# Patient Record
Sex: FEMALE | Race: WHITE | Employment: OTHER | URBAN - NONMETROPOLITAN AREA
[De-identification: names, ages, dates, MRNs, and addresses within clinical notes are randomized per-mention and may not be internally consistent; named-entity substitution may affect disease eponyms.]

---

## 2017-01-20 ENCOUNTER — TELEPHONE (OUTPATIENT)
Dept: CARDIOLOGY | Age: 77
End: 2017-01-20

## 2017-01-20 DIAGNOSIS — I73.9 PAD (PERIPHERAL ARTERY DISEASE) (HCC): Primary | ICD-10-CM

## 2017-02-15 ENCOUNTER — OFFICE VISIT (OUTPATIENT)
Dept: CARDIOLOGY | Age: 77
End: 2017-02-15

## 2017-02-15 VITALS
DIASTOLIC BLOOD PRESSURE: 68 MMHG | SYSTOLIC BLOOD PRESSURE: 138 MMHG | BODY MASS INDEX: 28.88 KG/M2 | HEIGHT: 67 IN | HEART RATE: 80 BPM | WEIGHT: 184 LBS

## 2017-02-15 DIAGNOSIS — E78.5 DYSLIPIDEMIA: ICD-10-CM

## 2017-02-15 DIAGNOSIS — I73.9 PAD (PERIPHERAL ARTERY DISEASE) (HCC): ICD-10-CM

## 2017-02-15 DIAGNOSIS — I25.10 CORONARY ARTERY DISEASE INVOLVING NATIVE CORONARY ARTERY OF NATIVE HEART WITHOUT ANGINA PECTORIS: ICD-10-CM

## 2017-02-15 DIAGNOSIS — Z95.1 S/P CABG (CORONARY ARTERY BYPASS GRAFT): ICD-10-CM

## 2017-02-15 DIAGNOSIS — I10 ESSENTIAL HYPERTENSION: ICD-10-CM

## 2017-02-15 DIAGNOSIS — R60.0 PEDAL EDEMA: Primary | ICD-10-CM

## 2017-02-15 PROCEDURE — 99213 OFFICE O/P EST LOW 20 MIN: CPT | Performed by: PHYSICIAN ASSISTANT

## 2017-02-15 RX ORDER — HYDROCHLOROTHIAZIDE 12.5 MG/1
12.5 CAPSULE, GELATIN COATED ORAL DAILY
Qty: 30 CAPSULE | Refills: 3 | Status: SHIPPED | OUTPATIENT
Start: 2017-02-15 | End: 2017-04-04 | Stop reason: SDUPTHER

## 2017-04-04 RX ORDER — HYDROCHLOROTHIAZIDE 12.5 MG/1
12.5 CAPSULE, GELATIN COATED ORAL DAILY
Qty: 90 CAPSULE | Refills: 0 | Status: SHIPPED | OUTPATIENT
Start: 2017-04-04 | End: 2017-05-18 | Stop reason: SDUPTHER

## 2017-04-06 RX ORDER — HYDROCHLOROTHIAZIDE 12.5 MG/1
12.5 CAPSULE, GELATIN COATED ORAL DAILY
Qty: 90 CAPSULE | Refills: 0 | OUTPATIENT
Start: 2017-04-06

## 2017-05-09 ENCOUNTER — OFFICE VISIT (OUTPATIENT)
Dept: FAMILY MEDICINE CLINIC | Age: 77
End: 2017-05-09

## 2017-05-09 VITALS
DIASTOLIC BLOOD PRESSURE: 64 MMHG | BODY MASS INDEX: 29.73 KG/M2 | WEIGHT: 185 LBS | TEMPERATURE: 97.8 F | HEART RATE: 67 BPM | HEIGHT: 66 IN | SYSTOLIC BLOOD PRESSURE: 110 MMHG | RESPIRATION RATE: 14 BRPM

## 2017-05-09 DIAGNOSIS — E78.00 PURE HYPERCHOLESTEROLEMIA: ICD-10-CM

## 2017-05-09 DIAGNOSIS — E11.9 TYPE 2 DIABETES MELLITUS WITHOUT COMPLICATION, WITHOUT LONG-TERM CURRENT USE OF INSULIN (HCC): Primary | ICD-10-CM

## 2017-05-09 DIAGNOSIS — Z95.1 S/P CABG X 3: ICD-10-CM

## 2017-05-09 DIAGNOSIS — M54.32 LEFT SIDED SCIATICA: ICD-10-CM

## 2017-05-09 DIAGNOSIS — Z91.81 AT HIGH RISK FOR FALLS: ICD-10-CM

## 2017-05-09 DIAGNOSIS — M25.551 HIP PAIN, RIGHT: ICD-10-CM

## 2017-05-09 LAB — HBA1C MFR BLD: 6.4 %

## 2017-05-09 PROCEDURE — 83036 HEMOGLOBIN GLYCOSYLATED A1C: CPT | Performed by: FAMILY MEDICINE

## 2017-05-09 PROCEDURE — 99214 OFFICE O/P EST MOD 30 MIN: CPT | Performed by: FAMILY MEDICINE

## 2017-05-09 RX ORDER — SIMVASTATIN 40 MG
TABLET ORAL
Qty: 90 TABLET | Refills: 3 | Status: SHIPPED | OUTPATIENT
Start: 2017-05-09 | End: 2018-06-04 | Stop reason: SDUPTHER

## 2017-05-09 RX ORDER — TRAMADOL HYDROCHLORIDE 50 MG/1
50 TABLET ORAL EVERY 8 HOURS PRN
Qty: 90 TABLET | Refills: 1 | Status: SHIPPED | OUTPATIENT
Start: 2017-05-09 | End: 2017-06-08

## 2017-05-18 ENCOUNTER — OFFICE VISIT (OUTPATIENT)
Dept: CARDIOLOGY | Age: 77
End: 2017-05-18

## 2017-05-18 VITALS
BODY MASS INDEX: 29.63 KG/M2 | HEART RATE: 80 BPM | WEIGHT: 184.4 LBS | DIASTOLIC BLOOD PRESSURE: 60 MMHG | SYSTOLIC BLOOD PRESSURE: 110 MMHG | HEIGHT: 66 IN

## 2017-05-18 DIAGNOSIS — I73.9 PAD (PERIPHERAL ARTERY DISEASE) (HCC): ICD-10-CM

## 2017-05-18 DIAGNOSIS — Z95.1 S/P CABG (CORONARY ARTERY BYPASS GRAFT): ICD-10-CM

## 2017-05-18 DIAGNOSIS — E11.59 TYPE 2 DIABETES MELLITUS WITH OTHER CIRCULATORY COMPLICATION: ICD-10-CM

## 2017-05-18 DIAGNOSIS — I25.10 CORONARY ARTERY DISEASE INVOLVING NATIVE CORONARY ARTERY OF NATIVE HEART WITHOUT ANGINA PECTORIS: Primary | ICD-10-CM

## 2017-05-18 DIAGNOSIS — E78.5 DYSLIPIDEMIA: ICD-10-CM

## 2017-05-18 DIAGNOSIS — I10 ESSENTIAL HYPERTENSION: ICD-10-CM

## 2017-05-18 PROCEDURE — 99213 OFFICE O/P EST LOW 20 MIN: CPT | Performed by: INTERNAL MEDICINE

## 2017-05-18 RX ORDER — HYDROCHLOROTHIAZIDE 12.5 MG/1
12.5 CAPSULE, GELATIN COATED ORAL DAILY
Qty: 90 CAPSULE | Refills: 3 | Status: ON HOLD | OUTPATIENT
Start: 2017-05-18 | End: 2018-03-03 | Stop reason: HOSPADM

## 2017-06-15 ENCOUNTER — TELEPHONE (OUTPATIENT)
Dept: FAMILY MEDICINE CLINIC | Age: 77
End: 2017-06-15

## 2017-07-07 ENCOUNTER — TELEPHONE (OUTPATIENT)
Dept: CARDIOLOGY | Age: 77
End: 2017-07-07

## 2017-07-20 ENCOUNTER — OFFICE VISIT (OUTPATIENT)
Dept: FAMILY MEDICINE CLINIC | Age: 77
End: 2017-07-20
Payer: MEDICARE

## 2017-07-20 VITALS
DIASTOLIC BLOOD PRESSURE: 68 MMHG | RESPIRATION RATE: 12 BRPM | BODY MASS INDEX: 28.73 KG/M2 | HEART RATE: 80 BPM | SYSTOLIC BLOOD PRESSURE: 132 MMHG | WEIGHT: 178.8 LBS | HEIGHT: 66 IN | TEMPERATURE: 99 F

## 2017-07-20 DIAGNOSIS — H57.89 EYE IRRITATION: Primary | ICD-10-CM

## 2017-07-20 DIAGNOSIS — Z85.828 HISTORY OF BASAL CELL CARCINOMA OF SKIN: ICD-10-CM

## 2017-07-20 PROCEDURE — 99213 OFFICE O/P EST LOW 20 MIN: CPT | Performed by: FAMILY MEDICINE

## 2017-07-20 RX ORDER — DIPHENHYDRAMINE HCL 25 MG
2 CAPSULE ORAL 3 TIMES DAILY PRN
Qty: 1 BOTTLE | Refills: 1 | Status: ON HOLD | OUTPATIENT
Start: 2017-07-20 | End: 2018-03-02

## 2017-07-20 RX ORDER — FLUTICASONE PROPIONATE 50 MCG
1 SPRAY, SUSPENSION (ML) NASAL DAILY
Qty: 1 BOTTLE | Refills: 3 | Status: SHIPPED | OUTPATIENT
Start: 2017-07-20 | End: 2018-02-07 | Stop reason: ALTCHOICE

## 2017-07-21 ENCOUNTER — TELEPHONE (OUTPATIENT)
Dept: FAMILY MEDICINE CLINIC | Age: 77
End: 2017-07-21

## 2017-08-01 ENCOUNTER — TELEPHONE (OUTPATIENT)
Dept: FAMILY MEDICINE CLINIC | Age: 77
End: 2017-08-01

## 2017-08-01 DIAGNOSIS — Z85.828 HISTORY OF BASAL CELL CANCER: Primary | ICD-10-CM

## 2017-09-19 DIAGNOSIS — R07.89 CHEST WALL PAIN: ICD-10-CM

## 2017-09-19 RX ORDER — AMITRIPTYLINE HYDROCHLORIDE 25 MG/1
25 TABLET, FILM COATED ORAL NIGHTLY
Qty: 90 TABLET | Refills: 3 | Status: SHIPPED | OUTPATIENT
Start: 2017-09-19 | End: 2019-01-14 | Stop reason: SDUPTHER

## 2017-12-14 ENCOUNTER — OFFICE VISIT (OUTPATIENT)
Dept: FAMILY MEDICINE CLINIC | Age: 77
End: 2017-12-14
Payer: MEDICARE

## 2017-12-14 VITALS
HEART RATE: 77 BPM | HEIGHT: 66 IN | WEIGHT: 182 LBS | RESPIRATION RATE: 14 BRPM | DIASTOLIC BLOOD PRESSURE: 77 MMHG | SYSTOLIC BLOOD PRESSURE: 138 MMHG | TEMPERATURE: 97.8 F | BODY MASS INDEX: 29.25 KG/M2

## 2017-12-14 DIAGNOSIS — E11.9 TYPE 2 DIABETES MELLITUS WITHOUT COMPLICATION, WITHOUT LONG-TERM CURRENT USE OF INSULIN (HCC): Primary | ICD-10-CM

## 2017-12-14 DIAGNOSIS — J30.89 CHRONIC NONSEASONAL ALLERGIC RHINITIS DUE TO OTHER ALLERGEN: ICD-10-CM

## 2017-12-14 DIAGNOSIS — I25.119 CORONARY ARTERY DISEASE INVOLVING NATIVE CORONARY ARTERY OF NATIVE HEART WITH ANGINA PECTORIS (HCC): ICD-10-CM

## 2017-12-14 LAB — HBA1C MFR BLD: 6.2 %

## 2017-12-14 PROCEDURE — 99214 OFFICE O/P EST MOD 30 MIN: CPT | Performed by: FAMILY MEDICINE

## 2017-12-14 PROCEDURE — 83036 HEMOGLOBIN GLYCOSYLATED A1C: CPT | Performed by: FAMILY MEDICINE

## 2017-12-14 RX ORDER — CLOPIDOGREL BISULFATE 75 MG/1
75 TABLET ORAL DAILY
Qty: 90 TABLET | Refills: 3 | Status: SHIPPED | OUTPATIENT
Start: 2017-12-14 | End: 2019-01-15 | Stop reason: SDUPTHER

## 2017-12-14 ASSESSMENT — PATIENT HEALTH QUESTIONNAIRE - PHQ9
2. FEELING DOWN, DEPRESSED OR HOPELESS: 1
1. LITTLE INTEREST OR PLEASURE IN DOING THINGS: 0
SUM OF ALL RESPONSES TO PHQ9 QUESTIONS 1 & 2: 1
SUM OF ALL RESPONSES TO PHQ QUESTIONS 1-9: 1

## 2017-12-14 NOTE — PROGRESS NOTES
Rafita Mccullough is a 68 y.o. female that presents for 6 Month Follow-Up; Fatigue (fatigue for past 3 months, states blood pressure was low at Dr Saorj Wisdom office last visit 12/12/17); Other (due for pneumonia injection ); and Other (parking placard)        HPI:    HTN    Does patient check BP regularly at home? - No  Current Medication regimen - Metoprolol, hctz  Tolerating medications well? - yes    Shortness of breath or chest pain? No  Headache or visual complaints? No  Neurologic changes like confusion? No  Extremity edema? No    BP Readings from Last 3 Encounters:   12/14/17 138/77   07/20/17 132/68   05/18/17 110/60     Diabetes Type 2    Glucose control:   Does patient check blood glucoses at home? Yes - last FBG was 105  Report of hypoglycemia: no  Lab Results   Component Value Date    LABA1C 6.2 12/14/2017     No results found for: EAG    Symptoms  Polyuria, Polydipsia or Polyphagia? No  Chest Pain, SOB, or Palpitations? -  No  New Vision complaints? No  Paresthesias of the extremities? No    Medications  Current medication were reviewed. Compliant with medications? yes  Medication side effects? No  On ACE-I or ARB? No  On antiplatelet therapy? Yes  On Statin? Yes      Exercise  Exercise? No  Wt Readings from Last 3 Encounters:   12/14/17 182 lb (82.6 kg)   07/20/17 178 lb 12.8 oz (81.1 kg)   05/18/17 184 lb 6.4 oz (83.6 kg)       Diet discipline?:  Low salt, fat, sugar diet?  no    Blood pressure control:  BP Readings from Last 3 Encounters:   12/14/17 138/77   07/20/17 132/68   05/18/17 110/60       No results found for: LABMICR, [id number]    Lab Results   Component Value Date    LDLCALC 83 02/03/2016       Nasal congestion well controlled. Has not had to use flonase recently.     Health Maintenance   Topic Date Due    DEXA (modify frequency per FRAX score)  07/25/2005    Pneumococcal low/med risk (2 of 2 - PCV13) 11/01/2016    Lipid screen  10/25/2021    DTaP/Tdap/Td vaccine (2 - Td) and family history and I have made updates where appropriate. ROS        PHYSICAL EXAM:  /77   Pulse 77   Temp 97.8 °F (36.6 °C) (Oral)   Resp 14   Ht 5' 6\" (1.676 m)   Wt 182 lb (82.6 kg)   LMP  (LMP Unknown)   BMI 29.38 kg/m²     General Appearance: well developed and well- nourished, in no acute distress  Head: normocephalic and atraumatic  ENT: external ear and ear canal normal bilaterally, nose without deformity, nasal mucosa and turbinates normal without polyps, oropharynx normal, dentition is normal for age, no lip or gum lesions noted  Neck: supple and non-tender without mass, no thyromegaly or thyroid nodules, no cervical lymphadenopathy  Pulmonary/Chest: clear to auscultation bilaterally- no wheezes, rales or rhonchi, normal air movement, no respiratory distress or retractions  Cardiovascular: normal rate, regular rhythm, normal S1 and S2, no murmurs, rubs, clicks, or gallops, distal pulses intact  Extremities: no cyanosis, clubbing or edema of the lower extremities  Psych:  Normal affect without evidence of depression or anxiety, insight and judgement are appropriate, memory appears intact  Skin: warm and dry, no rash or erythema      ASSESSMENT & PLAN  Marilyn Newman was seen today for 6 month follow-up, fatigue, other and other. Diagnoses and all orders for this visit:    Type 2 diabetes mellitus without complication, without long-term current use of insulin (HCC)  -     POCT glycosylated hemoglobin (Hb A1C)  -     Comprehensive Metabolic Panel; Future  -     Lipid Panel; Future    Coronary artery disease involving native coronary artery of native heart with angina pectoris (HCC)  -     metoprolol tartrate (LOPRESSOR) 25 MG tablet; Take 0.5 tablets by mouth 2 times daily  -     clopidogrel (PLAVIX) 75 MG tablet; Take 1 tablet by mouth daily  -     Handicap Placard MISC; by Does not apply route Expires 12/14/2022  -     Comprehensive Metabolic Panel;  Future  -     Lipid Panel; Future    Chronic nonseasonal allergic rhinitis due to other allergen    -Chronic issues well controlled, continue current medications  -Advised to call if any issues      Return in about 6 months (around 6/14/2018), or if symptoms worsen or fail to improve. Arnaud Beckett received counseling on the following healthy behaviors: medication adherence  Reviewed prior labs and health maintenance. Continue current medications, diet and exercise. Discussed use, benefit, and side effects of prescribed medications. Barriers to medication compliance addressed. Patient given educational materials - see patient instructions. All patient questions answered. Patient voiced understanding.

## 2018-02-06 ENCOUNTER — HOSPITAL ENCOUNTER (OUTPATIENT)
Dept: INTERVENTIONAL RADIOLOGY/VASCULAR | Age: 78
Discharge: HOME | End: 2018-02-06
Payer: MEDICARE

## 2018-02-06 ENCOUNTER — TELEPHONE (OUTPATIENT)
Dept: CARDIOLOGY CLINIC | Age: 78
End: 2018-02-06

## 2018-02-06 DIAGNOSIS — M79.661 PAIN AND SWELLING OF RIGHT LOWER LEG: ICD-10-CM

## 2018-02-06 DIAGNOSIS — M79.89 PAIN AND SWELLING OF RIGHT LOWER LEG: ICD-10-CM

## 2018-02-06 PROCEDURE — 93971 EXTREMITY STUDY: CPT

## 2018-02-06 NOTE — TELEPHONE ENCOUNTER
Attempted to call patient.  is full and was unable to leave a message.  Patient needs to be seen before Dr. Eva Enamordao will clear her for her surgery ( Sacroiliac Radio Frequency Ablation ) appointment made for March 27, 2018 @ 10:15AM

## 2018-02-07 ENCOUNTER — OFFICE VISIT (OUTPATIENT)
Dept: CARDIOLOGY CLINIC | Age: 78
End: 2018-02-07
Payer: MEDICARE

## 2018-02-07 VITALS
HEIGHT: 67 IN | SYSTOLIC BLOOD PRESSURE: 98 MMHG | BODY MASS INDEX: 29.35 KG/M2 | HEART RATE: 87 BPM | WEIGHT: 187 LBS | DIASTOLIC BLOOD PRESSURE: 58 MMHG

## 2018-02-07 DIAGNOSIS — I10 ESSENTIAL HYPERTENSION: ICD-10-CM

## 2018-02-07 DIAGNOSIS — I77.1 SUBCLAVIAN ARTERIAL STENOSIS (HCC): ICD-10-CM

## 2018-02-07 DIAGNOSIS — Z01.810 PREOP CARDIOVASCULAR EXAM: Primary | ICD-10-CM

## 2018-02-07 DIAGNOSIS — I73.9 PAD (PERIPHERAL ARTERY DISEASE) (HCC): ICD-10-CM

## 2018-02-07 DIAGNOSIS — Z95.1 S/P CABG (CORONARY ARTERY BYPASS GRAFT): ICD-10-CM

## 2018-02-07 DIAGNOSIS — E78.5 DYSLIPIDEMIA: ICD-10-CM

## 2018-02-07 DIAGNOSIS — M54.5 CHRONIC LOW BACK PAIN, UNSPECIFIED BACK PAIN LATERALITY, WITH SCIATICA PRESENCE UNSPECIFIED: ICD-10-CM

## 2018-02-07 DIAGNOSIS — I25.10 CORONARY ARTERY DISEASE INVOLVING NATIVE CORONARY ARTERY OF NATIVE HEART WITHOUT ANGINA PECTORIS: ICD-10-CM

## 2018-02-07 DIAGNOSIS — M79.89 LEG SWELLING: ICD-10-CM

## 2018-02-07 DIAGNOSIS — Z87.891 EX-SMOKER: ICD-10-CM

## 2018-02-07 DIAGNOSIS — G89.29 CHRONIC LOW BACK PAIN, UNSPECIFIED BACK PAIN LATERALITY, WITH SCIATICA PRESENCE UNSPECIFIED: ICD-10-CM

## 2018-02-07 PROCEDURE — 99214 OFFICE O/P EST MOD 30 MIN: CPT | Performed by: INTERNAL MEDICINE

## 2018-02-07 PROCEDURE — 93000 ELECTROCARDIOGRAM COMPLETE: CPT | Performed by: INTERNAL MEDICINE

## 2018-02-07 RX ORDER — TRAMADOL HYDROCHLORIDE 50 MG/1
50 TABLET ORAL EVERY 8 HOURS PRN
COMMUNITY
End: 2018-05-17 | Stop reason: ALTCHOICE

## 2018-02-07 NOTE — PROGRESS NOTES
This patient is followed regularly by Dr. aMximo Dawson DO. Chief Complaint   Patient presents with    Cardiac Clearance     sacroiliac radio frequency ablation    Coronary Artery Disease       Patient with history of high blood pressure, hyperlipidemia, 90-95 percent stenosis of the distal left main and mild to moderate calcification of the coronary arteries, S/P Coronary artery bypass grafting x3 with reverse aortocoronary saphenous vein grafts to the LAD, the first diagonal branch, and the obtuse marginal branch of the circumflex is here for the follow up. Needs cardiac clearance for sacroiliac radiofrequency ablation. Current Outpatient Prescriptions   Medication Sig Dispense Refill    traMADol (ULTRAM) 50 MG tablet Take 50 mg by mouth every 8 hours as needed for Pain.       metoprolol tartrate (LOPRESSOR) 25 MG tablet Take 0.5 tablets by mouth 2 times daily 180 tablet 3    clopidogrel (PLAVIX) 75 MG tablet Take 1 tablet by mouth daily 90 tablet 3    Handicap Placard MISC by Does not apply route Expires 12/14/2022 1 each 0    amitriptyline (ELAVIL) 25 MG tablet Take 1 tablet by mouth nightly 90 tablet 3    hydrochlorothiazide (MICROZIDE) 12.5 MG capsule Take 1 capsule by mouth daily 90 capsule 3    simvastatin (ZOCOR) 40 MG tablet TAKE 1 TABLET NIGHTLY 90 tablet 3    lactulose (CHRONULAC) 10 GM/15ML solution Take 20 g by mouth daily      Cyanocobalamin (B-12) 2500 MCG TABS Take by mouth daily       nitroGLYCERIN (NITROSTAT) 0.4 MG SL tablet Place 1 tablet under the tongue every 5 minutes as needed for Chest pain 25 tablet 3    aspirin 81 MG tablet Take 81 mg by mouth daily      Multiple Vitamins-Minerals (THERAPEUTIC MULTIVITAMIN-MINERALS) tablet Take 1 tablet by mouth daily (with breakfast) 30 tablet 0    artificial tears 0.1-0.3 % SOLN ophthalmic solution Apply 2 drops to eye 3 times daily as needed (dry eye) 1 Bottle 1     No current facility-administered medications for this visit. Review of Systems - General ROS: negative  Psychological ROS: negative  Hematological and Lymphatic ROS: No history of blood clots or bleeding disorder. Respiratory ROS: no cough, shortness of breath, or wheezing  Cardiovascular ROS: No chest pain  Gastrointestinal ROS: negative  Genito-Urinary ROS: no dysuria, trouble voiding, or hematuria  Musculoskeletal ROS: c/o back pain  Neurological ROS: no TIA or stroke symptoms  Dermatological ROS: negative  Complaints of leg swelling      BP (!) 98/58 (Site: Left Arm, Position: Sitting, Cuff Size: Medium Adult)   Pulse 87   Ht 5' 6.5\" (1.689 m)   Wt 187 lb (84.8 kg)   LMP  (LMP Unknown)   BMI 29.73 kg/m²   BP on the right arm - 120/60 mmHg    Physical Examination: General appearance - alert, well appearing  Mental status - alert, oriented to person, place, and time  Neck - supple, no significant adenopathy, no JVD  Chest - clear to auscultation, no wheezes, rales or rhonchi, symmetric air entry  Heart - normal rate, regular rhythm, normal S1, S2, no murmurs  Abdomen - soft, nontender, nondistended, no masses   Neurological - alert, oriented, normal speech, no focal findings   Musculoskeletal - no joint tenderness, deformity or swelling  Extremities - peripheral pulses normal, trace pedal edema  Skin - normal coloration and turgor, no rashes      EKG  NSR, AW STT changes    Echo  Normal EF, Grade 1 diastolic dysfunction, LAE    Cath   LM - 60% ds    Abn TEOFILO    Lower ext angiogram  Left lower extremity has a mild diffuse disease. Right anterior tibial artery is totally occluded.          Lab Results   Component Value Date    WBC 7.6 01/31/2017    RBC 4.15 01/31/2017    RBC 4.10 07/11/2011    HGB 11.4 01/31/2017    HCT 34.9 01/31/2017    MCV 84.1 01/31/2017    MCH 27.5 01/31/2017    MCHC 32.8 01/31/2017    RDW 16.0 01/31/2017     01/31/2017    MPV 9.1 01/31/2017       Lab Results   Component Value Date     02/21/2017    K 4.4 02/21/2017  02/21/2017    CO2 27 02/21/2017    BUN 10 02/21/2017    LABALBU 3.8 02/25/2016    LABALBU 4.6 07/11/2011    CREATININE 0.8 02/21/2017    CALCIUM 10.0 02/21/2017    LABGLOM 70 02/21/2017    GLUCOSE 125 02/21/2017       Lab Results   Component Value Date    ALKPHOS 115 02/25/2016    ALT 24 02/25/2016    AST 42 02/25/2016    PROT 7.1 02/25/2016    BILITOT 2.3 02/25/2016    BILIDIR 0.5 02/25/2016    LABALBU 3.8 02/25/2016    LABALBU 4.6 07/11/2011       Lab Results   Component Value Date    MG 2.5 02/19/2016         Lab Results   Component Value Date    LABA1C 6.2 12/14/2017       Lab Results   Component Value Date    TRIG 104 02/03/2016    HDL 49 02/03/2016    LDLCALC 83 02/03/2016           Assessment/Plan:      Pt is scheduled for sacroiliac radiofrequency ablation and needs pre-op cardiac clearance. Patient has significant cardiac conditions. Coronary artery bypass graft surgery  Patient's risk of perioperative cardiac events are always high whenever he is off antiplatelets agents. If it is must to stop antiplatelets agents prior to the surgery or procedure then I would recommend to stop Plavix 7 days before the procedure and aspirin 3 days before the procedure . And I recommend to resume them as soon as possible after the procedure. Continue perioperative B-Blocker to reduce further cardiovascular events. Leg swelling could be due to venous insufficiency - Get venous mapping with reflux time to assess the severity. If it is severe, endovascular laser ablation can be considered. Patient had abnormal carotid Doppler in 2015 showing retrograde flow in the left vertebral artery. It could be due to left subclavian stenosis. Blood pressure difference in both arms is more than 20 mmHg. I region suggestive of left subclavian artery stenosis. Get CTA of the chest for better assessment. Stable Peripheral vascular disease  Right anterior tibial artery is totally occluded.   I would intervene on right anterior tibial artery only if patient has known healing ulcer or resting pain. Continue aspirin and Plavix. Coronary artery disease   S/P Coronary artery bypass grafting x3 with reverse aortocoronary saphenous vein grafts to the LAD, the first diagonal branch, and the obtuse marginal branch of the circumflex. Seems to be stable. Denies angina or change in breathing pattern. Continue ASA/plavix/BB/Statin. Hypertension, on medical treatment. Seems to be under good control. Patient is compliant with medical treatment. Diabetes mellitus: Followed by family physician. Patient needs very tight control to minimize cardiac risk. Dyslipidemia: On statin, followed periodically. Patient need periodic lipid and liver profile. Patient is a advised to have their lipids and liver panel checked through family doctor. The therapeutic values that need to be met are also presented to the patient and need to achieve them is emphasized and patient agrees and accepts. Patient is educated about symptoms of congestive heart failure and these include weighing one self and if gains 3 pounds to take additional water pill, fluid restrict to 2 liters a day, 2 gram sodium intake and if increased thirst to seek medical attention. Will schedule follow up appointment in 3 months.

## 2018-02-07 NOTE — PROGRESS NOTES
Patient here for pre-op clearance for sacroiliac radio frequency ablation with Dr. Latasha Ram. Patient denies having any chest pain, SOB or palpitations. Patient has intermittent dizziness and RHODA. EKG completed in office.

## 2018-02-14 ENCOUNTER — HOSPITAL ENCOUNTER (OUTPATIENT)
Dept: INTERVENTIONAL RADIOLOGY/VASCULAR | Age: 78
Discharge: HOME | End: 2018-02-14
Payer: MEDICARE

## 2018-02-14 ENCOUNTER — HOSPITAL ENCOUNTER (OUTPATIENT)
Dept: CT IMAGING | Age: 78
Discharge: HOME | End: 2018-02-14
Payer: MEDICARE

## 2018-02-14 DIAGNOSIS — I77.1 SUBCLAVIAN ARTERIAL STENOSIS (HCC): ICD-10-CM

## 2018-02-14 DIAGNOSIS — M79.89 LEG SWELLING: ICD-10-CM

## 2018-02-14 LAB — POC CREATININE WHOLE BLOOD: 0.8 MG/DL (ref 0.5–1.2)

## 2018-02-14 PROCEDURE — 93970 EXTREMITY STUDY: CPT

## 2018-02-14 PROCEDURE — 82565 ASSAY OF CREATININE: CPT

## 2018-02-14 PROCEDURE — 6360000004 HC RX CONTRAST MEDICATION: Performed by: INTERNAL MEDICINE

## 2018-02-14 PROCEDURE — 71275 CT ANGIOGRAPHY CHEST: CPT

## 2018-02-14 RX ADMIN — IOPAMIDOL 90 ML: 755 INJECTION, SOLUTION INTRAVENOUS at 08:28

## 2018-02-19 ENCOUNTER — TELEPHONE (OUTPATIENT)
Dept: CARDIOLOGY CLINIC | Age: 78
End: 2018-02-19

## 2018-02-21 ENCOUNTER — HOSPITAL ENCOUNTER (EMERGENCY)
Age: 78
Discharge: HOME | End: 2018-02-21
Attending: EMERGENCY MEDICINE
Payer: MEDICARE

## 2018-02-21 VITALS
SYSTOLIC BLOOD PRESSURE: 166 MMHG | WEIGHT: 177 LBS | DIASTOLIC BLOOD PRESSURE: 81 MMHG | BODY MASS INDEX: 28.45 KG/M2 | HEART RATE: 80 BPM | OXYGEN SATURATION: 98 % | TEMPERATURE: 98.2 F | HEIGHT: 66 IN | RESPIRATION RATE: 20 BRPM

## 2018-02-21 DIAGNOSIS — S46.912A MUSCLE STRAIN OF LEFT UPPER ARM, INITIAL ENCOUNTER: Primary | ICD-10-CM

## 2018-02-21 DIAGNOSIS — M79.602 LEFT ARM PAIN: ICD-10-CM

## 2018-02-21 LAB
ANION GAP SERPL CALCULATED.3IONS-SCNC: 11 MEQ/L (ref 8–16)
ANISOCYTOSIS: ABNORMAL
BASOPHILS # BLD: 0.4 %
BASOPHILS ABSOLUTE: 0 THOU/MM3 (ref 0–0.1)
BUN BLDV-MCNC: 9 MG/DL (ref 7–22)
CALCIUM SERPL-MCNC: 10 MG/DL (ref 8.5–10.5)
CHLORIDE BLD-SCNC: 103 MEQ/L (ref 98–111)
CO2: 28 MEQ/L (ref 23–33)
CREAT SERPL-MCNC: 0.8 MG/DL (ref 0.4–1.2)
EKG ATRIAL RATE: 79 BPM
EKG P AXIS: 78 DEGREES
EKG P-R INTERVAL: 182 MS
EKG Q-T INTERVAL: 396 MS
EKG QRS DURATION: 92 MS
EKG QTC CALCULATION (BAZETT): 454 MS
EKG R AXIS: 56 DEGREES
EKG T AXIS: 75 DEGREES
EKG VENTRICULAR RATE: 79 BPM
EOSINOPHIL # BLD: 0.9 %
EOSINOPHILS ABSOLUTE: 0.1 THOU/MM3 (ref 0–0.4)
GFR SERPL CREATININE-BSD FRML MDRD: 69 ML/MIN/1.73M2
GLUCOSE BLD-MCNC: 230 MG/DL (ref 70–108)
HCT VFR BLD CALC: 31.5 % (ref 37–47)
HEMOGLOBIN: 9.8 GM/DL (ref 12–16)
HYPOCHROMIA: ABNORMAL
LYMPHOCYTES # BLD: 20.6 %
LYMPHOCYTES ABSOLUTE: 1.5 THOU/MM3 (ref 1–4.8)
MCH RBC QN AUTO: 25 PG (ref 27–31)
MCHC RBC AUTO-ENTMCNC: 31 GM/DL (ref 33–37)
MCV RBC AUTO: 80.4 FL (ref 81–99)
MICROCYTES: ABNORMAL
MONOCYTES # BLD: 5.1 %
MONOCYTES ABSOLUTE: 0.4 THOU/MM3 (ref 0.4–1.3)
NUCLEATED RED BLOOD CELLS: 0 /100 WBC
OSMOLALITY CALCULATION: 289.1 MOSMOL/KG (ref 275–300)
PDW BLD-RTO: 17.6 % (ref 11.5–14.5)
PLATELET # BLD: 264 THOU/MM3 (ref 130–400)
PMV BLD AUTO: 9.7 FL (ref 7.4–10.4)
POTASSIUM SERPL-SCNC: 4.7 MEQ/L (ref 3.5–5.2)
RBC # BLD: 3.92 MILL/MM3 (ref 4.2–5.4)
SEG NEUTROPHILS: 73 %
SEGMENTED NEUTROPHILS ABSOLUTE COUNT: 5.3 THOU/MM3 (ref 1.8–7.7)
SODIUM BLD-SCNC: 142 MEQ/L (ref 135–145)
TROPONIN T: < 0.01 NG/ML
WBC # BLD: 7.3 THOU/MM3 (ref 4.8–10.8)

## 2018-02-21 PROCEDURE — 99283 EMERGENCY DEPT VISIT LOW MDM: CPT

## 2018-02-21 PROCEDURE — 85025 COMPLETE CBC W/AUTO DIFF WBC: CPT

## 2018-02-21 PROCEDURE — 36415 COLL VENOUS BLD VENIPUNCTURE: CPT

## 2018-02-21 PROCEDURE — 80048 BASIC METABOLIC PNL TOTAL CA: CPT

## 2018-02-21 PROCEDURE — 93005 ELECTROCARDIOGRAM TRACING: CPT | Performed by: EMERGENCY MEDICINE

## 2018-02-21 PROCEDURE — 84484 ASSAY OF TROPONIN QUANT: CPT

## 2018-02-21 ASSESSMENT — PAIN DESCRIPTION - PAIN TYPE: TYPE: ACUTE PAIN

## 2018-02-21 ASSESSMENT — PAIN SCALES - GENERAL: PAINLEVEL_OUTOF10: 6

## 2018-02-21 ASSESSMENT — ENCOUNTER SYMPTOMS
WHEEZING: 0
DIARRHEA: 0
EYE DISCHARGE: 0
EYE PAIN: 0
VOMITING: 0
ABDOMINAL PAIN: 0
COUGH: 0
RHINORRHEA: 0
BACK PAIN: 0
NAUSEA: 0
SHORTNESS OF BREATH: 0
SORE THROAT: 0

## 2018-02-21 ASSESSMENT — PAIN DESCRIPTION - ORIENTATION: ORIENTATION: LEFT

## 2018-02-21 ASSESSMENT — PAIN DESCRIPTION - FREQUENCY: FREQUENCY: INTERMITTENT

## 2018-02-21 ASSESSMENT — PAIN DESCRIPTION - LOCATION: LOCATION: ARM

## 2018-02-21 ASSESSMENT — PAIN DESCRIPTION - DESCRIPTORS: DESCRIPTORS: CRAMPING;DISCOMFORT;PRESSURE

## 2018-02-21 NOTE — ED PROVIDER NOTES
feeling fine and with negative bloodwork and a normal EKG, will discharge home    CRITICAL CARE:   None     CONSULTS:  None    PROCEDURES:  None     FINAL IMPRESSION      1. Muscle strain of left upper arm, initial encounter    2. Left arm pain          DISPOSITION/PLAN   Discharge Home    PATIENT REFERRED TO:  Saurabh Paz [address]  [phone]    Schedule an appointment as soon as possible for a visit         DISCHARGE MEDICATIONS:  New Prescriptions    No medications on file       (Please note that portions of this note were completed with a voice recognition program.  Efforts were made to edit the dictations but occasionally words are mis-transcribed.)    Scribe:  Carter Pal 2/21/18 4:45 PM Scribing for and in the presence of Chantal Bahena DO. Signed by: Joshua Pearson, 02/21/18 5:47 PM    Provider:  I personally performed the services described in the documentation, reviewed and edited the documentation which was dictated to the scribe in my presence, and it accurately records my words and actions.     Chantal Bahena DO 2/21/18 5:47 PM                          Chantal Bahena DO  02/21/18 1747

## 2018-02-22 ENCOUNTER — TELEPHONE (OUTPATIENT)
Dept: CARDIOLOGY CLINIC | Age: 78
End: 2018-02-22

## 2018-02-22 DIAGNOSIS — I77.1 SUBCLAVIAN ARTERIAL STENOSIS (HCC): Primary | ICD-10-CM

## 2018-02-22 PROCEDURE — 93010 ELECTROCARDIOGRAM REPORT: CPT | Performed by: INTERNAL MEDICINE

## 2018-03-02 ENCOUNTER — HOSPITAL ENCOUNTER (OUTPATIENT)
Dept: INTERVENTIONAL RADIOLOGY/VASCULAR | Age: 78
Discharge: HOME | End: 2018-03-02
Payer: MEDICARE

## 2018-03-02 ENCOUNTER — HOSPITAL ENCOUNTER (OUTPATIENT)
Dept: INPATIENT UNIT | Age: 78
Discharge: HOME | End: 2018-03-03
Attending: RADIOLOGY | Admitting: INTERNAL MEDICINE
Payer: MEDICARE

## 2018-03-02 DIAGNOSIS — I77.1 SUBCLAVIAN ARTERIAL STENOSIS (HCC): ICD-10-CM

## 2018-03-02 LAB
ABO: NORMAL
ANION GAP SERPL CALCULATED.3IONS-SCNC: 12 MEQ/L (ref 8–16)
ANTIBODY SCREEN: NORMAL
APTT: 28.4 SECONDS (ref 22–38)
BUN BLDV-MCNC: 13 MG/DL (ref 7–22)
CALCIUM SERPL-MCNC: 10 MG/DL (ref 8.5–10.5)
CHLORIDE BLD-SCNC: 103 MEQ/L (ref 98–111)
CO2: 25 MEQ/L (ref 23–33)
CREAT SERPL-MCNC: 0.7 MG/DL (ref 0.4–1.2)
EKG ATRIAL RATE: 69 BPM
EKG P AXIS: 84 DEGREES
EKG P-R INTERVAL: 190 MS
EKG Q-T INTERVAL: 420 MS
EKG QRS DURATION: 86 MS
EKG QTC CALCULATION (BAZETT): 450 MS
EKG R AXIS: 31 DEGREES
EKG T AXIS: 80 DEGREES
EKG VENTRICULAR RATE: 69 BPM
GFR SERPL CREATININE-BSD FRML MDRD: 81 ML/MIN/1.73M2
GLUCOSE BLD-MCNC: 105 MG/DL (ref 70–108)
HCT VFR BLD CALC: 32.9 % (ref 37–47)
HEMOGLOBIN: 10.3 GM/DL (ref 12–16)
INR BLD: 1.08 (ref 0.85–1.13)
MCH RBC QN AUTO: 24.7 PG (ref 27–31)
MCHC RBC AUTO-ENTMCNC: 31.3 GM/DL (ref 33–37)
MCV RBC AUTO: 78.9 FL (ref 81–99)
PDW BLD-RTO: 19.1 % (ref 11.5–14.5)
PLATELET # BLD: 282 THOU/MM3 (ref 130–400)
PMV BLD AUTO: 9.9 FL (ref 7.4–10.4)
POTASSIUM REFLEX MAGNESIUM: 4.3 MEQ/L (ref 3.5–5.2)
RBC # BLD: 4.17 MILL/MM3 (ref 4.2–5.4)
RH FACTOR: NORMAL
SODIUM BLD-SCNC: 140 MEQ/L (ref 135–145)
WBC # BLD: 8.9 THOU/MM3 (ref 4.8–10.8)

## 2018-03-02 PROCEDURE — 2500000003 HC RX 250 WO HCPCS

## 2018-03-02 PROCEDURE — 85610 PROTHROMBIN TIME: CPT

## 2018-03-02 PROCEDURE — 2580000003 HC RX 258: Performed by: INTERNAL MEDICINE

## 2018-03-02 PROCEDURE — 36221 PLACE CATH THORACIC AORTA: CPT | Performed by: INTERNAL MEDICINE

## 2018-03-02 PROCEDURE — 6360000002 HC RX W HCPCS

## 2018-03-02 PROCEDURE — 6370000000 HC RX 637 (ALT 250 FOR IP): Performed by: INTERNAL MEDICINE

## 2018-03-02 PROCEDURE — 6360000002 HC RX W HCPCS: Performed by: INTERNAL MEDICINE

## 2018-03-02 PROCEDURE — 96361 HYDRATE IV INFUSION ADD-ON: CPT

## 2018-03-02 PROCEDURE — 80048 BASIC METABOLIC PNL TOTAL CA: CPT

## 2018-03-02 PROCEDURE — 86900 BLOOD TYPING SEROLOGIC ABO: CPT

## 2018-03-02 PROCEDURE — 2500000003 HC RX 250 WO HCPCS: Performed by: INTERNAL MEDICINE

## 2018-03-02 PROCEDURE — C1887 CATHETER, GUIDING: HCPCS

## 2018-03-02 PROCEDURE — 2780000010 HC IMPLANT OTHER

## 2018-03-02 PROCEDURE — 86850 RBC ANTIBODY SCREEN: CPT

## 2018-03-02 PROCEDURE — 75710 ARTERY X-RAYS ARM/LEG: CPT | Performed by: INTERNAL MEDICINE

## 2018-03-02 PROCEDURE — 93005 ELECTROCARDIOGRAM TRACING: CPT | Performed by: INTERNAL MEDICINE

## 2018-03-02 PROCEDURE — 6360000004 HC RX CONTRAST MEDICATION: Performed by: INTERNAL MEDICINE

## 2018-03-02 PROCEDURE — 85730 THROMBOPLASTIN TIME PARTIAL: CPT

## 2018-03-02 PROCEDURE — 36415 COLL VENOUS BLD VENIPUNCTURE: CPT

## 2018-03-02 PROCEDURE — 36140 INTRO NDL ICATH UPR/LXTR ART: CPT | Performed by: INTERNAL MEDICINE

## 2018-03-02 PROCEDURE — 36215 PLACE CATHETER IN ARTERY: CPT | Performed by: INTERNAL MEDICINE

## 2018-03-02 PROCEDURE — C1894 INTRO/SHEATH, NON-LASER: HCPCS

## 2018-03-02 PROCEDURE — C1769 GUIDE WIRE: HCPCS

## 2018-03-02 PROCEDURE — 86901 BLOOD TYPING SEROLOGIC RH(D): CPT

## 2018-03-02 PROCEDURE — 85027 COMPLETE CBC AUTOMATED: CPT

## 2018-03-02 PROCEDURE — 37246 TRLUML BALO ANGIOP 1ST ART: CPT | Performed by: INTERNAL MEDICINE

## 2018-03-02 PROCEDURE — 96360 HYDRATION IV INFUSION INIT: CPT

## 2018-03-02 PROCEDURE — 6370000000 HC RX 637 (ALT 250 FOR IP)

## 2018-03-02 PROCEDURE — 93010 ELECTROCARDIOGRAM REPORT: CPT | Performed by: NUCLEAR MEDICINE

## 2018-03-02 RX ORDER — ASPIRIN 325 MG
325 TABLET ORAL ONCE
Status: DISCONTINUED | OUTPATIENT
Start: 2018-03-02 | End: 2018-03-02

## 2018-03-02 RX ORDER — SODIUM CHLORIDE 9 MG/ML
INJECTION, SOLUTION INTRAVENOUS CONTINUOUS
Status: DISCONTINUED | OUTPATIENT
Start: 2018-03-02 | End: 2018-03-03

## 2018-03-02 RX ORDER — OXYCODONE HYDROCHLORIDE AND ACETAMINOPHEN 5; 325 MG/1; MG/1
2 TABLET ORAL EVERY 4 HOURS PRN
Status: DISCONTINUED | OUTPATIENT
Start: 2018-03-02 | End: 2018-03-03 | Stop reason: HOSPADM

## 2018-03-02 RX ORDER — FENTANYL CITRATE 50 UG/ML
50 INJECTION, SOLUTION INTRAMUSCULAR; INTRAVENOUS ONCE
Status: COMPLETED | OUTPATIENT
Start: 2018-03-02 | End: 2018-03-02

## 2018-03-02 RX ORDER — MIDAZOLAM HYDROCHLORIDE 1 MG/ML
1 INJECTION INTRAMUSCULAR; INTRAVENOUS ONCE
Status: COMPLETED | OUTPATIENT
Start: 2018-03-02 | End: 2018-03-02

## 2018-03-02 RX ORDER — OXYCODONE HYDROCHLORIDE AND ACETAMINOPHEN 5; 325 MG/1; MG/1
2 TABLET ORAL EVERY 4 HOURS PRN
Status: DISCONTINUED | OUTPATIENT
Start: 2018-03-02 | End: 2018-03-02

## 2018-03-02 RX ORDER — SODIUM CHLORIDE 0.9 % (FLUSH) 0.9 %
10 SYRINGE (ML) INJECTION EVERY 12 HOURS SCHEDULED
Status: DISCONTINUED | OUTPATIENT
Start: 2018-03-02 | End: 2018-03-03 | Stop reason: HOSPADM

## 2018-03-02 RX ORDER — DIAPER,BRIEF,INFANT-TODD,DISP
EACH MISCELLANEOUS ONCE
Status: COMPLETED | OUTPATIENT
Start: 2018-03-02 | End: 2018-03-02

## 2018-03-02 RX ORDER — SODIUM CHLORIDE 0.9 % (FLUSH) 0.9 %
10 SYRINGE (ML) INJECTION PRN
Status: DISCONTINUED | OUTPATIENT
Start: 2018-03-02 | End: 2018-03-03 | Stop reason: HOSPADM

## 2018-03-02 RX ORDER — HEPARIN SODIUM 1000 [USP'U]/ML
1900 INJECTION, SOLUTION INTRAVENOUS; SUBCUTANEOUS ONCE
Status: COMPLETED | OUTPATIENT
Start: 2018-03-02 | End: 2018-03-02

## 2018-03-02 RX ORDER — AMITRIPTYLINE HYDROCHLORIDE 25 MG/1
25 TABLET, FILM COATED ORAL NIGHTLY
Status: DISCONTINUED | OUTPATIENT
Start: 2018-03-02 | End: 2018-03-03 | Stop reason: HOSPADM

## 2018-03-02 RX ORDER — SIMVASTATIN 40 MG
40 TABLET ORAL NIGHTLY
Status: DISCONTINUED | OUTPATIENT
Start: 2018-03-02 | End: 2018-03-03 | Stop reason: HOSPADM

## 2018-03-02 RX ORDER — HYDROCHLOROTHIAZIDE 12.5 MG/1
12.5 CAPSULE, GELATIN COATED ORAL DAILY
Status: DISCONTINUED | OUTPATIENT
Start: 2018-03-03 | End: 2018-03-03 | Stop reason: HOSPADM

## 2018-03-02 RX ORDER — LACTULOSE 10 G/15ML
20 SOLUTION ORAL DAILY
Status: DISCONTINUED | OUTPATIENT
Start: 2018-03-02 | End: 2018-03-03 | Stop reason: HOSPADM

## 2018-03-02 RX ORDER — NITROGLYCERIN 20 MG/100ML
5 INJECTION INTRAVENOUS CONTINUOUS
Status: DISCONTINUED | OUTPATIENT
Start: 2018-03-02 | End: 2018-03-03 | Stop reason: HOSPADM

## 2018-03-02 RX ORDER — ACETAMINOPHEN 325 MG/1
650 TABLET ORAL EVERY 4 HOURS PRN
Status: DISCONTINUED | OUTPATIENT
Start: 2018-03-02 | End: 2018-03-03 | Stop reason: HOSPADM

## 2018-03-02 RX ORDER — TRAMADOL HYDROCHLORIDE 50 MG/1
50 TABLET ORAL EVERY 8 HOURS PRN
Status: DISCONTINUED | OUTPATIENT
Start: 2018-03-02 | End: 2018-03-03 | Stop reason: HOSPADM

## 2018-03-02 RX ORDER — ONDANSETRON 2 MG/ML
4 INJECTION INTRAMUSCULAR; INTRAVENOUS EVERY 6 HOURS PRN
Status: DISCONTINUED | OUTPATIENT
Start: 2018-03-02 | End: 2018-03-03 | Stop reason: HOSPADM

## 2018-03-02 RX ORDER — CLOPIDOGREL BISULFATE 75 MG/1
75 TABLET ORAL DAILY
Status: DISCONTINUED | OUTPATIENT
Start: 2018-03-03 | End: 2018-03-03 | Stop reason: HOSPADM

## 2018-03-02 RX ORDER — ACETAMINOPHEN 325 MG/1
650 TABLET ORAL EVERY 4 HOURS PRN
Status: DISCONTINUED | OUTPATIENT
Start: 2018-03-02 | End: 2018-03-02

## 2018-03-02 RX ORDER — SODIUM CHLORIDE 0.9 % (FLUSH) 0.9 %
10 SYRINGE (ML) INJECTION PRN
Status: DISCONTINUED | OUTPATIENT
Start: 2018-03-02 | End: 2018-03-02

## 2018-03-02 RX ORDER — HEPARIN SODIUM 1000 [USP'U]/ML
40 INJECTION, SOLUTION INTRAVENOUS; SUBCUTANEOUS ONCE
Status: COMPLETED | OUTPATIENT
Start: 2018-03-02 | End: 2018-03-02

## 2018-03-02 RX ORDER — FENTANYL CITRATE 50 UG/ML
25 INJECTION, SOLUTION INTRAMUSCULAR; INTRAVENOUS ONCE
Status: COMPLETED | OUTPATIENT
Start: 2018-03-02 | End: 2018-03-02

## 2018-03-02 RX ORDER — ASPIRIN 81 MG/1
81 TABLET, CHEWABLE ORAL DAILY
Status: DISCONTINUED | OUTPATIENT
Start: 2018-03-03 | End: 2018-03-03 | Stop reason: HOSPADM

## 2018-03-02 RX ORDER — VERAPAMIL HYDROCHLORIDE 2.5 MG/ML
2.5 INJECTION, SOLUTION INTRAVENOUS ONCE
Status: COMPLETED | OUTPATIENT
Start: 2018-03-02 | End: 2018-03-02

## 2018-03-02 RX ORDER — SODIUM CHLORIDE 9 MG/ML
INJECTION, SOLUTION INTRAVENOUS CONTINUOUS
Status: DISCONTINUED | OUTPATIENT
Start: 2018-03-02 | End: 2018-03-02

## 2018-03-02 RX ADMIN — MIDAZOLAM 1 MG: 1 INJECTION INTRAMUSCULAR; INTRAVENOUS at 10:23

## 2018-03-02 RX ADMIN — Medication 200 MCG: at 10:45

## 2018-03-02 RX ADMIN — BACITRACIN ZINC 1 G: 500 OINTMENT TOPICAL at 12:07

## 2018-03-02 RX ADMIN — VERAPAMIL HYDROCHLORIDE 2.5 MG: 2.5 INJECTION INTRAVENOUS at 10:45

## 2018-03-02 RX ADMIN — HEPARIN SODIUM 1900 UNITS: 1000 INJECTION, SOLUTION INTRAVENOUS; SUBCUTANEOUS at 10:45

## 2018-03-02 RX ADMIN — ONDANSETRON 4 MG: 2 INJECTION INTRAMUSCULAR; INTRAVENOUS at 15:20

## 2018-03-02 RX ADMIN — MIDAZOLAM 1 MG: 1 INJECTION INTRAMUSCULAR; INTRAVENOUS at 10:46

## 2018-03-02 RX ADMIN — AMITRIPTYLINE HYDROCHLORIDE 25 MG: 25 TABLET, FILM COATED ORAL at 20:17

## 2018-03-02 RX ADMIN — HEPARIN SODIUM 3100 UNITS: 1000 INJECTION, SOLUTION INTRAVENOUS; SUBCUTANEOUS at 10:45

## 2018-03-02 RX ADMIN — METOPROLOL TARTRATE 12.5 MG: 25 TABLET ORAL at 20:17

## 2018-03-02 RX ADMIN — FENTANYL CITRATE 25 MCG: 50 INJECTION INTRAMUSCULAR; INTRAVENOUS at 11:13

## 2018-03-02 RX ADMIN — FENTANYL CITRATE 25 MCG: 50 INJECTION INTRAMUSCULAR; INTRAVENOUS at 10:23

## 2018-03-02 RX ADMIN — SODIUM CHLORIDE: 9 INJECTION, SOLUTION INTRAVENOUS at 09:09

## 2018-03-02 RX ADMIN — Medication 10 ML: at 20:18

## 2018-03-02 RX ADMIN — SIMVASTATIN 40 MG: 40 TABLET, FILM COATED ORAL at 20:17

## 2018-03-02 RX ADMIN — IOVERSOL 75 ML: 678 INJECTION INTRA-ARTERIAL; INTRAVENOUS at 11:30

## 2018-03-02 RX ADMIN — FENTANYL CITRATE 25 MCG: 50 INJECTION INTRAMUSCULAR; INTRAVENOUS at 10:46

## 2018-03-02 RX ADMIN — NITROGLYCERIN 10 MCG/MIN: 20 INJECTION INTRAVENOUS at 11:35

## 2018-03-02 RX ADMIN — OXYCODONE HYDROCHLORIDE AND ACETAMINOPHEN 2 TABLET: 5; 325 TABLET ORAL at 14:07

## 2018-03-02 RX ADMIN — MIDAZOLAM 1 MG: 1 INJECTION INTRAMUSCULAR; INTRAVENOUS at 10:18

## 2018-03-02 RX ADMIN — MIDAZOLAM 1 MG: 1 INJECTION INTRAMUSCULAR; INTRAVENOUS at 11:13

## 2018-03-02 RX ADMIN — FENTANYL CITRATE 25 MCG: 50 INJECTION INTRAMUSCULAR; INTRAVENOUS at 10:18

## 2018-03-02 ASSESSMENT — PAIN SCALES - GENERAL
PAINLEVEL_OUTOF10: 0
PAINLEVEL_OUTOF10: 0
PAINLEVEL_OUTOF10: 2
PAINLEVEL_OUTOF10: 0

## 2018-03-02 NOTE — PROGRESS NOTES
Dr Ursula Diana stopped by to check on patient. Called for Rn. Pt sitting in bed holding trash can heaving. Pt had just finished her lunch tray. Had not previously c/o nausea. Pt Cristina ordered Nitro drip be turned off. O2 applied at 2L per nasal cannula. Zofran given-per MAR. No emesis. Will continue to monitor.

## 2018-03-03 ENCOUNTER — TELEPHONE (OUTPATIENT)
Dept: CARDIOLOGY CLINIC | Age: 78
End: 2018-03-03

## 2018-03-03 ENCOUNTER — TELEPHONE (OUTPATIENT)
Dept: FAMILY MEDICINE CLINIC | Age: 78
End: 2018-03-03

## 2018-03-03 ENCOUNTER — APPOINTMENT (OUTPATIENT)
Dept: CT IMAGING | Age: 78
End: 2018-03-03
Attending: RADIOLOGY
Payer: MEDICARE

## 2018-03-03 VITALS
SYSTOLIC BLOOD PRESSURE: 140 MMHG | OXYGEN SATURATION: 95 % | DIASTOLIC BLOOD PRESSURE: 70 MMHG | RESPIRATION RATE: 16 BRPM | WEIGHT: 185.8 LBS | HEART RATE: 71 BPM | BODY MASS INDEX: 29.16 KG/M2 | HEIGHT: 67 IN | TEMPERATURE: 98 F

## 2018-03-03 LAB — HEMOCCULT STL QL: NEGATIVE

## 2018-03-03 PROCEDURE — 70498 CT ANGIOGRAPHY NECK: CPT

## 2018-03-03 PROCEDURE — 96360 HYDRATION IV INFUSION INIT: CPT

## 2018-03-03 PROCEDURE — 2580000003 HC RX 258: Performed by: THORACIC SURGERY (CARDIOTHORACIC VASCULAR SURGERY)

## 2018-03-03 PROCEDURE — 6360000004 HC RX CONTRAST MEDICATION: Performed by: THORACIC SURGERY (CARDIOTHORACIC VASCULAR SURGERY)

## 2018-03-03 PROCEDURE — 6370000000 HC RX 637 (ALT 250 FOR IP): Performed by: INTERNAL MEDICINE

## 2018-03-03 PROCEDURE — 99222 1ST HOSP IP/OBS MODERATE 55: CPT | Performed by: THORACIC SURGERY (CARDIOTHORACIC VASCULAR SURGERY)

## 2018-03-03 PROCEDURE — 96361 HYDRATE IV INFUSION ADD-ON: CPT

## 2018-03-03 PROCEDURE — 6370000000 HC RX 637 (ALT 250 FOR IP): Performed by: THORACIC SURGERY (CARDIOTHORACIC VASCULAR SURGERY)

## 2018-03-03 PROCEDURE — 74176 CT ABD & PELVIS W/O CONTRAST: CPT

## 2018-03-03 PROCEDURE — 82272 OCCULT BLD FECES 1-3 TESTS: CPT

## 2018-03-03 RX ORDER — SODIUM CHLORIDE 9 MG/ML
INJECTION, SOLUTION INTRAVENOUS CONTINUOUS
Status: DISCONTINUED | OUTPATIENT
Start: 2018-03-03 | End: 2018-03-03 | Stop reason: HOSPADM

## 2018-03-03 RX ADMIN — IOPAMIDOL 80 ML: 755 INJECTION, SOLUTION INTRAVENOUS at 11:17

## 2018-03-03 RX ADMIN — SODIUM CHLORIDE: 9 INJECTION, SOLUTION INTRAVENOUS at 06:22

## 2018-03-03 RX ADMIN — CLOPIDOGREL 75 MG: 75 TABLET, FILM COATED ORAL at 08:39

## 2018-03-03 RX ADMIN — ASPIRIN 81 MG: 81 TABLET, CHEWABLE ORAL at 08:39

## 2018-03-03 RX ADMIN — METOPROLOL TARTRATE 12.5 MG: 25 TABLET ORAL at 08:39

## 2018-03-03 RX ADMIN — ACETYLCYSTEINE 1000 MG: 500 TABLET, EFFERVESCENT ORAL at 06:21

## 2018-03-03 RX ADMIN — ACETAMINOPHEN 650 MG: 325 TABLET ORAL at 11:35

## 2018-03-03 RX ADMIN — LACTULOSE 20 G: 20 SOLUTION ORAL at 14:19

## 2018-03-03 ASSESSMENT — PAIN DESCRIPTION - PAIN TYPE: TYPE: ACUTE PAIN

## 2018-03-03 ASSESSMENT — PAIN DESCRIPTION - LOCATION: LOCATION: WRIST

## 2018-03-03 ASSESSMENT — PAIN SCALES - GENERAL
PAINLEVEL_OUTOF10: 7
PAINLEVEL_OUTOF10: 0
PAINLEVEL_OUTOF10: 7

## 2018-03-03 ASSESSMENT — PAIN DESCRIPTION - ORIENTATION: ORIENTATION: LEFT

## 2018-03-03 NOTE — PROGRESS NOTES
All air out of vasc band. Vasc band removed at 1530, bandaid applied, site stable. Patient instructed not to bend, twist, lift, push or pull with right wrist, voices understanding.

## 2018-03-03 NOTE — PLAN OF CARE
Problem: Anxiety:  Goal: Level of anxiety will decrease  Level of anxiety will decrease   Outcome: Ongoing  Patient denies any anxiety during this shift. Will continue to monitor. Problem: Discharge Planning:  Goal: Discharged to appropriate level of care  Discharged to appropriate level of care   Outcome: Ongoing  Patient plans to be discharged home with . Appropriate for her level of care. Problem: Tissue Perfusion - Cardiopulmonary, Altered:  Goal: Hemodynamic stability will improve  Hemodynamic stability will improve   Outcome: Ongoing  Patient's vitals stable. Patient remains in normal sinus. Patient will have CT of abdomen and CTA of neck during the day. Will continue to monitor. Problem: Tissue Perfusion - Peripheral, Altered:  Goal: Absence of hematoma at arterial access site  Absence of hematoma at arterial access site   Outcome: Ongoing  Patient had peripheral angiogram 3/2. Left wrist developed hematoma while on 2E. Site now is soft but very bruised and swollen. Groin also developed hematoma but now is soft and has some bruising. Will continue to monitor. Goal: Circulatory function of lower extremities is within specified parameters  Circulatory function of lower extremities is within specified parameters   Outcome: Ongoing  All pulses palpable. Patient's lower extremity pulses 1+. Warm to touch. No numbness or tingling in extremities. Will continue to monitor. Problem: Falls - Risk of  Goal: Absence of falls  Outcome: Ongoing  Patient free of falls. Call light within reach. Bed in lowest position. Nonskid socks on. Bed alarm on. Patient able to ambulate with a standby. Hourly rounding continues. Problem: Pain:  Goal: Pain level will decrease  Pain level will decrease   Outcome: Ongoing  Patient denies any pain during this shift. Will continue to monitor. Comments: Care plan reviewed with patient.   Patient verbalized understanding of the plan of care and contribute to goal

## 2018-03-03 NOTE — CONSULTS
Facility-Administered Medications: 0.9 % sodium chloride infusion, , Intravenous, Continuous  nitroGLYCERIN 50 mg in dextrose 5% 250 mL infusion, 5 mcg/min, Intravenous, Continuous  oxyCODONE-acetaminophen (PERCOCET) 5-325 MG per tablet 2 tablet, 2 tablet, Oral, Q4H PRN  sodium chloride flush 0.9 % injection 10 mL, 10 mL, Intravenous, 2 times per day  sodium chloride flush 0.9 % injection 10 mL, 10 mL, Intravenous, PRN  acetaminophen (TYLENOL) tablet 650 mg, 650 mg, Oral, Q4H PRN  magnesium hydroxide (MILK OF MAGNESIA) 400 MG/5ML suspension 30 mL, 30 mL, Oral, Daily PRN  ondansetron (ZOFRAN) injection 4 mg, 4 mg, Intravenous, Q6H PRN  aspirin chewable tablet 81 mg, 81 mg, Oral, Daily  lactulose (CHRONULAC) 10 GM/15ML solution 20 g, 20 g, Oral, Daily  simvastatin (ZOCOR) tablet 40 mg, 40 mg, Oral, Nightly  hydrochlorothiazide (MICROZIDE) capsule 12.5 mg, 12.5 mg, Oral, Daily  amitriptyline (ELAVIL) tablet 25 mg, 25 mg, Oral, Nightly  metoprolol tartrate (LOPRESSOR) tablet 12.5 mg, 12.5 mg, Oral, BID  clopidogrel (PLAVIX) tablet 75 mg, 75 mg, Oral, Daily  traMADol (ULTRAM) tablet 50 mg, 50 mg, Oral, Q8H PRN  Allergies:  Ciprofloxacin and Darvon [propoxyphene hcl]    Social History:  reports that she quit smoking about 17 years ago. Her smoking use included Cigarettes. She has a 12.50 pack-year smoking history. She has never used smokeless tobacco. She reports that she does not drink alcohol or use drugs.     Family History:        Problem Relation Age of Onset    Early Death Mother      not sure of cause    Heart Disease Father 52     MI    Cancer Brother      pancreatic    Cancer Son      larynx    Diabetes Neg Hx     High Blood Pressure Neg Hx     Stroke Neg Hx      REVIEW OF SYSTEMS:  CONSTITUTIONAL:  negative  EYES:  negative  HEENT:  negative  RESPIRATORY:  negative  CARDIOVASCULAR:  negative  GASTROINTESTINAL:  negative  GENITOURINARY:  negative  INTEGUMENT/BREAST:  negative  HEMATOLOGIC/LYMPHATIC: negative  ALLERGIC/IMMUNOLOGIC:  negative  ENDOCRINE:  negative  MUSCULOSKELETAL:  negative  NEUROLOGICAL:  negative  BEHAVIOR/PSYCH:  negative  Left upper extremity claudication  PHYSICAL EXAM:  VITALS:  BP (!) 140/70   Pulse 71   Temp 98 °F (36.7 °C) (Oral)   Resp 16   Ht 5' 6.5\" (1.689 m)   Wt 185 lb 12.8 oz (84.3 kg)   LMP  (LMP Unknown)   SpO2 95%   BMI 29.54 kg/m²   CONSTITUTIONAL:  awake, alert, cooperative, no apparent distress, and appears stated age  EYES:  Lids and lashes normal, pupils equal, round and reactive to light, extra ocular muscles intact, sclera clear, conjunctiva normal  ENT:  Normocephalic, without obvious abnormality, atraumatic, sinuses nontender on palpation, external ears without lesions, oral pharynx with moist mucus membranes, tonsils without erythema or exudates, gums normal and good dentition. NECK:  supple, symmetrical, trachea midline, skin normal and carotid bruit heard on left  HEMATOLOGIC/LYMPHATICS:  no cervical lymphadenopathy and no supraclavicular lymphadenopathy  LUNGS:  No increased work of breathing, good air exchange, clear to auscultation bilaterally, no crackles or wheezing  CARDIOVASCULAR:  normal apical pulses, regular rate and rhythm, normal S1 and S2, no S3, no S4, no murmur noted, no edema, Absent left radial brachial artery pulses bounding right brachial and radial artery pulses. 4/4 bilateral DP pulses absent PT pulses bilaterally and carotid with bruit on left  ABDOMEN:  normal bowel sounds, soft, non-distended, non-tender, voluntary guarding present, and pulsatile mass midline aorta AAA. MUSCULOSKELETAL:  There is no redness, warmth, or swelling of the joints. Full range of motion noted. Motor strength is 5 out of 5 all extremities bilaterally. Tone is normal.  NEUROLOGIC:  Awake, alert, oriented to name, place and time. Cranial nerves II-XII are grossly intact. Motor is 5 out of 5 bilaterally. Cerebellar finger to nose, heel to shin intact.

## 2018-03-03 NOTE — PROGRESS NOTES
Transport arrived to take pt to 3b. Once in the wheelchair pt started to heave again. Vomited small amount of emesis. RN cancelled transportation. RN notified Dr Mark Sandoval. EKG ordered and completed. Will monitor patient.

## 2018-03-05 ENCOUNTER — HOSPITAL ENCOUNTER (OUTPATIENT)
Dept: INTERVENTIONAL RADIOLOGY/VASCULAR | Age: 78
Discharge: HOME | End: 2018-03-05
Payer: MEDICARE

## 2018-03-05 DIAGNOSIS — I77.1 SUBCLAVIAN ARTERIAL STENOSIS (HCC): ICD-10-CM

## 2018-03-05 PROCEDURE — 93880 EXTRACRANIAL BILAT STUDY: CPT

## 2018-03-05 NOTE — PROGRESS NOTES
03/05/2018 @ 3267: Follow up phone call made post procedure and patient stated doing well with no pain or problems with site.

## 2018-03-06 NOTE — PROGRESS NOTES
daily      nitroGLYCERIN (NITROSTAT) 0.4 MG SL tablet Place 1 tablet under the tongue every 5 minutes as needed for Chest pain 25 tablet 3       No current facility-administered medications for this encounter. Current Outpatient Prescriptions   Medication Sig Dispense Refill    metoprolol tartrate (LOPRESSOR) 25 MG tablet Take 0.5 tablets by mouth 2 times daily 180 tablet 3    clopidogrel (PLAVIX) 75 MG tablet Take 1 tablet by mouth daily 90 tablet 3    Handicap Placard MISC by Does not apply route Expires 12/14/2022 1 each 0    amitriptyline (ELAVIL) 25 MG tablet Take 1 tablet by mouth nightly 90 tablet 3    simvastatin (ZOCOR) 40 MG tablet TAKE 1 TABLET NIGHTLY 90 tablet 3    Cyanocobalamin (B-12) 2500 MCG TABS Take by mouth daily       aspirin 81 MG tablet Take 81 mg by mouth daily      traMADol (ULTRAM) 50 MG tablet Take 50 mg by mouth every 8 hours as needed for Pain.  lactulose (CHRONULAC) 10 GM/15ML solution Take 20 g by mouth daily      nitroGLYCERIN (NITROSTAT) 0.4 MG SL tablet Place 1 tablet under the tongue every 5 minutes as needed for Chest pain 25 tablet 3             PHYSICAL:     BP (!) 140/70   Pulse 71   Temp 98 °F (36.7 °C) (Oral)   Resp 16   Ht 5' 6.5\" (1.689 m)   Wt 185 lb 12.8 oz (84.3 kg)   LMP  (LMP Unknown)   SpO2 95%   BMI 29.54 kg/m²     Heart:  [x]Regular rate and rhythm  []Other:    Lungs:  [x]Clear    []Other:    Abdomen: [x]Soft    []Other:    Mental Status: [x]Alert & Oriented  []Other:   Ext:                [x]No edema       []Other:         No results for input(s): CKTOTAL, CKMB, CKMBINDEX, TROPONINI in the last 72 hours.     Lab Results   Component Value Date    WBC 8.9 03/02/2018    RBC 4.17 03/02/2018    RBC 4.10 07/11/2011    HGB 10.3 03/02/2018    HCT 32.9 03/02/2018    MCV 78.9 03/02/2018    MCH 24.7 03/02/2018    MCHC 31.3 03/02/2018    RDW 19.1 03/02/2018     03/02/2018    MPV 9.9 03/02/2018       Lab Results   Component Value Date     03/02/2018    K 4.3 03/02/2018     03/02/2018    CO2 25 03/02/2018    BUN 13 03/02/2018    LABALBU 3.8 02/25/2016    LABALBU 4.6 07/11/2011    CREATININE 0.7 03/02/2018    CALCIUM 10.0 03/02/2018    LABGLOM 81 03/02/2018    GLUCOSE 105 03/02/2018       Lab Results   Component Value Date    ALKPHOS 115 02/25/2016    ALT 24 02/25/2016    AST 42 02/25/2016    PROT 7.1 02/25/2016    BILITOT 2.3 02/25/2016    BILIDIR 0.5 02/25/2016    LABALBU 3.8 02/25/2016    LABALBU 4.6 07/11/2011       Lab Results   Component Value Date    MG 2.5 02/19/2016       No components found for: Candance Sahil    Lab Results   Component Value Date    LABA1C 6.2 12/14/2017       Lab Results   Component Value Date    TRIG 104 02/03/2016    HDL 49 02/03/2016    LDLCALC 83 02/03/2016       No results found for: TSH         SEDATION  Planned agent:[x]Midazolam []Meperidine [x]Sublimaze []Morphine []Diazepam  []Other:         ASA Classification:  []1 [x]2 []3 []4 []5  Class 1: A normal healthy patient  Class 2: Pt with mild to moderate systemic disease  Class 3: Severe systemic disease or disturbance  Class 4: Severe systemic disorders that are already life threatening. Class 5: Moribund pt with little chances of survival, for more than 24 hours. Mallampati I Airway Classification:   []1 [x]2 []3 []4      [x]Pre-procedure diagnostic studies complete and results available. Comment:    [x]Previous sedation/anesthesia experiences assessed. Comment:    [x]The patient is an appropriate candidate to undergo the planned procedure sedation and anesthesia. (Refer to nursing sedation/analgesia documentation record)  [x]Formulation and discussion of sedation/procedure plan, risks, and expectations with patient and/or responsible adult completed. [x]Patient examined immediately prior to the procedure.  (Refer to nursing sedation/analgesia documentation record)        Marshall Chacko MD, [facility]  Electronically signed 3/6/2018 at

## 2018-03-09 ENCOUNTER — OFFICE VISIT (OUTPATIENT)
Dept: FAMILY MEDICINE CLINIC | Age: 78
End: 2018-03-09
Payer: MEDICARE

## 2018-03-09 VITALS
TEMPERATURE: 98.9 F | WEIGHT: 187 LBS | BODY MASS INDEX: 31.16 KG/M2 | HEART RATE: 92 BPM | HEIGHT: 65 IN | DIASTOLIC BLOOD PRESSURE: 68 MMHG | RESPIRATION RATE: 12 BRPM | SYSTOLIC BLOOD PRESSURE: 136 MMHG

## 2018-03-09 DIAGNOSIS — I25.10 CORONARY ARTERY DISEASE INVOLVING NATIVE CORONARY ARTERY OF NATIVE HEART WITHOUT ANGINA PECTORIS: ICD-10-CM

## 2018-03-09 DIAGNOSIS — I73.9 PAD (PERIPHERAL ARTERY DISEASE) (HCC): ICD-10-CM

## 2018-03-09 DIAGNOSIS — I70.8 LEFT SUBCLAVIAN ARTERY OCCLUSION: Primary | ICD-10-CM

## 2018-03-09 PROCEDURE — 99214 OFFICE O/P EST MOD 30 MIN: CPT | Performed by: FAMILY MEDICINE

## 2018-03-09 PROCEDURE — 1111F DSCHRG MED/CURRENT MED MERGE: CPT | Performed by: FAMILY MEDICINE

## 2018-03-09 NOTE — PROGRESS NOTES
Transition of Care Visit/Hospital Follow Up:      Ibrahima Delgadillo is a 68 y.o. female that presents for Follow-Up from Hospital (feels about the same  attempted stint )        Date of Discharge:   3/3/18  Was patient contacted within 2 business days of discharge (see chart for documentation):  yes - 3/5/18      Patient presents for hospital follow up. Patient recently hospitalized at [facility] for treatment of Subclavian Artery stenosis. Symptoms prior to admission:  angina     Hospital Course per DC Summary:    No D/C Summary available at the time of the visit. It appears that she was initially admitted for cardiac cath due to angina and was noted to have an occluded left subclavian artery. She was seen by Dr Jennifer Alva in consult. Per his note:    HISTORY OF PRESENT ILLNESS:               The patient is a 68 y.o. female who presents with CAD s/p CABG for distal left main 75% stenosis CAD at present angina free. Was found to have a totally occluded left subclavian artery takeoff occlusion because of her left arm exertional claudication symptoms and depressed BP on the left arm. She came to [facility] ER on 2/21/2018 because of acute onset of left arm pain 6/10 in severity cramping in character. Stated her pain got worse with movement and relieved at rest suggestive of left arm incapacitating claudication. Angiogram done revealing a 2 cm long takeoff occlusion ot the left subclavian artery and a bovine arch. Yesterday Dr. Abbi Garduno attempted angiogram directed PCI of the left subclavian artery occlusion and plaque too solid to allow safe attempt at stenting so this procedure was stopped. Mrs. Wilson was examined and asked about her left arm symptoms. She states that her arm and forearm get very achy and heavy and weak when she tries to do home shores. States sometimes has gotten very weak with some bluish discoloration on her fingers that recover within a few minutes.  Denies left arm excersise induced dizziness but has not exerted left arm severe enough for this as it aches early. Advised her that when she felt that her left arm symptoms are so incapacitating to her that she would like to pursue a left carotid-subclavian bypass procedure for relief of her symptoms this is certainly an option and would be able to perform  for her whenever she decides to pursue it. She states that  she would like to pursue a left carotid subclavian bypass because she feels her symptoms are incapacitating enough but at the present time her and her  are dealing with family issues as their son recently passed and she would like to get this family situation under control before she would consider undergoing left carotid-subclavian artery bypass. She said she will call my office. I informed her that we are here to help her in any way we can. IMPRESSION/RECOMMENDATIONS:     Symptomatic left subclavian artery takeoff occlusion  3 cm infrarenal AAA plus aortic ectasia, calcified walls  73% LICA stenosis asymptomatic  45% [place] stenosis asymptomatic  CAD distal Left main s/p CABG, angina free at present  Anemia 10 gm will get stools for occult blood  PVD with absent palpable PT pulses bilaterally, bounding PT pulses bilaterally. Left carotid bruit        Plan: Elective left Carotid-Subclavian bypass   Cardiac clearance    Medication changes at discharge:  Medication list updated today    Clinical course since discharge:  States that she is feeling about the same since discharge. Denies CP. She has SOB but this is near her baseline. Notes that she does get pain in her left arm with exertion. Will get white discoloration of the left fingers when in the cold. Sx have not worsened since discharge. Eating well. No constipation. Does have some bruising of the left forearm. Current Outpatient Prescriptions   Medication Sig Dispense Refill    traMADol (ULTRAM) 50 MG tablet Take 50 mg by mouth every 8 hours as needed for Pain.      

## 2018-03-22 ENCOUNTER — TELEPHONE (OUTPATIENT)
Dept: CARDIOTHORACIC SURGERY | Age: 78
End: 2018-03-22

## 2018-03-22 NOTE — TELEPHONE ENCOUNTER
Rm Velasquez called in and stated she was seen by Dr. Sixto Coley as a consult and he wanted to do surgery after her cardiac clearance. She states they could not do the cath because she had too much blockage. She would like to know the next steps as she is in a lot of pain and would just rather go ahead with the surgery as planned. Please advise.

## 2018-03-28 ENCOUNTER — APPOINTMENT (OUTPATIENT)
Dept: CARDIAC CATH/INVASIVE PROCEDURES | Age: 78
End: 2018-03-28
Attending: INTERNAL MEDICINE
Payer: MEDICARE

## 2018-03-28 ENCOUNTER — PREP FOR PROCEDURE (OUTPATIENT)
Dept: CARDIOLOGY | Age: 78
End: 2018-03-28

## 2018-03-29 ENCOUNTER — TELEPHONE (OUTPATIENT)
Dept: FAMILY MEDICINE CLINIC | Age: 78
End: 2018-03-29

## 2018-04-04 RX ORDER — HYDROCHLOROTHIAZIDE 12.5 MG/1
12.5 TABLET ORAL DAILY
COMMUNITY
End: 2018-04-23

## 2018-04-10 ENCOUNTER — PREP FOR PROCEDURE (OUTPATIENT)
Dept: CARDIOTHORACIC SURGERY | Age: 78
End: 2018-04-10

## 2018-04-10 RX ORDER — SODIUM CHLORIDE 0.9 % (FLUSH) 0.9 %
10 SYRINGE (ML) INJECTION EVERY 12 HOURS SCHEDULED
Status: CANCELLED | OUTPATIENT
Start: 2018-04-10

## 2018-04-10 RX ORDER — SODIUM CHLORIDE 0.9 % (FLUSH) 0.9 %
10 SYRINGE (ML) INJECTION PRN
Status: CANCELLED | OUTPATIENT
Start: 2018-04-10

## 2018-04-11 ENCOUNTER — HOSPITAL ENCOUNTER (INPATIENT)
Age: 78
LOS: 1 days | Discharge: HOME | DRG: 038 | End: 2018-04-12
Attending: THORACIC SURGERY (CARDIOTHORACIC VASCULAR SURGERY) | Admitting: THORACIC SURGERY (CARDIOTHORACIC VASCULAR SURGERY)
Payer: MEDICARE

## 2018-04-11 ENCOUNTER — ANESTHESIA EVENT (OUTPATIENT)
Dept: OPERATING ROOM | Age: 78
DRG: 038 | End: 2018-04-11
Payer: MEDICARE

## 2018-04-11 ENCOUNTER — ANESTHESIA (OUTPATIENT)
Dept: OPERATING ROOM | Age: 78
DRG: 038 | End: 2018-04-11
Payer: MEDICARE

## 2018-04-11 VITALS
DIASTOLIC BLOOD PRESSURE: 74 MMHG | RESPIRATION RATE: 2 BRPM | SYSTOLIC BLOOD PRESSURE: 156 MMHG | TEMPERATURE: 95.2 F | OXYGEN SATURATION: 100 %

## 2018-04-11 DIAGNOSIS — G89.18 POST-OP PAIN: ICD-10-CM

## 2018-04-11 DIAGNOSIS — D64.9 LOW HEMOGLOBIN: Primary | ICD-10-CM

## 2018-04-11 PROBLEM — I73.9 PVD (PERIPHERAL VASCULAR DISEASE) (HCC): Status: ACTIVE | Noted: 2018-04-11

## 2018-04-11 LAB
ABO: NORMAL
ANION GAP SERPL CALCULATED.3IONS-SCNC: 19 MEQ/L (ref 8–16)
ANTIBODY SCREEN: NORMAL
BASE EXCESS (CALCULATED): -1.5 MMOL/L (ref -2.5–2.5)
BUN BLDV-MCNC: 7 MG/DL (ref 7–22)
CALCIUM IONIZED SERUM: 1.15 MMOL/L (ref 1.12–1.32)
CALCIUM SERPL-MCNC: 9.9 MG/DL (ref 8.5–10.5)
CHLORIDE BLD-SCNC: 102 MEQ/L (ref 98–111)
CO2: 21 MEQ/L (ref 23–33)
COLLECTED BY:: ABNORMAL
CREAT SERPL-MCNC: 0.7 MG/DL (ref 0.4–1.2)
DEVICE: ABNORMAL
GFR SERPL CREATININE-BSD FRML MDRD: 81 ML/MIN/1.73M2
GLUCOSE BLD-MCNC: 121 MG/DL (ref 70–108)
GLUCOSE BLD-MCNC: 124 MG/DL (ref 70–108)
GLUCOSE, WHOLE BLOOD: 147 MG/DL (ref 70–108)
HCO3: 24 MMOL/L (ref 23–28)
HCT VFR BLD CALC: 30.7 % (ref 37–47)
HEMOGLOBIN: 9.4 GM/DL (ref 12–16)
MCH RBC QN AUTO: 23.2 PG (ref 27–31)
MCHC RBC AUTO-ENTMCNC: 30.7 GM/DL (ref 33–37)
MCV RBC AUTO: 75.6 FL (ref 81–99)
MRSA SCREEN RT-PCR: NEGATIVE
O2 SATURATION: 100 %
PCO2: 43 MMHG (ref 35–45)
PDW BLD-RTO: 20 % (ref 11.5–14.5)
PH BLOOD GAS: 7.36 (ref 7.35–7.45)
PLATELET # BLD: 312 THOU/MM3 (ref 130–400)
PMV BLD AUTO: 9.6 FL (ref 7.4–10.4)
PO2: 187 MMHG (ref 71–104)
POC ACTIVATED CLOTTING TIME KAOLIN: 202 SECONDS (ref 1–150)
POTASSIUM SERPL-SCNC: 3.5 MEQ/L (ref 3.5–5.2)
POTASSIUM, WHOLE BLOOD: 3.5 MEQ/L (ref 3.5–4.9)
RBC # BLD: 4.07 MILL/MM3 (ref 4.2–5.4)
RH FACTOR: NORMAL
SODIUM BLD-SCNC: 142 MEQ/L (ref 135–145)
SODIUM, WHOLE BLOOD: 149 MEQ/L (ref 138–146)
SOURCE, BLOOD GAS: ABNORMAL
WBC # BLD: 8.6 THOU/MM3 (ref 4.8–10.8)

## 2018-04-11 PROCEDURE — 37799 UNLISTED PX VASCULAR SURGERY: CPT

## 2018-04-11 PROCEDURE — 2500000003 HC RX 250 WO HCPCS: Performed by: ANESTHESIOLOGY

## 2018-04-11 PROCEDURE — 87081 CULTURE SCREEN ONLY: CPT

## 2018-04-11 PROCEDURE — 3600000013 HC SURGERY LEVEL 3 ADDTL 15MIN: Performed by: THORACIC SURGERY (CARDIOTHORACIC VASCULAR SURGERY)

## 2018-04-11 PROCEDURE — 6360000002 HC RX W HCPCS: Performed by: THORACIC SURGERY (CARDIOTHORACIC VASCULAR SURGERY)

## 2018-04-11 PROCEDURE — 6360000002 HC RX W HCPCS: Performed by: NURSE PRACTITIONER

## 2018-04-11 PROCEDURE — 82803 BLOOD GASES ANY COMBINATION: CPT

## 2018-04-11 PROCEDURE — 82947 ASSAY GLUCOSE BLOOD QUANT: CPT

## 2018-04-11 PROCEDURE — 36415 COLL VENOUS BLD VENIPUNCTURE: CPT

## 2018-04-11 PROCEDURE — 7100000000 HC PACU RECOVERY - FIRST 15 MIN: Performed by: THORACIC SURGERY (CARDIOTHORACIC VASCULAR SURGERY)

## 2018-04-11 PROCEDURE — 86850 RBC ANTIBODY SCREEN: CPT

## 2018-04-11 PROCEDURE — C9248 INJ, CLEVIDIPINE BUTYRATE: HCPCS | Performed by: THORACIC SURGERY (CARDIOTHORACIC VASCULAR SURGERY)

## 2018-04-11 PROCEDURE — 6360000002 HC RX W HCPCS: Performed by: NURSE ANESTHETIST, CERTIFIED REGISTERED

## 2018-04-11 PROCEDURE — 87641 MR-STAPH DNA AMP PROBE: CPT

## 2018-04-11 PROCEDURE — 85027 COMPLETE CBC AUTOMATED: CPT

## 2018-04-11 PROCEDURE — 85347 COAGULATION TIME ACTIVATED: CPT

## 2018-04-11 PROCEDURE — 80048 BASIC METABOLIC PNL TOTAL CA: CPT

## 2018-04-11 PROCEDURE — 2500000003 HC RX 250 WO HCPCS: Performed by: NURSE ANESTHETIST, CERTIFIED REGISTERED

## 2018-04-11 PROCEDURE — 84295 ASSAY OF SERUM SODIUM: CPT

## 2018-04-11 PROCEDURE — 3700000001 HC ADD 15 MINUTES (ANESTHESIA): Performed by: THORACIC SURGERY (CARDIOTHORACIC VASCULAR SURGERY)

## 2018-04-11 PROCEDURE — 2000000000 HC ICU R&B

## 2018-04-11 PROCEDURE — 2500000003 HC RX 250 WO HCPCS: Performed by: THORACIC SURGERY (CARDIOTHORACIC VASCULAR SURGERY)

## 2018-04-11 PROCEDURE — 35606 BPG CAROTID-SUBCLAVIAN: CPT | Performed by: THORACIC SURGERY (CARDIOTHORACIC VASCULAR SURGERY)

## 2018-04-11 PROCEDURE — 2580000003 HC RX 258: Performed by: NURSE ANESTHETIST, CERTIFIED REGISTERED

## 2018-04-11 PROCEDURE — 2580000003 HC RX 258: Performed by: THORACIC SURGERY (CARDIOTHORACIC VASCULAR SURGERY)

## 2018-04-11 PROCEDURE — 82330 ASSAY OF CALCIUM: CPT

## 2018-04-11 PROCEDURE — 87086 URINE CULTURE/COLONY COUNT: CPT

## 2018-04-11 PROCEDURE — 3600000003 HC SURGERY LEVEL 3 BASE: Performed by: THORACIC SURGERY (CARDIOTHORACIC VASCULAR SURGERY)

## 2018-04-11 PROCEDURE — C1725 CATH, TRANSLUMIN NON-LASER: HCPCS | Performed by: THORACIC SURGERY (CARDIOTHORACIC VASCULAR SURGERY)

## 2018-04-11 PROCEDURE — 86901 BLOOD TYPING SEROLOGIC RH(D): CPT

## 2018-04-11 PROCEDURE — 86900 BLOOD TYPING SEROLOGIC ABO: CPT

## 2018-04-11 PROCEDURE — 84132 ASSAY OF SERUM POTASSIUM: CPT

## 2018-04-11 PROCEDURE — P9045 ALBUMIN (HUMAN), 5%, 250 ML: HCPCS

## 2018-04-11 PROCEDURE — 2780000010 HC IMPLANT OTHER: Performed by: THORACIC SURGERY (CARDIOTHORACIC VASCULAR SURGERY)

## 2018-04-11 PROCEDURE — 99024 POSTOP FOLLOW-UP VISIT: CPT | Performed by: THORACIC SURGERY (CARDIOTHORACIC VASCULAR SURGERY)

## 2018-04-11 PROCEDURE — 6360000002 HC RX W HCPCS

## 2018-04-11 PROCEDURE — 6370000000 HC RX 637 (ALT 250 FOR IP): Performed by: THORACIC SURGERY (CARDIOTHORACIC VASCULAR SURGERY)

## 2018-04-11 PROCEDURE — 031L0JK BYPASS LEFT INTERNAL CAROTID ARTERY TO LEFT EXTRACRANIAL ARTERY WITH SYNTHETIC SUBSTITUTE, OPEN APPROACH: ICD-10-PCS | Performed by: THORACIC SURGERY (CARDIOTHORACIC VASCULAR SURGERY)

## 2018-04-11 PROCEDURE — P9045 ALBUMIN (HUMAN), 5%, 250 ML: HCPCS | Performed by: NURSE ANESTHETIST, CERTIFIED REGISTERED

## 2018-04-11 PROCEDURE — 7100000001 HC PACU RECOVERY - ADDTL 15 MIN: Performed by: THORACIC SURGERY (CARDIOTHORACIC VASCULAR SURGERY)

## 2018-04-11 PROCEDURE — 82948 REAGENT STRIP/BLOOD GLUCOSE: CPT

## 2018-04-11 PROCEDURE — C1769 GUIDE WIRE: HCPCS

## 2018-04-11 PROCEDURE — 3700000000 HC ANESTHESIA ATTENDED CARE: Performed by: THORACIC SURGERY (CARDIOTHORACIC VASCULAR SURGERY)

## 2018-04-11 DEVICE — IMPLANTABLE DEVICE: Type: IMPLANTABLE DEVICE | Site: CAROTID | Status: FUNCTIONAL

## 2018-04-11 DEVICE — SYSTEM TISS GLUE 5ML CONTAIN SYR PLUNG STD SYR TIP 12MM 5PKS/EA: Type: IMPLANTABLE DEVICE | Site: CAROTID | Status: FUNCTIONAL

## 2018-04-11 RX ORDER — SUCCINYLCHOLINE CHLORIDE 20 MG/ML
INJECTION INTRAMUSCULAR; INTRAVENOUS PRN
Status: DISCONTINUED | OUTPATIENT
Start: 2018-04-11 | End: 2018-04-11 | Stop reason: SDUPTHER

## 2018-04-11 RX ORDER — PROPOFOL 10 MG/ML
INJECTION, EMULSION INTRAVENOUS PRN
Status: DISCONTINUED | OUTPATIENT
Start: 2018-04-11 | End: 2018-04-11 | Stop reason: SDUPTHER

## 2018-04-11 RX ORDER — LIDOCAINE HYDROCHLORIDE 20 MG/ML
INJECTION, SOLUTION INFILTRATION; PERINEURAL PRN
Status: DISCONTINUED | OUTPATIENT
Start: 2018-04-11 | End: 2018-04-11 | Stop reason: SDUPTHER

## 2018-04-11 RX ORDER — LABETALOL HYDROCHLORIDE 5 MG/ML
INJECTION, SOLUTION INTRAVENOUS PRN
Status: DISCONTINUED | OUTPATIENT
Start: 2018-04-11 | End: 2018-04-11

## 2018-04-11 RX ORDER — OXYCODONE HYDROCHLORIDE 5 MG/1
5 TABLET ORAL EVERY 4 HOURS PRN
Status: DISCONTINUED | OUTPATIENT
Start: 2018-04-11 | End: 2018-04-12 | Stop reason: HOSPADM

## 2018-04-11 RX ORDER — ACETAMINOPHEN 325 MG/1
650 TABLET ORAL EVERY 4 HOURS PRN
Status: DISCONTINUED | OUTPATIENT
Start: 2018-04-11 | End: 2018-04-12 | Stop reason: HOSPADM

## 2018-04-11 RX ORDER — ONDANSETRON 2 MG/ML
4 INJECTION INTRAMUSCULAR; INTRAVENOUS EVERY 6 HOURS PRN
Status: DISCONTINUED | OUTPATIENT
Start: 2018-04-11 | End: 2018-04-12 | Stop reason: HOSPADM

## 2018-04-11 RX ORDER — FENTANYL CITRATE 50 UG/ML
INJECTION, SOLUTION INTRAMUSCULAR; INTRAVENOUS PRN
Status: DISCONTINUED | OUTPATIENT
Start: 2018-04-11 | End: 2018-04-11 | Stop reason: SDUPTHER

## 2018-04-11 RX ORDER — SODIUM CHLORIDE 0.9 % (FLUSH) 0.9 %
10 SYRINGE (ML) INJECTION EVERY 12 HOURS SCHEDULED
Status: DISCONTINUED | OUTPATIENT
Start: 2018-04-11 | End: 2018-04-11 | Stop reason: HOSPADM

## 2018-04-11 RX ORDER — EPHEDRINE SULFATE 50 MG/ML
INJECTION INTRAVENOUS PRN
Status: DISCONTINUED | OUTPATIENT
Start: 2018-04-11 | End: 2018-04-11 | Stop reason: SDUPTHER

## 2018-04-11 RX ORDER — OXYCODONE HYDROCHLORIDE 5 MG/1
10 TABLET ORAL EVERY 4 HOURS PRN
Status: DISCONTINUED | OUTPATIENT
Start: 2018-04-11 | End: 2018-04-12 | Stop reason: HOSPADM

## 2018-04-11 RX ORDER — SODIUM CHLORIDE 0.9 % (FLUSH) 0.9 %
10 SYRINGE (ML) INJECTION PRN
Status: DISCONTINUED | OUTPATIENT
Start: 2018-04-11 | End: 2018-04-11 | Stop reason: HOSPADM

## 2018-04-11 RX ORDER — LABETALOL HYDROCHLORIDE 5 MG/ML
10 INJECTION, SOLUTION INTRAVENOUS EVERY 10 MIN PRN
Status: DISCONTINUED | OUTPATIENT
Start: 2018-04-11 | End: 2018-04-11 | Stop reason: HOSPADM

## 2018-04-11 RX ORDER — SODIUM CHLORIDE 9 MG/ML
INJECTION, SOLUTION INTRAVENOUS CONTINUOUS
Status: DISCONTINUED | OUTPATIENT
Start: 2018-04-11 | End: 2018-04-11

## 2018-04-11 RX ORDER — DEXAMETHASONE SODIUM PHOSPHATE 4 MG/ML
INJECTION, SOLUTION INTRA-ARTICULAR; INTRALESIONAL; INTRAMUSCULAR; INTRAVENOUS; SOFT TISSUE PRN
Status: DISCONTINUED | OUTPATIENT
Start: 2018-04-11 | End: 2018-04-11 | Stop reason: SDUPTHER

## 2018-04-11 RX ORDER — LABETALOL HYDROCHLORIDE 5 MG/ML
10 INJECTION, SOLUTION INTRAVENOUS
Status: DISCONTINUED | OUTPATIENT
Start: 2018-04-11 | End: 2018-04-12 | Stop reason: HOSPADM

## 2018-04-11 RX ORDER — LABETALOL HYDROCHLORIDE 5 MG/ML
INJECTION, SOLUTION INTRAVENOUS
Status: DISPENSED
Start: 2018-04-11 | End: 2018-04-12

## 2018-04-11 RX ORDER — SODIUM CHLORIDE 0.9 % (FLUSH) 0.9 %
10 SYRINGE (ML) INJECTION EVERY 12 HOURS SCHEDULED
Status: DISCONTINUED | OUTPATIENT
Start: 2018-04-11 | End: 2018-04-12 | Stop reason: HOSPADM

## 2018-04-11 RX ORDER — ENALAPRILAT 2.5 MG/2ML
1.25 INJECTION INTRAVENOUS EVERY 6 HOURS PRN
Status: DISCONTINUED | OUTPATIENT
Start: 2018-04-11 | End: 2018-04-12 | Stop reason: HOSPADM

## 2018-04-11 RX ORDER — ROCURONIUM BROMIDE 10 MG/ML
INJECTION, SOLUTION INTRAVENOUS PRN
Status: DISCONTINUED | OUTPATIENT
Start: 2018-04-11 | End: 2018-04-11 | Stop reason: SDUPTHER

## 2018-04-11 RX ORDER — GLYCOPYRROLATE 1 MG/5 ML
SYRINGE (ML) INTRAVENOUS PRN
Status: DISCONTINUED | OUTPATIENT
Start: 2018-04-11 | End: 2018-04-11 | Stop reason: SDUPTHER

## 2018-04-11 RX ORDER — MORPHINE SULFATE 2 MG/ML
2 INJECTION, SOLUTION INTRAMUSCULAR; INTRAVENOUS EVERY 5 MIN PRN
Status: DISCONTINUED | OUTPATIENT
Start: 2018-04-11 | End: 2018-04-11 | Stop reason: HOSPADM

## 2018-04-11 RX ORDER — SODIUM CHLORIDE, SODIUM LACTATE, POTASSIUM CHLORIDE, CALCIUM CHLORIDE 600; 310; 30; 20 MG/100ML; MG/100ML; MG/100ML; MG/100ML
INJECTION, SOLUTION INTRAVENOUS CONTINUOUS PRN
Status: DISCONTINUED | OUTPATIENT
Start: 2018-04-11 | End: 2018-04-11 | Stop reason: SDUPTHER

## 2018-04-11 RX ORDER — HEPARIN SODIUM 1000 [USP'U]/ML
INJECTION, SOLUTION INTRAVENOUS; SUBCUTANEOUS PRN
Status: DISCONTINUED | OUTPATIENT
Start: 2018-04-11 | End: 2018-04-11 | Stop reason: SDUPTHER

## 2018-04-11 RX ORDER — SODIUM CHLORIDE 450 MG/100ML
INJECTION, SOLUTION INTRAVENOUS CONTINUOUS
Status: DISCONTINUED | OUTPATIENT
Start: 2018-04-11 | End: 2018-04-12 | Stop reason: HOSPADM

## 2018-04-11 RX ORDER — SODIUM CHLORIDE 0.9 % (FLUSH) 0.9 %
10 SYRINGE (ML) INJECTION PRN
Status: DISCONTINUED | OUTPATIENT
Start: 2018-04-11 | End: 2018-04-12 | Stop reason: HOSPADM

## 2018-04-11 RX ORDER — CLONIDINE HYDROCHLORIDE 0.1 MG/1
0.1 TABLET ORAL EVERY 30 MIN PRN
Status: DISCONTINUED | OUTPATIENT
Start: 2018-04-11 | End: 2018-04-12 | Stop reason: HOSPADM

## 2018-04-11 RX ORDER — DOCUSATE SODIUM 100 MG/1
100 CAPSULE, LIQUID FILLED ORAL 2 TIMES DAILY
Status: DISCONTINUED | OUTPATIENT
Start: 2018-04-11 | End: 2018-04-12 | Stop reason: HOSPADM

## 2018-04-11 RX ORDER — FENTANYL CITRATE 50 UG/ML
50 INJECTION, SOLUTION INTRAMUSCULAR; INTRAVENOUS EVERY 5 MIN PRN
Status: DISCONTINUED | OUTPATIENT
Start: 2018-04-11 | End: 2018-04-11 | Stop reason: HOSPADM

## 2018-04-11 RX ORDER — ONDANSETRON 2 MG/ML
INJECTION INTRAMUSCULAR; INTRAVENOUS PRN
Status: DISCONTINUED | OUTPATIENT
Start: 2018-04-11 | End: 2018-04-11 | Stop reason: SDUPTHER

## 2018-04-11 RX ORDER — NEOSTIGMINE METHYLSULFATE 1 MG/ML
INJECTION, SOLUTION INTRAVENOUS PRN
Status: DISCONTINUED | OUTPATIENT
Start: 2018-04-11 | End: 2018-04-11 | Stop reason: SDUPTHER

## 2018-04-11 RX ORDER — ALBUMIN, HUMAN INJ 5% 5 %
SOLUTION INTRAVENOUS PRN
Status: DISCONTINUED | OUTPATIENT
Start: 2018-04-11 | End: 2018-04-11 | Stop reason: SDUPTHER

## 2018-04-11 RX ORDER — SODIUM CHLORIDE 9 MG/ML
INJECTION, SOLUTION INTRAVENOUS CONTINUOUS PRN
Status: DISCONTINUED | OUTPATIENT
Start: 2018-04-11 | End: 2018-04-11 | Stop reason: SDUPTHER

## 2018-04-11 RX ORDER — FAMOTIDINE 20 MG/1
20 TABLET, FILM COATED ORAL 2 TIMES DAILY
Status: DISCONTINUED | OUTPATIENT
Start: 2018-04-11 | End: 2018-04-12 | Stop reason: HOSPADM

## 2018-04-11 RX ADMIN — LABETALOL HYDROCHLORIDE 10 MG: 5 INJECTION INTRAVENOUS at 11:25

## 2018-04-11 RX ADMIN — LABETALOL HYDROCHLORIDE 5 MG: 5 INJECTION, SOLUTION INTRAVENOUS at 08:54

## 2018-04-11 RX ADMIN — NEOSTIGMINE METHYLSULFATE 1 MG: 1 INJECTION, SOLUTION INTRAVENOUS at 10:59

## 2018-04-11 RX ADMIN — PHENYLEPHRINE HYDROCHLORIDE 100 MCG: 10 INJECTION INTRAMUSCULAR; INTRAVENOUS; SUBCUTANEOUS at 09:05

## 2018-04-11 RX ADMIN — Medication 0.2 MG: at 09:00

## 2018-04-11 RX ADMIN — PHENYLEPHRINE HYDROCHLORIDE 100 MCG: 10 INJECTION INTRAMUSCULAR; INTRAVENOUS; SUBCUTANEOUS at 08:14

## 2018-04-11 RX ADMIN — SODIUM CHLORIDE, POTASSIUM CHLORIDE, SODIUM LACTATE AND CALCIUM CHLORIDE: 600; 310; 30; 20 INJECTION, SOLUTION INTRAVENOUS at 10:20

## 2018-04-11 RX ADMIN — Medication 40 MG: at 09:08

## 2018-04-11 RX ADMIN — LIDOCAINE HYDROCHLORIDE 60 MG: 20 INJECTION, SOLUTION INFILTRATION; PERINEURAL at 07:45

## 2018-04-11 RX ADMIN — PHENYLEPHRINE HYDROCHLORIDE 100 MCG: 10 INJECTION INTRAMUSCULAR; INTRAVENOUS; SUBCUTANEOUS at 07:47

## 2018-04-11 RX ADMIN — CLEVIPIDINE 3 MG/HR: 0.5 EMULSION INTRAVENOUS at 14:22

## 2018-04-11 RX ADMIN — LABETALOL HYDROCHLORIDE 10 MG: 5 INJECTION INTRAVENOUS at 12:05

## 2018-04-11 RX ADMIN — PHENYLEPHRINE HYDROCHLORIDE 200 MCG: 10 INJECTION INTRAMUSCULAR; INTRAVENOUS; SUBCUTANEOUS at 08:03

## 2018-04-11 RX ADMIN — PHENYLEPHRINE HYDROCHLORIDE 100 MCG: 10 INJECTION INTRAMUSCULAR; INTRAVENOUS; SUBCUTANEOUS at 08:01

## 2018-04-11 RX ADMIN — PROPOFOL 150 MG: 10 INJECTION, EMULSION INTRAVENOUS at 07:45

## 2018-04-11 RX ADMIN — PHENYLEPHRINE HYDROCHLORIDE 50 MCG: 10 INJECTION INTRAMUSCULAR; INTRAVENOUS; SUBCUTANEOUS at 09:16

## 2018-04-11 RX ADMIN — SODIUM CHLORIDE: 9 INJECTION, SOLUTION INTRAVENOUS at 06:52

## 2018-04-11 RX ADMIN — DEXAMETHASONE SODIUM PHOSPHATE 8 MG: 4 INJECTION, SOLUTION INTRAMUSCULAR; INTRAVENOUS at 08:25

## 2018-04-11 RX ADMIN — SODIUM CHLORIDE: 9 INJECTION, SOLUTION INTRAVENOUS at 07:58

## 2018-04-11 RX ADMIN — SODIUM CHLORIDE: 4.5 INJECTION, SOLUTION INTRAVENOUS at 13:45

## 2018-04-11 RX ADMIN — FAMOTIDINE 20 MG: 20 TABLET, FILM COATED ORAL at 21:25

## 2018-04-11 RX ADMIN — PHENYLEPHRINE HYDROCHLORIDE 100 MCG: 10 INJECTION INTRAMUSCULAR; INTRAVENOUS; SUBCUTANEOUS at 09:59

## 2018-04-11 RX ADMIN — PHENYLEPHRINE HYDROCHLORIDE 100 MCG: 10 INJECTION INTRAMUSCULAR; INTRAVENOUS; SUBCUTANEOUS at 09:40

## 2018-04-11 RX ADMIN — LABETALOL HYDROCHLORIDE 10 MG: 5 INJECTION INTRAVENOUS at 13:15

## 2018-04-11 RX ADMIN — Medication 10 ML: at 21:25

## 2018-04-11 RX ADMIN — DOCUSATE SODIUM 100 MG: 100 CAPSULE, LIQUID FILLED ORAL at 22:29

## 2018-04-11 RX ADMIN — PHENYLEPHRINE HYDROCHLORIDE 50 MCG: 10 INJECTION INTRAMUSCULAR; INTRAVENOUS; SUBCUTANEOUS at 10:13

## 2018-04-11 RX ADMIN — PHENYLEPHRINE HYDROCHLORIDE 200 MCG: 10 INJECTION INTRAMUSCULAR; INTRAVENOUS; SUBCUTANEOUS at 08:23

## 2018-04-11 RX ADMIN — EPHEDRINE SULFATE 10 MG: 50 INJECTION, SOLUTION INTRAVENOUS at 09:06

## 2018-04-11 RX ADMIN — OXYCODONE HYDROCHLORIDE 10 MG: 5 TABLET ORAL at 13:38

## 2018-04-11 RX ADMIN — PHENYLEPHRINE HYDROCHLORIDE 100 MCG: 10 INJECTION INTRAMUSCULAR; INTRAVENOUS; SUBCUTANEOUS at 08:20

## 2018-04-11 RX ADMIN — FENTANYL CITRATE 50 MCG: 50 INJECTION INTRAMUSCULAR; INTRAVENOUS at 09:29

## 2018-04-11 RX ADMIN — HEPARIN SODIUM 8000 UNITS: 1000 INJECTION, SOLUTION INTRAVENOUS; SUBCUTANEOUS at 09:35

## 2018-04-11 RX ADMIN — CEFAZOLIN SODIUM 2 G: 2 SOLUTION INTRAVENOUS at 08:03

## 2018-04-11 RX ADMIN — PHENYLEPHRINE HYDROCHLORIDE 50 MCG: 10 INJECTION INTRAMUSCULAR; INTRAVENOUS; SUBCUTANEOUS at 10:23

## 2018-04-11 RX ADMIN — PHENYLEPHRINE HYDROCHLORIDE 50 MCG: 10 INJECTION INTRAMUSCULAR; INTRAVENOUS; SUBCUTANEOUS at 10:10

## 2018-04-11 RX ADMIN — NEOSTIGMINE METHYLSULFATE 4 MG: 1 INJECTION, SOLUTION INTRAVENOUS at 10:47

## 2018-04-11 RX ADMIN — Medication 0.6 MG: at 10:47

## 2018-04-11 RX ADMIN — PHENYLEPHRINE HYDROCHLORIDE 50 MCG: 10 INJECTION INTRAMUSCULAR; INTRAVENOUS; SUBCUTANEOUS at 10:27

## 2018-04-11 RX ADMIN — PHENYLEPHRINE HYDROCHLORIDE 100 MCG: 10 INJECTION INTRAMUSCULAR; INTRAVENOUS; SUBCUTANEOUS at 07:59

## 2018-04-11 RX ADMIN — PHENYLEPHRINE HYDROCHLORIDE 100 MCG: 10 INJECTION INTRAMUSCULAR; INTRAVENOUS; SUBCUTANEOUS at 08:35

## 2018-04-11 RX ADMIN — PHENYLEPHRINE HYDROCHLORIDE 100 MCG: 10 INJECTION INTRAMUSCULAR; INTRAVENOUS; SUBCUTANEOUS at 08:11

## 2018-04-11 RX ADMIN — OXYCODONE HYDROCHLORIDE 5 MG: 5 TABLET ORAL at 21:25

## 2018-04-11 RX ADMIN — PROTAMINE SULFATE 50 MG: 10 INJECTION, SOLUTION INTRAVENOUS at 12:25

## 2018-04-11 RX ADMIN — FAMOTIDINE 20 MG: 20 TABLET, FILM COATED ORAL at 15:51

## 2018-04-11 RX ADMIN — FENTANYL CITRATE 100 MCG: 50 INJECTION INTRAMUSCULAR; INTRAVENOUS at 07:45

## 2018-04-11 RX ADMIN — LABETALOL HYDROCHLORIDE 10 MG: 5 INJECTION INTRAVENOUS at 11:35

## 2018-04-11 RX ADMIN — Medication 0.2 MG: at 09:05

## 2018-04-11 RX ADMIN — FENTANYL CITRATE 50 MCG: 50 INJECTION INTRAMUSCULAR; INTRAVENOUS at 09:13

## 2018-04-11 RX ADMIN — PHENYLEPHRINE HYDROCHLORIDE 100 MCG: 10 INJECTION INTRAMUSCULAR; INTRAVENOUS; SUBCUTANEOUS at 09:45

## 2018-04-11 RX ADMIN — Medication 50 MG: at 08:04

## 2018-04-11 RX ADMIN — CEFAZOLIN SODIUM 2 G: 2 SOLUTION INTRAVENOUS at 15:47

## 2018-04-11 RX ADMIN — LABETALOL HYDROCHLORIDE 10 MG: 5 INJECTION INTRAVENOUS at 11:45

## 2018-04-11 RX ADMIN — LABETALOL HYDROCHLORIDE 5 MG: 5 INJECTION, SOLUTION INTRAVENOUS at 11:02

## 2018-04-11 RX ADMIN — ONDANSETRON 4 MG: 2 INJECTION INTRAMUSCULAR; INTRAVENOUS at 10:44

## 2018-04-11 RX ADMIN — LABETALOL HYDROCHLORIDE 5 MG: 5 INJECTION, SOLUTION INTRAVENOUS at 08:50

## 2018-04-11 RX ADMIN — PHENYLEPHRINE HYDROCHLORIDE 100 MCG: 10 INJECTION INTRAMUSCULAR; INTRAVENOUS; SUBCUTANEOUS at 09:37

## 2018-04-11 RX ADMIN — LABETALOL HYDROCHLORIDE 5 MG: 5 INJECTION, SOLUTION INTRAVENOUS at 10:41

## 2018-04-11 RX ADMIN — SUCCINYLCHOLINE CHLORIDE 120 MG: 20 INJECTION, SOLUTION INTRAMUSCULAR; INTRAVENOUS at 07:45

## 2018-04-11 RX ADMIN — CEFAZOLIN SODIUM 2 G: 2 SOLUTION INTRAVENOUS at 23:44

## 2018-04-11 RX ADMIN — ALBUMIN (HUMAN) 250 ML: 12.5 INJECTION, SOLUTION INTRAVENOUS at 09:51

## 2018-04-11 RX ADMIN — FENTANYL CITRATE 50 MCG: 50 INJECTION INTRAMUSCULAR; INTRAVENOUS at 08:49

## 2018-04-11 ASSESSMENT — PULMONARY FUNCTION TESTS
PIF_VALUE: 15
PIF_VALUE: 14
PIF_VALUE: 15
PIF_VALUE: 6
PIF_VALUE: 15
PIF_VALUE: 14
PIF_VALUE: 4
PIF_VALUE: 16
PIF_VALUE: 27
PIF_VALUE: 14
PIF_VALUE: 15
PIF_VALUE: 14
PIF_VALUE: 15
PIF_VALUE: 14
PIF_VALUE: 16
PIF_VALUE: 16
PIF_VALUE: 15
PIF_VALUE: 16
PIF_VALUE: 16
PIF_VALUE: 15
PIF_VALUE: 13
PIF_VALUE: 14
PIF_VALUE: 15
PIF_VALUE: 16
PIF_VALUE: 15
PIF_VALUE: 14
PIF_VALUE: 14
PIF_VALUE: 15
PIF_VALUE: 16
PIF_VALUE: 19
PIF_VALUE: 14
PIF_VALUE: 16
PIF_VALUE: 14
PIF_VALUE: 15
PIF_VALUE: 15
PIF_VALUE: 14
PIF_VALUE: 15
PIF_VALUE: 15
PIF_VALUE: 16
PIF_VALUE: 16
PIF_VALUE: 14
PIF_VALUE: 15
PIF_VALUE: 15
PIF_VALUE: 16
PIF_VALUE: 14
PIF_VALUE: 15
PIF_VALUE: 14
PIF_VALUE: 14
PIF_VALUE: 16
PIF_VALUE: 14
PIF_VALUE: 15
PIF_VALUE: 15
PIF_VALUE: 16
PIF_VALUE: 15
PIF_VALUE: 16
PIF_VALUE: 0
PIF_VALUE: 15
PIF_VALUE: 16
PIF_VALUE: 16
PIF_VALUE: 15
PIF_VALUE: 1
PIF_VALUE: 15
PIF_VALUE: 14
PIF_VALUE: 15
PIF_VALUE: 14
PIF_VALUE: 15
PIF_VALUE: 14
PIF_VALUE: 11
PIF_VALUE: 16
PIF_VALUE: 14
PIF_VALUE: 17
PIF_VALUE: 0
PIF_VALUE: 16
PIF_VALUE: 15
PIF_VALUE: 16
PIF_VALUE: 14
PIF_VALUE: 0
PIF_VALUE: 15
PIF_VALUE: 16
PIF_VALUE: 4
PIF_VALUE: 4
PIF_VALUE: 3
PIF_VALUE: 15
PIF_VALUE: 18
PIF_VALUE: 15
PIF_VALUE: 11
PIF_VALUE: 14
PIF_VALUE: 13
PIF_VALUE: 15
PIF_VALUE: 3
PIF_VALUE: 15
PIF_VALUE: 14
PIF_VALUE: 3
PIF_VALUE: 15
PIF_VALUE: 11
PIF_VALUE: 14
PIF_VALUE: 15
PIF_VALUE: 16
PIF_VALUE: 14
PIF_VALUE: 14
PIF_VALUE: 2
PIF_VALUE: 15
PIF_VALUE: 14
PIF_VALUE: 2
PIF_VALUE: 14
PIF_VALUE: 14
PIF_VALUE: 16
PIF_VALUE: 14
PIF_VALUE: 15
PIF_VALUE: 16
PIF_VALUE: 3
PIF_VALUE: 14
PIF_VALUE: 14
PIF_VALUE: 16
PIF_VALUE: 16
PIF_VALUE: 15
PIF_VALUE: 16
PIF_VALUE: 14
PIF_VALUE: 15
PIF_VALUE: 16
PIF_VALUE: 18
PIF_VALUE: 5
PIF_VALUE: 14
PIF_VALUE: 16
PIF_VALUE: 12
PIF_VALUE: 16
PIF_VALUE: 0
PIF_VALUE: 15
PIF_VALUE: 16
PIF_VALUE: 1
PIF_VALUE: 14
PIF_VALUE: 15
PIF_VALUE: 12
PIF_VALUE: 15
PIF_VALUE: 4
PIF_VALUE: 14
PIF_VALUE: 2
PIF_VALUE: 15
PIF_VALUE: 14
PIF_VALUE: 15
PIF_VALUE: 12
PIF_VALUE: 16
PIF_VALUE: 16
PIF_VALUE: 25
PIF_VALUE: 14
PIF_VALUE: 14
PIF_VALUE: 15
PIF_VALUE: 15
PIF_VALUE: 2
PIF_VALUE: 14
PIF_VALUE: 14
PIF_VALUE: 12
PIF_VALUE: 12
PIF_VALUE: 15
PIF_VALUE: 0
PIF_VALUE: 15
PIF_VALUE: 16
PIF_VALUE: 15
PIF_VALUE: 17
PIF_VALUE: 15
PIF_VALUE: 14
PIF_VALUE: 17
PIF_VALUE: 16
PIF_VALUE: 14
PIF_VALUE: 15
PIF_VALUE: 14
PIF_VALUE: 15
PIF_VALUE: 12
PIF_VALUE: 14
PIF_VALUE: 15
PIF_VALUE: 14
PIF_VALUE: 16
PIF_VALUE: 14
PIF_VALUE: 15
PIF_VALUE: 14
PIF_VALUE: 15
PIF_VALUE: 16
PIF_VALUE: 1
PIF_VALUE: 16
PIF_VALUE: 16
PIF_VALUE: 14
PIF_VALUE: 15
PIF_VALUE: 17
PIF_VALUE: 16
PIF_VALUE: 16
PIF_VALUE: 2
PIF_VALUE: 16
PIF_VALUE: 15
PIF_VALUE: 15
PIF_VALUE: 11

## 2018-04-11 ASSESSMENT — PAIN DESCRIPTION - LOCATION
LOCATION: NECK
LOCATION: NECK

## 2018-04-11 ASSESSMENT — PAIN - FUNCTIONAL ASSESSMENT: PAIN_FUNCTIONAL_ASSESSMENT: 0-10

## 2018-04-11 ASSESSMENT — PAIN SCALES - GENERAL
PAINLEVEL_OUTOF10: 8
PAINLEVEL_OUTOF10: 7
PAINLEVEL_OUTOF10: 6
PAINLEVEL_OUTOF10: 4
PAINLEVEL_OUTOF10: 10

## 2018-04-11 ASSESSMENT — PAIN DESCRIPTION - DESCRIPTORS: DESCRIPTORS: ACHING;CONSTANT

## 2018-04-11 ASSESSMENT — PAIN DESCRIPTION - ORIENTATION
ORIENTATION: LEFT
ORIENTATION: LEFT

## 2018-04-11 ASSESSMENT — PAIN DESCRIPTION - PAIN TYPE
TYPE: SURGICAL PAIN
TYPE: ACUTE PAIN

## 2018-04-12 ENCOUNTER — TELEPHONE (OUTPATIENT)
Dept: FAMILY MEDICINE CLINIC | Age: 78
End: 2018-04-12

## 2018-04-12 VITALS
DIASTOLIC BLOOD PRESSURE: 65 MMHG | WEIGHT: 181.4 LBS | BODY MASS INDEX: 29.15 KG/M2 | TEMPERATURE: 98.6 F | HEART RATE: 75 BPM | HEIGHT: 66 IN | OXYGEN SATURATION: 93 % | SYSTOLIC BLOOD PRESSURE: 126 MMHG | RESPIRATION RATE: 20 BRPM

## 2018-04-12 PROBLEM — D64.9 LOW HEMOGLOBIN: Status: ACTIVE | Noted: 2018-04-12

## 2018-04-12 LAB
HCT VFR BLD CALC: 24.2 % (ref 37–47)
HCT VFR BLD CALC: 25.6 % (ref 37–47)
HEMOGLOBIN: 7.4 GM/DL (ref 12–16)
HEMOGLOBIN: 7.9 GM/DL (ref 12–16)
MCH RBC QN AUTO: 22.8 PG (ref 27–31)
MCH RBC QN AUTO: 23.1 PG (ref 27–31)
MCHC RBC AUTO-ENTMCNC: 30.5 GM/DL (ref 33–37)
MCHC RBC AUTO-ENTMCNC: 30.7 GM/DL (ref 33–37)
MCV RBC AUTO: 75 FL (ref 81–99)
MCV RBC AUTO: 75.1 FL (ref 81–99)
PDW BLD-RTO: 18.8 % (ref 11.5–14.5)
PDW BLD-RTO: 19.1 % (ref 11.5–14.5)
PLATELET # BLD: 245 THOU/MM3 (ref 130–400)
PLATELET # BLD: 273 THOU/MM3 (ref 130–400)
PMV BLD AUTO: 9.3 FL (ref 7.4–10.4)
PMV BLD AUTO: 9.4 FL (ref 7.4–10.4)
RBC # BLD: 3.22 MILL/MM3 (ref 4.2–5.4)
RBC # BLD: 3.41 MILL/MM3 (ref 4.2–5.4)
WBC # BLD: 11.2 THOU/MM3 (ref 4.8–10.8)
WBC # BLD: 11.7 THOU/MM3 (ref 4.8–10.8)

## 2018-04-12 PROCEDURE — 36415 COLL VENOUS BLD VENIPUNCTURE: CPT

## 2018-04-12 PROCEDURE — 85027 COMPLETE CBC AUTOMATED: CPT

## 2018-04-12 PROCEDURE — 6360000002 HC RX W HCPCS: Performed by: THORACIC SURGERY (CARDIOTHORACIC VASCULAR SURGERY)

## 2018-04-12 PROCEDURE — C9248 INJ, CLEVIDIPINE BUTYRATE: HCPCS | Performed by: THORACIC SURGERY (CARDIOTHORACIC VASCULAR SURGERY)

## 2018-04-12 PROCEDURE — 6370000000 HC RX 637 (ALT 250 FOR IP): Performed by: THORACIC SURGERY (CARDIOTHORACIC VASCULAR SURGERY)

## 2018-04-12 PROCEDURE — 2580000003 HC RX 258: Performed by: THORACIC SURGERY (CARDIOTHORACIC VASCULAR SURGERY)

## 2018-04-12 RX ORDER — OXYCODONE HYDROCHLORIDE 5 MG/1
5 TABLET ORAL EVERY 8 HOURS PRN
Qty: 21 TABLET | Refills: 0 | Status: SHIPPED | OUTPATIENT
Start: 2018-04-12 | End: 2018-04-19

## 2018-04-12 RX ORDER — LANOLIN ALCOHOL/MO/W.PET/CERES
325 CREAM (GRAM) TOPICAL 2 TIMES DAILY
Qty: 60 TABLET | Refills: 0 | Status: SHIPPED | OUTPATIENT
Start: 2018-04-12 | End: 2018-05-07 | Stop reason: SDUPTHER

## 2018-04-12 RX ORDER — HYDROCHLOROTHIAZIDE 25 MG/1
12.5 TABLET ORAL DAILY
Status: DISCONTINUED | OUTPATIENT
Start: 2018-04-12 | End: 2018-04-12 | Stop reason: HOSPADM

## 2018-04-12 RX ORDER — AMITRIPTYLINE HYDROCHLORIDE 25 MG/1
25 TABLET, FILM COATED ORAL NIGHTLY
Status: DISCONTINUED | OUTPATIENT
Start: 2018-04-12 | End: 2018-04-12 | Stop reason: HOSPADM

## 2018-04-12 RX ORDER — CLOPIDOGREL BISULFATE 75 MG/1
75 TABLET ORAL DAILY
Status: DISCONTINUED | OUTPATIENT
Start: 2018-04-12 | End: 2018-04-12 | Stop reason: HOSPADM

## 2018-04-12 RX ADMIN — CLEVIPIDINE 2.5 MG/HR: 0.5 EMULSION INTRAVENOUS at 04:19

## 2018-04-12 RX ADMIN — HYDROCHLOROTHIAZIDE 12.5 MG: 25 TABLET ORAL at 11:31

## 2018-04-12 RX ADMIN — SODIUM CHLORIDE: 4.5 INJECTION, SOLUTION INTRAVENOUS at 04:18

## 2018-04-12 RX ADMIN — ENOXAPARIN SODIUM 40 MG: 40 INJECTION SUBCUTANEOUS at 09:18

## 2018-04-12 RX ADMIN — ACETAMINOPHEN 650 MG: 325 TABLET ORAL at 08:07

## 2018-04-12 RX ADMIN — Medication 10 ML: at 09:19

## 2018-04-12 RX ADMIN — DOCUSATE SODIUM 100 MG: 100 CAPSULE, LIQUID FILLED ORAL at 11:32

## 2018-04-12 RX ADMIN — ASPIRIN 325 MG: 325 TABLET, DELAYED RELEASE ORAL at 09:17

## 2018-04-12 RX ADMIN — FAMOTIDINE 20 MG: 20 TABLET, FILM COATED ORAL at 09:17

## 2018-04-12 RX ADMIN — METOPROLOL TARTRATE 12.5 MG: 25 TABLET ORAL at 11:31

## 2018-04-12 RX ADMIN — Medication 10 ML: at 11:32

## 2018-04-12 RX ADMIN — CLOPIDOGREL 75 MG: 75 TABLET, FILM COATED ORAL at 11:31

## 2018-04-12 ASSESSMENT — PAIN DESCRIPTION - PAIN TYPE: TYPE: CHRONIC PAIN

## 2018-04-12 ASSESSMENT — PAIN DESCRIPTION - LOCATION: LOCATION: HEAD

## 2018-04-12 ASSESSMENT — PAIN SCALES - GENERAL
PAINLEVEL_OUTOF10: 2
PAINLEVEL_OUTOF10: 5

## 2018-04-12 ASSESSMENT — PAIN DESCRIPTION - DESCRIPTORS: DESCRIPTORS: ACHING

## 2018-04-13 ENCOUNTER — TELEPHONE (OUTPATIENT)
Dept: FAMILY MEDICINE CLINIC | Age: 78
End: 2018-04-13

## 2018-04-13 LAB
MRSA SCREEN: NORMAL
URINE CULTURE, ROUTINE: NORMAL
VRE CULTURE: NORMAL

## 2018-04-20 ENCOUNTER — OFFICE VISIT (OUTPATIENT)
Dept: CARDIOTHORACIC SURGERY | Age: 78
End: 2018-04-20

## 2018-04-20 VITALS — DIASTOLIC BLOOD PRESSURE: 66 MMHG | SYSTOLIC BLOOD PRESSURE: 116 MMHG | HEART RATE: 88 BPM

## 2018-04-20 DIAGNOSIS — R06.02 SOB (SHORTNESS OF BREATH): Primary | ICD-10-CM

## 2018-04-20 LAB
CHOLESTEROL, TOTAL: 125 MG/DL
CHOLESTEROL/HDL RATIO: NORMAL
HDLC SERPL-MCNC: 40 MG/DL (ref 35–70)
LDL CHOLESTEROL CALCULATED: 65 MG/DL (ref 0–160)
TRIGL SERPL-MCNC: 101 MG/DL
VLDLC SERPL CALC-MCNC: 20 MG/DL

## 2018-04-20 PROCEDURE — 99024 POSTOP FOLLOW-UP VISIT: CPT | Performed by: NURSE PRACTITIONER

## 2018-04-20 ASSESSMENT — ENCOUNTER SYMPTOMS
COLOR CHANGE: 0
BLOOD IN STOOL: 0
SHORTNESS OF BREATH: 0
STRIDOR: 0
ABDOMINAL DISTENTION: 0
SINUS PRESSURE: 0
VOMITING: 0
FACIAL SWELLING: 0
SORE THROAT: 0
ABDOMINAL PAIN: 0
CONSTIPATION: 0
VOICE CHANGE: 0
DIARRHEA: 0
BACK PAIN: 0
CHOKING: 0
SINUS PAIN: 0
WHEEZING: 0
RECTAL PAIN: 0
EYE DISCHARGE: 0
APNEA: 0
NAUSEA: 0
EYE ITCHING: 0
CHEST TIGHTNESS: 0
TROUBLE SWALLOWING: 0
EYE REDNESS: 0
ANAL BLEEDING: 0
EYE PAIN: 0
COUGH: 0
PHOTOPHOBIA: 0
RHINORRHEA: 0

## 2018-04-23 ENCOUNTER — OFFICE VISIT (OUTPATIENT)
Dept: FAMILY MEDICINE CLINIC | Age: 78
End: 2018-04-23
Payer: MEDICARE

## 2018-04-23 VITALS
RESPIRATION RATE: 12 BRPM | HEIGHT: 66 IN | HEART RATE: 68 BPM | DIASTOLIC BLOOD PRESSURE: 60 MMHG | BODY MASS INDEX: 28.57 KG/M2 | SYSTOLIC BLOOD PRESSURE: 106 MMHG | TEMPERATURE: 98.1 F | WEIGHT: 177.8 LBS

## 2018-04-23 DIAGNOSIS — G43.709 CHRONIC MIGRAINE WITHOUT AURA WITHOUT STATUS MIGRAINOSUS, NOT INTRACTABLE: ICD-10-CM

## 2018-04-23 DIAGNOSIS — I73.9 PERIPHERAL VASCULAR DISEASE (HCC): ICD-10-CM

## 2018-04-23 DIAGNOSIS — D64.9 LOW HEMOGLOBIN: ICD-10-CM

## 2018-04-23 DIAGNOSIS — G45.8 SUBCLAVIAN STEAL SYNDROME: Primary | ICD-10-CM

## 2018-04-23 PROCEDURE — 99214 OFFICE O/P EST MOD 30 MIN: CPT | Performed by: FAMILY MEDICINE

## 2018-04-23 PROCEDURE — 1111F DSCHRG MED/CURRENT MED MERGE: CPT | Performed by: FAMILY MEDICINE

## 2018-04-24 ENCOUNTER — TELEPHONE (OUTPATIENT)
Dept: FAMILY MEDICINE CLINIC | Age: 78
End: 2018-04-24

## 2018-04-30 ENCOUNTER — TELEPHONE (OUTPATIENT)
Dept: FAMILY MEDICINE CLINIC | Age: 78
End: 2018-04-30

## 2018-05-03 ENCOUNTER — TELEPHONE (OUTPATIENT)
Dept: FAMILY MEDICINE CLINIC | Age: 78
End: 2018-05-03

## 2018-05-03 ENCOUNTER — HOSPITAL ENCOUNTER (OUTPATIENT)
Age: 78
Discharge: HOME | End: 2018-05-03
Payer: MEDICARE

## 2018-05-03 ENCOUNTER — NURSE ONLY (OUTPATIENT)
Dept: FAMILY MEDICINE CLINIC | Age: 78
End: 2018-05-03

## 2018-05-03 ENCOUNTER — OFFICE VISIT (OUTPATIENT)
Dept: FAMILY MEDICINE CLINIC | Age: 78
End: 2018-05-03
Payer: MEDICARE

## 2018-05-03 VITALS
SYSTOLIC BLOOD PRESSURE: 112 MMHG | RESPIRATION RATE: 20 BRPM | HEART RATE: 75 BPM | WEIGHT: 175 LBS | TEMPERATURE: 98.4 F | HEIGHT: 66 IN | OXYGEN SATURATION: 97 % | BODY MASS INDEX: 28.12 KG/M2 | DIASTOLIC BLOOD PRESSURE: 68 MMHG

## 2018-05-03 VITALS — SYSTOLIC BLOOD PRESSURE: 110 MMHG | DIASTOLIC BLOOD PRESSURE: 76 MMHG

## 2018-05-03 DIAGNOSIS — R06.02 SHORTNESS OF BREATH ON EXERTION: ICD-10-CM

## 2018-05-03 DIAGNOSIS — I10 HYPERTENSION, UNSPECIFIED TYPE: Primary | ICD-10-CM

## 2018-05-03 DIAGNOSIS — R53.83 OTHER FATIGUE: ICD-10-CM

## 2018-05-03 DIAGNOSIS — R06.02 SHORTNESS OF BREATH ON EXERTION: Primary | ICD-10-CM

## 2018-05-03 LAB
ANION GAP SERPL CALCULATED.3IONS-SCNC: 17 MEQ/L (ref 8–16)
ANISOCYTOSIS: ABNORMAL
BASOPHILIA: SLIGHT
BASOPHILS # BLD: 0.3 %
BASOPHILS ABSOLUTE: 0 THOU/MM3 (ref 0–0.1)
BUN BLDV-MCNC: 8 MG/DL (ref 7–22)
CALCIUM SERPL-MCNC: 9.9 MG/DL (ref 8.5–10.5)
CHLORIDE BLD-SCNC: 105 MEQ/L (ref 98–111)
CO2: 24 MEQ/L (ref 23–33)
CREAT SERPL-MCNC: 0.9 MG/DL (ref 0.4–1.2)
EOSINOPHIL # BLD: 4.9 %
EOSINOPHILS ABSOLUTE: 0.5 THOU/MM3 (ref 0–0.4)
GFR SERPL CREATININE-BSD FRML MDRD: 61 ML/MIN/1.73M2
GLUCOSE BLD-MCNC: 94 MG/DL (ref 70–108)
HCT VFR BLD CALC: 30.4 % (ref 37–47)
HEMOGLOBIN: 9.5 GM/DL (ref 12–16)
HYPOCHROMIA: ABNORMAL
LYMPHOCYTES # BLD: 33.5 %
LYMPHOCYTES ABSOLUTE: 3.1 THOU/MM3 (ref 1–4.8)
MCH RBC QN AUTO: 25.1 PG (ref 27–31)
MCHC RBC AUTO-ENTMCNC: 31.3 GM/DL (ref 33–37)
MCV RBC AUTO: 80.4 FL (ref 81–99)
MICROCYTES: ABNORMAL
MONOCYTES # BLD: 11.1 %
MONOCYTES ABSOLUTE: 1 THOU/MM3 (ref 0.4–1.3)
NUCLEATED RED BLOOD CELLS: 0 /100 WBC
PDW BLD-RTO: 26.7 % (ref 11.5–14.5)
PLATELET # BLD: 378 THOU/MM3 (ref 130–400)
PLATELET ESTIMATE: ADEQUATE
PMV BLD AUTO: 9.4 FL (ref 7.4–10.4)
POTASSIUM SERPL-SCNC: 4.8 MEQ/L (ref 3.5–5.2)
RBC # BLD: 3.78 MILL/MM3 (ref 4.2–5.4)
SEG NEUTROPHILS: 50.2 %
SEGMENTED NEUTROPHILS ABSOLUTE COUNT: 4.7 THOU/MM3 (ref 1.8–7.7)
SODIUM BLD-SCNC: 146 MEQ/L (ref 135–145)
WBC # BLD: 9.4 THOU/MM3 (ref 4.8–10.8)

## 2018-05-03 PROCEDURE — 93000 ELECTROCARDIOGRAM COMPLETE: CPT | Performed by: FAMILY MEDICINE

## 2018-05-03 PROCEDURE — 85025 COMPLETE CBC W/AUTO DIFF WBC: CPT

## 2018-05-03 PROCEDURE — 80048 BASIC METABOLIC PNL TOTAL CA: CPT

## 2018-05-03 PROCEDURE — 36415 COLL VENOUS BLD VENIPUNCTURE: CPT

## 2018-05-03 PROCEDURE — 99214 OFFICE O/P EST MOD 30 MIN: CPT | Performed by: FAMILY MEDICINE

## 2018-05-04 ENCOUNTER — HOSPITAL ENCOUNTER (OUTPATIENT)
Dept: GENERAL RADIOLOGY | Age: 78
Discharge: HOME | End: 2018-05-04
Payer: MEDICARE

## 2018-05-04 ENCOUNTER — HOSPITAL ENCOUNTER (OUTPATIENT)
Age: 78
Discharge: HOME | End: 2018-05-04
Payer: MEDICARE

## 2018-05-04 ENCOUNTER — TELEPHONE (OUTPATIENT)
Dept: FAMILY MEDICINE CLINIC | Age: 78
End: 2018-05-04

## 2018-05-04 DIAGNOSIS — R06.02 SHORTNESS OF BREATH ON EXERTION: ICD-10-CM

## 2018-05-04 DIAGNOSIS — R06.02 SHORTNESS OF BREATH: Primary | ICD-10-CM

## 2018-05-04 PROCEDURE — 71046 X-RAY EXAM CHEST 2 VIEWS: CPT

## 2018-05-07 ENCOUNTER — TELEPHONE (OUTPATIENT)
Dept: FAMILY MEDICINE CLINIC | Age: 78
End: 2018-05-07

## 2018-05-07 RX ORDER — LANOLIN ALCOHOL/MO/W.PET/CERES
325 CREAM (GRAM) TOPICAL 2 TIMES DAILY
Qty: 60 TABLET | Refills: 2 | Status: SHIPPED | OUTPATIENT
Start: 2018-05-07 | End: 2019-05-29 | Stop reason: ALTCHOICE

## 2018-05-08 ENCOUNTER — OFFICE VISIT (OUTPATIENT)
Dept: CARDIOTHORACIC SURGERY | Age: 78
End: 2018-05-08

## 2018-05-08 VITALS
WEIGHT: 176.6 LBS | HEART RATE: 66 BPM | HEIGHT: 66 IN | SYSTOLIC BLOOD PRESSURE: 98 MMHG | BODY MASS INDEX: 28.38 KG/M2 | DIASTOLIC BLOOD PRESSURE: 65 MMHG

## 2018-05-08 DIAGNOSIS — I34.0 MILD MITRAL REGURGITATION: Primary | ICD-10-CM

## 2018-05-08 PROCEDURE — 99024 POSTOP FOLLOW-UP VISIT: CPT | Performed by: THORACIC SURGERY (CARDIOTHORACIC VASCULAR SURGERY)

## 2018-05-14 ENCOUNTER — TELEPHONE (OUTPATIENT)
Dept: FAMILY MEDICINE CLINIC | Age: 78
End: 2018-05-14

## 2018-05-14 ENCOUNTER — HOSPITAL ENCOUNTER (OUTPATIENT)
Dept: PULMONOLOGY | Age: 78
Discharge: HOME | End: 2018-05-14
Payer: MEDICARE

## 2018-05-14 ENCOUNTER — HOSPITAL ENCOUNTER (OUTPATIENT)
Age: 78
Discharge: HOME | End: 2018-05-14
Payer: MEDICARE

## 2018-05-14 DIAGNOSIS — R06.02 SHORTNESS OF BREATH: ICD-10-CM

## 2018-05-14 DIAGNOSIS — D64.9 LOW HEMOGLOBIN: ICD-10-CM

## 2018-05-14 LAB
HCT VFR BLD CALC: 27.6 % (ref 37–47)
HEMOGLOBIN: 8.8 GM/DL (ref 12–16)
MCH RBC QN AUTO: 25.8 PG (ref 27–31)
MCHC RBC AUTO-ENTMCNC: 31.9 GM/DL (ref 33–37)
MCV RBC AUTO: 80.9 FL (ref 81–99)
PDW BLD-RTO: 26.5 % (ref 11.5–14.5)
PLATELET # BLD: 281 THOU/MM3 (ref 130–400)
PMV BLD AUTO: 9.7 FL (ref 7.4–10.4)
RBC # BLD: 3.42 MILL/MM3 (ref 4.2–5.4)
WBC # BLD: 9.5 THOU/MM3 (ref 4.8–10.8)

## 2018-05-14 PROCEDURE — 94726 PLETHYSMOGRAPHY LUNG VOLUMES: CPT

## 2018-05-14 PROCEDURE — 85027 COMPLETE CBC AUTOMATED: CPT

## 2018-05-14 PROCEDURE — 94060 EVALUATION OF WHEEZING: CPT

## 2018-05-14 PROCEDURE — 36415 COLL VENOUS BLD VENIPUNCTURE: CPT

## 2018-05-14 PROCEDURE — 94729 DIFFUSING CAPACITY: CPT

## 2018-05-16 DIAGNOSIS — R06.02 SHORTNESS OF BREATH: ICD-10-CM

## 2018-05-16 DIAGNOSIS — R94.2 DECREASED DIFFUSION CAPACITY: Primary | ICD-10-CM

## 2018-05-17 ENCOUNTER — OFFICE VISIT (OUTPATIENT)
Dept: CARDIOLOGY CLINIC | Age: 78
End: 2018-05-17
Payer: MEDICARE

## 2018-05-17 VITALS
WEIGHT: 172.8 LBS | DIASTOLIC BLOOD PRESSURE: 70 MMHG | SYSTOLIC BLOOD PRESSURE: 132 MMHG | HEIGHT: 66 IN | BODY MASS INDEX: 27.77 KG/M2 | HEART RATE: 80 BPM

## 2018-05-17 DIAGNOSIS — E78.5 DYSLIPIDEMIA: ICD-10-CM

## 2018-05-17 DIAGNOSIS — Z95.1 S/P CABG (CORONARY ARTERY BYPASS GRAFT): ICD-10-CM

## 2018-05-17 DIAGNOSIS — I10 ESSENTIAL HYPERTENSION: ICD-10-CM

## 2018-05-17 DIAGNOSIS — I25.10 CORONARY ARTERY DISEASE INVOLVING NATIVE CORONARY ARTERY OF NATIVE HEART WITHOUT ANGINA PECTORIS: Primary | ICD-10-CM

## 2018-05-17 DIAGNOSIS — Z87.891 EX-SMOKER: ICD-10-CM

## 2018-05-17 DIAGNOSIS — I77.1 SUBCLAVIAN ARTERIAL STENOSIS (HCC): ICD-10-CM

## 2018-05-17 PROCEDURE — 99213 OFFICE O/P EST LOW 20 MIN: CPT | Performed by: INTERNAL MEDICINE

## 2018-05-17 RX ORDER — HYDROCHLOROTHIAZIDE 12.5 MG/1
12.5 CAPSULE, GELATIN COATED ORAL DAILY
COMMUNITY
End: 2018-06-14

## 2018-05-29 ENCOUNTER — HOSPITAL ENCOUNTER (OUTPATIENT)
Dept: NON INVASIVE DIAGNOSTICS | Age: 78
Discharge: HOME | End: 2018-05-29
Payer: MEDICARE

## 2018-05-29 DIAGNOSIS — I34.0 MILD MITRAL REGURGITATION: ICD-10-CM

## 2018-05-29 LAB
LV EF: 55 %
LVEF MODALITY: NORMAL

## 2018-05-29 PROCEDURE — 93306 TTE W/DOPPLER COMPLETE: CPT

## 2018-06-04 DIAGNOSIS — E78.00 PURE HYPERCHOLESTEROLEMIA: ICD-10-CM

## 2018-06-04 DIAGNOSIS — Z95.1 S/P CABG X 3: ICD-10-CM

## 2018-06-04 RX ORDER — SIMVASTATIN 40 MG
TABLET ORAL
Qty: 90 TABLET | Refills: 3 | Status: SHIPPED | OUTPATIENT
Start: 2018-06-04 | End: 2019-05-26 | Stop reason: SDUPTHER

## 2018-06-05 ENCOUNTER — OFFICE VISIT (OUTPATIENT)
Dept: CARDIOTHORACIC SURGERY | Age: 78
End: 2018-06-05

## 2018-06-05 VITALS
DIASTOLIC BLOOD PRESSURE: 63 MMHG | HEART RATE: 73 BPM | BODY MASS INDEX: 27.74 KG/M2 | SYSTOLIC BLOOD PRESSURE: 102 MMHG | WEIGHT: 172.6 LBS | HEIGHT: 66 IN

## 2018-06-05 DIAGNOSIS — I71.40 ABDOMINAL AORTIC ANEURYSM (AAA) 3.0 CM TO 5.0 CM IN DIAMETER IN FEMALE: Primary | ICD-10-CM

## 2018-06-05 DIAGNOSIS — I34.0 NON-RHEUMATIC MITRAL REGURGITATION: ICD-10-CM

## 2018-06-05 PROCEDURE — 99024 POSTOP FOLLOW-UP VISIT: CPT | Performed by: THORACIC SURGERY (CARDIOTHORACIC VASCULAR SURGERY)

## 2018-06-14 ENCOUNTER — OFFICE VISIT (OUTPATIENT)
Dept: FAMILY MEDICINE CLINIC | Age: 78
End: 2018-06-14
Payer: MEDICARE

## 2018-06-14 VITALS
TEMPERATURE: 98.2 F | SYSTOLIC BLOOD PRESSURE: 106 MMHG | BODY MASS INDEX: 27.8 KG/M2 | OXYGEN SATURATION: 97 % | HEIGHT: 66 IN | HEART RATE: 74 BPM | DIASTOLIC BLOOD PRESSURE: 52 MMHG | WEIGHT: 173 LBS | RESPIRATION RATE: 14 BRPM

## 2018-06-14 DIAGNOSIS — I10 ESSENTIAL HYPERTENSION: ICD-10-CM

## 2018-06-14 DIAGNOSIS — R94.2 DIFFUSION CAPACITY OF LUNG (DL), DECREASED: ICD-10-CM

## 2018-06-14 DIAGNOSIS — Z91.81 AT HIGH RISK FOR FALLS: ICD-10-CM

## 2018-06-14 DIAGNOSIS — R06.02 SHORTNESS OF BREATH: Primary | ICD-10-CM

## 2018-06-14 DIAGNOSIS — I25.10 CORONARY ARTERY DISEASE INVOLVING NATIVE CORONARY ARTERY OF NATIVE HEART WITHOUT ANGINA PECTORIS: ICD-10-CM

## 2018-06-14 PROCEDURE — 99214 OFFICE O/P EST MOD 30 MIN: CPT | Performed by: FAMILY MEDICINE

## 2018-06-29 RX ORDER — HYDROCHLOROTHIAZIDE 12.5 MG/1
12.5 CAPSULE, GELATIN COATED ORAL DAILY
Qty: 90 CAPSULE | Refills: 1 | Status: SHIPPED | OUTPATIENT
Start: 2018-06-29 | End: 2018-08-27 | Stop reason: ALTCHOICE

## 2018-07-19 ENCOUNTER — HOSPITAL ENCOUNTER (OUTPATIENT)
Age: 78
Discharge: HOME | End: 2018-07-19
Payer: MEDICARE

## 2018-07-19 LAB
ALBUMIN SERPL-MCNC: 3.9 G/DL (ref 3.5–5.1)
ALP BLD-CCNC: 113 U/L (ref 38–126)
ALT SERPL-CCNC: 12 U/L (ref 11–66)
ANION GAP SERPL CALCULATED.3IONS-SCNC: 10 MEQ/L (ref 8–16)
AST SERPL-CCNC: 36 U/L (ref 5–40)
BILIRUB SERPL-MCNC: 0.4 MG/DL (ref 0.3–1.2)
BUN BLDV-MCNC: 7 MG/DL (ref 7–22)
CALCIUM SERPL-MCNC: 9.7 MG/DL (ref 8.5–10.5)
CHLORIDE BLD-SCNC: 106 MEQ/L (ref 98–111)
CO2: 26 MEQ/L (ref 23–33)
CREAT SERPL-MCNC: 0.8 MG/DL (ref 0.4–1.2)
ERYTHROCYTE [DISTWIDTH] IN BLOOD BY AUTOMATED COUNT: 17.1 % (ref 11.5–14.5)
ERYTHROCYTE [DISTWIDTH] IN BLOOD BY AUTOMATED COUNT: 50.8 FL (ref 35–45)
GFR SERPL CREATININE-BSD FRML MDRD: 69 ML/MIN/1.73M2
GLUCOSE BLD-MCNC: 176 MG/DL (ref 70–108)
HCT VFR BLD CALC: 29.2 % (ref 37–47)
HEMOGLOBIN: 8.3 GM/DL (ref 12–16)
INR BLD: 1.16 (ref 0.85–1.13)
MCH RBC QN AUTO: 23.6 PG (ref 26–33)
MCHC RBC AUTO-ENTMCNC: 28.4 GM/DL (ref 32.2–35.5)
MCV RBC AUTO: 83.2 FL (ref 81–99)
PLATELET # BLD: 252 THOU/MM3 (ref 130–400)
PMV BLD AUTO: 10.9 FL (ref 9.4–12.4)
POTASSIUM SERPL-SCNC: 4.5 MEQ/L (ref 3.5–5.2)
RBC # BLD: 3.51 MILL/MM3 (ref 4.2–5.4)
SODIUM BLD-SCNC: 142 MEQ/L (ref 135–145)
TOTAL PROTEIN: 6.8 G/DL (ref 6.1–8)
WBC # BLD: 5.6 THOU/MM3 (ref 4.8–10.8)

## 2018-07-19 PROCEDURE — 36415 COLL VENOUS BLD VENIPUNCTURE: CPT

## 2018-07-19 PROCEDURE — 85027 COMPLETE CBC AUTOMATED: CPT

## 2018-07-19 PROCEDURE — 85610 PROTHROMBIN TIME: CPT

## 2018-07-19 PROCEDURE — 80053 COMPREHEN METABOLIC PANEL: CPT

## 2018-07-23 ENCOUNTER — TELEPHONE (OUTPATIENT)
Dept: CARDIOLOGY CLINIC | Age: 78
End: 2018-07-23

## 2018-07-23 NOTE — TELEPHONE ENCOUNTER
Patient stated that for about the last 3 weeks her right leg has been swelling. It goes down at night but as the day goes on it swells again. She is requesting an appointment. She stated she had open heart surgery and a bypass in the past. She is going out of town now and is planning to return late Wednesday. Please advise on an appointment.

## 2018-07-30 ENCOUNTER — TELEPHONE (OUTPATIENT)
Dept: CARDIOLOGY CLINIC | Age: 78
End: 2018-07-30

## 2018-07-30 ENCOUNTER — OFFICE VISIT (OUTPATIENT)
Dept: CARDIOLOGY CLINIC | Age: 78
End: 2018-07-30
Payer: MEDICARE

## 2018-07-30 ENCOUNTER — HOSPITAL ENCOUNTER (OUTPATIENT)
Dept: INTERVENTIONAL RADIOLOGY/VASCULAR | Age: 78
Discharge: HOME | End: 2018-07-30
Payer: MEDICARE

## 2018-07-30 VITALS
DIASTOLIC BLOOD PRESSURE: 62 MMHG | BODY MASS INDEX: 27.68 KG/M2 | HEART RATE: 72 BPM | WEIGHT: 172.2 LBS | HEIGHT: 66 IN | SYSTOLIC BLOOD PRESSURE: 116 MMHG

## 2018-07-30 DIAGNOSIS — I73.9 PVD (PERIPHERAL VASCULAR DISEASE) (HCC): ICD-10-CM

## 2018-07-30 DIAGNOSIS — I25.10 CORONARY ARTERY DISEASE INVOLVING NATIVE CORONARY ARTERY OF NATIVE HEART WITHOUT ANGINA PECTORIS: ICD-10-CM

## 2018-07-30 DIAGNOSIS — E78.5 DYSLIPIDEMIA: ICD-10-CM

## 2018-07-30 DIAGNOSIS — M79.89 LEG SWELLING: ICD-10-CM

## 2018-07-30 DIAGNOSIS — I25.10 CORONARY ARTERY DISEASE INVOLVING NATIVE CORONARY ARTERY OF NATIVE HEART WITHOUT ANGINA PECTORIS: Primary | ICD-10-CM

## 2018-07-30 DIAGNOSIS — Z95.1 S/P CABG (CORONARY ARTERY BYPASS GRAFT): ICD-10-CM

## 2018-07-30 DIAGNOSIS — I10 ESSENTIAL HYPERTENSION: ICD-10-CM

## 2018-07-30 PROCEDURE — 93971 EXTREMITY STUDY: CPT

## 2018-07-30 PROCEDURE — 99213 OFFICE O/P EST LOW 20 MIN: CPT | Performed by: PHYSICIAN ASSISTANT

## 2018-07-30 NOTE — PROGRESS NOTES
currently breastfeeding. General:   Well developed, well nourished  Lungs:   Clear to auscultation  Heart:    Normal S1 S2, No murmur, rubs, or gallops  Abdomen:   Soft, non tender, no organomegalies, positive bowel sounds  Extremities:   No edema, no cyanosis, good peripheral pulses  Neurological:   Awake, alert, oriented. No obvious focal deficits  Musculoskeletal:  No obvious deformities    EKG:     Echo 5/29/2018  Conclusions      Summary   Normal left ventricle size and systolic function. Ejection fraction was   estimated at 55 %. There were no regional left ventricular wall motion   abnormalities and wall thickness was within normal limits.   Doppler parameters were consistent with abnormal left ventricular   relaxation (grade 1 diastolic dysfunction).   Mildly dilated left atrium.   There was mild mitral regurgitation.   Mitral annular calcification is present.  Ursula Kinnier was mild tricuspid regurgitation.   Mild pulmonic regurgitation visualized. Diagnosis Orders   1. Coronary artery disease involving native coronary artery of native heart without angina pectoris  VL DUP LOWER EXTREMITY VENOUS RIGHT   2. PVD (peripheral vascular disease) ([facility])  VL DUP LOWER EXTREMITY VENOUS RIGHT   3. Leg swelling  VL DUP LOWER EXTREMITY VENOUS RIGHT   4. S/P CABG (coronary artery bypass graft)     5. Essential hypertension     6. Dyslipidemia         Orders Placed This Encounter   Procedures    VL DUP LOWER EXTREMITY VENOUS RIGHT     Standing Status:   Future     Number of Occurrences:   1     Standing Expiration Date:   7/30/2019     Scheduling Instructions:      STAT hold and call     Order Specific Question:   Reason for exam:     Answer:   R/O DVT     Continue Dr Anita Phillips current treatment plan    I will order a right lower extremity ultrasound to rule out DVT. Will also do office ABIs. Continue same current medications and with constant vigilance to changes in symptoms and also any potential side effects. Return for care if become worse or seek medical attention immediately. The patient is educated on symptoms of heart disease that include chest pain and passing out, dizziness, etc and to report them if there is any change or go to emergency room.        Follow up with myself in 4-6 weeks or sooner if needed  (Please note that portions of this note were completed with a voice recognition program.  Efforts were made to edit the dictation but occasionally words are mis-transcribed.)      Almaz Ovalles PA-C

## 2018-07-30 NOTE — PROGRESS NOTES
Patient here for peripheral edema    Pt complains of:chest pain,SOB,peripheral edema    Pt denies:palpitations,dizziness    EKG done on 5-3-2018

## 2018-07-30 NOTE — TELEPHONE ENCOUNTER
Lew notified patient negative for DVT. Patient cleared to go home. No further orders received. Please agree Zonia Ramp.

## 2018-08-15 ENCOUNTER — TELEPHONE (OUTPATIENT)
Dept: CARDIOLOGY CLINIC | Age: 78
End: 2018-08-15

## 2018-08-27 ENCOUNTER — OFFICE VISIT (OUTPATIENT)
Dept: CARDIOLOGY CLINIC | Age: 78
End: 2018-08-27
Payer: MEDICARE

## 2018-08-27 VITALS
BODY MASS INDEX: 27 KG/M2 | HEIGHT: 66 IN | SYSTOLIC BLOOD PRESSURE: 112 MMHG | WEIGHT: 168 LBS | DIASTOLIC BLOOD PRESSURE: 64 MMHG

## 2018-08-27 DIAGNOSIS — Z95.1 S/P CABG (CORONARY ARTERY BYPASS GRAFT): ICD-10-CM

## 2018-08-27 DIAGNOSIS — E78.5 DYSLIPIDEMIA: ICD-10-CM

## 2018-08-27 DIAGNOSIS — I10 ESSENTIAL HYPERTENSION: ICD-10-CM

## 2018-08-27 DIAGNOSIS — I25.10 CORONARY ARTERY DISEASE INVOLVING NATIVE CORONARY ARTERY OF NATIVE HEART WITHOUT ANGINA PECTORIS: Primary | ICD-10-CM

## 2018-08-27 DIAGNOSIS — I73.9 PAD (PERIPHERAL ARTERY DISEASE) (HCC): ICD-10-CM

## 2018-08-27 PROCEDURE — 99213 OFFICE O/P EST LOW 20 MIN: CPT | Performed by: PHYSICIAN ASSISTANT

## 2018-08-27 NOTE — PROGRESS NOTES
Current Outpatient Prescriptions   Medication Sig Dispense Refill    simvastatin (ZOCOR) 40 MG tablet TAKE 1 TABLET NIGHTLY 90 tablet 3    ferrous sulfate (FE TABS) 325 (65 Fe) MG EC tablet Take 1 tablet by mouth 2 times daily 60 tablet 2    metoprolol tartrate (LOPRESSOR) 25 MG tablet Take 0.5 tablets by mouth 2 times daily 180 tablet 3    clopidogrel (PLAVIX) 75 MG tablet Take 1 tablet by mouth daily 90 tablet 3    Handicap Placard MISC by Does not apply route Expires 12/14/2022 1 each 0    amitriptyline (ELAVIL) 25 MG tablet Take 1 tablet by mouth nightly 90 tablet 3    Cyanocobalamin (B-12) 2500 MCG TABS Take by mouth daily       aspirin 81 MG tablet Take 81 mg by mouth daily       No current facility-administered medications for this visit. Social History     Social History    Marital status:      Spouse name: Shann Goldmann Number of children: 3    Years of education: N/A     Occupational History    retired      Social History Main Topics    Smoking status: Former Smoker     Packs/day: 0.50     Years: 25.00     Types: Cigarettes     Quit date: 11/3/2000    Smokeless tobacco: Never Used      Comment: quit in 2000    Alcohol use No    Drug use: No    Sexual activity: Yes     Partners: Male     Other Topics Concern    None     Social History Narrative    None       Family History   Problem Relation Age of Onset    Early Death Mother         not sure of cause    Heart Disease Father 52        MI    Cancer Brother         pancreatic    Cancer Son         larynx    Diabetes Neg Hx     High Blood Pressure Neg Hx     Stroke Neg Hx        Blood pressure 112/64, height 5' 6\" (1.676 m), weight 168 lb (76.2 kg), not currently breastfeeding.     General:   Well developed, well nourished  Lungs:   Clear to auscultation  Heart:    Normal S1 S2, No murmur, rubs, or gallops  Abdomen:   Soft, non tender, no organomegalies, positive bowel sounds  Extremities:   No edema, no cyanosis, good

## 2018-08-31 ENCOUNTER — PROCEDURE VISIT (OUTPATIENT)
Dept: CARDIOLOGY CLINIC | Age: 78
End: 2018-08-31

## 2018-08-31 DIAGNOSIS — I73.9 CLAUDICATION (HCC): Primary | ICD-10-CM

## 2018-09-12 ENCOUNTER — APPOINTMENT (OUTPATIENT)
Dept: GENERAL RADIOLOGY | Age: 78
End: 2018-09-12
Payer: MEDICARE

## 2018-09-12 ENCOUNTER — HOSPITAL ENCOUNTER (EMERGENCY)
Age: 78
Discharge: HOME | End: 2018-09-12
Attending: INTERNAL MEDICINE
Payer: MEDICARE

## 2018-09-12 ENCOUNTER — APPOINTMENT (OUTPATIENT)
Dept: CT IMAGING | Age: 78
End: 2018-09-12
Payer: MEDICARE

## 2018-09-12 VITALS
HEIGHT: 66 IN | SYSTOLIC BLOOD PRESSURE: 140 MMHG | OXYGEN SATURATION: 95 % | HEART RATE: 65 BPM | WEIGHT: 168 LBS | TEMPERATURE: 98.4 F | BODY MASS INDEX: 27 KG/M2 | RESPIRATION RATE: 14 BRPM | DIASTOLIC BLOOD PRESSURE: 77 MMHG

## 2018-09-12 DIAGNOSIS — G44.89 OTHER HEADACHE SYNDROME: Primary | ICD-10-CM

## 2018-09-12 LAB
ALBUMIN SERPL-MCNC: 4.4 G/DL (ref 3.5–5.1)
ALP BLD-CCNC: 129 U/L (ref 38–126)
ALT SERPL-CCNC: 19 U/L (ref 11–66)
ANION GAP SERPL CALCULATED.3IONS-SCNC: 11 MEQ/L (ref 8–16)
ANISOCYTOSIS: PRESENT
AST SERPL-CCNC: 48 U/L (ref 5–40)
BASOPHILS # BLD: 0.9 %
BASOPHILS ABSOLUTE: 0.1 THOU/MM3 (ref 0–0.1)
BILIRUB SERPL-MCNC: 0.4 MG/DL (ref 0.3–1.2)
BUN BLDV-MCNC: 6 MG/DL (ref 7–22)
CALCIUM SERPL-MCNC: 9.3 MG/DL (ref 8.5–10.5)
CHLORIDE BLD-SCNC: 107 MEQ/L (ref 98–111)
CO2: 24 MEQ/L (ref 23–33)
CREAT SERPL-MCNC: 0.7 MG/DL (ref 0.4–1.2)
ELLIPTOCYTES: ABNORMAL
EOSINOPHIL # BLD: 3.9 %
EOSINOPHILS ABSOLUTE: 0.3 THOU/MM3 (ref 0–0.4)
ERYTHROCYTE [DISTWIDTH] IN BLOOD BY AUTOMATED COUNT: 22.5 % (ref 11.5–14.5)
ERYTHROCYTE [DISTWIDTH] IN BLOOD BY AUTOMATED COUNT: 69.4 FL (ref 35–45)
GFR SERPL CREATININE-BSD FRML MDRD: 81 ML/MIN/1.73M2
GLUCOSE BLD-MCNC: 93 MG/DL (ref 70–108)
HCT VFR BLD CALC: 35.5 % (ref 37–47)
HEMOGLOBIN: 10.6 GM/DL (ref 12–16)
IMMATURE GRANS (ABS): 0.02 THOU/MM3 (ref 0–0.07)
IMMATURE GRANULOCYTES: 0.3 %
LYMPHOCYTES # BLD: 32.9 %
LYMPHOCYTES ABSOLUTE: 2.5 THOU/MM3 (ref 1–4.8)
MCH RBC QN AUTO: 25.5 PG (ref 26–33)
MCHC RBC AUTO-ENTMCNC: 29.9 GM/DL (ref 32.2–35.5)
MCV RBC AUTO: 85.5 FL (ref 81–99)
MONOCYTES # BLD: 11.9 %
MONOCYTES ABSOLUTE: 0.9 THOU/MM3 (ref 0.4–1.3)
NUCLEATED RED BLOOD CELLS: 0 /100 WBC
OSMOLALITY CALCULATION: 280.4 MOSMOL/KG (ref 275–300)
PLATELET # BLD: 264 THOU/MM3 (ref 130–400)
PLATELET ESTIMATE: ADEQUATE
PMV BLD AUTO: 12.2 FL (ref 9.4–12.4)
POIKILOCYTES: ABNORMAL
POTASSIUM SERPL-SCNC: 4.2 MEQ/L (ref 3.5–5.2)
RBC # BLD: 4.15 MILL/MM3 (ref 4.2–5.4)
SCAN OF BLOOD SMEAR: NORMAL
SCHISTOCYTES: ABNORMAL
SEDIMENTATION RATE, ERYTHROCYTE: 14 MM/HR (ref 0–20)
SEG NEUTROPHILS: 50.1 %
SEGMENTED NEUTROPHILS ABSOLUTE COUNT: 3.8 THOU/MM3 (ref 1.8–7.7)
SODIUM BLD-SCNC: 142 MEQ/L (ref 135–145)
TOTAL PROTEIN: 7.3 G/DL (ref 6.1–8)
TROPONIN T: < 0.01 NG/ML
WBC # BLD: 7.5 THOU/MM3 (ref 4.8–10.8)

## 2018-09-12 PROCEDURE — 85651 RBC SED RATE NONAUTOMATED: CPT

## 2018-09-12 PROCEDURE — 85025 COMPLETE CBC W/AUTO DIFF WBC: CPT

## 2018-09-12 PROCEDURE — 6370000000 HC RX 637 (ALT 250 FOR IP): Performed by: INTERNAL MEDICINE

## 2018-09-12 PROCEDURE — 84484 ASSAY OF TROPONIN QUANT: CPT

## 2018-09-12 PROCEDURE — 70360 X-RAY EXAM OF NECK: CPT

## 2018-09-12 PROCEDURE — 71045 X-RAY EXAM CHEST 1 VIEW: CPT

## 2018-09-12 PROCEDURE — 80053 COMPREHEN METABOLIC PANEL: CPT

## 2018-09-12 PROCEDURE — 99284 EMERGENCY DEPT VISIT MOD MDM: CPT

## 2018-09-12 PROCEDURE — 36415 COLL VENOUS BLD VENIPUNCTURE: CPT

## 2018-09-12 PROCEDURE — 70450 CT HEAD/BRAIN W/O DYE: CPT

## 2018-09-12 RX ORDER — BUTALBITAL, ACETAMINOPHEN AND CAFFEINE 50; 325; 40 MG/1; MG/1; MG/1
1 TABLET ORAL EVERY 4 HOURS PRN
Status: DISCONTINUED | OUTPATIENT
Start: 2018-09-12 | End: 2018-09-12 | Stop reason: HOSPADM

## 2018-09-12 RX ADMIN — BUTALBITAL, ACETAMINOPHEN, AND CAFFEINE 1 TABLET: 50; 325; 40 TABLET ORAL at 19:40

## 2018-09-12 ASSESSMENT — PAIN DESCRIPTION - PAIN TYPE: TYPE: ACUTE PAIN

## 2018-09-12 ASSESSMENT — ENCOUNTER SYMPTOMS
SHORTNESS OF BREATH: 0
DIARRHEA: 0
BACK PAIN: 0
VOMITING: 0
NAUSEA: 0
EYE DISCHARGE: 0
COUGH: 0
SORE THROAT: 0
RHINORRHEA: 0
WHEEZING: 0
ABDOMINAL PAIN: 0
EYE PAIN: 0

## 2018-09-12 ASSESSMENT — PAIN SCALES - GENERAL
PAINLEVEL_OUTOF10: 9
PAINLEVEL_OUTOF10: 9

## 2018-09-12 ASSESSMENT — PAIN DESCRIPTION - LOCATION: LOCATION: THROAT;HEAD;EAR

## 2018-09-12 NOTE — ED PROVIDER NOTES
[facility]  eMERGENCY dEPARTMENT eNCOUnter          CHIEF COMPLAINT       Chief Complaint   Patient presents with    Headache    Neck Pain       Nurses Notes reviewed and I agree except as noted in the HPI. HISTORY OF PRESENT ILLNESS    Skinny Estevez is a 66 y.o. female who presents to the Emergency Department with a medical history of CAD, DM, CABG, and PVD for the evaluation of left sided neck pain. The patient reports to have had a left carotid artery stent placed by Dr. Tracy Valentin (cardiothoracic surgery) on April 11, 2018. She states that her neck pain presented this morning and rates her current neck pain as a 9/10 in severity and sharp in character. In addition to her neck pain, the patient reports to have started to experience a headache, left ear fullness, paresthesias of the left land, and lower extremity swelling secondary to presentation of her neck pain. She rates her current headache as a 8/10 in severity. The patient reports no radiating pain. She reports no relieving or modifying factors. She denies any confusion, weakness, numbness, or tingling. She denies nausea, vomiting, diarrhea, or abdominal pain. She denies any visual disturbances, blurred vision, or doubled vision. She denies any indigestion. The patient reports a past social history of smoking but states to have quit in 2000 after smoking for 43 years. No additional symptoms or complaints at this time. The HPI was provided by the patient. REVIEW OF SYSTEMS     Review of Systems   Constitutional: Negative for appetite change, chills, fatigue and fever. HENT: Negative for congestion, ear pain, rhinorrhea and sore throat. Left ear fullness   Eyes: Negative for pain, discharge and visual disturbance. Respiratory: Negative for cough, shortness of breath and wheezing. Cardiovascular: Positive for leg swelling. Negative for chest pain and palpitations.    Gastrointestinal: Negative for abdominal pain, diarrhea, nausea and vomiting. Genitourinary: Negative for difficulty urinating, dysuria, hematuria and vaginal discharge. Musculoskeletal: Positive for neck pain (left). Negative for arthralgias, back pain and joint swelling. Skin: Negative for pallor and rash. Neurological: Positive for headaches. Negative for dizziness, syncope, weakness, light-headedness and numbness. Left hand paresethesias   Hematological: Negative for adenopathy. Psychiatric/Behavioral: Negative for confusion and suicidal ideas. The patient is not nervous/anxious. PAST MEDICAL HISTORY    has a past medical history of Aneurysm of abdominal aorta ([address] Arthritis; Blood circulation, collateral; Bursitis, trochanteric; CAD (coronary artery disease); Cerebral artery occlusion with cerebral infarction ([address] Chronic back pain; Depression; Diabetes mellitus ([address] GERD (gastroesophageal reflux disease); Headache(784.0); History of basal cell cancer; Hyperlipidemia; Hypertension; Irritable bowel; Movement disorder; PVD (peripheral vascular disease) ([facility] S/P CABG (coronary artery bypass graft); and Sciatica. SURGICAL HISTORY      has a past surgical history that includes Colonoscopy; Hysterectomy; Upper gastrointestinal endoscopy; Cholecystectomy (yrs ago); Tonsillectomy; laryngoscopy (10/29/2012 Dr Chris Egan); Appendectomy; Endoscopy, colon, diagnostic; skin biopsy; hiatal hernia repair; Cardiac catheterization (2/3/2016); Abdomen surgery; eye surgery; hernia repair; Coronary artery bypass graft (2-18-16); and pr vein bypass graft,carot-subcl/ subcl-carotd (Left, 4/11/2018).     CURRENT MEDICATIONS       Previous Medications    AMITRIPTYLINE (ELAVIL) 25 MG TABLET    Take 1 tablet by mouth nightly    ASPIRIN 81 MG TABLET    Take 81 mg by mouth daily    CLOPIDOGREL (PLAVIX) 75 MG TABLET    Take 1 tablet by mouth daily    CYANOCOBALAMIN (B-12) 2500 MCG TABS    Take by mouth daily     FERROUS SULFATE (FE TABS) 325 (65 FE) MG EC TABLET    Take 1 tablet by mouth 2 times daily    HANDICAP PLACARD MISC    by Does not apply route Expires 2022    METOPROLOL TARTRATE (LOPRESSOR) 25 MG TABLET    Take 0.5 tablets by mouth 2 times daily    SIMVASTATIN (ZOCOR) 40 MG TABLET    TAKE 1 TABLET NIGHTLY       ALLERGIES     is allergic to ciprofloxacin and darvon [propoxyphene hcl]. FAMILY HISTORY     indicated that her mother is . She indicated that her father is . She indicated that her sister is alive. She indicated that her brother is . She indicated that her son is alive. She indicated that the status of her neg hx is unknown.    family history includes Cancer in her brother and son; Early Death in her mother; Heart Disease (age of onset: 52) in her father. SOCIAL HISTORY      reports that she quit smoking about 17 years ago. Her smoking use included Cigarettes. She has a 12.50 pack-year smoking history. She has never used smokeless tobacco. She reports that she does not drink alcohol or use drugs. PHYSICAL EXAM     INITIAL VITALS:  height is 5' 6\" (1.676 m) and weight is 168 lb (76.2 kg). Her oral temperature is 98.4 °F (36.9 °C). Her blood pressure is 140/77 (abnormal) and her pulse is 65. Her respiration is 14 and oxygen saturation is 95%. Physical Exam   Constitutional: She is oriented to person, place, and time. She appears well-developed and well-nourished. Non-toxic appearance. HENT:   Head: Normocephalic and atraumatic. Right Ear: External ear normal. Tympanic membrane is retracted. Left Ear: External ear normal. Tympanic membrane is retracted. Nose: Nose normal.   Mouth/Throat: Mucous membranes are normal. Posterior oropharyngeal erythema present. No oropharyngeal exudate or posterior oropharyngeal edema. Eyes: Conjunctivae and EOM are normal.   Neck: Normal range of motion. Neck supple. No JVD present.    Cardiovascular: Normal rate, regular rhythm, normal heart sounds, intact distal

## 2018-09-12 NOTE — ED TRIAGE NOTES
Pt to ED with c/o headache and right sided neck pain. Pt had stent placed through right carotid in April.   Pt also states her right leg is swelling and her left ear feels plugged

## 2018-10-23 ENCOUNTER — TELEPHONE (OUTPATIENT)
Dept: CARDIOLOGY CLINIC | Age: 78
End: 2018-10-23

## 2018-10-23 DIAGNOSIS — R68.89 ABNORMAL ANKLE BRACHIAL INDEX (ABI): Primary | ICD-10-CM

## 2018-11-19 ENCOUNTER — HOSPITAL ENCOUNTER (OUTPATIENT)
Dept: INTERVENTIONAL RADIOLOGY/VASCULAR | Age: 78
Discharge: HOME | End: 2018-11-19
Payer: MEDICARE

## 2018-11-19 DIAGNOSIS — R68.89 ABNORMAL ANKLE BRACHIAL INDEX (ABI): ICD-10-CM

## 2018-11-19 PROCEDURE — 93924 LWR XTR VASC STDY BILAT: CPT

## 2018-11-28 ENCOUNTER — OFFICE VISIT (OUTPATIENT)
Dept: CARDIOLOGY CLINIC | Age: 78
End: 2018-11-28
Payer: MEDICARE

## 2018-11-28 VITALS
HEIGHT: 66 IN | WEIGHT: 172.6 LBS | BODY MASS INDEX: 27.74 KG/M2 | SYSTOLIC BLOOD PRESSURE: 128 MMHG | DIASTOLIC BLOOD PRESSURE: 66 MMHG | OXYGEN SATURATION: 99 % | HEART RATE: 68 BPM

## 2018-11-28 DIAGNOSIS — R22.1 NODULE OF NECK: ICD-10-CM

## 2018-11-28 DIAGNOSIS — G45.8 SUBCLAVIAN STEAL SYNDROME: Primary | ICD-10-CM

## 2018-11-28 DIAGNOSIS — Z95.1 S/P CABG (CORONARY ARTERY BYPASS GRAFT): ICD-10-CM

## 2018-11-28 DIAGNOSIS — I10 ESSENTIAL HYPERTENSION: ICD-10-CM

## 2018-11-28 DIAGNOSIS — I25.10 CORONARY ARTERY DISEASE INVOLVING NATIVE CORONARY ARTERY OF NATIVE HEART WITHOUT ANGINA PECTORIS: Primary | ICD-10-CM

## 2018-11-28 DIAGNOSIS — E78.5 DYSLIPIDEMIA: ICD-10-CM

## 2018-11-28 PROCEDURE — 99213 OFFICE O/P EST LOW 20 MIN: CPT | Performed by: PHYSICIAN ASSISTANT

## 2018-11-28 NOTE — PROGRESS NOTES
Pt C/O Cp but pt will belch and it goes away, right foot was swelled but is better now,   Pt has a lump on the left side of neck. Pt denies , SOB, Headache, dizziness, heart palpitations, fatigue               [facility]  [facility]   [address] [address]   Dept: [phone]   Dept Fax: [phone]   Loc: [phone]      Chief Complaint   Patient presents with    3 Month Follow-Up     ofe      Patient presents for follow-up after recent ABIs. Patient states she has not had any more issues with her lower extremities. She denies any chest pain or palpitations. She has concerned about a lump that's superior to her left subclavian bypass incision. She states is been there for a couple of months. It's nontender. She's not had any recent upper respiratory infection.           General:   No fever, no chills, No fatigue or weight loss  Pulmonary:    No dyspnea, no wheezing  Cardiac:    Denies recent chest pain   GI:     No nausea or vomiting, no abdominal pain  Neuro:    No dizziness or light headedness  Musculoskeletal:  No recent active issues  Extremities:   No edema, good peripheral pulses      Past Medical History:   Diagnosis Date    Aneurysm of abdominal aorta (HCC)     Arthritis     Blood circulation, collateral     Bursitis, trochanteric     CAD (coronary artery disease) 2/2015    Cerebral artery occlusion with cerebral infarction (HCC)     Chronic back pain     Depression     Diabetes mellitus (HCC)     diet controlled    GERD (gastroesophageal reflux disease)     Headache(784.0)     History of basal cell cancer     Nose    Hyperlipidemia     Hypertension     Irritable bowel     Movement disorder     [facility]     S/P CABG (coronary artery bypass graft)     Sciatica        Allergies   Allergen Reactions    Ciprofloxacin      Had chest cold and just got worse and worse on the cipro   

## 2018-12-17 ENCOUNTER — OFFICE VISIT (OUTPATIENT)
Dept: FAMILY MEDICINE CLINIC | Age: 78
End: 2018-12-17
Payer: MEDICARE

## 2018-12-17 VITALS
BODY MASS INDEX: 27.94 KG/M2 | HEIGHT: 67 IN | DIASTOLIC BLOOD PRESSURE: 78 MMHG | WEIGHT: 178 LBS | RESPIRATION RATE: 14 BRPM | HEART RATE: 80 BPM | SYSTOLIC BLOOD PRESSURE: 132 MMHG | TEMPERATURE: 98.1 F

## 2018-12-17 DIAGNOSIS — I10 ESSENTIAL HYPERTENSION: Primary | ICD-10-CM

## 2018-12-17 DIAGNOSIS — I25.10 CORONARY ARTERY DISEASE INVOLVING NATIVE CORONARY ARTERY OF NATIVE HEART WITHOUT ANGINA PECTORIS: ICD-10-CM

## 2018-12-17 PROCEDURE — 99213 OFFICE O/P EST LOW 20 MIN: CPT | Performed by: FAMILY MEDICINE

## 2018-12-17 ASSESSMENT — PATIENT HEALTH QUESTIONNAIRE - PHQ9
SUM OF ALL RESPONSES TO PHQ9 QUESTIONS 1 & 2: 0
2. FEELING DOWN, DEPRESSED OR HOPELESS: 0
SUM OF ALL RESPONSES TO PHQ QUESTIONS 1-9: 0
1. LITTLE INTEREST OR PLEASURE IN DOING THINGS: 0
SUM OF ALL RESPONSES TO PHQ QUESTIONS 1-9: 0

## 2018-12-17 NOTE — PROGRESS NOTES
Harvinder Lo is a 66 y.o. female that presents for Follow-up (6 month f/u, discuss cardiac testing )        HPI:        HTN    Does patient check BP regularly at home? - No  Current Medication regimen - Metoprolol  Tolerating medications well? - yes    Shortness of breath or chest pain? Yes - SOB  Headache or visual complaints? No  Neurologic changes like confusion? No  Extremity edema? No    BP Readings from Last 3 Encounters:   12/17/18 132/78   11/28/18 128/66   11/28/18 128/66         CAD    History of myocardial infarction? No    History of heart failure? No.    Current symptoms of angina? No   Patient compliant with therapy? Yes  Tobacco use? No      Antiplatelet therapy? Yes    Beta-blocker therapy?   Yes   ACE/ARB therapy - No      Health Maintenance   Topic Date Due    DEXA (modify frequency per FRAX score)  07/25/2005    Shingles Vaccine (1 of 2 - 2 Dose Series) 11/20/2016    DTaP/Tdap/Td vaccine (2 - Td) 10/15/2022    Flu vaccine  Completed    Pneumococcal low/med risk  Completed       Past Medical History:   Diagnosis Date    Aneurysm of abdominal aorta (HCC)     Arthritis     Blood circulation, collateral     Bursitis, trochanteric     CAD (coronary artery disease) 2/2015    Cerebral artery occlusion with cerebral infarction (HCC)     Chronic back pain     Depression     Diabetes mellitus (HCC)     diet controlled    GERD (gastroesophageal reflux disease)     Headache(784.0)     History of basal cell cancer     Nose    Hyperlipidemia     Hypertension     Irritable bowel     Movement disorder     [facility]) [facility]     S/P CABG (coronary artery bypass graft)     Sciatica        Past Surgical History:   Procedure Laterality Date    ABDOMEN SURGERY      complete hysterectomy, gallbladder    APPENDECTOMY      CARDIAC [facility]  [facility]2016    [facility]    CHOLECYSTECTOMY  yrs ago    COLONOSCOPY      CORONARY ARTERY BYPASS GRAFT  2-18-16    x3

## 2018-12-24 DIAGNOSIS — I25.119 CORONARY ARTERY DISEASE INVOLVING NATIVE CORONARY ARTERY OF NATIVE HEART WITH ANGINA PECTORIS (HCC): ICD-10-CM

## 2018-12-27 ENCOUNTER — TELEPHONE (OUTPATIENT)
Dept: CARDIOLOGY CLINIC | Age: 78
End: 2018-12-27

## 2019-01-14 DIAGNOSIS — R07.89 CHEST WALL PAIN: ICD-10-CM

## 2019-01-14 RX ORDER — AMITRIPTYLINE HYDROCHLORIDE 25 MG/1
25 TABLET, FILM COATED ORAL NIGHTLY
Qty: 90 TABLET | Refills: 3 | Status: SHIPPED | OUTPATIENT
Start: 2019-01-14 | End: 2019-08-28

## 2019-01-15 DIAGNOSIS — I25.119 CORONARY ARTERY DISEASE INVOLVING NATIVE CORONARY ARTERY OF NATIVE HEART WITH ANGINA PECTORIS (HCC): ICD-10-CM

## 2019-01-15 RX ORDER — CLOPIDOGREL BISULFATE 75 MG/1
75 TABLET ORAL DAILY
Qty: 90 TABLET | Refills: 3 | Status: SHIPPED | OUTPATIENT
Start: 2019-01-15 | End: 2020-01-03

## 2019-01-29 ENCOUNTER — TELEPHONE (OUTPATIENT)
Dept: CARDIOLOGY CLINIC | Age: 79
End: 2019-01-29

## 2019-04-08 ENCOUNTER — HOSPITAL ENCOUNTER (OUTPATIENT)
Dept: MRI IMAGING | Age: 79
End: 2019-04-08
Payer: MEDICARE

## 2019-04-08 ENCOUNTER — HOSPITAL ENCOUNTER (OUTPATIENT)
Dept: MRI IMAGING | Age: 79
Discharge: HOME | End: 2019-04-08
Payer: MEDICARE

## 2019-04-08 DIAGNOSIS — M47.812 CERVICAL SPONDYLOSIS WITHOUT MYELOPATHY: ICD-10-CM

## 2019-04-08 PROCEDURE — 72141 MRI NECK SPINE W/O DYE: CPT

## 2019-04-08 PROCEDURE — 72148 MRI LUMBAR SPINE W/O DYE: CPT

## 2019-04-15 ENCOUNTER — TELEPHONE (OUTPATIENT)
Dept: CARDIOLOGY CLINIC | Age: 79
End: 2019-04-15

## 2019-04-15 NOTE — TELEPHONE ENCOUNTER
Pre op Risk Assessment    Procedure Caudal Epidural   Physician Dr. Kenny Bhatt  Date of surgery/procedure TBD    Last OV 11-28-18  Last Stress None in chart  Last Echo 05-29-18  Last Cath 03-28-19  Last Stent none in chart  Is patient on blood thinners Plavix, asa  Hold Meds/how many days 7 days

## 2019-05-26 DIAGNOSIS — E78.00 PURE HYPERCHOLESTEROLEMIA: ICD-10-CM

## 2019-05-26 DIAGNOSIS — Z95.1 S/P CABG X 3: ICD-10-CM

## 2019-05-28 RX ORDER — SIMVASTATIN 40 MG
TABLET ORAL
Qty: 90 TABLET | Refills: 3 | Status: SHIPPED | OUTPATIENT
Start: 2019-05-28 | End: 2020-05-11

## 2019-05-29 ENCOUNTER — OFFICE VISIT (OUTPATIENT)
Dept: CARDIOLOGY CLINIC | Age: 79
End: 2019-05-29
Payer: MEDICARE

## 2019-05-29 VITALS
HEIGHT: 66 IN | WEIGHT: 187.4 LBS | BODY MASS INDEX: 30.12 KG/M2 | DIASTOLIC BLOOD PRESSURE: 61 MMHG | HEART RATE: 82 BPM | SYSTOLIC BLOOD PRESSURE: 134 MMHG

## 2019-05-29 DIAGNOSIS — I25.10 CORONARY ARTERY DISEASE INVOLVING NATIVE CORONARY ARTERY OF NATIVE HEART WITHOUT ANGINA PECTORIS: Primary | ICD-10-CM

## 2019-05-29 DIAGNOSIS — I73.9 PAD (PERIPHERAL ARTERY DISEASE) (HCC): ICD-10-CM

## 2019-05-29 DIAGNOSIS — Z95.1 S/P CABG (CORONARY ARTERY BYPASS GRAFT): ICD-10-CM

## 2019-05-29 PROCEDURE — 99213 OFFICE O/P EST LOW 20 MIN: CPT | Performed by: PHYSICIAN ASSISTANT

## 2019-05-29 PROCEDURE — 93000 ELECTROCARDIOGRAM COMPLETE: CPT | Performed by: PHYSICIAN ASSISTANT

## 2019-05-29 RX ORDER — FUROSEMIDE 20 MG/1
20 TABLET ORAL DAILY
Qty: 30 TABLET | Refills: 3 | Status: SHIPPED | OUTPATIENT
Start: 2019-05-29 | End: 2019-07-20 | Stop reason: SDUPTHER

## 2019-05-29 NOTE — PROGRESS NOTES
6 month follow up. EKG done today. C/o cp on the left side of the chest does not radiate after \"burping\" the pain goes away, sob with activity and long walks, dizziness, lightheaded and RHODA worse on the right, headaches and pain on right side of the neck. Denies palpitations. [facility] PROFESSIONAL SERVICES  [facility] [address] [address]   Dept: [phone]   Dept Fax: [phone]   Loc: [phone]      Chief Complaint   Patient presents with    6 Month Follow-Up    Coronary Artery Disease     Patient with a history of a right subclavian bypass and CABG presents for follow-up. She states she has intermittent substernal discomfort that resolves with burping. She has intermittent shortness of breath. She denies any palpitations.         General:   No fever, no chills, No fatigue or weight loss  Pulmonary:    No dyspnea, no wheezing  Cardiac:    Denies recent chest pain   GI:     No nausea or vomiting, no abdominal pain  Neuro:    No dizziness or light headedness  Musculoskeletal:  No recent active issues  Extremities:   No edema, good peripheral pulses      Past Medical History:   Diagnosis Date    Aneurysm of abdominal aorta (HCC)     Arthritis     Blood circulation, collateral     Bursitis, trochanteric     CAD (coronary artery disease) 2/2015    Cerebral artery occlusion with cerebral infarction (HCC)     Chronic back pain     Depression     Diabetes mellitus (HCC)     diet controlled    GERD (gastroesophageal reflux disease)     Headache(784.0)     History of basal cell cancer     Nose    Hyperlipidemia     Hypertension     Irritable bowel     Movement disorder     PVD [facility]peripheral vascular disease) ([facility] [address]     S/P CABG (coronary artery bypass graft)     Sciatica        Allergies   Allergen Reactions    Ciprofloxacin      Had chest cold and just got worse and worse on the cipro    Darvon [Propoxyphene Hcl] Itching Current Outpatient Medications   Medication Sig Dispense Refill    furosemide (LASIX) 20 MG tablet Take 1 tablet by mouth daily 30 tablet 3    simvastatin (ZOCOR) 40 MG tablet TAKE 1 TABLET NIGHTLY 90 tablet 3    clopidogrel (PLAVIX) 75 MG tablet Take 1 tablet by mouth daily 90 tablet 3    amitriptyline (ELAVIL) 25 MG tablet Take 1 tablet by mouth nightly 90 tablet 3    metoprolol tartrate (LOPRESSOR) 25 MG tablet Take 0.5 tablets by mouth 2 times daily 180 tablet 3    Handicap Placard MISC by Does not apply route Expires 2022 1 each 0    Cyanocobalamin (B-12) 2500 MCG TABS Take by mouth daily       aspirin 81 MG tablet Take 81 mg by mouth daily       No current facility-administered medications for this visit.         Social History     Socioeconomic History    Marital status:      Spouse name: Dann Lucero Number of children: 3    Years of education: None    Highest education level: None   Occupational History    Occupation: retired   Social Needs    Financial resource strain: None    Food insecurity:     Worry: None     Inability: None    Transportation needs:     Medical: None     Non-medical: None   Tobacco Use    Smoking status: Former Smoker     Packs/day: 0.50     Years: 25.00     Pack years: 12.50     Types: Cigarettes     Last attempt to quit: 11/3/2000     Years since quittin.6    Smokeless tobacco: Never Used    Tobacco comment: quit in    Substance and Sexual Activity    Alcohol use: No    Drug use: No    Sexual activity: Yes     Partners: Male   Lifestyle    Physical activity:     Days per week: None     Minutes per session: None    Stress: None   Relationships    Social connections:     Talks on phone: None     Gets together: None     Attends Spiritism service: None     Active member of club or organization: None     Attends meetings of clubs or organizations: None     Relationship status: None    Intimate partner violence:     Fear of current or ex partner: None     Emotionally abused: None     Physically abused: None     Forced sexual activity: None   Other Topics Concern    None   Social History Narrative    None       Family History   Problem Relation Age of Onset    Early Death Mother         not sure of cause    Heart Disease Father 52        MI    Cancer Brother         pancreatic    Cancer Son         larynx    Diabetes Neg Hx     High Blood Pressure Neg Hx     Stroke Neg Hx        Blood pressure 134/61, pulse 82, height 5' 6\" (1.676 m), weight 187 lb 6.4 oz (85 kg), not currently breastfeeding. General:   Well developed, well nourished  Lungs:   Clear to auscultation  Heart:    Normal S1 S2, No murmur, rubs, or gallops  Abdomen:   Soft, non tender, no organomegalies, positive bowel sounds  Extremities:   No edema, no cyanosis, good peripheral pulses  Neurological:   Awake, alert, oriented. No obvious focal deficits  Musculoskeletal:  No obvious deformities    EKG: NSR    ABIs 11/19/2018  FINDINGS[de-identified]        1. Normal resting right TEOFILO 1.05, which does not change significantly following exercise   2. Normal resting left TEOFILO 1.07, which is not changed appreciably following exercise.               Impression   Normal study. Echo 5/29/2018  Conclusions      Summary   Normal left ventricle size and systolic function. Ejection fraction was   estimated at 55 %. There were no regional left ventricular wall motion   abnormalities and wall thickness was within normal limits.   Doppler parameters were consistent with abnormal left ventricular   relaxation (grade 1 diastolic dysfunction).   Mildly dilated left atrium.   There was mild mitral regurgitation.   Mitral annular calcification is present.  Wayna Perry was mild tricuspid regurgitation.   Mild pulmonic regurgitation visualized. Diagnosis Orders   1. Coronary artery disease involving native coronary artery of native heart without angina pectoris  EKG 12 Lead   2.  S/P CABG (coronary artery bypass graft)     3. PAD (peripheral artery disease) ([address]         Orders Placed This Encounter   Procedures    EKG 12 Lead     Order Specific Question:   Reason for Exam?     Answer: Other     An upcoming ultrasound of the right subclavian bypass. Continue same current medications and with constant vigilance to changes in symptoms and also any potential side effects. Return for care if become worse or seek medical attention immediately. The patient is educated on symptoms of heart disease that include chest pain and passing out, dizziness, etc and to report them if there is any change or go to emergency room.        Follow up with myself as scheduled or sooner if needed  (Please note that portions of this note were completed with a voice recognition program.  Efforts were made to edit the dictation but occasionally words are mis-transcribed.)      Sun Marquez PA-C

## 2019-06-03 ENCOUNTER — HOSPITAL ENCOUNTER (OUTPATIENT)
Dept: NON INVASIVE DIAGNOSTICS | Age: 79
Discharge: HOME | End: 2019-06-03
Payer: MEDICARE

## 2019-06-03 ENCOUNTER — HOSPITAL ENCOUNTER (OUTPATIENT)
Dept: INTERVENTIONAL RADIOLOGY/VASCULAR | Age: 79
Discharge: HOME | End: 2019-06-03
Payer: MEDICARE

## 2019-06-03 ENCOUNTER — HOSPITAL ENCOUNTER (OUTPATIENT)
Dept: CT IMAGING | Age: 79
Discharge: HOME | End: 2019-06-03
Payer: MEDICARE

## 2019-06-03 DIAGNOSIS — I71.40 ABDOMINAL AORTIC ANEURYSM (AAA) 3.0 CM TO 5.0 CM IN DIAMETER IN FEMALE: ICD-10-CM

## 2019-06-03 DIAGNOSIS — I34.0 NON-RHEUMATIC MITRAL REGURGITATION: ICD-10-CM

## 2019-06-03 DIAGNOSIS — G45.8 SUBCLAVIAN STEAL SYNDROME: ICD-10-CM

## 2019-06-03 LAB
LV EF: 55 %
LVEF MODALITY: NORMAL
POC CREATININE WHOLE BLOOD: 0.7 MG/DL (ref 0.5–1.2)

## 2019-06-03 PROCEDURE — 93931 UPPER EXTREMITY STUDY: CPT

## 2019-06-03 PROCEDURE — 75635 CT ANGIO ABDOMINAL ARTERIES: CPT

## 2019-06-03 PROCEDURE — 82565 ASSAY OF CREATININE: CPT

## 2019-06-03 PROCEDURE — 93306 TTE W/DOPPLER COMPLETE: CPT

## 2019-06-03 PROCEDURE — 6360000004 HC RX CONTRAST MEDICATION: Performed by: THORACIC SURGERY (CARDIOTHORACIC VASCULAR SURGERY)

## 2019-06-03 RX ADMIN — IOPAMIDOL 100 ML: 755 INJECTION, SOLUTION INTRAVENOUS at 09:43

## 2019-06-12 ENCOUNTER — OFFICE VISIT (OUTPATIENT)
Dept: CARDIOTHORACIC SURGERY | Age: 79
End: 2019-06-12
Payer: MEDICARE

## 2019-06-12 VITALS
BODY MASS INDEX: 30.05 KG/M2 | HEIGHT: 66 IN | HEART RATE: 76 BPM | WEIGHT: 187 LBS | DIASTOLIC BLOOD PRESSURE: 72 MMHG | SYSTOLIC BLOOD PRESSURE: 138 MMHG

## 2019-06-12 DIAGNOSIS — I71.40 ABDOMINAL AORTIC ANEURYSM (AAA) WITHOUT RUPTURE: Primary | ICD-10-CM

## 2019-06-12 DIAGNOSIS — G45.8 SUBCLAVIAN STEAL SYNDROME: ICD-10-CM

## 2019-06-12 PROCEDURE — 99213 OFFICE O/P EST LOW 20 MIN: CPT | Performed by: PHYSICIAN ASSISTANT

## 2019-06-12 NOTE — PROGRESS NOTES
normal S1/S2 without murmurs, rubs or gallops. Abdomen: Soft, non-tender, non-distended with normal bowel sounds. Ext: No clubbing, cyanosis or edema bilaterally. Full range of motion without deformity. Skin: Skin color, texture, turgor normal.  No rashes or lesions. Neurologic:  Neurovascularly intact without any focal sensory/motor deficits. Psychiatric:  Alert and oriented, thought content appropriate, normal insight. Labs:    CBC:  Lab Results   Component Value Date    WBC 7.5 09/12/2018    HGB 10.6 09/12/2018    HCT 35.5 09/12/2018    MCV 85.5 09/12/2018     09/12/2018    INR 1.16 07/19/2018     BMP:   Lab Results   Component Value Date     09/12/2018    K 4.2 09/12/2018    K 3.7 03/28/2018     09/12/2018    CO2 24 09/12/2018    BUN 6 09/12/2018    CREATININE 0.7 09/12/2018    MG 2.5 02/19/2016       Imaging  VAS upper extremity  FINDINGS:        DIST CCA--------> 72 PSV cm/sec   VERT ------------>-24 PSV cm/sec   PROX ANAST ---> 72 PSV cm/sec   PROX GRAFT --> 253 PSV cm/sec   MID GRAFT -----> 184 PSV cm/sec   DIST GRAFT ----> 110 PSV cm/sec   DIST ANAST ----> 161 PSV cm/sec   PROX SUBCL-----> 62 PSV cm/sec   MID SUBCL-------> 80 PSV cm/sec   DIST SUBCL------> 75 PSV cm/sec   AXILLARY A-------> 62 PSV cm/sec   BRACHIAL A------> 62 PSV cm/sec   Radial A ---------->17 PSV cm/sec   Ulnar A ----------->53 PSV cm/sec       VELOCITY MEASUREMENTS:   Irregular plaque is seen throughout the subclavian bypass graft. There are are markedly elevated velocities in the proximal portion of the graft presumably due to greater than 50% stenosis.       Reversal of flow is noted in the left vertebral artery.       The remaining velocities appear to be within normal limits. CTA:  FINDINGS:        Lung bases: Fibroemphysematous lungs. No acute findings.       Liver/gallbladder: Hepatic steatosis. Mildly nodular contour of the liver margins. Cannot exclude cirrhosis.  The gallbladder is not seen. Presumably there is been a prior cholecystectomy. There is moderate dilatation of the common duct diffusely, as    expected post cholecystectomy.       Pancreas, spleen and adrenal glands: Unremarkable       Kidneys: Mildly atrophic/hypoplastic appearing left kidney. Kidneys otherwise unremarkable. No hydronephrosis. No calculi.       Bowel: No abnormally dilated bowel loops are seen. Sigmoid diverticulosis. No evidence for diverticulitis. Findings of constipation. No free air. No free fluid. No retroperitoneal adenopathy.       Bones : Metallic sternotomy sutures. Moderate degenerative vertebral body spondylosis. Severe degenerative facet arthropathy lower lumbar spine. Degenerative disc disease L5-S1 level.       Pelvis: There is moderate thickening of the bladder wall anteriorly. Cannot exclude cystitis. No pelvic mass lesion or fluid collection. No pelvic adenopathy.        Lower extremities: Vascular clips in the right calf and left thigh, suggesting prior vein harvesting. The soft tissues and muscular structures of both lower extremities are otherwise unremarkable.       Vascular:        AORTA: Moderately tortuous abdominal aorta which is diffusely ectatic and demonstrates a 3.1 cm fusiform aneurysm distally, not appreciably changed from prior study. No dissection is seen. There is moderate calcification throughout the abdominal aorta. Moderate calcific plaque is seen at the origin of the celiac artery with suggestion of high-grade stenosis of about 70% at the origin of this vessel. Is also moderate plaque at the origin of the SMA with suggestion of mild stenosis of about 50%. There is    also suggestion of moderate stenosis at the origin of the right renal artery and moderate stenosis origin and proximal segment left renal artery.  The RANDA is patent but relatively small in caliber.       PELVIC VESSELS: Both common, internal, and external iliac arteries are widely patent.       LEFT LEG: The common femoral artery and profunda femoris artery are widely patent. . The SFA and popliteal artery are widely patent.  There is good three-vessel runoff in the left calf.       RIGHT LEG: The common femoral artery and profunda femoris artery are widely patent. The SFA and popliteal artery are widely patent. The trifurcation vessels are widely patent. Assessment:  Patient Active Problem List   Diagnosis    Hyperlipidemia    Chronic low back pain    Medication monitoring encounter    Postmenopausal HRT (hormone replacement therapy)    GERD (gastroesophageal reflux disease)    Postnasal discharge with cough    Esophageal stricture    History of smoking    Nasal mucositis (ulcerative)    Nasal septal deviation    Globus pharyngeus    Lingual tonsillitis    Dysphagia    Neck mass    Current non-smoker but past smoking history unknown    Candida, oral    Xerostomia    Lingual tonsil hypertrophy    Stone of salivary duct    BCC (basal cell carcinoma), face, bridge of nose.  Hip pain, right    Trochanteric bursitis of right hip    CAD (coronary artery disease)    Unstable angina (HCC)    Angina, class II (HCC)    Cerebral artery occlusion with cerebral infarction ([address]    PVD (peripheral vascular disease) (HCC)    Subclavian steal syndrome    Low hemoglobin       Plan 6/12/19:  1) Continue current medical therapy. 2) Follow up in 1 year for continued monitoring of AAA  3) Follow up in one year with arterial duplex to continue to follow carotid to subclavian bypass   Thank you for allowing us to be involved in the patient's care.     Electronically by Alan Alvarado PA-C  on 6/12/2019 at 12:17 PM

## 2019-06-16 ENCOUNTER — HOSPITAL ENCOUNTER (EMERGENCY)
Age: 79
Discharge: HOME | End: 2019-06-16
Attending: EMERGENCY MEDICINE
Payer: MEDICARE

## 2019-06-16 VITALS
SYSTOLIC BLOOD PRESSURE: 130 MMHG | TEMPERATURE: 98 F | RESPIRATION RATE: 16 BRPM | HEART RATE: 69 BPM | BODY MASS INDEX: 31.34 KG/M2 | OXYGEN SATURATION: 98 % | DIASTOLIC BLOOD PRESSURE: 69 MMHG | HEIGHT: 66 IN | WEIGHT: 195 LBS

## 2019-06-16 DIAGNOSIS — R42 VERTIGO: Primary | ICD-10-CM

## 2019-06-16 LAB
EKG ATRIAL RATE: 71 BPM
EKG P AXIS: 78 DEGREES
EKG P-R INTERVAL: 182 MS
EKG Q-T INTERVAL: 420 MS
EKG QRS DURATION: 88 MS
EKG QTC CALCULATION (BAZETT): 456 MS
EKG R AXIS: 15 DEGREES
EKG T AXIS: 74 DEGREES
EKG VENTRICULAR RATE: 71 BPM

## 2019-06-16 PROCEDURE — 93010 ELECTROCARDIOGRAM REPORT: CPT | Performed by: NUCLEAR MEDICINE

## 2019-06-16 PROCEDURE — 6370000000 HC RX 637 (ALT 250 FOR IP): Performed by: EMERGENCY MEDICINE

## 2019-06-16 PROCEDURE — 99284 EMERGENCY DEPT VISIT MOD MDM: CPT

## 2019-06-16 PROCEDURE — 93005 ELECTROCARDIOGRAM TRACING: CPT | Performed by: INTERNAL MEDICINE

## 2019-06-16 RX ORDER — MECLIZINE HYDROCHLORIDE CHEWABLE TABLETS 25 MG/1
25 TABLET, CHEWABLE ORAL ONCE
Status: COMPLETED | OUTPATIENT
Start: 2019-06-16 | End: 2019-06-16

## 2019-06-16 RX ORDER — ONDANSETRON 4 MG/1
4 TABLET, FILM COATED ORAL 3 TIMES DAILY PRN
Qty: 10 TABLET | Refills: 0 | Status: SHIPPED | OUTPATIENT
Start: 2019-06-16 | End: 2019-06-19

## 2019-06-16 RX ORDER — MECLIZINE HYDROCHLORIDE 25 MG/1
25 TABLET ORAL 3 TIMES DAILY PRN
Qty: 10 TABLET | Refills: 0 | Status: SHIPPED | OUTPATIENT
Start: 2019-06-16 | End: 2019-08-28

## 2019-06-16 RX ORDER — ONDANSETRON 4 MG/1
4 TABLET, ORALLY DISINTEGRATING ORAL ONCE
Status: COMPLETED | OUTPATIENT
Start: 2019-06-16 | End: 2019-06-16

## 2019-06-16 RX ADMIN — ONDANSETRON 4 MG: 4 TABLET, ORALLY DISINTEGRATING ORAL at 16:07

## 2019-06-16 RX ADMIN — MECLIZINE HCL 25 MG: 25 TABLET, CHEWABLE ORAL at 16:45

## 2019-06-16 RX ADMIN — MECLIZINE HCL 25 MG: 25 TABLET, CHEWABLE ORAL at 16:07

## 2019-06-16 ASSESSMENT — ENCOUNTER SYMPTOMS
CONSTIPATION: 0
CHEST TIGHTNESS: 0
DIARRHEA: 0
BACK PAIN: 0
COUGH: 0
SINUS PAIN: 0
ABDOMINAL PAIN: 0
RECTAL PAIN: 0
EYE PAIN: 0
SHORTNESS OF BREATH: 0
BLOOD IN STOOL: 0
NAUSEA: 1
SINUS PRESSURE: 0

## 2019-06-16 NOTE — ED TRIAGE NOTES
Pt to er. Pt states at [employer] this morning she began to get dizzy. States worse with movement and when she bends over. Pt states head hurts when she bends down also. Denies any CP or SOB. States symptoms started around 1000 this morning.

## 2019-06-16 NOTE — ED NOTES
pt medicated per orders- restful on cart w/  at bedside. Warm blanket provided.  Will monitor     Magnolia Ramirez RN  06/16/19 [id number]

## 2019-06-16 NOTE — ED PROVIDER NOTES
Chief Complaint   Patient presents with    Dizziness     History obtained from the patient. Tiago Chin is a 66 y.o. female, c/o the patient reports that her dizziness began this morning when she woke up. She states if she moves her head, the room begins to spin. She also complains of nausea and dry heaving. She has a history of vertigo but states that this feels worse than normal. Patient denies any further complaints at initial encounter. Review of Systems   Constitutional: Negative for chills, fever and unexpected weight change. HENT: Negative for congestion, ear pain, nosebleeds, sinus pressure and sinus pain. Eyes: Negative for pain. Respiratory: Negative for cough, chest tightness and shortness of breath. Cardiovascular: Negative for chest pain. Gastrointestinal: Positive for nausea. Negative for abdominal pain, blood in stool, constipation, diarrhea and rectal pain. Dry heave   Endocrine: Negative for polydipsia and polyuria. Genitourinary: Negative for dysuria and flank pain. Musculoskeletal: Negative for arthralgias, back pain, myalgias and neck pain. Skin: Negative for rash. Neurological: Positive for dizziness. Negative for tremors, seizures, weakness and headaches. All other systems reviewed and are negative.         Past Medical History:   Diagnosis Date    [facility]     Arthritis     Blood circulation, collateral     Bursitis, trochanteric     CAD (coronary artery disease) 2/2015    Cerebral artery occlusion with cerebral infarction ([facility])     Chronic back pain     Depression     Diabetes mellitus ([facility])     diet controlled    GERD (gastroesophageal reflux disease)     Headache(784.0)     History of basal cell cancer     Nose    Hyperlipidemia     Hypertension     Irritable bowel     Movement disorder     PVD (peripheral vascular disease) ([facility])     S/P CABG (coronary artery bypass graft)     Sciatica          Past Surgical History:   Procedure Laterality Date    ABDOMEN SURGERY      complete hysterectomy, gallbladder    APPENDECTOMY      C[facility]    CHOLECYSTECTOMY  yrs ago    COLONOSCOPY      CORONARY ARTERY BYPASS GRAFT  2-18-16    x3     ENDOSCOPY, COLON, DIAGNOSTIC      EYE SURGERY      cataracts, glaucoma    HERNIA REPAIR      Hiatal Hernia    HIATAL HERNIA REPAIR      HYSTERECTOMY      LARYNGOSCOPY  10/29/2012 Dr Klaudia Roberts with Bx, Lingual Tonsillectomy, Hypopharyngoscopy    NE VEIN BYPASS GRAFT,CAROT-SUBCL/ SUBCL-CAROTD Left 4/11/2018    LEFT CAROTID SUBCLAVIAN BYPASS performed by Darren Wolfe MD at [address] [place] [address]      as a teen    UPPER GASTROINTESTINAL ENDOSCOPY           No current facility-administered medications for this encounter.      Current Outpatient Medications:     meclizine (ANTIVERT) 25 MG tablet, Take 1 tablet by mouth 3 times daily as needed for Dizziness, Disp: 10 tablet, Rfl: 0    ondansetron (ZOFRAN) 4 MG tablet, Take 1 tablet by mouth 3 times daily as needed for Nausea or Vomiting, Disp: 10 tablet, Rfl: 0    furosemide (LASIX) 20 MG tablet, Take 1 tablet by mouth daily, Disp: 30 tablet, Rfl: 3    simvastatin (ZOCOR) 40 MG tablet, TAKE 1 TABLET NIGHTLY, Disp: 90 tablet, Rfl: 3    clopidogrel (PLAVIX) 75 MG tablet, Take 1 tablet by mouth daily, Disp: 90 tablet, Rfl: 3    amitriptyline (ELAVIL) 25 MG tablet, Take 1 tablet by mouth nightly, Disp: 90 tablet, Rfl: 3    metoprolol tartrate (LOPRESSOR) 25 MG tablet, Take 0.5 tablets by mouth 2 times daily, Disp: 180 tablet, Rfl: 3    Handicap Placard MISC, by Does not apply route Expires 12/14/2022, Disp: 1 each, Rfl: 0    Cyanocobalamin (B-12) 2500 MCG TABS, Take by mouth daily , Disp: , Rfl:     aspirin 81 MG tablet, Take 81 mg by mouth daily, Disp: , Rfl:     Social History     Social History Narrative    Not on file       Social She exhibits no edema. Neurological: She is alert and oriented to person, place, and time. No cranial nerve deficit. No neurological or focal deficits. The patient has 5/5 strength throughout the bilateral upper and lower extremities. Equal sensation is appreciated throughout the bilateral upper and lower extremities. Cranial nerves II - XII are intact. No pronator drift is noted. Skin: Skin is warm and dry. Psychiatric: She has a normal mood and affect. Her behavior is normal.   Nursing note and vitals reviewed. MDM  Initial Assessment: vertigo. Plan: medication, observation. Labs Reviewed - No data to display      Medications   ondansetron (ZOFRAN-ODT) disintegrating tablet 4 mg (4 mg Oral Given 6/16/19 1607)   meclizine (ANTIVERT) chewable tablet 25 mg (25 mg Oral Given 6/16/19 1607)   meclizine (ANTIVERT) chewable tablet 25 mg (25 mg Oral Given 6/16/19 1645)         No orders to display         Final diagnoses:   Vertigo         ED Course as of Jun 16 1647   Sun Jun 16, 2019   1643 Patient feeling better, patient is greatly improved but minimal dizziness still persist.We will give an additional dose of meclizine for possible discharge. Patient has an appointment coming up with her PCP in 2 days and will be able to follow-up then. Will discharge. [DT]      ED Course User Index  [DT] Delvin Thakur MD         New Prescriptions    MECLIZINE (ANTIVERT) 25 MG TABLET    Take 1 tablet by mouth 3 times daily as needed for Dizziness    ONDANSETRON (ZOFRAN) 4 MG TABLET    Take 1 tablet by mouth 3 times daily as needed for Nausea or Vomiting       Scribe:  Rubi Ceballos 6/16/19 3:43 PM Scribing for and in the presence of Jaden Lo.  Fina Love MD.    Signed by: Joshua Wilson, 06/16/19 4:47 PM    Provider:  I personally performed the services described in the documentation, reviewed and edited the documentation which was dictated to the scribe in my presence, and it accurately records my words and actions. Patsy Cranker Hermann Mention, MD 06/16/19 4:47 PM        Dieter Carolina MD  06/16/19 1743

## 2019-06-18 ENCOUNTER — OFFICE VISIT (OUTPATIENT)
Dept: FAMILY MEDICINE CLINIC | Age: 79
End: 2019-06-18
Payer: MEDICARE

## 2019-06-18 VITALS
TEMPERATURE: 98.6 F | RESPIRATION RATE: 12 BRPM | WEIGHT: 187.2 LBS | HEART RATE: 78 BPM | SYSTOLIC BLOOD PRESSURE: 102 MMHG | HEIGHT: 66 IN | DIASTOLIC BLOOD PRESSURE: 68 MMHG | BODY MASS INDEX: 30.08 KG/M2

## 2019-06-18 DIAGNOSIS — G45.8 SUBCLAVIAN STEAL SYNDROME: Primary | ICD-10-CM

## 2019-06-18 DIAGNOSIS — R73.9 HYPERGLYCEMIA: ICD-10-CM

## 2019-06-18 DIAGNOSIS — I25.119 CORONARY ARTERY DISEASE INVOLVING NATIVE CORONARY ARTERY OF NATIVE HEART WITH ANGINA PECTORIS (HCC): ICD-10-CM

## 2019-06-18 DIAGNOSIS — I10 ESSENTIAL HYPERTENSION: ICD-10-CM

## 2019-06-18 PROCEDURE — 99214 OFFICE O/P EST MOD 30 MIN: CPT | Performed by: FAMILY MEDICINE

## 2019-06-18 ASSESSMENT — PATIENT HEALTH QUESTIONNAIRE - PHQ9
SUM OF ALL RESPONSES TO PHQ QUESTIONS 1-9: 0
SUM OF ALL RESPONSES TO PHQ QUESTIONS 1-9: 0
SUM OF ALL RESPONSES TO PHQ9 QUESTIONS 1 & 2: 0
1. LITTLE INTEREST OR PLEASURE IN DOING THINGS: 0
2. FEELING DOWN, DEPRESSED OR HOPELESS: 0

## 2019-06-18 NOTE — PROGRESS NOTES
Hypertension     Irritable bowel     Movement disorder     [facility]VD [facility]peripheral vascular disease) ([facility])     S/P CABG (coronary artery bypass graft)     Sciatica        Past Surgical History:   Procedure Laterality Date    ABDOMEN SURGERY      complete hysterectomy, gallbladder    APPENDECTOMY      CARDI[address]HETER[address]ATION  2/3/2016    [address]    CHOLECYSTECTOMY  yrs ago    COLONOSCOPY      CORONARY ARTERY BYPASS GRAFT  2-18-16    x3     ENDOSCOPY, COLON, DIAGNOSTIC      EYE SURGERY      cataracts, glaucoma    HERNIA REPAIR      Hiatal Hernia    HIATAL HERNIA REPAIR      HYSTERECTOMY      LARYNGOSCOPY  10/29/2012 Dr Moni Hope with Bx, Lingual Tonsillectomy, Hypopharyngoscopy    [place] VEIN BYPASS GRAFT,CAROT-SUBCL/ SUBCL-CAROTD Left 2018    LEFT CAROTID SUBCLAVIAN BYPASS performed by Gary Xiao MD at [address] [address]      as a teen    UPPER GASTROINTESTINAL ENDOSCOPY         Social History     Tobacco Use    Smoking status: Former Smoker     Packs/day: 0.50     Years: 25.00     Pack years: 12.50     Types: Cigarettes     Last attempt to quit: 11/3/2000     Years since quittin.6    Smokeless tobacco: Never Used    Tobacco comment: quit in    Substance Use Topics    Alcohol use: No    Drug use: No       Family History   Problem Relation Age of Onset    Early Death Mother         not sure of cause    Heart Disease Father 52        MI    Cancer Brother         pancreatic    Cancer Son         larynx    Diabetes Neg Hx     High Blood Pressure Neg Hx     Stroke Neg Hx          I have reviewed the patient's past medical history, past surgical history, allergies, medications, social and family history and I have made updates where appropriate.     ROS        PHYSICAL EXAM:  /68   Pulse 78   Temp 98.6 °F (37 °C)   Resp 12   Ht 5' 6\" (1.676 m)   Wt 187 lb 3.2 oz (84.9 kg)   LMP  (LMP Unknown)   BMI 30.21 kg/m²     General Appearance: well developed and well- nourished, in no acute distress  Head: normocephalic and atraumatic  ENT: external ear and ear canal normal bilaterally, nose without deformity, nasal mucosa and turbinates normal without polyps, oropharynx normal, dentition is normal for age, no lip or gum lesions noted  Neck: supple and non-tender without mass, no thyromegaly or thyroid nodules, no cervical lymphadenopathy  Pulmonary/Chest: clear to auscultation bilaterally- no wheezes, rales or rhonchi, normal air movement, no respiratory distress or retractions  Cardiovascular: normal rate, regular rhythm, normal S1 and S2, no murmurs, rubs, clicks, or gallops, distal pulses intact  Extremities: no cyanosis, clubbing or edema of the lower extremities  Psych:  Normal affect without evidence of depression or anxiety, insight and judgement are appropriate, memory appears intact  Skin: warm and dry, no rash or erythema      ASSESSMENT & PLAN  Marleni Barriga was seen today for follow-up. Diagnoses and all orders for this visit:    Subclavian steal syndrome  -     External Referral To Vascular Surgery  -     Comprehensive Metabolic Panel; Future  -     Lipid Panel; Future    Coronary artery disease involving native coronary artery of native heart with angina pectoris ([facility] [address]  -     Comprehensive Metabolic Panel; Future  -     Lipid Panel; Future    Hyperglycemia  -     Hemoglobin A1C; Future    Essential hypertension         -Referred to Dr Lv Magallon for 2nd opinion on her sx  -Other chronic issues stable, continue current medications  -Advised to call if any issues      Return in about 6 months (around 12/18/2019), or if symptoms worsen or fail to improve. Marleni Barriga received counseling on the following healthy behaviors: medication adherence  Reviewed prior labs and health maintenance. Continue current medications, diet and exercise. Discussed use, benefit, and side effects of prescribed medications.  Barriers to medication compliance addressed. Patient given educational materials - see patient instructions. All patient questions answered. Patient voiced understanding.

## 2019-06-18 NOTE — PROGRESS NOTES
Visit Information    Have you changed or started any medications since your last visit including any over-the-counter medicines, vitamins, or herbal medicines? no   Are you having any side effects from any of your medications? -  no  Have you stopped taking any of your medications? Is so, why? -  no    Have you seen any other physician or provider since your last visit? Yes - Records Obtained  Have you had any other diagnostic tests since your last visit? Yes - Records Obtained  Have you been seen in the emergency room and/or had an admission to a hospital since we last saw you? Yes - Records Obtained  Have you had your routine dental cleaning in the past 6 months? no    Have you activated your [employer] account? If not, what are your barriers?  Yes     Patient Care Team:  Kira Frost DO as PCP - General  Kira Frost DO as PCP - [facility]  Susie Rivas MD as Consulting Physician [facility])    Medical History Review  Past Medical, Family, and Social History reviewed and does contribute to the patient presenting condition    Health Maintenance   Topic Date Due    DEXA (modify frequency per FRAX score)  07/25/2005    Shingles Vaccine (2 of 3) 11/15/2016    Potassium monitoring  09/12/2019    Creatinine monitoring  09/12/2019    DTaP/Tdap/Td vaccine (2 - Td) 10/15/2022    Flu vaccine  Completed    Pneumococcal 65+ years Vaccine  Completed

## 2019-06-19 ENCOUNTER — NURSE ONLY (OUTPATIENT)
Dept: LAB | Age: 79
End: 2019-06-19

## 2019-06-19 ENCOUNTER — TELEPHONE (OUTPATIENT)
Dept: FAMILY MEDICINE CLINIC | Age: 79
End: 2019-06-19

## 2019-06-19 DIAGNOSIS — I25.119 CORONARY ARTERY DISEASE INVOLVING NATIVE CORONARY ARTERY OF NATIVE HEART WITH ANGINA PECTORIS (HCC): ICD-10-CM

## 2019-06-19 DIAGNOSIS — G45.8 SUBCLAVIAN STEAL SYNDROME: ICD-10-CM

## 2019-06-19 DIAGNOSIS — R73.9 HYPERGLYCEMIA: ICD-10-CM

## 2019-06-19 LAB
ALBUMIN SERPL-MCNC: 4.1 G/DL (ref 3.5–5.1)
ALP BLD-CCNC: 114 U/L (ref 38–126)
ALT SERPL-CCNC: 17 U/L (ref 11–66)
ANION GAP SERPL CALCULATED.3IONS-SCNC: 9 MEQ/L (ref 8–16)
AST SERPL-CCNC: 53 U/L (ref 5–40)
AVERAGE GLUCOSE: 141 MG/DL (ref 70–126)
BILIRUB SERPL-MCNC: 0.7 MG/DL (ref 0.3–1.2)
BUN BLDV-MCNC: 7 MG/DL (ref 7–22)
CALCIUM SERPL-MCNC: 9.6 MG/DL (ref 8.5–10.5)
CHLORIDE BLD-SCNC: 104 MEQ/L (ref 98–111)
CHOLESTEROL, TOTAL: 118 MG/DL (ref 100–199)
CO2: 29 MEQ/L (ref 23–33)
CREAT SERPL-MCNC: 0.8 MG/DL (ref 0.4–1.2)
GFR SERPL CREATININE-BSD FRML MDRD: 69 ML/MIN/1.73M2
GLUCOSE BLD-MCNC: 124 MG/DL (ref 70–108)
HBA1C MFR BLD: 6.7 % (ref 4.4–6.4)
HDLC SERPL-MCNC: 50 MG/DL
LDL CHOLESTEROL CALCULATED: 54 MG/DL
POTASSIUM SERPL-SCNC: 4.7 MEQ/L (ref 3.5–5.2)
SODIUM BLD-SCNC: 142 MEQ/L (ref 135–145)
TOTAL PROTEIN: 7.1 G/DL (ref 6.1–8)
TRIGL SERPL-MCNC: 68 MG/DL (ref 0–199)

## 2019-07-25 RX ORDER — FUROSEMIDE 20 MG/1
TABLET ORAL
Qty: 90 TABLET | Refills: 1 | Status: SHIPPED | OUTPATIENT
Start: 2019-07-25 | End: 2019-08-28

## 2019-07-26 ENCOUNTER — TELEPHONE (OUTPATIENT)
Dept: CARDIOLOGY CLINIC | Age: 79
End: 2019-07-26

## 2019-08-05 ENCOUNTER — HOSPITAL ENCOUNTER (EMERGENCY)
Age: 79
Discharge: HOME | End: 2019-08-05
Payer: MEDICARE

## 2019-08-05 VITALS
HEIGHT: 66 IN | TEMPERATURE: 97.8 F | SYSTOLIC BLOOD PRESSURE: 147 MMHG | OXYGEN SATURATION: 98 % | HEART RATE: 77 BPM | DIASTOLIC BLOOD PRESSURE: 66 MMHG | BODY MASS INDEX: 30.05 KG/M2 | WEIGHT: 187 LBS | RESPIRATION RATE: 16 BRPM

## 2019-08-05 DIAGNOSIS — L25.5 CONTACT DERMATITIS DUE TO PLANT: Primary | ICD-10-CM

## 2019-08-05 PROCEDURE — 99213 OFFICE O/P EST LOW 20 MIN: CPT | Performed by: NURSE PRACTITIONER

## 2019-08-05 PROCEDURE — 99212 OFFICE O/P EST SF 10 MIN: CPT

## 2019-08-05 RX ORDER — BETAMETHASONE DIPROPIONATE 0.05 %
OINTMENT (GRAM) TOPICAL
Qty: 1 TUBE | Refills: 0 | Status: SHIPPED | OUTPATIENT
Start: 2019-08-05 | End: 2019-08-28

## 2019-08-05 ASSESSMENT — ENCOUNTER SYMPTOMS: SORE THROAT: 0

## 2019-08-05 NOTE — ED PROVIDER NOTES
MEDICATIONS       Discharge Medication List as of 8/5/2019  4:34 PM      CONTINUE these medications which have NOT CHANGED    Details   furosemide (LASIX) 20 MG tablet TAKE 1 TABLET BY MOUTH EVERY DAY, Disp-90 tablet, R-1Normal      simvastatin (ZOCOR) 40 MG tablet TAKE 1 TABLET NIGHTLY, Disp-90 tablet, R-3Normal      clopidogrel (PLAVIX) 75 MG tablet Take 1 tablet by mouth daily, Disp-90 tablet, R-3Normal      amitriptyline (ELAVIL) 25 MG tablet Take 1 tablet by mouth nightly, Disp-90 tablet, R-3Normal      metoprolol tartrate (LOPRESSOR) 25 MG tablet Take 0.5 tablets by mouth 2 times daily, Disp-180 tablet, R-3Normal      Cyanocobalamin (B-12) 2500 MCG TABS Take by mouth daily       aspirin 81 MG tablet Take 81 mg by mouth daily      meclizine (ANTIVERT) 25 MG tablet Take 1 tablet by mouth 3 times daily as needed for Dizziness, Disp-10 tablet, R-0Print      Handicap Placard [facility] Starting Thu 12/14/2017, Disp-1 each, R-0, PrintExpires 12/14/2022             ALLERGIES     Patient is is allergic to ciprofloxacin and darvon [propoxyphene hcl]. FAMILY HISTORY     Patient'sfamily history includes Cancer in her brother and son; Early Death in her mother; Heart Disease (age of onset: 52) in her father. SOCIAL HISTORY     Patient  reports that she quit smoking about 18 years ago. Her smoking use included cigarettes. She has a 12.50 pack-year smoking history. She has never used smokeless tobacco. She reports that she does not drink alcohol or use drugs. PHYSICAL EXAM     ED TRIAGE VITALS  BP: (!) 147/66, Temp: 97.8 °F (36.6 °C), Pulse: 77, Resp: 16, SpO2: 98 %  Physical Exam   Constitutional: She is oriented to person, place, and time. She appears well-developed and well-nourished. She is cooperative. No distress. HENT:   Head: Normocephalic and atraumatic. Eyes: Conjunctivae are normal. Right conjunctiva is not injected. Left conjunctiva is not injected. Pupils are equal.   Neck: Normal range of motion. Cardiovascular: Normal rate. Pulmonary/Chest: Effort normal.   Neurological: She is alert and oriented to person, place, and time. Skin: Skin is warm and dry. Rash (on bilateral arms, linear) noted. Rash is vesicular. There is erythema. Psychiatric: She has a normal mood and affect. Her speech is normal.   Nursing note and vitals reviewed. DIAGNOSTIC RESULTS   Labs: No results found for this visit on 08/05/19. IMAGING:  No orders to display     URGENT CARE COURSE:     Vitals:    08/05/19 1614   BP: (!) 147/66   Pulse: 77   Resp: 16   Temp: 97.8 °F (36.6 °C)   TempSrc: Temporal   SpO2: 98%   Weight: 187 lb (84.8 kg)   Height: 5' 6\" (1.676 m)       Medications - No data to display  PROCEDURES:  None  FINALIMPRESSION      1. Contact dermatitis due to plant        DISPOSITION/PLAN   DISPOSITION    Discharge   Physical exam is consistent with a contact dermatitis due to plant. Topical medication as directed. Patient states that she is scheduled for a cardiac procedure Wednesday. Patient has been advised to call that office and make them aware of current diagnosis as this may effect procedure schedule. Physical assessment findings, diagnostic testing(s) if applicable, and vital signs reviewed with patient/patient representative. Questions answered. If applicable, patient/patient representative will be contacted upon receipt of final culture and sensitivity or other testing results when available. Any additions or changes to medications or changes the plan of care will be made at that time. Medications as directed, including OTC medications for supportive care. Education provided on medications. Differential diagnosis(s) discussed with patient/patient representative. Home care/self care instructions reviewed with patient/patient representative. Patient is to follow-up with family care provider in 2-3 days if no improvement.   Patient is to go to the emergency department if symptoms Complex Repair And Epidermal Autograft Text: The defect edges were debeveled with a #15 scalpel blade.  The primary defect was closed partially with a complex linear closure.  Given the location of the defect, shape of the defect and the proximity to free margins an epidermal autograft was deemed most appropriate to repair the remaining defect.  The graft was trimmed to fit the size of the remaining defect.  The graft was then placed in the primary defect, oriented appropriately, and sutured into place.

## 2019-08-08 ENCOUNTER — HOSPITAL ENCOUNTER (EMERGENCY)
Age: 79
Discharge: HOME | End: 2019-08-08
Payer: MEDICARE

## 2019-08-08 VITALS
DIASTOLIC BLOOD PRESSURE: 70 MMHG | BODY MASS INDEX: 30.05 KG/M2 | SYSTOLIC BLOOD PRESSURE: 113 MMHG | HEART RATE: 84 BPM | WEIGHT: 187 LBS | HEIGHT: 66 IN | OXYGEN SATURATION: 98 % | RESPIRATION RATE: 16 BRPM | TEMPERATURE: 98.3 F

## 2019-08-08 DIAGNOSIS — L29.9 PRURITIC DISORDER: ICD-10-CM

## 2019-08-08 DIAGNOSIS — L23.7 ALLERGIC CONTACT DERMATITIS DUE TO PLANTS, EXCEPT FOOD: Primary | ICD-10-CM

## 2019-08-08 PROCEDURE — 99282 EMERGENCY DEPT VISIT SF MDM: CPT

## 2019-08-08 RX ORDER — PREDNISONE 20 MG/1
20 TABLET ORAL DAILY
Qty: 5 TABLET | Refills: 0 | Status: SHIPPED | OUTPATIENT
Start: 2019-08-08 | End: 2019-08-13

## 2019-08-08 ASSESSMENT — ENCOUNTER SYMPTOMS
EYE PAIN: 0
VOMITING: 0
ABDOMINAL PAIN: 0
RHINORRHEA: 0
NAUSEA: 0
DIARRHEA: 0
BACK PAIN: 0
COUGH: 0
SORE THROAT: 0
EYE DISCHARGE: 0
SHORTNESS OF BREATH: 0
WHEEZING: 0

## 2019-08-08 ASSESSMENT — PAIN DESCRIPTION - PAIN TYPE: TYPE: ACUTE PAIN

## 2019-08-08 ASSESSMENT — PAIN DESCRIPTION - DESCRIPTORS: DESCRIPTORS: CONSTANT;ITCHING

## 2019-08-08 ASSESSMENT — PAIN DESCRIPTION - ONSET: ONSET: ON-GOING

## 2019-08-08 ASSESSMENT — PAIN DESCRIPTION - PROGRESSION: CLINICAL_PROGRESSION: GRADUALLY WORSENING

## 2019-08-08 ASSESSMENT — PAIN SCALES - GENERAL: PAINLEVEL_OUTOF10: 10

## 2019-08-08 ASSESSMENT — PAIN DESCRIPTION - LOCATION: LOCATION: ARM

## 2019-08-08 NOTE — ED PROVIDER NOTES
Negative for adenopathy. Psychiatric/Behavioral: Negative for confusion and suicidal ideas. The patient is not nervous/anxious. PAST MEDICAL HISTORY    has a past medical history of Aneurysm of abdominal aorta (HCC), Arthritis, Blood circulation, collateral, Bursitis, trochanteric, CAD (coronary artery disease), Cerebral artery occlusion with cerebral infarction ([address] Chronic back pain, Depression, Diabetes mellitus ([address] GERD (gastroesophageal reflux disease), Headache(784.0), History of basal cell cancer, Hyperlipidemia, Hypertension, Irritable bowel, Movement disorder, PVD (peripheral vascular disease) ([address] S/P CABG (coronary artery bypass graft), and Sciatica. SURGICAL HISTORY      has a past surgical history that includes Colonoscopy; Hysterectomy; Upper gastrointestinal endoscopy; Cholecystectomy (yrs ago); Tonsillectomy; laryngoscopy (10/29/2012 Dr Khushbu Arguelles); Appendectomy; Endoscopy, colon, diagnostic; skin biopsy; hiatal hernia repair; Cardiac catheterization (2/3/2016); Abdomen surgery; eye surgery; hernia repair; Coronary artery bypass graft (2-18-16); and pr vein bypass graft,carot-subcl/ subcl-carotd (Left, 4/11/2018). CURRENT MEDICATIONS       Discharge Medication List as of 8/8/2019 11:19 AM      CONTINUE these medications which have NOT CHANGED    Details   betamethasone dipropionate (DIPROLENE) 0.05 % ointment Apply topically 2 times daily. , Disp-1 Tube, R-0, Normal      furosemide (LASIX) 20 MG tablet TAKE 1 TABLET BY MOUTH EVERY DAY, Disp-90 tablet, R-1Normal      meclizine (ANTIVERT) 25 MG tablet Take 1 tablet by mouth 3 times daily as needed for Dizziness, Disp-10 tablet, R-0Print      simvastatin (ZOCOR) 40 MG tablet TAKE 1 TABLET NIGHTLY, Disp-90 tablet, R-3Normal      clopidogrel (PLAVIX) 75 MG tablet Take 1 tablet by mouth daily, Disp-90 tablet, R-3Normal      amitriptyline (ELAVIL) 25 MG tablet Take 1 tablet by mouth nightly, Disp-90 tablet, R-3Normal      metoprolol tartrate (LOPRESSOR) 25 MG tablet Take 0.5 tablets by mouth 2 times daily, Disp-180 tablet, R-3Normal      Handicap Placard [facility] Starting Thu 2017, Disp-1 each, R-0, PrintExpires 2022      Cyanocobalamin (B-12) 2500 MCG TABS Take by mouth daily       aspirin 81 MG tablet Take 81 mg by mouth daily             ALLERGIES     is allergic to ciprofloxacin and darvon [propoxyphene hcl]. FAMILY HISTORY     She indicated that her mother is . She indicated that her father is . She indicated that her sister is alive. She indicated that her brother is . She indicated that her son is alive. She indicated that the status of her neg hx is unknown.   family history includes Cancer in her brother and son; Early Death in her mother; Heart Disease (age of onset: 52) in her father. SOCIAL HISTORY      reports that she quit smoking about 18 years ago. Her smoking use included cigarettes. She has a 12.50 pack-year smoking history. She has never used smokeless tobacco. She reports that she does not drink alcohol or use drugs. PHYSICAL EXAM     INITIAL VITALS:  height is 5' 6\" (1.676 m) and weight is 187 lb (84.8 kg). Her oral temperature is 98.3 °F (36.8 °C). Her blood pressure is 113/70 and her pulse is 84. Her respiration is 16 and oxygen saturation is 98%. Physical Exam   Constitutional: She is oriented to person, place, and time. She appears well-developed and well-nourished. HENT:   Head: Normocephalic and atraumatic. Right Ear: External ear normal.   Left Ear: External ear normal.   Eyes: Conjunctivae are normal. Right eye exhibits no discharge. Left eye exhibits no discharge. No scleral icterus. Neck: Normal range of motion. Neck supple. No JVD present. Pulmonary/Chest: Effort normal. No stridor. No respiratory distress. Abdominal: Soft. She exhibits no distension. Musculoskeletal: Normal range of motion. She exhibits no edema.    Neurological: She is alert and oriented to person, place, and time. She exhibits normal muscle tone. GCS eye subscore is 4. GCS verbal subscore is 5. GCS motor subscore is 6. Skin: Skin is warm and dry. Rash noted. Rash is papular. She is not diaphoretic. No erythema. Papular rash mostly over antecubital and forearms spreading into bilateral upper arms    Psychiatric: She has a normal mood and affect. Her behavior is normal.   Nursing note and vitals reviewed. DIFFERENTIAL DIAGNOSIS:   Including but not limited to contact dermatitis, shingles, eczema, psoriasis    DIAGNOSTIC RESULTS     EKG: All EKG's are interpreted by the Emergency Department Physician who either signs or Co-signs this chart in the absence of a cardiologist.    None     RADIOLOGY: non-plainfilm images(s) such as CT, Ultrasound and MRI are read by the radiologist.    No orders to display       LABS:     Labs Reviewed - No data to display    EMERGENCY DEPARTMENT COURSE:   Vitals:    Vitals:    08/08/19 1033   BP: 113/70   Pulse: 84   Resp: 16   Temp: 98.3 °F (36.8 °C)   TempSrc: Oral   SpO2: 98%   Weight: 187 lb (84.8 kg)   Height: 5' 6\" (1.676 m)       10:56 AM: The patient was seen and evaluated. MDM:  The patient presents to the ED with complaints of rash. The patient was not in any acute distress. The patient's physical exam showed a papular rash mostly over antecubital and forearms spreading into bilateral upper arms. Patient's status remained stable during the duration of their stay. I explained my proposed course of treatment with the patient, and they were amenable to my decision. The patient will be discharged home with prednisone, and will need to follow-up with her PCP. She was instructed to use OTC benadryl for itching, and OTC calamine lotion for the rash. The patient will need to come back to the ER if their symptoms worsen, or become more severe in nature. CRITICAL CARE:   None      CONSULTS:  None     PROCEDURES:  None     FINAL IMPRESSION      1.  Allergic

## 2019-08-22 ENCOUNTER — NURSE ONLY (OUTPATIENT)
Dept: LAB | Age: 79
End: 2019-08-22

## 2019-08-22 ENCOUNTER — OFFICE VISIT (OUTPATIENT)
Dept: FAMILY MEDICINE CLINIC | Age: 79
End: 2019-08-22
Payer: MEDICARE

## 2019-08-22 VITALS
HEIGHT: 65 IN | HEART RATE: 80 BPM | DIASTOLIC BLOOD PRESSURE: 70 MMHG | WEIGHT: 183.2 LBS | TEMPERATURE: 97.2 F | SYSTOLIC BLOOD PRESSURE: 144 MMHG | BODY MASS INDEX: 30.52 KG/M2 | RESPIRATION RATE: 14 BRPM

## 2019-08-22 DIAGNOSIS — D50.9 MICROCYTIC ANEMIA: ICD-10-CM

## 2019-08-22 DIAGNOSIS — D50.9 MICROCYTIC ANEMIA: Primary | ICD-10-CM

## 2019-08-22 LAB
ABSOLUTE RETIC #: 52 THOU/MM3 (ref 20–115)
BACTERIA: ABNORMAL
BILIRUBIN URINE: ABNORMAL
BLOOD, URINE: NEGATIVE
CASTS: ABNORMAL /LPF
CASTS: ABNORMAL /LPF
CHARACTER, URINE: CLEAR
COLOR: YELLOW
CRYSTALS: ABNORMAL
EPITHELIAL CELLS, UA: ABNORMAL /HPF
FERRITIN: 15 NG/ML (ref 10–291)
GLUCOSE, URINE: NEGATIVE MG/DL
ICTOTEST: NEGATIVE
IMMATURE RETIC FRACT: 17.8 % (ref 3–15.9)
IRON: 23 UG/DL (ref 50–170)
KETONES, URINE: NEGATIVE
LEUKOCYTE ESTERASE, URINE: NEGATIVE
MISCELLANEOUS LAB TEST RESULT: ABNORMAL
NITRITE, URINE: NEGATIVE
PH UA: 7.5 (ref 5–9)
PROTEIN UA: NEGATIVE MG/DL
RBC URINE: ABNORMAL /HPF
RENAL EPITHELIAL, UA: ABNORMAL
RETIC HEMOGLOBIN: 19.1 PG (ref 28.2–35.7)
RETICULOCYTE ABSOLUTE COUNT: 1.5 % (ref 0.5–2)
SPECIFIC GRAVITY UA: 1.01 (ref 1–1.03)
TOTAL IRON BINDING CAPACITY: 465 UG/DL (ref 171–450)
UROBILINOGEN, URINE: 4 EU/DL (ref 0–1)
WBC UA: ABNORMAL /HPF
YEAST: ABNORMAL

## 2019-08-22 PROCEDURE — 99213 OFFICE O/P EST LOW 20 MIN: CPT | Performed by: FAMILY MEDICINE

## 2019-08-22 RX ORDER — LANOLIN ALCOHOL/MO/W.PET/CERES
325 CREAM (GRAM) TOPICAL 2 TIMES DAILY
Qty: 60 TABLET | Refills: 5 | Status: SHIPPED | OUTPATIENT
Start: 2019-08-22 | End: 2019-12-30

## 2019-08-22 NOTE — PROGRESS NOTES
Deana Mcgowan is a 78 y.o. female that presents for     Chief Complaint   Patient presents with   Matthieu Maria Dolores 55 8/2/19       BP (!) 144/70   Pulse 80   Temp 97.2 °F (36.2 °C) (Oral)   Resp 14   Ht 5' 5\" (1.651 m)   Wt 183 lb 3.2 oz (83.1 kg)   LMP  (LMP Unknown)   BMI 30.49 kg/m²       HPI:    Had recent labs with Dr Renetta Gale that displayed HGb of 7.7 with MCV of 68. Patient is noting some exertional fatigue with mild exertion. Notes that her appetite is decreased overall. Denies abdominal pain/back pain, does get some nausea. Recent CTA of the abdomen was stable. No constipation, blood in stool, melena. Does note that her urine is 'turning orange'. No pain with urination. No hemoptysis.          Health Maintenance   Topic Date Due    Annual Wellness Visit (AWV)  07/25/2003    DEXA (modify frequency per FRAX score)  07/25/2005    Shingles Vaccine (2 of 3) 11/15/2016    Flu vaccine (1) 09/01/2019    Potassium monitoring  06/19/2020    Creatinine monitoring  08/02/2020    DTaP/Tdap/Td vaccine (2 - Td) 10/15/2022    Pneumococcal 65+ years Vaccine  Completed       Past Medical History:   Diagnosis Date    Aneurysm of abdominal aorta (HCC)     Arthritis     Blood circulation, collateral     Bursitis, trochanteric     CAD (coronary artery disease) 2/2015    Cerebral artery occlusion with cerebral infarction (HCC)     Chronic back pain     Depression     Diabetes mellitus (HCC)     diet controlled    GERD (gastroesophageal reflux disease)     Headache(784.0)     History of basal cell cancer     Nose    Hyperlipidemia     Hypertension     Irritable bowel     Movement disorder     PVD (peripheral vascular disease) (HCC)     S/P CABG (coronary artery bypass graft)     Sciatica        Past Surgical History:   Procedure Laterality Date    ABDOMEN SURGERY      complete hysterectomy, gallbladder    APPENDECTOMY      CARDIAC [facility]TER[facility]  [facility]/3/2016    [facility]    CHOLECYSTECTOMY without mass, no thyromegaly or thyroid nodules, no cervical lymphadenopathy  Pulmonary/Chest: clear to auscultation bilaterally- no wheezes, rales or rhonchi, normal air movement, no respiratory distress or retractions  Cardiovascular: normal rate, regular rhythm, normal S1 and S2, no murmurs, rubs, clicks, or gallops, distal pulses intact  Abdomen: soft, non-tender, non-distended, no rebound or guarding, no masses or hernias noted, no hepatospleenomegaly  Extremities: no cyanosis, clubbing or edema of the lower extremities  Psych:  Normal affect without evidence of depression oranxiety, insight and judgement are appropriate, memory appears intact  Skin: warm and dry, no rash or erythema      ASSESSMENT & PLAN  Mira Ogden was seen today for discuss labs. Diagnoses and all orders for this visit:    Microcytic anemia  -     Ferritin; Future  -     Iron; Future  -     Reticulocytes; Future  -     Iron Binding Capacity; Future  -     ferrous sulfate 325 (65 Fe) MG EC tablet; Take 1 tablet by mouth 2 times daily  -     [facility]FL([facility]) - Mik Martinez MD, Gastroenterology, [facility]  -     Urinalysis with Microscopic; Future    -Need to evaluate for source of likely bleeding  -Obtain iron studies today  -Refer to GI for likely endoscopy  -Obtain UA to eval for hematuria  -Further work up pending results of testing. Return if symptoms worsen or fail to improve. Controlled Substance Monitoring:    Acute and Chronic Pain Monitoring:   RX Monitoring 5/9/2017   Attestation The Prescription Monitoring Report for this patient was reviewed today. Periodic Controlled Substance Monitoring No signs of potential drug abuse or diversion identified. Mira Ogden received counseling on the following healthy behaviors: medication adherence  Reviewed prior labs and health maintenance. Continue current medications, diet and exercise. Discussed use, benefit, and side effects of prescribed medications.  Barriers to medication compliance addressed. Patient given educational materials - see patient instructions. All patient questions answered. Patient voiced understanding.

## 2019-08-29 ENCOUNTER — TELEPHONE (OUTPATIENT)
Dept: CARDIOLOGY CLINIC | Age: 79
End: 2019-08-29

## 2019-08-29 NOTE — TELEPHONE ENCOUNTER
Pre op Risk Assessment    Procedure EGD/Colonoscopy  Physician Dr. Matt Mota  Date of surgery/procedure 09-12-19    Last OV 05-29-19  Last Stress 01-25-16  Last Echo 06-03-19  Last Cath 03-8-18  Last Stent 02-03-16  Is patient on blood thinners plavix and aspirin 81  Hold Meds/how many days 5 days    Fax to : [phone]

## 2019-09-23 ENCOUNTER — HOSPITAL ENCOUNTER (OUTPATIENT)
Age: 79
Discharge: HOME | End: 2019-09-23
Payer: MEDICARE

## 2019-09-23 DIAGNOSIS — D50.9 IRON DEFICIENCY ANEMIA, UNSPECIFIED IRON DEFICIENCY ANEMIA TYPE: ICD-10-CM

## 2019-09-23 LAB
ANISOCYTOSIS: PRESENT
BASOPHILIA: ABNORMAL
BASOPHILS # BLD: 1.1 %
BASOPHILS ABSOLUTE: 0.1 THOU/MM3 (ref 0–0.1)
EOSINOPHIL # BLD: 4.7 %
EOSINOPHILS ABSOLUTE: 0.3 THOU/MM3 (ref 0–0.4)
ERYTHROCYTE [DISTWIDTH] IN BLOOD BY AUTOMATED COUNT: 29.9 % (ref 11.5–14.5)
ERYTHROCYTE [DISTWIDTH] IN BLOOD BY AUTOMATED COUNT: 84.8 FL (ref 35–45)
FERRITIN: 25 NG/ML (ref 10–291)
FOLATE: > 20 NG/ML (ref 4.8–24.2)
HCT VFR BLD CALC: 33.1 % (ref 37–47)
HEMOGLOBIN: 9.3 GM/DL (ref 12–16)
HYPOCHROMIA: PRESENT
IMMATURE GRANS (ABS): 0.03 THOU/MM3 (ref 0–0.07)
IMMATURE GRANULOCYTES: 0 %
IRON SATURATION: 21 % (ref 20–50)
IRON: 91 UG/DL (ref 50–170)
LYMPHOCYTES # BLD: 32.6 %
LYMPHOCYTES ABSOLUTE: 2.4 THOU/MM3 (ref 1–4.8)
MCH RBC QN AUTO: 23 PG (ref 26–33)
MCHC RBC AUTO-ENTMCNC: 28.1 GM/DL (ref 32.2–35.5)
MCV RBC AUTO: 81.9 FL (ref 81–99)
MONOCYTES # BLD: 11.8 %
MONOCYTES ABSOLUTE: 0.9 THOU/MM3 (ref 0.4–1.3)
NUCLEATED RED BLOOD CELLS: 0 /100 WBC
PLATELET # BLD: 242 THOU/MM3 (ref 130–400)
PLATELET ESTIMATE: ADEQUATE
PMV BLD AUTO: 10.5 FL (ref 9.4–12.4)
POIKILOCYTES: ABNORMAL
RBC # BLD: 4.04 MILL/MM3 (ref 4.2–5.4)
SCAN OF BLOOD SMEAR: NORMAL
SEG NEUTROPHILS: 49.4 %
SEGMENTED NEUTROPHILS ABSOLUTE COUNT: 3.6 THOU/MM3 (ref 1.8–7.7)
TARGET CELLS: ABNORMAL
TOTAL IRON BINDING CAPACITY: 434 UG/DL (ref 171–450)
VITAMIN B-12: > 2000 PG/ML (ref 211–911)
WBC # BLD: 7.3 THOU/MM3 (ref 4.8–10.8)

## 2019-09-23 PROCEDURE — 83550 IRON BINDING TEST: CPT

## 2019-09-23 PROCEDURE — 82746 ASSAY OF FOLIC ACID SERUM: CPT

## 2019-09-23 PROCEDURE — 82607 VITAMIN B-12: CPT

## 2019-09-23 PROCEDURE — 82272 OCCULT BLD FECES 1-3 TESTS: CPT

## 2019-09-23 PROCEDURE — 36415 COLL VENOUS BLD VENIPUNCTURE: CPT

## 2019-09-23 PROCEDURE — 85025 COMPLETE CBC W/AUTO DIFF WBC: CPT

## 2019-09-23 PROCEDURE — 82728 ASSAY OF FERRITIN: CPT

## 2019-09-23 PROCEDURE — 83540 ASSAY OF IRON: CPT

## 2019-09-24 ENCOUNTER — HOSPITAL ENCOUNTER (OUTPATIENT)
Age: 79
Setting detail: SPECIMEN
Discharge: HOME | End: 2019-09-24
Payer: MEDICARE

## 2019-09-24 DIAGNOSIS — D50.9 IRON DEFICIENCY ANEMIA, UNSPECIFIED IRON DEFICIENCY ANEMIA TYPE: ICD-10-CM

## 2019-09-24 LAB — HEMOCCULT STL QL: POSITIVE

## 2019-09-29 ENCOUNTER — HOSPITAL ENCOUNTER (EMERGENCY)
Age: 79
Discharge: HOME | End: 2019-09-29
Payer: MEDICARE

## 2019-09-29 VITALS
WEIGHT: 179 LBS | RESPIRATION RATE: 16 BRPM | DIASTOLIC BLOOD PRESSURE: 72 MMHG | TEMPERATURE: 97.8 F | HEIGHT: 66 IN | SYSTOLIC BLOOD PRESSURE: 145 MMHG | HEART RATE: 79 BPM | OXYGEN SATURATION: 97 % | BODY MASS INDEX: 28.77 KG/M2

## 2019-09-29 DIAGNOSIS — N92.0 SPOTTING: ICD-10-CM

## 2019-09-29 DIAGNOSIS — N93.9 VAGINAL BLEEDING: Primary | ICD-10-CM

## 2019-09-29 LAB
ALBUMIN SERPL-MCNC: 3.9 G/DL (ref 3.5–5.1)
ALP BLD-CCNC: 109 U/L (ref 38–126)
ALT SERPL-CCNC: 18 U/L (ref 11–66)
ANION GAP SERPL CALCULATED.3IONS-SCNC: 14 MEQ/L (ref 8–16)
ANISOCYTOSIS: PRESENT
APTT: 33.6 SECONDS (ref 22–38)
AST SERPL-CCNC: 57 U/L (ref 5–40)
BASOPHILS # BLD: 0.7 %
BASOPHILS ABSOLUTE: 0.1 THOU/MM3 (ref 0–0.1)
BILIRUB SERPL-MCNC: 0.5 MG/DL (ref 0.3–1.2)
BILIRUBIN DIRECT: < 0.2 MG/DL (ref 0–0.3)
BILIRUBIN URINE: NEGATIVE
BLOOD, URINE: NEGATIVE
BUN BLDV-MCNC: 6 MG/DL (ref 7–22)
CALCIUM SERPL-MCNC: 9.7 MG/DL (ref 8.5–10.5)
CHARACTER, URINE: CLEAR
CHLORIDE BLD-SCNC: 104 MEQ/L (ref 98–111)
CO2: 23 MEQ/L (ref 23–33)
COLOR: YELLOW
CREAT SERPL-MCNC: 0.8 MG/DL (ref 0.4–1.2)
EOSINOPHIL # BLD: 4 %
EOSINOPHILS ABSOLUTE: 0.3 THOU/MM3 (ref 0–0.4)
ERYTHROCYTE [DISTWIDTH] IN BLOOD BY AUTOMATED COUNT: 30.8 % (ref 11.5–14.5)
ERYTHROCYTE [DISTWIDTH] IN BLOOD BY AUTOMATED COUNT: 91.9 FL (ref 35–45)
GFR SERPL CREATININE-BSD FRML MDRD: 69 ML/MIN/1.73M2
GLUCOSE BLD-MCNC: 209 MG/DL (ref 70–108)
GLUCOSE URINE: NEGATIVE MG/DL
HCT VFR BLD CALC: 31.1 % (ref 37–47)
HEMOGLOBIN: 9 GM/DL (ref 12–16)
HYPOCHROMIA: PRESENT
IMMATURE GRANS (ABS): 0.01 THOU/MM3 (ref 0–0.07)
IMMATURE GRANULOCYTES: 0.1 %
KETONES, URINE: NEGATIVE
LEUKOCYTE ESTERASE, URINE: NEGATIVE
LYMPHOCYTES # BLD: 30.5 %
LYMPHOCYTES ABSOLUTE: 2.2 THOU/MM3 (ref 1–4.8)
MCH RBC QN AUTO: 24.1 PG (ref 26–33)
MCHC RBC AUTO-ENTMCNC: 28.9 GM/DL (ref 32.2–35.5)
MCV RBC AUTO: 83.4 FL (ref 81–99)
MONOCYTES # BLD: 12.5 %
MONOCYTES ABSOLUTE: 0.9 THOU/MM3 (ref 0.4–1.3)
NITRITE, URINE: NEGATIVE
NUCLEATED RED BLOOD CELLS: 0 /100 WBC
OSMOLALITY CALCULATION: 285 MOSMOL/KG (ref 275–300)
PH UA: 6.5 (ref 5–9)
PLATELET # BLD: 222 THOU/MM3 (ref 130–400)
PMV BLD AUTO: 10.2 FL (ref 9.4–12.4)
POIKILOCYTES: ABNORMAL
POTASSIUM SERPL-SCNC: 4 MEQ/L (ref 3.5–5.2)
PROTEIN UA: NEGATIVE
RBC # BLD: 3.73 MILL/MM3 (ref 4.2–5.4)
SCAN OF BLOOD SMEAR: NORMAL
SEG NEUTROPHILS: 52.2 %
SEGMENTED NEUTROPHILS ABSOLUTE COUNT: 3.8 THOU/MM3 (ref 1.8–7.7)
SODIUM BLD-SCNC: 141 MEQ/L (ref 135–145)
SPECIFIC GRAVITY, URINE: 1.01 (ref 1–1.03)
TARGET CELLS: ABNORMAL
TOTAL PROTEIN: 6.9 G/DL (ref 6.1–8)
UROBILINOGEN, URINE: 2 EU/DL (ref 0–1)
WBC # BLD: 7.3 THOU/MM3 (ref 4.8–10.8)

## 2019-09-29 PROCEDURE — 36415 COLL VENOUS BLD VENIPUNCTURE: CPT

## 2019-09-29 PROCEDURE — 80053 COMPREHEN METABOLIC PANEL: CPT

## 2019-09-29 PROCEDURE — 82248 BILIRUBIN DIRECT: CPT

## 2019-09-29 PROCEDURE — 2709999900 HC NON-CHARGEABLE SUPPLY

## 2019-09-29 PROCEDURE — 81003 URINALYSIS AUTO W/O SCOPE: CPT

## 2019-09-29 PROCEDURE — 85730 THROMBOPLASTIN TIME PARTIAL: CPT

## 2019-09-29 PROCEDURE — 99284 EMERGENCY DEPT VISIT MOD MDM: CPT

## 2019-09-29 PROCEDURE — 85025 COMPLETE CBC W/AUTO DIFF WBC: CPT

## 2019-09-29 ASSESSMENT — ENCOUNTER SYMPTOMS
VOMITING: 0
RHINORRHEA: 0
COUGH: 0
EYE DISCHARGE: 0
WHEEZING: 0
ABDOMINAL PAIN: 1
BACK PAIN: 0
DIARRHEA: 0
NAUSEA: 0
EYE PAIN: 0
SORE THROAT: 0
SHORTNESS OF BREATH: 0

## 2019-09-29 ASSESSMENT — PAIN SCALES - GENERAL: PAINLEVEL_OUTOF10: 5

## 2019-10-09 ENCOUNTER — OFFICE VISIT (OUTPATIENT)
Dept: FAMILY MEDICINE CLINIC | Age: 79
End: 2019-10-09
Payer: MEDICARE

## 2019-10-09 ENCOUNTER — NURSE ONLY (OUTPATIENT)
Dept: LAB | Age: 79
End: 2019-10-09

## 2019-10-09 ENCOUNTER — TELEPHONE (OUTPATIENT)
Dept: FAMILY MEDICINE CLINIC | Age: 79
End: 2019-10-09

## 2019-10-09 VITALS
DIASTOLIC BLOOD PRESSURE: 51 MMHG | TEMPERATURE: 98.4 F | HEART RATE: 74 BPM | WEIGHT: 181 LBS | HEIGHT: 66 IN | RESPIRATION RATE: 14 BRPM | BODY MASS INDEX: 29.09 KG/M2 | SYSTOLIC BLOOD PRESSURE: 110 MMHG

## 2019-10-09 DIAGNOSIS — Z01.818 PREOP EXAMINATION: Primary | ICD-10-CM

## 2019-10-09 DIAGNOSIS — G45.8 SUBCLAVIAN STEAL SYNDROME: ICD-10-CM

## 2019-10-09 DIAGNOSIS — K13.6 IRRITATION OF ORAL CAVITY: ICD-10-CM

## 2019-10-09 DIAGNOSIS — D50.9 MICROCYTIC ANEMIA: ICD-10-CM

## 2019-10-09 LAB
ANION GAP SERPL CALCULATED.3IONS-SCNC: 13 MEQ/L (ref 8–16)
ANISOCYTOSIS: PRESENT
BASOPHILS # BLD: 0.9 %
BASOPHILS ABSOLUTE: 0.1 THOU/MM3 (ref 0–0.1)
BUN BLDV-MCNC: 7 MG/DL (ref 7–22)
CALCIUM SERPL-MCNC: 10.3 MG/DL (ref 8.5–10.5)
CHLORIDE BLD-SCNC: 103 MEQ/L (ref 98–111)
CO2: 25 MEQ/L (ref 23–33)
CREAT SERPL-MCNC: 0.7 MG/DL (ref 0.4–1.2)
ELLIPTOCYTES: ABNORMAL
EOSINOPHIL # BLD: 4.8 %
EOSINOPHILS ABSOLUTE: 0.4 THOU/MM3 (ref 0–0.4)
ERYTHROCYTE [DISTWIDTH] IN BLOOD BY AUTOMATED COUNT: 31.7 % (ref 11.5–14.5)
ERYTHROCYTE [DISTWIDTH] IN BLOOD BY AUTOMATED COUNT: 96.4 FL (ref 35–45)
GFR SERPL CREATININE-BSD FRML MDRD: 81 ML/MIN/1.73M2
GLUCOSE BLD-MCNC: 156 MG/DL (ref 70–108)
HCT VFR BLD CALC: 33 % (ref 37–47)
HEMOGLOBIN: 9.8 GM/DL (ref 12–16)
IMMATURE GRANS (ABS): 0.04 THOU/MM3 (ref 0–0.07)
IMMATURE GRANULOCYTES: 0.5 %
LYMPHOCYTES # BLD: 30.5 %
LYMPHOCYTES ABSOLUTE: 2.3 THOU/MM3 (ref 1–4.8)
MCH RBC QN AUTO: 25.6 PG (ref 26–33)
MCHC RBC AUTO-ENTMCNC: 29.7 GM/DL (ref 32.2–35.5)
MCV RBC AUTO: 86.2 FL (ref 81–99)
MONOCYTES # BLD: 17.8 %
MONOCYTES ABSOLUTE: 1.4 THOU/MM3 (ref 0.4–1.3)
NUCLEATED RED BLOOD CELLS: 0 /100 WBC
PLATELET # BLD: 209 THOU/MM3 (ref 130–400)
PLATELET ESTIMATE: ADEQUATE
PMV BLD AUTO: 11.4 FL (ref 9.4–12.4)
POIKILOCYTES: ABNORMAL
POTASSIUM SERPL-SCNC: 4.4 MEQ/L (ref 3.5–5.2)
RBC # BLD: 3.83 MILL/MM3 (ref 4.2–5.4)
SCAN OF BLOOD SMEAR: NORMAL
SEG NEUTROPHILS: 45.5 %
SEGMENTED NEUTROPHILS ABSOLUTE COUNT: 3.5 THOU/MM3 (ref 1.8–7.7)
SODIUM BLD-SCNC: 141 MEQ/L (ref 135–145)
WBC # BLD: 7.7 THOU/MM3 (ref 4.8–10.8)

## 2019-10-09 PROCEDURE — 93000 ELECTROCARDIOGRAM COMPLETE: CPT | Performed by: FAMILY MEDICINE

## 2019-10-09 PROCEDURE — 99214 OFFICE O/P EST MOD 30 MIN: CPT | Performed by: FAMILY MEDICINE

## 2019-10-09 RX ORDER — LIDOCAINE HYDROCHLORIDE 20 MG/ML
15 SOLUTION OROPHARYNGEAL
Qty: 200 ML | Refills: 0 | Status: SHIPPED | OUTPATIENT
Start: 2019-10-09 | End: 2020-06-18 | Stop reason: ALTCHOICE

## 2019-10-10 ENCOUNTER — TELEPHONE (OUTPATIENT)
Dept: FAMILY MEDICINE CLINIC | Age: 79
End: 2019-10-10

## 2019-10-18 ENCOUNTER — HOSPITAL ENCOUNTER (OUTPATIENT)
Dept: GENERAL RADIOLOGY | Age: 79
Discharge: HOME | End: 2019-10-18
Payer: MEDICARE

## 2019-10-18 DIAGNOSIS — D50.0 IRON DEFICIENCY ANEMIA DUE TO CHRONIC BLOOD LOSS: ICD-10-CM

## 2019-10-18 DIAGNOSIS — Z98.890 S/P COLONOSCOPY: ICD-10-CM

## 2019-10-18 DIAGNOSIS — Z98.890 S/P ENDOSCOPY: ICD-10-CM

## 2019-10-18 DIAGNOSIS — R19.5 POSITIVE FECAL OCCULT BLOOD TEST: ICD-10-CM

## 2019-10-18 PROCEDURE — 2500000003 HC RX 250 WO HCPCS: Performed by: NURSE PRACTITIONER

## 2019-10-18 PROCEDURE — 74250 X-RAY XM SM INT 1CNTRST STD: CPT

## 2019-10-18 RX ADMIN — BARIUM SULFATE 600 ML: 240 SUSPENSION ORAL at 08:51

## 2019-12-02 ENCOUNTER — OFFICE VISIT (OUTPATIENT)
Dept: CARDIOLOGY CLINIC | Age: 79
End: 2019-12-02
Payer: MEDICARE

## 2019-12-02 VITALS
WEIGHT: 185 LBS | BODY MASS INDEX: 29.73 KG/M2 | HEIGHT: 66 IN | DIASTOLIC BLOOD PRESSURE: 69 MMHG | SYSTOLIC BLOOD PRESSURE: 117 MMHG | HEART RATE: 81 BPM

## 2019-12-02 DIAGNOSIS — I25.10 CORONARY ARTERY DISEASE INVOLVING NATIVE CORONARY ARTERY OF NATIVE HEART WITHOUT ANGINA PECTORIS: Primary | ICD-10-CM

## 2019-12-02 DIAGNOSIS — Z95.1 STATUS POST AORTO-CORONARY ARTERY BYPASS GRAFT: ICD-10-CM

## 2019-12-02 DIAGNOSIS — I73.9 PAD (PERIPHERAL ARTERY DISEASE) (HCC): ICD-10-CM

## 2019-12-02 PROCEDURE — 99213 OFFICE O/P EST LOW 20 MIN: CPT | Performed by: PHYSICIAN ASSISTANT

## 2019-12-02 RX ORDER — HYDROCHLOROTHIAZIDE 25 MG/1
25 TABLET ORAL DAILY
Qty: 30 TABLET | Refills: 3 | Status: SHIPPED | OUTPATIENT
Start: 2019-12-02 | End: 2019-12-18 | Stop reason: SDUPTHER

## 2019-12-08 DIAGNOSIS — I25.119 CORONARY ARTERY DISEASE INVOLVING NATIVE CORONARY ARTERY OF NATIVE HEART WITH ANGINA PECTORIS (HCC): ICD-10-CM

## 2019-12-18 ENCOUNTER — OFFICE VISIT (OUTPATIENT)
Dept: FAMILY MEDICINE CLINIC | Age: 79
End: 2019-12-18
Payer: MEDICARE

## 2019-12-18 VITALS
TEMPERATURE: 97.6 F | BODY MASS INDEX: 28.93 KG/M2 | SYSTOLIC BLOOD PRESSURE: 110 MMHG | WEIGHT: 180 LBS | DIASTOLIC BLOOD PRESSURE: 76 MMHG | HEIGHT: 66 IN | RESPIRATION RATE: 14 BRPM | HEART RATE: 75 BPM

## 2019-12-18 DIAGNOSIS — R07.89 CHEST WALL PAIN: ICD-10-CM

## 2019-12-18 DIAGNOSIS — I10 BENIGN ESSENTIAL HTN: Chronic | ICD-10-CM

## 2019-12-18 DIAGNOSIS — R73.03 PREDIABETES: ICD-10-CM

## 2019-12-18 DIAGNOSIS — Z91.81 AT HIGH RISK FOR FALLS: ICD-10-CM

## 2019-12-18 DIAGNOSIS — I25.10 CORONARY ARTERY DISEASE INVOLVING NATIVE CORONARY ARTERY OF NATIVE HEART WITHOUT ANGINA PECTORIS: Primary | ICD-10-CM

## 2019-12-18 PROCEDURE — 99214 OFFICE O/P EST MOD 30 MIN: CPT | Performed by: FAMILY MEDICINE

## 2019-12-18 RX ORDER — GLUCOSAMINE HCL/CHONDROITIN SU 500-400 MG
CAPSULE ORAL
Qty: 100 STRIP | Refills: 0 | Status: SHIPPED | OUTPATIENT
Start: 2019-12-18

## 2019-12-18 RX ORDER — AMITRIPTYLINE HYDROCHLORIDE 25 MG/1
50 TABLET, FILM COATED ORAL NIGHTLY
Qty: 90 TABLET | Refills: 3
Start: 2019-12-18 | End: 2019-12-30 | Stop reason: SDUPTHER

## 2019-12-18 RX ORDER — HYDROCHLOROTHIAZIDE 25 MG/1
25 TABLET ORAL DAILY
Qty: 90 TABLET | Refills: 3 | Status: SHIPPED | OUTPATIENT
Start: 2019-12-18 | End: 2020-12-21 | Stop reason: SDUPTHER

## 2019-12-29 DIAGNOSIS — D50.9 MICROCYTIC ANEMIA: ICD-10-CM

## 2019-12-30 DIAGNOSIS — R07.89 CHEST WALL PAIN: ICD-10-CM

## 2019-12-30 RX ORDER — AMITRIPTYLINE HYDROCHLORIDE 25 MG/1
50 TABLET, FILM COATED ORAL NIGHTLY
Qty: 90 TABLET | Refills: 3 | Status: SHIPPED | OUTPATIENT
Start: 2019-12-30 | End: 2019-12-31 | Stop reason: SDUPTHER

## 2019-12-30 RX ORDER — LANOLIN ALCOHOL/MO/W.PET/CERES
CREAM (GRAM) TOPICAL
Qty: 180 TABLET | Refills: 1 | Status: SHIPPED | OUTPATIENT
Start: 2019-12-30 | End: 2020-01-02

## 2019-12-31 DIAGNOSIS — R07.89 CHEST WALL PAIN: ICD-10-CM

## 2019-12-31 RX ORDER — AMITRIPTYLINE HYDROCHLORIDE 50 MG/1
50 TABLET, FILM COATED ORAL NIGHTLY
Qty: 90 TABLET | Refills: 3 | Status: SHIPPED | OUTPATIENT
Start: 2019-12-31 | End: 2020-08-10

## 2020-01-02 ENCOUNTER — TELEPHONE (OUTPATIENT)
Dept: FAMILY MEDICINE CLINIC | Age: 80
End: 2020-01-02

## 2020-01-02 RX ORDER — FERROUS SULFATE 325(65) MG
325 TABLET ORAL 2 TIMES DAILY
Qty: 180 TABLET | Refills: 1 | Status: SHIPPED | OUTPATIENT
Start: 2020-01-02 | End: 2020-06-22

## 2020-01-02 NOTE — TELEPHONE ENCOUNTER
ALTERNATIVE MEDICATION ferrous sulfate 325 (65 Fe) MG EC tablet  [facility] requesting the a new Rx for regula tabs be sent , that the EC/DR tabs are way more expensive .      Med pended for review

## 2020-01-03 RX ORDER — CLOPIDOGREL BISULFATE 75 MG/1
TABLET ORAL
Qty: 90 TABLET | Refills: 3 | Status: SHIPPED | OUTPATIENT
Start: 2020-01-03 | End: 2020-12-30

## 2020-02-25 ENCOUNTER — APPOINTMENT (OUTPATIENT)
Dept: CT IMAGING | Age: 80
End: 2020-02-25
Payer: MEDICARE

## 2020-02-25 ENCOUNTER — APPOINTMENT (OUTPATIENT)
Dept: GENERAL RADIOLOGY | Age: 80
End: 2020-02-25
Payer: MEDICARE

## 2020-02-25 ENCOUNTER — HOSPITAL ENCOUNTER (EMERGENCY)
Age: 80
Discharge: HOME | End: 2020-02-25
Attending: EMERGENCY MEDICINE
Payer: MEDICARE

## 2020-02-25 VITALS
TEMPERATURE: 98 F | SYSTOLIC BLOOD PRESSURE: 144 MMHG | DIASTOLIC BLOOD PRESSURE: 60 MMHG | OXYGEN SATURATION: 97 % | WEIGHT: 180 LBS | HEART RATE: 69 BPM | HEIGHT: 66 IN | RESPIRATION RATE: 16 BRPM | BODY MASS INDEX: 28.93 KG/M2

## 2020-02-25 LAB
ANION GAP SERPL CALCULATED.3IONS-SCNC: 13 MEQ/L (ref 8–16)
ANISOCYTOSIS: PRESENT
BASOPHILS # BLD: 1.4 %
BASOPHILS ABSOLUTE: 0.1 THOU/MM3 (ref 0–0.1)
BUN BLDV-MCNC: 18 MG/DL (ref 7–22)
CALCIUM SERPL-MCNC: 9.8 MG/DL (ref 8.5–10.5)
CHLORIDE BLD-SCNC: 104 MEQ/L (ref 98–111)
CO2: 26 MEQ/L (ref 23–33)
CREAT SERPL-MCNC: 0.9 MG/DL (ref 0.4–1.2)
EKG ATRIAL RATE: 78 BPM
EKG P AXIS: 73 DEGREES
EKG P-R INTERVAL: 174 MS
EKG Q-T INTERVAL: 412 MS
EKG QRS DURATION: 90 MS
EKG QTC CALCULATION (BAZETT): 469 MS
EKG R AXIS: 30 DEGREES
EKG T AXIS: 83 DEGREES
EKG VENTRICULAR RATE: 78 BPM
EOSINOPHIL # BLD: 6.6 %
EOSINOPHILS ABSOLUTE: 0.4 THOU/MM3 (ref 0–0.4)
ERYTHROCYTE [DISTWIDTH] IN BLOOD BY AUTOMATED COUNT: 25.1 % (ref 11.5–14.5)
ERYTHROCYTE [DISTWIDTH] IN BLOOD BY AUTOMATED COUNT: 89 FL (ref 35–45)
GFR SERPL CREATININE-BSD FRML MDRD: 60 ML/MIN/1.73M2
GLUCOSE BLD-MCNC: 136 MG/DL (ref 70–108)
HCT VFR BLD CALC: 29 % (ref 37–47)
HEMOGLOBIN: 8.5 GM/DL (ref 12–16)
IMMATURE GRANS (ABS): 0.02 THOU/MM3 (ref 0–0.07)
IMMATURE GRANULOCYTES: 0.3 %
LYMPHOCYTES # BLD: 26.6 %
LYMPHOCYTES ABSOLUTE: 1.6 THOU/MM3 (ref 1–4.8)
MAGNESIUM: 1.9 MG/DL (ref 1.6–2.4)
MCH RBC QN AUTO: 28.2 PG (ref 26–33)
MCHC RBC AUTO-ENTMCNC: 29.3 GM/DL (ref 32.2–35.5)
MCV RBC AUTO: 96.3 FL (ref 81–99)
MONOCYTES # BLD: 11.9 %
MONOCYTES ABSOLUTE: 0.7 THOU/MM3 (ref 0.4–1.3)
NUCLEATED RED BLOOD CELLS: 1 /100 WBC
OSMOLALITY CALCULATION: 289 MOSMOL/KG (ref 275–300)
PLATELET # BLD: 222 THOU/MM3 (ref 130–400)
PMV BLD AUTO: 10.4 FL (ref 9.4–12.4)
POTASSIUM REFLEX MAGNESIUM: 3.4 MEQ/L (ref 3.5–5.2)
PRO-BNP: 123.9 PG/ML (ref 0–1800)
RBC # BLD: 3.01 MILL/MM3 (ref 4.2–5.4)
SCAN OF BLOOD SMEAR: NORMAL
SEG NEUTROPHILS: 53.2 %
SEGMENTED NEUTROPHILS ABSOLUTE COUNT: 3.3 THOU/MM3 (ref 1.8–7.7)
SODIUM BLD-SCNC: 143 MEQ/L (ref 135–145)
TROPONIN T: < 0.01 NG/ML
WBC # BLD: 6.2 THOU/MM3 (ref 4.8–10.8)

## 2020-02-25 PROCEDURE — 83735 ASSAY OF MAGNESIUM: CPT

## 2020-02-25 PROCEDURE — 93005 ELECTROCARDIOGRAM TRACING: CPT | Performed by: EMERGENCY MEDICINE

## 2020-02-25 PROCEDURE — 74176 CT ABD & PELVIS W/O CONTRAST: CPT

## 2020-02-25 PROCEDURE — 83880 ASSAY OF NATRIURETIC PEPTIDE: CPT

## 2020-02-25 PROCEDURE — 71101 X-RAY EXAM UNILAT RIBS/CHEST: CPT

## 2020-02-25 PROCEDURE — 99283 EMERGENCY DEPT VISIT LOW MDM: CPT

## 2020-02-25 PROCEDURE — 6370000000 HC RX 637 (ALT 250 FOR IP): Performed by: EMERGENCY MEDICINE

## 2020-02-25 PROCEDURE — 80048 BASIC METABOLIC PNL TOTAL CA: CPT

## 2020-02-25 PROCEDURE — 93010 ELECTROCARDIOGRAM REPORT: CPT | Performed by: INTERNAL MEDICINE

## 2020-02-25 PROCEDURE — 36415 COLL VENOUS BLD VENIPUNCTURE: CPT

## 2020-02-25 PROCEDURE — 84484 ASSAY OF TROPONIN QUANT: CPT

## 2020-02-25 PROCEDURE — 85025 COMPLETE CBC W/AUTO DIFF WBC: CPT

## 2020-02-25 RX ORDER — HYDROCODONE BITARTRATE AND ACETAMINOPHEN 5; 325 MG/1; MG/1
1 TABLET ORAL ONCE
Status: COMPLETED | OUTPATIENT
Start: 2020-02-25 | End: 2020-02-25

## 2020-02-25 RX ORDER — OMEPRAZOLE 20 MG/1
40 CAPSULE, DELAYED RELEASE ORAL DAILY
Qty: 30 CAPSULE | Refills: 0 | Status: SHIPPED | OUTPATIENT
Start: 2020-02-25 | End: 2020-06-18

## 2020-02-25 RX ORDER — LIDOCAINE 4 G/G
1 PATCH TOPICAL ONCE
Status: DISCONTINUED | OUTPATIENT
Start: 2020-02-25 | End: 2020-02-25 | Stop reason: HOSPADM

## 2020-02-25 RX ORDER — TRAMADOL HYDROCHLORIDE 50 MG/1
50 TABLET ORAL 2 TIMES DAILY PRN
Qty: 10 TABLET | Refills: 0 | Status: SHIPPED | OUTPATIENT
Start: 2020-02-25 | End: 2020-03-17 | Stop reason: SDUPTHER

## 2020-02-25 RX ADMIN — HYDROCODONE BITARTRATE AND ACETAMINOPHEN 1 TABLET: 5; 325 TABLET ORAL at 11:04

## 2020-02-25 ASSESSMENT — PAIN DESCRIPTION - LOCATION: LOCATION: RIB CAGE

## 2020-02-25 ASSESSMENT — ENCOUNTER SYMPTOMS
ABDOMINAL PAIN: 1
COUGH: 0
CHEST TIGHTNESS: 0
BACK PAIN: 0
SHORTNESS OF BREATH: 0
WHEEZING: 0
TROUBLE SWALLOWING: 0
DIARRHEA: 0
BLOOD IN STOOL: 0
VOICE CHANGE: 0
VOMITING: 0
RHINORRHEA: 0
NAUSEA: 0

## 2020-02-25 ASSESSMENT — PAIN DESCRIPTION - DESCRIPTORS: DESCRIPTORS: SHARP

## 2020-02-25 ASSESSMENT — PAIN DESCRIPTION - PAIN TYPE: TYPE: ACUTE PAIN

## 2020-02-25 ASSESSMENT — PAIN SCALES - GENERAL
PAINLEVEL_OUTOF10: 8
PAINLEVEL_OUTOF10: 8

## 2020-02-25 NOTE — ED NOTES
Pt to xray at this time.  States that she will call out when she is back for her Jessica Lozoya RN  02/25/20 1048

## 2020-02-25 NOTE — ED NOTES
Dr. Jose Manuel Perez at bedside. Pt updated on POC. Call light in reach. Will monitor.       José Kirkland RN  02/25/20 8553

## 2020-02-25 NOTE — ED PROVIDER NOTES
[facility] [address] COMPLAINT       Chief Complaint   Patient presents with    Rib Pain     right       Nurses Notes reviewed and I agree except as noted inthe HPI. HISTORY OF PRESENT ILLNESS    Jassi To is a 78 y.o. female who presents to the Emergency Department for the evaluation of right rib/abdominal pain with onset of 3 days ago without injury. The patient presents here from home with  at bedside. The patient has a history of CAD, CABG x 3 in 2016, HTN, HLD, DM, GERD, AAA, and CVA. The patient has been taking Tylenol for pain control. She denies chest pain, SOB, cough, or wheezing. She does not have a history of shingles and denies rash. She denies nausea, vomiting, or diarrhea. She denies changes with urination. She denies back or flank pain. Patient describes this pain as sharp. She rates her current pain at a 8/10 in severity which increases upon palpation or with movement. Patient does not appear to be in any distress at this time. There are no other complaints, symptoms, or concerns. The HPI was provided by the patient. REVIEW OF SYSTEMS     Review of Systems   Constitutional: Negative for chills, diaphoresis and fever. HENT: Negative for congestion, ear pain, rhinorrhea, trouble swallowing and voice change. Eyes: Negative for visual disturbance. Respiratory: Negative for cough, chest tightness, shortness of breath and wheezing. Cardiovascular: Negative for chest pain, palpitations and leg swelling. Gastrointestinal: Positive for abdominal pain. Negative for blood in stool, diarrhea, nausea and vomiting. Genitourinary: Negative for dysuria, frequency and hematuria. Musculoskeletal: Positive for arthralgias. Negative for back pain and neck pain. Skin: Negative for rash and wound. Neurological: Negative for speech difficulty, weakness, numbness and headaches.    Psychiatric/Behavioral: Negative for confusion. PAST MEDICAL HISTORY    has a past medical history of Anemia, Aneurysm of abdominal aorta (HCC), Arthritis, Blood circulation, collateral, Bursitis, trochanteric, CAD (coronary artery disease), Cerebral artery occlusion with cerebral infarction ([address] Chronic back pain, Depression, Diabetes mellitus ([address] GERD (gastroesophageal reflux disease), Headache(784.0), History of basal cell cancer, Hyperlipidemia, Hypertension, Irritable bowel, Movement disorder, PVD (peripheral vascular disease) [address] [address] [facility] S/P CABG (coronary artery bypass graft), and Sciatica. SURGICAL HISTORY      has a past surgical history that includes Colonoscopy; Hysterectomy; Upper gastrointestinal endoscopy; Cholecystectomy (yrs ago); Tonsillectomy; laryngoscopy (10/29/2012 Dr eLvi Chung); Appendectomy; Endoscopy, colon, diagnostic; skin biopsy; hiatal hernia repair; Cardiac catheterization (2/3/2016); Abdomen surgery; eye surgery; hernia repair; Coronary artery bypass graft (2-18-16); and pr vein bypass graft,carot-subcl/ subcl-carotd (Left, 4/11/2018).     CURRENT MEDICATIONS       Previous Medications    AMITRIPTYLINE (ELAVIL) 50 MG TABLET    Take 1 tablet by mouth nightly    ASPIRIN 81 MG TABLET    Take 81 mg by mouth daily    BLOOD GLUCOSE MONITOR STRIPS    Check blood sugar q daily, Dx E11.9    CLOPIDOGREL (PLAVIX) 75 MG TABLET    TAKE 1 TABLET BY MOUTH EVERY DAY    CYANOCOBALAMIN (B-12) 2500 MCG TABS    Take by mouth daily     FERROUS SULFATE 325 (65 FE) MG TABLET    Take 1 tablet by mouth 2 times daily    HANDICAP PLACARD MISC    by Does not apply route Expires 12/14/2022    HYDROCHLOROTHIAZIDE (HYDRODIURIL) 25 MG TABLET    Take 1 tablet by mouth daily    LIDOCAINE VISCOUS HCL (XYLOCAINE) 2 % SOLN SOLUTION    Take 15 mLs by mouth every 3 hours as needed for Irritation    METOPROLOL TARTRATE (LOPRESSOR) 25 MG TABLET    TAKE 1/2 TABLET TWICE A DAY    SIMVASTATIN (ZOCOR) 40 MG TABLET    TAKE 1 TABLET NIGHTLY            is other components within normal limits   BASIC METABOLIC PANEL W/ REFLEX TO MG FOR LOW K - Abnormal; Notable for the following components:    Potassium reflex Magnesium 3.4 (*)     Glucose 136 (*)     All other components within normal limits   GLOMERULAR FILTRATION RATE, ESTIMATED - Abnormal; Notable for the following components:    Est, Glom Filt Rate 60 (*)     All other components within normal limits   TROPONIN   BRAIN NATRIURETIC PEPTIDE   ANION GAP   MAGNESIUM   OSMOLALITY   SCAN OF BLOOD SMEAR       EMERGENCY DEPARTMENT COURSE:   Vitals:    Vitals:    02/25/20 0959 02/25/20 1042 02/25/20 1143   BP: 138/65 129/61 (!) 144/60   Pulse: 80 74 69   Resp: 20 16 16   Temp: 98 °F (36.7 °C)     TempSrc: Oral     SpO2: 98% 95% 97%   Weight: 180 lb (81.6 kg)     Height: 5' 6\" (1.676 m)       1035 Labs ordered. CT abdomen pelvis and XR right ribs/chest ordered. Norco given    1223 Lidocaine patch given    Presenting with complaint of right lower rib cage pain, no rash noted. She denies having had any trauma to her chest wall, said that she woke up with the pain about 3 days ago, thinks that she slept on her 's elbow throughout the night. Patient has pain with palpation of the site, truncal rotation and deep breathing. Low suspicion for PE. Patient had some discomfort of the abdomen, involving the right upper quadrant, CT of the abdomen nonacute, she status post cholecystectomy, of which she was not sure at the time of initial questioning. She had some nonspecific stranding along the celiac axis. She has infrarenal aneurysm measuring 3 cm. She has an appointment with cardiothoracic surgery. Discussion with the neurologist to consult concerning the stranding, I thinks that this could be due to common bile duct dilation otherwise no specific etiology could be gleaned from the study. Patient's pain better with Norco, discharged home with lidocaine patch.     CRITICAL CARE:   None      CONSULTS:  None

## 2020-03-04 ENCOUNTER — HOSPITAL ENCOUNTER (OUTPATIENT)
Dept: GENERAL RADIOLOGY | Age: 80
Discharge: HOME | End: 2020-03-04
Payer: MEDICARE

## 2020-03-04 ENCOUNTER — HOSPITAL ENCOUNTER (OUTPATIENT)
Age: 80
Discharge: HOME | End: 2020-03-04
Payer: MEDICARE

## 2020-03-04 ENCOUNTER — OFFICE VISIT (OUTPATIENT)
Dept: FAMILY MEDICINE CLINIC | Age: 80
End: 2020-03-04
Payer: MEDICARE

## 2020-03-04 ENCOUNTER — NURSE ONLY (OUTPATIENT)
Dept: LAB | Age: 80
End: 2020-03-04

## 2020-03-04 VITALS
BODY MASS INDEX: 29.41 KG/M2 | TEMPERATURE: 98.1 F | DIASTOLIC BLOOD PRESSURE: 72 MMHG | RESPIRATION RATE: 12 BRPM | WEIGHT: 183 LBS | HEIGHT: 66 IN | HEART RATE: 83 BPM | SYSTOLIC BLOOD PRESSURE: 138 MMHG

## 2020-03-04 LAB
ALBUMIN SERPL-MCNC: 4 G/DL (ref 3.5–5.1)
ALP BLD-CCNC: 98 U/L (ref 38–126)
ALT SERPL-CCNC: 15 U/L (ref 11–66)
ANION GAP SERPL CALCULATED.3IONS-SCNC: 15 MEQ/L (ref 8–16)
AST SERPL-CCNC: 47 U/L (ref 5–40)
BILIRUB SERPL-MCNC: 0.6 MG/DL (ref 0.3–1.2)
BUN BLDV-MCNC: 12 MG/DL (ref 7–22)
CALCIUM SERPL-MCNC: 9.8 MG/DL (ref 8.5–10.5)
CHLORIDE BLD-SCNC: 98 MEQ/L (ref 98–111)
CO2: 25 MEQ/L (ref 23–33)
CREAT SERPL-MCNC: 0.7 MG/DL (ref 0.4–1.2)
GFR SERPL CREATININE-BSD FRML MDRD: 81 ML/MIN/1.73M2
GLUCOSE BLD-MCNC: 148 MG/DL (ref 70–108)
POTASSIUM SERPL-SCNC: 3.3 MEQ/L (ref 3.5–5.2)
SCAN OF BLOOD SMEAR: NORMAL
SODIUM BLD-SCNC: 138 MEQ/L (ref 135–145)
TOTAL PROTEIN: 7.4 G/DL (ref 6.1–8)
TSH SERPL DL<=0.05 MIU/L-ACNC: 2.78 UIU/ML (ref 0.4–4.2)

## 2020-03-04 PROCEDURE — 73070 X-RAY EXAM OF ELBOW: CPT

## 2020-03-04 PROCEDURE — 99214 OFFICE O/P EST MOD 30 MIN: CPT | Performed by: FAMILY MEDICINE

## 2020-03-04 RX ORDER — FAMOTIDINE 40 MG/1
TABLET, FILM COATED ORAL
Qty: 90 TABLET | Refills: 1 | Status: SHIPPED | OUTPATIENT
Start: 2020-03-04 | End: 2020-08-31

## 2020-03-04 RX ORDER — METRONIDAZOLE 500 MG/1
500 TABLET ORAL 2 TIMES DAILY
Qty: 14 TABLET | Refills: 0 | Status: SHIPPED | OUTPATIENT
Start: 2020-03-04 | End: 2020-03-11

## 2020-03-04 RX ORDER — FLUCONAZOLE 150 MG/1
150 TABLET ORAL ONCE
Qty: 1 TABLET | Refills: 0 | Status: SHIPPED | OUTPATIENT
Start: 2020-03-04 | End: 2020-03-04

## 2020-03-04 ASSESSMENT — PATIENT HEALTH QUESTIONNAIRE - PHQ9
2. FEELING DOWN, DEPRESSED OR HOPELESS: 0
SUM OF ALL RESPONSES TO PHQ QUESTIONS 1-9: 0
SUM OF ALL RESPONSES TO PHQ QUESTIONS 1-9: 0
SUM OF ALL RESPONSES TO PHQ9 QUESTIONS 1 & 2: 0
1. LITTLE INTEREST OR PLEASURE IN DOING THINGS: 0

## 2020-03-04 NOTE — PROGRESS NOTES
Resp 12   Ht 5' 5.5\" (1.664 m)   Wt 183 lb (83 kg)   LMP  (LMP Unknown)   BMI 29.99 kg/m²   General appearance: alert, well appearing, and in no distress. CVS exam: normal rate, regular rhythm, normal S1, S2, no murmurs, rubs, clicks or gallops. Chest: clear to auscultation, no wheezes, rales or rhonchi, symmetric air entry. Abdominal exam: soft, nontender, nondistended, no masses or organomegaly.  Female Exam:  Thick, whitish dc noted on the vulva and vagina  Psych:  Affect appropriate. Thought process is normal without evidence of depression or psychosis. Good insight and appropriae interaction. Cognition and memory appear to be impaired. She had difficulty answering HPI questions and frequently changes the subject. Physical Exam  Musculoskeletal:      Left elbow: She exhibits normal range of motion, no swelling, no effusion and no deformity. ASSESSMENT & PLAN  Winifred Giron was seen today for vaginal discharge and discuss medications. Diagnoses and all orders for this visit:    Left elbow pain  -     XR ELBOW LEFT (2 VIEWS); Future    Gastroesophageal reflux disease without esophagitis  -     famotidine (PEPCID) 40 MG tablet; TAKE 1 TABLET BY MOUTH EVERY DAY AT NIGHT    Chronic superficial gastritis without bleeding  -     famotidine (PEPCID) 40 MG tablet; TAKE 1 TABLET BY MOUTH EVERY DAY AT NIGHT    Vaginal discharge  -     metroNIDAZOLE (FLAGYL) 500 MG tablet; Take 1 tablet by mouth 2 times daily for 7 days  -     fluconazole (DIFLUCAN) 150 MG tablet; Take 1 tablet by mouth once for 1 dose    Impaired memory  -     TSH with Reflex; Future  -     CBC Auto Differential; Future  -     Comprehensive Metabolic Panel; Future  -     Vitamin B12; Future  -     MRI BRAIN WO CONTRAST; Future    Cognitive impairment  -     TSH with Reflex; Future  -     CBC Auto Differential; Future  -     Comprehensive Metabolic Panel;  Future  -     Vitamin B12; Future  -     MRI BRAIN WO CONTRAST;

## 2020-03-05 ENCOUNTER — TELEPHONE (OUTPATIENT)
Dept: FAMILY MEDICINE CLINIC | Age: 80
End: 2020-03-05

## 2020-03-05 LAB
ANISOCYTOSIS: PRESENT
BASOPHILIA: ABNORMAL
BASOPHILS # BLD: 1.2 %
BASOPHILS ABSOLUTE: 0.1 THOU/MM3 (ref 0–0.1)
ELLIPTOCYTES: ABNORMAL
EOSINOPHIL # BLD: 5.9 %
EOSINOPHILS ABSOLUTE: 0.5 THOU/MM3 (ref 0–0.4)
ERYTHROCYTE [DISTWIDTH] IN BLOOD BY AUTOMATED COUNT: 24.7 % (ref 11.5–14.5)
ERYTHROCYTE [DISTWIDTH] IN BLOOD BY AUTOMATED COUNT: 87.1 FL (ref 35–45)
HCT VFR BLD CALC: 29.4 % (ref 37–47)
HEMOGLOBIN: 8.6 GM/DL (ref 12–16)
IMMATURE GRANS (ABS): 0.03 THOU/MM3 (ref 0–0.07)
IMMATURE GRANULOCYTES: 0.4 %
LYMPHOCYTES # BLD: 24.1 %
LYMPHOCYTES ABSOLUTE: 2 THOU/MM3 (ref 1–4.8)
MCH RBC QN AUTO: 28.5 PG (ref 26–33)
MCHC RBC AUTO-ENTMCNC: 29.3 GM/DL (ref 32.2–35.5)
MCV RBC AUTO: 97.4 FL (ref 81–99)
MONOCYTES # BLD: 14.6 %
MONOCYTES ABSOLUTE: 1.2 THOU/MM3 (ref 0.4–1.3)
NUCLEATED RED BLOOD CELLS: 0 /100 WBC
PATHOLOGIST REVIEW: ABNORMAL
PLATELET # BLD: 265 THOU/MM3 (ref 130–400)
PLATELET ESTIMATE: ADEQUATE
PMV BLD AUTO: 10.8 FL (ref 9.4–12.4)
POIKILOCYTES: ABNORMAL
RBC # BLD: 3.02 MILL/MM3 (ref 4.2–5.4)
SCHISTOCYTES: ABNORMAL
SEG NEUTROPHILS: 53.8 %
SEGMENTED NEUTROPHILS ABSOLUTE COUNT: 4.4 THOU/MM3 (ref 1.8–7.7)
VITAMIN B-12: > 2000 PG/ML (ref 211–911)
WBC # BLD: 8.1 THOU/MM3 (ref 4.8–10.8)

## 2020-03-05 RX ORDER — POTASSIUM CHLORIDE 20 MEQ/1
20 TABLET, EXTENDED RELEASE ORAL DAILY
Qty: 90 TABLET | Refills: 1 | Status: SHIPPED | OUTPATIENT
Start: 2020-03-05 | End: 2020-08-10

## 2020-03-05 NOTE — TELEPHONE ENCOUNTER
----- Message from Gopal Reza DO sent at 3/5/2020  8:32 AM EST -----  Labs look ok other than her potassium is a little low. I would like to start her on a potassium supplement and will send this to the pharmacy. We will call when we have the results of the MRI.     The XRay of her elbow was also normal.

## 2020-03-07 LAB
AEROBIC CULTURE: NORMAL
GRAM STAIN RESULT: NORMAL

## 2020-03-10 ENCOUNTER — APPOINTMENT (OUTPATIENT)
Dept: GENERAL RADIOLOGY | Age: 80
End: 2020-03-10
Payer: MEDICARE

## 2020-03-10 ENCOUNTER — HOSPITAL ENCOUNTER (EMERGENCY)
Age: 80
Discharge: HOME | End: 2020-03-10
Payer: MEDICARE

## 2020-03-10 ENCOUNTER — APPOINTMENT (OUTPATIENT)
Dept: CT IMAGING | Age: 80
End: 2020-03-10
Payer: MEDICARE

## 2020-03-10 VITALS
WEIGHT: 180 LBS | SYSTOLIC BLOOD PRESSURE: 159 MMHG | BODY MASS INDEX: 28.93 KG/M2 | DIASTOLIC BLOOD PRESSURE: 88 MMHG | HEART RATE: 73 BPM | OXYGEN SATURATION: 95 % | TEMPERATURE: 97.9 F | RESPIRATION RATE: 20 BRPM | HEIGHT: 66 IN

## 2020-03-10 LAB
ANION GAP SERPL CALCULATED.3IONS-SCNC: 14 MEQ/L (ref 8–16)
ANISOCYTOSIS: PRESENT
BASOPHILIA: ABNORMAL
BASOPHILS # BLD: 0.6 %
BASOPHILS ABSOLUTE: 0.1 THOU/MM3 (ref 0–0.1)
BUN BLDV-MCNC: 10 MG/DL (ref 7–22)
CALCIUM SERPL-MCNC: 9.7 MG/DL (ref 8.5–10.5)
CHLORIDE BLD-SCNC: 102 MEQ/L (ref 98–111)
CO2: 24 MEQ/L (ref 23–33)
CREAT SERPL-MCNC: 0.9 MG/DL (ref 0.4–1.2)
EKG ATRIAL RATE: 72 BPM
EKG P AXIS: 90 DEGREES
EKG P-R INTERVAL: 184 MS
EKG Q-T INTERVAL: 418 MS
EKG QRS DURATION: 96 MS
EKG QTC CALCULATION (BAZETT): 457 MS
EKG R AXIS: 22 DEGREES
EKG T AXIS: 68 DEGREES
EKG VENTRICULAR RATE: 72 BPM
ELLIPTOCYTES: ABNORMAL
EOSINOPHIL # BLD: 2.2 %
EOSINOPHILS ABSOLUTE: 0.3 THOU/MM3 (ref 0–0.4)
ERYTHROCYTE [DISTWIDTH] IN BLOOD BY AUTOMATED COUNT: 24.5 % (ref 11.5–14.5)
ERYTHROCYTE [DISTWIDTH] IN BLOOD BY AUTOMATED COUNT: 86.6 FL (ref 35–45)
GFR SERPL CREATININE-BSD FRML MDRD: 60 ML/MIN/1.73M2
GLUCOSE BLD-MCNC: 192 MG/DL (ref 70–108)
HCT VFR BLD CALC: 29 % (ref 37–47)
HEMOGLOBIN: 8.5 GM/DL (ref 12–16)
IMMATURE GRANS (ABS): 0.16 THOU/MM3 (ref 0–0.07)
IMMATURE GRANULOCYTES: 1.2 %
LYMPHOCYTES # BLD: 7 %
LYMPHOCYTES ABSOLUTE: 0.9 THOU/MM3 (ref 1–4.8)
MCH RBC QN AUTO: 28.4 PG (ref 26–33)
MCHC RBC AUTO-ENTMCNC: 29.3 GM/DL (ref 32.2–35.5)
MCV RBC AUTO: 97 FL (ref 81–99)
MONOCYTES # BLD: 7.7 %
MONOCYTES ABSOLUTE: 1 THOU/MM3 (ref 0.4–1.3)
NUCLEATED RED BLOOD CELLS: 0 /100 WBC
OSMOLALITY CALCULATION: 283.6 MOSMOL/KG (ref 275–300)
PLATELET # BLD: 290 THOU/MM3 (ref 130–400)
PLATELET ESTIMATE: ADEQUATE
PMV BLD AUTO: 10.5 FL (ref 9.4–12.4)
POIKILOCYTES: ABNORMAL
POTASSIUM SERPL-SCNC: 3.4 MEQ/L (ref 3.5–5.2)
RBC # BLD: 2.99 MILL/MM3 (ref 4.2–5.4)
REASON FOR REJECTION: NORMAL
REJECTED TEST: NORMAL
SCAN OF BLOOD SMEAR: NORMAL
SCHISTOCYTES: ABNORMAL
SEG NEUTROPHILS: 81.3 %
SEGMENTED NEUTROPHILS ABSOLUTE COUNT: 10.7 THOU/MM3 (ref 1.8–7.7)
SODIUM BLD-SCNC: 140 MEQ/L (ref 135–145)
TARGET CELLS: ABNORMAL
TROPONIN T: < 0.01 NG/ML
WBC # BLD: 13.1 THOU/MM3 (ref 4.8–10.8)

## 2020-03-10 PROCEDURE — 80048 BASIC METABOLIC PNL TOTAL CA: CPT

## 2020-03-10 PROCEDURE — 72170 X-RAY EXAM OF PELVIS: CPT

## 2020-03-10 PROCEDURE — 71045 X-RAY EXAM CHEST 1 VIEW: CPT

## 2020-03-10 PROCEDURE — 93010 ELECTROCARDIOGRAM REPORT: CPT | Performed by: NUCLEAR MEDICINE

## 2020-03-10 PROCEDURE — 73090 X-RAY EXAM OF FOREARM: CPT

## 2020-03-10 PROCEDURE — 70450 CT HEAD/BRAIN W/O DYE: CPT

## 2020-03-10 PROCEDURE — 36415 COLL VENOUS BLD VENIPUNCTURE: CPT

## 2020-03-10 PROCEDURE — 73130 X-RAY EXAM OF HAND: CPT

## 2020-03-10 PROCEDURE — 99283 EMERGENCY DEPT VISIT LOW MDM: CPT

## 2020-03-10 PROCEDURE — 93005 ELECTROCARDIOGRAM TRACING: CPT | Performed by: NURSE PRACTITIONER

## 2020-03-10 PROCEDURE — 85025 COMPLETE CBC W/AUTO DIFF WBC: CPT

## 2020-03-10 PROCEDURE — 72100 X-RAY EXAM L-S SPINE 2/3 VWS: CPT

## 2020-03-10 PROCEDURE — 99284 EMERGENCY DEPT VISIT MOD MDM: CPT

## 2020-03-10 PROCEDURE — 84484 ASSAY OF TROPONIN QUANT: CPT

## 2020-03-10 PROCEDURE — 72072 X-RAY EXAM THORAC SPINE 3VWS: CPT

## 2020-03-10 PROCEDURE — 72125 CT NECK SPINE W/O DYE: CPT

## 2020-03-10 ASSESSMENT — PAIN DESCRIPTION - PAIN TYPE: TYPE: ACUTE PAIN

## 2020-03-10 ASSESSMENT — PAIN DESCRIPTION - DESCRIPTORS: DESCRIPTORS: ACHING

## 2020-03-10 ASSESSMENT — PAIN DESCRIPTION - FREQUENCY: FREQUENCY: CONTINUOUS

## 2020-03-10 ASSESSMENT — PAIN SCALES - GENERAL: PAINLEVEL_OUTOF10: 9

## 2020-03-10 ASSESSMENT — PAIN DESCRIPTION - LOCATION: LOCATION: BACK

## 2020-03-10 NOTE — ED NOTES
Bed: 004A  Expected date: 3/10/20  Expected time:   Means of arrival: [facility]  Comments:      Jose Luke, BRIGETTE  03/10/20 4328

## 2020-03-10 NOTE — ED NOTES
Patient to via EMS for fall. Patient reports she lost her balance while going to the  [address] [place] [address]. Patient states she fell backwards hitting her head and back on the shower. Patient denies loss of consciousness. Patient states she is on plavix.  Patient has hematoma to right wrist. Patient c/o neck and back pain      Taryn Gilbert, RN  03/10/20 0235

## 2020-03-13 ENCOUNTER — HOSPITAL ENCOUNTER (OUTPATIENT)
Dept: MRI IMAGING | Age: 80
Discharge: HOME | End: 2020-03-13
Payer: MEDICARE

## 2020-03-13 PROCEDURE — 70551 MRI BRAIN STEM W/O DYE: CPT

## 2020-03-15 ASSESSMENT — ENCOUNTER SYMPTOMS
RHINORRHEA: 0
ABDOMINAL PAIN: 0
CHEST TIGHTNESS: 0
COUGH: 0
BACK PAIN: 0

## 2020-03-15 NOTE — ED PROVIDER NOTES
Normal range of motion. Muscular tenderness (c6-c7) present. Trachea: No tracheal deviation. Cardiovascular:      Rate and Rhythm: Normal rate and regular rhythm. Pulses: Normal pulses. Heart sounds: Normal heart sounds. No gallop. Comments: Normal capillary refill  Pulmonary:      Effort: Pulmonary effort is normal. No respiratory distress. Breath sounds: Normal breath sounds. No stridor. Abdominal:      General: Bowel sounds are normal.      Palpations: Abdomen is soft. Musculoskeletal: Normal range of motion. General: Swelling (right wrist/forearm--obvious hematoma) and tenderness present. No deformity. Skin:     General: Skin is warm and dry. Coloration: Skin is not pale. Findings: No erythema or rash. Neurological:      Mental Status: She is alert and oriented to person, place, and time. Cranial Nerves: No cranial nerve deficit. Psychiatric:         Behavior: Behavior normal.           DIFFERENTIAL DIAGNOSIS:   Including but not limited to sprain, strain, contusion, hematoma, fracture, ICH    DIAGNOSTIC RESULTS     EKG: AllEKG's are interpreted by the Emergency Department Physician who either signs or Co-signs this chart in the absence of a cardiologist.  none    RADIOLOGY: non-plain film images(s) such as CT, Ultrasound and MRI are read by the radiologist.  Plain radiographic images are visualized and preliminarily interpreted by the emergencyphysician unless otherwise stated below. XR CHEST PORTABLE   Final Result   . 1. Minimal left infrahilar atelectasis. 2. Stable postsurgical changes. **This report has been created using voice recognition software. It may contain minor errors which are inherent in voice recognition technology. **      Final report electronically signed by Dr. Pavel James on 3/10/2020 3:27 AM      CT HEAD WO CONTRAST   Final Result   1.. Encephalomalacia of the right cerebellum redemonstrated correlated with previous ischemic changes. 2. No acute pathology present. **This report has been created using voice recognition software. It may contain minor errors which are inherent in voice recognition technology. **      Final report electronically signed by Dr. Corrine Zimmer on 3/10/2020 3:03 AM      CT CERVICAL SPINE WO CONTRAST   Final Result   1.. Nondisplaced fracture of the right C7 superior articulating facet and lateral lamina. 2. Mild anterior C7 vertebral height loss the chronicity of which is indeterminate. 3. Degenerative changes of the cervical spine described above. **This report has been created using voice recognition software. It may contain minor errors which are inherent in voice recognition technology. **      Final report electronically signed by Dr. Corrine Zimmer on 3/10/2020 3:17 AM      XR PELVIS (1-2 VIEWS)   Final Result       1. Negative exam for acute appearing pelvic fracture. 2. Degenerative disc space changes at L5-S1. **This report has been created using voice recognition software. It may contain minor errors which are inherent in voice recognition technology. **      Final report electronically signed by Dr. Corrine Zimmer on 3/10/2020 3:28 AM      XR LUMBAR SPINE (2-3 VIEWS)   Final Result   1.. Degenerative changes of the lumbar spine without acute appearing fracture. 2. Atherosclerotic vascular disease of the abdominal aorta. 3. Degenerative changes of the sacroiliac joint spaces. **This report has been created using voice recognition software. It may contain minor errors which are inherent in voice recognition technology. **      Final report electronically signed by Dr. Corrine Zimmer on 3/10/2020 3:20 AM      XR THORACIC SPINE (3 VIEWS)   Final Result   1. Osteopenic and degenerative changes of the skeleton. **This report has been created using voice recognition software.  It may contain minor errors which are inherent in voice recognition lb (81.6 kg)    Height: 5' 6\" (1.676 m)          Pertinent Labs & Imaging studies reviewed. (See chart for details)    ED Course as of Mar 15 0037   Tue Mar 10, 2020   0340 D/W Dr. Andres Bassett, Trauma consult. Defer to spine. If they want admitted, will admit. D/W RODOLFO Cheung for [facility]. She will review images and call back. [KJ]   [address] Schaumann, PA reviewed imaging. OK to dc to follow up with Dr. Bhanu Love. UPdated family. They are agreeable. She has OIO appt today at [facility]      ED Course User Index  [KJ] Sivakumar Luciano, APRN - CNP         Controlled Substances Monitoring:     RX Monitoring 5/9/2017   Attestation The Prescription Monitoring Report for this patient was reviewed today. Periodic Controlled Substance Monitoring No signs of potential drug abuse or diversion identified. The patient was seen and evaluated in the ER for a fall. No trauma alert was called because there was no evidence of TBI/Intracranial injury. Work up was completed. She has a large hematoma on the right forearm/wrist.  She also has a C7 fracture. This is a stable fracture. I d/w Dr. Josse REYNOLDS. She can follow up in the office. She has an appointment there in about 6 hours already for a shoulder injury. Her wrist is wrapped and placed in an immobilizer. She is encouraged to keep that OIO appointment for further evaluation. Strict return precautions and follow up instructions were discussed with the patient with which the patient agrees     Physical assessment findings, diagnostic testing(s) if applicable, and vital signs reviewed with patient/patient representative. Questions answered. Medications asdirected, including OTC medications for supportive care. Education provided on medications. Differential diagnosis(s) discussed with patient/patient representative. Home care/self care instructions reviewed withpatient/patient representative.   Patient is to follow-up

## 2020-03-16 ENCOUNTER — TELEPHONE (OUTPATIENT)
Dept: FAMILY MEDICINE CLINIC | Age: 80
End: 2020-03-16

## 2020-03-16 NOTE — TELEPHONE ENCOUNTER
----- Message from Jim Cook DO sent at 3/14/2020  7:50 AM EDT -----  The MRI did not display any new findings in the brain. I will discuss further with them at her upcoming visit.

## 2020-03-17 ENCOUNTER — TELEPHONE (OUTPATIENT)
Dept: FAMILY MEDICINE CLINIC | Age: 80
End: 2020-03-17

## 2020-03-17 RX ORDER — TRAMADOL HYDROCHLORIDE 50 MG/1
50 TABLET ORAL EVERY 6 HOURS PRN
Qty: 28 TABLET | Refills: 0 | Status: SHIPPED | OUTPATIENT
Start: 2020-03-17 | End: 2020-03-24

## 2020-03-17 NOTE — TELEPHONE ENCOUNTER
Pt states she will reschedule 3 weeks out but she really wants to discuss a few things with dr Adonica Lesches about her fall on 3/1/9/20 and how she cannot sit down without having a lot of pain in her tailbone. Pt has been rescheduled but would like dr Adonica Lesches to call her and discuss these issues with her.         Future Appointments   Date Time Provider Ashley Dorsey   4/9/2020  1:45 PM [facility]   5/25/2020  9:00 AM STR CT IMAGING RM1  [facility]   5/25/2020 10:00 AM STR VASCULAR LAB ROOM 2 [facility] V[facility]   6/1/2020 10:30 AM Kasia Short PA-C [facility]   6/8/2020 10:15 AM Vilma Mary PA-C [facility]   6/18/2020 10:00 AM Kulwinder Tian DO [address] [place] [address]

## 2020-04-02 ENCOUNTER — TELEPHONE (OUTPATIENT)
Dept: FAMILY MEDICINE CLINIC | Age: 80
End: 2020-04-02

## 2020-05-11 RX ORDER — SIMVASTATIN 40 MG
TABLET ORAL
Qty: 90 TABLET | Refills: 3 | Status: SHIPPED | OUTPATIENT
Start: 2020-05-11 | End: 2020-07-06 | Stop reason: SDUPTHER

## 2020-05-28 ENCOUNTER — HOSPITAL ENCOUNTER (OUTPATIENT)
Dept: CT IMAGING | Age: 80
Discharge: HOME | End: 2020-05-28
Payer: MEDICARE

## 2020-05-28 ENCOUNTER — HOSPITAL ENCOUNTER (OUTPATIENT)
Dept: INTERVENTIONAL RADIOLOGY/VASCULAR | Age: 80
Discharge: HOME | End: 2020-05-28
Payer: MEDICARE

## 2020-05-28 LAB — POC CREATININE WHOLE BLOOD: 0.9 MG/DL (ref 0.5–1.2)

## 2020-05-28 PROCEDURE — 75635 CT ANGIO ABDOMINAL ARTERIES: CPT

## 2020-05-28 PROCEDURE — 93931 UPPER EXTREMITY STUDY: CPT

## 2020-05-28 PROCEDURE — 82565 ASSAY OF CREATININE: CPT

## 2020-05-28 PROCEDURE — 6360000004 HC RX CONTRAST MEDICATION: Performed by: PHYSICIAN ASSISTANT

## 2020-05-28 RX ADMIN — IOPAMIDOL 100 ML: 755 INJECTION, SOLUTION INTRAVENOUS at 09:13

## 2020-06-01 ENCOUNTER — OFFICE VISIT (OUTPATIENT)
Dept: CARDIOTHORACIC SURGERY | Age: 80
End: 2020-06-01
Payer: MEDICARE

## 2020-06-01 VITALS
TEMPERATURE: 98.2 F | HEIGHT: 66 IN | HEART RATE: 76 BPM | WEIGHT: 180.3 LBS | DIASTOLIC BLOOD PRESSURE: 71 MMHG | BODY MASS INDEX: 28.98 KG/M2 | SYSTOLIC BLOOD PRESSURE: 113 MMHG

## 2020-06-01 PROCEDURE — 99213 OFFICE O/P EST LOW 20 MIN: CPT | Performed by: PHYSICIAN ASSISTANT

## 2020-06-10 ENCOUNTER — HOSPITAL ENCOUNTER (OUTPATIENT)
Dept: CT IMAGING | Age: 80
Discharge: HOME | End: 2020-06-10
Payer: MEDICARE

## 2020-06-10 ENCOUNTER — HOSPITAL ENCOUNTER (OUTPATIENT)
Age: 80
Discharge: HOME | End: 2020-06-10
Payer: MEDICARE

## 2020-06-10 PROCEDURE — 70498 CT ANGIOGRAPHY NECK: CPT

## 2020-06-10 PROCEDURE — 6360000004 HC RX CONTRAST MEDICATION: Performed by: PHYSICIAN ASSISTANT

## 2020-06-10 RX ADMIN — IOPAMIDOL 85 ML: 755 INJECTION, SOLUTION INTRAVENOUS at 09:19

## 2020-06-18 ENCOUNTER — OFFICE VISIT (OUTPATIENT)
Dept: FAMILY MEDICINE CLINIC | Age: 80
End: 2020-06-18
Payer: MEDICARE

## 2020-06-18 VITALS
BODY MASS INDEX: 31.24 KG/M2 | SYSTOLIC BLOOD PRESSURE: 122 MMHG | HEART RATE: 75 BPM | HEIGHT: 64 IN | RESPIRATION RATE: 16 BRPM | DIASTOLIC BLOOD PRESSURE: 60 MMHG | WEIGHT: 183 LBS | TEMPERATURE: 98.5 F | OXYGEN SATURATION: 98 %

## 2020-06-18 PROCEDURE — 99214 OFFICE O/P EST MOD 30 MIN: CPT | Performed by: FAMILY MEDICINE

## 2020-06-18 RX ORDER — DONEPEZIL HYDROCHLORIDE 10 MG/1
10 TABLET, FILM COATED ORAL NIGHTLY
Qty: 30 TABLET | Refills: 5 | Status: SHIPPED | OUTPATIENT
Start: 2020-06-18 | End: 2020-10-12

## 2020-06-18 NOTE — PROGRESS NOTES
Hib vaccine  Aged Out    Meningococcal (ACWY) vaccine  Aged Out       Past Medical History:   Diagnosis Date    Anemia     Aneurysm of abdominal aorta (HCC)     Arthritis     Blood circulation, collateral     Bursitis, trochanteric     CAD (coronary artery disease) 2015    Cerebral artery occlusion with cerebral infarction (HCC)     Chronic back pain     Depression     Diabetes mellitus (HCC)     diet controlled    GERD (gastroesophageal reflux disease)     Headache(784.0)     History of basal cell cancer     Nose    Hyperlipidemia     Hypertension     Irritable bowel     Movement disorder     PVD (peripheral vascular disease) ([address]     S/P CABG (coronary artery bypass graft)     Sciatica        Past Surgical History:   Procedure Laterality Date    ABDOMEN SURGERY      complete hysterectomy, gallbladder    APPENDECTOMY      CARD[facility]AC [facility]    CHOLECYSTECTOMY  yrs ago    COLONOSCOPY      CORONARY ARTERY BYPASS GRAFT  2-18-16    x3     ENDOSCOPY, COLON, DIAGNOSTIC      EYE SURGERY      cataracts, glaucoma    HERNIA REPAIR      Hiatal Hernia    HIATAL HERNIA REPAIR      HYSTERECTOMY      LARYNGOSCOPY  10/29/2012 Dr Kayode Atkins with Bx, Lingual Tonsillectomy, Hypopharyngoscopy    MA VEIN BYPASS GRAFT,CAROT-SUBCL/ SUBCL-CAROTD Left 2018    LEFT CAROTID SUBCLAVIAN BYPASS performed by Debra Warren MD at [address] [place] [address]      as a teen    UPPER GASTROINTESTINAL ENDOSCOPY         Social History     Tobacco Use    Smoking status: Former Smoker     Packs/day: 0.50     Years: 25.00     Pack years: 12.50     Types: Cigarettes     Last attempt to quit: 11/3/2000     Years since quittin.6    Smokeless tobacco: Never Used    Tobacco comment: quit in    Substance Use Topics    Alcohol use: No    Drug use: No       Family History   Problem Relation Age of Onset    Early Death Mother         not nightly    Pure hypercholesterolemia    Gastroesophageal reflux disease without esophagitis    Benign essential HTN    -Her history and recent testing indicate that she likely has Alzheimer's Dementia. I had an extensive discussion today with patient regarding diagnosis, tx and prognosis. She would like to try aricept for treatment. Will see back in 1 month.  -Other chronic issues are stable, continue current medications  -Advised to call if any issues      Return in about 4 weeks (around 7/16/2020). Controlled Substance Monitoring:    Acute and Chronic Pain Monitoring:   RX Monitoring 5/9/2017   Attestation The Prescription Monitoring Report for this patient was reviewed today. Periodic Controlled Substance Monitoring No signs of potential drug abuse or diversion identified. Giovanni Lu received counseling on the following healthy behaviors: medication adherence  Reviewed prior labs and health maintenance. Continue current medications, diet and exercise. Discussed use, benefit, and side effects of prescribed medications. Barriers to medication compliance addressed. Patient given educational materials - see patient instructions. All patient questions answered. Patient voiced understanding.

## 2020-06-22 ENCOUNTER — TELEPHONE (OUTPATIENT)
Dept: CARDIOTHORACIC SURGERY | Age: 80
End: 2020-06-22

## 2020-06-22 RX ORDER — FERROUS SULFATE 325(65) MG
TABLET ORAL
Qty: 180 TABLET | Refills: 1 | Status: SHIPPED | OUTPATIENT
Start: 2020-06-22 | End: 2020-08-10 | Stop reason: SDUPTHER

## 2020-06-23 ENCOUNTER — TELEPHONE (OUTPATIENT)
Dept: CARDIOTHORACIC SURGERY | Age: 80
End: 2020-06-23

## 2020-06-23 ENCOUNTER — OFFICE VISIT (OUTPATIENT)
Dept: CARDIOLOGY CLINIC | Age: 80
End: 2020-06-23
Payer: MEDICARE

## 2020-06-23 VITALS
DIASTOLIC BLOOD PRESSURE: 58 MMHG | HEIGHT: 64 IN | BODY MASS INDEX: 30.94 KG/M2 | SYSTOLIC BLOOD PRESSURE: 132 MMHG | WEIGHT: 181.25 LBS | HEART RATE: 64 BPM

## 2020-06-23 PROCEDURE — 99213 OFFICE O/P EST LOW 20 MIN: CPT | Performed by: NURSE PRACTITIONER

## 2020-06-23 NOTE — TELEPHONE ENCOUNTER
Contacted pt about CTA neck results. Case was discussed with Dr. Estela Celaya and Dr. Catrachita Goel. Dr. Catrachita Goel recommended referral to [facility] in [place] for possible stenting of carotid to subclavian bypass.

## 2020-06-23 NOTE — PROGRESS NOTES
[facility] PROFESSIONAL SERVICES  [facility]   [address]   [address] [address]   Dept: [phone]   Dept Fax: [phone]   Loc: [phone]      Chief Complaint   Patient presents with   Dano Michaels    Coronary Artery Disease     6 month f/u visit in 77 y/o female with history of subclavian steal syndrome and AAA s/p carotid subclavian bypass 4/11/18, CAD with CABG 2016, HTN, HLP, DM type 2, PVD, GERD. Currently having more LUE symptoms such as pain and fatigue as prior to carotid subclavian bypass. Had recent CTA of neck with restenosis and CV surgery following and referred to [facility] for further evaluation/management. No recent sob, chest pain, palpitations, dizziness, lightheadedness, or syncope.    Cardiologist: Followed with [facility] in past      General:   No fever, no chills, + intermittent fatigue or weight loss  Pulmonary:    No dyspnea, no wheezing  Cardiac:    Denies recent chest pain   GI:     No nausea or vomiting, no abdominal pain  Neuro:    No dizziness or light headedness  Musculoskeletal:  No recent active issues  Extremities:   No edema, good peripheral pulses, LUE pain       Past Medical History:   Diagnosis Date    Anemia     Aneurysm of abdominal aorta (HCC)     Arthritis     Blood circulation, collateral     Bursitis, trochanteric     CAD (coronary artery disease) 2/2015    Cerebral artery occlusion with cerebral infarction ([facility]     Chronic back pain     Depression     Diabetes mellitus (HCC)     diet controlled    GERD (gastroesophageal reflux disease)     Headache(784.0)     History of basal cell cancer     Nose    Hyperlipidemia     Hypertension     Irritable bowel     Movement disorder     PVD (peripheral vascular disease) ([facility]     S/P CABG (coronary artery bypass graft)     Sciatica        Allergies   Allergen Reactions    Ciprofloxacin      Had chest cold and just got worse and worse on the cipro    Darvon Male   Lifestyle    Physical activity     Days per week: None     Minutes per session: None    Stress: None   Relationships    Social connections     Talks on phone: None     Gets together: None     Attends Jew service: None     Active member of club or organization: None     Attends meetings of clubs or organizations: None     Relationship status: None    Intimate partner violence     Fear of current or ex partner: None     Emotionally abused: None     Physically abused: None     Forced sexual activity: None   Other Topics Concern    None   Social History Narrative    None       Family History   Problem Relation Age of Onset    Early Death Mother         not sure of cause    Heart Disease Father 52        MI    Cancer Brother         pancreatic    Cancer Son         larynx    Diabetes Neg Hx     High Blood Pressure Neg Hx     Stroke Neg Hx     Colon Cancer Neg Hx     Colon Polyps Neg Hx        Blood pressure (!) 132/58, pulse 64, height 5' 4\" (1.626 m), weight 181 lb 4 oz (82.2 kg), not currently breastfeeding. General:   Well developed, well nourished  Lungs:   Clear to auscultation  Heart:    Normal S1 S2, No murmur, rubs, or gallops  Abdomen:   Soft, non tender, no organomegalies, positive bowel sounds  Extremities:   No edema, no cyanosis, good peripheral pulses  Neurological:   Awake, alert, oriented. No obvious focal deficits  Musculoskeletal:  No obvious deformities    EKG:     Echo: 6/3/19  Summary   Ejection fraction is visually estimated at 55%. Overall left ventricular function is normal.   Mildly dilated left atrium. Signature      ----------------------------------------------------------------   Electronically signed by Kevin Culp MD   [facility]: 3/28/18  Procedure Summary      Distal left main is 60% stenosed. SVG to LAD is patent. SVG to OM1 is patent. SVG to diagonal 1 is patent. RCA is mild diffuse disease.       Patient has a long 3-3.5 cm infrarenal

## 2020-06-23 NOTE — TELEPHONE ENCOUNTER
Faxed referral for Dr. Aaron Nuñez to [facility] at [phone], per [facility] request.

## 2020-06-25 NOTE — TELEPHONE ENCOUNTER
Called [facility] to check the status of the referral. I spoke to Juan who states that Dr. Rita Phillips next date of availability is not until August. So it is currently pending review by Dr. Jennifer Rodriguez and his nurse to possibly get the patient in sooner.

## 2020-07-06 ENCOUNTER — HOSPITAL ENCOUNTER (EMERGENCY)
Age: 80
Discharge: HOME | End: 2020-07-06
Attending: EMERGENCY MEDICINE
Payer: MEDICARE

## 2020-07-06 ENCOUNTER — APPOINTMENT (OUTPATIENT)
Dept: CT IMAGING | Age: 80
End: 2020-07-06
Payer: MEDICARE

## 2020-07-06 VITALS
WEIGHT: 181 LBS | TEMPERATURE: 98.2 F | OXYGEN SATURATION: 98 % | DIASTOLIC BLOOD PRESSURE: 76 MMHG | SYSTOLIC BLOOD PRESSURE: 131 MMHG | BODY MASS INDEX: 31.07 KG/M2 | HEART RATE: 79 BPM | RESPIRATION RATE: 15 BRPM

## 2020-07-06 LAB
ALBUMIN SERPL-MCNC: 4.1 G/DL (ref 3.5–5.1)
ALP BLD-CCNC: 97 U/L (ref 38–126)
ALT SERPL-CCNC: 23 U/L (ref 11–66)
ANION GAP SERPL CALCULATED.3IONS-SCNC: 12 MEQ/L (ref 8–16)
ANISOCYTOSIS: PRESENT
AST SERPL-CCNC: 49 U/L (ref 5–40)
BASOPHILS # BLD: 0.9 %
BASOPHILS ABSOLUTE: 0.1 THOU/MM3 (ref 0–0.1)
BILIRUB SERPL-MCNC: 1 MG/DL (ref 0.3–1.2)
BILIRUBIN DIRECT: 0.4 MG/DL (ref 0–0.3)
BUN BLDV-MCNC: 13 MG/DL (ref 7–22)
CALCIUM SERPL-MCNC: 9.8 MG/DL (ref 8.5–10.5)
CHLORIDE BLD-SCNC: 101 MEQ/L (ref 98–111)
CO2: 27 MEQ/L (ref 23–33)
CREAT SERPL-MCNC: 0.7 MG/DL (ref 0.4–1.2)
EKG ATRIAL RATE: 69 BPM
EKG P AXIS: 89 DEGREES
EKG P-R INTERVAL: 190 MS
EKG Q-T INTERVAL: 424 MS
EKG QRS DURATION: 98 MS
EKG QTC CALCULATION (BAZETT): 454 MS
EKG R AXIS: 31 DEGREES
EKG T AXIS: 78 DEGREES
EKG VENTRICULAR RATE: 69 BPM
EOSINOPHIL # BLD: 0.9 %
EOSINOPHILS ABSOLUTE: 0.1 THOU/MM3 (ref 0–0.4)
ERYTHROCYTE [DISTWIDTH] IN BLOOD BY AUTOMATED COUNT: 21.2 % (ref 11.5–14.5)
ERYTHROCYTE [DISTWIDTH] IN BLOOD BY AUTOMATED COUNT: 82 FL (ref 35–45)
GFR SERPL CREATININE-BSD FRML MDRD: 81 ML/MIN/1.73M2
GLUCOSE BLD-MCNC: 175 MG/DL (ref 70–108)
HCT VFR BLD CALC: 32.1 % (ref 37–47)
HEMOGLOBIN: 10.2 GM/DL (ref 12–16)
IMMATURE GRANS (ABS): 0.06 THOU/MM3 (ref 0–0.07)
IMMATURE GRANULOCYTES: 0.8 %
LIPASE: 114.8 U/L (ref 5.6–51.3)
LYMPHOCYTES # BLD: 17.7 %
LYMPHOCYTES ABSOLUTE: 1.4 THOU/MM3 (ref 1–4.8)
MCH RBC QN AUTO: 33 PG (ref 26–33)
MCHC RBC AUTO-ENTMCNC: 31.8 GM/DL (ref 32.2–35.5)
MCV RBC AUTO: 103.9 FL (ref 81–99)
MONOCYTES # BLD: 8.7 %
MONOCYTES ABSOLUTE: 0.7 THOU/MM3 (ref 0.4–1.3)
NUCLEATED RED BLOOD CELLS: 1 /100 WBC
OSMOLALITY CALCULATION: 283.8 MOSMOL/KG (ref 275–300)
PLATELET # BLD: 228 THOU/MM3 (ref 130–400)
PMV BLD AUTO: 11.1 FL (ref 9.4–12.4)
POTASSIUM SERPL-SCNC: 4 MEQ/L (ref 3.5–5.2)
RBC # BLD: 3.09 MILL/MM3 (ref 4.2–5.4)
SEG NEUTROPHILS: 71 %
SEGMENTED NEUTROPHILS ABSOLUTE COUNT: 5.5 THOU/MM3 (ref 1.8–7.7)
SODIUM BLD-SCNC: 140 MEQ/L (ref 135–145)
TOTAL CK: 45 U/L (ref 30–135)
TOTAL PROTEIN: 7.2 G/DL (ref 6.1–8)
TROPONIN T: < 0.01 NG/ML
WBC # BLD: 7.8 THOU/MM3 (ref 4.8–10.8)

## 2020-07-06 PROCEDURE — 36415 COLL VENOUS BLD VENIPUNCTURE: CPT

## 2020-07-06 PROCEDURE — 83690 ASSAY OF LIPASE: CPT

## 2020-07-06 PROCEDURE — 85025 COMPLETE CBC W/AUTO DIFF WBC: CPT

## 2020-07-06 PROCEDURE — 2580000003 HC RX 258: Performed by: EMERGENCY MEDICINE

## 2020-07-06 PROCEDURE — 6360000004 HC RX CONTRAST MEDICATION: Performed by: EMERGENCY MEDICINE

## 2020-07-06 PROCEDURE — 82550 ASSAY OF CK (CPK): CPT

## 2020-07-06 PROCEDURE — 99283 EMERGENCY DEPT VISIT LOW MDM: CPT

## 2020-07-06 PROCEDURE — 93005 ELECTROCARDIOGRAM TRACING: CPT | Performed by: EMERGENCY MEDICINE

## 2020-07-06 PROCEDURE — 80053 COMPREHEN METABOLIC PANEL: CPT

## 2020-07-06 PROCEDURE — 84484 ASSAY OF TROPONIN QUANT: CPT

## 2020-07-06 PROCEDURE — 82248 BILIRUBIN DIRECT: CPT

## 2020-07-06 PROCEDURE — 74177 CT ABD & PELVIS W/CONTRAST: CPT

## 2020-07-06 RX ORDER — ATORVASTATIN CALCIUM 20 MG/1
20 TABLET, FILM COATED ORAL DAILY
COMMUNITY
End: 2020-07-06 | Stop reason: ALTCHOICE

## 2020-07-06 RX ORDER — SIMVASTATIN 40 MG
TABLET ORAL
Qty: 30 TABLET | Refills: 0 | Status: SHIPPED | OUTPATIENT
Start: 2020-07-06 | End: 2021-05-03

## 2020-07-06 RX ORDER — ONDANSETRON 4 MG/1
4 TABLET, ORALLY DISINTEGRATING ORAL EVERY 8 HOURS PRN
Qty: 10 TABLET | Refills: 0 | Status: SHIPPED | OUTPATIENT
Start: 2020-07-06 | End: 2020-07-10

## 2020-07-06 RX ORDER — SODIUM CHLORIDE 9 MG/ML
1000 INJECTION, SOLUTION INTRAVENOUS CONTINUOUS
Status: DISCONTINUED | OUTPATIENT
Start: 2020-07-06 | End: 2020-07-06 | Stop reason: HOSPADM

## 2020-07-06 RX ORDER — FERROUS SULFATE 325(65) MG
TABLET ORAL
Qty: 180 TABLET | Refills: 1 | OUTPATIENT
Start: 2020-07-06

## 2020-07-06 RX ORDER — OMEPRAZOLE 20 MG/1
40 CAPSULE, DELAYED RELEASE ORAL DAILY
Qty: 60 CAPSULE | Refills: 0 | Status: SHIPPED | OUTPATIENT
Start: 2020-07-06 | End: 2020-08-10 | Stop reason: SDUPTHER

## 2020-07-06 RX ADMIN — IOPAMIDOL 80 ML: 755 INJECTION, SOLUTION INTRAVENOUS at 15:13

## 2020-07-06 RX ADMIN — SODIUM CHLORIDE 1000 ML: 9 INJECTION, SOLUTION INTRAVENOUS at 12:45

## 2020-07-06 ASSESSMENT — ENCOUNTER SYMPTOMS
EYE REDNESS: 0
ABDOMINAL DISTENTION: 1
VOMITING: 0
SORE THROAT: 0
DIARRHEA: 1
SINUS PRESSURE: 0
CONSTIPATION: 0
RESPIRATORY NEGATIVE: 1
RHINORRHEA: 0
DIARRHEA: 0
BLOOD IN STOOL: 0
NAUSEA: 1
BACK PAIN: 0
SHORTNESS OF BREATH: 0
ABDOMINAL PAIN: 0
WHEEZING: 0
CHEST TIGHTNESS: 0
NAUSEA: 0
ABDOMINAL PAIN: 1

## 2020-07-06 NOTE — ED NOTES
Cholecystectomy (yrs ago); Tonsillectomy; laryngoscopy (10/29/2012 Dr Mervat Sánchez); Appendectomy; Endoscopy, colon, diagnostic; skin biopsy; hiatal hernia repair; Cardiac catheterization (2/3/2016); Abdomen surgery; eye surgery; hernia repair; Coronary artery bypass graft (16); and pr vein bypass graft,carot-subcl/ subcl-carotd (Left, 2018). CURRENT MEDICATIONS    Previous Medications    AMITRIPTYLINE (ELAVIL) 50 MG TABLET    Take 1 tablet by mouth nightly    ASPIRIN 81 MG TABLET    Take 81 mg by mouth daily    ATORVASTATIN (LIPITOR) 20 MG TABLET    Take 20 mg by mouth daily    BLOOD GLUCOSE MONITOR STRIPS    Check blood sugar q daily, Dx E11.9    CLOPIDOGREL (PLAVIX) 75 MG TABLET    TAKE 1 TABLET BY MOUTH EVERY DAY    DONEPEZIL (ARICEPT) 10 MG TABLET    Take 1 tablet by mouth nightly    FAMOTIDINE (PEPCID) 40 MG TABLET    TAKE 1 TABLET BY MOUTH EVERY DAY AT NIGHT    FERROUS SULFATE (IRON 325) 325 (65 FE) MG TABLET    TAKE 1 TABLET BY MOUTH TWICE A DAY    HANDICAP PLACARD MISC    by Does not apply route Expires 2022    HYDROCHLOROTHIAZIDE (HYDRODIURIL) 25 MG TABLET    Take 1 tablet by mouth daily    METOPROLOL TARTRATE (LOPRESSOR) 25 MG TABLET    TAKE 1/2 TABLET TWICE A DAY    POTASSIUM CHLORIDE (KLOR-CON M) 20 MEQ EXTENDED RELEASE TABLET    Take 1 tablet by mouth daily    SIMVASTATIN (ZOCOR) 40 MG TABLET    TAKE 1 TABLET NIGHTLY     ALLERGIES    is allergic to ciprofloxacin and darvon [propoxyphene hcl]. FAMILY HISTORY    She indicated that her mother is . She indicated that her father is . She indicated that her sister is alive. She indicated that her brother is . She indicated that her son is alive. She indicated that the status of her neg hx is unknown.   family history includes Cancer in her brother and son; Early Death in her mother; Heart Disease (age of onset: 52) in her father. SOCIAL HISTORY     reports that she quit smoking about 19 years ago.  Her smoking use included cigarettes. She has a 12.50 pack-year smoking history. She has never used smokeless tobacco. She reports that she does not drink alcohol or use drugs. PHYSICAL EXAM      INITIAL VITALS: /76   Pulse 88   Temp 98.2 °F (36.8 °C) (Oral)   Resp 16   Wt 181 lb (82.1 kg)   LMP  (LMP Unknown)   SpO2 98%   BMI 31.07 kg/m² Estimated body mass index is 31.07 kg/m² as calculated from the following:    Height as of 6/23/20: 5' 4\" (1.626 m). Weight as of this encounter: 181 lb (82.1 kg). Physical Exam  Constitutional:       General: She is not in acute distress. Appearance: Normal appearance. Eyes:      Extraocular Movements: Extraocular movements intact. Cardiovascular:      Rate and Rhythm: Normal rate and regular rhythm. Pulses: Normal pulses. Heart sounds: Normal heart sounds. Pulmonary:      Effort: Pulmonary effort is normal. No respiratory distress. Breath sounds: Normal breath sounds. Abdominal:      General: Bowel sounds are normal.      Palpations: Abdomen is soft. Tenderness: There is no abdominal tenderness. There is no guarding or rebound. Musculoskeletal: Normal range of motion. Skin:     General: Skin is warm and dry. Neurological:      General: No focal deficit present. Mental Status: She is alert. MEDICAL DECISION MAKING    DIFFERENTIAL DIAGNOSIS:  Adverse medication reaction       DIAGNOSTIC RESULTS    RADIOLOGY:  I have reviewed radiologic plain film image(s).   The plain films will be read or overread by the radiologist.All other non-plain film images(s) such as CT, Ultrasound and MRI have been read by the radiologist.  No orders to display     LABS:   Labs Reviewed   CBC WITH AUTO DIFFERENTIAL - Abnormal; Notable for the following components:       Result Value    RBC 3.09 (*)     Hemoglobin 10.2 (*)     Hematocrit 32.1 (*)     .9 (*)     MCHC 31.8 (*)     RDW-CV 21.2 (*)     RDW-SD 82.0 (*)     All other components within normal limits   BASIC METABOLIC PANEL - Abnormal; Notable for the following components:    Glucose 175 (*)     All other components within normal limits   HEPATIC FUNCTION PANEL - Abnormal; Notable for the following components:    Bilirubin, Direct 0.4 (*)     AST 49 (*)     All other components within normal limits   LIPASE - Abnormal; Notable for the following components:    Lipase 114.8 (*)     All other components within normal limits   GLOMERULAR FILTRATION RATE, ESTIMATED - Abnormal; Notable for the following components:    Est, Glom Filt Rate 81 (*)     All other components within normal limits   TROPONIN   ANION GAP   OSMOLALITY   URINE RT REFLEX TO CULTURE     All other unresulted laboratory test above are normal:    Vitals:    Vitals:    07/06/20 1207   BP: 132/76   Pulse: 88   Resp: 16   Temp: 98.2 °F (36.8 °C)   TempSrc: Oral   SpO2: 98%   Weight: 181 lb (82.1 kg)       EMERGENCY DEPARTMENT COURSE:    Medications   0.9 % sodium chloride infusion (has no administration in time range)     CRITICAL CARE:   None. CONSULTS:  None    PROCEDURES:  None. FINAL IMPRESSION     No diagnosis found. DISPOSITION/PLAN  PATIENT REFERRED TO:  No follow-up provider specified. DISCHARGE MEDICATIONS:  New Prescriptions    No medications on file      07/06/20 12:51 PM     **This is a Medical/ PA/ APRN Student Note and is charted for educational purposes. The non-physician staff attested note is not to be used for billing purposes or to guide patient care. Please see the physician modifications/ attestation for treatment plan/suggestions. This note has been reviewed and feedback has been provided to the student.  *

## 2020-07-06 NOTE — ED PROVIDER NOTES
[facility]  EMERGENCY DEPARTMENT     Pt Name: Priscila Teran  MRN: [id number]  [place] 1940  Date of evaluation: 7/6/2020  Provider: Zach Tse DO,     [address] [place] [address]       Chief Complaint   Patient presents with    Nausea       HISTORY OF PRESENT ILLNESS    Priscila Teran is a 78 y.o. female who presents to the emergency department from home for evaluation of nausea and epigastric discomfort. States over the last 2 nights after being switched from zocor to liptor. She states she will wake up in the middle of the night with epigastric discomfort. Nausea and dry heaves. She states it felt better with ginger ale. Worse with food. Pain and discomfort subsided on went to the ED. she denies chest pain or shortness of breath. Denies fever or chills. Denies lower extremity swelling. Denies dark stools or blood in her stool. Triage notes and Nursing notes were reviewed by myself. Any discrepancies are addressed above.     PAST MEDICAL HISTORY     Past Medical History:   Diagnosis Date    Anemia     Aneurysm of abdominal aorta (HCC)     Arthritis     Blood circulation, collateral     Bursitis, trochanteric     CAD (coronary artery disease) 2/2015    Cerebral artery occlusion with cerebral infarction (HCC)     Chronic back pain     Depression     Diabetes mellitus (HCC)     diet controlled    GERD (gastroesophageal reflux disease)     Headache(784.0)     History of basal cell cancer     Nose    Hyperlipidemia     Hypertension     Irritable bowel     Movement disorder     PVD [facility]peripheral vascular disease) ([facility] [address]     S/P CABG (coronary artery bypass graft)     Sciatica        SURGICAL HISTORY       Past Surgical History:   Procedure Laterality Date    ABDOMEN SURGERY      complete hysterectomy, gallbladder    APPENDECTOMY      CARDIAC [facility]  [facility]/2016    [facility]    CHOLECYSTECTOMY  yrs ago    COLONOSCOPY      CORONARY ARTERY BYPASS GRAFT  2-18-16    x3     ENDOSCOPY, COLON, DIAGNOSTIC      EYE SURGERY      cataracts, glaucoma    HERNIA REPAIR      Hiatal Hernia    HIATAL HERNIA REPAIR      HYSTERECTOMY      LARYNGOSCOPY  10/29/2012 Dr Rosas Liner with Bx, Lingual Tonsillectomy, Hypopharyngoscopy    [place] VEIN BYPASS GRAFT,CAROT-SUBCL/ SUBCL-CAROTD Left 4/11/2018    LEFT CAROTID SUBCLAVIAN BYPASS performed by Iwona Echols MD at [id number] [place] [address]      as a teen    UPPER GASTROINTESTINAL ENDOSCOPY         CURRENT MEDICATIONS       Previous Medications    AMITRIPTYLINE (ELAVIL) 50 MG TABLET    Take 1 tablet by mouth nightly    ASPIRIN 81 MG TABLET    Take 81 mg by mouth daily    ATORVASTATIN (LIPITOR) 20 MG TABLET    Take 20 mg by mouth daily    BLOOD GLUCOSE MONITOR STRIPS    Check blood sugar q daily, Dx E11.9    CLOPIDOGREL (PLAVIX) 75 MG TABLET    TAKE 1 TABLET BY MOUTH EVERY DAY    DONEPEZIL (ARICEPT) 10 MG TABLET    Take 1 tablet by mouth nightly    FAMOTIDINE (PEPCID) 40 MG TABLET    TAKE 1 TABLET BY MOUTH EVERY DAY AT NIGHT    FERROUS SULFATE (IRON 325) 325 (65 FE) MG TABLET    TAKE 1 TABLET BY MOUTH TWICE A DAY    HANDICAP PLACARD MISC    by Does not apply route Expires 12/14/2022    HYDROCHLOROTHIAZIDE (HYDRODIURIL) 25 MG TABLET    Take 1 tablet by mouth daily    METOPROLOL TARTRATE (LOPRESSOR) 25 MG TABLET    TAKE 1/2 TABLET TWICE A DAY    POTASSIUM CHLORIDE (KLOR-CON M) 20 MEQ EXTENDED RELEASE TABLET    Take 1 tablet by mouth daily    SIMVASTATIN (ZOCOR) 40 MG TABLET    TAKE 1 TABLET NIGHTLY       ALLERGIES     Ciprofloxacin and Darvon [propoxyphene hcl]    FAMILY HISTORY       Family History   Problem Relation Age of Onset    Early Death Mother         not sure of cause    Heart Disease Father 52        MI    Cancer Brother         pancreatic    Cancer Son         larynx    Diabetes Neg Hx     High Blood Pressure Neg Hx     Stroke Neg Hx     Colon Cancer Neg Hx     Colon Polyps Neg Hx SOCIAL HISTORY       Social History     Socioeconomic History    Marital status:      Spouse name: Tana Yañez Number of children: 3    Years of education: None    Highest education level: None   Occupational History    Occupation: retired   Social Needs    Financial resource strain: None    Food insecurity     Worry: None     Inability: None    Transportation needs     Medical: None     Non-medical: None   Tobacco Use    Smoking status: Former Smoker     Packs/day: 0.50     Years: 25.00     Pack years: 12.50     Types: Cigarettes     Last attempt to quit: 11/3/2000     Years since quittin.6    Smokeless tobacco: Never Used    Tobacco comment: quit in    Substance and Sexual Activity    Alcohol use: No    Drug use: No    Sexual activity: Yes     Partners: Male   Lifestyle    Physical activity     Days per week: None     Minutes per session: None    Stress: None   Relationships    Social connections     Talks on phone: None     Gets together: None     Attends Rastafari service: None     Active member of club or organization: None     Attends meetings of clubs or organizations: None     Relationship status: None    Intimate partner violence     Fear of current or ex partner: None     Emotionally abused: None     Physically abused: None     Forced sexual activity: None   Other Topics Concern    None   Social History Narrative    None       REVIEW OF SYSTEMS     Review of Systems   Constitutional: Negative for activity change, chills and fever. HENT: Negative for congestion, rhinorrhea, sinus pressure and sore throat. Eyes: Negative for redness and visual disturbance. Respiratory: Negative for chest tightness, shortness of breath and wheezing. Cardiovascular: Negative for chest pain, palpitations and leg swelling. Gastrointestinal: Positive for abdominal distention, abdominal pain and diarrhea. Negative for blood in stool, constipation, nausea and vomiting. Coloration: Skin is not jaundiced. Findings: No rash. Neurological:      Mental Status: She is alert and oriented to person, place, and time. She is not disoriented. GCS: GCS eye subscore is 4. GCS verbal subscore is 5. GCS motor subscore is 6. Cranial Nerves: No cranial nerve deficit. Sensory: No sensory deficit. Motor: No abnormal muscle tone or seizure activity. Coordination: Coordination normal.         DIAGNOSTIC RESULTS     EKG:(none if blank)  All EKG's are interpreted by [facility] Phys[facility] who either signs or Co-signs this chart in the absence of a cardiologist.    Sinus rhythm chronic inferior changes. No acute ST-T wave changes. No STEMI. Similar to old EKG. RADIOLOGY: (none if blank)   Interpretation per the Radiologistbelow, if available at the time of this note:    CT ABDOMEN PELVIS W IV CONTRAST Additional Contrast? None   Final Result      1. Cirrhotic, nodular hepatic contour correlation with LFTs. Heterogeneous attenuation of the liver. Nonemergent abdominal MRI can be performed as clinically warranted. 2. There is minimal possible stranding near the pancreatic tail which is nonspecific. Correlation with pancreatic serology is advised. 3. Common bile duct is dilated which is likely postsurgical. Correlation with serology is advised. **This report has been created using voice recognition software. It may contain minor errors which are inherent in voice recognition technology. **      Final report electronically signed by Dr. Adore Swanson on 7/6/2020 3:32 PM          LABS:  Labs Reviewed   CBC WITH AUTO DIFFERENTIAL - Abnormal; Notable for the following components:       Result Value    RBC 3.09 (*)     Hemoglobin 10.2 (*)     Hematocrit 32.1 (*)     .9 (*)     MCHC 31.8 (*)     RDW-CV 21.2 (*)     RDW-SD 82.0 (*)     All other components within normal limits   BASIC METABOLIC PANEL - Abnormal; Notable for the following components:    Glucose 175 (*)     All other components within normal limits   HEPATIC FUNCTION PANEL - Abnormal; Notable for the following components:    Bilirubin, Direct 0.4 (*)     AST 49 (*)     All other components within normal limits   LIPASE - Abnormal; Notable for the following components:    Lipase 114.8 (*)     All other components within normal limits   GLOMERULAR FILTRATION RATE, ESTIMATED - Abnormal; Notable for the following components:    Est, Glom Filt Rate 81 (*)     All other components within normal limits   TROPONIN   ANION GAP   OSMOLALITY   URINE RT REFLEX TO CULTURE   CK       All other labs were within normal range or not returned as of this dictation. Please note, any cultures that may have been sent were not resulted at the time of this patient visit. EMERGENCY DEPARTMENT COURSE andMedical Decision Making:     MDM/   The patient presents with abdominal discomfort and nausea. Her pain and symptoms have resolved since she arrived to the ED. symptoms developed after being placed on Lipitor for atherosclerosis . This was a change from Zocor. There is a small percentage of people that can develop pancreatitis from this. The patient is feeling better with a benign repeat examination. There is no evidence of an acute abdomen at this time. Based on history, physical exam, risk factors, and tests,  my suspicion for bowel obstruction, severe pancreatitis, abscess, perforated viscous, diverticulitis, cholecystis, appendicitis is very low and I feel the patient can be managed as an outpatient with follow up. I also see no evidence of aortic dissection, AAA, or Acute Coronary Syndrome. Patient has a mild increase and lipase and questionable mild stranding of the pancreatic tail. Her pain is controlled she is tolerating p.o.. She recommended clear liquid diet and advance as tolerated. Admission is not warranted at this time. I did discuss this with the patient.   She was agreeable to outpatient management. Instructions have been given for the patient to return to the ED for worsening of the pain, high fevers, intractable vomiting, or bleeding. Strict follow up and return precautions have been discussed. She is discontinuing her Lipitor and resuming Zocor and she is letting her prescribing physician know of the change. Strict returnprecautions and follow up instructions were discussed with the patient with which the patient agrees    ED Medications administered this visit:    Medications   0.9 % sodium chloride infusion (has no administration in time range)         Procedures: (None if blank)       CLINICAL       1. Nausea    2. Other acute pancreatitis, unspecified complication status    3. Pure hypercholesterolemia    4.  S/P CABG x 3          DISPOSITION/PLAN   DISPOSITION    Home, GI and PCP follow-up    PATIENT REFERRED TO:  Bart Vasquez DO  [address] [place] [address] Dr Marielos Steward  [phone]    Schedule an appointment as soon as possible for a visit in 2 days      Len Goodson MD  [address] [place] [address]. [address]  [phone]    Call in 1 day  To schedule an appointment in the next 4 days      DISCHARGE MEDICATIONS:  Discharge Medication List as of 7/6/2020  4:25 PM      START taking these medications    Details   omeprazole (PRILOSEC) 20 MG delayed release capsule Take 2 capsules by mouth Daily, Disp-60 capsule, R-0Print      ondansetron (ZOFRAN-ODT) 4 MG disintegrating tablet Place 1 tablet under the tongue every 8 hours as needed for Nausea or Vomiting May Sub regular tablet (non-ODT) if insurance does not cover ODT., Disp-10 tablet, R-0Print                    (Please note that portions of this note were completed with a voice recognition program.  Efforts were made to edit the dictations but occasionallywords are mis-transcribed.)      Royce Diamond DO, (electronically signed)  Attending Physician, Emergency Department       Royce Diamond DO  07/06/20 2010

## 2020-07-06 NOTE — ED NOTES
Presents with complaints of nausea for the past couple of days. States that she was placed on a new statin on Friday and then started to get sick on Saturday. States that she has been been having nausea and dry heaves.       Tomer Gao RN  07/06/20 1427

## 2020-07-07 ENCOUNTER — CARE COORDINATION (OUTPATIENT)
Dept: CARE COORDINATION | Age: 80
End: 2020-07-07

## 2020-07-07 NOTE — CARE COORDINATION
Attempted to reach patient for ED follow up in regards to COVID-19 education/ monitoring. Patient was unavailable at the time of my call, and a generic voicemail message was left asking patient to return my call at [phone].

## 2020-07-08 NOTE — CARE COORDINATION
Patient contacted regarding recent visit for viral symptoms. This Emily Ella contacted the patient by telephone to perform post discharge call. Verified name and  with patient as identifiers. Provided introduction to self, and reason for call due to viral symptoms of infection and/or exposure to COVID-19. Patient presented to emergency department/flu clinic with complaints of viral symptoms/exposure to COVID. Patient reports symptoms are improving. Due to no new or worsening symptoms the RN CTN/ACM was not notified for escalation. Discussed exposure protocols and quarantine with CDC Guidelines What To Do If You Are Sick    Patient was given an opportunity for questions and concerns. Stay home except to get medical care    Separate yourself from other people and animals in your home    Call ahead before visiting your doctor    Wear a facemask    Cover your coughs and sneezes    Clean your hands often    Avoid sharing personal household items    Clean all high-touch surfaces everyday    Monitor your symptoms  Seek prompt medical attention if your illness is worsening (e.g., difficulty breathing). Before seeking care, call your healthcare provider and tell them that you have, or are being evaluated for, COVID-19. Put on a facemask before you enter the facility. These steps will help the healthcare provider's office to keep other people in the office or waiting room from getting infected or exposed. Ask your healthcare provider to call the local or [facility] health department. Persons who are placed under If you have a medical emergency and need to call 911, notify the dispatch personnel that you have, or are being evaluated for COVID-19. If possible, put on a facemask before emergency medical services arrive. The patient agrees to contact the [facility] exposure line [phone], local health department [place] Department of Health: ([phone]) and PCP office for questions related to their healthcare. Author provided contact information for future reference. Patient/family/caregiver given information for [employer] and agrees to enroll no    I spoke with the patient re: ed visit. Patient was seen and treated for nausea. Patient states she is feeling better. Admits to minimal nausea. Patient is currently at appointment and requested to end call. Discussed COVID-19 precautions. Will f/u with the patient in two weeks.

## 2020-07-09 ENCOUNTER — OFFICE VISIT (OUTPATIENT)
Dept: FAMILY MEDICINE CLINIC | Age: 80
End: 2020-07-09
Payer: MEDICARE

## 2020-07-09 VITALS
BODY MASS INDEX: 29.16 KG/M2 | RESPIRATION RATE: 10 BRPM | HEIGHT: 65 IN | WEIGHT: 175 LBS | TEMPERATURE: 98.8 F | SYSTOLIC BLOOD PRESSURE: 136 MMHG | HEART RATE: 68 BPM | OXYGEN SATURATION: 99 % | DIASTOLIC BLOOD PRESSURE: 64 MMHG

## 2020-07-09 PROCEDURE — 99213 OFFICE O/P EST LOW 20 MIN: CPT | Performed by: FAMILY MEDICINE

## 2020-07-09 NOTE — PROGRESS NOTES
Rosalina Wang is a 78 y.o. female that presents for     Chief Complaint   Patient presents with    Pre-op Exam     Courtenay next wed .  Other     was  nauseatesd  D/T  medication ( atrovastin )        /64   Pulse 68   Temp 98.8 °F (37.1 °C) (Oral)   Resp 10   Ht 5' 5\" (1.651 m)   Wt 175 lb (79.4 kg)   LMP  (LMP Unknown)   SpO2 99%   BMI 29.12 kg/m²       HPI:    Has been seen by Dr Mayuri Marroquin since last visit. Scheduled for a left subclavian angiogram next week. 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 Has a history of headaches, left arm pain and visual changes related to this. Patient and  are present today to get a clearer plan on upcoming testing. They were sent an event monitor through the mail this week and are not sure where this is from or why it was ordered. Denies palpitations. She did have recent syncopal episode. I reviewed the notes with them from their recent cardiology visit. This had been ordered by Dr Dorothy Molina office. She will have the angiogram next with with possible intervention at that time. I will follow up with them following completion of the testing.     Health Maintenance   Topic Date Due    DEXA (modify frequency per FRAX score)  07/25/1995    Shingles Vaccine (2 of 3) 11/15/2016    Annual Wellness Visit (AWV)  06/18/2019    Lipid screen  06/19/2020    Flu vaccine (1) 09/01/2020    Potassium monitoring  07/06/2021    Creatinine monitoring  07/06/2021    DTaP/Tdap/Td vaccine (2 - Td) 10/15/2022    Pneumococcal 65+ years Vaccine  Completed    Hepatitis A vaccine  Aged Out    Hepatitis B vaccine  Aged Out    Hib vaccine  Aged Out    Meningococcal (ACWY) vaccine  Aged Out       Past Medical History:   Diagnosis Date    Anemia     Aneurysm of abdominal aorta (HCC)     Arthritis     Blood circulation, collateral     Bursitis, trochanteric     CAD (coronary artery disease) 2/2015    Cerebral artery occlusion with [facility] infarction ([facility] [address]     Chronic back pain     Depression     Diabetes mellitus ([facility] [address]     diet controlled    GERD (gastroesophageal reflux disease)     Headache(784.0)     History of basal cell cancer     Nose    Hyperlipidemia     Hypertension     Irritable bowel     Movement disorder     PVD (peripheral vascular disease) ([facility] [address]     S/P CABG (coronary artery bypass graft)     Sciatica        Past Surgical History:   Procedure Laterality Date    ABDOMEN SURGERY      complete hysterectomy, gallbladder    APPENDECTOMY      C[address]HETERIZATION  2/3/2016    [address]    CHOLECYSTECTOMY  yrs ago    COLONOSCOPY      CORONARY ARTERY BYPASS GRAFT  2-18-16    x3     ENDOSCOPY, COLON, DIAGNOSTIC      EYE SURGERY      cataracts, glaucoma    HERNIA REPAIR      Hiatal Hernia    HIATAL HERNIA REPAIR      HYSTERECTOMY      LARYNGOSCOPY  10/29/2012 Dr Kavitha Sam with Bx, Lingual Tonsillectomy, Hypopharyngoscopy    [place] VEIN BYPASS GRAFT,CAROT-SUBCL/ SUBCL-CAROTD Left 2018    LEFT CAROTID SUBCLAVIAN BYPASS performed by Stevenson Retana MD at [address] [place] [address]      as a teen    UPPER GASTROINTESTINAL ENDOSCOPY         Social History     Tobacco Use    Smoking status: Former Smoker     Packs/day: 0.50     Years: 25.00     Pack years: 12.50     Types: Cigarettes     Last attempt to quit: 11/3/2000     Years since quittin.6    Smokeless tobacco: Never Used    Tobacco comment: quit in    Substance Use Topics    Alcohol use: No    Drug use: No       Family History   Problem Relation Age of Onset    Early Death Mother         not sure of cause    Heart Disease Father 52        MI    Cancer Brother         pancreatic    Cancer Son         larynx    Diabetes Neg Hx     High Blood Pressure Neg Hx     Stroke Neg Hx     Colon Cancer Neg Hx     Colon Polyps Neg Hx          I have reviewed the patient's past medical history, past surgical history, allergies, medications, social and family history and I have made updates where appropriate. Review of Systems        PHYSICAL EXAM:  /64   Pulse 68   Temp 98.8 °F (37.1 °C) (Oral)   Resp 10   Ht 5' 5\" (1.651 m)   Wt 175 lb (79.4 kg)   LMP  (LMP Unknown)   SpO2 99% Comment: R/A  BMI 29.12 kg/m²     General Appearance: well developed and well- nourished, in no acute distress  Head: normocephalic and atraumatic  ENT: external ear and ear canal normal bilaterally, nose without deformity, nasal mucosa and turbinates normal without polyps, oropharynx normal, dentition is normal for age, no lipor gum lesions noted  Neck: supple and non-tender without mass, no thyromegaly or thyroid nodules, no cervical lymphadenopathy  Pulmonary/Chest: clear to auscultation bilaterally- no wheezes, rales or rhonchi, normal air movement, no respiratory distress or retractions  Cardiovascular: normal rate, regular rhythm, normal S1 and S2, no murmurs, rubs, clicks, or gallops, distal pulses intact  Extremities: no cyanosis, clubbing or edema of the lower extremities  Psych:  Normal affect without evidence of depression oranxiety, insight and judgement are impaired, memory appears impaired  Skin: warm and dry, no rash or erythema      ASSESSMENT & PLAN  [place] Nurse was seen today for pre-op exam and other. Diagnoses and all orders for this visit:    Subclavian steal syndrome    Syncope, unspecified syncope type    -Plan as above  20 minutes were spent face to face in this encounter and >50% of that time was spent counseling the patient on their medical diagnosis. Return if symptoms worsen or fail to improve. Controlled Substance Monitoring:    Acute and Chronic Pain Monitoring:   RX Monitoring 5/9/2017   Attestation The Prescription Monitoring Report for this patient was reviewed today. Periodic Controlled Substance Monitoring No signs of potential drug abuse or diversion identified. [facility] received counseling on the following healthy behaviors: medication adherence  Reviewed prior labs and health maintenance. Continue current medications, diet and exercise. Discussed use, benefit, and side effects of prescribed medications. Barriers to medication compliance addressed. Patient given educational materials - see patient instructions. All patient questions answered. Patient voiced understanding.

## 2020-07-13 ENCOUNTER — HOSPITAL ENCOUNTER (OUTPATIENT)
Age: 80
Discharge: HOME | End: 2020-07-13
Payer: MEDICARE

## 2020-07-13 LAB
PERFORMING LAB: NORMAL
REPORT: NORMAL
SARS-COV-2: NOT DETECTED

## 2020-07-13 PROCEDURE — U0002 COVID-19 LAB TEST NON-CDC: HCPCS

## 2020-07-20 ENCOUNTER — CARE COORDINATION (OUTPATIENT)
Dept: CARE COORDINATION | Age: 80
End: 2020-07-20

## 2020-07-20 NOTE — CARE COORDINATION
Patient contacted regarding COVID-19 risk and screening. This author contacted the patient by telephone to perform follow-up call. Verified name and  with patient as identifiers. Symptoms reviewed with patient. Patient reports symptoms are improving. Due to no new or worsening symptoms the RN CTN/ACM was not notified for escalation. This author reviewed discharge instructions, medical action plan and red flags such as increased shortness of breath, increasing fever, worsening cough or chest pain with patient who verbalized understanding. Discussed exposure protocols and quarantine with CDC Guidelines What To Do If You Are Sick    Patient who was given an opportunity for questions and concerns. The patient agrees to contact the [facility] exposure line [phone], [facility] department [place] [facility]: ([phone])[facility] PCP office for questions related to their healthcare. Author provided contact information for future reference. Spoke with pt on phone. Pt statees she has been nauseated more days than not. Encouraged pt to contact PCP and pt voiced understanding. Pt was reminded of covid 19 precautions. Will resolve at this time.

## 2020-07-28 ENCOUNTER — NURSE ONLY (OUTPATIENT)
Dept: LAB | Age: 80
End: 2020-07-28

## 2020-07-28 LAB
AMYLASE: 89 U/L (ref 20–104)
ERYTHROCYTE [DISTWIDTH] IN BLOOD BY AUTOMATED COUNT: 20.4 % (ref 11.5–14.5)
ERYTHROCYTE [DISTWIDTH] IN BLOOD BY AUTOMATED COUNT: 75.1 FL (ref 35–45)
FERRITIN: 32 NG/ML (ref 10–291)
GAMMA GLUTAMYL TRANSFERASE: 65 U/L (ref 8–69)
HBV SURFACE AB TITR SER: NEGATIVE {TITER}
HCT VFR BLD CALC: 29.5 % (ref 37–47)
HEMOGLOBIN: 8.6 GM/DL (ref 12–16)
HEPATITIS B SURFACE ANTIGEN: NEGATIVE
HEPATITIS C ANTIBODY: NEGATIVE
INR BLD: 1.23 (ref 0.85–1.13)
IRON SATURATION: 7 % (ref 20–50)
IRON: 28 UG/DL (ref 50–170)
LIPASE: 41.8 U/L (ref 5.6–51.3)
MCH RBC QN AUTO: 29.1 PG (ref 26–33)
MCHC RBC AUTO-ENTMCNC: 29.2 GM/DL (ref 32.2–35.5)
MCV RBC AUTO: 99.7 FL (ref 81–99)
PLATELET # BLD: 247 THOU/MM3 (ref 130–400)
PMV BLD AUTO: 10.8 FL (ref 9.4–12.4)
RBC # BLD: 2.96 MILL/MM3 (ref 4.2–5.4)
REASON FOR REJECTION: NORMAL
REJECTED TEST: NORMAL
TOTAL IRON BINDING CAPACITY: 401 UG/DL (ref 171–450)
WBC # BLD: 5.6 THOU/MM3 (ref 4.8–10.8)

## 2020-07-29 LAB
AFP-TUMOR MARKER: 4.1 UG/L
AMMONIA: 49 UMOL/L (ref 11–60)

## 2020-07-30 ENCOUNTER — TELEPHONE (OUTPATIENT)
Dept: FAMILY MEDICINE CLINIC | Age: 80
End: 2020-07-30

## 2020-07-30 ENCOUNTER — HOSPITAL ENCOUNTER (OUTPATIENT)
Dept: ULTRASOUND IMAGING | Age: 80
Discharge: HOME | End: 2020-07-30
Payer: MEDICARE

## 2020-07-30 PROCEDURE — 76705 ECHO EXAM OF ABDOMEN: CPT

## 2020-07-30 RX ORDER — POLYETHYLENE GLYCOL 3350 17 G/17G
17 POWDER, FOR SOLUTION ORAL DAILY
Qty: 510 G | Refills: 5 | Status: SHIPPED | OUTPATIENT
Start: 2020-07-30 | End: 2020-08-29

## 2020-07-30 NOTE — TELEPHONE ENCOUNTER
DEBBIE. Melani Polo Maria L Lisa Pt came in today stating they got blood done today but was not put on ice and needed it redrawn and was not happy about this    Had scan done today and \"they hurt her real bad right side\" looking at her liver. She requested copy sent to office because you were not going to get a copy and that upset her. States the main reason they came by the office today. ....... Melani Lisa Pt here today very upset and states she needs something for her bowels and she is dizzy because of this being backed up. Pt would like something to help with the bowels on a daily basis and feels this would also help with her dizziness because after she went all day yesterday her dizziness was better.      Future Appointments   Date Time Provider Ashley Dorsey   8/4/2020  9:45 AM DO KAE Cox [facility] [place]   8/5/2020  9:30 AM ANDREZ Tanner - CNP [facility]   8/10/2020  1:30 PM DO KAE Cox PCT [facility] [place]   10/21/2020  1:15 PM Jordan Jacob MD [address] [place] [facility] [place]   6/1/2021 10:30 AM STR ULTRASOUND RM 2 STRZ US STR Radiolog   6/1/2021 11:00 AM STR VASCULAR LAB ROOM 2 STRZ VAS LAB STR Radiolog   6/7/2021 10:30 AM Wliliam Funes PA-C SRPX CT/CV P - [place]

## 2020-07-31 LAB
ALPHA-1 ANTITRYPSIN: 141 MG/DL (ref 90–200)
F-ACTIN AB IGG: 6 UNITS (ref 0–19)
HAV AB SERPL IA-ACNC: POSITIVE
MITOCHONDRIAL ANTIBODY: 3 UNITS (ref 0–20)

## 2020-08-01 LAB — HAV IGM SER IA-ACNC: NEGATIVE

## 2020-08-10 ENCOUNTER — OFFICE VISIT (OUTPATIENT)
Dept: FAMILY MEDICINE CLINIC | Age: 80
End: 2020-08-10
Payer: MEDICARE

## 2020-08-10 VITALS
BODY MASS INDEX: 28.18 KG/M2 | RESPIRATION RATE: 16 BRPM | TEMPERATURE: 97.9 F | HEART RATE: 64 BPM | SYSTOLIC BLOOD PRESSURE: 136 MMHG | OXYGEN SATURATION: 97 % | WEIGHT: 174.6 LBS | DIASTOLIC BLOOD PRESSURE: 60 MMHG

## 2020-08-10 PROCEDURE — 99214 OFFICE O/P EST MOD 30 MIN: CPT | Performed by: FAMILY MEDICINE

## 2020-08-10 RX ORDER — FERROUS SULFATE 325(65) MG
TABLET ORAL
Qty: 180 TABLET | Refills: 3 | Status: SHIPPED | OUTPATIENT
Start: 2020-08-10 | End: 2020-10-22

## 2020-08-10 RX ORDER — OMEPRAZOLE 40 MG/1
40 CAPSULE, DELAYED RELEASE ORAL DAILY
Qty: 90 CAPSULE | Refills: 3 | Status: SHIPPED | OUTPATIENT
Start: 2020-08-10 | End: 2020-12-21

## 2020-08-10 NOTE — PROGRESS NOTES
Rosalina Wang is a [de-identified] y.o. female that presents for     Chief Complaint   Patient presents with    Pre-op Exam     discuss medication        /60   Pulse 64   Temp 97.9 °F (36.6 °C)   Resp 16   Wt 174 lb 9.6 oz (79.2 kg)   LMP  (LMP Unknown)   SpO2 97%   BMI 28.18 kg/m²       HPI:    Dementia    HPI:  I started patient on Aricept on 6/18/20 with new dx of dementia. History is obtained from patient and . New concerns - no new concerns. She does note that she still feeling fatigued through the day. Appetite is about the same. Miralax is helping with the constipation. Since last visit, memory has been about the same. Current Meds:  aricept  In terms of ADLs, she needs Independent  In terms of IADLs she needs Moderate Assist  Driving - none   Continence - normal.    The patient is left alone rarely. Ambulation:  normal.       Has not been taking potassium, her K levels have been normal.  She is not on a loop diuretic, is on HCTZ. HTN    Does patient check BP regularly at home? - No  Current Medication regimen - HCTZ, metoprolol  Tolerating medications well? - yes    Shortness of breath or chest pain? No  Headache or visual complaints? No  Neurologic changes like confusion? No  Extremity edema?  No    BP Readings from Last 3 Encounters:   08/10/20 136/60   08/05/20 118/72   07/09/20 136/64         Health Maintenance   Topic Date Due    Hepatitis A vaccine (1 of 2 - Risk 2-dose series) 07/25/1941    Hepatitis B vaccine (1 of 3 - Risk 3-dose series) 07/25/1959    DEXA (modify frequency per FRAX score)  07/25/1995    Shingles Vaccine (2 of 3) 11/15/2016    Annual Wellness Visit (AWV)  06/18/2019    Lipid screen  06/19/2020    Flu vaccine (1) 09/01/2020    Potassium monitoring  07/06/2021    Creatinine monitoring  07/06/2021    DTaP/Tdap/Td vaccine (2 - Td) 10/15/2022    Pneumococcal 65+ years Vaccine  Completed    Hib vaccine  Aged Out    Meningococcal (ACWY) vaccine Aged Out       Past Medical History:   Diagnosis Date    Anemia     Aneurysm of abdominal aorta (HCC)     Arthritis     Blood circulation, collateral     Bursitis, trochanteric     CAD (coronary artery disease) 2015    Cerebral artery occlusion with cerebral infarction (HCC)     Chronic back pain     Depression     Diabetes mellitus (HCC)     diet controlled    GERD (gastroesophageal reflux disease)     Headache(784.0)     History of basal cell cancer     Nose    Hyperlipidemia     Hypertension     Irritable bowel     Movement disorder     PVD (peripheral vascular disease) ([address]     S/P CABG (coronary artery bypass graft)     Sciatica        Past Surgical History:   Procedure Laterality Date    ABDOMEN SURGERY      complete hysterectomy, gallbladder    APPENDECTOMY      CARD[facility]    CHOLECYSTECTOMY  yrs ago    COLONOSCOPY      CORONARY ARTERY BYPASS GRAFT  2-18-16    x3     ENDOSCOPY, COLON, DIAGNOSTIC      EYE SURGERY      cataracts, glaucoma    HERNIA REPAIR      Hiatal Hernia    HIATAL HERNIA REPAIR      HYSTERECTOMY      LARYNGOSCOPY  10/29/2012 Dr Kavitha Sam with Bx, Lingual Tonsillectomy, Hypopharyngoscopy    [place] VEIN BYPASS GRAFT,CAROT-SUBCL/ SUBCL-CAROTD Left 2018    LEFT CAROTID SUBCLAVIAN BYPASS performed by Stevenson Retana MD at [address] [place] [address]      as a teen    UPPER GASTROINTESTINAL ENDOSCOPY         Social History     Tobacco Use    Smoking status: Former Smoker     Packs/day: 0.50     Years: 25.00     Pack years: 12.50     Types: Cigarettes     Last attempt to quit: 11/3/2000     Years since quittin.7    Smokeless tobacco: Never Used    Tobacco comment: quit in    Substance Use Topics    Alcohol use: No    Drug use: No       Family History   Problem Relation Age of Onset    Early Death Mother         not sure of cause    Heart Disease Father 52        MI   Lani Booker

## 2020-08-14 ENCOUNTER — HOSPITAL ENCOUNTER (OUTPATIENT)
Dept: GENERAL RADIOLOGY | Age: 80
Discharge: HOME | End: 2020-08-14
Payer: MEDICARE

## 2020-08-14 PROCEDURE — 74250 X-RAY XM SM INT 1CNTRST STD: CPT

## 2020-08-14 PROCEDURE — 2500000003 HC RX 250 WO HCPCS: Performed by: NURSE PRACTITIONER

## 2020-08-14 RX ADMIN — BARIUM SULFATE 600 ML: 240 SUSPENSION ORAL at 10:16

## 2020-08-18 ENCOUNTER — TELEPHONE (OUTPATIENT)
Dept: FAMILY MEDICINE CLINIC | Age: 80
End: 2020-08-18

## 2020-08-18 NOTE — TELEPHONE ENCOUNTER
Jackalyn Cooks from [place] is requesting progress notes from the patient's recent visit with Dr. Prasad Odom on 08/10/2020.     Fax to [phone]  Attn: Jackalyn Cooks

## 2020-08-26 ENCOUNTER — NURSE ONLY (OUTPATIENT)
Dept: LAB | Age: 80
End: 2020-08-26

## 2020-08-26 ENCOUNTER — HOSPITAL ENCOUNTER (OUTPATIENT)
Dept: GENERAL RADIOLOGY | Age: 80
Discharge: HOME | End: 2020-08-26
Payer: MEDICARE

## 2020-08-26 ENCOUNTER — OFFICE VISIT (OUTPATIENT)
Dept: FAMILY MEDICINE CLINIC | Age: 80
End: 2020-08-26
Payer: MEDICARE

## 2020-08-26 ENCOUNTER — HOSPITAL ENCOUNTER (OUTPATIENT)
Age: 80
Discharge: HOME | End: 2020-08-26
Payer: MEDICARE

## 2020-08-26 ENCOUNTER — TELEPHONE (OUTPATIENT)
Dept: FAMILY MEDICINE CLINIC | Age: 80
End: 2020-08-26

## 2020-08-26 VITALS
OXYGEN SATURATION: 98 % | SYSTOLIC BLOOD PRESSURE: 106 MMHG | DIASTOLIC BLOOD PRESSURE: 74 MMHG | HEIGHT: 66 IN | TEMPERATURE: 98.7 F | BODY MASS INDEX: 26.78 KG/M2 | HEART RATE: 83 BPM | RESPIRATION RATE: 12 BRPM | WEIGHT: 166.6 LBS

## 2020-08-26 LAB
ALBUMIN SERPL-MCNC: 3.9 G/DL (ref 3.5–5.1)
ALP BLD-CCNC: 87 U/L (ref 38–126)
ALT SERPL-CCNC: 15 U/L (ref 11–66)
AMMONIA: 25 UMOL/L (ref 11–60)
ANION GAP SERPL CALCULATED.3IONS-SCNC: 11 MEQ/L (ref 8–16)
AST SERPL-CCNC: 39 U/L (ref 5–40)
BILIRUB SERPL-MCNC: 0.8 MG/DL (ref 0.3–1.2)
BUN BLDV-MCNC: 18 MG/DL (ref 7–22)
CALCIUM SERPL-MCNC: 10.3 MG/DL (ref 8.5–10.5)
CHLORIDE BLD-SCNC: 105 MEQ/L (ref 98–111)
CO2: 27 MEQ/L (ref 23–33)
CREAT SERPL-MCNC: 0.9 MG/DL (ref 0.4–1.2)
ERYTHROCYTE [DISTWIDTH] IN BLOOD BY AUTOMATED COUNT: 21 % (ref 11.5–14.5)
ERYTHROCYTE [DISTWIDTH] IN BLOOD BY AUTOMATED COUNT: 68.1 FL (ref 35–45)
FERRITIN: 18 NG/ML (ref 10–291)
FIBROMETER PLATELET COUNT: 344
GAMMA GLUTAMYL TRANSFERASE: 79 U/L (ref 8–69)
GFR SERPL CREATININE-BSD FRML MDRD: 60 ML/MIN/1.73M2
GLUCOSE BLD-MCNC: 140 MG/DL (ref 70–108)
HCT VFR BLD CALC: 27.9 % (ref 37–47)
HEMOGLOBIN: 8.3 GM/DL (ref 12–16)
INR BLD: 1.2 (ref 0.85–1.13)
IRON SATURATION: 5 % (ref 20–50)
IRON: 25 UG/DL (ref 50–170)
MCH RBC QN AUTO: 26.8 PG (ref 26–33)
MCHC RBC AUTO-ENTMCNC: 29.7 GM/DL (ref 32.2–35.5)
MCV RBC AUTO: 90 FL (ref 81–99)
PLATELET # BLD: 344 THOU/MM3 (ref 130–400)
PMV BLD AUTO: 10.8 FL (ref 9.4–12.4)
POTASSIUM SERPL-SCNC: 4.3 MEQ/L (ref 3.5–5.2)
RBC # BLD: 3.1 MILL/MM3 (ref 4.2–5.4)
SCAN OF BLOOD SMEAR: NORMAL
SODIUM BLD-SCNC: 143 MEQ/L (ref 135–145)
TOTAL IRON BINDING CAPACITY: 477 UG/DL (ref 171–450)
TOTAL PROTEIN: 6.9 G/DL (ref 6.1–8)
WBC # BLD: 7.2 THOU/MM3 (ref 4.8–10.8)

## 2020-08-26 PROCEDURE — 74018 RADEX ABDOMEN 1 VIEW: CPT

## 2020-08-26 PROCEDURE — 99213 OFFICE O/P EST LOW 20 MIN: CPT | Performed by: NURSE PRACTITIONER

## 2020-08-26 RX ORDER — ONDANSETRON 4 MG/1
4 TABLET, ORALLY DISINTEGRATING ORAL EVERY 8 HOURS PRN
Qty: 20 TABLET | Refills: 0 | Status: SHIPPED | OUTPATIENT
Start: 2020-08-26 | End: 2021-03-22

## 2020-08-26 RX ORDER — PROMETHAZINE HYDROCHLORIDE 25 MG/1
25 TABLET ORAL 4 TIMES DAILY PRN
Qty: 20 TABLET | Refills: 0 | Status: SHIPPED | OUTPATIENT
Start: 2020-08-26 | End: 2020-09-02

## 2020-08-26 NOTE — TELEPHONE ENCOUNTER
I will send in some Phenergan  She can stop the Zofran   If no improvement, they should let GI know  Her KUB revealed large amount of stool throughout the colon consistent with constipation  I want her to take another dose of Miralax this afternoon  Try to push fluids- water, Gatorde, Propel  Please let me know if she doesn't begin improving.   Thanks  [facility]

## 2020-08-26 NOTE — TELEPHONE ENCOUNTER
Pt's , Chelsey Molina, on HIPAA, called stating the zofran is not working for [employer] nausea. Pt is still \"gagging\" and coughing up phlegm. Please advise.

## 2020-08-26 NOTE — PROGRESS NOTES
[facility]  [address] [facility]  Dept: [phone]  Loc: [phone]  Visit Date: 8/26/2020      HPI:   Ce Jj is a [de-identified] y.o. female thatpresents for Nausea (Pt presents c/o nausea since 8/14 after she drank contrast for a CT. She is sometimes nauseous all day other days its on and off. She denies vomiting. )    HPI:    Gastroenteritis     HPI:      Symptoms have been present for since 8/14/20 when she had to drink contrast for a CT. Nausea? Yes  Vomiting? Yes - dry heaving, bringing up small amt of phlegm  Diarrhea? No  Abdominal Pain? Yes - generalized abdominal discomfort and bloating  Fever? No  Blood or Mucus in Stools? No  Currently able to tolerate fluids? yes: in very small amounts, not eating or drinking much  Recent camping or drinking from wells? No  Recent ingestion of seafood or undercooked meat? No  She comes in with her  today  History obtained from pt, , and medical records. I have reviewed the patient's past medical history, past surgical history, allergies,medications, social and family history and I have made updates where appropriate.     Past Medical History:   Diagnosis Date    Anemia     Aneurysm of abdominal aorta (HCC)     Arthritis     Blood circulation, collateral     Bursitis, trochanteric     CAD (coronary artery disease) 2/2015    [facility] ([facility])     Chronic back pain     Depression     Diabetes mellitus (HCC)     diet controlled    GERD (gastroesophageal reflux disease)     Headache(784.0)     History of basal cell cancer     Nose    Hyperlipidemia     Hypertension     Irritable bowel     Movement disorder     PVD [facility]peripheral vascular disease) ([facility]     S/P CABG (coronary artery bypass graft)     Sciatica        Past Surgical History:   Procedure Laterality Date    ABDOMEN SURGERY      complete hysterectomy, gallbladder    Dizziness, Headaches        PHYSICAL EXAM:  /74   Pulse 83   Temp 98.7 °F (37.1 °C)   Resp 12   Ht 5' 6\" (1.676 m)   Wt 166 lb 9.6 oz (75.6 kg)   LMP  (LMP Unknown)   SpO2 98%   BMI 26.89 kg/m²     General Appearance: well developed and well- nourished, in no acute distress  Head: normocephalic and atraumatic  Eyes: pupils equal, round, and reactive to light,  conjunctivae and eye lids without erythema  ENT: external ear normal bilaterally, nose without deformity, no lip or gum lesions noted  Neck: supple and non-tender without mass  Pulmonary/Chest: clear to auscultation bilaterally- no wheezes, rales or rhonchi, normal air movement, no respiratory distress orretractions  Cardiovascular: normal rate, regular rhythm, normal S1 and S2, no murmurs, rubs, clicks, or gallops,distal pulses intact  Abdomen: soft, non-tender, slightly distended, normal bowel sounds,  no rebound or guarding, no masses or hernias noted, no hepatospleenomegaly  Extremities: no cyanosis, clubbing or edema of the lower extremities  Musculoskeletal: No joint swelling or gross deformity   Neuro:  Alert,   normal speech, no focal findings or movement disorder noted, gait wnl  Psych:  Normal affect without evidence of depression or anxiety, insight and judgement are appropriate, memoryappears intact  Skin: warm and dry, no rash or erythema  Lymph:  No cervical, auricular or supraclavicular lymph nodes palpated    ASSESSMENT & PLAN  Clement Cifuentes was seen today for nausea. Diagnoses and all orders for this visit:    Nausea  -     ondansetron (ZOFRAN ODT) 4 MG disintegrating tablet; Take 1 tablet by mouth every 8 hours as needed for Nausea or Vomiting  -     XR ABDOMEN (KUB) (SINGLE AP VIEW); Future    Generalized abdominal pain  -     ondansetron (ZOFRAN ODT) 4 MG disintegrating tablet; Take 1 tablet by mouth every 8 hours as needed for Nausea or Vomiting  -     XR ABDOMEN (KUB) (SINGLE AP VIEW);  Future    Abdominal bloating  - ondansetron (ZOFRAN ODT) 4 MG disintegrating tablet; Take 1 tablet by mouth every 8 hours as needed for Nausea or Vomiting  -     XR ABDOMEN (KUB) (SINGLE AP VIEW); Future    Will call results of KUB today  Eat bland diet  Push clear liquids    No follow-ups on file. Naomi Ruiz received counseling on the following healthy behaviors: nutrition and medication adherence  Reviewedprior labs and health maintenance. Continue current medications, diet and exercise. Discussed use, benefit, and side effects of prescribed medications. Barriers to medication compliance addressed. Patient given educationalmaterials - see patient instructions. All patient questions answered. Patient voiced understanding.      Electronically signed by ANDREZ Gonzalez on 8/26/20 at 8:10 AM EDT

## 2020-08-31 RX ORDER — FAMOTIDINE 40 MG/1
TABLET, FILM COATED ORAL
Qty: 90 TABLET | Refills: 3 | Status: SHIPPED | OUTPATIENT
Start: 2020-08-31 | End: 2020-12-21 | Stop reason: SDUPTHER

## 2020-09-02 PROBLEM — D50.0 IRON DEFICIENCY ANEMIA DUE TO CHRONIC BLOOD LOSS: Status: ACTIVE | Noted: 2020-09-02

## 2020-09-02 PROBLEM — K74.60 CIRRHOSIS OF LIVER WITHOUT ASCITES (HCC): Status: ACTIVE | Noted: 2020-09-02

## 2020-09-04 LAB
REASON FOR REJECTION: NORMAL
REJECTED TEST: NORMAL

## 2020-09-10 ENCOUNTER — TELEPHONE (OUTPATIENT)
Dept: FAMILY MEDICINE CLINIC | Age: 80
End: 2020-09-10

## 2020-09-10 NOTE — TELEPHONE ENCOUNTER
Pt called stating she is very unsatisfied with Mehreen Villarreal, APRN-CORTEZ. Shasha Gu stated Blayne Lieberman was extremely rude and obnoxious at her last appointment, office note in EPIC. Pt also stated the exam room she was placed in was \"filthy dirty with cobwebs behind the door\". Pt is refusing to return to that office. Pt is asking for a referral to a new GI physician. Please advise.

## 2020-10-01 ENCOUNTER — TELEPHONE (OUTPATIENT)
Dept: FAMILY MEDICINE CLINIC | Age: 80
End: 2020-10-01

## 2020-10-01 ENCOUNTER — NURSE ONLY (OUTPATIENT)
Dept: LAB | Age: 80
End: 2020-10-01

## 2020-10-01 ENCOUNTER — OFFICE VISIT (OUTPATIENT)
Dept: FAMILY MEDICINE CLINIC | Age: 80
End: 2020-10-01
Payer: MEDICARE

## 2020-10-01 VITALS
WEIGHT: 167 LBS | BODY MASS INDEX: 26.84 KG/M2 | SYSTOLIC BLOOD PRESSURE: 118 MMHG | RESPIRATION RATE: 18 BRPM | OXYGEN SATURATION: 98 % | DIASTOLIC BLOOD PRESSURE: 76 MMHG | HEART RATE: 66 BPM | HEIGHT: 66 IN | TEMPERATURE: 98.7 F

## 2020-10-01 PROCEDURE — 99213 OFFICE O/P EST LOW 20 MIN: CPT | Performed by: NURSE PRACTITIONER

## 2020-10-01 NOTE — PROGRESS NOTES
[facility]manuel [facility]  [address] [facility]  Dept: [phone]  Loc: [phone]  Visit Date: 10/1/2020      HPI:   Dana Colmenares is a [de-identified] y.o. female thatpresents for Fatigue (getting worse cant sleep at night to tired cant function normal activites  bed by 930 -10 pm and asleep then wake up 630 cant go back to sleep  just sit around cant finish projects  dizzy and light headedness  )    HPI:  Comes in today with her  with c/o fatigue  Started 1-2 mos ago  Sleeping well at night  Goes to sleep at 9:30  Lately has been frustrated because she's been waking up 6-6:30am and then cannot go back to sleep  She used to sleep later than this  She denies any chest pain, palpitations, headache, visual changes  At times she feels dizzy  With activity, like going to the grocery, she gets winded and fatigued easily  Has to stop and rest frequently  Lately has just been making a list and sending her  in   Denies any blood in stools, gross hematuria  Continues on iron, however, was told to start taking it different, and hasn't felt as well since  I have reviewed the patient's past medical history, past surgical history, allergies,medications, social and family history and I have made updates where appropriate.     Past Medical History:   Diagnosis Date    Anemia     Aneurysm of abdominal aorta (HCC)     Arthritis     Blood circulation, collateral     Bursitis, trochanteric     CAD (coronary artery disease) 2/2015    Cerebral artery occlusion with cerebral infarction [facility])     Chronic back pain     Cirrhosis of liver without ascites ([facility] [address] 9/2/2020    Depression     Diabetes mellitus (HCC)     diet controlled    GERD (gastroesophageal reflux disease)     Headache(784.0)     History of basal cell cancer     Nose    Hyperlipidemia     Hypertension     Irritable bowel     Movement disorder     PVD [facility]peripheral vascular disease) ([facility]     S/P CABG (coronary artery bypass graft)     Sciatica        Past Surgical History:   Procedure Laterality Date    ABDOMEN SURGERY      complete hysterectomy, gallbladder    APPENDECTOMY      CARDIAC [facility]  [facility]    CHOLECYSTECTOMY  yrs ago    COLONOSCOPY      CORONARY ARTERY BYPASS GRAFT  2-18-16    x3     ENDOSCOPY, COLON, DIAGNOSTIC      EYE SURGERY      cataracts, glaucoma    HERNIA REPAIR      Hiatal Hernia    HIATAL HERNIA REPAIR      HYSTERECTOMY      LARYNGOSCOPY  10/29/2012 Dr Sita Valentine with Bx, Lingual Tonsillectomy, Hypopharyngoscopy    [place] VEIN BYPASS GRAFT,CAROT-SUBCL/ SUBCL-CAROTD Left 2018    LEFT CAROTID SUBCLAVIAN BYPASS performed by Marly Mosley MD at [address] [address]      as a teen    UPPER GASTROINTESTINAL ENDOSCOPY         Social History     Tobacco Use    Smoking status: Former Smoker     Packs/day: 0.50     Years: 25.00     Pack years: 12.50     Types: Cigarettes     Last attempt to quit: 11/3/2000     Years since quittin.9    Smokeless tobacco: Never Used    Tobacco comment: quit in    Substance Use Topics    Alcohol use: No    Drug use: No       Family History   Problem Relation Age of Onset    Early Death Mother         not sure of cause    Heart Disease Father 52        MI    Cancer Brother         pancreatic    Cancer Son         larynx    Diabetes Neg Hx     High Blood Pressure Neg Hx     Stroke Neg Hx     Colon Cancer Neg Hx     Colon Polyps Neg Hx          Review of Systems  Constitutional: Negative for Fever, Chills, +fatigue  Cardiovascular:  Negative forChest Pain, Palpitations  Respiratory:  Negative for Cough, Wheezing, +SOB with exertion, not at rest  Gastrointestinal:  Nausea, Vomiting, Diarrhea, Constipation, Blood in stool  Genitourinary:  Negative for Dysuria,Change in frequency, Urgency  Skin:  Negative for Color change, Rash, Itching, Wound  Psychiatric: Negative forAnxiety, Depression, Suicidal ideation  Musculoskeletal:  Negative for Joint pain, Back pain, Gait problems  Neurological:  Negative for Headaches +dizziness        PHYSICAL EXAM:  /76   Pulse 66   Temp 98.7 °F (37.1 °C)   Resp 18   Ht 5' 6\" (1.676 m)   Wt 167 lb (75.8 kg)   LMP  (LMP Unknown)   SpO2 98%   BMI 26.95 kg/m²     General Appearance: well developed and well- nourished, in no acute distress  Head: normocephalic and atraumatic  Eyes: pupils equal, round, and reactive to light,  conjunctivae and eye lids without erythema  ENT: external ear normal bilaterally, nose without deformity, dentition is normal for age, no lip or gum lesions noted  Neck: supple and non-tender without mass, no thyromegaly or thyroid nodules, no cervical lymphadenopathy  Pulmonary/Chest: clear to auscultation bilaterally- no wheezes, rales or rhonchi, normal air movement, no respiratory distress or retractions  Cardiovascular: normal rate, regular rhythm, normal S1 and S2, no murmurs, rubs, clicks, or gallops,distal pulses intact  Abdomen: soft, non-tender, non-distended, normal bowel sounds,  no rebound or guarding, nomasses or hernias noted, no hepatospleenomegaly  Extremities: no cyanosis, clubbing or edema of the lower extremities  Musculoskeletal: No joint swelling or gross deformity   Neuro:  Alert,  normal speech, no focal findings or movement disorder noted, gait wnl  Psych:  Normal affect without evidence of depression or anxiety, insight and judgement are appropriate, memoryappears intact  Skin: warm and dry, no rash or erythema  Lymph:  No cervical, auricular or supraclavicular lymph nodes palpated    ASSESSMENT & PLAN  Pilar Cedeño was seen today for fatigue.     Diagnoses and all orders for this visit:    Fatigue, unspecified type  -     CBC With Auto Differential; Future    Change positions slowly   Needs scheduled with GI  Recently had capsule endoscopy  Discussed findings with her  Would like to hold off on previously ordered Hem Consult until she sees GI  Will call lab results. Dave To was evaluated today and a DME order was entered for a standard wheelchair because she requires this to successfully complete daily living tasks of bathing, toileting, personal cares, ambulating, grooming, hygiene and meal preparation. A standard wheelchair is necessary due to the patient's impaired ambulation and mobility restrictions. The patient would not be able to resolve these daily living tasks using a cane or walker. The patient can self-propel a standard wheelchair safely in their home and can maneuver within their home with adequate access. The patient cannot self-propel in a standard wheelchair. The need for this equipment was discussed with the patient and she understands, is in agreement, and has not expressed an unwillingness to use the wheelchair. Dave To was evaluated today and a DME order was entered for a lightweight wheelchair because she requires this to successfully complete daily living tasks of eating, bathing, toileting, personal cares, ambulating, grooming, hygiene and taking own medications. A lightweight wheelchair is necessary due to the patient's impaired ambulation and mobility restrictions. The patient would not be able to resolve these daily living tasks using a cane or walker. The patient can self-propel a lightweight wheelchair safely in their home and can maneuver within their home with adequate access. The patient cannot self-propel in a standard wheelchair. The need for this equipment was discussed with the patient and she understands, is in agreement, and has not expressed an unwillingness to use the wheelchair. No follow-ups on file. Stephany Leal received counseling on the following healthy behaviors: medication adherence  Reviewedprior labs and health maintenance. Continue current medications, diet and exercise.   Discussed use, benefit, and side

## 2020-10-01 NOTE — TELEPHONE ENCOUNTER
Dr. Mike Briseno ordered a new GI consult a couple of weeks ago. Can we please get her scheduled with Dr. Maximus Smith or Dr. Derek Bower? ? She isn't following with Glen Jaeger? Dr. Josesito Garcia going forward.     Thank you  Neal Sic

## 2020-10-01 NOTE — PATIENT INSTRUCTIONS
Patient Education        Fatigue: Care Instructions  Your Care Instructions     Fatigue is a feeling of tiredness, exhaustion, or lack of energy. You may feel fatigue because of too much or not enough activity. It can also come from stress, lack of sleep, boredom, and poor diet. Many medical problems, such as viral infections, can cause fatigue. Emotional problems, especially depression, are often the cause of fatigue. Fatigue is most often a symptom of another problem. Treatment for fatigue depends on the cause. For example, if you have fatigue because you have a certain health problem, treating this problem also treats your fatigue. If depression or anxiety is the cause, treatment may help. Follow-up care is a key part of your treatment and safety. Be sure to make and go to all appointments, and call your doctor if you are having problems. It's also a good idea to know your test results and keep a list of the medicines you take. How can you care for yourself at home? · Get regular exercise. But don't overdo it. Go back and forth between rest and exercise. · Get plenty of rest.  · Eat a healthy diet. Do not skip meals, especially breakfast.  · Reduce your use of caffeine, tobacco, and alcohol. Caffeine is most often found in coffee, tea, cola drinks, and chocolate. · Limit medicines that can cause fatigue. This includes tranquilizers and cold and allergy medicines. When should you call for help? Watch closely for changes in your health, and be sure to contact your doctor if:  · You have new symptoms such as fever or a rash. · Your fatigue gets worse. · You have been feeling down, depressed, or hopeless. Or you may have lost interest in things that you usually enjoy. · You are not getting better as expected. Where can you learn more? Go to https://andre.[employer]. org and sign in to your [employer] account.  Enter Z330 in the Zentila box to learn more about \"Fatigue: Care Instructions. \"     If you do not have an account, please click on the \"Sign Up Now\" link. Current as of: June 26, 2019               Content Version: 12.5  © 9496-4379 Healthwise, Incorporated. Care instructions adapted under license by [facility] ([facility]). If you have questions about a medical condition or this instruction, always ask your healthcare professional. [address] any warranty or liability for your use of this information.

## 2020-10-02 ENCOUNTER — TELEPHONE (OUTPATIENT)
Dept: FAMILY MEDICINE CLINIC | Age: 80
End: 2020-10-02

## 2020-10-02 NOTE — TELEPHONE ENCOUNTER
----- Message from ANDREZ Castillo CNP sent at 10/2/2020  8:06 AM EDT -----  Labs came back. Anemia persists, has slightly worsened since it was last checked in Aug.  Continue iron BID. Try to eat iron rich foods. Keep appt with GI on Tuesday.   Recheck labs on Monday am  Electronically signed by ANDREZ Castillo CNP on 10/2/2020 at 8:03 AM

## 2020-10-05 ENCOUNTER — NURSE ONLY (OUTPATIENT)
Dept: LAB | Age: 80
End: 2020-10-05

## 2020-10-05 ENCOUNTER — TELEPHONE (OUTPATIENT)
Dept: FAMILY MEDICINE CLINIC | Age: 80
End: 2020-10-05

## 2020-10-05 LAB
ANISOCYTOSIS: PRESENT
BASOPHILS # BLD: 2.1 %
BASOPHILS ABSOLUTE: 0.1 THOU/MM3 (ref 0–0.1)
EOSINOPHIL # BLD: 3.5 %
EOSINOPHILS ABSOLUTE: 0.2 THOU/MM3 (ref 0–0.4)
ERYTHROCYTE [DISTWIDTH] IN BLOOD BY AUTOMATED COUNT: 24.3 % (ref 11.5–14.5)
ERYTHROCYTE [DISTWIDTH] IN BLOOD BY AUTOMATED COUNT: 76.9 FL (ref 35–45)
HCT VFR BLD CALC: 28.1 % (ref 37–47)
HEMOGLOBIN: 7.8 GM/DL (ref 12–16)
HYPOCHROMIA: PRESENT
IMMATURE GRANS (ABS): 0.02 THOU/MM3 (ref 0–0.07)
IMMATURE GRANULOCYTES: 0.3 %
LYMPHOCYTES # BLD: 31.3 %
LYMPHOCYTES ABSOLUTE: 1.8 THOU/MM3 (ref 1–4.8)
MCH RBC QN AUTO: 24.3 PG (ref 26–33)
MCHC RBC AUTO-ENTMCNC: 27.8 GM/DL (ref 32.2–35.5)
MCV RBC AUTO: 87.5 FL (ref 81–99)
MONOCYTES # BLD: 15.4 %
MONOCYTES ABSOLUTE: 0.9 THOU/MM3 (ref 0.4–1.3)
NUCLEATED RED BLOOD CELLS: 0 /100 WBC
PLATELET # BLD: 318 THOU/MM3 (ref 130–400)
PMV BLD AUTO: 11 FL (ref 9.4–12.4)
RBC # BLD: 3.21 MILL/MM3 (ref 4.2–5.4)
SEG NEUTROPHILS: 47.4 %
SEGMENTED NEUTROPHILS ABSOLUTE COUNT: 2.7 THOU/MM3 (ref 1.8–7.7)
WBC # BLD: 5.7 THOU/MM3 (ref 4.8–10.8)

## 2020-10-05 NOTE — TELEPHONE ENCOUNTER
----- Message from ANDREZ Ureña CNP sent at 10/5/2020  2:56 PM EDT -----  H&H is still low, but stable, hasn't dropped anymore. Keep appt with GI.   Electronically signed by ANDREZ Ureña CNP on 10/5/2020 at 2:56 PM

## 2020-10-07 ENCOUNTER — TELEPHONE (OUTPATIENT)
Dept: FAMILY MEDICINE CLINIC | Age: 80
End: 2020-10-07

## 2020-10-07 NOTE — TELEPHONE ENCOUNTER
Kellee Nurse  BC/BS calling to see if doctor wants patient to check blood surgars everyday. Please call Tam Heading back at [phone]  Confidential, will have to leave a message.

## 2020-10-12 RX ORDER — DONEPEZIL HYDROCHLORIDE 10 MG/1
TABLET, FILM COATED ORAL
Qty: 90 TABLET | Refills: 3 | Status: ON HOLD | OUTPATIENT
Start: 2020-10-12 | End: 2021-09-15

## 2020-10-14 ENCOUNTER — TELEPHONE (OUTPATIENT)
Dept: ADMINISTRATIVE | Age: 80
End: 2020-10-14

## 2020-10-14 NOTE — TELEPHONE ENCOUNTER
Order placed for wheelchair d/t fatigue, anemia, back and hip pain  Electronically signed by ANDREZ Hoffman CNP on 10/14/2020 at 1:15 PM

## 2020-10-14 NOTE — TELEPHONE ENCOUNTER
Please advise if pt is needing an appt.     Last office visit was 10/1/2020 with Miladis Robbins CNP

## 2020-10-14 NOTE — TELEPHONE ENCOUNTER
Patient is needing orders for a light weight wheel chair sent to [facility]    # [phone] Sarah Monge  leave a message, it is a confidential vm

## 2020-10-21 PROBLEM — D64.9 ANEMIA, UNSPECIFIED: Status: ACTIVE | Noted: 2020-10-21

## 2020-10-21 RX ORDER — 0.9 % SODIUM CHLORIDE 0.9 %
250 INTRAVENOUS SOLUTION INTRAVENOUS ONCE
Status: CANCELLED
Start: 2020-10-21

## 2020-10-21 RX ORDER — HEPARIN SODIUM (PORCINE) LOCK FLUSH IV SOLN 100 UNIT/ML 100 UNIT/ML
500 SOLUTION INTRAVENOUS PRN
Status: CANCELLED | OUTPATIENT
Start: 2020-10-21

## 2020-10-21 RX ORDER — SODIUM CHLORIDE 0.9 % (FLUSH) 0.9 %
10 SYRINGE (ML) INJECTION PRN
Status: CANCELLED | OUTPATIENT
Start: 2020-10-21

## 2020-10-21 RX ORDER — SODIUM CHLORIDE 0.9 % (FLUSH) 0.9 %
20 SYRINGE (ML) INJECTION PRN
Status: CANCELLED | OUTPATIENT
Start: 2020-10-21

## 2020-10-22 ENCOUNTER — OFFICE VISIT (OUTPATIENT)
Dept: CARDIOLOGY CLINIC | Age: 80
End: 2020-10-22
Payer: MEDICARE

## 2020-10-22 ENCOUNTER — HOSPITAL ENCOUNTER (OUTPATIENT)
Dept: INFUSION THERAPY | Age: 80
Discharge: HOME | End: 2020-10-22
Payer: MEDICARE

## 2020-10-22 VITALS
HEART RATE: 55 BPM | DIASTOLIC BLOOD PRESSURE: 60 MMHG | HEIGHT: 66 IN | BODY MASS INDEX: 26.12 KG/M2 | WEIGHT: 162.5 LBS | SYSTOLIC BLOOD PRESSURE: 110 MMHG

## 2020-10-22 VITALS
HEART RATE: 50 BPM | DIASTOLIC BLOOD PRESSURE: 56 MMHG | OXYGEN SATURATION: 98 % | RESPIRATION RATE: 18 BRPM | TEMPERATURE: 97.8 F | SYSTOLIC BLOOD PRESSURE: 111 MMHG

## 2020-10-22 DIAGNOSIS — D64.9 ANEMIA, UNSPECIFIED TYPE: ICD-10-CM

## 2020-10-22 DIAGNOSIS — D50.0 IRON DEFICIENCY ANEMIA DUE TO CHRONIC BLOOD LOSS: Primary | ICD-10-CM

## 2020-10-22 LAB
ABO: NORMAL
ANTIBODY SCREEN: NORMAL
HCT VFR BLD CALC: 30.3 % (ref 37–47)
HEMOGLOBIN: 8.5 GM/DL (ref 12–16)
RH FACTOR: NORMAL

## 2020-10-22 PROCEDURE — 2580000003 HC RX 258: Performed by: SPECIALIST

## 2020-10-22 PROCEDURE — 86900 BLOOD TYPING SEROLOGIC ABO: CPT

## 2020-10-22 PROCEDURE — 86901 BLOOD TYPING SEROLOGIC RH(D): CPT

## 2020-10-22 PROCEDURE — 36415 COLL VENOUS BLD VENIPUNCTURE: CPT

## 2020-10-22 PROCEDURE — 86923 COMPATIBILITY TEST ELECTRIC: CPT

## 2020-10-22 PROCEDURE — P9016 RBC LEUKOCYTES REDUCED: HCPCS

## 2020-10-22 PROCEDURE — 93000 ELECTROCARDIOGRAM COMPLETE: CPT | Performed by: INTERNAL MEDICINE

## 2020-10-22 PROCEDURE — 85014 HEMATOCRIT: CPT

## 2020-10-22 PROCEDURE — 86850 RBC ANTIBODY SCREEN: CPT

## 2020-10-22 PROCEDURE — 36430 TRANSFUSION BLD/BLD COMPNT: CPT

## 2020-10-22 PROCEDURE — 85018 HEMOGLOBIN: CPT

## 2020-10-22 PROCEDURE — 99214 OFFICE O/P EST MOD 30 MIN: CPT | Performed by: INTERNAL MEDICINE

## 2020-10-22 RX ORDER — 0.9 % SODIUM CHLORIDE 0.9 %
20 INTRAVENOUS SOLUTION INTRAVENOUS ONCE
Status: COMPLETED | OUTPATIENT
Start: 2020-10-22 | End: 2020-10-22

## 2020-10-22 RX ADMIN — SODIUM CHLORIDE 20 ML: 9 INJECTION, SOLUTION INTRAVENOUS at 11:02

## 2020-10-22 NOTE — PROGRESS NOTES
Patient tolerated transfusion of blood without any complications. Patient kept for 20 minutes observation post transfusion. Denies dizziness, lightheadedness, acute nausea or vomiting, headache, chest pain/pressure, rash/itching, or an increase in SOB. Last vital signs:   BP (!) 111/56   Pulse 50   Temp 97.8 °F (36.6 °C) (Oral)   Resp 18   LMP  (LMP Unknown)   SpO2 98%     Patient instructed if they experience any of the above symptoms following today's transfusion,he/she is to notify the physician immediately or go to the emergency department. Discharge instructions given to patient. Verbalizes understanding. Ambulated off unit per self.

## 2020-10-22 NOTE — PROGRESS NOTES
[address] [facility] [address] [place] [address]  Dept: [phone]  Dept Fax: [phone]  Loc: [phone]    Visit Date: 10/22/2020    Ms. Joey Magallanes is a [de-identified] y.o. female  who presented for:      CAD  CABG  PAD    Followed with Jose Luis Palmer JL in past    HPI:   HPI   Andre Mora is a pleasant [de-identified]year old female patient who  has a past medical history of Anemia, Aneurysm of abdominal aorta (HCC), Arthritis, Blood circulation, collateral, Bursitis, trochanteric, CAD (coronary artery disease), Cerebral artery occlusion with cerebral infarction [facility]), Chronic back pain, Cirrhosis of liver without ascites ([facility] Depression, Diabetes mellitus ([facility] GERD (gastroesophageal reflux disease), Headache(784.0), History of basal cell cancer, Hyperlipidemia, Hypertension, Irritable bowel, Movement disorder, PVD (peripheral vascular disease) ([facility] [address] S/P CABG (coronary artery bypass graft), and Sciatica. The patient has known h/o CAD, she is s/p 3 vessel CABG 2016 (Reverse aortocoronary saphenous vein grafts to the LAD, the first diagonal branch, and the obtuse marginal branch of the circumflex). She has had left CCA to left SCA bypass surgery in year 2018. In 07/2020, the patient was reported to have LUE symptoms, syncope. Vascular studies were concerning for stenosis of bypass. The patient is followed by [facility] at [facility]. She was referred to [facility] vascular team in [place], [place]. Left UE Angiogram was done on 7/15/2020, no intervention was needed. The paitnet also has h/o intermediate asymptomatic bilateral carotid stenosis (~60%), on medical therapy. Echocardiogram 06/2019 revealed EF 55%, mild LAE, mild MR, mild TR. LHC in 2018 revealed patent SVGs to LAD, D1 and OM1. Patient denies chest pain, shortness of breath, dyspnea on exertion, orthopnea, paroxysmal nocturnal dyspnea, palpitations, syncope, weight gain or leg swelling.  She reports fatigue and dizziness. She has h/o anemia, she was started on Iron infusion, received one unit PRBC today. Current Outpatient Medications:     donepezil (ARICEPT) 10 MG tablet, TAKE 1 TABLET BY MOUTH EVERY DAY AT NIGHT, Disp: 90 tablet, Rfl: 3    famotidine (PEPCID) 40 MG tablet, TAKE 1 TABLET BY MOUTH EVERY DAY AT NIGHT, Disp: 90 tablet, Rfl: 3    ondansetron (ZOFRAN ODT) 4 MG disintegrating tablet, Take 1 tablet by mouth every 8 hours as needed for Nausea or Vomiting, Disp: 20 tablet, Rfl: 0    ferrous sulfate (IRON 325) 325 (65 Fe) MG tablet, TAKE 1 TABLET BY MOUTH TWICE A DAY, Disp: 180 tablet, Rfl: 3    omeprazole (PRILOSEC) 40 MG delayed release capsule, Take 1 capsule by mouth Daily, Disp: 90 capsule, Rfl: 3    bisacodyl (DULCOLAX) 5 MG EC tablet, See Prep Instructions, Disp: 4 tablet, Rfl: 0    polyethylene glycol (GLYCOLAX) 17 GM/SCOOP powder, Dispense 238 Gram Bottle.   Use as Directed, Disp: 238 g, Rfl: 0    simvastatin (ZOCOR) 40 MG tablet, Take 1 table nightly at bedtime, Disp: 30 tablet, Rfl: 0    clopidogrel (PLAVIX) 75 MG tablet, TAKE 1 TABLET BY MOUTH EVERY DAY, Disp: 90 tablet, Rfl: 3    hydrochlorothiazide (HYDRODIURIL) 25 MG tablet, Take 1 tablet by mouth daily, Disp: 90 tablet, Rfl: 3    blood glucose monitor strips, Check blood sugar q daily, Dx E11.9, Disp: 100 strip, Rfl: 0    metoprolol tartrate (LOPRESSOR) 25 MG tablet, TAKE 1/2 TABLET TWICE A DAY, Disp: 90 tablet, Rfl: 3    Handicap Placard MISC, by Does not apply route Expires 12/14/2022, Disp: 1 each, Rfl: 0    aspirin 81 MG tablet, Take 81 mg by mouth daily, Disp: , Rfl:     Past Medical History  Yoana Farias  has a past medical history of Anemia, Aneurysm of abdominal aorta (HCC), Arthritis, Blood circulation, collateral, Bursitis, trochanteric, CAD (coronary artery disease), Cerebral artery occlusion with cerebral infarction [facility]), Chronic back pain, Cirrhosis of liver without ascites ([facility] Depression, Diabetes mellitus ([facility] GERD (gastroesophageal reflux disease), Headache(784.0), History of basal cell cancer, Hyperlipidemia, Hypertension, Irritable bowel, Movement disorder, PVD (peripheral vascular disease) ([facility] S/P CABG (coronary artery bypass graft), and Sciatica. Social History  Tl Rooney  reports that she quit smoking about 19 years ago. Her smoking use included cigarettes. She has a 12.50 pack-year smoking history. She has never used smokeless tobacco. She reports that she does not drink alcohol or use drugs. Family History  Tl Rooney family history includes Cancer in her brother and son; Early Death in her mother; Heart Disease (age of onset: 52) in her father. Past Surgical History   Past Surgical History:   Procedure Laterality Date    ABDOMEN SURGERY      complete hysterectomy, gallbladder    APPENDECTOMY      CARDIAC CATHETERIZATION  2/3/2016    [facility]    CHOLECYSTECTOMY  yrs ago    COLONOSCOPY      CORONARY ARTERY BYPASS GRAFT  2-18-16    x3     ENDOSCOPY, COLON, DIAGNOSTIC      EYE SURGERY      cataracts, glaucoma    HERNIA REPAIR      Hiatal Hernia    HIATAL HERNIA REPAIR      HYSTERECTOMY      LARYNGOSCOPY  10/29/2012 Dr Mena Brown with Bx, Lingual Tonsillectomy, Hypopharyngoscopy    ID VEIN BYPASS GRAFT,CAROT-SUBCL/ SUBCL-CAROTD Left 4/11/2018    LEFT CAROTID SUBCLAVIAN BYPASS performed by Thania Husain MD at [address] [place] [address]      as a teen    UPPER GASTROINTESTINAL ENDOSCOPY         Review of Systems   Constitutional: Negative for chills and fever  HENT: Negative for congestion, sinus pressure, sneezing and sore throat. Eyes: Negative for pain, discharge, redness and itching. Respiratory: Negative for apnea, cough  Gastrointestinal: Negative for blood in stool, constipation, diarrhea   Endocrine: Negative for cold intolerance, heat intolerance, polydipsia. Genitourinary: Negative for dysuria, enuresis, flank pain and hematuria. Musculoskeletal: Negative for arthralgias, joint swelling and neck pain. Neurological: Negative for numbness and headaches. Psychiatric/Behavioral: Negative for agitation, confusion, decreased concentration and dysphoric mood. Objective:     LMP  (LMP Unknown)     Wt Readings from Last 3 Encounters:   10/01/20 167 lb (75.8 kg)   09/02/20 166 lb 12.8 oz (75.7 kg)   08/26/20 166 lb 9.6 oz (75.6 kg)     BP Readings from Last 3 Encounters:   10/22/20 (!) 94/44   10/01/20 118/76   09/02/20 (!) 136/57       Nursing note and vitals reviewed. Physical Exam   Constitutional: Oriented to person, place, and time. Appears well-developed and well-nourished. ENT: Moist mucous membranes. No bleeding. Tongue is midline. Head: Normocephalic and atraumatic. Eyes: EOM are normal. Pupils are equal, round, and reactive to light. Neck: Normal range of motion. Neck supple. No JVD present. Cardiovascular: Normal rate, regular rhythm, III/VI systolic murmur, no rubs, and intact distal pulses. Pulmonary/Chest: Effort normal and breath sounds normal. No respiratory distress. No wheezes. No rales. Abdominal: Soft. Bowel sounds are normal. No distension. There is no tenderness. Musculoskeletal: Normal range of motion. no edema. Neurological: Alert and oriented to person, place, and time. No cranial nerve deficit. Coordination normal.   Skin: Skin is warm and dry. Psychiatric: Normal mood and affect.        Lab Results   Component Value Date    CKTOTAL 45 07/06/2020    CKTOTAL 55 07/11/2011       Lab Results   Component Value Date    WBC 5.7 10/05/2020    RBC 3.21 10/05/2020    RBC 4.10 07/11/2011    HGB 7.8 10/05/2020    HCT 28.1 10/05/2020    MCV 87.5 10/05/2020    MCH 24.3 10/05/2020    MCHC 27.8 10/05/2020    RDW 20.6 08/02/2019     10/05/2020    MPV 11.0 10/05/2020       Lab Results   Component Value Date     08/26/2020    K 4.3 08/26/2020    K 3.4 02/25/2020     08/26/2020    CO2 27 08/26/2020    BUN 18 08/26/2020    LABALBU 3.9 08/26/2020    LABALBU 4.6 07/11/2011    CREATININE 0.9 08/26/2020    CALCIUM 10.3 08/26/2020    LABGLOM 60 08/26/2020    GLUCOSE 140 08/26/2020       Lab Results   Component Value Date    ALKPHOS 87 08/26/2020    ALT 15 08/26/2020    AST 39 08/26/2020    PROT 6.9 08/26/2020    BILITOT 0.8 08/26/2020    BILIDIR 0.4 07/06/2020    LABALBU 3.9 08/26/2020    LABALBU 4.6 07/11/2011       Lab Results   Component Value Date    MG 1.9 02/25/2020       Lab Results   Component Value Date    INR 1.20 (H) 08/26/2020    INR 1.23 (H) 07/28/2020    INR 1.22 (H) 10/25/2019    PROTIME 14.1 (H) 10/25/2019         Lab Results   Component Value Date    LABA1C 6.7 06/19/2019       Lab Results   Component Value Date    TRIG 68 06/19/2019    HDL 50 06/19/2019    LDLCALC 54 06/19/2019       Lab Results   Component Value Date    TSH 2.780 03/04/2020         Testing Reviewed:      I have individually reviewed the cardiac test below:    ECHO:   Results for orders placed during the hospital encounter of 06/03/19   ECHO Complete 2D W Doppler W Color    Narrative Transthoracic Echocardiography Report (TTE)     Demographics      Patient Name    [address]  Gender               Female                   I      MR #            [id number]      Race                                                      Ethnicity      Account #       [de-identified]      Room Number      Accession       [id number]      Date of Study        06/03/2019   Number      Date of Birth   1940     Referring Physician  Penny Mendieta MD      Age             66 year(s)     Sonographer          Jackie Sanders RDCS                                     Interpreting         Echo reader of the                                  Physician            week                                                       Jeni Zarco MD     Procedure    Type of Study TTE procedure:ECHOCARDIOGRAM COMPLETE 2D W DOPPLER W COLOR. Procedure Date  Date: 06/03/2019 Start: 10:44 AM    Study Location: Echo Lab  Technical Quality: Adequate visualization    Indications:Mitral regurgitation. Additional Medical History:Coronary artery disease, CABG, CVA, PVD,  Hypertension, Hyperlipidemia, Diabetes, Former smoker, GERD    Patient Status: Routine    Height: 65.75 inches Weight: 187 pounds BSA: 1.94 m^2 BMI: 30.41 kg/m^2    BP: 134/61 mmHg    Allergies    - Cipro. - Darvon. - Cipro. - Darvon. Conclusions      Summary   Ejection fraction is visually estimated at 55%. Overall left ventricular function is normal.   Mildly dilated left atrium. Signature      ----------------------------------------------------------------   Electronically signed by Cricket Angel MD (Interpreting   physician) on 06/03/2019 at 04:36 PM   ----------------------------------------------------------------      Findings      Mitral Valve   Mild mitral regurgitation is present. Aortic Valve   The aortic valve was trileaflet with normal thickness and cuspal   separation. DOPPLER: Transaortic velocity was within the normal range with   no evidence of aortic stenosis. There was no evidence of aortic   regurgitation. Tricuspid Valve   Tricuspid valve was not well visualized. Mild tricuspid regurgitation. Pulmonic Valve   The pulmonic valve was not well visualized . Trivial pulmonic regurgitation visualized. Left Atrium   Mildly dilated left atrium. Left Ventricle   Ejection fraction is visually estimated at 55%. Overall left ventricular function is normal.      Right Atrium   Right atrial size was normal.      Right Ventricle   The right ventricular size was normal with normal systolic function and   wall thickness. Pericardial Effusion   The pericardium was normal in appearance with no evidence of a pericardial   effusion.       Pleural Effusion   No evidence of pleural effusion. Aorta / Great Vessels   -Aortic root dimension within normal limits.   -The Pulmonary artery is within normal limits. -IVC size is within normal limits with normal respiratory phasic changes.      M-Mode/2D Measurements & Calculations      LV Diastolic    LV Systolic Dimension:    AV Cusp Separation: 2 cmLA   Dimension: 4.7  3.4 cm                    Dimension: 3.6 cmAO Root   cm              LV Volume Diastolic: 499  Dimension: 2.5 cmLA Area: 21   LV FS:27.7 %    ml                        cm^2   LV PW           LV Volume Systolic: 77.0   Diastolic: 1 cm ml   Septum          LV EDV/LV EDV Index: 480   Diastolic: 1 cm JZ/50 N^2YY ESV/LV ESV    RV Diastolic Dimension: 3.3 cm                   Index: 47.4 ml/24 m^2                   EF Calculated: 53.5 %     LA/Aorta: 1.44                                                LA volume/Index: 64.5 ml /33m^2     Doppler Measurements & Calculations      MV Peak E-Wave: 94 cm/s    AV Peak Velocity: 166  LVOT Peak Velocity: 101   MV Peak A-Wave: 113 cm/s   cm/s                   cm/s   MV E/A Ratio: 0.83         AV Peak Gradient:      LVOT Peak Gradient: 4   MV Peak Gradient: 3.53     11.02 mmHg             mmHg   mmHg                                                     TV Peak E-Wave: 62.2   MV Deceleration Time: 310                         cm/s   msec                                              TV Peak A-Wave: 50.7                              IVRT: 70 msec          cm/s      MV E' Septal Velocity: 5.9                        TV Peak Gradient: 1.55   cm/s                       AV DVI (Vmax):0.61     mmHg   MV A' Septal Velocity: 9                          TR Velocity:209 cm/s   cm/s                                              TR Gradient:17.47 mmHg   MV E' Lateral Velocity:                           PV Peak Velocity: 73.7   10.3 cm/s                                         cm/s   MV A' Lateral Velocity:                           PV Peak Gradient: 2.17   7.3 cm/s                                          mmHg   E/E' septal: 15.93   E/E' lateral: 9.13   MR Velocity: 463 cm/s                             KS ED Velocity: 99.1                                                     cm/s     [email or web address] [id number]        Ekg:   EKG Interpretation:  normal EKG, normal sinus rhythm, nonspecific ST and T waves changes, anterior infarct     Cath:2018  Distal left main is 60% stenosed. SVG to LAD is patent. SVG to OM1 is patent. SVG to diagonal 1 is patent. RCA is mild diffuse disease. Patient has a long 3-3.5 cm infrarenal abdominal aneurysm. Recommendations      Continue current cardiac care. Patient is scheduled for left carotid artery to left subclavian artery   bypass surgery. Left heart cath was done as a preop risk stratification. Stable coronary artery disease. 3.5 cm infrarenal abdominal aneurysm. Annual follow-up. Estimated Blood Loss:0 ml. Complications:No complications.       Signatures      ----------------------------------------------------------------   Electronically signed by Luis Eduardo Doty MD (Performing   Physician) on 03/28/2018 at 16:51   ----------------------------------------------------------------        LUE/Graft Duplex imaging 5/28/20:  FINDINGS:     LEFT ARTERY  (PSV cm/sec)    PROX CCA -------> 90 PSV cm/sec  LEFT VERT -----> 29 PSV cm/sec RETROGRADE FLOW  PROX ANAST ----> 180 PSV cm/sec  PROX GRAFT ------> 183 PSV cm/sec  MID GRAFT -------> 180 PSV cm/sec  DIST GRAFT ------->138 PSV cm/sec    PROX SUBCLAVIAN ------>123 PSV cm/sec   MID SUBCLAVIAN --------->108 PSV cm/sec   DIST SUBCLAVIAN ------->96 PSV cm/sec   AXILLARY ------------------->104 PSV cm/sec   PROX BRACHIAL --------->81 PSV cm/sec   MID BRACHIAL ------------>76 PSV cm/sec   DIST BRACHIAL ---------->70 PSV cm/sec 7/15/2020, no intervention was needed. The paitnet also has h/o intermediate asymptomatic bilateral carotid stenosis (~60%), on medical therapy. Echocardiogram 06/2019 revealed EF 55%, mild LAE, mild MR, mild TR. LHC in 2018 revealed patent SVGs to LAD, D1 and OM1. Patient denies chest pain, shortness of breath, dyspnea on exertion, orthopnea, paroxysmal nocturnal dyspnea, palpitations, syncope, weight gain or leg swelling. She reports fatigue and dizziness. She has h/o anemia, she was started on Iron infusion, received one unit PRBC today. 1. Fatigue  2. CAD  3. 3 vessel CABG 2016  4. Patent SVGs to LAD, D1 and OM1 on Cath 2018  5. Chronic HFpEF (EF 55% on Echo 05/2019)  6. Mild mitral regurgitation   7. Mild tricuspid regurgitation  8. Abdominal aortic ectasia  9. S/p left CCA to left SCA bypass surgery in 2018 (patent per angiogram at OSH 07/2020)  10. H/O Syncope   11. PAD  12. Bilateral asymptomatic ~60% DIXIE  13. DM  14. Dyslipidemia  15. HTN  16. Chronic anemia      Plavix 75 mg po daily   ASA 81 mg po daily   Metoprolol 25 mg po BID    HCTZ 25 mg po daily   Zocor 40 mg po daily   Allergic to Lipitor   LDL 54 in 2019   Repeat Lipid panel   Reports worsening fatigue, dizziness   Known valvular heart disease   Check echocardiogram         Above findings and plan of care were discussed with patient in details, patient's questions were answered.      Disposition:  RTC in 6 months            Electronically signed by Brigette Pemberton MD, Dain Singleton, [place]    10/22/2020 at 12:32 PM EDT

## 2020-10-22 NOTE — PLAN OF CARE
Care plan reviewed with patient. Patient verbalized understanding of the plan of care and contribute to goal setting. Problem: Intellectual/Education/Knowledge Deficit  Goal: Teaching initiated upon admission  Outcome: Met This Shift  Note: Patient verbalize understanding to  verbal and written information given  on blood transfusion,procedure ,and possible reaction. Instructed to call MD if develop any complications once discharged. Intervention: Verbal/written education provided  Note: Patient educated blood product transfusion protocol:    Patient receiving blood:      - Blood product transfusion information sheet given: questions answered and consent signed  - Take vital signs/ monitor lungs sound prior to transfusion  - Monitor patient for 15 minutes after transfusion started  - Take vital signs / monitor lungs sound in 15 minutes and post transfusion  - Assess IV site   - Monitor patient closely for potential transfusion reaction    Call MD if develop complications once discharged. Problem: Discharge Planning  Goal: Knowledge of discharge instructions  Description: Knowledge of discharge instructions  Outcome: Met This Shift  Note: Verbalized understanding of discharge instructions, follow ups and when to call doctor   Intervention: Discharge to appropriate level of care  Note: Discuss discharge instructions, follow ups and when to call doctor. Problem: Falls - Risk of:  Goal: Will remain free from falls  Description: Will remain free from falls  Outcome: Met This Shift  Note: Free from falls while in infusion center.  Verbalized understanding of fall prevention and to ask for assistance with ambulation   Intervention: Assess medication that may increase risk of fall  Description: [TRUNCATED] Assess medication that may increase risk of fall - REMINDER(s): Anticholinergic burden, Anticonvulsant use, Antidepressant use, Antihypertensive use, Antipsychotic use, Benzodiazepine use, Hypnotic medication use, Medication use multiple, ... [TRUNCATED] Assess medication that may increase risk of fall - REMINDER(s): Anticholinergic burden, Anticonvulsant use, Antidepressant use, Antihypertensive use, Antipsychotic use, Benzodiazepine use, Hypnotic medication use, Medication use multiple, ...   Note: Assess for fall risk, instruct to ask for assistance with ambulation

## 2020-10-23 ENCOUNTER — TELEPHONE (OUTPATIENT)
Dept: CARDIOLOGY CLINIC | Age: 80
End: 2020-10-23

## 2020-10-26 ENCOUNTER — HOSPITAL ENCOUNTER (OUTPATIENT)
Age: 80
Discharge: HOME | End: 2020-10-26
Payer: MEDICARE

## 2020-10-26 LAB
CHOLESTEROL, TOTAL: 108 MG/DL (ref 100–199)
HDLC SERPL-MCNC: 43 MG/DL
LDL CHOLESTEROL CALCULATED: 52 MG/DL
TRIGL SERPL-MCNC: 66 MG/DL (ref 0–199)

## 2020-10-26 PROCEDURE — 36415 COLL VENOUS BLD VENIPUNCTURE: CPT

## 2020-10-26 PROCEDURE — 80061 LIPID PANEL: CPT

## 2020-10-27 ENCOUNTER — HOSPITAL ENCOUNTER (OUTPATIENT)
Dept: NON INVASIVE DIAGNOSTICS | Age: 80
Discharge: HOME | End: 2020-10-27
Payer: MEDICARE

## 2020-10-27 LAB
LV EF: 58 %
LVEF MODALITY: NORMAL

## 2020-10-27 PROCEDURE — 93306 TTE W/DOPPLER COMPLETE: CPT

## 2020-11-09 ENCOUNTER — TELEPHONE (OUTPATIENT)
Dept: FAMILY MEDICINE CLINIC | Age: 80
End: 2020-11-09

## 2020-11-09 NOTE — TELEPHONE ENCOUNTER
Kellee Garcia Danna called stating [employer] (ph# [phone])[facility] not received the order for diabetic supplies.      Please advise

## 2020-11-11 ENCOUNTER — TELEPHONE (OUTPATIENT)
Dept: FAMILY MEDICINE CLINIC | Age: 80
End: 2020-11-11

## 2020-11-11 NOTE — TELEPHONE ENCOUNTER
Pt called stating she sugar levels have been running in between 151 to 170 and she is unable to get her sugar levels down. Pt is asking what she is needing to do. Please advise.

## 2020-11-11 NOTE — TELEPHONE ENCOUNTER
That's not excessively high, I recommend that she make an appt in the next couple of weeks to review this.

## 2020-11-12 NOTE — TELEPHONE ENCOUNTER
[facility] equipment to resend paperwork and office is closed will call back in the AM

## 2020-11-18 ENCOUNTER — TELEPHONE (OUTPATIENT)
Dept: FAMILY MEDICINE CLINIC | Age: 80
End: 2020-11-18

## 2020-11-18 NOTE — TELEPHONE ENCOUNTER
Annel Patel called stating the office note from 10/1/2020 needs to be updated to say standard wheelchair and not light weight wheelchair.        Please fax updated office note to [phone]  Attn: Sridevi Rendon

## 2020-11-20 ENCOUNTER — NURSE ONLY (OUTPATIENT)
Dept: LAB | Age: 80
End: 2020-11-20

## 2020-11-20 ENCOUNTER — HOSPITAL ENCOUNTER (OUTPATIENT)
Age: 80
Setting detail: SPECIMEN
Discharge: HOME | End: 2020-11-20
Payer: MEDICARE

## 2020-11-20 LAB
ANISOCYTOSIS: PRESENT
BASOPHILS # BLD: 1 %
BASOPHILS ABSOLUTE: 0.1 THOU/MM3 (ref 0–0.1)
EOSINOPHIL # BLD: 2.7 %
EOSINOPHILS ABSOLUTE: 0.2 THOU/MM3 (ref 0–0.4)
ERYTHROCYTE [DISTWIDTH] IN BLOOD BY AUTOMATED COUNT: 21.7 % (ref 11.5–14.5)
ERYTHROCYTE [DISTWIDTH] IN BLOOD BY AUTOMATED COUNT: 66.2 FL (ref 35–45)
HCT VFR BLD CALC: 25.1 % (ref 37–47)
HEMOGLOBIN: 7.1 GM/DL (ref 12–16)
HYPOCHROMIA: PRESENT
IMMATURE GRANS (ABS): 0.01 THOU/MM3 (ref 0–0.07)
IMMATURE GRANULOCYTES: 0.2 %
LYMPHOCYTES # BLD: 29 %
LYMPHOCYTES ABSOLUTE: 1.7 THOU/MM3 (ref 1–4.8)
MCH RBC QN AUTO: 23.7 PG (ref 26–33)
MCHC RBC AUTO-ENTMCNC: 28.3 GM/DL (ref 32.2–35.5)
MCV RBC AUTO: 83.7 FL (ref 81–99)
MONOCYTES # BLD: 15.4 %
MONOCYTES ABSOLUTE: 0.9 THOU/MM3 (ref 0.4–1.3)
NUCLEATED RED BLOOD CELLS: 0 /100 WBC
PLATELET # BLD: 249 THOU/MM3 (ref 130–400)
PMV BLD AUTO: 11.8 FL (ref 9.4–12.4)
POIKILOCYTES: ABNORMAL
RBC # BLD: 3 MILL/MM3 (ref 4.2–5.4)
SCAN OF BLOOD SMEAR: NORMAL
SEG NEUTROPHILS: 51.7 %
SEGMENTED NEUTROPHILS ABSOLUTE COUNT: 3.1 THOU/MM3 (ref 1.8–7.7)
WBC # BLD: 5.9 THOU/MM3 (ref 4.8–10.8)

## 2020-11-20 PROCEDURE — U0003 INFECTIOUS AGENT DETECTION BY NUCLEIC ACID (DNA OR RNA); SEVERE ACUTE RESPIRATORY SYNDROME CORONAVIRUS 2 (SARS-COV-2) (CORONAVIRUS DISEASE [COVID-19]), AMPLIFIED PROBE TECHNIQUE, MAKING USE OF HIGH THROUGHPUT TECHNOLOGIES AS DESCRIBED BY CMS-2020-01-R: HCPCS

## 2020-11-20 NOTE — TELEPHONE ENCOUNTER
Recent Visits  Date Type Provider Dept   08/10/20 Office Visit Dane Katerina Swartz 110   07/09/20 Office Visit Dane Swartz, DO [facility]   06/18/20 Office Visit Dane Swartz, DO [facility]   03/04/20 Office Visit Dane Swartz, DO [facility]   12/18/19 Office Visit Dane Swartz, DO [facility]   10/09/19 Office Visit Dane Swartz, DO [facility]   08/22/19 Office Visit Dane Swartz, DO [facility]   06/18/19 Office Visit Dane Swartz, DO [facility]   Showing recent visits within past 540 days with a meds authorizing provider and meeting all other requirements     Future Appointments  Date Type Provider Dept   12/21/20 Appointment Dane Swartz, DO [facility]  [address]   Showing future appointments within next 150 days with a meds authorizing provider and meeting all other requirements     Future Appointments   Date Time Provider Ashley [place]   12/21/2020 10:20 AM DO VERNA MaeA PCT P - [place]   2/25/2021  2:30 PM Rubi Shaffer MD [facility] - [place]   6/1/2021 10:30 AM STR ULTRASOUND RM 2 STR US STR Radiolog   6/1/2021 11:00 AM STR VASCULAR LAB ROOM 2 [facility] VAS LAB STR Radiolog   6/7/2021 10:30 AM Kody Santoro PA-C [facility] CT/CV P - [place]

## 2020-11-22 LAB — SARS-COV-2: NOT DETECTED

## 2020-11-24 ENCOUNTER — HOSPITAL ENCOUNTER (OUTPATIENT)
Dept: INFUSION THERAPY | Age: 80
Discharge: HOME | End: 2020-11-24
Payer: MEDICARE

## 2020-11-24 VITALS
TEMPERATURE: 98.5 F | HEART RATE: 57 BPM | DIASTOLIC BLOOD PRESSURE: 49 MMHG | SYSTOLIC BLOOD PRESSURE: 109 MMHG | RESPIRATION RATE: 16 BRPM | OXYGEN SATURATION: 96 %

## 2020-11-24 DIAGNOSIS — D50.0 IRON DEFICIENCY ANEMIA DUE TO CHRONIC BLOOD LOSS: Primary | ICD-10-CM

## 2020-11-24 LAB
ABO: NORMAL
ANTIBODY SCREEN: NORMAL
HCT VFR BLD CALC: 25.5 % (ref 37–47)
HEMOGLOBIN: 7.4 GM/DL (ref 12–16)
RH FACTOR: NORMAL

## 2020-11-24 PROCEDURE — 86850 RBC ANTIBODY SCREEN: CPT

## 2020-11-24 PROCEDURE — 85014 HEMATOCRIT: CPT

## 2020-11-24 PROCEDURE — 85018 HEMOGLOBIN: CPT

## 2020-11-24 PROCEDURE — 99211 OFF/OP EST MAY X REQ PHY/QHP: CPT

## 2020-11-24 PROCEDURE — P9016 RBC LEUKOCYTES REDUCED: HCPCS

## 2020-11-24 PROCEDURE — 2580000003 HC RX 258: Performed by: SPECIALIST

## 2020-11-24 PROCEDURE — 36430 TRANSFUSION BLD/BLD COMPNT: CPT

## 2020-11-24 PROCEDURE — 86900 BLOOD TYPING SEROLOGIC ABO: CPT

## 2020-11-24 PROCEDURE — 36415 COLL VENOUS BLD VENIPUNCTURE: CPT

## 2020-11-24 PROCEDURE — 86901 BLOOD TYPING SEROLOGIC RH(D): CPT

## 2020-11-24 PROCEDURE — 86923 COMPATIBILITY TEST ELECTRIC: CPT

## 2020-11-24 RX ORDER — 0.9 % SODIUM CHLORIDE 0.9 %
250 INTRAVENOUS SOLUTION INTRAVENOUS ONCE
Status: COMPLETED | OUTPATIENT
Start: 2020-11-24 | End: 2020-11-24

## 2020-11-24 RX ADMIN — SODIUM CHLORIDE 250 ML: 9 INJECTION, SOLUTION INTRAVENOUS at 13:45

## 2020-11-24 RX ADMIN — SODIUM CHLORIDE 250 ML: 9 INJECTION, SOLUTION INTRAVENOUS at 09:31

## 2020-11-24 NOTE — PLAN OF CARE
Problem: Intellectual/Education/Knowledge Deficit  Goal: Teaching initiated upon admission  Outcome: Met This Shift  Note: Patient verbalize understanding to verbal and written information given on PRBCs transfusions, procedure and possible reaction. Instructed to call MD if develop any complications once discharged. Intervention: Verbal/written education provided  Note: Patient educated blood product transfusion protocol:    Patient receiving 2 units PRBCs:      - Blood product transfusion information sheet given: questions answered and consent signed  - Take vital signs/ monitor lungs sound prior to transfusion  - Monitor patient for 15 minutes after transfusion started  - Take vital signs / monitor lungs sound in 15 minutes and post transfusion  - Assess IV site   - Monitor patient closely for potential transfusion reaction    Call MD if develop complications once discharged. Problem: Discharge Planning  Goal: Knowledge of discharge instructions  Description: Knowledge of discharge instructions  Outcome: Met This Shift  Note: Patient verbalizes understanding of discharge instructions, follow up appointment, and when to call physician if needed   Intervention: Discharge to appropriate level of care  Note: Discharge instructions given to pt and reviewed. Follow up appointments discussed. Problem: Falls - Risk of:  Goal: Will remain free from falls  Description: Will remain free from falls  Outcome: Met This Shift  Note: Patient free of falls this visit. Intervention: Assess medication that may increase risk of fall  Note: Fall risks assessed. Precautions discussed. Call light within reach during visit. Patient mobilized with stand by assist during visit. Care plan reviewed with patient. Patient verbalizes understanding of the plan of care and contributes to goal setting.

## 2020-11-24 NOTE — PROGRESS NOTES
Patient tolerated transfusion of 2 units PRBCs without any complications. Patient kept for 20 minutes observation post transfusion. Denies dizziness, lightheadedness, acute nausea or vomiting, headache, chest pain/pressure, rash/itching, or an increase in SOB. Last vital signs:   BP (!) 109/49   Pulse 57   Temp 98.5 °F (36.9 °C) (Oral)   Resp 16 Comment: lungs clear  LMP  (LMP Unknown)   SpO2 96%     Patient instructed if they experience any of the above symptoms following today's transfusion, she is to notify the physician immediately or go to the emergency department. Discharge instructions given to patient. Verbalizes understanding. Patient wheeled off unit via wheelchair per  with belongings.

## 2020-11-25 ENCOUNTER — TELEPHONE (OUTPATIENT)
Dept: FAMILY MEDICINE CLINIC | Age: 80
End: 2020-11-25

## 2020-11-25 NOTE — TELEPHONE ENCOUNTER
We can do the prevnar next time they are in the office. Medicare generally does not cover a prophylactic TDap    Will need to contact their insurance to see if they cover Shingrix, we could do it here if they do cover it.

## 2020-11-25 NOTE — TELEPHONE ENCOUNTER
Laisha Encarnacion stopped in stating that he had just left the pharmacy and It was suggested that  He and Yoana Farias inquire about getting the following vaccines:  Shingles   T-dap  Prevnar    Please advise

## 2020-12-21 ENCOUNTER — OFFICE VISIT (OUTPATIENT)
Dept: FAMILY MEDICINE CLINIC | Age: 80
End: 2020-12-21
Payer: MEDICARE

## 2020-12-21 VITALS
SYSTOLIC BLOOD PRESSURE: 126 MMHG | BODY MASS INDEX: 25.26 KG/M2 | RESPIRATION RATE: 18 BRPM | WEIGHT: 157.2 LBS | TEMPERATURE: 97.4 F | DIASTOLIC BLOOD PRESSURE: 82 MMHG | HEART RATE: 58 BPM | OXYGEN SATURATION: 98 % | HEIGHT: 66 IN

## 2020-12-21 PROCEDURE — 99213 OFFICE O/P EST LOW 20 MIN: CPT | Performed by: FAMILY MEDICINE

## 2020-12-21 PROCEDURE — G0438 PPPS, INITIAL VISIT: HCPCS | Performed by: FAMILY MEDICINE

## 2020-12-21 RX ORDER — HYDROCHLOROTHIAZIDE 25 MG/1
25 TABLET ORAL DAILY
Qty: 90 TABLET | Refills: 3 | Status: SHIPPED | OUTPATIENT
Start: 2020-12-21 | End: 2021-03-22

## 2020-12-21 RX ORDER — MIRTAZAPINE 7.5 MG/1
7.5 TABLET, FILM COATED ORAL NIGHTLY
Qty: 30 TABLET | Refills: 5 | Status: SHIPPED | OUTPATIENT
Start: 2020-12-21 | End: 2020-12-21 | Stop reason: SDUPTHER

## 2020-12-21 RX ORDER — MIRTAZAPINE 7.5 MG/1
7.5 TABLET, FILM COATED ORAL NIGHTLY
Qty: 90 TABLET | Refills: 3 | Status: SHIPPED | OUTPATIENT
Start: 2020-12-21 | End: 2022-03-18 | Stop reason: SDUPTHER

## 2020-12-21 RX ORDER — LIDOCAINE 50 MG/G
3 PATCH TOPICAL EVERY 24 HOURS
Qty: 90 PATCH | Refills: 3 | Status: SHIPPED | OUTPATIENT
Start: 2020-12-21

## 2020-12-21 RX ORDER — FAMOTIDINE 40 MG/1
TABLET, FILM COATED ORAL
Qty: 90 TABLET | Refills: 3 | Status: SHIPPED | OUTPATIENT
Start: 2020-12-21 | End: 2021-11-29

## 2020-12-21 NOTE — PROGRESS NOTES
glucose monitor strips Check blood sugar q daily, Dx E11.9 Yes Moriah Miller,    Handicap Placard MISC by Does not apply route Expires 12/14/2022 Yes Moriah Miller,    aspirin 81 MG tablet Take 81 mg by mouth daily Yes Historical Provider, MD         Past Medical History:   Diagnosis Date    Anemia     [facility]     Arthritis     Blood circulation, collateral     Bursitis, trochanteric     CAD (coronary artery disease) 2/2015    Cerebral artery occlusion with cerebral infarction (HCC)     Chronic back pain     Cirrhosis of liver without ascites ([facility] [facility] 9/2/2020    Depression     Diabetes mellitus (HCC)     diet controlled    GERD (gastroesophageal reflux disease)     Headache(784.0)     History of basal cell cancer     Nose    Hyperlipidemia     Hypertension     Irritable bowel     Movement disorder     PVD [facility]peripheral vascular disease) ([facility]     S/P CABG (coronary artery bypass graft)     Sciatica        Past Surgical History:   Procedure Laterality Date    ABDOMEN SURGERY      complete hysterectomy, gallbladder    APPENDECTOMY      CARD[facility]AC [facility]IZATION  2/3/2016    [facility]    CHOLECYSTECTOMY  yrs ago    COLONOSCOPY      CORONARY ARTERY BYPASS GRAFT  2-18-16    x3     ENDOSCOPY, COLON, DIAGNOSTIC      EYE SURGERY      cataracts, glaucoma    HERNIA REPAIR      Hiatal Hernia    HIATAL HERNIA REPAIR      HYSTERECTOMY      LARYNGOSCOPY  10/29/2012 Dr Cyndi Echevarria with Bx, Lingual Tonsillectomy, Hypopharyngoscopy    [place] VEIN BYPASS GRAFT,CAROT-SUBCL/ SUBCL-CAROTD Left 4/11/2018    LEFT CAROTID SUBCLAVIAN BYPASS performed by Amando Humphries MD at [address] [address]      as a teen    UPPER GASTROINTESTINAL ENDOSCOPY           Family History   Problem Relation Age of Onset    Early Death Mother         not sure of cause    Heart Disease Father 52        MI    Cancer Brother         pancreatic    Cancer Son File Not on File Not on File      General Health Risk Interventions:  · Relates all of the issues to her dementia and anemia, addressed in other note    Health Habits/Nutrition:  Health Habits/Nutrition  Do you exercise for at least 20 minutes 2-3 times per week?: (!) No  Have you lost any weight without trying in the past 3 months?: No  Do you eat fewer than 2 meals per day?: No  Have you seen a dentist within the past year?: Yes  Body mass index: (!) 25.37  Health Habits/Nutrition Interventions:  · Advised on activity as tolerated     Safety:  Safety  Do you have working smoke detectors?: Yes  Have all throw rugs been removed or fastened?: (!) No  Do you have non-slip mats or surfaces in all bathtubs/showers?: Yes  Do all of your stairways have a railing or banister?: (!) No  Are your doorways, halls and stairs free of clutter?: Yes  Do you always fasten your seatbelt when you are in a car?: Yes  Safety Interventions:  · Home safety tips provided    ADL:  ADLs  In the past 7 days, did you need help from others to perform any of the following everyday activities? Eating, dressing, grooming, bathing, toileting, or walking/balance?: (!) Walking/Balance, Toileting, Bathing, Grooming, Dressing  In the past 7 days, did you need help from others to take care of any of the following?  Laundry, housekeeping, banking/finances, shopping, telephone use, food preparation, transportation, or taking medications?: [employer], Housekeeping, Banking/Finances, Shopping, Telephone Use, Food Preparation, Transportation  ADL Interventions:  · Lives with  that helps with all ADLs and IADLs    Personalized Preventive Plan   Current Health Maintenance Status  Immunization History   Administered Date(s) Administered    Influenza 11/03/2011, 10/15/2012, 12/11/2013    Influenza Vaccine, unspecified formulation 09/13/2016    Influenza Virus Vaccine 11/10/2014, 11/17/2015, 10/01/2017    Influenza, High Dose (Fluzone 65 yrs and older) 10/03/2018, 08/22/2019    Pneumococcal Conjugate 13-valent ([id number]) 11/11/2017    Pneumococcal Polysaccharide ([id number]) 11/01/2015    Tdap (Boostrix, Adacel) 10/15/2012    Zoster Live (Zostavax) 09/20/2016        Health Maintenance   Topic Date Due    Hepatitis A vaccine (1 of 2 - Risk 2-dose series) 07/25/1941    Hepatitis B vaccine (1 of 3 - Risk 3-dose series) 07/25/1959    DEXA (modify frequency per FRAX score)  07/25/1995    Shingles Vaccine (2 of 3) 11/15/2016    Annual Wellness Visit (AWV)  06/18/2019    Potassium monitoring  08/26/2021    Creatinine monitoring  08/26/2021    Lipid screen  10/26/2021    DTaP/Tdap/Td vaccine (2 - Td) 10/15/2022    Flu vaccine  Completed    Pneumococcal 65+ years Vaccine  Completed    Hib vaccine  Aged Out    Meningococcal (ACWY) vaccine  Aged Out     Recommendations for [employer] Due: see orders and patient instructions/AVS.  . Recommended screening schedule for the next 5-10 years is provided to the patient in written form: see Patient Janis Romo was seen today for medicare awv. Diagnoses and all orders for this visit:    Routine general medical examination at a health care facility    At high risk for falls    Chronic bilateral low back pain without sciatica  -     lidocaine (LIDODERM) 5 %; Place 3 patches onto the skin every 24 hours 12 hours on, 12 hours off. Gastroesophageal reflux disease without esophagitis  -     famotidine (PEPCID) 40 MG tablet; Take 1 tab PO daily    Chronic superficial gastritis without bleeding  -     famotidine (PEPCID) 40 MG tablet; Take 1 tab PO daily    Weight loss  -     mirtazapine (REMERON) 7.5 MG tablet; Take 1 tablet by mouth nightly               On the basis of positive falls risk screening, assessment and plan is as follows: home safety tips provided.

## 2020-12-21 NOTE — PROGRESS NOTES
Felipe Schneider is a [de-identified] y.o. female that presents for     Chief Complaint   Patient presents with    Medicare AWV       /82   Pulse 58   Temp 97.4 °F (36.3 °C)   Resp 18   Ht 5' 6\" (1.676 m)   Wt 157 lb 3.2 oz (71.3 kg)   LMP  (LMP Unknown)   SpO2 98%   BMI 25.37 kg/m²       HPI:    Weight has been trending down over the past 6 months. Has had a full GI work up recently. Patient states that she will eat and then feel full. Lost about 10 lbs in the past 4 months. No fever, chills, diarrhea, N/V. Anemia:  Following with Dr Gil Durand. She has been getting iron infusions. Notes that she is having frequent fatigue. Dr Gil Durand had her hold her ASA and plavix for 1 week. Dementia    HPI:     History is obtained from patient and . New concerns - no new concerns. Since last visit, memory has been about the same. She does seem stable today. Her  states that she has been doing better. Current Meds:  aricept  In terms of ADLs, she needs Independent  In terms of IADLs she needs Moderate Assist  Driving - none   Continence - normal.    The patient is left alone rarely. Ambulation:  normal.         HTN    Does patient check BP regularly at home? - No  Current Medication regimen - HCTZ, metoprolol  Tolerating medications well? - yes    Shortness of breath or chest pain? No  Headache or visual complaints? No  Neurologic changes like confusion? No  Extremity edema?  No    BP Readings from Last 3 Encounters:   12/21/20 126/82   11/24/20 (!) 109/49   10/22/20 (!) 111/56         Health Maintenance   Topic Date Due    Hepatitis A vaccine (1 of 2 - Risk 2-dose series) 07/25/1941    Hepatitis B vaccine (1 of 3 - Risk 3-dose series) 07/25/1959    DEXA (modify frequency per FRAX score)  07/25/1995    Shingles Vaccine (2 of 3) 11/15/2016    Annual Wellness Visit (AWV)  06/18/2019    Potassium monitoring  08/26/2021    Creatinine monitoring  08/26/2021    Lipid screen  10/26/2021    DTaP/Tdap/Td vaccine (2 - Td) 10/15/2022    Flu vaccine  Completed    Pneumococcal 65+ years Vaccine  Completed    Hib vaccine  Aged Out    Meningococcal (ACWY) vaccine  Aged Out       Past Medical History:   Diagnosis Date    Anemia     Aneurysm of abdominal aorta (HCC)     Arthritis     Blood circulation, collateral     Bursitis, trochanteric     CAD (coronary artery disease) 2015    Cerebral artery occlusion with cerebral infarction (HCC)     Chronic back pain     Cirrhosis of liver without ascites ([facility] [address] 2020    Depression     Diabetes mellitus (HCC)     diet controlled    GERD (gastroesophageal reflux disease)     Headache(784.0)     History of basal cell cancer     Nose    Hyperlipidemia     Hypertension     Irritable bowel     Movement disorder     PVD (peripheral vascular disease) ([facility] [address] [facility]     S/P CABG (coronary artery bypass graft)     Sciatica        Past Surgical History:   Procedure Laterality Date    ABDOMEN SURGERY      complete hysterectomy, gallbladder    APPENDECTOMY      C[facility]    CHOLECYSTECTOMY  yrs ago    COLONOSCOPY      CORONARY ARTERY BYPASS GRAFT  2-18-16    x3     ENDOSCOPY, COLON, DIAGNOSTIC      EYE SURGERY      cataracts, glaucoma    HERNIA REPAIR      Hiatal Hernia    HIATAL HERNIA REPAIR      HYSTERECTOMY      LARYNGOSCOPY  10/29/2012 Dr Madalyn Morillo with Bx, Lingual Tonsillectomy, Hypopharyngoscopy    [place] VEIN BYPASS GRAFT,CAROT-SUBCL/ SUBCL-CAROTD Left 2018    LEFT CAROTID SUBCLAVIAN BYPASS performed by Ana English MD at [address] [place] [address]      as a teen    UPPER GASTROINTESTINAL ENDOSCOPY         Social History     Tobacco Use    Smoking status: Former Smoker     Packs/day: 0.50     Years: 25.00     Pack years: 12.50     Types: Cigarettes     Quit date: 11/3/2000     Years since quittin.1    Smokeless tobacco: Never Used    Tobacco comment: quit in 2000   Substance Use Topics    Alcohol use: No    Drug use: No       Family History   Problem Relation Age of Onset    Early Death Mother         not sure of cause    Heart Disease Father 52        MI    Cancer Brother         pancreatic    Cancer Son         larynx    Diabetes Neg Hx     High Blood Pressure Neg Hx     Stroke Neg Hx     Colon Cancer Neg Hx     Colon Polyps Neg Hx          I have reviewed the patient's past medical history, past surgical history, allergies, medications, social and family history and I have made updates where appropriate. Review of Systems        PHYSICAL EXAM:  /82   Pulse 58   Temp 97.4 °F (36.3 °C)   Resp 18   Ht 5' 6\" (1.676 m)   Wt 157 lb 3.2 oz (71.3 kg)   LMP  (LMP Unknown)   SpO2 98%   BMI 25.37 kg/m²     General Appearance: well developed and well- nourished, in no acute distress  Head: normocephalic and atraumatic  ENT: external ear and ear canal normal bilaterally, nose without deformity, nasal mucosa and turbinates normal without polyps, oropharynx normal, dentition is normal for age, no lipor gum lesions noted  Neck: supple and non-tender without mass, no thyromegaly or thyroid nodules, no cervical lymphadenopathy  Pulmonary/Chest: clear to auscultation bilaterally- no wheezes, rales or rhonchi, normal air movement, no respiratory distress or retractions  Cardiovascular: normal rate, regular rhythm, normal S1 and S2, no murmurs, rubs, clicks, or gallops, distal pulses intact  Extremities: no cyanosis, clubbing or edema of the lower extremities  Psych:  Normal affect without evidence of depression oranxiety, insight and judgement are appropriate, memory appears impaired, but improved from previous visits  Skin: warm and dry, no rash or erythema      ASSESSMENT & PLAN  Don Rivera was seen today for medicare awv.     Diagnoses and all orders for this visit:    Routine general medical examination at a health care facility    At high risk for falls    Chronic bilateral low back pain without sciatica  -     lidocaine (LIDODERM) 5 %; Place 3 patches onto the skin every 24 hours 12 hours on, 12 hours off. Gastroesophageal reflux disease without esophagitis  -     famotidine (PEPCID) 40 MG tablet; Take 1 tab PO daily    Chronic superficial gastritis without bleeding  -     famotidine (PEPCID) 40 MG tablet; Take 1 tab PO daily    Weight loss  -     mirtazapine (REMERON) 7.5 MG tablet; Take 1 tablet by mouth nightly    -Unclear etiology of her weight loss, has seen GI recently, will trial adding remeron and reeval in 3 months.  -Other chronic issues are stable, continue current medications  -Advised to call if any issues    Return in 3 months (on 3/21/2021) for Medicare Annual Wellness Visit in 1 year. Controlled Substance Monitoring:    Acute and Chronic Pain Monitoring:   RX Monitoring 5/9/2017   Attestation The Prescription Monitoring Report for this patient was reviewed today. Periodic Controlled Substance Monitoring No signs of potential drug abuse or diversion identified. Liv Camara received counseling on the following healthy behaviors: medication adherence  Reviewed prior labs and health maintenance. Continue current medications, diet and exercise. Discussed use, benefit, and side effects of prescribed medications. Barriers to medication compliance addressed. Patient given educational materials - see patient instructions. All patient questions answered. Patient voiced understanding.

## 2020-12-21 NOTE — PATIENT INSTRUCTIONS
Personalized Preventive Plan for Ekaterina Ashland - 12/21/2020  Medicare offers a range of preventive health benefits. Some of the tests and screenings are paid in full while other may be subject to a deductible, co-insurance, and/or copay. Some of these benefits include a comprehensive review of your medical history including lifestyle, illnesses that may run in your family, and various assessments and screenings as appropriate. After reviewing your medical record and screening and assessments performed today your provider may have ordered immunizations, labs, imaging, and/or referrals for you. A list of these orders (if applicable) as well as your Preventive Care list are included within your After Visit Summary for your review. Other Preventive Recommendations:    · A preventive eye exam performed by an eye specialist is recommended every 1-2 years to screen for glaucoma; cataracts, macular degeneration, and other eye disorders. · A preventive dental visit is recommended every 6 months. · Try to get at least 150 minutes of exercise per week or 10,000 steps per day on a pedometer . · Order or download the FREE \"Exercise & Physical Activity: Your Everyday Guide\" from [employer]. Call 4-790-938-2111 or search [employer] online. · You need 3111-2228 mg of calcium and 7044-5602 IU of vitamin D per day. It is possible to meet your calcium requirement with diet alone, but a vitamin D supplement is usually necessary to meet this goal.  · When exposed to the sun, use a sunscreen that protects against both UVA and UVB radiation with an SPF of 30 or greater. Reapply every 2 to 3 hours or after sweating, drying off with a towel, or swimming. · Always wear a seat belt when traveling in a car. Always wear a helmet when riding a bicycle or motorcycle.

## 2020-12-30 RX ORDER — CLOPIDOGREL BISULFATE 75 MG/1
TABLET ORAL
Qty: 90 TABLET | Refills: 3 | Status: SHIPPED | OUTPATIENT
Start: 2020-12-30 | End: 2021-03-22

## 2020-12-30 RX ORDER — POTASSIUM CHLORIDE 1500 MG/1
TABLET, EXTENDED RELEASE ORAL
Qty: 90 TABLET | Refills: 3 | Status: SHIPPED | OUTPATIENT
Start: 2020-12-30 | End: 2021-03-22

## 2021-03-22 ENCOUNTER — OFFICE VISIT (OUTPATIENT)
Dept: FAMILY MEDICINE CLINIC | Age: 81
End: 2021-03-22
Payer: MEDICARE

## 2021-03-22 VITALS
DIASTOLIC BLOOD PRESSURE: 68 MMHG | HEART RATE: 58 BPM | WEIGHT: 155 LBS | HEIGHT: 66 IN | SYSTOLIC BLOOD PRESSURE: 100 MMHG | OXYGEN SATURATION: 93 % | BODY MASS INDEX: 24.91 KG/M2 | RESPIRATION RATE: 16 BRPM | TEMPERATURE: 98.6 F

## 2021-03-22 DIAGNOSIS — I25.119 CORONARY ARTERY DISEASE INVOLVING NATIVE CORONARY ARTERY OF NATIVE HEART WITH ANGINA PECTORIS (HCC): ICD-10-CM

## 2021-03-22 DIAGNOSIS — I10 BENIGN ESSENTIAL HTN: Primary | ICD-10-CM

## 2021-03-22 DIAGNOSIS — R73.03 PREDIABETES: ICD-10-CM

## 2021-03-22 DIAGNOSIS — R42 DISEQUILIBRIUM: ICD-10-CM

## 2021-03-22 DIAGNOSIS — F02.80 LATE ONSET ALZHEIMER'S DISEASE WITHOUT BEHAVIORAL DISTURBANCE (HCC): ICD-10-CM

## 2021-03-22 DIAGNOSIS — F32.1 CURRENT MODERATE EPISODE OF MAJOR DEPRESSIVE DISORDER WITHOUT PRIOR EPISODE (HCC): ICD-10-CM

## 2021-03-22 DIAGNOSIS — G30.1 LATE ONSET ALZHEIMER'S DISEASE WITHOUT BEHAVIORAL DISTURBANCE (HCC): ICD-10-CM
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
## 2021-03-22 LAB — HBA1C MFR BLD: 5.3 % (ref 4.3–5.7)

## 2021-03-22 PROCEDURE — 99214 OFFICE O/P EST MOD 30 MIN: CPT | Performed by: FAMILY MEDICINE

## 2021-03-22 RX ORDER — SERTRALINE HYDROCHLORIDE 25 MG/1
25 TABLET, FILM COATED ORAL DAILY
Qty: 30 TABLET | Refills: 3 | Status: SHIPPED | OUTPATIENT
Start: 2021-03-22 | End: 2021-07-10

## 2021-03-22 ASSESSMENT — PATIENT HEALTH QUESTIONNAIRE - PHQ9
SUM OF ALL RESPONSES TO PHQ9 QUESTIONS 1 & 2: 0
SUM OF ALL RESPONSES TO PHQ QUESTIONS 1-9: 0
DEPRESSION UNABLE TO ASSESS: FUNCTIONAL CAPACITY MOTIVATION LIMITS ACCURACY

## 2021-03-22 NOTE — PROGRESS NOTES
Caryle Coffer is a [de-identified] y.o. female that presents for     Chief Complaint   Patient presents with    Follow-up     3 month fatigue  no stability      Diabetes     A1C        /68   Pulse 58   Temp 98.6 °F (37 °C)   Resp 16   Ht 5' 6\" (1.676 m)   Wt 155 lb (70.3 kg)   LMP  (LMP Unknown)   SpO2 93%   BMI 25.02 kg/m²       HPI:    Still having disequlibrium and difficulty walking. Had a fall 2 weeks ago. No leg weakness or vertigo. Has a walker at home, but does not use this often. Mood has been 'bad' recently. States that she feels 'useless'. Feels like she doesn't do anything for fun. Sleeping ok. Dementia    HPI:     History is obtained from patient and . New concerns - no new concerns. Since last visit, memory has been about the same. Current Meds:  aricept  In terms of ADLs, she needs Independent  In terms of IADLs she needs Moderate Assist  Driving - none   Continence - normal.    The patient is left alone rarely. Ambulation:  normal.       HTN    Does patient check BP regularly at home? - No  Current Medication regimen - HCTZ, metoprolol  Tolerating medications well? - yes    Shortness of breath or chest pain? No  Headache or visual complaints? No  Neurologic changes like confusion? No  Extremity edema? No    BP Readings from Last 3 Encounters:   03/22/21 100/68   12/21/20 126/82   11/24/20 (!) 109/49       Prediabetes:  Blood sugar has decreased with her weight loss. A1c today 5.3, not currently on medications for DM2. Was about 6.7 2 years ago. Weight has stabilized since last visit.     Health Maintenance   Topic Date Due    Hepatitis A vaccine (1 of 2 - Risk 2-dose series) Never done    Hepatitis B vaccine (1 of 3 - Risk 3-dose series) Never done    DEXA (modify frequency per FRAX score)  Never done    Shingles Vaccine (3 of 3) 02/25/2021    Potassium monitoring  08/26/2021    Creatinine monitoring  08/26/2021    Lipid screen  10/26/2021    Annual Wellness Visit (AWV)  12/22/2021    DTaP/Tdap/Td vaccine (2 - Td) 10/15/2022    Flu vaccine  Completed    Pneumococcal 65+ years Vaccine  Completed    COVID-19 Vaccine  Completed    Hib vaccine  Aged Out    Meningococcal (ACWY) vaccine  Aged Out       Past Medical History:   Diagnosis Date    Anemia     Aneurysm of abdominal aorta (HCC)     Arthritis     Blood circulation, collateral     Bursitis, trochanteric     CAD (coronary artery disease) 2/2015    Cerebral artery occlusion with cerebral infarction (HCC)     Chronic back pain     Cirrhosis of liver without ascites ([facility] [address] 9/2/2020    Depression     Diabetes mellitus (HCC)     diet controlled    GERD (gastroesophageal reflux disease)     Headache(784.0)     History of basal cell cancer     Nose    Hyperlipidemia     Hypertension     Irritable bowel     Movement disorder     PVD (peripheral vascular disease) ([facility] [address] [facility]     S/P CABG (coronary artery bypass graft)     Sciatica        Past Surgical History:   Procedure Laterality Date    ABDOMEN SURGERY      complete hysterectomy, gallbladder    APPENDECTOMY      [facility]    CHOLECYSTECTOMY  yrs ago    COLONOSCOPY      CORONARY ARTERY BYPASS GRAFT  2-18-16    x3     ENDOSCOPY, COLON, DIAGNOSTIC      EYE SURGERY      cataracts, glaucoma    HERNIA REPAIR      Hiatal Hernia    HIATAL HERNIA REPAIR      HYSTERECTOMY      LARYNGOSCOPY  10/29/2012 Dr Lisa Bennett with Bx, Lingual Tonsillectomy, Hypopharyngoscopy    [place] VEIN BYPASS GRAFT,CAROT-SUBCL/ SUBCL-CAROTD Left 4/11/2018    LEFT CAROTID SUBCLAVIAN BYPASS performed by Kinjal Uribe MD at [address] [place] [address]      as a teen    UPPER GASTROINTESTINAL ENDOSCOPY         Social History     Tobacco Use    Smoking status: Former Smoker     Packs/day: 0.50     Years: 25.00     Pack years: 12.50     Types: Cigarettes     Quit date: 11/3/2000     Years since quittin.3    Smokeless tobacco: Never Used    Tobacco comment: quit in    Substance Use Topics    Alcohol use: No    Drug use: No       Family History   Problem Relation Age of Onset    Early Death Mother         not sure of cause    Heart Disease Father 52        MI    Cancer Brother         pancreatic    Cancer Son         larynx    Diabetes Neg Hx     High Blood Pressure Neg Hx     Stroke Neg Hx     Colon Cancer Neg Hx     Colon Polyps Neg Hx          I have reviewed the patient's past medical history, past surgical history, allergies, medications, social and family history and I have made updates where appropriate. Review of Systems        PHYSICAL EXAM:  /68   Pulse 58   Temp 98.6 °F (37 °C)   Resp 16   Ht 5' 6\" (1.676 m)   Wt 155 lb (70.3 kg)   LMP  (LMP Unknown)   SpO2 93%   BMI 25.02 kg/m²     General Appearance: well developed and well- nourished, in no acute distress  Head: normocephalic and atraumatic  ENT: external ear and ear canal normal bilaterally, nose without deformity, nasal mucosa and turbinates normal without polyps, oropharynx normal, dentition is normal for age, no lipor gum lesions noted  Neck: supple and non-tender without mass, no thyromegaly or thyroid nodules, no cervical lymphadenopathy  Pulmonary/Chest: clear to auscultation bilaterally- no wheezes, rales or rhonchi, normal air movement, no respiratory distress or retractions  Cardiovascular: normal rate, regular rhythm, normal S1 and S2, no murmurs, rubs, clicks, or gallops, distal pulses intact  Extremities: no cyanosis, clubbing or edema of the lower extremities  Psych:  Normal affect without evidence of depression oranxiety, insight and judgement are appropriate, memory appears impaired, but improved from previous visits  Skin: warm and dry, no rash or erythema      ASSESSMENT & PLAN  Artist Natalie was seen today for follow-up and diabetes.     Diagnoses and all orders for this visit:    Benign essential HTN    Late onset Alzheimer's disease without behavioral disturbance (HCC)    Current moderate episode of major depressive disorder without prior episode (HCC)  -     sertraline (ZOLOFT) 25 MG tablet; Take 1 tablet by mouth daily    Prediabetes  -     POCT glycosylated hemoglobin (Hb A1C)  -     sertraline (ZOLOFT) 25 MG tablet; Take 1 tablet by mouth daily    Disequilibrium  -     Ambulatory referral to Physical Therapy    Fall, sequela  -     Ambulatory referral to Physical Therapy    Coronary artery disease involving native coronary artery of native heart with angina pectoris [facility])  -     External Referral To Cardiology         -MDD is new issue, will start zoloft and recheck at next visit  -Start PT for balance concerns  -Stop HCTZ as BPs have been borderline low and her hx of falls  -Other chronic issues are stable, continue current medications  -Advised to call if any issues    Return in about 3 months (around 6/22/2021). Controlled Substance Monitoring:    Acute and Chronic Pain Monitoring:   RX Monitoring 5/9/2017   Attestation The Prescription Monitoring Report for this patient was reviewed today. Periodic Controlled Substance Monitoring No signs of potential drug abuse or diversion identified. Jesusita Champion received counseling on the following healthy behaviors: medication adherence  Reviewed prior labs and health maintenance. Continue current medications, diet and exercise. Discussed use, benefit, and side effects of prescribed medications. Barriers to medication compliance addressed. Patient given educational materials - see patient instructions. All patient questions answered. Patient voiced understanding.

## 2021-03-24 ENCOUNTER — HOSPITAL ENCOUNTER (OUTPATIENT)
Dept: PHYSICAL THERAPY | Age: 81
Setting detail: THERAPIES SERIES
Discharge: HOME | End: 2021-03-24
Payer: MEDICARE

## 2021-03-24 PROCEDURE — 97110 THERAPEUTIC EXERCISES: CPT

## 2021-03-24 PROCEDURE — 97162 PT EVAL MOD COMPLEX 30 MIN: CPT

## 2021-03-24 NOTE — PROGRESS NOTES
** PLEASE SIGN, DATE AND TIME CERTIFICATION BELOW AND RETURN TO [facility] OUTPATIENT REHABILITATION (FAX #: [phone]). ATTEST/CO-SIGN IF ACCESSING VIA [employer]. THANK YOU.**    I certify that I have examined the patient below and determined that Physical Medicine and Rehabilitation service is necessary and that I approve the established plan of care for up to 90 days or as specifically noted. Attestation, signature or co-signature of physician indicates approval of certification requirements.    ________________________ ____________ __________  Physician Signature   Date   Time  [address] [facility]  PHYSICAL THERAPY  [x] EVALUATION  [] DAILY NOTE (LAND) [] DAILY NOTE (AQUATIC ) [] PROGRESS NOTE [] DISCHARGE NOTE    [x] [address] [facility]   [] [place] [address]    [] [address] [facility]   [] Destiney Husseinz    Date: 3/24/2021  Patient Name:  Gordo Durham  : 1940  MRN: [id number]  CSN: [id number]    Referring Practitioner Bebo Donohue DO   Diagnosis R42 (ICD-10-CM) - Disequilibrium   W19. XXXS (ICD-10-CM) - Fall, sequela      Treatment Diagnosis Difficulty with ambulation, Balance deficits   Date of Evaluation 3/24/21    Additional Pertinent History Fall one year ago causing C7 fracture and sacral fracture, Did have low iron but recent numbers are up, having difficulty with blood sugar fluctuations in last couple of weeks      Functional Outcome Measure Used Tinetti   Functional Outcome Score  (3/24/21)       Insurance: Primary: Payor: Kaden Feliz /  /  / ,   Secondary:    Authorization Information: Unlimited visits. Aquatics and modalities except for Ionto and HP/CP covered. Telehealth not covered.    Visit # 1, 1/10 for progress note   Visits Allowed: Unlimited   Recertification Date: 06   Physician Follow-Up: In 3 months   Physician Orders:    History of Present Illness:      SUBJECTIVE: Patient reports about a year ago she fell into the shower.  fractured her C7 vertebrae and her tailbone. Reports also lost feeling in both legs but that returned the same day.  the ER put her in a neck brace and sent her home.  did see Dr Sreekanth Rudd and was told to keep neck brace on for 6 weeks and then he released her.  was not having balance issues prior to the fall but it has gotten worse since the fall. Lennie does not get dizzy and has not had any more falls.  has not had any symptoms down her legs since the fall. Reports just feels unsteady and holds on to the walls.  has canes and walkers to use at home. Social/Functional History and Current Status:  Medications and Allergies have been reviewed and are listed on Medical History Questionnaire. Carrol Yang lives with spouse in a single story home with a ramp to enter. Task Previous Current   ADLs  Independent Modified Independent   IADL's Independent Modified Independent   Ambulation Independent Assistance Required   Transfers Independent Modified Independent   Recreation Independent Modified Independent   Community Integration Independent Modified Independent   Driving Does not drive Does not drive   Work Retired  Retired. Likes to sew but cannot due to her neck     OBJECTIVE:    Pain: 10/10 in neck and out to bilateral shoulders   Palpation    Observation    Posture Fair       Range of Motion WFL bilateral LE   Strength Moderate core weakness.  Bilateral legs 4-5/5 throughout   Coordination    Sensation Normal   Bed Mobility    Transfers    Ambulation Decreased speed, uneven step length, very unsteady and has to hold on to something for security, uncoordinated and disconnected leg movements causing difficulty and unsteadiness advancing legs   Stairs Does not use stairs   Balance See Tinetti   Special Tests          TREATMENT   Precautions: Fall history   Pain: 10/10 neck and out to bilateral shoulders    X in shaded column indicates activity completed today Modalities Parameters/  Location  Notes                     Manual Therapy Time/Technique  Notes                     Exercise/Intervention   Notes   Educated on balance for home: feet side by side by side                                                     Step stance                                                      SLS bilat 30 sec  30 sec ea way  5x5 sec  X  X  X    Sit to stand from higher surface 5x  X                                                                     Specific Interventions Next Treatment: gait and balance work, Nu-step    Activity/Treatment Tolerance:  [x]  Patient tolerated treatment well  []  Patient limited by fatigue  []  Patient limited by pain   []  Patient limited by medical complications  []  Other:     Assessment: Patient fell and fractured her C7 vertebrae a year ago. Patient did not have any treatment for it and was only put in a neck brace and then released by the doctor. Patient continues to have 10/10 pain in her neck that radiates out to her shoulders and sometimes down her arms. Patient's balance has also been steadily worsening since she fell. Patient has good strength in her legs so that does not seem to be the cause of her balance issues. Patient's gait issues almost appear neurological as if she cannot control what her legs are doing. PT is wondering if more testing needs to be done on her neck to rule out something going on in her spine that could be the cause of the gait disturbance. Patient would benefit from therapy to address the balance issues but patient needs further assessment by a doctor also.   Body Structures/Functions/Activity Limitations: impaired activity tolerance, impaired balance, impaired coordination, impaired endurance, impaired motor control, impaired strength and abnormal gait  Prognosis: fair    GOALS:  Patient Goal: To walk normally and have better balance    Short Term Goals:  Time Frame: 4 weeks  1) Patient to ambulate with appropriate AD as needed with more controlled leg motions and even step length to prevent falls when walking  2) Patient to demonstrate all balance activity with no more than one assist for safety with community ambulation  3) Patient to demonstrate all exercises with controlled leg motions for improved stability when trying to perform home and self care  4) Patient to stand from all surfaces with improved balance and control to prevent falls. Long Term Goals:  Time Frame: 12 weeks  1) Patient to be independent with home program to be more active with home and community activity and less feeling of unsteadiness. Patient Education:   [x]  HEP/Education Completed: Plan of Care, Goals, HEP. PT to contact doctor about neck.  Med[employer] Access Code:  []  No new Education completed  []  Reviewed Prior HEP      [x]  Patient verbalized and/or demonstrated understanding of education provided. []  Patient unable to verbalize and/or demonstrate understanding of education provided. Will continue education. []  Barriers to learning: None    PLAN:  Treatment Recommendations: Strengthening, Balance Training, Functional Mobility Training, Endurance Training, Gait Training, Stair Training, Neuromuscular Re-education, Home Exercise Program, Patient Education and Modalities    [x]  Plan of care initiated. Plan to see patient 2 times per week for 12 weeks to address the treatment planned outlined above.   []  Continue with current plan of care  []  Modify plan of care as follows:    []  Hold pending physician visit  []  Discharge    Time In 0950   Time Out 1045   Timed Code Minutes: 10 min   Total Treatment Time: 55 min       Electronically Signed by: [place] [id number]

## 2021-03-25 ENCOUNTER — HOSPITAL ENCOUNTER (OUTPATIENT)
Dept: PHYSICAL THERAPY | Age: 81
Setting detail: THERAPIES SERIES
Discharge: HOME | End: 2021-03-25
Payer: MEDICARE

## 2021-03-25 ENCOUNTER — TELEPHONE (OUTPATIENT)
Dept: FAMILY MEDICINE CLINIC | Age: 81
End: 2021-03-25

## 2021-03-25 PROCEDURE — 97112 NEUROMUSCULAR REEDUCATION: CPT

## 2021-03-25 PROCEDURE — 97110 THERAPEUTIC EXERCISES: CPT

## 2021-03-25 NOTE — PROGRESS NOTES
** PLEASE SIGN, DATE AND TIME CERTIFICATION BELOW AND RETURN TO [facility] OUTPATIENT REHABILITATION (FAX #: [phone]). ATTEST/CO-SIGN IF ACCESSING VIA [employer]. THANK YOU.**    I certify that I have examined the patient below and determined that Physical Medicine and Rehabilitation service is necessary and that I approve the established plan of care for up to 90 days or as specifically noted. Attestation, signature or co-signature of physician indicates approval of certification requirements.    ________________________ ____________ __________  Physician Signature   Date   Time  [address] [facility]  PHYSICAL THERAPY  [] EVALUATION  [x] DAILY NOTE (LAND) [] DAILY NOTE (AQUATIC ) [] PROGRESS NOTE [] DISCHARGE NOTE    [x] [facility]   [] [facility]     [] [address] [facility]   [] Alicia Monday    Date: 3/25/2021  Patient Name:  Hua Resendiz  : 1940  MRN: [id number]  CSN: [id number]    Referring Practitioner Liang Hernandez DO   Diagnosis R42 (ICD-10-CM) - Disequilibrium   W19. XXXS (ICD-10-CM) - Fall, sequela      Treatment Diagnosis Difficulty with ambulation, Balance deficits   Date of Evaluation 3/24/21    Additional Pertinent History Fall one year ago causing C7 fracture and sacral fracture, Did have low iron but recent numbers are up, having difficulty with blood sugar fluctuations in last couple of weeks      Functional Outcome Measure Used Tinetti   Functional Outcome Score 28 (3/24/21)       Insurance: Primary: Payor: Meenakshi Oneill /  /  / ,   Secondary:    Authorization Information: Unlimited visits. Aquatics and modalities except for Ionto and HP/CP covered. Telehealth not covered. Visit # 2, 2/10 for progress note   Visits Allowed: Unlimited   Recertification Date:    Physician Follow-Up: In 3 months   Physician Orders:    History of Present Illness:      SUBJECTIVE: Patient reports doing ok today.  States having a little right side neck pain. TREATMENT   Precautions: Fall history   Pain: 2/10 neck and out to right shoulder    X in shaded column indicates activity completed today   Modalities Parameters/  Location  Notes                     Manual Therapy Time/Technique  Notes                     Exercise/Intervention   Notes   Educated on balance for home: feet side by side by side                                                     Step stance                                                      SLS bilat                                                      Tandem stance bilat 30 sec  30 sec ea way  5x5 sec  30 sec ea way  X  X  X  X    Sit to stand from higher surface 5x      Nu-step Level 2 5 minutes  X    Step-ups 4\" fwd only 10x bilat  X    Step over PVC pipe fwd and sides 10x each  X    Rockerboard fwd only 10x  X Balance for 30 seconds   Marching on blue foam 10x bilt  X    Walking in // bars: fwd/back and sides 3 laps each  X                           Specific Interventions Next Treatment: gait and balance work, Nu-step    Activity/Treatment Tolerance:  [x]  Patient tolerated treatment well  []  Patient limited by fatigue  []  Patient limited by pain   []  Patient limited by medical complications  []  Other:     Assessment: Patient doing well with all balance work given for [employer]. Started new balance activity today. Needing use of bars for safety. Patient reported just fatigued after session.     GOALS:  Patient Goal: To walk normally and have better balance    Short Term Goals:  Time Frame: 4 weeks  1) Patient to ambulate with appropriate AD as needed with more controlled leg motions and even step length to prevent falls when walking  2) Patient to demonstrate all balance activity with no more than one assist for safety with community ambulation  3) Patient to demonstrate all exercises with controlled leg motions for improved stability when trying to perform home and self care  4) Patient to stand from all surfaces with improved balance and control to prevent falls. Long Term Goals:  Time Frame: 12 weeks  1) Patient to be independent with home program to be more active with home and community activity and less feeling of unsteadiness. Patient Education:   [x]  HEP/Education Completed: Continue with HEP. Add in tandem stance. Can do step ups and stepping over over stick.  [employer] Access Code:  []  No new Education completed  []  Reviewed Prior HEP      [x]  Patient verbalized and/or demonstrated understanding of education provided. []  Patient unable to verbalize and/or demonstrate understanding of education provided. Will continue education. []  Barriers to learning: None    PLAN:  []  Plan of care initiated. Plan to see patient 2 times per week for 12 weeks to address the treatment planned outlined above.   [x]  Continue with current plan of care  []  Modify plan of care as follows:    []  Hold pending physician visit  []  Discharge    Time In 1240   Time Out 1308   Timed Code Minutes: 28 min   Total Treatment Time: 28 min       Electronically Signed by: [place] [id number]

## 2021-03-25 NOTE — TELEPHONE ENCOUNTER
Jennifer informed and verbalized understanding.   She will proceed with therapy and call with any issues

## 2021-03-25 NOTE — TELEPHONE ENCOUNTER
[facility] with SR OP therapy wanted to let you know she has some concerns with the pt.   Pt had a fall last march which injured her C7 Vertebrae, she saw Dr Janelle Simmons and was in a neck brace for awhile  Pt is still c/o of pain 10/10 in her neck,out to both shoulders,down both arms,her balance is also affected, her leg strength is good, but she cant control leg motions she feels it is more of a neurological issue

## 2021-03-25 NOTE — TELEPHONE ENCOUNTER
She had an MRI of the brain after the fall that did not display any acute issues. She also had a CT scan of the neck this summer that did not display any acute bone issues in the cervical area. If they don't feel that PT is safe, let me know and I can a neurology referral with the patient.

## 2021-04-05 ENCOUNTER — OFFICE VISIT (OUTPATIENT)
Dept: FAMILY MEDICINE CLINIC | Age: 81
End: 2021-04-05
Payer: MEDICARE

## 2021-04-05 ENCOUNTER — HOSPITAL ENCOUNTER (OUTPATIENT)
Dept: CT IMAGING | Age: 81
Discharge: HOME | End: 2021-04-05
Payer: MEDICARE

## 2021-04-05 ENCOUNTER — HOSPITAL ENCOUNTER (OUTPATIENT)
Age: 81
Discharge: HOME | End: 2021-04-05
Payer: MEDICARE

## 2021-04-05 VITALS
RESPIRATION RATE: 16 BRPM | HEART RATE: 58 BPM | OXYGEN SATURATION: 97 % | HEIGHT: 66 IN | BODY MASS INDEX: 26.23 KG/M2 | WEIGHT: 163.2 LBS | DIASTOLIC BLOOD PRESSURE: 78 MMHG | SYSTOLIC BLOOD PRESSURE: 120 MMHG | TEMPERATURE: 96.3 F

## 2021-04-05 DIAGNOSIS — K57.92 DIVERTICULITIS: Primary | ICD-10-CM

## 2021-04-05 DIAGNOSIS — R10.13 EPIGASTRIC ABDOMINAL PAIN: ICD-10-CM

## 2021-04-05 DIAGNOSIS — R10.32 LLQ PAIN: ICD-10-CM

## 2021-04-05 DIAGNOSIS — R11.0 NAUSEA: ICD-10-CM

## 2021-04-05 DIAGNOSIS — R10.13 EPIGASTRIC ABDOMINAL PAIN: Primary | ICD-10-CM

## 2021-04-05 LAB
ALBUMIN SERPL-MCNC: 3.9 G/DL (ref 3.5–5.1)
ALP BLD-CCNC: 127 U/L (ref 38–126)
ALT SERPL-CCNC: 25 U/L (ref 11–66)
ANION GAP SERPL CALCULATED.3IONS-SCNC: 10 MEQ/L (ref 8–16)
ANISOCYTOSIS: PRESENT
AST SERPL-CCNC: 58 U/L (ref 5–40)
BASOPHILS # BLD: 1 %
BASOPHILS ABSOLUTE: 0.1 THOU/MM3 (ref 0–0.1)
BILIRUB SERPL-MCNC: 1.3 MG/DL (ref 0.3–1.2)
BUN BLDV-MCNC: 14 MG/DL (ref 7–22)
CALCIUM SERPL-MCNC: 9.5 MG/DL (ref 8.5–10.5)
CHLORIDE BLD-SCNC: 105 MEQ/L (ref 98–111)
CO2: 26 MEQ/L (ref 23–33)
CREAT SERPL-MCNC: 0.7 MG/DL (ref 0.4–1.2)
ELLIPTOCYTES: ABNORMAL
EOSINOPHIL # BLD: 4.5 %
EOSINOPHILS ABSOLUTE: 0.3 THOU/MM3 (ref 0–0.4)
ERYTHROCYTE [DISTWIDTH] IN BLOOD BY AUTOMATED COUNT: 25.9 % (ref 11.5–14.5)
ERYTHROCYTE [DISTWIDTH] IN BLOOD BY AUTOMATED COUNT: 94.3 FL (ref 35–45)
GFR SERPL CREATININE-BSD FRML MDRD: 80 ML/MIN/1.73M2
GLUCOSE BLD-MCNC: 71 MG/DL (ref 70–108)
HCT VFR BLD CALC: 36 % (ref 37–47)
HEMOGLOBIN: 11 GM/DL (ref 12–16)
IMMATURE GRANS (ABS): 0.01 THOU/MM3 (ref 0–0.07)
IMMATURE GRANULOCYTES: 0.2 %
LIPASE: 96.5 U/L (ref 5.6–51.3)
LYMPHOCYTES # BLD: 29.5 %
LYMPHOCYTES ABSOLUTE: 1.8 THOU/MM3 (ref 1–4.8)
MCH RBC QN AUTO: 30.8 PG (ref 26–33)
MCHC RBC AUTO-ENTMCNC: 30.6 GM/DL (ref 32.2–35.5)
MCV RBC AUTO: 100.8 FL (ref 81–99)
MONOCYTES # BLD: 14 %
MONOCYTES ABSOLUTE: 0.8 THOU/MM3 (ref 0.4–1.3)
NUCLEATED RED BLOOD CELLS: 0 /100 WBC
PLATELET # BLD: 151 THOU/MM3 (ref 130–400)
PMV BLD AUTO: 12 FL (ref 9.4–12.4)
POC CREATININE WHOLE BLOOD: 0.8 MG/DL (ref 0.5–1.2)
POC POTASSIUM, WHOLE BLOOD: NORMAL MEQ/L (ref 3.5–4.9)
POIKILOCYTES: ABNORMAL
POTASSIUM SERPL-SCNC: 4.4 MEQ/L (ref 3.5–5.2)
RBC # BLD: 3.57 MILL/MM3 (ref 4.2–5.4)
SCAN OF BLOOD SMEAR: NORMAL
SEG NEUTROPHILS: 50.8 %
SEGMENTED NEUTROPHILS ABSOLUTE COUNT: 3 THOU/MM3 (ref 1.8–7.7)
SODIUM BLD-SCNC: 141 MEQ/L (ref 135–145)
TOTAL PROTEIN: 7 G/DL (ref 6.1–8)
WBC # BLD: 6 THOU/MM3 (ref 4.8–10.8)

## 2021-04-05 PROCEDURE — 36415 COLL VENOUS BLD VENIPUNCTURE: CPT

## 2021-04-05 PROCEDURE — 99214 OFFICE O/P EST MOD 30 MIN: CPT | Performed by: FAMILY MEDICINE

## 2021-04-05 PROCEDURE — 83690 ASSAY OF LIPASE: CPT

## 2021-04-05 PROCEDURE — 80053 COMPREHEN METABOLIC PANEL: CPT

## 2021-04-05 PROCEDURE — 6360000004 HC RX CONTRAST MEDICATION: Performed by: FAMILY MEDICINE

## 2021-04-05 PROCEDURE — 82565 ASSAY OF CREATININE: CPT

## 2021-04-05 PROCEDURE — 74177 CT ABD & PELVIS W/CONTRAST: CPT

## 2021-04-05 PROCEDURE — 85025 COMPLETE CBC W/AUTO DIFF WBC: CPT

## 2021-04-05 RX ORDER — AMOXICILLIN AND CLAVULANATE POTASSIUM 875; 125 MG/1; MG/1
1 TABLET, FILM COATED ORAL 2 TIMES DAILY
Qty: 20 TABLET | Refills: 0 | Status: SHIPPED | OUTPATIENT
Start: 2021-04-05 | End: 2021-04-08 | Stop reason: ALTCHOICE

## 2021-04-05 RX ORDER — TRAMADOL HYDROCHLORIDE 50 MG/1
50 TABLET ORAL EVERY 6 HOURS PRN
Qty: 20 TABLET | Refills: 0 | Status: SHIPPED | OUTPATIENT
Start: 2021-04-05 | End: 2021-04-10

## 2021-04-05 RX ADMIN — IOPAMIDOL 85 ML: 755 INJECTION, SOLUTION INTRAVENOUS at 12:37

## 2021-04-05 RX ADMIN — IOHEXOL 50 ML: 240 INJECTION, SOLUTION INTRATHECAL; INTRAVASCULAR; INTRAVENOUS; ORAL at 12:37

## 2021-04-05 NOTE — PROGRESS NOTES
SUBJECTIVE:    Swati Daugherty is a [de-identified] y.o. y/o female that presents with Pain (rt sided  abdominal pain gas diarhea and rt sided headaches  no blood in stool constant pain and gets sharp  )  . HPI:       Symptoms have been present for 1 week(s). Getting worse over that time  Location:  right upper quadrant and right lower quadrant    Description: aching and sharp   Provoking Factors - worse near the time of BMs  Alleviating Factors - unknown  Severity - moderate, but can keep hr from going to sleep   Radiation: No    Change in pain with eating? No  Change in pain with BM? Yes, worsening  Nausea? yes  Vomiting? no  Diarrhea? Yes, watery stools  Constipation?  no  Blood in Stools? no  Dysuria/Change in Urinary Frequency/Hematuria? no  Back Pain? no  Has been burping     Review of Systems  Constitutional:   Negative for  chills, fever and changes in weight      OBJECTIVE:  /78   Pulse 58   Temp 96.3 °F (35.7 °C)   Resp 16   Ht 5' 6\" (1.676 m)   Wt 163 lb 3.2 oz (74 kg)   LMP  (LMP Unknown)   SpO2 97%   BMI 26.34 kg/m²   General Appearance: well developed and well- nourished, in no acute distress  HEENT: NCAT, nasal mucosa normal and patent, mucous membranes moist, pharynx normal without lesions, neck supple without masses  Neck: supple and non-tender without mass, no thyromegaly or thyroid nodules, no cervical lymphadenopathy  Pulmonary/Chest: clear to auscultation bilaterally- no wheezes, rales or rhonchi, normal air movement, no respiratory distress  Cardiovascular: normal rate, regular rhythm, normal S1 and S2, no murmurs, rubs, clicks, or gallops, distal pulses intact, no carotid bruits  Abdomen: soft, tenderness noted in the RUQ, LUQ and LLQ, tenderness appears to be worst in the LLQ, there is some voluntary guarding, non-distended, normal bowel sounds, no masses or organomegaly, no rebound   Extremities: no cyanosis, clubbing or edema  Musculoskeletal: No joint swelling or gross deformity Psych:  Normal affect without evidence of depression or anxiety  Skin: warm and dry, no rash or erythema      ASSESSMENT & PLAN  Naomi Perea was seen today for pain. Diagnoses and all orders for this visit:    Epigastric abdominal pain  -     CT ABDOMEN PELVIS W IV CONTRAST Additional Contrast? Oral; Future  -     CBC Auto Differential; Future  -     Comprehensive Metabolic Panel; Future  -     Lipase; Future    LLQ pain    Nausea        Return if symptoms worsen or fail to improve. -DDx includes diverticulitis, pancreatitis.   I suspect diverticulitis as the pain seems worst in the LLQ  -Will try to obtain labs and CT scan today  -I offered pain medication while we wait to get tests done, she wants to hold off until we have a diagnosis  -Will call with results ASAP  -Patient advised to call immediately or go to ER if any worsening of symptoms

## 2021-04-07 ENCOUNTER — APPOINTMENT (OUTPATIENT)
Dept: CT IMAGING | Age: 81
End: 2021-04-07
Payer: MEDICARE

## 2021-04-07 ENCOUNTER — HOSPITAL ENCOUNTER (EMERGENCY)
Age: 81
Discharge: HOME | End: 2021-04-07
Attending: EMERGENCY MEDICINE
Payer: MEDICARE

## 2021-04-07 VITALS
OXYGEN SATURATION: 91 % | HEIGHT: 66 IN | RESPIRATION RATE: 16 BRPM | BODY MASS INDEX: 26.2 KG/M2 | DIASTOLIC BLOOD PRESSURE: 47 MMHG | SYSTOLIC BLOOD PRESSURE: 114 MMHG | HEART RATE: 60 BPM | WEIGHT: 163 LBS | TEMPERATURE: 98.6 F

## 2021-04-07 DIAGNOSIS — K52.9 COLITIS: ICD-10-CM

## 2021-04-07 DIAGNOSIS — K57.32 DIVERTICULITIS OF COLON: Primary | ICD-10-CM

## 2021-04-07 LAB
ADENOVIRUS F 40 41 PCR: NOT DETECTED
ALBUMIN SERPL-MCNC: 3.8 G/DL (ref 3.5–5.1)
ALP BLD-CCNC: 121 U/L (ref 38–126)
ALT SERPL-CCNC: 19 U/L (ref 11–66)
ANION GAP SERPL CALCULATED.3IONS-SCNC: 7 MEQ/L (ref 8–16)
ANISOCYTOSIS: PRESENT
AST SERPL-CCNC: 39 U/L (ref 5–40)
ASTROVIRUS PCR: NOT DETECTED
BACTERIA: ABNORMAL /HPF
BASOPHILS # BLD: 0.4 %
BASOPHILS ABSOLUTE: 0 THOU/MM3 (ref 0–0.1)
BILIRUB SERPL-MCNC: 1.4 MG/DL (ref 0.3–1.2)
BILIRUBIN DIRECT: 0.4 MG/DL (ref 0–0.3)
BILIRUBIN URINE: NEGATIVE
BLOOD, URINE: NEGATIVE
BUN BLDV-MCNC: 17 MG/DL (ref 7–22)
CALCIUM SERPL-MCNC: 9.9 MG/DL (ref 8.5–10.5)
CAMPYLOBACTER PCR: NOT DETECTED
CASTS 2: ABNORMAL /LPF
CASTS UA: ABNORMAL /LPF
CHARACTER, URINE: CLEAR
CHLORIDE BLD-SCNC: 107 MEQ/L (ref 98–111)
CLOSTRIDIUM DIFFICILE, PCR: NOT DETECTED
CO2: 25 MEQ/L (ref 23–33)
COLOR: YELLOW
CREAT SERPL-MCNC: 0.7 MG/DL (ref 0.4–1.2)
CRYPTOSPORIDIUM PCR: NOT DETECTED
CRYSTALS, UA: ABNORMAL
CYCLOSPORA CAYETANENSIS PCR: NOT DETECTED
E COLI 0157 PCR: NORMAL
E COLI ENTEROAGGREGATIVE PCR: NOT DETECTED
E COLI ENTEROPATHOGENIC PCR: NOT DETECTED
E COLI ENTEROTOXIGENIC PCR: NOT DETECTED
E COLI SHIGA LIKE TOXIN PCR: NOT DETECTED
E COLI SHIGELLA/ENTEROINVASIVE PCR: NOT DETECTED
E HISTOLYTICA GI FILM ARRAY: NOT DETECTED
EKG ATRIAL RATE: 57 BPM
EKG P AXIS: 89 DEGREES
EKG P-R INTERVAL: 172 MS
EKG Q-T INTERVAL: 462 MS
EKG QRS DURATION: 88 MS
EKG QTC CALCULATION (BAZETT): 449 MS
EKG R AXIS: 45 DEGREES
EKG T AXIS: 60 DEGREES
EKG VENTRICULAR RATE: 57 BPM
EOSINOPHIL # BLD: 4.9 %
EOSINOPHILS ABSOLUTE: 0.4 THOU/MM3 (ref 0–0.4)
EPITHELIAL CELLS, UA: ABNORMAL /HPF
ERYTHROCYTE [DISTWIDTH] IN BLOOD BY AUTOMATED COUNT: 25.6 % (ref 11.5–14.5)
ERYTHROCYTE [DISTWIDTH] IN BLOOD BY AUTOMATED COUNT: 91.3 FL (ref 35–45)
GFR SERPL CREATININE-BSD FRML MDRD: 80 ML/MIN/1.73M2
GIARDIA LAMBLIA PCR: NOT DETECTED
GLUCOSE BLD-MCNC: 95 MG/DL (ref 70–108)
GLUCOSE URINE: NEGATIVE MG/DL
HCT VFR BLD CALC: 32.8 % (ref 37–47)
HEMOCCULT STL QL: POSITIVE
HEMOGLOBIN: 10.2 GM/DL (ref 12–16)
IMMATURE GRANS (ABS): 0.02 THOU/MM3 (ref 0–0.07)
IMMATURE GRANULOCYTES: 0.3 %
KETONES, URINE: NEGATIVE
LEUKOCYTE ESTERASE, URINE: ABNORMAL
LIPASE: 122.5 U/L (ref 5.6–51.3)
LYMPHOCYTES # BLD: 19.4 %
LYMPHOCYTES ABSOLUTE: 1.5 THOU/MM3 (ref 1–4.8)
MAGNESIUM: 1.9 MG/DL (ref 1.6–2.4)
MCH RBC QN AUTO: 30.5 PG (ref 26–33)
MCHC RBC AUTO-ENTMCNC: 31.1 GM/DL (ref 32.2–35.5)
MCV RBC AUTO: 98.2 FL (ref 81–99)
MISCELLANEOUS 2: ABNORMAL
MONOCYTES # BLD: 14.2 %
MONOCYTES ABSOLUTE: 1.1 THOU/MM3 (ref 0.4–1.3)
NITRITE, URINE: NEGATIVE
NOROVIRUS GI GII PCR: NOT DETECTED
NUCLEATED RED BLOOD CELLS: 0 /100 WBC
OSMOLALITY CALCULATION: 278.9 MOSMOL/KG (ref 275–300)
PH UA: 6 (ref 5–9)
PLATELET # BLD: 152 THOU/MM3 (ref 130–400)
PLESIOMONAS SHIGELLOIDES PCR: NOT DETECTED
PMV BLD AUTO: 11.7 FL (ref 9.4–12.4)
POTASSIUM SERPL-SCNC: 4.6 MEQ/L (ref 3.5–5.2)
PROTEIN UA: NEGATIVE
RBC # BLD: 3.34 MILL/MM3 (ref 4.2–5.4)
RBC URINE: ABNORMAL /HPF
RENAL EPITHELIAL, UA: ABNORMAL
ROTAVIRUS A PCR: NOT DETECTED
SALMONELLA PCR: NOT DETECTED
SAPOVIRUS PCR: NOT DETECTED
SEG NEUTROPHILS: 60.8 %
SEGMENTED NEUTROPHILS ABSOLUTE COUNT: 4.7 THOU/MM3 (ref 1.8–7.7)
SODIUM BLD-SCNC: 139 MEQ/L (ref 135–145)
SPECIFIC GRAVITY, URINE: 1.01 (ref 1–1.03)
TOTAL PROTEIN: 6.4 G/DL (ref 6.1–8)
TROPONIN T: < 0.01 NG/ML
UROBILINOGEN, URINE: 0.2 EU/DL (ref 0–1)
VIBRIO CHOLERAE PCR: NOT DETECTED
VIBRIO PCR: NOT DETECTED
WBC # BLD: 7.8 THOU/MM3 (ref 4.8–10.8)
WBC UA: ABNORMAL /HPF
YEAST: ABNORMAL
YERSINIA ENTEROCOLITICA PCR: NOT DETECTED

## 2021-04-07 PROCEDURE — 83690 ASSAY OF LIPASE: CPT

## 2021-04-07 PROCEDURE — 84484 ASSAY OF TROPONIN QUANT: CPT

## 2021-04-07 PROCEDURE — 96360 HYDRATION IV INFUSION INIT: CPT

## 2021-04-07 PROCEDURE — 85025 COMPLETE CBC W/AUTO DIFF WBC: CPT

## 2021-04-07 PROCEDURE — 96374 THER/PROPH/DIAG INJ IV PUSH: CPT

## 2021-04-07 PROCEDURE — 2580000003 HC RX 258: Performed by: EMERGENCY MEDICINE

## 2021-04-07 PROCEDURE — 82248 BILIRUBIN DIRECT: CPT

## 2021-04-07 PROCEDURE — 80053 COMPREHEN METABOLIC PANEL: CPT

## 2021-04-07 PROCEDURE — 99285 EMERGENCY DEPT VISIT HI MDM: CPT

## 2021-04-07 PROCEDURE — 81001 URINALYSIS AUTO W/SCOPE: CPT

## 2021-04-07 PROCEDURE — 0097U HC GI PTHGN MULT REV TRANS & AMP PRB TECH 22 TRGT: CPT

## 2021-04-07 PROCEDURE — 93005 ELECTROCARDIOGRAM TRACING: CPT | Performed by: EMERGENCY MEDICINE

## 2021-04-07 PROCEDURE — 6370000000 HC RX 637 (ALT 250 FOR IP): Performed by: EMERGENCY MEDICINE

## 2021-04-07 PROCEDURE — 82272 OCCULT BLD FECES 1-3 TESTS: CPT

## 2021-04-07 PROCEDURE — 83735 ASSAY OF MAGNESIUM: CPT

## 2021-04-07 PROCEDURE — 74176 CT ABD & PELVIS W/O CONTRAST: CPT

## 2021-04-07 PROCEDURE — 36415 COLL VENOUS BLD VENIPUNCTURE: CPT

## 2021-04-07 PROCEDURE — 96361 HYDRATE IV INFUSION ADD-ON: CPT

## 2021-04-07 PROCEDURE — 6360000002 HC RX W HCPCS: Performed by: EMERGENCY MEDICINE

## 2021-04-07 RX ORDER — ONDANSETRON 2 MG/ML
4 INJECTION INTRAMUSCULAR; INTRAVENOUS ONCE
Status: COMPLETED | OUTPATIENT
Start: 2021-04-07 | End: 2021-04-07

## 2021-04-07 RX ORDER — 0.9 % SODIUM CHLORIDE 0.9 %
1000 INTRAVENOUS SOLUTION INTRAVENOUS ONCE
Status: COMPLETED | OUTPATIENT
Start: 2021-04-07 | End: 2021-04-07

## 2021-04-07 RX ORDER — PANTOPRAZOLE SODIUM 40 MG/1
40 TABLET, DELAYED RELEASE ORAL ONCE
Status: COMPLETED | OUTPATIENT
Start: 2021-04-07 | End: 2021-04-07

## 2021-04-07 RX ORDER — HYDROCODONE BITARTRATE AND ACETAMINOPHEN 5; 325 MG/1; MG/1
1 TABLET ORAL ONCE
Status: COMPLETED | OUTPATIENT
Start: 2021-04-07 | End: 2021-04-07

## 2021-04-07 RX ORDER — HYDROCODONE BITARTRATE AND ACETAMINOPHEN 5; 325 MG/1; MG/1
1 TABLET ORAL EVERY 6 HOURS PRN
Qty: 12 TABLET | Refills: 0 | Status: SHIPPED | OUTPATIENT
Start: 2021-04-07 | End: 2021-04-10

## 2021-04-07 RX ADMIN — PANTOPRAZOLE SODIUM 40 MG: 40 TABLET, DELAYED RELEASE ORAL at 03:39

## 2021-04-07 RX ADMIN — ONDANSETRON 4 MG: 2 INJECTION INTRAMUSCULAR; INTRAVENOUS at 03:39

## 2021-04-07 RX ADMIN — HYDROCODONE BITARTRATE AND ACETAMINOPHEN 1 TABLET: 5; 325 TABLET ORAL at 03:39

## 2021-04-07 RX ADMIN — SODIUM CHLORIDE 1000 ML: 9 INJECTION, SOLUTION INTRAVENOUS at 03:39

## 2021-04-07 ASSESSMENT — ENCOUNTER SYMPTOMS
CONSTIPATION: 0
EYE ITCHING: 0
CHEST TIGHTNESS: 0
ABDOMINAL PAIN: 1
EYE REDNESS: 0
SHORTNESS OF BREATH: 0
PHOTOPHOBIA: 0
VOICE CHANGE: 0
BLOOD IN STOOL: 0
EYE DISCHARGE: 0
TROUBLE SWALLOWING: 0
DIARRHEA: 1
COUGH: 0
SINUS PRESSURE: 0
WHEEZING: 0
CHOKING: 0
EYE PAIN: 0
RHINORRHEA: 0
NAUSEA: 1
ABDOMINAL DISTENTION: 0
SORE THROAT: 0
BACK PAIN: 0
VOMITING: 0

## 2021-04-07 ASSESSMENT — PAIN DESCRIPTION - DESCRIPTORS: DESCRIPTORS: DISCOMFORT

## 2021-04-07 ASSESSMENT — PAIN SCALES - GENERAL: PAINLEVEL_OUTOF10: 6

## 2021-04-07 ASSESSMENT — PAIN DESCRIPTION - PAIN TYPE: TYPE: ACUTE PAIN

## 2021-04-07 ASSESSMENT — PAIN DESCRIPTION - LOCATION: LOCATION: ABDOMEN

## 2021-04-07 NOTE — ED NOTES
Pt resting quietly in room no needs expressed. Side rails up x2 with call light in reach. Will continue to monitor.        Timothy Aly RN  04/07/21 [id number]

## 2021-04-07 NOTE — ED PROVIDER NOTES
[facility]  eMERGENCY dEPARTMENT eNCOUnter          CHIEF COMPLAINT       Chief Complaint   Patient presents with    Abdominal Pain    Diarrhea       Nurses Notes reviewed and I agree except as noted in the HPI. HISTORY OF PRESENT ILLNESS    Kandis Riedel is a [de-identified] y.o. female who presents abdominal pain and diarrhea. Apparently she went to her primary care's office and had the same complaints for the last several days. He ordered some labs and a CAT scan. CAT scan showed acute diverticulitis. She was placed on Augmentin. Patient comes in tonight complaining of continued diarrhea intermittent right-sided abdominal pain. She has nausea without vomiting. Denies any overt chest pain or shortness of breath. Currently the patient is resting comfortably on the cot no apparent distress. I went over her prior findings with her and her  at bedside. I encouraged them not to take anything that would harden her stool such as Lomotil as this might worsen the diverticulitis. She was encouraged to drink plenty of water. She was advised to avoid nuts popcorn high fatty foods dairy products and fried foods. REVIEW OF SYSTEMS     Review of Systems   Constitutional: Negative for activity change, appetite change, diaphoresis, fatigue and unexpected weight change. HENT: Negative for congestion, ear discharge, ear pain, hearing loss, rhinorrhea, sinus pressure, sore throat, trouble swallowing and voice change. Eyes: Negative for photophobia, pain, discharge, redness and itching. Respiratory: Negative for cough, choking, chest tightness, shortness of breath and wheezing. Cardiovascular: Negative for chest pain, palpitations and leg swelling. Gastrointestinal: Positive for abdominal pain, diarrhea and nausea. Negative for abdominal distention, blood in stool, constipation and vomiting. Endocrine: Negative for polydipsia, polyphagia and polyuria.    Genitourinary: Negative for BLOOD GLUCOSE MONITOR STRIPS    Check blood sugar q daily, Dx E11.9    DONEPEZIL (ARICEPT) 10 MG TABLET    TAKE 1 TABLET BY MOUTH EVERY DAY AT NIGHT    FAMOTIDINE (PEPCID) 40 MG TABLET    Take 1 tab PO daily    HANDICAP PLACARD MISC    by Does not apply route Expires 2022    LIDOCAINE (LIDODERM) 5 %    Place 3 patches onto the skin every 24 hours 12 hours on, 12 hours off. METOPROLOL TARTRATE (LOPRESSOR) 25 MG TABLET    TAKE 1/2 TABLET TWICE A DAY    MIRTAZAPINE (REMERON) 7.5 MG TABLET    Take 1 tablet by mouth nightly    POLYETHYLENE GLYCOL (GLYCOLAX) 17 GM/SCOOP POWDER    Dispense 238 Gram Bottle. Use as Directed    SERTRALINE (ZOLOFT) 25 MG TABLET    Take 1 tablet by mouth daily    SIMVASTATIN (ZOCOR) 40 MG TABLET    Take 1 table nightly at bedtime    TRAMADOL (ULTRAM) 50 MG TABLET    Take 1 tablet by mouth every 6 hours as needed for Pain for up to 5 days. ALLERGIES     is allergic to ciprofloxacin; darvon [propoxyphene hcl]; and lipitor [atorvastatin calcium]. FAMILY HISTORY     She indicated that her mother is . She indicated that her father is . She indicated that her sister is alive. She indicated that her brother is . She indicated that her son is alive. She indicated that the status of her neg hx is unknown.   family history includes Cancer in her brother and son; Early Death in her mother; Heart Disease (age of onset: 52) in her father. SOCIAL HISTORY      reports that she quit smoking about 20 years ago. Her smoking use included cigarettes. She has a 12.50 pack-year smoking history. She has never used smokeless tobacco. She reports that she does not drink alcohol or use drugs. PHYSICAL EXAM     INITIAL VITALS:  height is 5' 6\" (1.676 m) and weight is 163 lb (73.9 kg). Her oral temperature is 98.6 °F (37 °C). Her blood pressure is 114/47 (abnormal) and her pulse is 60. Her respiration is 16 and oxygen saturation is 91%.     Physical Exam  Vitals signs and nursing note reviewed. Constitutional:       General: She is not in acute distress. Appearance: She is well-developed. She is not diaphoretic. HENT:      Head: Normocephalic and atraumatic. Right Ear: External ear normal.      Left Ear: External ear normal.      Nose: Nose normal.      Mouth/Throat:      Pharynx: No oropharyngeal exudate. Eyes:      General: No scleral icterus. Right eye: No discharge. Left eye: No discharge. Conjunctiva/sclera: Conjunctivae normal.      Pupils: Pupils are equal, round, and reactive to light. Neck:      Musculoskeletal: Normal range of motion and neck supple. Thyroid: No thyromegaly. Vascular: No JVD. Trachea: No tracheal deviation. Cardiovascular:      Rate and Rhythm: Normal rate and regular rhythm. Pulses:           Carotid pulses are 2+ on the right side and 2+ on the left side. Radial pulses are 2+ on the right side and 2+ on the left side. Femoral pulses are 2+ on the right side and 2+ on the left side. Popliteal pulses are 2+ on the right side and 2+ on the left side. Dorsalis pedis pulses are 2+ on the right side and 2+ on the left side. Posterior tibial pulses are 2+ on the right side and 2+ on the left side. Heart sounds: Normal heart sounds, S1 normal and S2 normal. No murmur. No friction rub. No gallop. Pulmonary:      Effort: Pulmonary effort is normal.      Breath sounds: Normal breath sounds. No stridor. No decreased breath sounds, wheezing, rhonchi or rales. Chest:      Chest wall: No lacerations, deformity, swelling, tenderness, crepitus or edema. Abdominal:      General: Bowel sounds are normal. There is no distension. Palpations: Abdomen is soft. There is no mass. Tenderness: There is abdominal tenderness in the right upper quadrant and right lower quadrant. There is no right CVA tenderness, left CVA tenderness, guarding or rebound.  Negative signs inform the patient of this and that they can follow-up with the primary care physician. Here today her Hemoccult was positive I highly suspected that with a diverticulitis. CT scan here today shows an enteritis or colitis. In any event I went back in to reassess the patient she is doing much better. She feels much better with the pain medication. She has an upcoming appointment scheduled with her primary care physician she is instructed to keep that appointment. She is instructed to ask the doctor for a referral to gastroenterologist at that time. I discussed this extensively at bedside with the patient and  who understood and agreed with the plan. They are instructed to return to the emergency room for any new or worsening complaints. Patient is subsequently discharged home in stable condition. Patient has what appears to be diverticulitis and colitis. Patient has been given antibiotics by her primary care physician she is instructed to take that as prescribed. Patient has been given a short course of pain medication here she is instructed to use that as needed. Patient has a scheduled appointment to follow-up with her primary care physician she is instructed to keep that appointment. She is instructed to inquire as to a gastroenterologist with her primary care physician. Patient is instructed take all current medications as prescribed follow-up as directed return to the emergency room immediately for any new or worsening complaints. CRITICAL CARE:   None    CONSULTS:  None    PROCEDURES:  None    FINAL IMPRESSION      1. Diverticulitis of colon    2. Colitis          DISPOSITION/PLAN   Discharge    PATIENT REFERRED TO:  DO Sinan Kelly [id number] [phone]    Call in 1 day        DISCHARGE MEDICATIONS:  New Prescriptions    HYDROCODONE-ACETAMINOPHEN (NORCO) 5-325 MG PER TABLET    Take 1 tablet by mouth every 6 hours as needed for Pain for up to 3 days.  Intended

## 2021-04-08 ENCOUNTER — TELEPHONE (OUTPATIENT)
Dept: FAMILY MEDICINE CLINIC | Age: 81
End: 2021-04-08

## 2021-04-08 ENCOUNTER — OFFICE VISIT (OUTPATIENT)
Dept: FAMILY MEDICINE CLINIC | Age: 81
End: 2021-04-08
Payer: MEDICARE

## 2021-04-08 ENCOUNTER — CARE COORDINATION (OUTPATIENT)
Dept: CARE COORDINATION | Age: 81
End: 2021-04-08

## 2021-04-08 VITALS
RESPIRATION RATE: 12 BRPM | HEIGHT: 67 IN | BODY MASS INDEX: 25.43 KG/M2 | DIASTOLIC BLOOD PRESSURE: 60 MMHG | WEIGHT: 162 LBS | SYSTOLIC BLOOD PRESSURE: 112 MMHG | TEMPERATURE: 99.1 F | HEART RATE: 54 BPM | OXYGEN SATURATION: 98 %

## 2021-04-08 DIAGNOSIS — D64.9 LOW HEMOGLOBIN: ICD-10-CM

## 2021-04-08 DIAGNOSIS — R11.2 NAUSEA AND VOMITING, INTRACTABILITY OF VOMITING NOT SPECIFIED, UNSPECIFIED VOMITING TYPE: Primary | ICD-10-CM

## 2021-04-08 DIAGNOSIS — K74.60 CIRRHOSIS OF LIVER WITHOUT ASCITES, UNSPECIFIED HEPATIC CIRRHOSIS TYPE (HCC): ICD-10-CM

## 2021-04-08 DIAGNOSIS — D50.0 IRON DEFICIENCY ANEMIA DUE TO CHRONIC BLOOD LOSS: ICD-10-CM

## 2021-04-08 DIAGNOSIS — K57.92 DIVERTICULITIS: ICD-10-CM

## 2021-04-08 PROCEDURE — 99215 OFFICE O/P EST HI 40 MIN: CPT | Performed by: NURSE PRACTITIONER

## 2021-04-08 RX ORDER — ONDANSETRON 4 MG/1
4 TABLET, ORALLY DISINTEGRATING ORAL EVERY 8 HOURS PRN
Qty: 30 TABLET | Refills: 0 | Status: ON HOLD | OUTPATIENT
Start: 2021-04-08 | End: 2021-09-12

## 2021-04-08 RX ORDER — SULFAMETHOXAZOLE AND TRIMETHOPRIM 800; 160 MG/1; MG/1
1 TABLET ORAL 2 TIMES DAILY
Qty: 14 TABLET | Refills: 0 | Status: SHIPPED | OUTPATIENT
Start: 2021-04-08 | End: 2021-04-15

## 2021-04-08 RX ORDER — METRONIDAZOLE 500 MG/1
500 TABLET ORAL 3 TIMES DAILY
Qty: 21 TABLET | Refills: 0 | Status: SHIPPED | OUTPATIENT
Start: 2021-04-08 | End: 2021-04-15

## 2021-04-08 NOTE — TELEPHONE ENCOUNTER
Pt called stating she had a bowel movement and when she wiped there was bright red blood on the tissue. There was no blood in the toilet or in pt's stool.     Please advise

## 2021-04-08 NOTE — CARE COORDINATION
Attempted to complete ED followup. Tonny Frankel is scheduled for Same Day appt with PCP office for issue from ED visit. No COVID tested and not suspected. No further follow up at this time.

## 2021-04-08 NOTE — PATIENT INSTRUCTIONS
Continue soft diet (soup, pudding, jello, yogurt)  Push fluids- water, Gatorade, Propel  Continue Norco ( pain med) as needed for pain  Start Zofran every 8 hours as needed for nausea and vomiting  Stop Augmentin  Start Bactrim 1 tab twice a day and Flagyl 1 tab three times a day  Start Probiotic daily        Patient Education        Diverticulitis: Care Instructions  Overview     Diverticulitis occurs when pouches form in the wall of the colon and become inflamed or infected. It can be very painful. Doctors aren't sure what causes diverticulitis. There is no proof that foods such as nuts, seeds, or berries cause it or make it worse. A low-fiber diet may cause the colon to work harder to push stool forward. Pouches may form because of this extra work. It may be hard to think about healthy eating while you're in pain. But as you recover, you might think about how you can use healthy eating for overall better health. Healthy eating may help you avoid future attacks. Follow-up care is a key part of your treatment and safety. Be sure to make and go to all appointments, and call your doctor if you are having problems. It's also a good idea to know your test results and keep a list of the medicines you take. How can you care for yourself at home? · Drink plenty of fluids. If you have kidney, heart, or liver disease and have to limit fluids, talk with your doctor before you increase the amount of fluids you drink. · Stay with liquids or a bland diet (plain rice, bananas, dry toast or crackers, applesauce) until you are feeling better. Then you can return to regular foods and slowly increase the amount of fiber in your diet. · Use a heating pad set on low on your belly to relieve mild cramps and pain. · Get extra rest until you are feeling better. · Be safe with medicines. Read and follow all instructions on the label. ? If the doctor gave you a prescription medicine for pain, take it as prescribed.   ? If you are not taking a prescription pain medicine, ask your doctor if you can take an over-the-counter medicine. · If your doctor prescribed antibiotics, take them as directed. Do not stop taking them just because you feel better. You need to take the full course of antibiotics. · Do not use laxatives or enemas unless your doctor tells you to use them. When should you call for help? Call your doctor now or seek immediate medical care if:    · You have a fever.     · You are vomiting.     · You have new or worse belly pain.     · You cannot pass stools or gas. Watch closely for changes in your health, and be sure to contact your doctor if you have any problems. Where can you learn more? Go to https://[employer] (TUGG[employer]peRocket Reliefeweb.GoPro. org and sign in to your Purchext account. Enter H901 in the AdWired box to learn more about \"Diverticulitis: Care Instructions. \"     If you do not have an account, please click on the \"Sign Up Now\" link. Current as of: April 15, 2020               Content Version: 12.8  © 2006-2021 [email or web address]. Care instructions adapted under license by [facility] ([facility]). If you have questions about a medical condition or this instruction, always ask your healthcare professional. [address] any warranty or liability for your use of this information. Patient Education        Nausea and Vomiting: Care Instructions  Your Care Instructions     When you are nauseated, you may feel weak and sweaty and notice a lot of saliva in your mouth. Nausea often leads to vomiting. Most of the time you do not need to worry about nausea and vomiting, but they can be signs of other illnesses. Two common causes of nausea and vomiting are stomach flu and food poisoning. Nausea and vomiting from viral stomach flu will usually start to improve within 24 hours. Nausea and vomiting from food poisoning may last from 12 to 48 hours.   The doctor has checked you carefully, but problems can develop later. If you notice any problems or new symptoms, get medical treatment right away. Follow-up care is a key part of your treatment and safety. Be sure to make and go to all appointments, and call your doctor if you are having problems. It's also a good idea to know your test results and keep a list of the medicines you take. How can you care for yourself at home? · To prevent dehydration, drink plenty of fluids. Choose water and other caffeine-free clear liquids until you feel better. If you have kidney, heart, or liver disease and have to limit fluids, talk with your doctor before you increase the amount of fluids you drink. · Rest in bed until you feel better. · When you are able to eat, try clear soups, mild foods, and liquids until all symptoms are gone for 12 to 48 hours. Other good choices include dry toast, crackers, cooked cereal, and gelatin dessert, such as Jell-O. When should you call for help? Call 911 anytime you think you may need emergency care. For example, call if:    · You passed out (lost consciousness). Call your doctor now or seek immediate medical care if:    · You have symptoms of dehydration, such as:  ? Dry eyes and a dry mouth. ? Passing only a little urine. ? Feeling thirstier than usual.     · You have new or worsening belly pain.     · You have a new or higher fever.     · You vomit blood or what looks like coffee grounds. Watch closely for changes in your health, and be sure to contact your doctor if:    · You have ongoing nausea and vomiting.     · Your vomiting is getting worse.     · Your vomiting lasts longer than 2 days.     · You are not getting better as expected. Where can you learn more? Go to https://Magenta ComputacÃƒÂ­onnilesh.AudioCompass. org and sign in to your Surprise Ride account. Enter 21 [id number] in the Meez box to learn more about \"Nausea and Vomiting: Care Instructions. \"     If you do not have an account, please click on the \"Sign Up Now\" link. Current as of: February 26, 2020               Content Version: 12.8  © 3610-0634 Healthwise, [employer]. Care instructions adapted under license by [facility] ([facility]). If you have questions about a medical condition or this instruction, always ask your healthcare professional. [address] any warranty or liability for your use of this information.

## 2021-04-08 NOTE — PROGRESS NOTES
[address]   [address] [facility] [address]  Dept: [phone]  Loc: [phone]  Visit Date: 4/8/2021      Chief Complaint:   Kaleb Garsia is a [de-identified] y.o. female thatpresents for Follow-up (diverticulitis  worsse over last 10 days ), Other (referral to GI - does not want  JACKELIN ), and Discuss Medications (pain meds make her feel hot  and slightly nausea)      HPI:  Follows up today for Diverticulitis  Was seen in the office on 4/5, CT scan showed diverticulitis  Was started on Augmentin and Tramadol  Presented to the ER yesterday with worsening pain and diarrhea  Labs and CT scan were stable    Today, she reports her pain continues  Currently rates it 10/10  No provoking factors identified  Improves some with Norco  Diarrhea continues, says at times she cannot get off the toilet for extended amounts of time  Also having trouble with nausea and vomiting  Denies any fever or chills, bloody stools or vomit    She comes in with her . History obtained from pt, spouse, and medical records. I have reviewed the patient's past medical history, past surgical history, allergies,medications, social and family history and I have made updates where appropriate.     Past Medical History:   Diagnosis Date    Anemia     Aneurysm of abdominal aorta (HCC)     Arthritis     Blood circulation, collateral     Bursitis, trochanteric     CAD (coronary artery disease) 2/2015    Cerebral artery occlusion with cerebral infarction (HCC)     Chronic back pain     Cirrhosis of liver without ascites ([facility] [address] 9/2/2020    Depression     Diabetes mellitus (HCC)     diet controlled    GERD (gastroesophageal reflux disease)     Headache(784.0)     History of basal cell cancer     Nose    Hyperlipidemia     Hypertension     Irritable bowel     Movement disorder     PVD [facility]peripheral vascular disease) ([facility]     S/P CABG (coronary artery bypass graft)     Sciatica Past Surgical History:   Procedure Laterality Date    ABDOMEN SURGERY      complete hysterectomy, gallbladder    APPENDECTOMY      [facility]  [facility]2016    [facility]    CHOLECYSTECTOMY  yrs ago    COLONOSCOPY      CORONARY ARTERY BYPASS GRAFT  2-18-16    x3     ENDOSCOPY, COLON, DIAGNOSTIC      EYE SURGERY      cataracts, glaucoma    HERNIA REPAIR      Hiatal Hernia    HIATAL HERNIA REPAIR      HYSTERECTOMY      LARYNGOSCOPY  10/29/2012 Dr Ezekiel Angel with Bx, Lingual Tonsillectomy, Hypopharyngoscopy    NH VEIN BYPASS GRAFT,CAROT-SUBCL/ SUBCL-CAROTD Left 2018    LEFT CAROTID SUBCLAVIAN BYPASS performed by Td Espinal MD at [address]      as a teen    UPPER GASTROINTESTINAL ENDOSCOPY         Social History     Tobacco Use    Smoking status: Former Smoker     Packs/day: 0.50     Years: 25.00     Pack years: 12.50     Types: Cigarettes     Quit date: 11/3/2000     Years since quittin.4    Smokeless tobacco: Never Used    Tobacco comment: quit in    Substance Use Topics    Alcohol use: No    Drug use: No       Family History   Problem Relation Age of Onset    Early Death Mother         not sure of cause    Heart Disease Father 52        MI    Cancer Brother         pancreatic    Cancer Son         larynx    Diabetes Neg Hx     High Blood Pressure Neg Hx     Stroke Neg Hx     Colon Cancer Neg Hx     Colon Polyps Neg Hx          Review of Systems  Constitutional: Negative for Fever, Chills, +Fatigue  Cardiovascular:  Negative forChest Pain, Palpitations  Respiratory:  Negative for Cough, Wheezing, Shortness of breath  Gastrointestinal: +nausea, vomiting, diarrhea, abd pain, decreased appetite, negative for bloody stools or vomit  Genitourinary:  Negative for Difficulty or painful urination,Change in frequency, Urgency  Skin:  Negative for Color change, Rash, Itching, Wound  Psychiatric:  Negative forAnxiety, Depression, Suicidal ideation  Musculoskeletal:  Negative for Joint pain, Back pain, Gait problems  Neurological:  Negative for Dizziness, Headaches        PHYSICAL EXAM:  /60   Pulse 54   Temp 99.1 °F (37.3 °C) (Oral)   Resp 12   Ht 5' 6.5\" (1.689 m)   Wt 162 lb (73.5 kg)   LMP  (LMP Unknown)   SpO2 98%   BMI 25.76 kg/m²     General Appearance: well developed and well- nourished, in no acute distress  Head: normocephalic and atraumatic  Eyes: pupils equal, round, and reactive to light,  conjunctivae and eye lids without erythema  ENT: external ear and ear canal normal bilaterally, nose without deformity, nasal mucosa and turbinates normal without polyps, oropharynx normal, dentition is normal for age, no lip or gum lesions noted  Neck: supple and non-tender without mass, no thyromegaly or thyroid nodules, no cervical lymphadenopathy  Pulmonary/Chest: clear to auscultation bilaterally- no wheezes, rales or rhonchi, normal air movement, no respiratory distress orretractions  Cardiovascular: normal rate, regular rhythm, normal S1 and S2, no murmurs, rubs, clicks, or gallops,distal pulses intact  Abdomen: soft, diffusely tender, non-distended, normal bowel sounds  Extremities: no cyanosis, clubbing or edema of the lower extremities  Musculoskeletal: No joint swelling or gross deformity   Neuro:  Alert, 5/5 strength globally and symmetrically, normal speech, no focal findings or movement disorder noted, gait wnl  Psych:  Normal affect without evidence of depression or anxiety, insight and judgement are appropriate, memoryappears intact  Skin: warm and dry, no rash or erythema  Lymph:  No cervical, auricular or supraclavicular lymph nodes palpated    Radiology:    The pt has had a CT abd and pelvis which I have reviewed along with the accompanying imaging.   The study suggests:    Narrative   EXAM:  CT OF ABDOMEN/PELVIS WITHOUT CONTRAST:       COMPARISON:    CT,[facility]  - CT ABDOMEN PELVIS W IV CONTRAST  - 04/05/2021 12:41 PM EDT       TECHNIQUE:  Enteric contrast was not administered prior to exam.     Contiguous axial images from lung bases through ischial tuberosities, with    sagittal and coronal reformatted images.       FINDINGS:       The lung bases are clear.  No pleural effusion. Moderate left renal atrophy.  No urinary tract stone or obstruction. Tortuosity and calcification of the aorta, maximum diameter 3.2 cm.  No    evidence of recent hemorrhage. [address] unremarkable. Cirrhotic morphology of liver surface.  Small volume abdominopelvic    ascites. Gallbladder is either contracted or surgically absent.  Common bile duct    is dilated, measures approximately 12 mm diameter.  No intraductal stone.     No intrahepatic duct dilatation appreciated. The pancreas is unremarkable. No bowel obstruction, pneumatosis or pneumoperitoneum.  Appendix not    visualized.  No secondary findings of appendicitis.  Sigmoid diverticuli    are present.  No diverticulitis is appreciated. Em Hoyles is prominent fluid    within distal small bowel and proximal colon, no distention of these    structures. Pelvic organs are unremarkable. No osseous lesion identified.           Impression   1.  Prominent fluid within large and small bowel is a nonspecific finding,    can be seen with enteritis. 2.  Other chronic findings, including cirrhosis, ascites, moderate common    duct dilatation and mild aneurysmal dilatation of aorta are stable.       This document has been electronically signed by: Tez Cortez MD    on 04/07/2021 05:12 AM       All CTs at this facility use dose modulation techniques and iterative    reconstructions, and/or weight-based dosing   when appropriate to reduce radiation to a low as reasonably achievable. ASSESSMENT & PLAN  Tami Correia was seen today for follow-up, other and discuss medications.     Diagnoses and all orders for this visit:    Nausea and vomiting, intractability of vomiting not specified, unspecified vomiting type  -     ondansetron (ZOFRAN ODT) 4 MG disintegrating tablet; Place 1 tablet under the tongue every 8 hours as needed for Nausea or Vomiting  -     [facility] - Ravin Forte MD, Gastroenterology, [address] [place] [address]    Iron deficiency anemia due to chronic blood loss  -     [facility] - Ravin Forte MD, Gastroenterology, [address] [facility] hemoglobin  -     Toby Luna MD, Gastroenterology, [address] [place] [address]    Cirrhosis of liver without ascites, unspecified hepatic cirrhosis type ([facility] [address]  -     [facility] - Ravin Forte MD, Gastroenterology, [address] [place] [address]    Diverticulitis  -     Toby Luna MD, Gastroenterology, [address] [place] [address]  -     sulfamethoxazole-trimethoprim (BACTRIM DS) 800-160 MG per tablet; Take 1 tablet by mouth 2 times daily for 7 days  -     metroNIDAZOLE (FLAGYL) 500 MG tablet; Take 1 tablet by mouth 3 times daily for 7 days      Continue soft diet (soup, pudding, jello, yogurt)  Push fluids- water, Gatorade, Propel  Continue Norco ( pain med) as needed for pain  Start Zofran every 8 hours as needed for nausea and vomiting  Stop Augmentin  Start Bactrim 1 tab twice a day and Flagyl 1 tab three times a day  Start Probiotic daily  ER precautions given, verbalized understanding  RTO Monday or Tues with either me or Dr. Corral [facility]    No follow-ups on file. Naomi Perea received counseling on the following healthy behaviors: nutrition, exercise and medication adherence  Reviewedprior labs and health maintenance. Continue current medications, diet and exercise. Discussed use, benefit, and side effects of prescribed medications. Barriers to medication compliance addressed. Patient given educationalmaterials - see patient instructions. All patient questions answered. Patient voiced understanding.      Electronically signed by ANDREZ Dobson on 4/8/21 at 11:17 AM EDT

## 2021-04-08 NOTE — TELEPHONE ENCOUNTER
It's likely from having the frequent BMs. Continue to monitor, if it gets worse, let me know. It should resolve on its own.

## 2021-04-10 ENCOUNTER — TELEPHONE (OUTPATIENT)
Dept: FAMILY MEDICINE CLINIC | Age: 81
End: 2021-04-10

## 2021-04-10 DIAGNOSIS — R52 PAIN MANAGEMENT: Primary | ICD-10-CM

## 2021-04-10 NOTE — TELEPHONE ENCOUNTER
Patient called [facility] today on Saturday with concern about a call she received from the pharmacy advising her she needed a refill on her [place].  Patient was put on Norco a few days ago for an acute episode of diverticulitis. I talked with the patient about how she is doing and she states her pain is doing very much better and she does not need the pain medication. I told her to disregard the notification from the pharmacy.

## 2021-05-03 DIAGNOSIS — E78.00 PURE HYPERCHOLESTEROLEMIA: ICD-10-CM

## 2021-05-03 DIAGNOSIS — Z95.1 S/P CABG X 3: ICD-10-CM

## 2021-05-03 RX ORDER — SIMVASTATIN 40 MG
TABLET ORAL
Qty: 90 TABLET | Refills: 3 | Status: SHIPPED | OUTPATIENT
Start: 2021-05-03 | End: 2022-04-22 | Stop reason: SDUPTHER

## 2021-05-10 ENCOUNTER — TELEPHONE (OUTPATIENT)
Dept: FAMILY MEDICINE CLINIC | Age: 81
End: 2021-05-10

## 2021-05-10 DIAGNOSIS — R11.2 NAUSEA AND VOMITING, INTRACTABILITY OF VOMITING NOT SPECIFIED, UNSPECIFIED VOMITING TYPE: Primary | ICD-10-CM

## 2021-05-10 RX ORDER — ESOMEPRAZOLE MAGNESIUM 40 MG/1
40 CAPSULE, DELAYED RELEASE ORAL
Qty: 30 CAPSULE | Refills: 5 | Status: SHIPPED | OUTPATIENT
Start: 2021-05-10 | End: 2021-08-17

## 2021-05-10 RX ORDER — OMEPRAZOLE 40 MG/1
40 CAPSULE, DELAYED RELEASE ORAL
Qty: 30 CAPSULE | Refills: 5 | Status: SHIPPED | OUTPATIENT
Start: 2021-05-10 | End: 2021-05-10

## 2021-05-10 NOTE — TELEPHONE ENCOUNTER
Her symptoms are fairly broad. We can try omeprazole and see if this helps. If her symptoms persist or worsen, I would recommend coming in for a follow up appt.

## 2021-05-10 NOTE — TELEPHONE ENCOUNTER
Omeprazole DR 40mg is not covered under insurance. The alternatives are esomeprazole mag DR 40mg capsule,esomeprazole mag DR 40mg packet. Please advise.  See attachment

## 2021-05-10 NOTE — TELEPHONE ENCOUNTER
----- Message from Yuli crowder sent at 5/8/2021  1:18 PM EDT -----  Subject: Message to Provider    QUESTIONS  Information for Provider? Pt is unable to see the MD she was referred to   until June and is having bloating, pain in her side, nauseous, sore throat   and belching. She would like to get medication to ease her symptoms until   she can go to her appointment.   ---------------------------------------------------------------------------  --------------  Erlinda TITUS  What is the best way for the office to contact you? OK to leave message on   voicemail  Preferred Call Back Phone Number? [phone]  ---------------------------------------------------------------------------  --------------  SCRIPT ANSWERS  Relationship to Patient?  Self

## 2021-05-11 NOTE — TELEPHONE ENCOUNTER
Please contact the pharmacy for further info, they have now told us that both meds are not covered and to try the other one instead.

## 2021-05-11 NOTE — TELEPHONE ENCOUNTER
Esomeprazole Mag DR 40MG cap is not covered by pt insurance alternatives suggested are Omeprazole DR 40 MG  Cap or Esomeprazole DR 40 MG Packet please review and submit new prescription if required

## 2021-05-11 NOTE — TELEPHONE ENCOUNTER
Spoke with Pharmacy the Omeprazole dr 40 mg capsules and the Esomeprazole dr 40 mg packets are covered

## 2021-05-12 ENCOUNTER — TELEPHONE (OUTPATIENT)
Dept: FAMILY MEDICINE CLINIC | Age: 81
End: 2021-05-12

## 2021-05-12 ENCOUNTER — OFFICE VISIT (OUTPATIENT)
Dept: FAMILY MEDICINE CLINIC | Age: 81
End: 2021-05-12
Payer: MEDICARE

## 2021-05-12 VITALS
OXYGEN SATURATION: 97 % | SYSTOLIC BLOOD PRESSURE: 118 MMHG | HEIGHT: 67 IN | HEART RATE: 55 BPM | DIASTOLIC BLOOD PRESSURE: 62 MMHG | TEMPERATURE: 97.6 F | WEIGHT: 163 LBS | BODY MASS INDEX: 25.58 KG/M2 | RESPIRATION RATE: 16 BRPM

## 2021-05-12 DIAGNOSIS — R19.7 DIARRHEA, UNSPECIFIED TYPE: Primary | ICD-10-CM

## 2021-05-12 DIAGNOSIS — R14.1 GAS PAIN: ICD-10-CM

## 2021-05-12 DIAGNOSIS — R11.0 NAUSEA: ICD-10-CM

## 2021-05-12 PROCEDURE — 99214 OFFICE O/P EST MOD 30 MIN: CPT | Performed by: NURSE PRACTITIONER

## 2021-05-12 RX ORDER — ONDANSETRON 4 MG/1
4 TABLET, ORALLY DISINTEGRATING ORAL EVERY 8 HOURS PRN
Qty: 20 TABLET | Refills: 0 | Status: ON HOLD | OUTPATIENT
Start: 2021-05-12 | End: 2021-09-12

## 2021-05-12 RX ORDER — SIMETHICONE 125 MG
125 TABLET,CHEWABLE ORAL EVERY 6 HOURS PRN
Qty: 60 TABLET | Refills: 3 | Status: SHIPPED | OUTPATIENT
Start: 2021-05-12 | End: 2022-06-23

## 2021-05-12 ASSESSMENT — ENCOUNTER SYMPTOMS
BELCHING: 1
ABDOMINAL PAIN: 1
DIARRHEA: 1
NAUSEA: 1

## 2021-05-12 NOTE — PROGRESS NOTES
[address] [facility] [address]  [address] [place] [address]  Dept: [phone]  Loc: [phone]  Visit Date: 5/12/2021      Chief Complaint:   Cristhian Encarnacion is a [de-identified] y.o. female thatpresents for Nausea (3 weeks ago seen cant see dr Pincus Dandy until 06/11 sharp pains liquid BM x5-7 a day  constant pain worse after eating  gassy    )        HPI:  Abdominal Pain  This is a recurrent problem. The current episode started more than 1 month ago. The problem occurs intermittently. The problem has been unchanged. The pain is located in the RLQ and RUQ. The pain is at a severity of 10/10. The pain is severe. The quality of the pain is aching and cramping. The abdominal pain radiates to the right flank. Associated symptoms include anorexia, belching, diarrhea and nausea. Nothing aggravates the pain. The pain is relieved by nothing. She has tried proton pump inhibitors and H2 blockers for the symptoms. The treatment provided no relief. Prior diagnostic workup includes GI consult (Colonoscopy planned for June). The patient comes in with her  today. History obtained from pt, , and medical records. I have reviewed the patient's past medical history, past surgical history, allergies,medications, social and family history and I have made updates where appropriate.     Past Medical History:   Diagnosis Date    Anemia     Aneurysm of abdominal aorta (HCC)     Arthritis     Blood circulation, collateral     Bursitis, trochanteric     CAD (coronary artery disease) 2/2015    Cerebral artery occlusion with cerebral infarction [facility])     Chronic back pain     Cirrhosis of liver without ascites ([facility] [address] 9/2/2020    Depression     Diabetes mellitus (HCC)     diet controlled    GERD (gastroesophageal reflux disease)     Headache(784.0)     History of basal cell cancer     Nose    Hyperlipidemia     Hypertension     Irritable bowel     Movement disorder     PVD and atraumatic. Right Ear: External ear normal.      Left Ear: External ear normal.      Nose: Nose normal.      Mouth/Throat:      Mouth: Mucous membranes are moist.   Eyes:      Conjunctiva/sclera: Conjunctivae normal.   Neck:      Musculoskeletal: Normal range of motion. Cardiovascular:      Rate and Rhythm: Normal rate and regular rhythm. Pulses: Normal pulses. Heart sounds: Normal heart sounds. Pulmonary:      Effort: Pulmonary effort is normal.      Breath sounds: Normal breath sounds. Abdominal:      General: Bowel sounds are increased. There is distension. Palpations: Abdomen is soft. Tenderness: There is abdominal tenderness. There is no right CVA tenderness or left CVA tenderness. Comments: Diffuse tenderness entire abdomen     Musculoskeletal: Normal range of motion. Skin:     General: Skin is warm and dry. Neurological:      General: No focal deficit present. Mental Status: She is alert and oriented to person, place, and time. Psychiatric:         Mood and Affect: Mood normal.         Behavior: Behavior normal.         ASSESSMENT & PLAN  Davi Michaels was seen today for nausea. Diagnoses and all orders for this visit:    Diarrhea, unspecified type    Nausea  -     ondansetron (ZOFRAN ODT) 4 MG disintegrating tablet; Take 1 tablet by mouth every 8 hours as needed for Nausea or Vomiting  -     simethicone (MYLICON) 112 MG chewable tablet; Take 1 tablet by mouth every 6 hours as needed for Flatulence    Gas pain  -     ondansetron (ZOFRAN ODT) 4 MG disintegrating tablet; Take 1 tablet by mouth every 8 hours as needed for Nausea or Vomiting  -     simethicone (MYLICON) 227 MG chewable tablet;  Take 1 tablet by mouth every 6 hours as needed for Flatulence        Previous stool studies were negative except occult blood  CT showed prominent fluid in small and large bowel, cirrhosis, ascites, common duct dilation, and mild aneurysm dilation of aorta that were all stable. Saw Dr. Ashley Olson NP in April  Scheduled for colonoscopy in June    Abdominal pain and diarrhea haven't worsened, just not getting any better  Start Zofran for nausea  Start Simethicone for gas and belching  Follow bland, soft diet  Push fluids  ER precautions given, verbalized understanding    No follow-ups on file. Naomi Perea received counseling on the following healthy behaviors: nutrition and medication adherence  Reviewedprior labs and health maintenance. Continue current medications, diet and exercise. Discussed use, benefit, and side effects of prescribed medications. Barriers to medication compliance addressed. Patient given educationalmaterials - see patient instructions. All patient questions answered. Patient voiced understanding.      Electronically signed by ANDREZ Dobson on 5/12/21 at 11:17 AM EDT

## 2021-05-12 NOTE — TELEPHONE ENCOUNTER
----- Message from Aj Ocampo sent at 5/12/2021  2:06 PM EDT -----  Subject: Message to Provider    QUESTIONS  Information for Provider? Patients  calling because patient was   given a prescription and he needs to see what it is for.  ---------------------------------------------------------------------------  --------------  CALL BACK INFO  What is the best way for the office to contact you? OK to leave message on   voicemail  Preferred Call Back Phone Number? [phone]  ---------------------------------------------------------------------------  --------------  SCRIPT ANSWERS  Relationship to Patient? Other  Representative Name? Meagan Quintero  Is the Representative on the appropriate HIPAA document in Epic?  Yes

## 2021-05-12 NOTE — PATIENT INSTRUCTIONS
Patient Education        Gas and Bloating: Care Instructions  Your Care Instructions     Gas and bloating can be uncomfortable and embarrassing problems. All people pass gas, but some people produce more gas than others, sometimes enough to cause distress. It is normal to pass gas from 6 to 20 times per day. Excess gas usually is not caused by a serious health problem. Gas and bloating usually are caused by something you eat or drink, including some food supplements and medicines. Gas and bloating are usually harmless and go away without treatment. However, changing your diet can help end the problem. Some over-the-counter medicines can help prevent gas and relieve bloating. Follow-up care is a key part of your treatment and safety. Be sure to make and go to all appointments, and call your doctor if you are having problems. It's also a good idea to know your test results and keep a list of the medicines you take. How can you care for yourself at home? · Keep a food diary if you think a food gives you gas. Write down what you eat or drink. Also record when you get gas. If you notice that a food seems to cause your gas each time, avoid it and see if the gas goes away. Examples of foods that cause gas include:  ? Fried and fatty foods. ? Beans. ? Vegetables such as artichokes, asparagus, broccoli, brussels sprouts, cabbage, cauliflower, cucumbers, green peppers, onions, peas, radishes, and raw potatoes. ? Fruits such as apricots, bananas, melons, peaches, pears, prunes, and raw apples. ? Wheat and wheat bran. · Soak dry beans in water overnight, then dump the water and cook the soaked beans in new water. This can help prevent gas and bloating. · If you have problems with lactose, avoid dairy products such as milk and cheese. · Try not to swallow air. Do not drink through a straw, gulp your food, or chew gum. · Take an over-the-counter medicine. Read and follow all instructions on the label. ?  Food enzymes, such as Beano, can be added to gas-producing foods to prevent gas. ? Antacids, such as Maalox Anti-Gas and Mylanta Gas, can relieve bloating by making you burp. Be careful when you take over-the-counter antacid medicines. Many of these medicines have aspirin in them. Read the label to make sure that you are not taking more than the recommended dose. Too much aspirin can be harmful. ? Activated charcoal tablets, such as CharcoCaps, may decrease odor from gas you pass. ? If you have problems with lactose, you can take medicines such as Dairy Ease and Lactaid with dairy products to prevent gas and bloating. · Get some exercise regularly. When should you call for help? Call 911 anytime you think you may need emergency care. For example, call if:    · You have gas and signs of a heart attack, such as:  ? Chest pain or pressure. ? Sweating. ? Shortness of breath. ? Nausea or vomiting. ? Pain that spreads from the chest to the neck, jaw, or one or both shoulders or arms. ? Dizziness or lightheadedness. ? A fast or uneven pulse. After calling 911, chew 1 adult-strength aspirin. Wait for an ambulance. Do not try to drive yourself. Call your doctor now or seek immediate medical care if:    · You have severe belly pain.     · You have blood in your stool. Watch closely for changes in your health, and be sure to contact your doctor if:    · You have blood or pus in your urine.     · Your urine is cloudy or smells bad.     · You are burping and have trouble swallowing.     · You feel bloated and have swelling in your belly.     · You do not get better as expected. Where can you learn more? Go to https://ForkforcepeKleer.CableOrganizer.com. org and sign in to your Novaliq account. Enter N718 in the Dhingana box to learn more about \"Gas and Bloating: Care Instructions. \"     If you do not have an account, please click on the \"Sign Up Now\" link.   Current as of: February 26, 2020               Content Version: 12.8  © 2006-2021 [employer]. Care instructions adapted under license by [facility] ([facility]). If you have questions about a medical condition or this instruction, always ask your healthcare professional. [address] any warranty or liability for your use of this information. Patient Education        Nausea and Vomiting: Care Instructions  Your Care Instructions     When you are nauseated, you may feel weak and sweaty and notice a lot of saliva in your mouth. Nausea often leads to vomiting. Most of the time you do not need to worry about nausea and vomiting, but they can be signs of other illnesses. Two common causes of nausea and vomiting are stomach flu and food poisoning. Nausea and vomiting from viral stomach flu will usually start to improve within 24 hours. Nausea and vomiting from food poisoning may last from 12 to 48 hours. The doctor has checked you carefully, but problems can develop later. If you notice any problems or new symptoms, get medical treatment right away. Follow-up care is a key part of your treatment and safety. Be sure to make and go to all appointments, and call your doctor if you are having problems. It's also a good idea to know your test results and keep a list of the medicines you take. How can you care for yourself at home? · To prevent dehydration, drink plenty of fluids. Choose water and other caffeine-free clear liquids until you feel better. If you have kidney, heart, or liver disease and have to limit fluids, talk with your doctor before you increase the amount of fluids you drink. · Rest in bed until you feel better. · When you are able to eat, try clear soups, mild foods, and liquids until all symptoms are gone for 12 to 48 hours. Other good choices include dry toast, crackers, cooked cereal, and gelatin dessert, such as Jell-O. When should you call for help?    Call 911 anytime you think you may need emergency care. For example, call if:    · You passed out (lost consciousness). Call your doctor now or seek immediate medical care if:    · You have symptoms of dehydration, such as:  ? Dry eyes and a dry mouth. ? Passing only a little urine. ? Feeling thirstier than usual.     · You have new or worsening belly pain.     · You have a new or higher fever.     · You vomit blood or what looks like coffee grounds. Watch closely for changes in your health, and be sure to contact your doctor if:    · You have ongoing nausea and vomiting.     · Your vomiting is getting worse.     · Your vomiting lasts longer than 2 days.     · You are not getting better as expected. Where can you learn more? Go to https://Indium Software Inc.peData Security Systems Solutions."SevOne[employer]. org and sign in to your Autobase account. Enter 30 [id number] in the EntropySoft box to learn more about \"Nausea and Vomiting: Care Instructions. \"     If you do not have an account, please click on the \"Sign Up Now\" link. Current as of: February 26, 2020               Content Version: 12.8  © 7659-4971 [employer], Incorporated. Care instructions adapted under license by [facility] ([facility]). If you have questions about a medical condition or this instruction, always ask your healthcare professional. [address] any warranty or liability for your use of this information.

## 2021-05-12 NOTE — TELEPHONE ENCOUNTER
Zofran for nausea  Simethicone for gas and bloating and belching  Electronically signed by ANDREZ Rob CNP on 5/12/2021 at 2:58 PM

## 2021-05-25 ENCOUNTER — APPOINTMENT (OUTPATIENT)
Dept: CT IMAGING | Age: 81
End: 2021-05-25
Payer: MEDICARE

## 2021-05-25 ENCOUNTER — APPOINTMENT (OUTPATIENT)
Dept: GENERAL RADIOLOGY | Age: 81
End: 2021-05-25
Payer: MEDICARE

## 2021-05-25 ENCOUNTER — HOSPITAL ENCOUNTER (EMERGENCY)
Age: 81
Discharge: HOME | End: 2021-05-26
Attending: EMERGENCY MEDICINE
Payer: MEDICARE

## 2021-05-25 VITALS
SYSTOLIC BLOOD PRESSURE: 153 MMHG | HEART RATE: 68 BPM | OXYGEN SATURATION: 93 % | DIASTOLIC BLOOD PRESSURE: 80 MMHG | TEMPERATURE: 97.8 F | RESPIRATION RATE: 17 BRPM

## 2021-05-25 DIAGNOSIS — R18.8 CIRRHOSIS OF LIVER WITH ASCITES, UNSPECIFIED HEPATIC CIRRHOSIS TYPE (HCC): ICD-10-CM

## 2021-05-25 DIAGNOSIS — R18.8 OTHER ASCITES: ICD-10-CM

## 2021-05-25 DIAGNOSIS — K57.90 DIVERTICULOSIS: Primary | ICD-10-CM

## 2021-05-25 DIAGNOSIS — K74.60 CIRRHOSIS OF LIVER WITH ASCITES, UNSPECIFIED HEPATIC CIRRHOSIS TYPE (HCC): ICD-10-CM

## 2021-05-25 LAB
ALBUMIN SERPL-MCNC: 4 G/DL (ref 3.5–5.1)
ALP BLD-CCNC: 151 U/L (ref 38–126)
ALT SERPL-CCNC: 23 U/L (ref 11–66)
ANION GAP SERPL CALCULATED.3IONS-SCNC: 6 MEQ/L (ref 8–16)
AST SERPL-CCNC: 60 U/L (ref 5–40)
BILIRUB SERPL-MCNC: 0.8 MG/DL (ref 0.3–1.2)
BILIRUBIN DIRECT: 0.3 MG/DL (ref 0–0.3)
BUN BLDV-MCNC: 12 MG/DL (ref 7–22)
CALCIUM SERPL-MCNC: 9.8 MG/DL (ref 8.5–10.5)
CHLORIDE BLD-SCNC: 108 MEQ/L (ref 98–111)
CO2: 29 MEQ/L (ref 23–33)
CREAT SERPL-MCNC: 0.9 MG/DL (ref 0.4–1.2)
GFR SERPL CREATININE-BSD FRML MDRD: 60 ML/MIN/1.73M2
GLUCOSE BLD-MCNC: 92 MG/DL (ref 70–108)
LIPASE: 118.8 U/L (ref 5.6–51.3)
MAGNESIUM: 2 MG/DL (ref 1.6–2.4)
OSMOLALITY CALCULATION: 284.4 MOSMOL/KG (ref 275–300)
POTASSIUM SERPL-SCNC: 4 MEQ/L (ref 3.5–5.2)
REASON FOR REJECTION: NORMAL
REJECTED TEST: NORMAL
SODIUM BLD-SCNC: 143 MEQ/L (ref 135–145)
TOTAL PROTEIN: 6.6 G/DL (ref 6.1–8)
TROPONIN T: < 0.01 NG/ML

## 2021-05-25 PROCEDURE — 71045 X-RAY EXAM CHEST 1 VIEW: CPT

## 2021-05-25 PROCEDURE — 85025 COMPLETE CBC W/AUTO DIFF WBC: CPT

## 2021-05-25 PROCEDURE — 99285 EMERGENCY DEPT VISIT HI MDM: CPT

## 2021-05-25 PROCEDURE — 36415 COLL VENOUS BLD VENIPUNCTURE: CPT

## 2021-05-25 PROCEDURE — 83735 ASSAY OF MAGNESIUM: CPT

## 2021-05-25 PROCEDURE — 82248 BILIRUBIN DIRECT: CPT

## 2021-05-25 PROCEDURE — 96361 HYDRATE IV INFUSION ADD-ON: CPT

## 2021-05-25 PROCEDURE — 84484 ASSAY OF TROPONIN QUANT: CPT

## 2021-05-25 PROCEDURE — 6370000000 HC RX 637 (ALT 250 FOR IP): Performed by: EMERGENCY MEDICINE

## 2021-05-25 PROCEDURE — 93005 ELECTROCARDIOGRAM TRACING: CPT | Performed by: EMERGENCY MEDICINE

## 2021-05-25 PROCEDURE — 2580000003 HC RX 258: Performed by: EMERGENCY MEDICINE

## 2021-05-25 PROCEDURE — 80053 COMPREHEN METABOLIC PANEL: CPT

## 2021-05-25 PROCEDURE — 74177 CT ABD & PELVIS W/CONTRAST: CPT

## 2021-05-25 PROCEDURE — 83690 ASSAY OF LIPASE: CPT

## 2021-05-25 PROCEDURE — 96360 HYDRATION IV INFUSION INIT: CPT

## 2021-05-25 PROCEDURE — 6360000004 HC RX CONTRAST MEDICATION: Performed by: EMERGENCY MEDICINE

## 2021-05-25 RX ORDER — HYDROCODONE BITARTRATE AND ACETAMINOPHEN 5; 325 MG/1; MG/1
1 TABLET ORAL ONCE
Status: COMPLETED | OUTPATIENT
Start: 2021-05-25 | End: 2021-05-25

## 2021-05-25 RX ORDER — PANTOPRAZOLE SODIUM 40 MG/1
40 TABLET, DELAYED RELEASE ORAL ONCE
Status: COMPLETED | OUTPATIENT
Start: 2021-05-25 | End: 2021-05-25

## 2021-05-25 RX ORDER — 0.9 % SODIUM CHLORIDE 0.9 %
1000 INTRAVENOUS SOLUTION INTRAVENOUS ONCE
Status: COMPLETED | OUTPATIENT
Start: 2021-05-25 | End: 2021-05-26

## 2021-05-25 RX ORDER — ONDANSETRON 4 MG/1
4 TABLET, ORALLY DISINTEGRATING ORAL ONCE
Status: COMPLETED | OUTPATIENT
Start: 2021-05-25 | End: 2021-05-25

## 2021-05-25 RX ADMIN — IOPAMIDOL 80 ML: 755 INJECTION, SOLUTION INTRAVENOUS at 23:36

## 2021-05-25 RX ADMIN — SODIUM CHLORIDE 1000 ML: 9 INJECTION, SOLUTION INTRAVENOUS at 22:57

## 2021-05-25 RX ADMIN — ONDANSETRON 4 MG: 4 TABLET, ORALLY DISINTEGRATING ORAL at 22:57

## 2021-05-25 RX ADMIN — PANTOPRAZOLE SODIUM 40 MG: 40 TABLET, DELAYED RELEASE ORAL at 22:57

## 2021-05-25 RX ADMIN — HYDROCODONE BITARTRATE AND ACETAMINOPHEN 1 TABLET: 5; 325 TABLET ORAL at 22:57

## 2021-05-25 ASSESSMENT — ENCOUNTER SYMPTOMS
SHORTNESS OF BREATH: 0
EYE PAIN: 0
VOMITING: 0
CHOKING: 0
RHINORRHEA: 0
NAUSEA: 1
COUGH: 0
ABDOMINAL DISTENTION: 0
PHOTOPHOBIA: 0
TROUBLE SWALLOWING: 0
CONSTIPATION: 0
SINUS PRESSURE: 0
EYE REDNESS: 0
SORE THROAT: 0
VOICE CHANGE: 0
ABDOMINAL PAIN: 1
DIARRHEA: 1
BLOOD IN STOOL: 0
WHEEZING: 0
EYE DISCHARGE: 0
CHEST TIGHTNESS: 0
BACK PAIN: 0
EYE ITCHING: 0

## 2021-05-25 ASSESSMENT — PAIN SCALES - GENERAL
PAINLEVEL_OUTOF10: 10
PAINLEVEL_OUTOF10: 5
PAINLEVEL_OUTOF10: 10

## 2021-05-25 ASSESSMENT — PAIN DESCRIPTION - PAIN TYPE
TYPE: ACUTE PAIN
TYPE: ACUTE PAIN

## 2021-05-25 ASSESSMENT — PAIN DESCRIPTION - DESCRIPTORS: DESCRIPTORS: ACHING;DULL;DISCOMFORT

## 2021-05-25 ASSESSMENT — PAIN DESCRIPTION - LOCATION: LOCATION: ABDOMEN

## 2021-05-26 ENCOUNTER — TELEPHONE (OUTPATIENT)
Dept: CARDIOTHORACIC SURGERY | Age: 81
End: 2021-05-26

## 2021-05-26 DIAGNOSIS — I73.9 PERIPHERAL VASCULAR DISEASE, UNSPECIFIED (HCC): Primary | ICD-10-CM

## 2021-05-26 DIAGNOSIS — I73.9 CLAUDICATION (HCC): ICD-10-CM

## 2021-05-26 LAB
ANISOCYTOSIS: PRESENT
BASOPHILIC STIPPLING: ABNORMAL
BASOPHILS # BLD: 0.8 %
BASOPHILS ABSOLUTE: 0 THOU/MM3 (ref 0–0.1)
DACROCYTES: ABNORMAL
ELLIPTOCYTES: ABNORMAL
EOSINOPHIL # BLD: 4.2 %
EOSINOPHILS ABSOLUTE: 0.2 THOU/MM3 (ref 0–0.4)
ERYTHROCYTE [DISTWIDTH] IN BLOOD BY AUTOMATED COUNT: 22.7 % (ref 11.5–14.5)
ERYTHROCYTE [DISTWIDTH] IN BLOOD BY AUTOMATED COUNT: 80.1 FL (ref 35–45)
HCT VFR BLD CALC: 32.1 % (ref 37–47)
HEMOGLOBIN: 9.9 GM/DL (ref 12–16)
HYPOCHROMIA: PRESENT
IMMATURE GRANS (ABS): 0.01 THOU/MM3 (ref 0–0.07)
IMMATURE GRANULOCYTES: 0.2 %
LYMPHOCYTES # BLD: 24.8 %
LYMPHOCYTES ABSOLUTE: 1.3 THOU/MM3 (ref 1–4.8)
MCH RBC QN AUTO: 29.7 PG (ref 26–33)
MCHC RBC AUTO-ENTMCNC: 30.8 GM/DL (ref 32.2–35.5)
MCV RBC AUTO: 96.4 FL (ref 81–99)
MONOCYTES # BLD: 15.8 %
MONOCYTES ABSOLUTE: 0.8 THOU/MM3 (ref 0.4–1.3)
NUCLEATED RED BLOOD CELLS: 0 /100 WBC
PLATELET # BLD: 150 THOU/MM3 (ref 130–400)
PLATELET ESTIMATE: ADEQUATE
PMV BLD AUTO: 11.9 FL (ref 9.4–12.4)
POIKILOCYTES: ABNORMAL
RBC # BLD: 3.33 MILL/MM3 (ref 4.2–5.4)
SCAN OF BLOOD SMEAR: NORMAL
SEG NEUTROPHILS: 54.2 %
SEGMENTED NEUTROPHILS ABSOLUTE COUNT: 2.8 THOU/MM3 (ref 1.8–7.7)
TARGET CELLS: ABNORMAL
WBC # BLD: 5.1 THOU/MM3 (ref 4.8–10.8)

## 2021-05-26 RX ORDER — HYDROCODONE BITARTRATE AND ACETAMINOPHEN 5; 325 MG/1; MG/1
1 TABLET ORAL EVERY 6 HOURS PRN
Qty: 12 TABLET | Refills: 0 | Status: SHIPPED | OUTPATIENT
Start: 2021-05-26 | End: 2021-05-29

## 2021-05-26 NOTE — ED TRIAGE NOTES
Pt presents to the ED with c/o abdomen pain and chest pain. Pt states she was just recently seen here for similar abdomen pain. Pt states she was diagnosed with diverticulitis. Pt states she has been trying to get an earlier appointment with Dr Judy Tariq but hasn't been able to. Pt states she has been having belching and diarrhea. Pt states the other day she was working in the yard and after noticed pain to LT rib cage.

## 2021-05-26 NOTE — ED PROVIDER NOTES
[facility]  eMERGENCY dEPARTMENT eNCOUnter          CHIEF COMPLAINT       Chief Complaint   Patient presents with    Abdominal Pain    Chest Pain    Diarrhea       Nurses Notes reviewed and I agree except as noted in the HPI. HISTORY OF PRESENT ILLNESS    Olga Martinez is a [de-identified] y.o. female who presents to the ER today with complaints of right-sided abdominal pain. She states that she has had unresolved diarrhea for quite some time. She also states she was pulling weeds in her garden and she thinks that she hurt the muscles between her ribs on her left side. Patient is followed up with her family doctor and tried to see Dr. Kirby Russo as an outpatient but stated that his office could not get her in. REVIEW OF SYSTEMS     Review of Systems   Constitutional: Negative for activity change, appetite change, diaphoresis, fatigue and unexpected weight change. HENT: Negative for congestion, ear discharge, ear pain, hearing loss, rhinorrhea, sinus pressure, sore throat, trouble swallowing and voice change. Eyes: Negative for photophobia, pain, discharge, redness and itching. Respiratory: Negative for cough, choking, chest tightness, shortness of breath and wheezing. Cardiovascular: Negative for chest pain, palpitations and leg swelling. Gastrointestinal: Positive for abdominal pain, diarrhea and nausea. Negative for abdominal distention, blood in stool, constipation and vomiting. Right upper abdominal pain. Endocrine: Negative for polydipsia, polyphagia and polyuria. Genitourinary: Negative for decreased urine volume, difficulty urinating, dysuria, enuresis, frequency, hematuria and urgency. Patient denies difficulty urinating   Musculoskeletal: Negative for arthralgias, back pain, gait problem, myalgias, neck pain and neck stiffness. Skin: Negative for pallor and rash. Allergic/Immunologic: Negative for immunocompromised state.    Neurological: Negative for dizziness, tremors, seizures, syncope, facial asymmetry, weakness, light-headedness, numbness and headaches. Hematological: Negative for adenopathy. Does not bruise/bleed easily. Psychiatric/Behavioral: Negative for agitation, hallucinations and suicidal ideas. The patient is not nervous/anxious. PAST MEDICAL HISTORY    has a past medical history of Anemia, Aneurysm of abdominal aorta (HCC), Arthritis, Blood circulation, collateral, Bursitis, trochanteric, CAD (coronary artery disease), Cerebral artery occlusion with cerebral infarction [facility]), Chronic back pain, Cirrhosis of liver without ascites ([facility] [address] Depression, Diabetes mellitus ([facility] [address] GERD (gastroesophageal reflux disease), Headache(784.0), History of basal cell cancer, Hyperlipidemia, Hypertension, Irritable bowel, Movement disorder, PVD (peripheral vascular disease) ([facility] [address] S/P CABG (coronary artery bypass graft), and Sciatica. SURGICAL HISTORY      has a past surgical history that includes Colonoscopy; Hysterectomy; Upper gastrointestinal endoscopy; Cholecystectomy (yrs ago); Tonsillectomy; laryngoscopy (10/29/2012 Dr Eneida Shankar); Appendectomy; Endoscopy, colon, diagnostic; skin biopsy; hiatal hernia repair; Cardiac catheterization (2/3/2016); Abdomen surgery; eye surgery; hernia repair; Coronary artery bypass graft (2-18-16); and pr vein bypass graft,carot-subcl/ subcl-carotd (Left, 4/11/2018). CURRENT MEDICATIONS       Previous Medications    BLOOD GLUCOSE MONITOR STRIPS    Check blood sugar q daily, Dx E11.9    DONEPEZIL (ARICEPT) 10 MG TABLET    TAKE 1 TABLET BY MOUTH EVERY DAY AT NIGHT    ESOMEPRAZOLE (NEXIUM) 40 MG DELAYED RELEASE CAPSULE    Take 1 capsule by mouth every morning (before breakfast)    FAMOTIDINE (PEPCID) 40 MG TABLET    Take 1 tab PO daily    HANDICAP PLACARD MISC    by Does not apply route Expires 12/14/2022    LIDOCAINE (LIDODERM) 5 %    Place 3 patches onto the skin every 24 hours 12 hours on, 12 hours off. METOPROLOL TARTRATE (LOPRESSOR) 25 MG TABLET    TAKE 1/2 TABLET TWICE A DAY    MIRTAZAPINE (REMERON) 7.5 MG TABLET    Take 1 tablet by mouth nightly    ONDANSETRON (ZOFRAN ODT) 4 MG DISINTEGRATING TABLET    Place 1 tablet under the tongue every 8 hours as needed for Nausea or Vomiting    ONDANSETRON (ZOFRAN ODT) 4 MG DISINTEGRATING TABLET    Take 1 tablet by mouth every 8 hours as needed for Nausea or Vomiting    POLYETHYLENE GLYCOL (GLYCOLAX) 17 GM/SCOOP POWDER    Dispense 238 Gram Bottle. Use as Directed    SERTRALINE (ZOLOFT) 25 MG TABLET    Take 1 tablet by mouth daily    SIMETHICONE (MYLICON) 919 MG CHEWABLE TABLET    Take 1 tablet by mouth every 6 hours as needed for Flatulence    SIMVASTATIN (ZOCOR) 40 MG TABLET    TAKE 1 TABLET NIGHTLY       ALLERGIES     is allergic to ciprofloxacin, darvon [propoxyphene hcl], and lipitor [atorvastatin calcium]. FAMILY HISTORY     She indicated that her mother is . She indicated that her father is . She indicated that her sister is alive. She indicated that her brother is . She indicated that her son is alive. She indicated that the status of her neg hx is unknown.   family history includes Cancer in her brother and son; Early Death in her mother; Heart Disease (age of onset: 52) in her father. SOCIAL HISTORY      reports that she quit smoking about 20 years ago. Her smoking use included cigarettes. She has a 12.50 pack-year smoking history. She has never used smokeless tobacco. She reports that she does not drink alcohol and does not use drugs. PHYSICAL EXAM     INITIAL VITALS:  oral temperature is 97.8 °F (36.6 °C). Her blood pressure is 153/80 (abnormal) and her pulse is 68. Her respiration is 17 and oxygen saturation is 93%. Physical Exam  Vitals and nursing note reviewed. Constitutional:       General: She is not in acute distress. Appearance: She is well-developed. She is not diaphoretic.    HENT:      Head: Normocephalic and atraumatic. Right Ear: External ear normal.      Left Ear: External ear normal.      Nose: Nose normal.      Mouth/Throat:      Pharynx: No oropharyngeal exudate. Eyes:      General: No scleral icterus. Right eye: No discharge. Left eye: No discharge. Conjunctiva/sclera: Conjunctivae normal.      Pupils: Pupils are equal, round, and reactive to light. Neck:      Thyroid: No thyromegaly. Vascular: No JVD. Trachea: No tracheal deviation. Cardiovascular:      Rate and Rhythm: Normal rate and regular rhythm. Heart sounds: Normal heart sounds, S1 normal and S2 normal. No murmur heard. No friction rub. No gallop. Pulmonary:      Effort: Pulmonary effort is normal.      Breath sounds: Normal breath sounds. No stridor. No wheezing, rhonchi or rales. Chest:      Chest wall: No tenderness. Abdominal:      General: Abdomen is protuberant. Bowel sounds are normal. There is no distension. Palpations: Abdomen is soft. There is no mass. Tenderness: There is generalized abdominal tenderness. There is no guarding or rebound. Negative signs include Kessler's sign, Rovsing's sign, McBurney's sign, psoas sign and obturator sign. Hernia: No hernia is present. Musculoskeletal:         General: No tenderness. Normal range of motion. Cervical back: Normal range of motion and neck supple. Lymphadenopathy:      Cervical: No cervical adenopathy. Skin:     General: Skin is warm and dry. Findings: No bruising, ecchymosis, lesion or rash. Neurological:      Mental Status: She is alert and oriented to person, place, and time. Cranial Nerves: No cranial nerve deficit. Coordination: Coordination normal.      Deep Tendon Reflexes: Reflexes are normal and symmetric. Psychiatric:         Speech: Speech normal.         Behavior: Behavior normal.         Thought Content:  Thought content normal.         Judgment: Judgment normal.         DIFFERENTIAL Patient is instructed to return to the nearest emergency room immediately for any new or worsening complaints. This was discussed extensively at bedside with the patient and family who understood and agreed with the plan. Patient is subsequently discharged home in good condition. Patient has what appears to be ascites secondary due to liver cirrhosis she also has what appears to be diverticulosis. Patient is instructed to call her gastroenterologist and schedule follow-up appointment and to do so within the next 1 to 2 days. Patient has been provided a diet for liver disease. Patient is given a short course of pain medication for this she is instructed to take it as prescribed. She is instructed to follow-up with her primary care physician and do so within the next 1 to 2 days. Patient is instructed to return to the nearest emergency room immediately for any new or worsening complaints. CRITICAL CARE:   None    CONSULTS:  None    PROCEDURES:  None    FINAL IMPRESSION      1. Diverticulosis    2. Other ascites    3.  Cirrhosis of liver with ascites, unspecified hepatic cirrhosis type [facility])          DISPOSITION/PLAN   Discharge    PATIENT REFERRED TO:  Saurabh Newton [address]  [phone]    Call in 1 day      Gustavo Taylor MD  [address] [address] [phone]    Call in 1 day        DISCHARGE MEDICATIONS:  New Prescriptions    No medications on file       (Please note that portions of this note were completed with a voice recognition program.  Efforts were made to edit the dictations but occasionally words are mis-transcribed.)    Tarun Trinidad, DO Jono Guillory, DO  05/26/21 [facility], DO  05/26/21 [id number]

## 2021-05-26 NOTE — ED NOTES
Pt resting in bed. Respirations even and unlabored. Pt reports pain has improved after medication, 5/10.       Rand Serra, RN  05/25/21 2136

## 2021-05-26 NOTE — TELEPHONE ENCOUNTER
Received call from Kailey Das with [facility] Scheduling  Order for TEOFILO scheduled on 6/3/21 has   New order placed  Please agree. Thank you.

## 2021-05-26 NOTE — ED NOTES
Pt resting in bed. Respirations even and unlabored. Pt medicated per MAR.       Haily Barcenas RN  05/25/21 [id number]

## 2021-05-27 LAB
EKG ATRIAL RATE: 57 BPM
EKG P AXIS: 84 DEGREES
EKG P-R INTERVAL: 188 MS
EKG Q-T INTERVAL: 470 MS
EKG QRS DURATION: 90 MS
EKG QTC CALCULATION (BAZETT): 457 MS
EKG R AXIS: 38 DEGREES
EKG T AXIS: 59 DEGREES
EKG VENTRICULAR RATE: 57 BPM

## 2021-06-03 ENCOUNTER — APPOINTMENT (OUTPATIENT)
Dept: ULTRASOUND IMAGING | Age: 81
End: 2021-06-03
Payer: MEDICARE

## 2021-06-03 ENCOUNTER — HOSPITAL ENCOUNTER (OUTPATIENT)
Dept: ULTRASOUND IMAGING | Age: 81
Discharge: HOME | End: 2021-06-03
Payer: MEDICARE

## 2021-06-03 ENCOUNTER — HOSPITAL ENCOUNTER (OUTPATIENT)
Dept: INTERVENTIONAL RADIOLOGY/VASCULAR | Age: 81
Discharge: HOME | End: 2021-06-03
Payer: MEDICARE

## 2021-06-03 DIAGNOSIS — R10.9 ABDOMINAL PAIN, UNSPECIFIED ABDOMINAL LOCATION: ICD-10-CM

## 2021-06-03 DIAGNOSIS — R93.3 ABNORMAL COMPUTED TOMOGRAPHY OF STOMACH: ICD-10-CM

## 2021-06-03 DIAGNOSIS — I73.9 PERIPHERAL VASCULAR DISEASE, UNSPECIFIED (HCC): ICD-10-CM

## 2021-06-03 DIAGNOSIS — I71.40 ABDOMINAL AORTIC ANEURYSM (AAA) WITHOUT RUPTURE: ICD-10-CM

## 2021-06-03 DIAGNOSIS — I73.9 CLAUDICATION (HCC): ICD-10-CM

## 2021-06-03 PROCEDURE — 76775 US EXAM ABDO BACK WALL LIM: CPT

## 2021-06-03 PROCEDURE — 76705 ECHO EXAM OF ABDOMEN: CPT

## 2021-06-03 PROCEDURE — 93922 UPR/L XTREMITY ART 2 LEVELS: CPT

## 2021-06-17 ENCOUNTER — HOSPITAL ENCOUNTER (EMERGENCY)
Age: 81
Discharge: HOME | End: 2021-06-17
Payer: MEDICARE

## 2021-06-17 ENCOUNTER — APPOINTMENT (OUTPATIENT)
Dept: GENERAL RADIOLOGY | Age: 81
End: 2021-06-17
Payer: MEDICARE

## 2021-06-17 VITALS
OXYGEN SATURATION: 95 % | HEIGHT: 67 IN | BODY MASS INDEX: 25.27 KG/M2 | DIASTOLIC BLOOD PRESSURE: 65 MMHG | RESPIRATION RATE: 18 BRPM | HEART RATE: 60 BPM | TEMPERATURE: 98.5 F | WEIGHT: 161 LBS | SYSTOLIC BLOOD PRESSURE: 137 MMHG

## 2021-06-17 DIAGNOSIS — R07.81 RIB PAIN ON LEFT SIDE: Primary | ICD-10-CM

## 2021-06-17 PROCEDURE — 99213 OFFICE O/P EST LOW 20 MIN: CPT

## 2021-06-17 PROCEDURE — 99213 OFFICE O/P EST LOW 20 MIN: CPT | Performed by: NURSE PRACTITIONER

## 2021-06-17 PROCEDURE — 71101 X-RAY EXAM UNILAT RIBS/CHEST: CPT

## 2021-06-17 RX ORDER — ACETAMINOPHEN 325 MG/1
650 TABLET ORAL EVERY 6 HOURS PRN
COMMUNITY

## 2021-06-17 ASSESSMENT — PAIN DESCRIPTION - LOCATION: LOCATION: RIB CAGE

## 2021-06-17 ASSESSMENT — ENCOUNTER SYMPTOMS
EYE REDNESS: 0
EYE ITCHING: 0
COUGH: 0
SHORTNESS OF BREATH: 0

## 2021-06-17 ASSESSMENT — PAIN DESCRIPTION - PAIN TYPE: TYPE: ACUTE PAIN

## 2021-06-17 ASSESSMENT — PAIN DESCRIPTION - ORIENTATION: ORIENTATION: LEFT;ANTERIOR

## 2021-06-17 ASSESSMENT — PAIN SCALES - GENERAL: PAINLEVEL_OUTOF10: 10

## 2021-06-17 ASSESSMENT — PAIN DESCRIPTION - DESCRIPTORS: DESCRIPTORS: DISCOMFORT

## 2021-06-17 ASSESSMENT — PAIN - FUNCTIONAL ASSESSMENT: PAIN_FUNCTIONAL_ASSESSMENT: PREVENTS OR INTERFERES SOME ACTIVE ACTIVITIES AND ADLS

## 2021-06-17 NOTE — ED TRIAGE NOTES
Patient ambulated to room with spouse and complaint of left rib pain. States she was pulling weeds a couple of days ago and felt sharp pain in left side.

## 2021-06-17 NOTE — ED PROVIDER NOTES
[address]       Chief Complaint   Patient presents with    Rib Pain     left       Nurses Notes reviewed and I agree except as noted in the HPI. HISTORY OF PRESENT ILLNESS   Swati Daugherty is a [de-identified] y.o. female who presents for evaluation of left rib pain that  Started Friday/Saturday. She states that she was gardening, she was bent over, and she felt a pop, she screamed, and stood up. She states that she has felt a bump there ever since and has pain with coughing, sneezing, and movement. Has had some shortness of breath but she makes her self take a deep breath and it resolved. She has been taking tylenol as needed. She denies injury or trauma to the area. Patient is provided by the patient and her . REVIEW OF SYSTEMS     Review of Systems   Constitutional: Negative for chills, fatigue and fever. Eyes: Negative for redness and itching. Respiratory: Negative for cough and shortness of breath. Cardiovascular: Negative for chest pain. Musculoskeletal: Positive for arthralgias (left rib pain). Skin: Negative for rash. Allergic/Immunologic: Negative for environmental allergies and food allergies. Neurological: Negative for dizziness and headaches. Hematological: Negative for adenopathy.        PAST MEDICAL HISTORY         Diagnosis Date    Anemia     Aneurysm of abdominal aorta (HCC)     Arthritis     Blood circulation, collateral     Bursitis, trochanteric     CAD (coronary artery disease) 2/2015    Cerebral artery occlusion with cerebral infarction [facility])     Chronic back pain     Cirrhosis of liver without ascites ([facility] [facility] 9/2/2020    Depression     Diabetes mellitus (HCC)     diet controlled    GERD (gastroesophageal reflux disease)     Headache(784.0)     History of basal cell cancer     Nose    Hyperlipidemia     Hypertension     Irritable bowel     Movement disorder     [facility]     S/P CABG (coronary artery bypass graft)     Sciatica        SURGICAL HISTORY     Patient  has a past surgical history that includes Colonoscopy; Hysterectomy; Upper gastrointestinal endoscopy; Cholecystectomy (yrs ago); Tonsillectomy; laryngoscopy (10/29/2012 Dr Jessy Shine); Appendectomy; Endoscopy, colon, diagnostic; skin biopsy; hiatal hernia repair; Cardiac catheterization (2/3/2016); Abdomen surgery; eye surgery; hernia repair; Coronary artery bypass graft (2-18-16); and pr vein bypass graft,carot-subcl/ subcl-carotd (Left, 4/11/2018).     CURRENT MEDICATIONS       Discharge Medication List as of 6/17/2021  5:46 PM      CONTINUE these medications which have NOT CHANGED    Details   acetaminophen (TYLENOL) 325 MG tablet Take 650 mg by mouth every 6 hours as needed for PainHistorical Med      simethicone (MYLICON) 213 MG chewable tablet Take 1 tablet by mouth every 6 hours as needed for Flatulence, Disp-60 tablet, R-3Normal      esomeprazole (NEXIUM) 40 MG delayed release capsule Take 1 capsule by mouth every morning (before breakfast), Disp-30 capsule, R-5Normal      simvastatin (ZOCOR) 40 MG tablet TAKE 1 TABLET NIGHTLY, Disp-90 tablet, R-3Normal      sertraline (ZOLOFT) 25 MG tablet Take 1 tablet by mouth daily, Disp-30 tablet, R-3Normal      lidocaine (LIDODERM) 5 % Place 3 patches onto the skin every 24 hours 12 hours on, 12 hours off., Disp-90 patch, R-3Normal      famotidine (PEPCID) 40 MG tablet Take 1 tab PO daily, Disp-90 tablet, R-3Normal      mirtazapine (REMERON) 7.5 MG tablet Take 1 tablet by mouth nightly, Disp-90 tablet, R-3Normal      metoprolol tartrate (LOPRESSOR) 25 MG tablet TAKE 1/2 TABLET TWICE A DAY, Disp-90 tablet,R-3Normal      donepezil (ARICEPT) 10 MG tablet TAKE 1 TABLET BY MOUTH EVERY DAY AT NIGHT, Disp-90 tablet,R-3Normal      !! ondansetron (ZOFRAN ODT) 4 MG disintegrating tablet Take 1 tablet by mouth every 8 hours as needed for Nausea or Vomiting, Disp-20 tablet, R-0Normal      !! ondansetron (ZOFRAN ODT) 4 MG disintegrating tablet Place 1 tablet under the tongue every 8 hours as needed for Nausea or Vomiting, Disp-30 tablet, R-0Normal      polyethylene glycol (GLYCOLAX) 17 GM/SCOOP powder Dispense 238 Gram Bottle. Use as Directed, Disp-238 g,R-0Normal      blood glucose monitor strips Check blood sugar q daily, Dx E11.9, Disp-100 strip, R-0, Normal      Handicap Placard MISC Starting Thu 12/14/2017, Disp-1 each, R-0, PrintExpires 12/14/2022       !! - Potential duplicate medications found. Please discuss with provider. ALLERGIES     Patient is is allergic to ciprofloxacin, darvon [propoxyphene hcl], and lipitor [atorvastatin calcium]. FAMILY HISTORY     Patient'sfamily history includes Cancer in her brother and son; Early Death in her mother; Heart Disease (age of onset: 52) in her father. SOCIAL HISTORY     Patient  reports that she quit smoking about 20 years ago. Her smoking use included cigarettes. She has a 12.50 pack-year smoking history. She has never used smokeless tobacco. She reports that she does not drink alcohol and does not use drugs. PHYSICAL EXAM     ED TRIAGE VITALS  BP: 137/65, Temp: 98.5 °F (36.9 °C), Pulse: 60, Resp: 18, SpO2: 95 %  Physical Exam  Vitals and nursing note reviewed. Constitutional:       General: She is not in acute distress. Appearance: Normal appearance. She is well-developed and well-groomed. HENT:      Head: Normocephalic and atraumatic. Right Ear: External ear normal.      Left Ear: External ear normal.      Mouth/Throat:      Lips: Pink. Mouth: Mucous membranes are moist.   Eyes:      Conjunctiva/sclera: Conjunctivae normal.      Right eye: Right conjunctiva is not injected. Left eye: Left conjunctiva is not injected. Pupils: Pupils are equal.   Cardiovascular:      Rate and Rhythm: Normal rate. Heart sounds: Normal heart sounds.    Pulmonary:      Effort: Pulmonary effort is normal. No respiratory distress. Breath sounds: Normal breath sounds and air entry. Chest:      Chest wall: Tenderness present. No deformity or crepitus. Comments: No evidence of injury or trauma, no rash. Musculoskeletal:      Cervical back: Normal range of motion. Skin:     General: Skin is warm and dry. Findings: No rash (on exposed surfaces). Neurological:      Mental Status: She is alert and oriented to person, place, and time. Psychiatric:         Mood and Affect: Mood normal.         Speech: Speech normal.         Behavior: Behavior is cooperative. DIAGNOSTIC RESULTS   Labs:  Abnormal Labs Reviewed - No data to display     IMAGING:  XR RIBS LEFT INCLUDE CHEST (MIN 3 VIEWS)   Final Result   No acute findings      This document has been electronically signed by: Lucianne Fleischer, MD on    06/17/2021 05:41 PM        URGENT CARE COURSE:     Vitals:    06/17/21 1643   BP: 137/65   Pulse: 60   Resp: 18   Temp: 98.5 °F (36.9 °C)   TempSrc: Temporal   SpO2: 95%   Weight: 161 lb (73 kg)   Height: 5' 6.5\" (1.689 m)       Medications - No data to display  PROCEDURES:  FINALIMPRESSION      1. Rib pain on left side        DISPOSITION/PLAN   DISPOSITION    Discharge    ED Course as of Jun 17 1749   Thu Jun 17, 2021   1747 Chest x-ray is negative for an acute process per radiologist read. Lidocaine patches were going to be prescribed, however patient says she has these at home and can use them on the left ribs. [facility]      ED Course User Index  [HERNANDEZ] Meek Haskins APRN - CNP     Physical assessment findings, diagnostic testing(s) if applicable, and vital signs reviewed with patient/patient representative. Questions answered. If applicable, patient/patient representative will be contacted upon receipt of final culture and sensitivity or other testing results when available. Any additions or changes to medications or changes the plan of care will be made at that time.   Medications as directed, including OTC medications for supportive care. Education provided on medications. Differential diagnosis(s) discussed with patient/patient representative. Home care/self care instructions reviewed with patient/patient representative. Patient is to follow-up with family care provider in 2-3 days if no improvement. Patient is to go to the emergency department if symptoms worsen. Patient/patient representative is aware of care plan, questions answered, verbalizes understanding and is in agreement. Teach back method used for patient/patient representative teaching(s) and printed instructions attached to after visit summary. Problem List Items Addressed This Visit     None      Visit Diagnoses     Rib pain on left side    -  Primary          PATIENT REFERRED TO:  Cornelio Wood DO  [address] [place] [address]. [address]   [phone]    Schedule an appointment as soon as possible for a visit in 3 days  For further evaluation. , If symptoms change/worsen, go to the [facility]  [address]  [phone]    as needed, If symptoms change/worsen, go to the [facility], [address], APRN - CNP  06/17/21 7821

## 2021-06-17 NOTE — ED NOTES
Pt verbalized discharge instructions. Pt informed to go to ER if develop chest pain, shortness of breath or abdominal pain. Pt ambulatory out in stable condition. Assessment unchanged.        Caleb Bosch RN  06/17/21 7059

## 2021-06-21 ENCOUNTER — OFFICE VISIT (OUTPATIENT)
Dept: FAMILY MEDICINE CLINIC | Age: 81
End: 2021-06-21
Payer: MEDICARE

## 2021-06-21 VITALS
HEIGHT: 67 IN | SYSTOLIC BLOOD PRESSURE: 120 MMHG | RESPIRATION RATE: 16 BRPM | HEART RATE: 66 BPM | WEIGHT: 162.2 LBS | TEMPERATURE: 97.7 F | OXYGEN SATURATION: 97 % | DIASTOLIC BLOOD PRESSURE: 74 MMHG | BODY MASS INDEX: 25.46 KG/M2

## 2021-06-21 DIAGNOSIS — S39.011A STRAIN OF ABDOMINAL WALL, INITIAL ENCOUNTER: Primary | ICD-10-CM

## 2021-06-21 DIAGNOSIS — G30.1 LATE ONSET ALZHEIMER'S DISEASE WITHOUT BEHAVIORAL DISTURBANCE (HCC): ICD-10-CM

## 2021-06-21 DIAGNOSIS — F02.80 LATE ONSET ALZHEIMER'S DISEASE WITHOUT BEHAVIORAL DISTURBANCE (HCC): ICD-10-CM

## 2021-06-21 DIAGNOSIS — I10 BENIGN ESSENTIAL HTN: ICD-10-CM

## 2021-06-21 DIAGNOSIS — E78.00 PURE HYPERCHOLESTEROLEMIA: ICD-10-CM

## 2021-06-21 PROCEDURE — 99214 OFFICE O/P EST MOD 30 MIN: CPT | Performed by: FAMILY MEDICINE

## 2021-06-21 RX ORDER — TRAMADOL HYDROCHLORIDE 50 MG/1
50 TABLET ORAL EVERY 6 HOURS PRN
Qty: 28 TABLET | Refills: 0 | Status: SHIPPED | OUTPATIENT
Start: 2021-06-21 | End: 2021-06-28

## 2021-06-21 SDOH — ECONOMIC STABILITY: FOOD INSECURITY: WITHIN THE PAST 12 MONTHS, YOU WORRIED THAT YOUR FOOD WOULD RUN OUT BEFORE YOU GOT MONEY TO BUY MORE.: NEVER TRUE

## 2021-06-21 SDOH — ECONOMIC STABILITY: FOOD INSECURITY: WITHIN THE PAST 12 MONTHS, THE FOOD YOU BOUGHT JUST DIDN'T LAST AND YOU DIDN'T HAVE MONEY TO GET MORE.: NEVER TRUE

## 2021-06-21 ASSESSMENT — SOCIAL DETERMINANTS OF HEALTH (SDOH): HOW HARD IS IT FOR YOU TO PAY FOR THE VERY BASICS LIKE FOOD, HOUSING, MEDICAL CARE, AND HEATING?: NOT HARD AT ALL

## 2021-07-10 DIAGNOSIS — F32.1 CURRENT MODERATE EPISODE OF MAJOR DEPRESSIVE DISORDER WITHOUT PRIOR EPISODE (HCC): ICD-10-CM

## 2021-07-10 DIAGNOSIS — R73.03 PREDIABETES: ICD-10-CM

## 2021-07-10 RX ORDER — SERTRALINE HYDROCHLORIDE 25 MG/1
TABLET, FILM COATED ORAL
Qty: 90 TABLET | Refills: 3 | Status: SHIPPED | OUTPATIENT
Start: 2021-07-10 | End: 2022-07-13

## 2021-07-23 ENCOUNTER — OFFICE VISIT (OUTPATIENT)
Dept: FAMILY MEDICINE CLINIC | Age: 81
End: 2021-07-23
Payer: MEDICARE

## 2021-07-23 ENCOUNTER — TELEPHONE (OUTPATIENT)
Dept: FAMILY MEDICINE CLINIC | Age: 81
End: 2021-07-23

## 2021-07-23 VITALS
TEMPERATURE: 97.9 F | HEART RATE: 51 BPM | WEIGHT: 163.6 LBS | BODY MASS INDEX: 26.29 KG/M2 | SYSTOLIC BLOOD PRESSURE: 110 MMHG | RESPIRATION RATE: 14 BRPM | HEIGHT: 66 IN | DIASTOLIC BLOOD PRESSURE: 60 MMHG | OXYGEN SATURATION: 94 %

## 2021-07-23 DIAGNOSIS — R14.1 GAS PAIN: ICD-10-CM

## 2021-07-23 DIAGNOSIS — R19.7 DIARRHEA, UNSPECIFIED TYPE: ICD-10-CM

## 2021-07-23 DIAGNOSIS — R11.0 NAUSEA: Primary | ICD-10-CM

## 2021-07-23 PROCEDURE — 99214 OFFICE O/P EST MOD 30 MIN: CPT | Performed by: NURSE PRACTITIONER

## 2021-07-23 RX ORDER — PROMETHAZINE HYDROCHLORIDE 25 MG/1
25 TABLET ORAL 4 TIMES DAILY PRN
Qty: 20 TABLET | Refills: 0 | Status: SHIPPED | OUTPATIENT
Start: 2021-07-23 | End: 2021-07-30

## 2021-07-23 NOTE — TELEPHONE ENCOUNTER
----- Message from Dianna Kwok sent at 7/23/2021 12:22 PM EDT -----  Subject: Message to Provider    QUESTIONS  Information for Provider? Pt's  called on her behalf. Pt was given   a printed out list of the medications she should be taking at her appt   today 7/23. On that list is esomeprazole ([address]) 40 MG delayed release   capsule. The pt is not currently taking that medication and wonders if she   should be? In addition, pt is currently taking Omeprazole 40mg but that   medication was not on the list she was given. Please call  Shyla Marcus   back to answer these questions.   ---------------------------------------------------------------------------  --------------  CALL BACK INFO  What is the best way for the office to contact you? OK to leave message on   voicemail  Preferred Call Back Phone Number? [phone]  ---------------------------------------------------------------------------  --------------  SCRIPT ANSWERS  Relationship to Patient?  Self

## 2021-07-23 NOTE — TELEPHONE ENCOUNTER
Spoke with patient told her she should have a prescription for nexium at the pharmacy and she should be taking that instead of omeprazole. Understanding voiced.

## 2021-07-23 NOTE — PATIENT INSTRUCTIONS
Start Simethicone ( for gas) every 6 hours  Start Phenergan as directed for nausea  If taking Phenergan, don't take Zofran  Push fluids  [place] diet  Stop green tea and coffee

## 2021-07-23 NOTE — PROGRESS NOTES
[address] MEDICINE [address] [place]  [address] [place] [address]  Dept: [phone]  Loc: [phone]  Visit Date: 7/23/2021      Chief Complaint:   Roopa Chauhan is a 80 y.o. female thatpresents for Abdominal Pain (colonoscopy June 22nd has had problems since at 7 BMs a day belching )        HPI:  Comes in with c/o abd cramping, bloating, belching, gas pain, nausea, diarrhea  Seen in the past with similar complaints  Scopes last month, says both were negative  Has not called GI with her worsening symptoms  Reviewed med list, not taking was prescribed in May for similar complaints  Reviewed diet, not eating much, drinking Gingerale, coffee, green tea all day    The patient comes in with her  today. History obtained from pt, , and medical records. I have reviewed the patient's past medical history, past surgical history, allergies,medications, social and family history and I have made updates where appropriate.     Past Medical History:   Diagnosis Date    Anemia     Aneurysm of abdominal aorta (HCC)     Arthritis     Blood circulation, collateral     Bursitis, trochanteric     CAD (coronary artery disease) 2/2015    Cerebral artery occlusion with cerebral infarction (HCC)     Chronic back pain     Cirrhosis of liver without ascites ([address] 9/2/2020    Depression     Diabetes mellitus (HCC)     diet controlled    GERD (gastroesophageal reflux disease)     Headache(784.0)     History of basal cell cancer     Nose    Hyperlipidemia     Hypertension     Irritable bowel     Movement disorder     PVD (peripheral vascular disease) ([address]     S/P CABG (coronary artery bypass graft)     Sciatica        Past Surgical History:   Procedure Laterality Date    ABDOMEN SURGERY      complete hysterectomy, gallbladder    APPENDECTOMY      CARDIAC CATHETERIZATION  2/3/2016    [address]    CHOLECYSTECTOMY  yrs ago    COLONOSCOPY      CORONARY ARTERY BYPASS GRAFT  2-18-16    x3    [facility] ENDOSCOPY, COLON, DIAGNOSTIC      EYE SURGERY      cataracts, glaucoma    HERNIA REPAIR      Hiatal Hernia    HIATAL HERNIA REPAIR      HYSTERECTOMY      LARYNGOSCOPY  10/29/2012 Dr Jackelyn Romero with Bx, Lingual Tonsillectomy, Hypopharyngoscopy    [place] VEIN BYPASS GRAFT,CAROT-SUBCL/ SUBCL-CAROTD Left 2018    LEFT CAROTID SUBCLAVIAN BYPASS performed by Shanae Najera MD at [address] [address]      as a teen    UPPER GASTROINTESTINAL ENDOSCOPY         Social History     Tobacco Use    Smoking status: Former Smoker     Packs/day: 0.50     Years: 25.00     Pack years: 12.50     Types: Cigarettes     Quit date: 11/3/2000     Years since quittin.7    Smokeless tobacco: Never Used    Tobacco comment: quit in    Vaping Use    Vaping Use: Never used   Substance Use Topics    Alcohol use: No    Drug use: No       Family History   Problem Relation Age of Onset    Early Death Mother         not sure of cause    Heart Disease Father 52        MI    Cancer Brother         pancreatic    Cancer Son         larynx    Diabetes Neg Hx     High Blood Pressure Neg Hx     Stroke Neg Hx     Colon Cancer Neg Hx     Colon Polyps Neg Hx          Review of Systems   Gastrointestinal: Positive for abdominal distention, abdominal pain, diarrhea and nausea. PHYSICAL EXAM:  /60   Pulse 51   Temp 97.9 °F (36.6 °C) (Infrared)   Resp 14   Ht 5' 6\" (1.676 m)   Wt 163 lb 9.6 oz (74.2 kg)   LMP  (LMP Unknown)   SpO2 94%   BMI 26.41 kg/m²     Physical Exam  Constitutional:       Appearance: Normal appearance. HENT:      Head: Normocephalic and atraumatic.       Right Ear: External ear normal.      Left Ear: External ear normal.      Nose: Nose normal.      Mouth/Throat:      Mouth: Mucous membranes are moist.   Eyes:      Conjunctiva/sclera: Conjunctivae normal.   Cardiovascular:      Rate and Rhythm: Normal rate and regular rhythm. Pulses: Normal pulses. Heart sounds: Normal heart sounds. Pulmonary:      Effort: Pulmonary effort is normal.      Breath sounds: Normal breath sounds. Abdominal:      Palpations: Abdomen is soft. Comments: Hyperactive BS in RUQ, LLQ, tympanic in some areas   Musculoskeletal:         General: Normal range of motion. Cervical back: Normal range of motion. Skin:     General: Skin is warm and dry. Neurological:      General: No focal deficit present. Mental Status: She is alert and oriented to person, place, and time. Psychiatric:         Mood and Affect: Mood normal.         Behavior: Behavior normal.             ASSESSMENT & PLAN  Esthela Bliss was seen today for abdominal pain. Diagnoses and all orders for this visit:    Nausea  -     promethazine (PHENERGAN) 25 MG tablet; Take 1 tablet by mouth 4 times daily as needed for Nausea    Gas pain    Diarrhea, unspecified type    Start Simethicone for gas   Try Phenergan for nausea instead of Zofran  Clear liquids until symptoms improve  Stop drinking coffee, gingerale, and green tea  Water, propel, for now  [employer]  Needs to follow up with Dr. Lisa Vazquez    No follow-ups on file. Esthela Bliss received counseling on the following healthy behaviors: nutrition, exercise and medication adherence  Reviewedprior labs and health maintenance. Continue current medications, diet and exercise. Discussed use, benefit, and side effects of prescribed medications. Barriers to medication compliance addressed. Patient given educationalmaterials - see patient instructions. All patient questions answered. Patient voiced understanding.      Electronically signed by ANDREZ Doe CNP, APRN on 7/23/21 at 10:38 AM EDT

## 2021-07-24 ASSESSMENT — ENCOUNTER SYMPTOMS
ABDOMINAL PAIN: 1
ABDOMINAL DISTENTION: 1
DIARRHEA: 1
NAUSEA: 1

## 2021-08-16 ENCOUNTER — HOSPITAL ENCOUNTER (OUTPATIENT)
Dept: ULTRASOUND IMAGING | Age: 81
Discharge: HOME | End: 2021-08-16
Payer: MEDICARE

## 2021-08-16 DIAGNOSIS — K74.60 HEPATIC CIRRHOSIS, UNSPECIFIED HEPATIC CIRRHOSIS TYPE, UNSPECIFIED WHETHER ASCITES PRESENT (HCC): ICD-10-CM

## 2021-08-16 PROCEDURE — 76705 ECHO EXAM OF ABDOMEN: CPT

## 2021-09-01 DIAGNOSIS — K21.9 GASTROESOPHAGEAL REFLUX DISEASE WITHOUT ESOPHAGITIS: ICD-10-CM

## 2021-09-01 RX ORDER — OMEPRAZOLE 40 MG/1
CAPSULE, DELAYED RELEASE ORAL
Qty: 90 CAPSULE | Refills: 3 | Status: SHIPPED | OUTPATIENT
Start: 2021-09-01 | End: 2022-03-18

## 2021-09-12 ENCOUNTER — HOSPITAL ENCOUNTER (INPATIENT)
Age: 81
LOS: 3 days | Discharge: HOME HEALTH | DRG: 391 | End: 2021-09-15
Attending: FAMILY MEDICINE | Admitting: HOSPITALIST
Payer: MEDICARE

## 2021-09-12 ENCOUNTER — APPOINTMENT (OUTPATIENT)
Dept: CT IMAGING | Age: 81
DRG: 391 | End: 2021-09-12
Payer: MEDICARE

## 2021-09-12 DIAGNOSIS — K57.92 ACUTE DIVERTICULITIS: Primary | ICD-10-CM

## 2021-09-12 DIAGNOSIS — R18.8 OTHER ASCITES: ICD-10-CM

## 2021-09-12 LAB
ALBUMIN SERPL-MCNC: 4.4 G/DL (ref 3.5–5.1)
ALP BLD-CCNC: 137 U/L (ref 38–126)
ALT SERPL-CCNC: 17 U/L (ref 11–66)
ANION GAP SERPL CALCULATED.3IONS-SCNC: 9 MEQ/L (ref 8–16)
AST SERPL-CCNC: 48 U/L (ref 5–40)
BACTERIA: ABNORMAL /HPF
BILIRUB SERPL-MCNC: 1.7 MG/DL (ref 0.3–1.2)
BILIRUBIN URINE: NEGATIVE
BLOOD, URINE: NEGATIVE
BUN BLDV-MCNC: 8 MG/DL (ref 7–22)
CALCIUM SERPL-MCNC: 10 MG/DL (ref 8.5–10.5)
CASTS 2: ABNORMAL /LPF
CASTS UA: ABNORMAL /LPF
CHARACTER, URINE: CLEAR
CHLORIDE BLD-SCNC: 107 MEQ/L (ref 98–111)
CO2: 28 MEQ/L (ref 23–33)
COLOR: YELLOW
CREAT SERPL-MCNC: 0.8 MG/DL (ref 0.4–1.2)
CRYSTALS, UA: ABNORMAL
EKG ATRIAL RATE: 49 BPM
EKG P AXIS: 94 DEGREES
EKG P-R INTERVAL: 184 MS
EKG Q-T INTERVAL: 490 MS
EKG QRS DURATION: 88 MS
EKG QTC CALCULATION (BAZETT): 442 MS
EKG R AXIS: 36 DEGREES
EKG T AXIS: 71 DEGREES
EKG VENTRICULAR RATE: 49 BPM
EPITHELIAL CELLS, UA: ABNORMAL /HPF
GFR SERPL CREATININE-BSD FRML MDRD: 69 ML/MIN/1.73M2
GLUCOSE BLD-MCNC: 80 MG/DL (ref 70–108)
GLUCOSE BLD-MCNC: 86 MG/DL (ref 70–108)
GLUCOSE BLD-MCNC: 90 MG/DL (ref 70–108)
GLUCOSE URINE: NEGATIVE MG/DL
KETONES, URINE: NEGATIVE
LACTIC ACID, SEPSIS: 1.3 MMOL/L (ref 0.5–1.9)
LACTIC ACID, SEPSIS: 2.1 MMOL/L (ref 0.5–1.9)
LEUKOCYTE ESTERASE, URINE: ABNORMAL
LIPASE: 106.4 U/L (ref 5.6–51.3)
MISCELLANEOUS 2: ABNORMAL
NITRITE, URINE: NEGATIVE
OSMOLALITY CALCULATION: 284.1 MOSMOL/KG (ref 275–300)
PH UA: 7 (ref 5–9)
POTASSIUM REFLEX MAGNESIUM: 4.4 MEQ/L (ref 3.5–5.2)
PROTEIN UA: NEGATIVE
RBC URINE: ABNORMAL /HPF
RENAL EPITHELIAL, UA: ABNORMAL
SCAN OF BLOOD SMEAR: NORMAL
SODIUM BLD-SCNC: 144 MEQ/L (ref 135–145)
SPECIFIC GRAVITY, URINE: > 1.03 (ref 1–1.03)
TOTAL PROTEIN: 7 G/DL (ref 6.1–8)
TROPONIN T: < 0.01 NG/ML
UROBILINOGEN, URINE: 1 EU/DL (ref 0–1)
WBC UA: ABNORMAL /HPF
YEAST: ABNORMAL

## 2021-09-12 PROCEDURE — 81001 URINALYSIS AUTO W/SCOPE: CPT

## 2021-09-12 PROCEDURE — 6360000002 HC RX W HCPCS: Performed by: STUDENT IN AN ORGANIZED HEALTH CARE EDUCATION/TRAINING PROGRAM

## 2021-09-12 PROCEDURE — 99223 1ST HOSP IP/OBS HIGH 75: CPT | Performed by: HOSPITALIST

## 2021-09-12 PROCEDURE — 36415 COLL VENOUS BLD VENIPUNCTURE: CPT

## 2021-09-12 PROCEDURE — 1200000000 HC SEMI PRIVATE

## 2021-09-12 PROCEDURE — 6360000002 HC RX W HCPCS: Performed by: HOSPITALIST

## 2021-09-12 PROCEDURE — 2500000003 HC RX 250 WO HCPCS: Performed by: STUDENT IN AN ORGANIZED HEALTH CARE EDUCATION/TRAINING PROGRAM

## 2021-09-12 PROCEDURE — 84484 ASSAY OF TROPONIN QUANT: CPT

## 2021-09-12 PROCEDURE — 80053 COMPREHEN METABOLIC PANEL: CPT

## 2021-09-12 PROCEDURE — 93005 ELECTROCARDIOGRAM TRACING: CPT | Performed by: FAMILY MEDICINE

## 2021-09-12 PROCEDURE — 83690 ASSAY OF LIPASE: CPT

## 2021-09-12 PROCEDURE — 2580000003 HC RX 258: Performed by: STUDENT IN AN ORGANIZED HEALTH CARE EDUCATION/TRAINING PROGRAM

## 2021-09-12 PROCEDURE — 83605 ASSAY OF LACTIC ACID: CPT

## 2021-09-12 PROCEDURE — 85025 COMPLETE CBC W/AUTO DIFF WBC: CPT

## 2021-09-12 PROCEDURE — 6360000004 HC RX CONTRAST MEDICATION: Performed by: STUDENT IN AN ORGANIZED HEALTH CARE EDUCATION/TRAINING PROGRAM

## 2021-09-12 PROCEDURE — 87040 BLOOD CULTURE FOR BACTERIA: CPT

## 2021-09-12 PROCEDURE — 82948 REAGENT STRIP/BLOOD GLUCOSE: CPT

## 2021-09-12 PROCEDURE — 74177 CT ABD & PELVIS W/CONTRAST: CPT

## 2021-09-12 PROCEDURE — 96374 THER/PROPH/DIAG INJ IV PUSH: CPT

## 2021-09-12 PROCEDURE — 6370000000 HC RX 637 (ALT 250 FOR IP): Performed by: HOSPITALIST

## 2021-09-12 PROCEDURE — 87086 URINE CULTURE/COLONY COUNT: CPT

## 2021-09-12 PROCEDURE — 99285 EMERGENCY DEPT VISIT HI MDM: CPT

## 2021-09-12 RX ORDER — DONEPEZIL HYDROCHLORIDE 10 MG/1
10 TABLET, FILM COATED ORAL NIGHTLY
Status: DISCONTINUED | OUTPATIENT
Start: 2021-09-12 | End: 2021-09-15 | Stop reason: HOSPADM

## 2021-09-12 RX ORDER — SODIUM CHLORIDE 9 MG/ML
25 INJECTION, SOLUTION INTRAVENOUS PRN
Status: DISCONTINUED | OUTPATIENT
Start: 2021-09-12 | End: 2021-09-15 | Stop reason: HOSPADM

## 2021-09-12 RX ORDER — SODIUM CHLORIDE 0.9 % (FLUSH) 0.9 %
10 SYRINGE (ML) INJECTION EVERY 12 HOURS SCHEDULED
Status: DISCONTINUED | OUTPATIENT
Start: 2021-09-12 | End: 2021-09-15 | Stop reason: HOSPADM

## 2021-09-12 RX ORDER — PANTOPRAZOLE SODIUM 40 MG/1
40 TABLET, DELAYED RELEASE ORAL
Status: DISCONTINUED | OUTPATIENT
Start: 2021-09-13 | End: 2021-09-15 | Stop reason: HOSPADM

## 2021-09-12 RX ORDER — ONDANSETRON 2 MG/ML
4 INJECTION INTRAMUSCULAR; INTRAVENOUS EVERY 6 HOURS PRN
Status: DISCONTINUED | OUTPATIENT
Start: 2021-09-12 | End: 2021-09-15 | Stop reason: HOSPADM

## 2021-09-12 RX ORDER — POLYETHYLENE GLYCOL 3350 17 G/17G
17 POWDER, FOR SOLUTION ORAL DAILY PRN
Status: DISCONTINUED | OUTPATIENT
Start: 2021-09-12 | End: 2021-09-15 | Stop reason: HOSPADM

## 2021-09-12 RX ORDER — MIRTAZAPINE 15 MG/1
7.5 TABLET, FILM COATED ORAL NIGHTLY
Status: DISCONTINUED | OUTPATIENT
Start: 2021-09-12 | End: 2021-09-15 | Stop reason: HOSPADM

## 2021-09-12 RX ORDER — ATORVASTATIN CALCIUM 20 MG/1
20 TABLET, FILM COATED ORAL DAILY
Status: DISCONTINUED | OUTPATIENT
Start: 2021-09-12 | End: 2021-09-12 | Stop reason: ALTCHOICE

## 2021-09-12 RX ORDER — ACETAMINOPHEN 325 MG/1
650 TABLET ORAL EVERY 6 HOURS PRN
Status: DISCONTINUED | OUTPATIENT
Start: 2021-09-12 | End: 2021-09-13

## 2021-09-12 RX ORDER — SERTRALINE HYDROCHLORIDE 25 MG/1
25 TABLET, FILM COATED ORAL DAILY
Status: DISCONTINUED | OUTPATIENT
Start: 2021-09-12 | End: 2021-09-15 | Stop reason: HOSPADM

## 2021-09-12 RX ORDER — SODIUM CHLORIDE 9 MG/ML
INJECTION, SOLUTION INTRAVENOUS CONTINUOUS
Status: DISCONTINUED | OUTPATIENT
Start: 2021-09-12 | End: 2021-09-15 | Stop reason: HOSPADM

## 2021-09-12 RX ORDER — KETOROLAC TROMETHAMINE 30 MG/ML
15 INJECTION, SOLUTION INTRAMUSCULAR; INTRAVENOUS ONCE
Status: COMPLETED | OUTPATIENT
Start: 2021-09-12 | End: 2021-09-12

## 2021-09-12 RX ORDER — ACETAMINOPHEN 650 MG/1
650 SUPPOSITORY RECTAL EVERY 6 HOURS PRN
Status: DISCONTINUED | OUTPATIENT
Start: 2021-09-12 | End: 2021-09-13

## 2021-09-12 RX ORDER — CIPROFLOXACIN 2 MG/ML
400 INJECTION, SOLUTION INTRAVENOUS EVERY 12 HOURS
Status: DISCONTINUED | OUTPATIENT
Start: 2021-09-12 | End: 2021-09-14

## 2021-09-12 RX ORDER — SIMVASTATIN 40 MG
40 TABLET ORAL NIGHTLY
Status: DISCONTINUED | OUTPATIENT
Start: 2021-09-12 | End: 2021-09-15 | Stop reason: HOSPADM

## 2021-09-12 RX ORDER — ONDANSETRON 4 MG/1
4 TABLET, ORALLY DISINTEGRATING ORAL EVERY 8 HOURS PRN
Status: DISCONTINUED | OUTPATIENT
Start: 2021-09-12 | End: 2021-09-15 | Stop reason: HOSPADM

## 2021-09-12 RX ORDER — SODIUM CHLORIDE 0.9 % (FLUSH) 0.9 %
10 SYRINGE (ML) INJECTION PRN
Status: DISCONTINUED | OUTPATIENT
Start: 2021-09-12 | End: 2021-09-15 | Stop reason: HOSPADM

## 2021-09-12 RX ORDER — MORPHINE SULFATE 2 MG/ML
1 INJECTION, SOLUTION INTRAMUSCULAR; INTRAVENOUS EVERY 4 HOURS PRN
Status: DISCONTINUED | OUTPATIENT
Start: 2021-09-12 | End: 2021-09-15 | Stop reason: HOSPADM

## 2021-09-12 RX ADMIN — ENOXAPARIN SODIUM 40 MG: 40 INJECTION SUBCUTANEOUS at 20:48

## 2021-09-12 RX ADMIN — SODIUM CHLORIDE: 9 INJECTION, SOLUTION INTRAVENOUS at 20:40

## 2021-09-12 RX ADMIN — MIRTAZAPINE 7.5 MG: 15 TABLET, FILM COATED ORAL at 20:48

## 2021-09-12 RX ADMIN — Medication 40 MG: at 20:55

## 2021-09-12 RX ADMIN — DONEPEZIL HYDROCHLORIDE 10 MG: 10 TABLET, FILM COATED ORAL at 20:48

## 2021-09-12 RX ADMIN — METRONIDAZOLE 500 MG: 500 INJECTION, SOLUTION INTRAVENOUS at 23:00

## 2021-09-12 RX ADMIN — IOPAMIDOL 80 ML: 755 INJECTION, SOLUTION INTRAVENOUS at 15:28

## 2021-09-12 RX ADMIN — KETOROLAC TROMETHAMINE 15 MG: 30 INJECTION, SOLUTION INTRAMUSCULAR; INTRAVENOUS at 15:50

## 2021-09-12 RX ADMIN — CIPROFLOXACIN 400 MG: 2 INJECTION, SOLUTION INTRAVENOUS at 22:00

## 2021-09-12 RX ADMIN — SERTRALINE 25 MG: 25 TABLET, FILM COATED ORAL at 20:48

## 2021-09-12 ASSESSMENT — PAIN DESCRIPTION - ONSET: ONSET: ON-GOING

## 2021-09-12 ASSESSMENT — ENCOUNTER SYMPTOMS
WHEEZING: 0
ANAL BLEEDING: 0
COUGH: 1
SHORTNESS OF BREATH: 0
PHOTOPHOBIA: 0
EYE PAIN: 0
TROUBLE SWALLOWING: 0
CHEST TIGHTNESS: 0
ABDOMINAL PAIN: 1
SINUS PAIN: 0
VOMITING: 0
BACK PAIN: 0
SINUS PRESSURE: 0
ABDOMINAL PAIN: 0
NAUSEA: 0
CONSTIPATION: 0
COLOR CHANGE: 0
ABDOMINAL DISTENTION: 0
DIARRHEA: 1
RECTAL PAIN: 0
BLOOD IN STOOL: 0
SORE THROAT: 1

## 2021-09-12 ASSESSMENT — PAIN DESCRIPTION - ORIENTATION: ORIENTATION: RIGHT;OUTER

## 2021-09-12 ASSESSMENT — PAIN - FUNCTIONAL ASSESSMENT: PAIN_FUNCTIONAL_ASSESSMENT: ACTIVITIES ARE NOT PREVENTED

## 2021-09-12 ASSESSMENT — PAIN DESCRIPTION - LOCATION: LOCATION: ABDOMEN

## 2021-09-12 ASSESSMENT — PAIN DESCRIPTION - DESCRIPTORS: DESCRIPTORS: ACHING

## 2021-09-12 ASSESSMENT — PAIN SCALES - GENERAL
PAINLEVEL_OUTOF10: 8
PAINLEVEL_OUTOF10: 10
PAINLEVEL_OUTOF10: 0

## 2021-09-12 ASSESSMENT — PAIN DESCRIPTION - PROGRESSION
CLINICAL_PROGRESSION: NOT CHANGED
CLINICAL_PROGRESSION: NOT CHANGED

## 2021-09-12 ASSESSMENT — PAIN DESCRIPTION - FREQUENCY: FREQUENCY: CONTINUOUS

## 2021-09-12 ASSESSMENT — PAIN DESCRIPTION - PAIN TYPE: TYPE: ACUTE PAIN

## 2021-09-12 NOTE — H&P
HISTORY AND PHYSICAL             Date: 9/12/2021        Patient Name: Tiara Ortiz     YOB: 1940      Age:  80 y.o. Chief Complaint     Chief Complaint   Patient presents with    Abdominal Pain     right lower      Abdominal pain, right sided    History Obtained From   Patient and her     History of Present Illness   The patient is a 26-year-old female with a past medical history of CAD s/p CABG in 2016, cerebral artery occlusion with cerebral infarction x3 with no residuals, DM diet controlled, GERD, diverticulosis, and cirrhosis who presented to [facility] ED 9/12 for complaints of right upper quadrant pain. The patient states the pain started 9/9, and has been progressively worse since. The pain is described as a sharp, constant pain that does not radiate. This pain is made worse by movement, rated 10/10 and better without movement rated 3/10. Nothing else alleviates the pain. It is not associated with eating or NSAID use. The patient states she has had this pain in the past however this is much worse. It started 1 year ago following an unpleasant abdominal exam. During this visit the patient was told she had cirrhosis. After this encounter she never had work-up for her cirrhosis. She also reports ongoing diarrhea since her colonoscopy in June 2021. She has found some mild relief with Metamucil. Prior to this she was having constipation. In the ED the patient was hemodynamically stable, afebrile, and WBC WNL. Lactic acid was mildly elevated at 2.1. LFTs were abnormal, with alk phos 137, AST 48, ALT 17, lipase 106.4.  UA nonsignificant. EKG revealed sinus bradycardia. Troponins negative. Blood and urine cultures pending. CT abdomen pelvis reveals sigmoid colon diverticula with adjacent inflammatory stranding highly suspicious of acute diverticulitis, ascites, cirrhosis and aneurysm of infrarenal abdominal aorta.   Abdominal physical exam revealed tenderness and guarding throughout abdomen, but markedly in the left lower quadrant, midgastric region and right lower quadrant. The patient denied any pain with eating or NSAID. She denied any hematochezia, melena, hematemesis. Ascites is noted. The patient was admitted for further management and care.      Past Medical History     Past Medical History:   Diagnosis Date    Anemia     Aneurysm of abdominal aorta (HCC)     Arthritis     Blood circulation, collateral     Bursitis, trochanteric     CAD (coronary artery disease) 2/2015    Cerebral artery occlusion with cerebral infarction (HCC)     Chronic back pain     Cirrhosis of liver without ascites ([facility] 9/2/2020    Depression     Diabetes mellitus (HCC)     diet controlled    GERD (gastroesophageal reflux disease)     Headache(784.0)     History of basal cell cancer     Nose    Hyperlipidemia     Hypertension     Irritable bowel     Movement disorder     PVD (peripheral vascular disease) ([facility]     S/P CABG (coronary artery bypass graft)     Sciatica         Past Surgical History     Past Surgical History:   Procedure Laterality Date    ABDOMEN SURGERY      complete hysterectomy, gallbladder    APPENDECTOMY      CARDIAC [facility]HETERIZATION  2/3/2016    [facility]    CHOLECYSTECTOMY  yrs ago    COLONOSCOPY      CORONARY ARTERY BYPASS GRAFT  2-18-16    x3     ENDOSCOPY, COLON, DIAGNOSTIC      EYE SURGERY      cataracts, glaucoma    HERNIA REPAIR      Hiatal Hernia    HIATAL HERNIA REPAIR      HYSTERECTOMY      LARYNGOSCOPY  10/29/2012 Dr Karely Diop with Bx, Lingual Tonsillectomy, Hypopharyngoscopy    GA VEIN BYPASS GRAFT,CAROT-SUBCL/ SUBCL-CAROTD Left 4/11/2018    LEFT CAROTID SUBCLAVIAN BYPASS performed by Elizabeth Reddy MD at [address] [place] [address]      as a teen    UPPER GASTROINTESTINAL ENDOSCOPY          Medications Prior to Admission     Prior to Admission medications    Medication Sig Start Date End Date Taking? Authorizing Provider   omeprazole (PRILOSEC) 40 MG delayed release capsule TAKE 1 CAPSULE BY MOUTH EVERY DAY 9/1/21   ANDREZ Torres CNP   esomeprazole ([address]) 40 MG delayed release capsule TAKE 1 CAPSULE BY MOUTH EVERY DAY IN THE MORNING BEFORE BREAKFAST 8/17/21   Ermelinda Baez DO   sertraline (ZOLOFT) 25 MG tablet TAKE 1 TABLET BY MOUTH EVERY DAY 7/10/21   Ermelinda Baez DO   acetaminophen (TYLENOL) 325 MG tablet Take 650 mg by mouth every 6 hours as needed for Pain    Historical Provider, MD   ondansetron (ZOFRAN ODT) 4 MG disintegrating tablet Take 1 tablet by mouth every 8 hours as needed for Nausea or Vomiting 5/12/21   ANDREZ Torres CNP   simethicone (MYLICON) 960 MG chewable tablet Take 1 tablet by mouth every 6 hours as needed for Flatulence 5/12/21   ANDREZ Torres CNP   simvastatin (ZOCOR) 40 MG tablet TAKE 1 TABLET NIGHTLY 5/3/21   ANDREZ Torres CNP   ondansetron (ZOFRAN ODT) 4 MG disintegrating tablet Place 1 tablet under the tongue every 8 hours as needed for Nausea or Vomiting 4/8/21   ANDREZ Torres CNP   lidocaine (LIDODERM) 5 % Place 3 patches onto the skin every 24 hours 12 hours on, 12 hours off. 12/21/20   Ermelinda Baez DO   famotidine (PEPCID) 40 MG tablet Take 1 tab PO daily 12/21/20   Ermelinda Baez DO   mirtazapine (REMERON) 7.5 MG tablet Take 1 tablet by mouth nightly 12/21/20   ANDREZ Torres CNP   metoprolol tartrate (LOPRESSOR) 25 MG tablet TAKE 1/2 TABLET TWICE A DAY 11/20/20   Lew Aleman DO   donepezil (ARICEPT) 10 MG tablet TAKE 1 TABLET BY MOUTH EVERY DAY AT NIGHT 10/12/20   Ermelinda Baez DO   polyethylene glycol (GLYCOLAX) 17 GM/SCOOP powder Dispense 238 Gram Bottle.   Use as Directed 8/5/20   ANDREZ Pierson CNP   blood glucose monitor strips Check blood sugar q daily, Dx E11.9 12/18/19   Ermelinda Savory, DO   Handicap Placard MISC by Does not apply route Expires 12/14/2022 12/14/17   Maranda FONSECA light-headedness and numbness. Psychiatric/Behavioral: Negative. Physical Exam   /62   Pulse 66   Temp 98.2 °F (36.8 °C) (Oral)   Resp 17   Ht 5' 6\" (1.676 m)   Wt 163 lb (73.9 kg)   LMP  (LMP Unknown)   SpO2 96%   BMI 26.31 kg/m²     Physical Exam  Constitutional:       General: She is in acute distress. Appearance: Normal appearance. HENT:      Head: Normocephalic and atraumatic. Nose: Nose normal.      Mouth/Throat:      Pharynx: Oropharynx is clear. Comments: Removable upper dentures, fixed lower dentures  Eyes:      Extraocular Movements: Extraocular movements intact. Conjunctiva/sclera: Conjunctivae normal.      Pupils: Pupils are equal, round, and reactive to light. Cardiovascular:      Rate and Rhythm: Normal rate and regular rhythm. Pulses: Normal pulses. Heart sounds: Normal heart sounds. No murmur heard. No friction rub. No gallop. Pulmonary:      Effort: Pulmonary effort is normal. No respiratory distress. Breath sounds: Normal breath sounds. No stridor. No wheezing, rhonchi or rales. Chest:      Chest wall: No tenderness. Abdominal:      General: Bowel sounds are normal. There is no distension. Palpations: Abdomen is soft. Tenderness: There is abdominal tenderness. There is no right CVA tenderness, left CVA tenderness or guarding. Comments: Tenderness to percussion and palpation throughout, but greater in RLQ, LLQ, midgastric regions. Mild fluid wave present. Voluntary guarding   Musculoskeletal:         General: No swelling or tenderness. Normal range of motion. Cervical back: Normal range of motion and neck supple. Right lower leg: No edema. Left lower leg: No edema. Skin:     General: Skin is warm and dry. Capillary Refill: Capillary refill takes less than 2 seconds. Findings: No bruising, erythema or rash. Neurological:      General: No focal deficit present.       Mental Status: She is alert and oriented to person, place, and time. Mental status is at baseline. Cranial Nerves: No cranial nerve deficit. Sensory: No sensory deficit. Motor: No weakness. Deep Tendon Reflexes: Reflexes normal.   Psychiatric:         Mood and Affect: Mood normal.         Behavior: Behavior normal.         Thought Content:  Thought content normal.         Judgment: Judgment normal.         Labs      Recent Results (from the past 24 hour(s))   CBC auto differential    Collection Time: 09/12/21  1:56 PM   Result Value Ref Range    WBC 6.2 4.8 - 10.8 thou/mm3    RBC 3.29 (L) 4.20 - 5.40 mill/mm3    Hemoglobin 10.9 (L) 12.0 - 16.0 gm/dl    Hematocrit 33.6 (L) 37.0 - 47.0 %    .1 (H) 81.0 - 99.0 fL    MCH 33.1 (H) 26.0 - 33.0 pg    MCHC 32.4 32.2 - 35.5 gm/dl    RDW-CV 22.5 (H) 11.5 - 14.5 %    RDW-SD 85.2 (H) 35.0 - 45.0 fL    Platelets 170 620 - 488 thou/mm3    MPV 11.3 9.4 - 12.4 fL    Differential Type see below     Platelet Estimate ADEQUATE Adequate   Comprehensive Metabolic Panel w/ Reflex to MG    Collection Time: 09/12/21  1:56 PM   Result Value Ref Range    Glucose 80 70 - 108 mg/dL    CREATININE 0.8 0.4 - 1.2 mg/dL    BUN 8 7 - 22 mg/dL    Sodium 144 135 - 145 meq/L    Potassium reflex Magnesium 4.4 3.5 - 5.2 meq/L    Chloride 107 98 - 111 meq/L    CO2 28 23 - 33 meq/L    Calcium 10.0 8.5 - 10.5 mg/dL    AST 48 (H) 5 - 40 U/L    Alkaline Phosphatase 137 (H) 38 - 126 U/L    Total Protein 7.0 6.1 - 8.0 g/dL    Albumin 4.4 3.5 - 5.1 g/dL    Total Bilirubin 1.7 (H) 0.3 - 1.2 mg/dL    ALT 17 11 - 66 U/L   Lipase    Collection Time: 09/12/21  1:56 PM   Result Value Ref Range    Lipase 106.4 (H) 5.6 - 51.3 U/L   Anion Gap    Collection Time: 09/12/21  1:56 PM   Result Value Ref Range    Anion Gap 9.0 8.0 - 16.0 meq/L   Glomerular Filtration Rate, Estimated    Collection Time: 09/12/21  1:56 PM   Result Value Ref Range    Est, Glom Filt Rate 69 (A) ml/min/1.73m2   Osmolality    Collection Time: 09/12/21  1:56 PM   Result Value Ref Range    Osmolality Calc 284.1 275.0 - 300.0 mOsmol/kg   Scan of Blood Smear    Collection Time: 09/12/21  1:56 PM   Result Value Ref Range    SCAN OF BLOOD SMEAR see below    Troponin    Collection Time: 09/12/21  1:56 PM   Result Value Ref Range    Troponin T < 0.010 ng/ml   EKG 12 Lead    Collection Time: 09/12/21  1:58 PM   Result Value Ref Range    Ventricular Rate 49 BPM    Atrial Rate 49 BPM    P-R Interval 184 ms    QRS Duration 88 ms    Q-T Interval 490 ms    QTc Calculation (Bazett) 442 ms    P Axis 94 degrees    R Axis 36 degrees    T Axis 71 degrees        Imaging/Diagnostics Last 24 Hours   CT ABDOMEN PELVIS W IV CONTRAST Additional Contrast? None    Result Date: 9/12/2021  PROCEDURE: CT ABDOMEN PELVIS W IV CONTRAST CLINICAL INFORMATION: Right upper quadrant abdominal pain TECHNIQUE: CT of the abdomen and pelvis was performed following administration of 80 mL contrast only. Axial images as well as coronal and sagittal reconstructions were obtained. All CT scans at this facility use dose modulation, iterative reconstruction, and/or weight-based dosing when appropriate to reduce radiation dose to as low as reasonably achievable. COMPARISON: CT abdomen and pelvis 5/25/2021 FINDINGS: Lower thorax: There is minimal scarring at the bilateral lung bases. No pleural effusion is identified. Abdomen: There is no free intraperitoneal air. There is a small amount of free fluid, primarily in the upper abdomen. The liver has a nodular contour. The gallbladder is surgically absent. Prominence of the common bile duct is likely postsurgical. The pancreas, spleen, adrenal glands and right kidney are normal. There is atrophy of the left kidney. Atherosclerotic calcifications are present in a tortuous abdominal aorta. An aneurysm of the infrarenal abdominal aorta measures 3.2 cm in diameter (image 46). There is no mesenteric or retroperitoneal lymphadenopathy.  Degenerative changes are present in the lumbar spine without evidence of aggressive osseous lesions. Pelvis: There are diverticula in the sigmoid colon with adjacent inflammatory stranding. Minimal free fluid is present in the dependent pelvis. There is no pelvic or inguinal lymphadenopathy. Degenerative changes are seen in the pelvis without evidence of aggressive osseous lesions. 1. Sigmoid colon diverticula with adjacent inflammatory stranding highly suspicious for acute diverticulitis. 2. Ascites. 3. Cirrhosis. 4. Aneurysm of the infrarenal abdominal aorta. Final report electronically signed by Dr. Mima Pemberton on 9/12/2021 3:49 PM      Assessment    1. Acute sigmoid diverticulitis, uncomplicated  2. Cirrhosis, unknown etiology  3. Macrocytic anemia  4. Elevated lipase  5. Infrarenal abdominal aortic aneurysm - 3.2 cm  6. CAD s/p CABG  7. Hypertension  8. DM2  9. GERD  10. Depression      Plan   1. Start metronidazole 500 mg IVPB q8hrs and ceftriaxone 400 mg IVPB q12 hrs for next 48 hours. If symptomatic improvement will transition to p.o. Antibiotic regiment will be a total of 10 days. 2. Maintain NPO status. Started IV fluids at 100mL/hr  3. Check daily CBC and BMP. 4. 1 mg morphine every 4 hours as needed for pain control. 5. Will require outpatient colonoscopy in 4 to 6 weeks by GI. 6. Will check liver enzymes. No prior cirrhosis work-up. Will require patient EGD. 7. Elevated lipase on admission however not met pancreatitis criteria. Will repeat lipase in the morning. 8. We will check iron studies, reticulocytes, vitamin B 12 and folate to evaluate for chronic anemia. 9. Will check CXR PA and lateral to rule out other etiology as cause for right upper quadrant pain. 10. Aspirin and Plavix was held per patient by primary because of unknown source of anemia. We will continue to monitor for now. 11. Resumed home sertraline 25 minutes p.o. daily  12. Resume home simvastatin 40 mg p.o. nightly  13.  Resumed home metoprolol 12.5 mg p.o. twice daily  14. Started Protonix 40 mg p.o. daily for GERD prophylaxis.   15. Started Lovenox 40 mg subQ for DVT prophylaxis         Consultations Ordered:  PALLIATIVE CARE EVAL    Electronically signed by Nayely Bassett DO on 9/12/21 at 5:03 PM EDT

## 2021-09-12 NOTE — ED NOTES
Pt to ED via intake with  from home with c/o right lower abdominal pain. Pt reports that the pain began Friday. Pt reports yesterday they laid on the couch with a heating pad with slight relief. Pt rates pain a \"12/10\". Pt is A&Ox4. Pt respirations easy and unlabored. VSS. Pt  remains at bedside. Will continue to monitor.        Cindy Pastrana RN  09/12/21 [id number]

## 2021-09-12 NOTE — ED PROVIDER NOTES
[facility] ENCOUNTER          Pt Name: Teresita Anderson  MRN: [id number]  [place] 1940  Date of evaluation: 9/12/2021  Treating Resident Physician: Trudy Alcala MD  Supervising Physician: Vilma Durant MD    CHIEF COMPLAINT       Chief Complaint   Patient presents with    Abdominal Pain     right lower     History obtained from spouse, chart review and the patient. HISTORY OF PRESENT ILLNESS    Teresita Anderson is a 80 y.o. female who presents to the emergency department for evaluation of pain. Maria Casillas started having right upper quadrant abdominal pain starting Friday afternoon. She notes it is better when she does not move at all, and the pain is worse when she moves. No nausea or vomiting. She has had diarrhea since a colonoscopy in June 2021, however this has been better recently with the addition of psyllium fiber. She also notes right shoulder pain however that is been ongoing for many months. She denies fever, or other symptoms. Not taking any analgesia at home today. She has been seen multiple times for abdominal pain however she says this pain is different from those. She has had numerous abdominal surgeries including cholecystectomy, appendicectomy, hysterectomy. The patient has no other acute complaints at this time. REVIEW OF SYSTEMS   Review of Systems   Constitutional: Negative for chills, fatigue and fever. Respiratory: Negative for chest tightness and shortness of breath. Cardiovascular: Negative for chest pain, palpitations and leg swelling. Gastrointestinal: Positive for abdominal pain and diarrhea. Negative for constipation, nausea and vomiting. Genitourinary: Negative for dysuria. Musculoskeletal: Negative for back pain and neck pain. Right shoulder pain   Skin: Negative for color change, pallor, rash and wound.    Neurological: Negative for dizziness, syncope, weakness, numbness and headaches. Psychiatric/Behavioral: Negative for confusion. All other systems reviewed and are negative.         PAST MEDICAL AND SURGICAL HISTORY     Past Medical History:   Diagnosis Date    Anemia     Aneurysm of abdominal aorta (HCC)     Arthritis     Blood circulation, collateral     Bursitis, trochanteric     CAD (coronary artery disease) 2/2015    Cerebral artery occlusion with cerebral infarction (HCC)     Chronic back pain     Cirrhosis of liver without ascites ([facility] [address] 9/2/2020    Depression     Diabetes mellitus (HCC)     diet controlled    GERD (gastroesophageal reflux disease)     Headache(784.0)     History of basal cell cancer     Nose    Hyperlipidemia     Hypertension     Irritable bowel     Movement disorder     PVD (peripheral vascular disease) ([facility] [address]     S/P CABG (coronary artery bypass graft)     Sciatica      Past Surgical History:   Procedure Laterality Date    ABDOMEN SURGERY      complete hysterectomy, gallbladder    APPENDECTOMY      CARDIAC [facility]HETERIZATION  2/3/2016    [facility]    CHOLECYSTECTOMY  yrs ago    COLONOSCOPY      CORONARY ARTERY BYPASS GRAFT  2-18-16    x3     ENDOSCOPY, COLON, DIAGNOSTIC      EYE SURGERY      cataracts, glaucoma    HERNIA REPAIR      Hiatal Hernia    HIATAL HERNIA REPAIR      HYSTERECTOMY      LARYNGOSCOPY  10/29/2012 Dr Anderson Lies with Bx, Lingual Tonsillectomy, Hypopharyngoscopy    NM VEIN BYPASS GRAFT,CAROT-SUBCL/ SUBCL-CAROTD Left 4/11/2018    LEFT CAROTID SUBCLAVIAN BYPASS performed by Danyell Springer MD at [address] [place] [address]      as a teen    UPPER GASTROINTESTINAL ENDOSCOPY           MEDICATIONS     Current Facility-Administered Medications:     donepezil (ARICEPT) tablet 10 mg, 10 mg, Oral, Nightly, Paris May MD, 10 mg at 09/12/21 2048    [START ON 9/13/2021] pantoprazole (PROTONIX) tablet 40 mg, 40 mg, Oral, QAM , Paris May MD    metoprolol tartrate (LOPRESSOR) tablet 12.5 mg, 12.5 mg, Oral, BID, Evita Gomez MD    mirtazapine (REMERON) tablet 7.5 mg, 7.5 mg, Oral, Nightly, Evita Gomez MD, 7.5 mg at 21    sertraline (ZOLOFT) tablet 25 mg, 25 mg, Oral, Daily, Evita Gomez MD, 25 mg at 21    sodium chloride flush 0.9 % injection 10 mL, 10 mL, IntraVENous, 2 times per day, Evita Gomez MD    sodium chloride flush 0.9 % injection 10 mL, 10 mL, IntraVENous, PRN, Evita Gomez MD    0.9 % sodium chloride infusion, 25 mL, IntraVENous, PRN, Evita Gomez MD    enoxaparin (LOVENOX) injection 40 mg, 40 mg, SubCUTAneous, Q24H, Evita Gomez MD, 40 mg at 21    ondansetron (ZOFRAN-ODT) disintegrating tablet 4 mg, 4 mg, Oral, Q8H PRN **OR** ondansetron (ZOFRAN) injection 4 mg, 4 mg, IntraVENous, Q6H PRN, vEita Gomez MD    polyethylene glycol (GLYCOLAX) packet 17 g, 17 g, Oral, Daily PRN, Evita Gomez MD    acetaminophen (TYLENOL) tablet 650 mg, 650 mg, Oral, Q6H PRN **OR** acetaminophen (TYLENOL) suppository 650 mg, 650 mg, Rectal, Q6H PRN, Evita Gomez MD    morphine (PF) injection 1 mg, 1 mg, IntraVENous, Q4H PRN, Antwan Gia, DO    ciprofloxacin (CIPRO) IVPB 400 mg, 400 mg, IntraVENous, Q12H, Antwan Gia, DO, Last Rate: 200 mL/hr at 21, 400 mg at 21    metronidazole (FLAGYL) 500 mg in NaCl 100 mL IVPB premix, 500 mg, IntraVENous, Q8H, Antwan Gia, DO    0.9 % sodium chloride infusion, , IntraVENous, Continuous, Antwan Gia, DO, Last Rate: 100 mL/hr at 21, New Bag at 21    simvastatin (ZOCOR) tablet 40 mg, 40 mg, Oral, Nightly, Evita Gomez MD, 40 mg at 21      SOCIAL HISTORY     Social History     Social History Narrative    Not on file     Social History     Tobacco Use    Smoking status: Former Smoker     Packs/day: 0.50     Years: 25.00     Pack years: 12.50     Types: Cigarettes     Quit date: 11/3/2000     Years since quittin.8  Smokeless tobacco: Never Used    Tobacco comment: quit in 2000   Vaping Use    Vaping Use: Never used   Substance Use Topics    Alcohol use: No    Drug use: No         ALLERGIES     Allergies   Allergen Reactions    Ciprofloxacin      Had chest cold and just got worse and worse on the cipro    Darvon [Propoxyphene Hcl] Itching    Lipitor [Atorvastatin Calcium] Nausea And Vomiting         FAMILY HISTORY     Family History   Problem Relation Age of Onset    Early Death Mother         not sure of cause    Heart Disease Father 52        MI    Diabetes Sister     Cancer Brother         pancreatic    Cancer Son         larynx    High Blood Pressure Neg Hx     Stroke Neg Hx     Colon Cancer Neg Hx     Colon Polyps Neg Hx          PREVIOUS RECORDS   Previous records reviewed: prior ED visits for abdominal pain reviewed. PHYSICAL EXAM     ED Triage Vitals   BP Temp Temp Source Pulse Resp SpO2 Height Weight   09/12/21 1353 09/12/21 1352 09/12/21 1352 09/12/21 1352 09/12/21 1352 09/12/21 1352 09/12/21 1352 09/12/21 1352   128/69 98.2 °F (36.8 °C) Oral 53 17 97 % 5' 6\" (1.676 m) 163 lb (73.9 kg)     Initial vital signs and nursing assessment reviewed and normal. Body mass index is 27.05 kg/m². Pulsoximetry is normal per my interpretation. Additional Vital Signs:  Vitals:    09/12/21 2015   BP: 135/65   Pulse: 62   Resp: 16   Temp: 98 °F (36.7 °C)   SpO2: 100%       Physical Exam  Vitals and nursing note reviewed. Constitutional:       Appearance: She is well-developed. HENT:      Head: Normocephalic. Cardiovascular:      Rate and Rhythm: Normal rate and regular rhythm. Heart sounds: Normal heart sounds. Pulmonary:      Effort: Pulmonary effort is normal.      Breath sounds: Normal breath sounds. Abdominal:      General: Abdomen is flat. A surgical scar is present. Bowel sounds are normal.      Palpations: Abdomen is soft. Tenderness:  There is abdominal tenderness in the right upper quadrant, epigastric area and left lower quadrant. Skin:     General: Skin is warm and dry. Capillary Refill: Capillary refill takes less than 2 seconds. Neurological:      Mental Status: She is alert and oriented to person, place, and time. Psychiatric:         Mood and Affect: Mood normal.         Behavior: Behavior normal.             MEDICAL DECISION MAKING   Initial Differential Diagnosis:   1. Choledocolithasis  2. Diverticulitis  3. Diverticulosis  4. Renal Calculi  5. Pancreatitis  6. Acute coronary syndrome (abdominal pain in older female)    Plan:    IV   CBC, CMP, Tropoin, Lipase   Urinalsysis   POCUS RUQ   ketorlac   CT Abdomen/Pelvis with contrast    Summary:  Patient notes her pain today is different from past visits, given it is in the RUQ, this is concerning for choledocholithiasis. POCUS showed ascites. Labs showed elevated bilirubin at 1.7, other labs unremarkable. CT showed acute diverticulitis of sigmoid colon with fat stranding. IV antibiotics started. Patient will be admitted to Hospitalist service for additional evaluation and treatment.       ED RESULTS   Laboratory results:  Labs Reviewed   CBC WITH AUTO DIFFERENTIAL - Abnormal; Notable for the following components:       Result Value    RBC 3.29 (*)     Hemoglobin 10.9 (*)     Hematocrit 33.6 (*)     .1 (*)     MCH 33.1 (*)     RDW-CV 22.5 (*)     RDW-SD 85.2 (*)     All other components within normal limits   COMPREHENSIVE METABOLIC PANEL W/ REFLEX TO MG FOR LOW K - Abnormal; Notable for the following components:    AST 48 (*)     Alkaline Phosphatase 137 (*)     Total Bilirubin 1.7 (*)     All other components within normal limits   LIPASE - Abnormal; Notable for the following components:    Lipase 106.4 (*)     All other components within normal limits   GLOMERULAR FILTRATION RATE, ESTIMATED - Abnormal; Notable for the following components:    Est, Glom Filt Rate 69 (*)     All other components within normal limits   LACTATE, SEPSIS - Abnormal; Notable for the following components:    Lactic Acid, Sepsis 2.1 (*)     All other components within normal limits   URINE WITH REFLEXED MICRO - Abnormal; Notable for the following components:    Specific Gravity, Urine > 1.030 (*)     Leukocyte Esterase, Urine SMALL (*)     All other components within normal limits   CULTURE, BLOOD 1   CULTURE, BLOOD 2   CULTURE, REFLEXED, URINE    Narrative:     Source: urine, clean catch       Site:           Current Antibiotics: not stated   ANION GAP   OSMOLALITY   SCAN OF BLOOD SMEAR   TROPONIN   LACTATE, SEPSIS   CBC WITH AUTO DIFFERENTIAL   BASIC METABOLIC PANEL W/ REFLEX TO MG FOR LOW K   PHOSPHORUS   HEPATIC FUNCTION PANEL   IRON   IRON BINDING CAPACITY   IRON SATURATION   RETICULOCYTES   VITAMIN B12 & FOLATE   LIPASE   POCT GLUCOSE   POCT GLUCOSE   POCT GLUCOSE   POCT GLUCOSE       Radiologic studies results:  CT ABDOMEN PELVIS W IV CONTRAST Additional Contrast? None   Final Result   1. Sigmoid colon diverticula with adjacent inflammatory stranding highly suspicious for acute diverticulitis. 2. Ascites. 3. Cirrhosis. 4. Aneurysm of the infrarenal abdominal aorta.       Final report electronically signed by Dr. Nicky Ram on 9/12/2021 3:49 PM      XR CHEST (2 VW)    (Results Pending)       ED Medications administered this visit:   Medications   donepezil (ARICEPT) tablet 10 mg (10 mg Oral Given 9/12/21 2048)   pantoprazole (PROTONIX) tablet 40 mg (has no administration in time range)   metoprolol tartrate (LOPRESSOR) tablet 12.5 mg (12.5 mg Oral Not Given 9/12/21 2055)   mirtazapine (REMERON) tablet 7.5 mg (7.5 mg Oral Given 9/12/21 2048)   sertraline (ZOLOFT) tablet 25 mg (25 mg Oral Given 9/12/21 2048)   sodium chloride flush 0.9 % injection 10 mL (10 mLs IntraVENous Not Given 9/12/21 2051)   sodium chloride flush 0.9 % injection 10 mL (has no administration in time range)   0.9 % sodium chloride infusion (has no administration in time range)   enoxaparin (LOVENOX) injection 40 mg (40 mg SubCUTAneous Given 9/12/21 2048)   ondansetron (ZOFRAN-ODT) disintegrating tablet 4 mg (has no administration in time range)     Or   ondansetron (ZOFRAN) injection 4 mg (has no administration in time range)   polyethylene glycol (GLYCOLAX) packet 17 g (has no administration in time range)   acetaminophen (TYLENOL) tablet 650 mg (has no administration in time range)     Or   acetaminophen (TYLENOL) suppository 650 mg (has no administration in time range)   morphine (PF) injection 1 mg (has no administration in time range)   ciprofloxacin (CIPRO) IVPB 400 mg (400 mg IntraVENous New Bag 9/12/21 2200)   metronidazole (FLAGYL) 500 mg in NaCl 100 mL IVPB premix (has no administration in time range)   0.9 % sodium chloride infusion ( IntraVENous New Bag 9/12/21 2040)   simvastatin (ZOCOR) tablet 40 mg (40 mg Oral Given 9/12/21 2055)   ketorolac (TORADOL) injection 15 mg (15 mg IntraVENous Given 9/12/21 1550)   iopamidol (ISOVUE-370) 76 % injection 80 mL (80 mLs IntraVENous Given 9/12/21 1528)         ED COURSE     ED Course as of Sep 12 2347   Sun Sep 12, 2021   1616 Referral for admission sent to Dr. Roro Pacheco (Hospitalist) via [address] [place] [address]     [SC]      ED Course User Index  [SC] Nataly Nguyen MD         MEDICATION CHANGES     Current Discharge Medication List            FINAL DISPOSITION     Final diagnoses:   Acute diverticulitis   Other ascites     Condition: condition: stable  Dispo: Admit to med/surg floor      This transcription was electronically signed. Parts of this transcriptions may have been dictated by use of voice recognition software and electronically transcribed, and parts may have been transcribed with the assistance of an ED scribe. The transcription may contain errors not detected in proofreading. Please refer to my supervising physician's documentation if my documentation differs.     Electronically Signed: Brandi Carbajal MD, 09/12/21, 11:47 PM         Lamar Monae MD  Resident  09/12/21 6915

## 2021-09-13 ENCOUNTER — APPOINTMENT (OUTPATIENT)
Dept: GENERAL RADIOLOGY | Age: 81
DRG: 391 | End: 2021-09-13
Payer: MEDICARE

## 2021-09-13 LAB
ABSOLUTE RETIC #: 55 THOU/MM3 (ref 20–115)
ALBUMIN SERPL-MCNC: 3.8 G/DL (ref 3.5–5.1)
ALP BLD-CCNC: 115 U/L (ref 38–126)
ALT SERPL-CCNC: 14 U/L (ref 11–66)
ANION GAP SERPL CALCULATED.3IONS-SCNC: 7 MEQ/L (ref 8–16)
ANISOCYTOSIS: PRESENT
ANISOCYTOSIS: PRESENT
AST SERPL-CCNC: 36 U/L (ref 5–40)
BASOPHILS # BLD: 0.8 %
BASOPHILS # BLD: 1.2 %
BASOPHILS ABSOLUTE: 0 THOU/MM3 (ref 0–0.1)
BASOPHILS ABSOLUTE: 0 THOU/MM3 (ref 0–0.1)
BILIRUB SERPL-MCNC: 2 MG/DL (ref 0.3–1.2)
BILIRUBIN DIRECT: 0.7 MG/DL (ref 0–0.3)
BUN BLDV-MCNC: 9 MG/DL (ref 7–22)
CALCIUM SERPL-MCNC: 9.4 MG/DL (ref 8.5–10.5)
CHLORIDE BLD-SCNC: 110 MEQ/L (ref 98–111)
CO2: 27 MEQ/L (ref 23–33)
CREAT SERPL-MCNC: 0.8 MG/DL (ref 0.4–1.2)
DACROCYTES: ABNORMAL
DIFFERENTIAL TYPE: ABNORMAL
ELLIPTOCYTES: ABNORMAL
ELLIPTOCYTES: ABNORMAL
EOSINOPHIL # BLD: 4.2 %
EOSINOPHIL # BLD: 5.4 %
EOSINOPHILS ABSOLUTE: 0.2 THOU/MM3 (ref 0–0.4)
EOSINOPHILS ABSOLUTE: 0.3 THOU/MM3 (ref 0–0.4)
ERYTHROCYTE [DISTWIDTH] IN BLOOD BY AUTOMATED COUNT: 21.8 % (ref 11.5–14.5)
ERYTHROCYTE [DISTWIDTH] IN BLOOD BY AUTOMATED COUNT: 22.5 % (ref 11.5–14.5)
ERYTHROCYTE [DISTWIDTH] IN BLOOD BY AUTOMATED COUNT: 82.6 FL (ref 35–45)
ERYTHROCYTE [DISTWIDTH] IN BLOOD BY AUTOMATED COUNT: 85.2 FL (ref 35–45)
FOLATE: 12 NG/ML (ref 4.8–24.2)
GFR SERPL CREATININE-BSD FRML MDRD: 69 ML/MIN/1.73M2
GLUCOSE BLD-MCNC: 101 MG/DL (ref 70–108)
GLUCOSE BLD-MCNC: 84 MG/DL (ref 70–108)
GLUCOSE BLD-MCNC: 87 MG/DL (ref 70–108)
GLUCOSE BLD-MCNC: 88 MG/DL (ref 70–108)
GLUCOSE BLD-MCNC: 90 MG/DL (ref 70–108)
GLUCOSE BLD-MCNC: 92 MG/DL (ref 70–108)
HCT VFR BLD CALC: 31.2 % (ref 37–47)
HCT VFR BLD CALC: 33.6 % (ref 37–47)
HEMOGLOBIN: 10.2 GM/DL (ref 12–16)
HEMOGLOBIN: 10.9 GM/DL (ref 12–16)
IMMATURE GRANS (ABS): 0.01 THOU/MM3 (ref 0–0.07)
IMMATURE GRANS (ABS): 0.01 THOU/MM3 (ref 0–0.07)
IMMATURE GRANULOCYTES: 0.2 %
IMMATURE GRANULOCYTES: 0.3 %
IMMATURE RETIC FRACT: 10.1 % (ref 3–15.9)
IRON SATURATION: 81 % (ref 20–50)
IRON: 227 UG/DL (ref 50–170)
LIPASE: 72.1 U/L (ref 5.6–51.3)
LYMPHOCYTES # BLD: 26.9 %
LYMPHOCYTES # BLD: 31.3 %
LYMPHOCYTES ABSOLUTE: 1.1 THOU/MM3 (ref 1–4.8)
LYMPHOCYTES ABSOLUTE: 1.7 THOU/MM3 (ref 1–4.8)
MCH RBC QN AUTO: 33.1 PG (ref 26–33)
MCH RBC QN AUTO: 33.1 PG (ref 26–33)
MCHC RBC AUTO-ENTMCNC: 32.4 GM/DL (ref 32.2–35.5)
MCHC RBC AUTO-ENTMCNC: 32.7 GM/DL (ref 32.2–35.5)
MCV RBC AUTO: 101.3 FL (ref 81–99)
MCV RBC AUTO: 102.1 FL (ref 81–99)
MONOCYTES # BLD: 16.2 %
MONOCYTES # BLD: 16.7 %
MONOCYTES ABSOLUTE: 0.6 THOU/MM3 (ref 0.4–1.3)
MONOCYTES ABSOLUTE: 1 THOU/MM3 (ref 0.4–1.3)
NUCLEATED RED BLOOD CELLS: 0 /100 WBC
NUCLEATED RED BLOOD CELLS: 1 /100 WBC
ORGANISM: ABNORMAL
PATHOLOGIST REVIEW: ABNORMAL
PATHOLOGIST REVIEW: ABNORMAL
PHOSPHORUS: 3.5 MG/DL (ref 2.4–4.7)
PLATELET # BLD: 121 THOU/MM3 (ref 130–400)
PLATELET # BLD: 160 THOU/MM3 (ref 130–400)
PLATELET ESTIMATE: ADEQUATE
PMV BLD AUTO: 10.9 FL (ref 9.4–12.4)
PMV BLD AUTO: 11.3 FL (ref 9.4–12.4)
POIKILOCYTES: ABNORMAL
POIKILOCYTES: ABNORMAL
POTASSIUM REFLEX MAGNESIUM: 4.6 MEQ/L (ref 3.5–5.2)
RBC # BLD: 3.08 MILL/MM3 (ref 4.2–5.4)
RBC # BLD: 3.29 MILL/MM3 (ref 4.2–5.4)
RETIC HEMOGLOBIN: 31 PG (ref 28.2–35.7)
RETICULOCYTE ABSOLUTE COUNT: 1.8 % (ref 0.5–2)
SCAN OF BLOOD SMEAR: NORMAL
SCHISTOCYTES: ABNORMAL
SEG NEUTROPHILS: 45.1 %
SEG NEUTROPHILS: 51.7 %
SEGMENTED NEUTROPHILS ABSOLUTE COUNT: 1.5 THOU/MM3 (ref 1.8–7.7)
SEGMENTED NEUTROPHILS ABSOLUTE COUNT: 3.2 THOU/MM3 (ref 1.8–7.7)
SODIUM BLD-SCNC: 144 MEQ/L (ref 135–145)
TOTAL IRON BINDING CAPACITY: 280 UG/DL (ref 171–450)
TOTAL PROTEIN: 6.2 G/DL (ref 6.1–8)
URINE CULTURE REFLEX: ABNORMAL
VITAMIN B-12: 937 PG/ML (ref 211–911)
WBC # BLD: 3.4 THOU/MM3 (ref 4.8–10.8)
WBC # BLD: 6.2 THOU/MM3 (ref 4.8–10.8)

## 2021-09-13 PROCEDURE — 6360000002 HC RX W HCPCS: Performed by: HOSPITALIST

## 2021-09-13 PROCEDURE — 85046 RETICYTE/HGB CONCENTRATE: CPT

## 2021-09-13 PROCEDURE — 6360000002 HC RX W HCPCS: Performed by: STUDENT IN AN ORGANIZED HEALTH CARE EDUCATION/TRAINING PROGRAM

## 2021-09-13 PROCEDURE — 2580000003 HC RX 258: Performed by: STUDENT IN AN ORGANIZED HEALTH CARE EDUCATION/TRAINING PROGRAM

## 2021-09-13 PROCEDURE — 82948 REAGENT STRIP/BLOOD GLUCOSE: CPT

## 2021-09-13 PROCEDURE — 82746 ASSAY OF FOLIC ACID SERUM: CPT

## 2021-09-13 PROCEDURE — 2580000003 HC RX 258: Performed by: HOSPITALIST

## 2021-09-13 PROCEDURE — 83540 ASSAY OF IRON: CPT

## 2021-09-13 PROCEDURE — 80076 HEPATIC FUNCTION PANEL: CPT

## 2021-09-13 PROCEDURE — 1200000000 HC SEMI PRIVATE

## 2021-09-13 PROCEDURE — 6370000000 HC RX 637 (ALT 250 FOR IP): Performed by: HOSPITALIST

## 2021-09-13 PROCEDURE — 2500000003 HC RX 250 WO HCPCS: Performed by: STUDENT IN AN ORGANIZED HEALTH CARE EDUCATION/TRAINING PROGRAM

## 2021-09-13 PROCEDURE — 85025 COMPLETE CBC W/AUTO DIFF WBC: CPT

## 2021-09-13 PROCEDURE — 83690 ASSAY OF LIPASE: CPT

## 2021-09-13 PROCEDURE — 82607 VITAMIN B-12: CPT

## 2021-09-13 PROCEDURE — 36415 COLL VENOUS BLD VENIPUNCTURE: CPT

## 2021-09-13 PROCEDURE — 99233 SBSQ HOSP IP/OBS HIGH 50: CPT | Performed by: HOSPITALIST

## 2021-09-13 PROCEDURE — 6370000000 HC RX 637 (ALT 250 FOR IP): Performed by: STUDENT IN AN ORGANIZED HEALTH CARE EDUCATION/TRAINING PROGRAM

## 2021-09-13 PROCEDURE — 80048 BASIC METABOLIC PNL TOTAL CA: CPT

## 2021-09-13 PROCEDURE — 71046 X-RAY EXAM CHEST 2 VIEWS: CPT

## 2021-09-13 PROCEDURE — 83550 IRON BINDING TEST: CPT

## 2021-09-13 PROCEDURE — 84100 ASSAY OF PHOSPHORUS: CPT

## 2021-09-13 RX ORDER — DEXTROSE MONOHYDRATE 25 G/50ML
12.5 INJECTION, SOLUTION INTRAVENOUS PRN
Status: DISCONTINUED | OUTPATIENT
Start: 2021-09-13 | End: 2021-09-15 | Stop reason: HOSPADM

## 2021-09-13 RX ORDER — DEXTROSE MONOHYDRATE 50 MG/ML
100 INJECTION, SOLUTION INTRAVENOUS PRN
Status: DISCONTINUED | OUTPATIENT
Start: 2021-09-13 | End: 2021-09-15 | Stop reason: HOSPADM

## 2021-09-13 RX ORDER — ACETAMINOPHEN 500 MG
500 TABLET ORAL EVERY 6 HOURS PRN
Status: DISCONTINUED | OUTPATIENT
Start: 2021-09-13 | End: 2021-09-15 | Stop reason: HOSPADM

## 2021-09-13 RX ORDER — ACETAMINOPHEN 650 MG/1
650 SUPPOSITORY RECTAL EVERY 6 HOURS PRN
Status: DISCONTINUED | OUTPATIENT
Start: 2021-09-13 | End: 2021-09-15 | Stop reason: HOSPADM

## 2021-09-13 RX ORDER — NICOTINE POLACRILEX 4 MG
15 LOZENGE BUCCAL PRN
Status: DISCONTINUED | OUTPATIENT
Start: 2021-09-13 | End: 2021-09-15 | Stop reason: HOSPADM

## 2021-09-13 RX ADMIN — SODIUM CHLORIDE, PRESERVATIVE FREE 10 ML: 5 INJECTION INTRAVENOUS at 21:09

## 2021-09-13 RX ADMIN — METRONIDAZOLE 500 MG: 500 INJECTION, SOLUTION INTRAVENOUS at 05:10

## 2021-09-13 RX ADMIN — POLYETHYLENE GLYCOL 3350 17 G: 17 POWDER, FOR SOLUTION ORAL at 21:07

## 2021-09-13 RX ADMIN — CIPROFLOXACIN 400 MG: 2 INJECTION, SOLUTION INTRAVENOUS at 08:48

## 2021-09-13 RX ADMIN — ENOXAPARIN SODIUM 40 MG: 40 INJECTION SUBCUTANEOUS at 21:08

## 2021-09-13 RX ADMIN — SERTRALINE 25 MG: 25 TABLET, FILM COATED ORAL at 09:28

## 2021-09-13 RX ADMIN — SODIUM CHLORIDE: 9 INJECTION, SOLUTION INTRAVENOUS at 14:21

## 2021-09-13 RX ADMIN — DONEPEZIL HYDROCHLORIDE 10 MG: 10 TABLET, FILM COATED ORAL at 21:09

## 2021-09-13 RX ADMIN — MORPHINE SULFATE 1 MG: 2 INJECTION, SOLUTION INTRAMUSCULAR; INTRAVENOUS at 15:16

## 2021-09-13 RX ADMIN — CIPROFLOXACIN 400 MG: 2 INJECTION, SOLUTION INTRAVENOUS at 21:07

## 2021-09-13 RX ADMIN — MORPHINE SULFATE 1 MG: 2 INJECTION, SOLUTION INTRAMUSCULAR; INTRAVENOUS at 05:10

## 2021-09-13 RX ADMIN — METOPROLOL TARTRATE 12.5 MG: 25 TABLET, FILM COATED ORAL at 21:07

## 2021-09-13 RX ADMIN — METRONIDAZOLE 500 MG: 500 INJECTION, SOLUTION INTRAVENOUS at 21:07

## 2021-09-13 RX ADMIN — MIRTAZAPINE 7.5 MG: 15 TABLET, FILM COATED ORAL at 21:09

## 2021-09-13 RX ADMIN — METRONIDAZOLE 500 MG: 500 INJECTION, SOLUTION INTRAVENOUS at 13:36

## 2021-09-13 RX ADMIN — Medication 40 MG: at 21:09

## 2021-09-13 RX ADMIN — MORPHINE SULFATE 1 MG: 2 INJECTION, SOLUTION INTRAMUSCULAR; INTRAVENOUS at 21:08

## 2021-09-13 RX ADMIN — ACETAMINOPHEN 650 MG: 325 TABLET ORAL at 17:19

## 2021-09-13 RX ADMIN — ACETAMINOPHEN 650 MG: 325 TABLET ORAL at 10:34

## 2021-09-13 ASSESSMENT — PAIN DESCRIPTION - ORIENTATION
ORIENTATION: RIGHT
ORIENTATION: RIGHT;OUTER
ORIENTATION: RIGHT;OUTER
ORIENTATION: MID

## 2021-09-13 ASSESSMENT — PAIN SCALES - GENERAL
PAINLEVEL_OUTOF10: 6
PAINLEVEL_OUTOF10: 3
PAINLEVEL_OUTOF10: 3
PAINLEVEL_OUTOF10: 2
PAINLEVEL_OUTOF10: 5
PAINLEVEL_OUTOF10: 3
PAINLEVEL_OUTOF10: 6
PAINLEVEL_OUTOF10: 8
PAINLEVEL_OUTOF10: 4
PAINLEVEL_OUTOF10: 10

## 2021-09-13 ASSESSMENT — PAIN DESCRIPTION - PAIN TYPE
TYPE: ACUTE PAIN

## 2021-09-13 ASSESSMENT — PAIN DESCRIPTION - PROGRESSION
CLINICAL_PROGRESSION: NOT CHANGED
CLINICAL_PROGRESSION: NOT CHANGED
CLINICAL_PROGRESSION: GRADUALLY IMPROVING
CLINICAL_PROGRESSION: NOT CHANGED

## 2021-09-13 ASSESSMENT — PAIN DESCRIPTION - FREQUENCY
FREQUENCY: CONTINUOUS

## 2021-09-13 ASSESSMENT — PAIN DESCRIPTION - LOCATION
LOCATION: ABDOMEN
LOCATION: HEAD

## 2021-09-13 ASSESSMENT — PAIN DESCRIPTION - DESCRIPTORS
DESCRIPTORS: DISCOMFORT
DESCRIPTORS: ACHING
DESCRIPTORS: HEADACHE
DESCRIPTORS: ACHING

## 2021-09-13 ASSESSMENT — PAIN DESCRIPTION - ONSET
ONSET: ON-GOING

## 2021-09-13 ASSESSMENT — PAIN - FUNCTIONAL ASSESSMENT: PAIN_FUNCTIONAL_ASSESSMENT: ACTIVITIES ARE NOT PREVENTED

## 2021-09-13 NOTE — PROGRESS NOTES
Hospitalist Progress Note    Patient:  Tiara Ortiz      Unit/Bed:8B-24/024-A    YOB: 1940    MRN: [id number]       Acct: [de-identified]     PCP: Margaux Harrison DO    Date of Admission: 9/12/2021    Assessment/Plan:    1. Acute sigmoid diverticulitis - uncomplicated        - Presented afebrile, WBC WNL, lactic acid 2.1. CT Abd/pel 9/12 reveals findings supportive of sigmoid colon diverticulitis. Started NPO and IVFs NS at 100 ml/hr. On 1 g morphine q4hrs PRN for pain control.        - Started Ciprofloxacin 400 mg IVPB q12hrs and Metronidazole 500 mg IVPB q8hrs for 48 hours. Transition to oral if symptomatic improvement. Antibiotic regiment total of 10 days. - 9/13 afebrile, leukopenia, lactic acid 1.3. Continue to check daily CBC. We will transition to oral antibiotics tomorrow. - We will require outpatient colonoscopy in 4 to 6 weeks by GI. 2. Cirrhosis - unknown etiology        - Noted in 9/12 CT Abd/pel and 8/16 US Liver. No prior cirrhosis work-up. No encephalopathy, asterixis, hematemesis, or JVD. Small ascites noted        - 9/13 LFTs improved and WNL. - We will require outpatient EGD for evaluation of varices. 3. Right upper quadrant pain - possibly referred pain. - Presenting chief complaint. Present for 1 year, worsening over 3 days. History of cholecystectomy, appendectomy and unspecified abdominal surgery. Elevated lipase without pancreatitis, trending down. Cirrhosis with small ascites, no symptoms of SBP. Diverticulitis in sigmoid region. Will monitor for resolution with antibiotic regiment. 4. Macrocytic anemia, chronic        - Baseline Hgb 9-10. Reports prior anemia work-up with small capsule endoscopy and colonoscopy which were negative. No EGD. - Iron studies as follows; iron 227, iron sat 81, TIBC 280, reticulocyte normal, B12 937, folate 12. This suggestive of ACD vs iron overload.  Will continue to monitor.        - 9/13 stable, Hgb 10.2 from prior 9/12 Hgb 10.9. Will monitor Hgb with daily CBC. Transfuse pRBC if Hgb <7.0    5. Elevated lipase        - Presenting Lipase 106.4.  9/13 Repeat lipase trending down, 72.1    6. Infrarenal abdominal aortic aneurysm - 3.2 cm        - Noted in 9/12 CT Abd/pel. Similar in size to abdominal aortic ultrasound from 6/2021. We will continue to monitor. 7. CAD s/p CABG        - Home aspirin and Plavix held by primary care due to continued low hemoglobin per patient. This was a few months ago. Currently on 40 mg po qDay    8. Hypertension        - Continue home metoprolol 12.5 mg po BID. Hold if HR <50.    9. DM2        - Last hemoglobin A1c 5.3 3/2021. DM diet controlled. - 9/13 stable, blood glucose 166. Continue monitor blood glucose with daily BMP. 10. GERD        - Continue pantoprazole 40 mg po qDay    11. Depression        - Continue sertraline 25 mg po qDay and mirtazapine 7.5 mg po qDay    12. DVT prophylaxis        - Continue enoxaparin 40 mg subQ qDay          Expected discharge date:  TBD    Disposition:    [x] Home       [] TCU       [] Rehab       [] Psych       [] SNF       [] [place]       [] Other-    Chief Complaint: Abdominal pain, right sided    Hospital Course: The patient is a 80-year-old female with a past medical history of CAD s/p CABG in 2016, cerebral artery occlusion with cerebral infarction x3 with no residuals, DM diet controlled, GERD, diverticulosis, and cirrhosis who presented to [facility] ED 9/12 for complaints of right upper quadrant pain. The patient states the pain started 9/9, and has been progressively worse since. The pain is described as a sharp, constant pain that does not radiate. This pain is made worse by movement, rated 10/10 and better without movement rated 3/10. Nothing else alleviates the pain. It is not associated with eating or NSAID use.  The patient states she has had this pain in the past however this is much worse. It started 1 year ago following an unpleasant abdominal exam. During this visit the patient was told she had cirrhosis. After this encounter she never had work-up for her cirrhosis. She also reports ongoing diarrhea since her colonoscopy in June 2021. She has found some mild relief with Metamucil. Prior to this she was having constipation.      In the ED the patient was hemodynamically stable, afebrile, and WBC WNL. Lactic acid was mildly elevated at 2.1. LFTs were abnormal, with alk phos 137, AST 48, ALT 17, lipase 106.4.  UA nonsignificant. EKG revealed sinus bradycardia. Troponins negative. Blood and urine cultures pending. CT abdomen pelvis reveals sigmoid colon diverticula with adjacent inflammatory stranding highly suspicious of acute diverticulitis, ascites, cirrhosis and aneurysm of infrarenal abdominal aorta. Abdominal physical exam revealed tenderness and guarding throughout abdomen, but markedly in the left lower quadrant, midgastric region and right lower quadrant. The patient denied any pain with eating or NSAID. She denied any hematochezia, melena, hematemesis. Ascites is noted. The patient was admitted for further management and care. Subjective (past 24 hours): No significant overnight events. This morning the patient continues to report right upper quadrant pain. She states that the pain medication is not helping with this. She also reports a headache. ROS (12 point review of systems completed. Pertinent positives noted. Otherwise ROS is negative).     Medications:  Reviewed    Infusion Medications    dextrose      sodium chloride      sodium chloride 100 mL/hr at 09/13/21 1421     Scheduled Medications    donepezil  10 mg Oral Nightly    pantoprazole  40 mg Oral QAM AC    metoprolol tartrate  12.5 mg Oral BID    mirtazapine  7.5 mg Oral Nightly    sertraline  25 mg Oral Daily    sodium chloride flush  10 mL IntraVENous 2 times per day    enoxaparin  40 mg SubCUTAneous Q24H    ciprofloxacin  400 mg IntraVENous Q12H    metroNIDAZOLE  500 mg IntraVENous Q8H    simvastatin  40 mg Oral Nightly     PRN Meds: glucose, dextrose, glucagon (rDNA), dextrose, sodium chloride flush, sodium chloride, ondansetron **OR** ondansetron, polyethylene glycol, acetaminophen **OR** acetaminophen, morphine    No intake or output data in the 24 hours ending 09/13/21 1529    Diet:  Diet NPO    Exam:  BP (!) 122/58   Pulse 65   Temp 98.5 °F (36.9 °C) (Oral)   Resp 18   Ht 5' 6\" (1.676 m)   Wt 167 lb 9.6 oz (76 kg)   LMP  (LMP Unknown)   SpO2 90%   BMI 27.05 kg/m²     General appearance: mild apparent distress, appears stated age and cooperative. HEENT: Pupils equal, round, and reactive to light. Conjunctivae/corneas clear. Removal upper dentures, fixed lower dentures  Neck: Supple, with full range of motion. No jugular venous distention. Trachea midline. Respiratory:  Normal respiratory effort. Clear to auscultation, bilaterally without Rales/Wheezes/Rhonchi. Cardiovascular: Regular rate and rhythm with normal S1/S2 without murmurs, rubs or gallops. Abdomen: Soft, voluntary guarding, tenderness to percussion and palpation throughout, greater in RLQ and LLQ, mild fluid wave, non-distended with normal bowel sounds. Musculoskeletal: passive and active ROM x 4 extremities. Skin: Skin color, texture, turgor normal.  No rashes or lesions. Neurologic:  Neurovascularly intact without any focal sensory/motor deficits.  Cranial nerves: II-XII intact, grossly non-focal.  Psychiatric: Alert and oriented, thought content appropriate, normal insight  Capillary Refill: Brisk,< 3 seconds   Peripheral Pulses: +2 palpable, equal bilaterally       Labs:   Recent Labs     09/12/21  1356 09/13/21  0644   WBC 6.2 3.4*   HGB 10.9* 10.2*   HCT 33.6* 31.2*    121*     Recent Labs     09/12/21  1356 09/13/21  0644    144   K 4.4 4.6    110   CO2 28 27   BUN 8 9 CREATININE 0.8 0.8   CALCIUM 10.0 9.4   PHOS  --  3.5     Recent Labs     09/12/21  1356 09/13/21  0644   AST 48* 36   ALT 17 14   BILIDIR  --  0.7*   BILITOT 1.7* 2.0*   ALKPHOS 137* 115     No results for input(s): INR in the last 72 hours. No results for input(s): Wesley Oiler in the last 72 hours. Microbiology:    9/13/21 Blood culture 1 - no preliminary growth  9/13/21 Blood culture 2 - no preliminary growth  9/13/21 Urine culture - growth of contaminants    Urinalysis:      Lab Results   Component Value Date    NITRU NEGATIVE 09/12/2021    WBCUA 10-15 09/12/2021    BACTERIA NONE SEEN 09/12/2021    RBCUA 0-2 09/12/2021    BLOODU NEGATIVE 09/12/2021    SPECGRAV 1.014 08/22/2019    GLUCOSEU NEGATIVE 09/12/2021       Radiology:  XR CHEST (2 VW)   Final Result   1. Hyperinflation is demonstrated. No other acute cardiopulmonary findings are otherwise seen. **This report has been created using voice recognition software. It may contain minor errors which are inherent in voice recognition technology. **      Final report electronically signed by Dr. Letha Palm on 9/13/2021 8:41 AM      CT ABDOMEN PELVIS W IV CONTRAST Additional Contrast? None   Final Result   1. Sigmoid colon diverticula with adjacent inflammatory stranding highly suspicious for acute diverticulitis. 2. Ascites. 3. Cirrhosis. 4. Aneurysm of the infrarenal abdominal aorta.       Final report electronically signed by Dr. Ori Nash on 9/12/2021 3:49 PM          DVT prophylaxis: [x] Lovenox                                 [] SCDs                                 [] SQ Heparin                                 [] Encourage ambulation           [] Already on Anticoagulation     Code Status: Full Code    PT/OT Eval Status: N/A    Tele:   [x] yes             [] no    Electronically signed by Winter Florentino DO on 9/13/2021 at 3:29 PM

## 2021-09-13 NOTE — PROGRESS NOTES
[facility]           Progress Note    Patient Name:  Marty Small  Medical Record Number:  [id number]  YOB: 1940    Date:  9/13/2021  Time:  9:46 AM  Completed By:  Ashley Cohen, RN, RN    Reason for Palliative Care Evaluation:  Code status    Advance Directives:none on file  [] [facility] DNR Form  [] Living Will  [] Medical POA    Current Code Status  [x] Full Resuscitation  [] DNR-Comfort Care-Arrest  [] DNR-Comfort Care  [] Limited   [] No CPR   [] No shock   [] No ET intubation/reintubation   [] No resuscitative medications   [] Other limitation:    Family/Patient Discussion:  Patient resting in bed. Patient is alert and oriented to all spheres. Patient's  is at the bedside. Palliative care introduced. Discussion about code status levels and what each level entails. Discussed complications of rib fractures, brain and organ damage associated with resuscitative measures. Patient verbalizes understanding that she is a full code and if a change is desired, she must alert staff. Plan/Follow-Up:  At this time, patient wishes to remain a full code. Please call palliative care if further needs arise.     Ashley Cohen, RN, RN  9/13/2021,  9:46 AM

## 2021-09-14 ENCOUNTER — APPOINTMENT (OUTPATIENT)
Dept: GENERAL RADIOLOGY | Age: 81
DRG: 391 | End: 2021-09-14
Payer: MEDICARE

## 2021-09-14 LAB
ALLEN TEST: NORMAL
ANION GAP SERPL CALCULATED.3IONS-SCNC: 8 MEQ/L (ref 8–16)
ANISOCYTOSIS: PRESENT
BASE EXCESS (CALCULATED): -2.1 MMOL/L (ref -2.5–2.5)
BASOPHILS # BLD: 0.5 %
BASOPHILS ABSOLUTE: 0 THOU/MM3 (ref 0–0.1)
BUN BLDV-MCNC: 11 MG/DL (ref 7–22)
CALCIUM SERPL-MCNC: 9.1 MG/DL (ref 8.5–10.5)
CHLORIDE BLD-SCNC: 108 MEQ/L (ref 98–111)
CO2: 24 MEQ/L (ref 23–33)
COLLECTED BY:: NORMAL
CREAT SERPL-MCNC: 0.7 MG/DL (ref 0.4–1.2)
DACROCYTES: ABNORMAL
DEVICE: NORMAL
ELLIPTOCYTES: ABNORMAL
EOSINOPHIL # BLD: 0.7 %
EOSINOPHILS ABSOLUTE: 0 THOU/MM3 (ref 0–0.4)
ERYTHROCYTE [DISTWIDTH] IN BLOOD BY AUTOMATED COUNT: 21.9 % (ref 11.5–14.5)
ERYTHROCYTE [DISTWIDTH] IN BLOOD BY AUTOMATED COUNT: 83.6 FL (ref 35–45)
GFR SERPL CREATININE-BSD FRML MDRD: 80 ML/MIN/1.73M2
GLUCOSE BLD-MCNC: 108 MG/DL (ref 70–108)
GLUCOSE BLD-MCNC: 76 MG/DL (ref 70–108)
GLUCOSE BLD-MCNC: 79 MG/DL (ref 70–108)
GLUCOSE BLD-MCNC: 99 MG/DL (ref 70–108)
HCO3: 23 MMOL/L (ref 23–28)
HCT VFR BLD CALC: 31.5 % (ref 37–47)
HEMOGLOBIN: 10 GM/DL (ref 12–16)
IFIO2: 3
IMMATURE GRANS (ABS): 0.01 THOU/MM3 (ref 0–0.07)
IMMATURE GRANULOCYTES: 0.2 %
LYMPHOCYTES # BLD: 15.3 %
LYMPHOCYTES ABSOLUTE: 0.6 THOU/MM3 (ref 1–4.8)
MCH RBC QN AUTO: 32.7 PG (ref 26–33)
MCHC RBC AUTO-ENTMCNC: 31.7 GM/DL (ref 32.2–35.5)
MCV RBC AUTO: 102.9 FL (ref 81–99)
MONOCYTES # BLD: 9.2 %
MONOCYTES ABSOLUTE: 0.4 THOU/MM3 (ref 0.4–1.3)
NUCLEATED RED BLOOD CELLS: 1 /100 WBC
O2 SATURATION: 96 %
PCO2: 42 MMHG (ref 35–45)
PH BLOOD GAS: 7.36 (ref 7.35–7.45)
PLATELET # BLD: 124 THOU/MM3 (ref 130–400)
PMV BLD AUTO: 10 FL (ref 9.4–12.4)
PO2: 86 MMHG (ref 71–104)
POIKILOCYTES: ABNORMAL
POTASSIUM REFLEX MAGNESIUM: 4.5 MEQ/L (ref 3.5–5.2)
RBC # BLD: 3.06 MILL/MM3 (ref 4.2–5.4)
SCAN OF BLOOD SMEAR: NORMAL
SCHISTOCYTES: ABNORMAL
SEG NEUTROPHILS: 74.1 %
SEGMENTED NEUTROPHILS ABSOLUTE COUNT: 3.1 THOU/MM3 (ref 1.8–7.7)
SODIUM BLD-SCNC: 140 MEQ/L (ref 135–145)
SOURCE, BLOOD GAS: NORMAL
TARGET CELLS: ABNORMAL
WBC # BLD: 4.2 THOU/MM3 (ref 4.8–10.8)

## 2021-09-14 PROCEDURE — 82803 BLOOD GASES ANY COMBINATION: CPT

## 2021-09-14 PROCEDURE — 71046 X-RAY EXAM CHEST 2 VIEWS: CPT

## 2021-09-14 PROCEDURE — 2580000003 HC RX 258: Performed by: HOSPITALIST

## 2021-09-14 PROCEDURE — 2580000003 HC RX 258: Performed by: STUDENT IN AN ORGANIZED HEALTH CARE EDUCATION/TRAINING PROGRAM

## 2021-09-14 PROCEDURE — 36600 WITHDRAWAL OF ARTERIAL BLOOD: CPT

## 2021-09-14 PROCEDURE — 99233 SBSQ HOSP IP/OBS HIGH 50: CPT | Performed by: HOSPITALIST

## 2021-09-14 PROCEDURE — 1200000000 HC SEMI PRIVATE

## 2021-09-14 PROCEDURE — 6370000000 HC RX 637 (ALT 250 FOR IP): Performed by: STUDENT IN AN ORGANIZED HEALTH CARE EDUCATION/TRAINING PROGRAM

## 2021-09-14 PROCEDURE — 85025 COMPLETE CBC W/AUTO DIFF WBC: CPT

## 2021-09-14 PROCEDURE — 2500000003 HC RX 250 WO HCPCS: Performed by: STUDENT IN AN ORGANIZED HEALTH CARE EDUCATION/TRAINING PROGRAM

## 2021-09-14 PROCEDURE — 80048 BASIC METABOLIC PNL TOTAL CA: CPT

## 2021-09-14 PROCEDURE — 6360000002 HC RX W HCPCS: Performed by: HOSPITALIST

## 2021-09-14 PROCEDURE — 6360000002 HC RX W HCPCS: Performed by: STUDENT IN AN ORGANIZED HEALTH CARE EDUCATION/TRAINING PROGRAM

## 2021-09-14 PROCEDURE — 6370000000 HC RX 637 (ALT 250 FOR IP): Performed by: HOSPITALIST

## 2021-09-14 PROCEDURE — 36415 COLL VENOUS BLD VENIPUNCTURE: CPT

## 2021-09-14 PROCEDURE — 82948 REAGENT STRIP/BLOOD GLUCOSE: CPT

## 2021-09-14 RX ORDER — METRONIDAZOLE 500 MG/1
500 TABLET ORAL EVERY 8 HOURS SCHEDULED
Status: DISCONTINUED | OUTPATIENT
Start: 2021-09-14 | End: 2021-09-15 | Stop reason: HOSPADM

## 2021-09-14 RX ORDER — CIPROFLOXACIN 500 MG/1
500 TABLET, FILM COATED ORAL EVERY 12 HOURS SCHEDULED
Status: DISCONTINUED | OUTPATIENT
Start: 2021-09-14 | End: 2021-09-15 | Stop reason: HOSPADM

## 2021-09-14 RX ORDER — FUROSEMIDE 20 MG/1
20 TABLET ORAL ONCE
Status: COMPLETED | OUTPATIENT
Start: 2021-09-14 | End: 2021-09-14

## 2021-09-14 RX ADMIN — METRONIDAZOLE 500 MG: 500 TABLET ORAL at 21:43

## 2021-09-14 RX ADMIN — SODIUM CHLORIDE: 9 INJECTION, SOLUTION INTRAVENOUS at 00:34

## 2021-09-14 RX ADMIN — MIRTAZAPINE 7.5 MG: 15 TABLET, FILM COATED ORAL at 21:46

## 2021-09-14 RX ADMIN — METRONIDAZOLE 500 MG: 500 INJECTION, SOLUTION INTRAVENOUS at 13:42

## 2021-09-14 RX ADMIN — CIPROFLOXACIN 400 MG: 2 INJECTION, SOLUTION INTRAVENOUS at 08:02

## 2021-09-14 RX ADMIN — METOPROLOL TARTRATE 12.5 MG: 25 TABLET, FILM COATED ORAL at 21:46

## 2021-09-14 RX ADMIN — ACETAMINOPHEN 500 MG: 500 TABLET ORAL at 09:14

## 2021-09-14 RX ADMIN — CIPROFLOXACIN 500 MG: 500 TABLET, FILM COATED ORAL at 21:43

## 2021-09-14 RX ADMIN — ONDANSETRON 4 MG: 2 INJECTION INTRAMUSCULAR; INTRAVENOUS at 00:34

## 2021-09-14 RX ADMIN — SERTRALINE 25 MG: 25 TABLET, FILM COATED ORAL at 07:40

## 2021-09-14 RX ADMIN — SODIUM CHLORIDE, PRESERVATIVE FREE 10 ML: 5 INJECTION INTRAVENOUS at 21:46

## 2021-09-14 RX ADMIN — FUROSEMIDE 20 MG: 20 TABLET ORAL at 21:43

## 2021-09-14 RX ADMIN — Medication 40 MG: at 21:46

## 2021-09-14 RX ADMIN — METRONIDAZOLE 500 MG: 500 INJECTION, SOLUTION INTRAVENOUS at 05:39

## 2021-09-14 RX ADMIN — METOPROLOL TARTRATE 12.5 MG: 25 TABLET, FILM COATED ORAL at 07:41

## 2021-09-14 RX ADMIN — ENOXAPARIN SODIUM 40 MG: 40 INJECTION SUBCUTANEOUS at 21:46

## 2021-09-14 RX ADMIN — SODIUM CHLORIDE, PRESERVATIVE FREE 10 ML: 5 INJECTION INTRAVENOUS at 00:35

## 2021-09-14 RX ADMIN — PANTOPRAZOLE SODIUM 40 MG: 40 TABLET, DELAYED RELEASE ORAL at 05:39

## 2021-09-14 RX ADMIN — DONEPEZIL HYDROCHLORIDE 10 MG: 10 TABLET, FILM COATED ORAL at 21:46

## 2021-09-14 ASSESSMENT — PAIN DESCRIPTION - PAIN TYPE
TYPE: ACUTE PAIN

## 2021-09-14 ASSESSMENT — PAIN DESCRIPTION - FREQUENCY
FREQUENCY: CONTINUOUS

## 2021-09-14 ASSESSMENT — PAIN DESCRIPTION - LOCATION
LOCATION: ABDOMEN

## 2021-09-14 ASSESSMENT — PAIN DESCRIPTION - ORIENTATION
ORIENTATION: RIGHT

## 2021-09-14 ASSESSMENT — PAIN SCALES - GENERAL
PAINLEVEL_OUTOF10: 0
PAINLEVEL_OUTOF10: 0
PAINLEVEL_OUTOF10: 5
PAINLEVEL_OUTOF10: 8
PAINLEVEL_OUTOF10: 6
PAINLEVEL_OUTOF10: 3

## 2021-09-14 ASSESSMENT — PAIN DESCRIPTION - PROGRESSION
CLINICAL_PROGRESSION: NOT CHANGED

## 2021-09-14 ASSESSMENT — PAIN - FUNCTIONAL ASSESSMENT
PAIN_FUNCTIONAL_ASSESSMENT: ACTIVITIES ARE NOT PREVENTED

## 2021-09-14 ASSESSMENT — PAIN DESCRIPTION - ONSET
ONSET: ON-GOING

## 2021-09-14 ASSESSMENT — PAIN DESCRIPTION - DESCRIPTORS
DESCRIPTORS: DISCOMFORT
DESCRIPTORS: STABBING
DESCRIPTORS: DISCOMFORT
DESCRIPTORS: STABBING

## 2021-09-14 ASSESSMENT — PAIN SCALES - WONG BAKER: WONGBAKER_NUMERICALRESPONSE: 0

## 2021-09-14 NOTE — CARE COORDINATION
Discharge Planning Update:     Treating sigmoid diverticulitis. Continues with nausea. NPO. IVF at 100/hr. Cipro and Flagyl. Afebrile. O2 at 3L/NC. Sat 95%. Plans home with spouse. CM to continue to follow for possible needs.

## 2021-09-14 NOTE — PROGRESS NOTES
Hospitalist Progress Note    Patient:  Tiara Ortiz      Unit/Bed:8B-24/024-A    YOB: 1940    MRN: [id number]       Acct: [de-identified]     PCP: Margaux Harrison DO    Date of Admission: 9/12/2021    Assessment/Plan:    1. Acute sigmoid diverticulitis - uncomplicated        - Presented afebrile, WBC WNL, lactic acid 2.1. CT Abd/pel 9/12 reveals findings supportive of sigmoid colon diverticulitis. Started NPO and IVFs NS at 100 ml/hr. On 1 g morphine q4hrs PRN for pain control.        - Started Ciprofloxacin 400 mg IVPB q12hrs and Metronidazole 500 mg IVPB q8hrs for 48 hours. Transition to oral if symptomatic improvement. Antibiotic regiment total of 10 days. - 9/14 afebrile, improving leukopenia  Continue to check daily CBC. Discontinued IV antibiotics. Started metronidazole 500 mg po q8hrs and ciprofloxacin 500 mg po q12hrs. Antibiotic day 3. Will continue for total of 10 days        - We will require outpatient colonoscopy in 4 to 6 weeks by GI.        - Diet advanced to clear liquids. 2. Cirrhosis - unknown etiology        - Noted in 9/12 CT Abd/pel and 8/16 US Liver. No prior cirrhosis work-up. No encephalopathy, asterixis, hematemesis, or JVD. Small ascites noted        - 9/13 LFTs improved and WNL. - We will require outpatient EGD for evaluation of varices    3. Acute hypoxic respiratory failure - likely secondary to volume overload        - Input 2932, output 300. Stopped IV  mL/hr. Will check CXR PA and lateral to rule out pleural effusion/congestion. If present will start Lasix 20 mg po qDay for gentle diuresis. 4. Right upper quadrant pain - possible referred pain        - Presenting chief complaint. Present for 1 year, worsening over 3 days. History of cholecystectomy, appendectomy and unspecified abdominal surgery. Elevated lipase without pancreatitis, trending down. Cirrhosis with small ascites, no symptoms of SBP.  Diverticulitis in sigmoid region. Will monitor for resolution with antibiotic regiment. 5. Macrocytic anemia, chronic        - Baseline Hgb 9-10. Reports prior anemia work-up with small capsule endoscopy and colonoscopy which were negative. No EGD. - Iron studies as follows; iron 227, iron sat 81, TIBC 280, reticulocyte normal, B12 937, folate 12. This suggestive of ACD. Will continue to monitor.        - 9/14 stable, Hgb 10.0 from prior 9/13 Hgb 10.2. Will monitor Hgb with daily CBC. Transfuse pRBC if Hgb <7.0    6. Elevated lipase        - Presenting Lipase 106.4.  9/13 Repeat lipase trending down, 72.1    7. Infrarenal abdominal aortic aneurysm - 3.2 cm        - Noted in 9/12 CT Abd/pel. Similar in size to abdominal aortic ultrasound from 6/2021. We will continue to monitor. 8. CAD s/p CABG        - Home aspirin and Plavix held by primary care due to continued low hemoglobin per patient. This was a few months ago. Currently on 40 mg po qDay    9. Hypertension        - Continue home metoprolol 12.5 mg po BID. Hold if HR <50.    10. DM2        - Last hemoglobin A1c 5.3 3/2021. DM diet controlled. - 9/14 stable, blood glucose 99. Continue monitor blood glucose with daily BMP. 11. GERD        - Continue pantoprazole 40 mg po qDay    12. Depression        - Continue sertraline 25 mg po qDay and mirtazapine 7.5 mg po qDay    13. DVT prophylaxis        - Continue enoxaparin 40 mg subQ qDay       Expected discharge date:  TBD    Disposition:    [x] Home       [] TCU       [] Rehab       [] Psych       [] SNF       [] [facility]       [] Other-    Chief Complaint: Abdominal pain, right sided    Hospital Course: The patient is a 80-year-old female with a past medical history of CAD s/p CABG in 2016, cerebral artery occlusion with cerebral infarction x3 with no residuals, DM diet controlled, GERD, diverticulosis, and cirrhosis who presented to [facility] ED 9/12 for complaints of right upper quadrant pain.  The patient states the pain started 9/9, and has been progressively worse since.  The pain is described as a sharp, constant pain that does not radiate. This pain is made worse by movement, rated 10/10 and better without movement rated 3/10.  Nothing else alleviates the pain.  It is not associated with eating or NSAID use. The patient states she has had this pain in the past however this is much worse.  It started 1 year ago following an unpleasant abdominal exam. During this visit the patient was told she had cirrhosis. Patricia Zengadinas this encounter she never had work-up for her cirrhosis. She also reports ongoing diarrhea since her colonoscopy in June 2021.  She has found some mild relief with Metamucil.  Prior to this she was having constipation.      In the ED the patient was hemodynamically stable, afebrile, and WBC WNL.  Lactic acid was mildly elevated at 2.1. LFTs were abnormal, with alk phos 137, AST 48, ALT 17, lipase 106. 4.  UA nonsignificant.  EKG revealed sinus bradycardia.  Troponins negative.  Blood and urine cultures pending.  CT abdomen pelvis reveals sigmoid colon diverticula with adjacent inflammatory stranding highly suspicious of acute diverticulitis, ascites, cirrhosis and aneurysm of infrarenal abdominal aorta.  Abdominal physical exam revealed tenderness and guarding throughout abdomen, but markedly in the left lower quadrant, midgastric region and right lower quadrant.  The patient denied any pain with eating or NSAID.  She denied any hematochezia, melena, hematemesis.  Ascites is noted. The patient was admitted for further management and care.     Subjective (past 24 hours):   Overnight the patient required increased oxygen. She is currently on 3L NC. The patient was reported to desat into the upper 80s with attempted weaning. Will check CXR, ABG and hold IV fluids. The patient's diet will be progressed to clear liquids. Hemodynamically stable. Will transition to oral antibiotics.        ITZEL (12 point review of systems completed. Pertinent positives noted. Otherwise ROS is negative). Medications:  Reviewed    Infusion Medications    dextrose      sodium chloride      [Held by provider] sodium chloride Stopped (09/14/21 1109)     Scheduled Medications    donepezil  10 mg Oral Nightly    pantoprazole  40 mg Oral QAM AC    metoprolol tartrate  12.5 mg Oral BID    mirtazapine  7.5 mg Oral Nightly    sertraline  25 mg Oral Daily    sodium chloride flush  10 mL IntraVENous 2 times per day    enoxaparin  40 mg SubCUTAneous Q24H    ciprofloxacin  400 mg IntraVENous Q12H    metroNIDAZOLE  500 mg IntraVENous Q8H    simvastatin  40 mg Oral Nightly     PRN Meds: glucose, dextrose, glucagon (rDNA), dextrose, acetaminophen **OR** acetaminophen, sodium chloride flush, sodium chloride, ondansetron **OR** ondansetron, polyethylene glycol, morphine      Intake/Output Summary (Last 24 hours) at 9/14/2021 1312  Last data filed at 9/14/2021 1007  Gross per 24 hour   Intake 2932.24 ml   Output 500 ml   Net 2432.24 ml       Diet:  Diet NPO Exceptions are: Sips of Water with Meds    Exam:  BP (!) 105/45   Pulse 53   Temp 98.7 °F (37.1 °C) (Oral)   Resp 15   Ht 5' 6\" (1.676 m)   Wt 167 lb 9.6 oz (76 kg)   LMP  (LMP Unknown)   SpO2 100%   BMI 27.05 kg/m²     General appearance: mild apparent distress, appears stated age and cooperative. HEENT: Pupils equal, round, and reactive to light. Conjunctivae/corneas clear. Removal upper dentures, fixed lower dentures  Neck: Supple, with full range of motion. No jugular venous distention. Trachea midline. Respiratory:  Normal respiratory effort. Clear to auscultation, bilaterally without Rales/Wheezes/Rhonchi. Cardiovascular: Regular rate and rhythm with normal S1/S2 without murmurs, rubs or gallops. Abdomen: Soft, mild tenderness in RLQ and LLQ. Severe tenderness and voluntary guarding in RUQ.  Mild fluid wave, non-distended with normal bowel sounds. Musculoskeletal: passive and active ROM x 4 extremities. Skin: Skin color, texture, turgor normal.  No rashes or lesions. Neurologic:  Neurovascularly intact without any focal sensory/motor deficits. Cranial nerves: II-XII intact, grossly non-focal.  Psychiatric: Alert and oriented, thought content appropriate, normal insight  Capillary Refill: Brisk,< 3 seconds   Peripheral Pulses: +2 palpable, equal bilaterally       Labs:   Recent Labs     09/12/21  1356 09/13/21  0644 09/14/21  0723   WBC 6.2 3.4* 4.2*   HGB 10.9* 10.2* 10.0*   HCT 33.6* 31.2* 31.5*    121* 124*     Recent Labs     09/12/21  1356 09/13/21  0644 09/14/21  0723    144 140   K 4.4 4.6 4.5    110 108   CO2 28 27 24   BUN 8 9 11   CREATININE 0.8 0.8 0.7   CALCIUM 10.0 9.4 9.1   PHOS  --  3.5  --      Recent Labs     09/12/21  1356 09/13/21  0644   AST 48* 36   ALT 17 14   BILIDIR  --  0.7*   BILITOT 1.7* 2.0*   ALKPHOS 137* 115     No results for input(s): INR in the last 72 hours. No results for input(s): Elena Lowers in the last 72 hours. Microbiology:    9/13/21 Blood culture 1 - no preliminary growth  9/13/21 Blood culture 2 - no preliminary growth  9/13/21 Urine culture - growth of contaminants    Urinalysis:      Lab Results   Component Value Date    NITRU NEGATIVE 09/12/2021    WBCUA 10-15 09/12/2021    BACTERIA NONE SEEN 09/12/2021    RBCUA 0-2 09/12/2021    BLOODU NEGATIVE 09/12/2021    SPECGRAV 1.014 08/22/2019    GLUCOSEU NEGATIVE 09/12/2021       Radiology:  XR CHEST (2 VW)   Final Result      Small bilateral pleural effusions. **This report has been created using voice recognition software. It may contain minor errors which are inherent in voice recognition technology. **      Final report electronically signed by Dr. Mariana Le on 9/14/2021 11:30 AM      XR CHEST (2 VW)   Final Result   1. Hyperinflation is demonstrated. No other acute cardiopulmonary findings are otherwise seen. **This report has been created using voice recognition software. It may contain minor errors which are inherent in voice recognition technology. **      Final report electronically signed by Dr. Stevenson Jacinto on 9/13/2021 8:41 AM      CT ABDOMEN PELVIS W IV CONTRAST Additional Contrast? None   Final Result   1. Sigmoid colon diverticula with adjacent inflammatory stranding highly suspicious for acute diverticulitis. 2. Ascites. 3. Cirrhosis. 4. Aneurysm of the infrarenal abdominal aorta.       Final report electronically signed by Dr. Marco A Jara on 9/12/2021 3:49 PM          DVT prophylaxis: [x] Lovenox                                 [] SCDs                                 [] SQ Heparin                                 [] Encourage ambulation           [] Already on Anticoagulation     Code Status: Full Code    PT/OT Eval Status: N/A    Tele:   [x] yes             [] no    Electronically signed by Roro Vann DO on 9/14/2021 at 1:12 PM

## 2021-09-15 VITALS
WEIGHT: 167.6 LBS | BODY MASS INDEX: 26.93 KG/M2 | OXYGEN SATURATION: 93 % | HEART RATE: 56 BPM | DIASTOLIC BLOOD PRESSURE: 47 MMHG | SYSTOLIC BLOOD PRESSURE: 97 MMHG | TEMPERATURE: 98.6 F | RESPIRATION RATE: 16 BRPM | HEIGHT: 66 IN

## 2021-09-15 DIAGNOSIS — G30.1 LATE ONSET ALZHEIMER'S DISEASE WITHOUT BEHAVIORAL DISTURBANCE (HCC): ICD-10-CM

## 2021-09-15 DIAGNOSIS — F02.80 LATE ONSET ALZHEIMER'S DISEASE WITHOUT BEHAVIORAL DISTURBANCE (HCC): ICD-10-CM

## 2021-09-15 LAB
ANION GAP SERPL CALCULATED.3IONS-SCNC: 7 MEQ/L (ref 8–16)
ANISOCYTOSIS: PRESENT
BASOPHILS # BLD: 0.8 %
BASOPHILS ABSOLUTE: 0 THOU/MM3 (ref 0–0.1)
BUN BLDV-MCNC: 10 MG/DL (ref 7–22)
CALCIUM SERPL-MCNC: 9.5 MG/DL (ref 8.5–10.5)
CHLORIDE BLD-SCNC: 109 MEQ/L (ref 98–111)
CO2: 24 MEQ/L (ref 23–33)
CREAT SERPL-MCNC: 0.8 MG/DL (ref 0.4–1.2)
EOSINOPHIL # BLD: 4.6 %
EOSINOPHILS ABSOLUTE: 0.2 THOU/MM3 (ref 0–0.4)
ERYTHROCYTE [DISTWIDTH] IN BLOOD BY AUTOMATED COUNT: 21.9 % (ref 11.5–14.5)
ERYTHROCYTE [DISTWIDTH] IN BLOOD BY AUTOMATED COUNT: 86.4 FL (ref 35–45)
GFR SERPL CREATININE-BSD FRML MDRD: 69 ML/MIN/1.73M2
GLUCOSE BLD-MCNC: 90 MG/DL (ref 70–108)
HCT VFR BLD CALC: 29.6 % (ref 37–47)
HEMOGLOBIN: 9.5 GM/DL (ref 12–16)
IMMATURE GRANS (ABS): 0.01 THOU/MM3 (ref 0–0.07)
IMMATURE GRANULOCYTES: 0.3 %
LYMPHOCYTES # BLD: 25.9 %
LYMPHOCYTES ABSOLUTE: 1 THOU/MM3 (ref 1–4.8)
MCH RBC QN AUTO: 33 PG (ref 26–33)
MCHC RBC AUTO-ENTMCNC: 32.1 GM/DL (ref 32.2–35.5)
MCV RBC AUTO: 102.8 FL (ref 81–99)
MONOCYTES # BLD: 16 %
MONOCYTES ABSOLUTE: 0.6 THOU/MM3 (ref 0.4–1.3)
NUCLEATED RED BLOOD CELLS: 1 /100 WBC
PLATELET # BLD: 127 THOU/MM3 (ref 130–400)
PLATELET ESTIMATE: ADEQUATE
PMV BLD AUTO: 10.6 FL (ref 9.4–12.4)
POIKILOCYTES: ABNORMAL
POTASSIUM REFLEX MAGNESIUM: 4.2 MEQ/L (ref 3.5–5.2)
RBC # BLD: 2.88 MILL/MM3 (ref 4.2–5.4)
SCAN OF BLOOD SMEAR: NORMAL
SEG NEUTROPHILS: 52.4 %
SEGMENTED NEUTROPHILS ABSOLUTE COUNT: 2 THOU/MM3 (ref 1.8–7.7)
SODIUM BLD-SCNC: 140 MEQ/L (ref 135–145)
WBC # BLD: 3.9 THOU/MM3 (ref 4.8–10.8)

## 2021-09-15 PROCEDURE — 6370000000 HC RX 637 (ALT 250 FOR IP): Performed by: STUDENT IN AN ORGANIZED HEALTH CARE EDUCATION/TRAINING PROGRAM

## 2021-09-15 PROCEDURE — 80048 BASIC METABOLIC PNL TOTAL CA: CPT

## 2021-09-15 PROCEDURE — 36415 COLL VENOUS BLD VENIPUNCTURE: CPT

## 2021-09-15 PROCEDURE — 85025 COMPLETE CBC W/AUTO DIFF WBC: CPT

## 2021-09-15 PROCEDURE — 6360000002 HC RX W HCPCS: Performed by: INTERNAL MEDICINE

## 2021-09-15 PROCEDURE — 2580000003 HC RX 258: Performed by: HOSPITALIST

## 2021-09-15 PROCEDURE — 6370000000 HC RX 637 (ALT 250 FOR IP): Performed by: HOSPITALIST

## 2021-09-15 PROCEDURE — 99239 HOSP IP/OBS DSCHRG MGMT >30: CPT | Performed by: INTERNAL MEDICINE

## 2021-09-15 RX ORDER — FUROSEMIDE 10 MG/ML
20 INJECTION INTRAMUSCULAR; INTRAVENOUS ONCE
Status: COMPLETED | OUTPATIENT
Start: 2021-09-15 | End: 2021-09-15

## 2021-09-15 RX ORDER — DONEPEZIL HYDROCHLORIDE 10 MG/1
TABLET, FILM COATED ORAL
Qty: 90 TABLET | Refills: 3 | Status: SHIPPED | OUTPATIENT
Start: 2021-09-15 | End: 2022-03-18 | Stop reason: ALTCHOICE

## 2021-09-15 RX ORDER — METRONIDAZOLE 500 MG/1
500 TABLET ORAL EVERY 8 HOURS SCHEDULED
Qty: 21 TABLET | Refills: 0 | Status: SHIPPED | OUTPATIENT
Start: 2021-09-15 | End: 2021-09-22

## 2021-09-15 RX ORDER — CIPROFLOXACIN 500 MG/1
500 TABLET, FILM COATED ORAL EVERY 12 HOURS SCHEDULED
Qty: 14 TABLET | Refills: 0 | Status: SHIPPED | OUTPATIENT
Start: 2021-09-15 | End: 2021-09-22

## 2021-09-15 RX ADMIN — SERTRALINE 25 MG: 25 TABLET, FILM COATED ORAL at 09:00

## 2021-09-15 RX ADMIN — METRONIDAZOLE 500 MG: 500 TABLET ORAL at 06:44

## 2021-09-15 RX ADMIN — METRONIDAZOLE 500 MG: 500 TABLET ORAL at 14:06

## 2021-09-15 RX ADMIN — SODIUM CHLORIDE, PRESERVATIVE FREE 10 ML: 5 INJECTION INTRAVENOUS at 09:00

## 2021-09-15 RX ADMIN — METOPROLOL TARTRATE 12.5 MG: 25 TABLET, FILM COATED ORAL at 09:00

## 2021-09-15 RX ADMIN — FUROSEMIDE 20 MG: 10 INJECTION, SOLUTION INTRAMUSCULAR; INTRAVENOUS at 10:21

## 2021-09-15 RX ADMIN — ACETAMINOPHEN 500 MG: 500 TABLET ORAL at 12:54

## 2021-09-15 RX ADMIN — CIPROFLOXACIN 500 MG: 500 TABLET, FILM COATED ORAL at 09:00

## 2021-09-15 RX ADMIN — PANTOPRAZOLE SODIUM 40 MG: 40 TABLET, DELAYED RELEASE ORAL at 06:44

## 2021-09-15 ASSESSMENT — PAIN DESCRIPTION - DESCRIPTORS
DESCRIPTORS: DISCOMFORT

## 2021-09-15 ASSESSMENT — PAIN DESCRIPTION - PAIN TYPE
TYPE: ACUTE PAIN

## 2021-09-15 ASSESSMENT — PAIN - FUNCTIONAL ASSESSMENT
PAIN_FUNCTIONAL_ASSESSMENT: ACTIVITIES ARE NOT PREVENTED

## 2021-09-15 ASSESSMENT — PAIN DESCRIPTION - LOCATION
LOCATION: ABDOMEN

## 2021-09-15 ASSESSMENT — PAIN DESCRIPTION - PROGRESSION
CLINICAL_PROGRESSION: NOT CHANGED

## 2021-09-15 ASSESSMENT — PAIN DESCRIPTION - ORIENTATION
ORIENTATION: RIGHT

## 2021-09-15 ASSESSMENT — PAIN DESCRIPTION - ONSET
ONSET: ON-GOING

## 2021-09-15 ASSESSMENT — PAIN SCALES - GENERAL
PAINLEVEL_OUTOF10: 3
PAINLEVEL_OUTOF10: 5
PAINLEVEL_OUTOF10: 0
PAINLEVEL_OUTOF10: 3

## 2021-09-15 ASSESSMENT — PAIN DESCRIPTION - FREQUENCY
FREQUENCY: CONTINUOUS

## 2021-09-15 ASSESSMENT — PAIN SCALES - WONG BAKER: WONGBAKER_NUMERICALRESPONSE: 0

## 2021-09-15 NOTE — TELEPHONE ENCOUNTER
Recent Visits  Date Type Provider Dept   07/23/21 Office Visit Lee Moore, APRN - CNP [facility] [place] [facility]   06/21/21 Office Visit Roda Olszewski, [address] [facility]   05/12/21 Office Visit Lee Moore, APRN - CNP [facility] [place] [facility]   04/08/21 Office Visit Lee Moore APRN - CNP [facility] [place] [facility]   04/05/21 Office Visit Roda Olszewski, DO [address]   03/22/21 Office Visit Roda Olszewski, DO [address]   12/21/20 Office Visit Roda Olszewski, DO [facility] [place] [facility]   10/01/20 Office Visit Lee Moore APRN - CNP [facility] [place] [facility]   08/26/20 Office Visit Lee Moore, APRN - CNP [facility] [address]   08/10/20 Office Visit Roda Olszewski, [address] recent visits within past 540 days with a meds authorizing provider and meeting all other requirements  Future Appointments  Date Type Provider Dept   12/21/21 Appointment Roda Olszewski, DO [facility] [address]   Showing future appointments within next 150 days with a meds authorizing provider and meeting all other requirements                Future Appointments   Date Time Provider Ashley Dorsey   12/21/2021  1:00 PM Roda Olszewski, DO [facility] [place]

## 2021-09-15 NOTE — DISCHARGE SUMMARY
Hospitalist Discharge Summary        Patient: Dana Colmenares  YOB: 1940  MRN: [id number]   Acct: [de-identified]    Primary Care Physician: Terry Wallis DO    Admit date  9/12/2021    Discharge date:  9/15/2021 3:20 PM    Chief Complaint on presentation :-  Abd pain    Discharge Assessment and Plan:-     1. Acute sigmoid diverticulitis - uncomplicated        - Presented afebrile, WBC WNL, lactic acid 2.1. CT Abd/pel 9/12 reveals findings supportive of sigmoid colon diverticulitis. Started NPO and IVFs NS at 100 ml/hr. On 1 g morphine q4hrs PRN for pain control.        - Started Ciprofloxacin 400 mg IVPB q12hrs and Metronidazole 500 mg IVPB q8hrs for 48 hours.  Transition to oral if symptomatic improvement.  Antibiotic regiment total of 10 days.         - 9/14 afebrile, improving leukopenia  Continue to check daily CBC. Discontinued IV antibiotics. Started metronidazole 500 mg po q8hrs and ciprofloxacin 500 mg po q12hrs. Antibiotic day 3. Will continue for total of 10 days        - We will require outpatient colonoscopy in 4 to 6 weeks by GI.        - Diet advanced to clear liquids. 9/15: Patient tolerated regular diet. No abdominal pain. Nausea vomiting. To be discharged on oral Cipro and Flagyl for 7 days to complete a 10-day course.     2. Cirrhosis - unknown etiology        - Noted in 9/12 CT Abd/pel and 8/16 US Liver.         - 9/13 LFTs improved and WNL.       - We will require outpatient EGD for evaluation of varices    9/15: Compensated. Patient follows with Dr. Alen Young. Needs to follow-up with him for surveillance.     3. Acute hypoxic respiratory failure - likely secondary to volume overload        - Input 2932, output 300. Stopped IV  mL/hr. Will check CXR PA and lateral to rule out pleural effusion/congestion. If present will start Lasix 20 mg po qDay for gentle diuresis. 9/15: Patient was able to be weaned off oxygen completely. Satting well high 90s.   Ambulated without any hypoxia. Patient will be discharged in stable condition.     5. Macrocytic anemia, chronic        - Baseline Hgb 9-10.  Reports prior anemia work-up with small capsule endoscopy and colonoscopy which were negative.  No EGD.       - Iron studies as follows; iron 227, iron sat 81, TIBC 280, reticulocyte normal, B12 937, folate 12. This suggestive of ACD. Will continue to monitor.        - 9/14 stable, Hgb 10.0 from prior 9/13 Hgb 10.2.  Will monitor Hgb with daily CBC. Transfuse pRBC if Hgb <7.0     6. Elevated lipase        - Presenting Lipase 106. 4.  9/13 Repeat lipase trending down, 72.1     7. Infrarenal abdominal aortic aneurysm - 3.2 cm        - Noted in 9/12 CT Abd/pel.  Similar in size to abdominal aortic ultrasound from 6/2021.  We will continue to monitor.     8. CAD s/p CABG        - Home aspirin and Plavix held by primary care due to continued low hemoglobin per patient.  This was a few months ago. Currently on 40 mg po qDay     9. Hypertension        - Continue home metoprolol 12.5 mg po BID. Hold if HR <50.     10. DM2        - Last hemoglobin A1c 5.3 3/2021.  DM diet controlled.        - 9/14 stable, blood glucose 99.  Continue monitor blood glucose with daily BMP.       11. GERD        - Continue pantoprazole 40 mg po qDay     12. Depression        - Continue sertraline 25 mg po qDay and mirtazapine 7.5 mg po qDay     13.  DVT prophylaxis        - Continue enoxaparin 40 mg subQ qDay    Initial H and P and Hospital course:-    51-year-old female with a past medical history of CAD s/p CABG in 2016, cerebral artery occlusion with cerebral infarction x3 with no residuals, DM diet controlled, GERD, diverticulosis, and cirrhosis who presented to [facility] ED 9/12 for complaints of right upper quadrant pain.  The patient states the pain started 9/9, and has been progressively worse since.  The pain is described as a sharp, constant pain that does not radiate. This pain is made worse by movement, rated 10/10 and better without movement rated 3/10.  Nothing else alleviates the pain.  It is not associated with eating or NSAID use. The patient states she has had this pain in the past however this is much worse.  It started 1 year ago following an unpleasant abdominal exam. During this visit the patient was told she had cirrhosis. Prudy Iam this encounter she never had work-up for her cirrhosis. She also reports ongoing diarrhea since her colonoscopy in June 2021.  She has found some mild relief with Metamucil.  Prior to this she was having constipation.      In the ED the patient was hemodynamically stable, afebrile, and WBC WNL.  Lactic acid was mildly elevated at 2.1. LFTs were abnormal, with alk phos 137, AST 48, ALT 17, lipase 106. 4.  UA nonsignificant.  EKG revealed sinus bradycardia.  Troponins negative.  Blood and urine cultures pending.  CT abdomen pelvis reveals sigmoid colon diverticula with adjacent inflammatory stranding highly suspicious of acute diverticulitis, ascites, cirrhosis and aneurysm of infrarenal abdominal aorta.  Abdominal physical exam revealed tenderness and guarding throughout abdomen, but markedly in the left lower quadrant, midgastric region and right lower quadrant.  The patient denied any pain with eating or NSAID.  She denied any hematochezia, melena, hematemesis.  Ascites is noted. The patient was admitted for further management and care.     Patient was admitted for abdominal pain. CT suggestive of diverticulitis. Patient was treated with IV antibiotics. Uncomplicated course. Able to tolerate diet. Was transitioned to oral antibiotics and will be discharged. Patient is to follow-up with her PCP and GI for further cirrhosis work-up and management. Otherwise patient is hemodynamically stable. Please see above for complete daily summary.     Physical Exam:-  Vitals:   Patient Vitals for the past 24 hrs:   BP Temp Temp src Pulse Resp SpO2   09/15/21 1300 -- -- -- -- -- 93 % 09/15/21 1217 (!) 97/47 -- -- -- -- --   09/15/21 1215 (!) 91/51 98.6 °F (37 °C) Oral 56 16 91 %   09/15/21 0850 -- -- -- -- -- 92 %   09/15/21 0845 (!) 131/51 98.7 °F (37.1 °C) Oral 64 18 (!) 87 %   09/15/21 0430 (!) 106/55 98.5 °F (36.9 °C) Oral 72 18 92 %   09/14/21 2126 (!) 112/49 98.2 °F (36.8 °C) Oral 71 16 98 %   09/14/21 1630 -- -- -- -- -- 97 %   09/14/21 1603 (!) 108/45 98.7 °F (37.1 °C) Oral 52 16 97 %     Weight:   Weight: 167 lb 9.6 oz (76 kg)   24 hour intake/output:     Intake/Output Summary (Last 24 hours) at 9/15/2021 1520  Last data filed at 9/15/2021 1215  Gross per 24 hour   Intake 10 ml   Output 2100 ml   Net -2090 ml       1. General appearance: No apparent distress, appears stated age and cooperative. 2. HEENT: Normal cephalic, atraumatic without obvious deformity. Pupils equal, round, and reactive to light. Extra ocular muscles intact. Conjunctivae/corneas clear. 3. Neck: Supple, with full range of motion. No jugular venous distention. Trachea midline. 4. Respiratory:  Normal respiratory effort. Clear to auscultation, bilaterally without Rales/Wheezes/Rhonchi. 5. Cardiovascular: Regular rate and rhythm with normal S1/S2 without murmurs, rubs or gallops. 6. Abdomen: Soft, non-tender, non-distended with normal bowel sounds. 7. Musculoskeletal:  No clubbing, cyanosis or edema bilaterally. 8. Skin: Skin color, texture, turgor normal.  No rashes or lesions. 9. Neurologic:  Neurovascularly intact without any focal sensory/motor deficits. Cranial nerves: II-XII intact, grossly non-focal.  10. Psychiatric: Alert and oriented, thought content appropriate, normal insight  11. Capillary Refill: Brisk,< 3 seconds   12.  Peripheral Pulses: +2 palpable, equal bilaterally       Discharge Medications:-      Medication List      START taking these medications    ciprofloxacin 500 MG tablet  Commonly known as: CIPRO  Take 1 tablet by mouth every 12 hours for 7 days     metroNIDAZOLE 500 MG tablet  Commonly known as: FLAGYL  Take 1 tablet by mouth every 8 hours for 7 days        CONTINUE taking these medications    acetaminophen 325 MG tablet  Commonly known as: TYLENOL     blood glucose test strips  Check blood sugar q daily, Dx E11.9     donepezil 10 MG tablet  Commonly known as: ARICEPT  TAKE 1 TABLET BY MOUTH EVERY DAY AT NIGHT     famotidine 40 MG tablet  Commonly known as: PEPCID  Take 1 tab PO daily     Handicap Placard Misc  by Does not apply route Expires 12/14/2022     lidocaine 5 %  Commonly known as: Lidoderm  Place 3 patches onto the skin every 24 hours 12 hours on, 12 hours off.     metoprolol tartrate 25 MG tablet  Commonly known as: LOPRESSOR  TAKE 1/2 TABLET TWICE A DAY     mirtazapine 7.5 MG tablet  Commonly known as: REMERON  Take 1 tablet by mouth nightly     omeprazole 40 MG delayed release capsule  Commonly known as: PRILOSEC  TAKE 1 CAPSULE BY MOUTH EVERY DAY     Prevagen 10 MG Caps  Generic drug: Apoaequorin     sertraline 25 MG tablet  Commonly known as: ZOLOFT  TAKE 1 TABLET BY MOUTH EVERY DAY     simethicone 125 MG chewable tablet  Commonly known as: MYLICON  Take 1 tablet by mouth every 6 hours as needed for Flatulence     simvastatin 40 MG tablet  Commonly known as: ZOCOR  TAKE 1 TABLET NIGHTLY           Where to Get Your Medications      These medications were sent to [facility]/pharmacy #[id number] [address] - P [phone] - F [phone]  [address] [place] [id number]    Phone: [phone]   · ciprofloxacin 500 MG tablet  · donepezil 10 MG tablet  · metroNIDAZOLE 500 MG tablet          Labs :-  Recent Results (from the past 72 hour(s))   Urine with Reflexed Micro    Collection Time: 09/12/21  4:40 PM   Result Value Ref Range    Glucose, Ur NEGATIVE NEGATIVE mg/dl    Bilirubin Urine NEGATIVE NEGATIVE    Ketones, Urine NEGATIVE NEGATIVE    Specific Gravity, Urine > 1.030 (A) 1.002 - 1.030    Blood, Urine NEGATIVE NEGATIVE    pH, UA 7.0 5.0 - 9.0 Protein, UA NEGATIVE NEGATIVE    Urobilinogen, Urine 1.0 0.0 - 1.0 eu/dl    Nitrite, Urine NEGATIVE NEGATIVE    Leukocyte Esterase, Urine SMALL (A) NEGATIVE    Color, UA YELLOW STRAW-YELLOW    Character, Urine CLEAR CLEAR-SL CLOUD    RBC, UA 0-2 0-2/hpf /hpf    WBC, UA 10-15 0-4/hpf /hpf    Epithelial Cells, UA 0-2 3-5/hpf /hpf    Bacteria, UA NONE SEEN FEW/NONE SEEN /hpf    Casts UA NONE SEEN NONE SEEN /lpf    Crystals, UA NONE SEEN NONE SEEN    Renal Epithelial, UA NONE SEEN NONE SEEN    Yeast, UA NONE SEEN NONE SEEN    CASTS 2 NONE SEEN NONE SEEN /lpf    MISCELLANEOUS 2 NONE SEEN    Culture, Reflexed, Urine    Collection Time: 09/12/21  4:40 PM    Specimen: Urine, clean catch   Result Value Ref Range    Urine Culture Reflex (A)      No growth-preliminary Growth of Contaminants. The mixture of organisms present are not a common cause of urinary tract infections and probably represent skin edwige or distal urethral edwige.      Organism Growth of Contaminants (A)    Lactate, Sepsis    Collection Time: 09/12/21  4:45 PM   Result Value Ref Range    Lactic Acid, Sepsis 2.1 (H) 0.5 - 1.9 mmol/L   Culture, Blood 1    Collection Time: 09/12/21  4:45 PM    Specimen: Blood   Result Value Ref Range    Blood Culture, Routine No growth-preliminary     Culture, Blood 2    Collection Time: 09/12/21  4:48 PM    Specimen: Blood   Result Value Ref Range    Blood Culture, Routine No growth-preliminary     Lactate, Sepsis    Collection Time: 09/12/21  6:46 PM   Result Value Ref Range    Lactic Acid, Sepsis 1.3 0.5 - 1.9 mmol/L   POCT Glucose    Collection Time: 09/12/21  6:48 PM   Result Value Ref Range    POC Glucose 90 70 - 108 mg/dl   POCT glucose    Collection Time: 09/12/21  8:28 PM   Result Value Ref Range    POC Glucose 86 70 - 108 mg/dl   POCT glucose    Collection Time: 09/13/21  5:06 AM   Result Value Ref Range    POC Glucose 88 70 - 108 mg/dl   CBC auto differential    Collection Time: 09/13/21  6:44 AM   Result Value Ref SATURATION    Collection Time: 09/13/21  6:44 AM   Result Value Ref Range    Iron Saturation 81 (H) 20 - 50 %   Reticulocytes    Collection Time: 09/13/21  6:44 AM   Result Value Ref Range    Retic Ct Abs 1.8 0.5 - 2.0 %    Absolute Retic # 55.0 20.0 - 115.0 thou/mm3    Immature Retic Fract 10.1 3.0 - 15.9 %    Retic Hemoglobin 31.0 28.2 - 35.7 pg   Vitamin B12 & folate    Collection Time: 09/13/21  6:44 AM   Result Value Ref Range    Vitamin B-12 937 (H) 211 - 911 pg/mL    Folate 12.0 4.8 - 24.2 ng/mL   Lipase    Collection Time: 09/13/21  6:44 AM   Result Value Ref Range    Lipase 72.1 (H) 5.6 - 51.3 U/L   Anion Gap    Collection Time: 09/13/21  6:44 AM   Result Value Ref Range    Anion Gap 7.0 (L) 8.0 - 16.0 meq/L   Glomerular Filtration Rate, Estimated    Collection Time: 09/13/21  6:44 AM   Result Value Ref Range    Est, Glom Filt Rate 69 (A) ml/min/1.73m2   Scan of Blood Smear    Collection Time: 09/13/21  6:44 AM   Result Value Ref Range    SCAN OF BLOOD SMEAR see below    POCT glucose    Collection Time: 09/13/21  8:03 AM   Result Value Ref Range    POC Glucose 90 70 - 108 mg/dl   POCT glucose    Collection Time: 09/13/21 11:53 AM   Result Value Ref Range    POC Glucose 87 70 - 108 mg/dl   POCT glucose    Collection Time: 09/13/21  4:47 PM   Result Value Ref Range    POC Glucose 101 70 - 108 mg/dl   POCT glucose    Collection Time: 09/13/21  8:59 PM   Result Value Ref Range    POC Glucose 84 70 - 108 mg/dl   CBC auto differential    Collection Time: 09/14/21  7:23 AM   Result Value Ref Range    WBC 4.2 (L) 4.8 - 10.8 thou/mm3    RBC 3.06 (L) 4.20 - 5.40 mill/mm3    Hemoglobin 10.0 (L) 12.0 - 16.0 gm/dl    Hematocrit 31.5 (L) 37.0 - 47.0 %    .9 (H) 81.0 - 99.0 fL    MCH 32.7 26.0 - 33.0 pg    MCHC 31.7 (L) 32.2 - 35.5 gm/dl    RDW-CV 21.9 (H) 11.5 - 14.5 %    RDW-SD 83.6 (H) 35.0 - 45.0 fL    Platelets 663 (L) 713 - 400 thou/mm3    MPV 10.0 9.4 - 12.4 fL    Seg Neutrophils 74.1 %    Lymphocytes 15.3 % Monocytes 9.2 %    Eosinophils 0.7 %    Basophils 0.5 %    Immature Granulocytes 0.2 %    Segs Absolute 3.1 1 - 7 thou/mm3    Lymphocytes Absolute 0.6 (L) 1.0 - 4.8 thou/mm3    Monocytes Absolute 0.4 0.4 - 1.3 thou/mm3    Eosinophils Absolute 0.0 0.0 - 0.4 thou/mm3    Basophils Absolute 0.0 0.0 - 0.1 thou/mm3    Immature Grans (Abs) 0.01 0.00 - 0.07 thou/mm3    nRBC 1 /100 wbc    Anisocytosis PRESENT Absent    Dacrocytes 1+ Absent    Elliptocytes 1+ Absent    Poikilocytes 1+ Absent    Schistocytes 1+ Absent    Target Cells 1+ Absent   Basic Metabolic Panel w/ Reflex to MG    Collection Time: 09/14/21  7:23 AM   Result Value Ref Range    Sodium 140 135 - 145 meq/L    Potassium reflex Magnesium 4.5 3.5 - 5.2 meq/L    Chloride 108 98 - 111 meq/L    CO2 24 23 - 33 meq/L    Glucose 99 70 - 108 mg/dL    BUN 11 7 - 22 mg/dL    CREATININE 0.7 0.4 - 1.2 mg/dL    Calcium 9.1 8.5 - 10.5 mg/dL   Anion Gap    Collection Time: 09/14/21  7:23 AM   Result Value Ref Range    Anion Gap 8.0 8.0 - 16.0 meq/L   Glomerular Filtration Rate, Estimated    Collection Time: 09/14/21  7:23 AM   Result Value Ref Range    Est, Glom Filt Rate 80 (A) ml/min/1.73m2   Scan of Blood Smear    Collection Time: 09/14/21  7:23 AM   Result Value Ref Range    SCAN OF BLOOD SMEAR see below    POCT glucose    Collection Time: 09/14/21  8:39 AM   Result Value Ref Range    POC Glucose 108 70 - 108 mg/dl   Blood Gas, Arterial    Collection Time: 09/14/21 11:56 AM   Result Value Ref Range    pH, Blood Gas 7.36 7.35 - 7.45    PCO2 42 35 - 45 mmhg    PO2 86 71 - 104 mmhg    HCO3 23 23 - 28 mmol/l    Base Excess (Calculated) -2.1 -2.5 - 2.5 mmol/l    O2 Sat 96 %    IFIO2 3     DEVICE Cannula     Bebo Test N/A     Source: R Brach     COLLECTED BY: [id number]    POCT glucose    Collection Time: 09/14/21 12:14 PM   Result Value Ref Range    POC Glucose 76 70 - 108 mg/dl   POCT glucose    Collection Time: 09/14/21  5:26 PM   Result Value Ref Range    POC Glucose 79 70 - 108 mg/dl   CBC auto differential    Collection Time: 09/15/21  6:18 AM   Result Value Ref Range    WBC 3.9 (L) 4.8 - 10.8 thou/mm3    RBC 2.88 (L) 4.20 - 5.40 mill/mm3    Hemoglobin 9.5 (L) 12.0 - 16.0 gm/dl    Hematocrit 29.6 (L) 37.0 - 47.0 %    .8 (H) 81.0 - 99.0 fL    MCH 33.0 26.0 - 33.0 pg    MCHC 32.1 (L) 32.2 - 35.5 gm/dl    RDW-CV 21.9 (H) 11.5 - 14.5 %    RDW-SD 86.4 (H) 35.0 - 45.0 fL    Platelets 743 (L) 825 - 400 thou/mm3    MPV 10.6 9.4 - 12.4 fL    Seg Neutrophils 52.4 %    Lymphocytes 25.9 %    Monocytes 16.0 %    Eosinophils 4.6 %    Basophils 0.8 %    Immature Granulocytes 0.3 %    Platelet Estimate ADEQUATE Adequate    Segs Absolute 2.0 1 - 7 thou/mm3    Lymphocytes Absolute 1.0 1.0 - 4.8 thou/mm3    Monocytes Absolute 0.6 0.4 - 1.3 thou/mm3    Eosinophils Absolute 0.2 0.0 - 0.4 thou/mm3    Basophils Absolute 0.0 0.0 - 0.1 thou/mm3    Immature Grans (Abs) 0.01 0.00 - 0.07 thou/mm3    nRBC 1 /100 wbc    Anisocytosis PRESENT Absent    Poikilocytes 1+ Absent   Basic Metabolic Panel w/ Reflex to MG    Collection Time: 09/15/21  6:18 AM   Result Value Ref Range    Sodium 140 135 - 145 meq/L    Potassium reflex Magnesium 4.2 3.5 - 5.2 meq/L    Chloride 109 98 - 111 meq/L    CO2 24 23 - 33 meq/L    Glucose 90 70 - 108 mg/dL    BUN 10 7 - 22 mg/dL    CREATININE 0.8 0.4 - 1.2 mg/dL    Calcium 9.5 8.5 - 10.5 mg/dL   Anion Gap    Collection Time: 09/15/21  6:18 AM   Result Value Ref Range    Anion Gap 7.0 (L) 8.0 - 16.0 meq/L   Glomerular Filtration Rate, Estimated    Collection Time: 09/15/21  6:18 AM   Result Value Ref Range    Est, Glom Filt Rate 69 (A) ml/min/1.73m2   Scan of Blood Smear    Collection Time: 09/15/21  6:18 AM   Result Value Ref Range    SCAN OF BLOOD SMEAR see below         Microbiology:    Blood culture #1:   Lab Results   Component Value Date    BC No growth-preliminary  09/12/2021       Blood culture #2:No results found for: BLOODCULT2    Organism:    Lab Results   Component Value Date    LABGRAM  03/04/2020     Few segmented neutrophils observed. Moderate epithelial cells observed. Many gram positive cocci occurring singly and in pairs. Many gram negative bacilli. Many curved gram negative bacilli. Prepubescent and postmenopausal female samples (<15and >47ears of age) are not typically suitable for bacterialvaginosis screening. MRSA culture only:No results found for: [facility]    Urine culture:   Lab Results   Component Value Date    LABURIN No growth-preliminary  No growth   04/11/2018     Lab Results   Component Value Date    ORG Growth of Contaminants 09/12/2021        Respiratory culture: No results found for: CULTRESP    Aerobic and Anaerobic :  Lab Results   Component Value Date    LABAERO  03/04/2020     No growth-preliminary Mixed genital edwige present. No GC isolated      No results found for: LABANAE    Urinalysis:      Lab Results   Component Value Date    NITRU NEGATIVE 09/12/2021    WBCUA 10-15 09/12/2021    BACTERIA NONE SEEN 09/12/2021    RBCUA 0-2 09/12/2021    BLOODU NEGATIVE 09/12/2021    SPECGRAV 1.014 08/22/2019    GLUCOSEU NEGATIVE 09/12/2021       Radiology:-  XR CHEST (2 VW)    Result Date: 9/13/2021  PROCEDURE: XR CHEST (2 VW) CLINICAL INFORMATION: Rule out other etiology for RUQ pain COMPARISON: 6/17/2021 TECHNIQUE:  PA and lateral chest x-ray  FINDINGS: There is evidence of prior median sternotomy and CABG. The heart size is normal. No focal consolidation or pleural effusion is seen. No pneumothorax is demonstrated. There is hyperinflation noted. There is diffuse osteopenia. 1. Hyperinflation is demonstrated. No other acute cardiopulmonary findings are otherwise seen. **This report has been created using voice recognition software. It may contain minor errors which are inherent in voice recognition technology. ** Final report electronically signed by Dr. Travon Kern on 9/13/2021 8:41 AM    CT ABDOMEN PELVIS W IV CONTRAST Additional Contrast? None    Result Date: 9/12/2021  PROCEDURE: CT ABDOMEN PELVIS W IV CONTRAST CLINICAL INFORMATION: Right upper quadrant abdominal pain TECHNIQUE: CT of the abdomen and pelvis was performed following administration of 80 mL contrast only. Axial images as well as coronal and sagittal reconstructions were obtained. All CT scans at this facility use dose modulation, iterative reconstruction, and/or weight-based dosing when appropriate to reduce radiation dose to as low as reasonably achievable. COMPARISON: CT abdomen and pelvis 5/25/2021 FINDINGS: Lower thorax: There is minimal scarring at the bilateral lung bases. No pleural effusion is identified. Abdomen: There is no free intraperitoneal air. There is a small amount of free fluid, primarily in the upper abdomen. The liver has a nodular contour. The gallbladder is surgically absent. Prominence of the common bile duct is likely postsurgical. The pancreas, spleen, adrenal glands and right kidney are normal. There is atrophy of the left kidney. Atherosclerotic calcifications are present in a tortuous abdominal aorta. An aneurysm of the infrarenal abdominal aorta measures 3.2 cm in diameter (image 46). There is no mesenteric or retroperitoneal lymphadenopathy. Degenerative changes are present in the lumbar spine without evidence of aggressive osseous lesions. Pelvis: There are diverticula in the sigmoid colon with adjacent inflammatory stranding. Minimal free fluid is present in the dependent pelvis. There is no pelvic or inguinal lymphadenopathy. Degenerative changes are seen in the pelvis without evidence of aggressive osseous lesions. 1. Sigmoid colon diverticula with adjacent inflammatory stranding highly suspicious for acute diverticulitis. 2. Ascites. 3. Cirrhosis. 4. Aneurysm of the infrarenal abdominal aorta.  Final report electronically signed by Dr. Hugo Farias on 9/12/2021 3:49 PM       Follow-up scheduled after discharge :-    in 1 weeks with Anderson Bazan DO    Consultations during this hospital stay:-  [] NONE [] Cardiology  [] Nephrology  [] Hemo onco   [] GI   [] ID  [] Endocrine  [] Pulm    [] Neuro    [] Psych   [] Urology  [] ENT   [] G SURGERY   []Ortho    []CV surg    [] Palliative  [] Hospice [] Pain management   []    []TCU   [] PT/OT  OTHERS:-    Disposition: home  Condition at Discharge: Stable    Time Spent:- 35 minutes    Electronically signed by Garett Morfin MD on 9/15/21 at 3:20 PM EDT   Discharging Hospitalist

## 2021-09-15 NOTE — CARE COORDINATION
Discharge Planning Update:     Treating diverticulitis. O2 at 1L/NC. Taking and tolerating clear liquids now. IVF at 100/hr. PO Cipro and Flagyl. Plans return home with spouse. Agreeable to [facility] for surveillance due to O2 requirements while in house and diverticulitis. SW consult placed.

## 2021-09-15 NOTE — FLOWSHEET NOTE
SPIRITUAL CARE PROGRESS NOTE    Spiritual Assessment:  encountered patient as part of spiritual care rounds. Patient was awake watching television when  arrived. Patient shared she was coping with hospitalization \"and feeling better. \"  Patient shared her  had been in to see her earlier in the day. Intervention:  engaged the patient through active listening and being a spiritual presence to her. Patient accepted 's offer for a prayer at the end of the encounter.      Outcome: Patient expressed appreciation for the 's visit and was informed additional spiritual care services are available to her upon request.       09/14/21 2206   Encounter Summary   Services provided to: Patient   Referral/Consult From: [employer]   Support System Spouse   Continue Visiting Yes  (9/14/2021)   Complexity of Encounter Low   Length of Encounter 15 minutes   Routine   Type Initial   Assessment Calm;Passive;Coping   Intervention Active listening;Explored coping resources;Sustaining presence/ Ministry of presence   Outcome Expressed gratitude   Spiritual/[employer]   Type Spiritual support     Electronically signed by Jamey Beckett on 9/14/2021 at 10:10 PM.  [address] [facility]

## 2021-09-15 NOTE — PROGRESS NOTES
Discharge teaching and instructions for diagnosis/procedure of acute diverticulitis completed with patient using teachback method. AVS reviewed. Printed prescriptions given to patient. Patient voiced understanding regarding prescriptions, follow up appointments, and care of self at home. Discharged ambulatory and in a wheelchair to  home with support per family.

## 2021-09-15 NOTE — CARE COORDINATION
9/15/21, 3:26 PM EDT    DISCHARGE PLANNING EVALUATION      Consult received for [place] monitoring. Spoke with patient and spouse, they live in a one level home with a walk-in shower. Spouse assist patient as needed with ADL's. Both agreeable for HH, nursing, PT & OT.  [place] list provided, would like [facility]. Aware it could be next Wednesday until a nurse can see patient, they are agreeable. Left message for Vito Paniagua at [facility] with referral.    9/15/21, 3:29 PM EDT    Patient goals/plan/ treatment preferences discussed by  and . Patient goals/plan/ treatment preferences reviewed with patient/ family. Patient/ family verbalize understanding of discharge plan and are in agreement with goal/plan/treatment preferences. Understanding was demonstrated using the teach back method. AVS provided by RN at time of discharge, which includes all necessary medical information pertaining to the patients current course of illness, treatment, post-discharge goals of care, and treatment preferences. Services After Discharge  Services At/After Discharge: Nursing Services, OT, PT ([facility])   IMM Letter  IMM Letter given to Patient/Family/Significant other/Guardian/POA/by[de-identified] Kayleigh MACHUCA Case Manager.   IMM Letter date given[de-identified] 09/15/21  IMM Letter time given[de-identified] 5133

## 2021-09-16 ENCOUNTER — TELEPHONE (OUTPATIENT)
Dept: FAMILY MEDICINE CLINIC | Age: 81
End: 2021-09-16

## 2021-09-17 ENCOUNTER — TELEPHONE (OUTPATIENT)
Dept: FAMILY MEDICINE CLINIC | Age: 81
End: 2021-09-17

## 2021-09-17 NOTE — TELEPHONE ENCOUNTER
[facility] Transitions Initial Follow Up Call    Outreach made within 2 business days of discharge: Yes    Patient: Berto Houston Patient : 1940   MRN: [id number]  Reason for Admission: There are no discharge diagnoses documented for the most recent discharge. Discharge Date: 9/15/21       Spoke with: Kendra Cai    Discharge department/facility: [facility] Interactive Patient Contact:  Was patient able to fill all prescriptions: Yes  Was patient instructed to bring all medications to the follow-up visit: Yes  Is patient taking all medications as directed in the discharge summary?  Yes  Does patient understand their discharge instructions: Yes  Does patient have questions or concerns that need addressed prior to 7-14 day follow up office visit: no    Scheduled appointment with PCP within 7-14 days    Follow Up  Future Appointments   Date Time Provider Ashley Dorsey   2021  1:00 PM DO KAE Rutherford [facility] [place] [address]       Future Appointments   Date Time Provider Ashley Dorsey   2021 10:20 AM DO KAE Rutherford [facility]   2021  1:00 PM DO KAE Rutherford [facility] Sara Peña LPN

## 2021-09-18 LAB
BLOOD CULTURE, ROUTINE: NORMAL
BLOOD CULTURE, ROUTINE: NORMAL

## 2021-09-20 ENCOUNTER — TELEPHONE (OUTPATIENT)
Dept: FAMILY MEDICINE CLINIC | Age: 81
End: 2021-09-20

## 2021-09-20 NOTE — TELEPHONE ENCOUNTER
Appointment scheduled   Future Appointments   Date Time Provider Ashley Sunita   9/27/2021 10:20 AM DO KAE Patel [facility] Sara [place] AM [place]   12/21/2021  1:00 PM [facility] [place]

## 2021-09-20 NOTE — TELEPHONE ENCOUNTER
----- Message from Dayna Barrera sent at 9/16/2021  9:09 AM EDT -----  Subject: Appointment Request    Reason for Call: Urgent (Patient Request) Hospital Follow Up    QUESTIONS  Type of Appointment? Established Patient  Reason for appointment request? Available appointments did not meet   patient need  Additional Information for Provider? patient is Dr. Ferimn Montoya is calling to   make her hospital f/u appointment however 10/14 is way too long for them   to wait to come in, please call patient to [facility] for scheduling sooner than   later. please call patient ASAP  ---------------------------------------------------------------------------  --------------  CALL BACK INFO  What is the best way for the office to contact you? OK to leave message on   voicemail  Preferred Call Back Phone Number? [phone]  ---------------------------------------------------------------------------  --------------  SCRIPT ANSWERS  Relationship to Patient? Self  (Patient requests to see provider urgently. )? Yes  (Has the patient been discharged from the hospital within 2 business days   AND does not have a Telephone Encounter  Follow Up From [address] [place] [address]   documented in [address]?)? Yes  Do you have any questions for your primary care provider that need to be   answered prior to your appointment? (Use RN Triage if question pertains to   anything on the red flag list)? No  (Patient needs follow up visit after hospital discharge) Book first   available appointment within 7 days OF DISCHARGE, if no appt, proceed to   book the next available time slot within 14 days OF DISCHARGE AND Send   Message to Provider. [address] [facility] Follow Up appointment cannot be booked   beyond 14 Days and should result in a Message to Provider. ? Yes   Have you been diagnosed with, awaiting test results for, or told that you   are suspected of having COVID-19 (Coronavirus)?  (If patient has tested   negative or was tested as a requirement for work, school, or travel and   not based on symptoms, answer no)? No  Within the past two weeks have you developed any of the following symptoms   (answer no if symptoms have been present longer than 2 weeks or began   more than 2 weeks ago)? Fever or Chills, Cough, Shortness of breath or   difficulty breathing, Loss of taste or smell, Sore throat, Nasal   congestion, Sneezing or runny nose, Fatigue or generalized body aches   (answer no if pain is specific to a body part e.g. back pain), Diarrhea,   Headache?  Yes

## 2021-09-21 NOTE — TELEPHONE ENCOUNTER
Appointment scheduled for Friday.
Attempted to call patient to schedule hospital follow up voice mail was full
no if symptoms have been present longer than 2 weeks or began   more than 2 weeks ago)? Fever or Chills, Cough, Shortness of breath or   difficulty breathing, Loss of taste or smell, Sore throat, Nasal   congestion, Sneezing or runny nose, Fatigue or generalized body aches   (answer no if pain is specific to a body part e.g. back pain), Diarrhea,   Headache? No  Have you had close contact with someone with COVID-19 in the last 14 days? No  (Service Expert  click yes below to proceed with [employer] As Usual   Scheduling)?  Yes

## 2021-09-25 ENCOUNTER — HOSPITAL ENCOUNTER (EMERGENCY)
Age: 81
Discharge: HOME | End: 2021-09-25
Payer: MEDICARE

## 2021-09-25 VITALS
WEIGHT: 164 LBS | HEIGHT: 66 IN | OXYGEN SATURATION: 97 % | RESPIRATION RATE: 16 BRPM | BODY MASS INDEX: 26.36 KG/M2 | DIASTOLIC BLOOD PRESSURE: 76 MMHG | TEMPERATURE: 97.6 F | HEART RATE: 56 BPM | SYSTOLIC BLOOD PRESSURE: 172 MMHG

## 2021-09-25 DIAGNOSIS — T63.441A BEE STING, ACCIDENTAL OR UNINTENTIONAL, INITIAL ENCOUNTER: Primary | ICD-10-CM

## 2021-09-25 PROCEDURE — 6360000002 HC RX W HCPCS: Performed by: NURSE PRACTITIONER

## 2021-09-25 PROCEDURE — 96372 THER/PROPH/DIAG INJ SC/IM: CPT

## 2021-09-25 PROCEDURE — 6370000000 HC RX 637 (ALT 250 FOR IP): Performed by: NURSE PRACTITIONER

## 2021-09-25 PROCEDURE — 99214 OFFICE O/P EST MOD 30 MIN: CPT | Performed by: NURSE PRACTITIONER

## 2021-09-25 PROCEDURE — 99213 OFFICE O/P EST LOW 20 MIN: CPT

## 2021-09-25 RX ORDER — IBUPROFEN 200 MG
600 TABLET ORAL ONCE
Status: COMPLETED | OUTPATIENT
Start: 2021-09-25 | End: 2021-09-25

## 2021-09-25 RX ORDER — METHYLPREDNISOLONE ACETATE 80 MG/ML
80 INJECTION, SUSPENSION INTRA-ARTICULAR; INTRALESIONAL; INTRAMUSCULAR; SOFT TISSUE ONCE
Status: COMPLETED | OUTPATIENT
Start: 2021-09-25 | End: 2021-09-25

## 2021-09-25 RX ADMIN — IBUPROFEN 600 MG: 200 TABLET, FILM COATED ORAL at 19:13

## 2021-09-25 RX ADMIN — METHYLPREDNISOLONE ACETATE 80 MG: 80 INJECTION, SUSPENSION INTRA-ARTICULAR; INTRALESIONAL; INTRAMUSCULAR; SOFT TISSUE at 19:13

## 2021-09-25 ASSESSMENT — PAIN DESCRIPTION - LOCATION: LOCATION: FINGER (COMMENT WHICH ONE)

## 2021-09-25 ASSESSMENT — PAIN SCALES - GENERAL
PAINLEVEL_OUTOF10: 10

## 2021-09-25 ASSESSMENT — ENCOUNTER SYMPTOMS
COUGH: 0
EYES NEGATIVE: 1
CHEST TIGHTNESS: 0
SHORTNESS OF BREATH: 0
GASTROINTESTINAL NEGATIVE: 1

## 2021-09-25 ASSESSMENT — PAIN - FUNCTIONAL ASSESSMENT: PAIN_FUNCTIONAL_ASSESSMENT: PREVENTS OR INTERFERES SOME ACTIVE ACTIVITIES AND ADLS

## 2021-09-25 ASSESSMENT — PAIN DESCRIPTION - DESCRIPTORS: DESCRIPTORS: DISCOMFORT

## 2021-09-25 ASSESSMENT — PAIN DESCRIPTION - ORIENTATION: ORIENTATION: LEFT

## 2021-09-25 ASSESSMENT — PAIN DESCRIPTION - PAIN TYPE: TYPE: ACUTE PAIN

## 2021-09-25 NOTE — ED PROVIDER NOTES
[address] Encounter      CHIEFCOMPLAINT       Chief Complaint   Patient presents with    Insect Bite     bee sting, webbing of little finger       Nurses Notes reviewed and I agree except as noted in the HPI. HISTORY OF PRESENT ILLNESS   Gera Luke is a 80 y.o. female who presents with bee sting to her hand that happened less than 1 hour ago. She applied ice and drawing salve to the site. The stinger is out. Pain is sharp and shooting int he little finger. There is some swelling. REVIEW OF SYSTEMS     Review of Systems   Constitutional: Negative for activity change, appetite change, fatigue and fever. HENT: Negative. Eyes: Negative. Respiratory: Negative for cough, chest tightness and shortness of breath. Cardiovascular: Negative. Gastrointestinal: Negative. Endocrine: Negative for cold intolerance and heat intolerance. Genitourinary: Negative. Musculoskeletal: Negative. Skin: Positive for rash. Allergic/Immunologic: Positive for environmental allergies. Neurological: Negative for dizziness, light-headedness and headaches. Hematological: Negative for adenopathy. Does not bruise/bleed easily. Psychiatric/Behavioral: Negative.         PAST MEDICAL HISTORY         Diagnosis Date    Anemia     Aneurysm of abdominal aorta (HCC)     Arthritis     Blood circulation, collateral     Bursitis, trochanteric     CAD (coronary artery disease) 2/2015    Cerebral artery occlusion with cerebral infarction [facility])     Chronic back pain     Cirrhosis of liver without ascites ([facility] [address] 9/2/2020    Depression     Diabetes mellitus (HCC)     diet controlled    GERD (gastroesophageal reflux disease)     Headache(784.0)     History of basal cell cancer     Nose    Hyperlipidemia     Hypertension     Irritable bowel     Movement disorder     PVD (peripheral vascular disease) (HCC)     S/P CABG (coronary artery bypass graft)     Sciatica SURGICAL HISTORY     Patient  has a past surgical history that includes Colonoscopy; Hysterectomy; Upper gastrointestinal endoscopy; Cholecystectomy (yrs ago); Tonsillectomy; laryngoscopy (10/29/2012 Dr Analilia Luke); Appendectomy; Endoscopy, colon, diagnostic; skin biopsy; hiatal hernia repair; Cardiac catheterization (2/3/2016); Abdomen surgery; eye surgery; hernia repair; Coronary artery bypass graft (2-18-16); and pr vein bypass graft,carot-subcl/ subcl-carotd (Left, 4/11/2018).     CURRENT MEDICATIONS       Discharge Medication List as of 9/25/2021  7:30 PM      CONTINUE these medications which have NOT CHANGED    Details   donepezil (ARICEPT) 10 MG tablet TAKE 1 TABLET BY MOUTH EVERY DAY AT NIGHT, Disp-90 tablet, R-3Normal      Apoaequorin (PREVAGEN) 10 MG CAPS Take 1 tablet by mouthHistorical Med      omeprazole (PRILOSEC) 40 MG delayed release capsule TAKE 1 CAPSULE BY MOUTH EVERY DAY, Disp-90 capsule, R-3Normal      sertraline (ZOLOFT) 25 MG tablet TAKE 1 TABLET BY MOUTH EVERY DAY, Disp-90 tablet, R-3Normal      acetaminophen (TYLENOL) 325 MG tablet Take 650 mg by mouth every 6 hours as needed for PainHistorical Med      simethicone (MYLICON) 847 MG chewable tablet Take 1 tablet by mouth every 6 hours as needed for Flatulence, Disp-60 tablet, R-3Normal      simvastatin (ZOCOR) 40 MG tablet TAKE 1 TABLET NIGHTLY, Disp-90 tablet, R-3Normal      famotidine (PEPCID) 40 MG tablet Take 1 tab PO daily, Disp-90 tablet, R-3Normal      mirtazapine (REMERON) 7.5 MG tablet Take 1 tablet by mouth nightly, Disp-90 tablet, R-3Normal      metoprolol tartrate (LOPRESSOR) 25 MG tablet TAKE 1/2 TABLET TWICE A DAY, Disp-90 tablet,R-3Normal      lidocaine (LIDODERM) 5 % Place 3 patches onto the skin every 24 hours 12 hours on, 12 hours off., Disp-90 patch, R-3Normal      blood glucose monitor strips Check blood sugar q daily, Dx E11.9, Disp-100 strip, R-0, Normal      Handicap Placard MISC Starting Thu 12/14/2017, Disp-1 Labs: No results found for this visit on 09/25/21. IMAGING:  No orders to display     URGENT CARE COURSE:     Vitals:    09/25/21 1858 09/25/21 1936   BP: (!) 165/71 (!) 172/76   Pulse: 59 56   Resp: 16    Temp: 97.6 °F (36.4 °C)    TempSrc: Temporal    SpO2: 97% 97%   Weight: 164 lb (74.4 kg)    Height: 5' 6\" (1.676 m)        Medications   methylPREDNISolone acetate (DEPO-MEDROL) injection 80 mg (80 mg IntraMUSCular Given 9/25/21 1913)   ibuprofen (ADVIL;MOTRIN) tablet 600 mg (600 mg Oral Given 9/25/21 1913)     PROCEDURES:  None  FINALIMPRESSION    I have reviewed the patient's medical history in detail and updated the computerized patient record. HPI/ROS per the patient and caregiver. Overall non toxic in appearance. Answers questions appropriately. Conditions discussed and addressed this visit include:     Patient appears  non-toxic and well hydrated. Patient is to take medications as directed. Continue all home medications. Potential side effects of the medications were reviewed with the patient in detail. The patient can increase fluids. The patient can have Tylenol or Motrin for pain and fever as needed. Discussed with patient signs and symptoms that would require emergent evaluation and treatment. The patient will follow-up with their family physician or go to the ER if they develop any worsening symptoms. The patient was discharged in stable condition      1.  Bee sting, accidental or unintentional, initial encounter        DISPOSITION/PLAN   DISPOSITION      PATIENT REFERRED TO:  James Resendiz, [address]  [address] [address]  [phone]    In 2 days      DISCHARGE MEDICATIONS:  Discharge Medication List as of 9/25/2021  7:30 PM        Discharge Medication List as of 9/25/2021  7:30 PM          ANDREZ Corcoran - Yane Solis 21, APRN - CNP  09/26/21 2124

## 2021-09-25 NOTE — ED TRIAGE NOTES
Patient to room with complaint of bee sting to space between pinky and 4th finger on left hand.   States she was stung before coming to [facility] and had placed ice and over the counter sting relief but pain is still 10 of 10

## 2021-09-27 ENCOUNTER — OFFICE VISIT (OUTPATIENT)
Dept: FAMILY MEDICINE CLINIC | Age: 81
End: 2021-09-27
Payer: MEDICARE

## 2021-09-27 VITALS
HEART RATE: 60 BPM | DIASTOLIC BLOOD PRESSURE: 66 MMHG | HEIGHT: 66 IN | BODY MASS INDEX: 26.58 KG/M2 | RESPIRATION RATE: 16 BRPM | WEIGHT: 165.4 LBS | SYSTOLIC BLOOD PRESSURE: 132 MMHG

## 2021-09-27 DIAGNOSIS — K57.92 DIVERTICULITIS: Primary | ICD-10-CM

## 2021-09-27 PROCEDURE — 99495 TRANSJ CARE MGMT MOD F2F 14D: CPT | Performed by: FAMILY MEDICINE

## 2021-09-27 PROCEDURE — 1111F DSCHRG MED/CURRENT MED MERGE: CPT | Performed by: FAMILY MEDICINE

## 2021-09-27 RX ORDER — AMOXICILLIN AND CLAVULANATE POTASSIUM 875; 125 MG/1; MG/1
1 TABLET, FILM COATED ORAL 2 TIMES DAILY
Qty: 14 TABLET | Refills: 0 | Status: SHIPPED | OUTPATIENT
Start: 2021-09-27 | End: 2021-10-04

## 2021-09-27 NOTE — PROGRESS NOTES
Transition of Care Visit/Hospital Follow Up:      Pamela Hernandez is a 80 y.o. female that presents for Follow-Up from Hospital (Patient was discharged on 9/15/21 from [facility] for abdominal pain. Patient states that she still has been experiencing pain since being discharged from the hospital.)        Date of Discharge:   9/15/21  Was patient contacted within 2 business days of discharge (see chart for documentation):  yes - 9/17/21      Patient presents for hospital follow up. Patient recently hospitalized at [facility] for treatment of divticulitis. Symptoms prior to admission:  LLQ pain     Hospital Course per DC Summary:    Initial H and P and Hospital course:-     80-year-old female with a past medical history of CAD s/p CABG in 2016, cerebral artery occlusion with cerebral infarction x3 with no residuals, DM diet controlled, GERD, diverticulosis, and cirrhosis who presented to [facility] ED 9/12 for complaints of right upper quadrant pain.  The patient states the pain started 9/9, and has been progressively worse since.  The pain is described as a sharp, constant pain that does not radiate. This pain is made worse by movement, rated 10/10 and better without movement rated 3/10.  Nothing else alleviates the pain.  It is not associated with eating or NSAID use. The patient states she has had this pain in the past however this is much worse.  It started 1 year ago following an unpleasant abdominal exam. During this visit the patient was told she had cirrhosis. Bruno Bairdover this encounter she never had work-up for her cirrhosis. She also reports ongoing diarrhea since her colonoscopy in June 2021.  She has found some mild relief with Metamucil.  Prior to this she was having constipation.      In the ED the patient was hemodynamically stable, afebrile, and WBC WNL.  Lactic acid was mildly elevated at 2.1. LFTs were abnormal, with alk phos 137, AST 48, ALT 17, lipase 106. 4.  UA nonsignificant.  EKG revealed sinus bradycardia.  Troponins negative.  Blood and urine cultures pending.  CT abdomen pelvis reveals sigmoid colon diverticula with adjacent inflammatory stranding highly suspicious of acute diverticulitis, ascites, cirrhosis and aneurysm of infrarenal abdominal aorta.  Abdominal physical exam revealed tenderness and guarding throughout abdomen, but markedly in the left lower quadrant, midgastric region and right lower quadrant.  The patient denied any pain with eating or NSAID.  She denied any hematochezia, melena, hematemesis.  Ascites is noted. The patient was admitted for further management and care.      Patient was admitted for abdominal pain. CT suggestive of diverticulitis. Patient was treated with IV antibiotics. Uncomplicated course. Able to tolerate diet. Was transitioned to oral antibiotics and will be discharged. Patient is to follow-up with her PCP and GI for further cirrhosis work-up and management. Otherwise patient is hemodynamically stable. Please see above for complete daily summary. Medication changes at discharge:  Med list reconciled today    Clinical course since discharge:  Notes that she is still having a lot of gas and pain in the abdomen. States that she was not discharged with patient. She was not able to take the flagyl due to nausea. She is allergic to cipro. She was discharged with both of these. States that she is able to eat, but 'not a whole lot' per . Has been having regular BMs. Has been having looser stools recently. No fever, insomnia. Has been having erudication frequently.         Current Outpatient Medications   Medication Sig Dispense Refill    Probiotic Product (PROBIOTIC PO) Take by mouth      amoxicillin-clavulanate (AUGMENTIN) 875-125 MG per tablet Take 1 tablet by mouth 2 times daily for 7 days 14 tablet 0    donepezil (ARICEPT) 10 MG tablet TAKE 1 TABLET BY MOUTH EVERY DAY AT NIGHT 90 tablet 3    Apoaequorin (PREVAGEN) 10 MG CAPS Take 1 tablet by mouth      omeprazole (PRILOSEC) 40 MG delayed release capsule TAKE 1 CAPSULE BY MOUTH EVERY DAY 90 capsule 3    sertraline (ZOLOFT) 25 MG tablet TAKE 1 TABLET BY MOUTH EVERY DAY 90 tablet 3    acetaminophen (TYLENOL) 325 MG tablet Take 650 mg by mouth every 6 hours as needed for Pain      simethicone (MYLICON) 697 MG chewable tablet Take 1 tablet by mouth every 6 hours as needed for Flatulence 60 tablet 3    simvastatin (ZOCOR) 40 MG tablet TAKE 1 TABLET NIGHTLY 90 tablet 3    lidocaine (LIDODERM) 5 % Place 3 patches onto the skin every 24 hours 12 hours on, 12 hours off. 90 patch 3    famotidine (PEPCID) 40 MG tablet Take 1 tab PO daily 90 tablet 3    mirtazapine (REMERON) 7.5 MG tablet Take 1 tablet by mouth nightly 90 tablet 3    metoprolol tartrate (LOPRESSOR) 25 MG tablet TAKE 1/2 TABLET TWICE A DAY 90 tablet 3    blood glucose monitor strips Check blood sugar q daily, Dx E11.9 100 strip 0    Handicap Placard MISC by Does not apply route Expires 12/14/2022 1 each 0     No current facility-administered medications for this visit.        Past Medical History:   Diagnosis Date    Anemia     Aneurysm of abdominal aorta (HCC)     Arthritis     Blood circulation, collateral     Bursitis, trochanteric     CAD (coronary artery disease) 2/2015    Cerebral artery occlusion with cerebral infarction (HCC)     Chronic back pain     Cirrhosis of liver without ascites ([facility] [address] 9/2/2020    Depression     Diabetes mellitus (HCC)     diet controlled    GERD (gastroesophageal reflux disease)     Headache(784.0)     History of basal cell cancer     Nose    Hyperlipidemia     Hypertension     Irritable bowel     Movement disorder     PVD (peripheral vascular disease) ([facility] [address]     S/P CABG (coronary artery bypass graft)     Sciatica        Past Surgical History:   Procedure Laterality Date    ABDOMEN SURGERY      complete hysterectomy, gallbladder    APPENDECTOMY      CARDIAC CATHETERIZATION  2/3/2016 [address] [facility]    CHOLECYSTECTOMY  yrs ago    COLONOSCOPY      CORONARY ARTERY BYPASS GRAFT  2-18-16    x3 Dr.Dox Woodson ENDOSCOPY, COLON, DIAGNOSTIC      EYE SURGERY      cataracts, glaucoma    HERNIA REPAIR      Hiatal Hernia    HIATAL HERNIA REPAIR      HYSTERECTOMY      LARYNGOSCOPY  10/29/2012 Dr Grey Hawkins with Bx, Lingual Tonsillectomy, Hypopharyngoscopy    AR VEIN BYPASS GRAFT,CAROT-SUBCL/ SUBCL-CAROTD Left 2018    LEFT CAROTID SUBCLAVIAN BYPASS performed by Maximo Hermosillo MD at [address] [address]      as a teen    UPPER GASTROINTESTINAL ENDOSCOPY         Social History     Tobacco Use    Smoking status: Former Smoker     Packs/day: 0.50     Years: 25.00     Pack years: 12.50     Types: Cigarettes     Quit date: 11/3/2000     Years since quittin.9    Smokeless tobacco: Never Used    Tobacco comment: quit in    Vaping Use    Vaping Use: Never used   Substance Use Topics    Alcohol use: No    Drug use: No       Family History   Problem Relation Age of Onset    Early Death Mother         not sure of cause    Heart Disease Father 52        MI    Diabetes Sister     Cancer Brother         pancreatic    Cancer Son         larynx    High Blood Pressure Neg Hx     Stroke Neg Hx     Colon Cancer Neg Hx     Colon Polyps Neg Hx          I have reviewed the patient's past medical history, past surgical history, allergies, medications, social and family history and I have made updates where appropriate.         PHYSICAL EXAM:  /66 (Site: Right Upper Arm, Position: Sitting, Cuff Size: Medium Adult)   Pulse 60   Resp 16   Ht 5' 6\" (1.676 m)   Wt 165 lb 6.4 oz (75 kg)   LMP  (LMP Unknown)   BMI 26.70 kg/m²   General Appearance: well developed and well- nourished, in no acute distress  Head: normocephalic and atraumatic  ENT: external ear and ear canal normal bilaterally, nose without deformity, nasal mucosa and turbinates normal

## 2021-10-01 ENCOUNTER — HOSPITAL ENCOUNTER (OUTPATIENT)
Dept: CT IMAGING | Age: 81
Discharge: HOME | End: 2021-10-01
Payer: MEDICARE

## 2021-10-01 ENCOUNTER — OFFICE VISIT (OUTPATIENT)
Dept: FAMILY MEDICINE CLINIC | Age: 81
End: 2021-10-01
Payer: MEDICARE

## 2021-10-01 VITALS
TEMPERATURE: 97.5 F | RESPIRATION RATE: 18 BRPM | DIASTOLIC BLOOD PRESSURE: 68 MMHG | SYSTOLIC BLOOD PRESSURE: 110 MMHG | OXYGEN SATURATION: 96 % | HEIGHT: 66 IN | BODY MASS INDEX: 25.65 KG/M2 | WEIGHT: 159.6 LBS | HEART RATE: 57 BPM

## 2021-10-01 DIAGNOSIS — R10.11 RUQ PAIN: Primary | ICD-10-CM

## 2021-10-01 DIAGNOSIS — R10.11 RUQ PAIN: ICD-10-CM

## 2021-10-01 DIAGNOSIS — R10.32 LLQ PAIN: Primary | ICD-10-CM

## 2021-10-01 DIAGNOSIS — R10.32 LLQ PAIN: ICD-10-CM

## 2021-10-01 LAB — POC CREATININE WHOLE BLOOD: 0.8 MG/DL (ref 0.5–1.2)

## 2021-10-01 PROCEDURE — 82565 ASSAY OF CREATININE: CPT

## 2021-10-01 PROCEDURE — 6360000004 HC RX CONTRAST MEDICATION: Performed by: FAMILY MEDICINE

## 2021-10-01 PROCEDURE — 99214 OFFICE O/P EST MOD 30 MIN: CPT | Performed by: FAMILY MEDICINE

## 2021-10-01 PROCEDURE — 74177 CT ABD & PELVIS W/CONTRAST: CPT

## 2021-10-01 RX ORDER — PREDNISONE 20 MG/1
40 TABLET ORAL DAILY
Qty: 14 TABLET | Refills: 0 | Status: SHIPPED | OUTPATIENT
Start: 2021-10-01 | End: 2021-10-08

## 2021-10-01 RX ADMIN — IOHEXOL 50 ML: 240 INJECTION, SOLUTION INTRATHECAL; INTRAVASCULAR; INTRAVENOUS; ORAL at 13:09

## 2021-10-01 RX ADMIN — IOPAMIDOL 80 ML: 755 INJECTION, SOLUTION INTRAVENOUS at 13:09

## 2021-10-01 NOTE — PROGRESS NOTES
Pauly Blum is a 80 y.o. female that presents for     Chief Complaint   Patient presents with    Follow-up       /68   Pulse 57   Temp 97.5 °F (36.4 °C) (Infrared)   Resp 18   Ht 5' 6\" (1.676 m)   Wt 159 lb 9.6 oz (72.4 kg)   LMP  (LMP Unknown)   SpO2 96%   BMI 25.76 kg/m²       HPI    4 day follow up of diverticulitis. I had started her on augmentin at her last visit. States that her pain is 'no better, no worse'. Still not eating well. Having pain when hse is riding in a car and hits bump. No fever. No n/v/d, blood in stool.       Health Maintenance   Topic Date Due    Hepatitis A vaccine (1 of 2 - Risk 2-dose series) Never done    Hepatitis B vaccine (1 of 3 - Risk 3-dose series) Never done    DEXA (modify frequency per FRAX score)  Never done    Shingles Vaccine (3 of 3) 02/25/2021    Flu vaccine (1) 09/01/2021    Lipid screen  10/26/2021    Annual Wellness Visit (AWV)  12/22/2021    Potassium monitoring  09/15/2022    Creatinine monitoring  09/15/2022    DTaP/Tdap/Td vaccine (2 - Td or Tdap) 10/15/2022    Pneumococcal 65+ years Vaccine  Completed    COVID-19 Vaccine  Completed    Hib vaccine  Aged Out    Meningococcal (ACWY) vaccine  Aged Out       Past Medical History:   Diagnosis Date    Anemia     Aneurysm of abdominal aorta (HCC)     Arthritis     Blood circulation, collateral     Bursitis, trochanteric     CAD (coronary artery disease) 2/2015    Cerebral artery occlusion with cerebral infarction (HCC)     Chronic back pain     Cirrhosis of liver without ascites ([address] 9/2/2020    Depression     Diabetes mellitus (HCC)     diet controlled    GERD (gastroesophageal reflux disease)     Headache(784.0)     History of basal cell cancer     Nose    Hyperlipidemia     Hypertension     Irritable bowel     Movement disorder     PVD (peripheral vascular disease) ([address]     S/P CABG (coronary artery bypass graft)     Sciatica        Past Surgical History: Procedure Laterality Date    ABDOMEN SURGERY      complete hysterectomy, gallbladder    APPENDECTOMY      C[facility]    CHOLECYSTECTOMY  yrs ago    COLONOSCOPY      CORONARY ARTERY BYPASS GRAFT  2-18-16    x3     ENDOSCOPY, COLON, DIAGNOSTIC      EYE SURGERY      cataracts, glaucoma    HERNIA REPAIR      Hiatal Hernia    HIATAL HERNIA REPAIR      HYSTERECTOMY      LARYNGOSCOPY  10/29/2012 Dr Carson Aguirre with Bx, Lingual Tonsillectomy, Hypopharyngoscopy    RI VEIN BYPASS GRAFT,CAROT-SUBCL/ SUBCL-CAROTD Left 2018    LEFT CAROTID SUBCLAVIAN BYPASS performed by Lynda Birch MD at [address] [place] [address]      as a teen    UPPER GASTROINTESTINAL ENDOSCOPY         Social History     Tobacco Use    Smoking status: Former Smoker     Packs/day: 0.50     Years: 25.00     Pack years: 12.50     Types: Cigarettes     Quit date: 11/3/2000     Years since quittin.9    Smokeless tobacco: Never Used    Tobacco comment: quit in    Vaping Use    Vaping Use: Never used   Substance Use Topics    Alcohol use: No    Drug use: No       Family History   Problem Relation Age of Onset    Early Death Mother         not sure of cause    Heart Disease Father 52        MI    Diabetes Sister     Cancer Brother         pancreatic    Cancer Son         larynx    High Blood Pressure Neg Hx     Stroke Neg Hx     Colon Cancer Neg Hx     Colon Polyps Neg Hx          I have reviewed the patient's past medical history, past surgical history, allergies, medications, social and family history and I have made updates where appropriate. Review of Systems        PHYSICAL EXAM:  /68   Pulse 57   Temp 97.5 °F (36.4 °C) (Infrared)   Resp 18   Ht 5' 6\" (1.676 m)   Wt 159 lb 9.6 oz (72.4 kg)   LMP  (LMP Unknown)   SpO2 96%   BMI 25.76 kg/m²     Physical Exam  Vitals and nursing note reviewed.    Constitutional: General: She is not in acute distress. Appearance: She is well-developed. HENT:      Head: Normocephalic and atraumatic. Right Ear: Hearing and external ear normal.      Left Ear: Hearing and external ear normal.      Nose: Nose normal.   Eyes:      General:         Right eye: No discharge. Left eye: No discharge. Conjunctiva/sclera: Conjunctivae normal.   Neck:      Thyroid: No thyromegaly. Trachea: No tracheal deviation. Cardiovascular:      Rate and Rhythm: Normal rate and regular rhythm. Heart sounds: Normal heart sounds. No murmur heard. No friction rub. No gallop. Pulmonary:      Effort: Pulmonary effort is normal. No respiratory distress. Breath sounds: No wheezing or rales. Chest:      Chest wall: No tenderness. Abdominal:      General: There is no distension. Palpations: There is no mass. Tenderness: There is abdominal tenderness (in the LLQ and RUQ). There is guarding (voluntary). There is no rebound. Hernia: No hernia is present. Musculoskeletal:         General: No deformity. Cervical back: Normal range of motion and neck supple. Lymphadenopathy:      Cervical: No cervical adenopathy. Skin:     General: Skin is warm and dry. Findings: No erythema or rash. Neurological:      Mental Status: She is alert. Motor: No abnormal muscle tone. Coordination: Coordination normal.   Psychiatric:         Behavior: Behavior normal.         Thought Content: Thought content normal.         Judgment: Judgment normal.             ASSESSMENT & PLAN  Eduarda Canchola was seen today for follow-up. Diagnoses and all orders for this visit:    LLQ pain  -     CT ABDOMEN PELVIS W IV CONTRAST Additional Contrast? Oral; Future    RUQ pain  -     CT ABDOMEN PELVIS W IV CONTRAST Additional Contrast? Oral; Future    -Due to persistent pain despite treatment, I did order a CT scan today to r/o abscess or perforation.   I received the results at approximately 3PM.  She may actually have inflammatory bowel disease per CT read. I discussed results with patient by phone. I started her on prednisone today and will follow up with her early next week. Continue augmentin for now. Return if symptoms worsen or fail to improve. The most recent results of the following tests were reviewed with the patient today: CT abd, creatinine     All copied or forwarded information in the progress note was verified by me to be accurate at the time of visit  Patient's past medical, surgical, social and family history were reviewed and updated     All patient questions answered. Patient voiced understanding.

## 2021-10-11 ENCOUNTER — TELEPHONE (OUTPATIENT)
Dept: FAMILY MEDICINE CLINIC | Age: 81
End: 2021-10-11

## 2021-10-11 NOTE — TELEPHONE ENCOUNTER
Left detailed message for patient to go ahead and  prescription from pharmacy and have patient take it as prescribed, to call, stop in or use mychart if any questions. Thanks.

## 2021-10-12 ENCOUNTER — TELEPHONE (OUTPATIENT)
Dept: FAMILY MEDICINE CLINIC | Age: 81
End: 2021-10-12

## 2021-11-08 DIAGNOSIS — I25.119 CORONARY ARTERY DISEASE INVOLVING NATIVE CORONARY ARTERY OF NATIVE HEART WITH ANGINA PECTORIS (HCC): ICD-10-CM

## 2021-11-08 NOTE — TELEPHONE ENCOUNTER
Recent Visits  Date Type Provider Dept   10/01/21 Office Visit Jameson Alcaraz [facility]   09/27/21 Office Visit Jameson Alcaraz, DO [facility]   07/23/21 Office Visit Danni Spangler APRN - CNP [facility]   06/21/21 Office Visit Jameson Alcaraz, DO [facility]   05/12/21 Office Visit Danni Spangler APRN - CNP [facility]  [place] [facility]   04/08/21 Office Visit Danni Spangler, APRN - CNP [facility]  [address]   04/05/21 Office Visit Jameson Alcaraz, DO [facility]   03/22/21 Office Visit Jameson Alcaraz, DO [facility]   12/21/20 Office Visit Jameson Alcaraz, DO [facility]   10/01/20 Office Visit Danni Spangler, APRN - CNP [facility]  [address] recent visits within past 540 days with a meds authorizing provider and meeting all other requirements  Future Appointments  Date Type Provider Dept   12/21/21 Appointment Jameson Alcaraz DO [facility]  [address] future appointments within next 150 days with a meds authorizing provider and meeting all other requirements    Future Appointments   Date Time Provider Ashley Dorsey   12/21/2021  1:00 PM DO KAE Reese [facility]

## 2021-11-22 ENCOUNTER — OFFICE VISIT (OUTPATIENT)
Dept: FAMILY MEDICINE CLINIC | Age: 81
End: 2021-11-22
Payer: MEDICARE

## 2021-11-22 VITALS
TEMPERATURE: 97.1 F | WEIGHT: 166.2 LBS | DIASTOLIC BLOOD PRESSURE: 70 MMHG | BODY MASS INDEX: 26.71 KG/M2 | RESPIRATION RATE: 14 BRPM | SYSTOLIC BLOOD PRESSURE: 130 MMHG | OXYGEN SATURATION: 94 % | HEIGHT: 66 IN | HEART RATE: 59 BPM

## 2021-11-22 DIAGNOSIS — J01.90 ACUTE RHINOSINUSITIS: Primary | ICD-10-CM

## 2021-11-22 LAB
Lab: NORMAL
QC PASS/FAIL: NORMAL
SARS-COV-2 RDRP RESP QL NAA+PROBE: NEGATIVE

## 2021-11-22 PROCEDURE — 87635 SARS-COV-2 COVID-19 AMP PRB: CPT | Performed by: FAMILY MEDICINE

## 2021-11-22 PROCEDURE — 99213 OFFICE O/P EST LOW 20 MIN: CPT | Performed by: FAMILY MEDICINE

## 2021-11-22 RX ORDER — AMOXICILLIN AND CLAVULANATE POTASSIUM 875; 125 MG/1; MG/1
1 TABLET, FILM COATED ORAL 2 TIMES DAILY
Qty: 20 TABLET | Refills: 0 | Status: SHIPPED | OUTPATIENT
Start: 2021-11-22 | End: 2021-12-02

## 2021-11-22 RX ORDER — POLYETHYLENE GLYCOL 3350 17 G/17G
17 POWDER, FOR SOLUTION ORAL DAILY
COMMUNITY
End: 2022-06-23

## 2021-11-22 NOTE — PROGRESS NOTES
SUBJECTIVE:  Gera Luke is a 80 y.o. y/o female that presents with Otalgia (bilat), Headache, and Leg Pain (bilat)  . HPI:      Symptoms have been present for 5 day(s). Symptoms are worse since they initially started. Fever? No  Runny nose or congestion? Yes, with frontal pressure   Cough? Yes - 'a little bit'  Sore throat? No  Shortness of breath/Wheezing? Yes - mild SOB  Other associated symptoms?  ear pain, fatigue      Known COVID exposures or RFs? No  Smoker? No  Preexisting Respiratory conditions?  none      Past Medical History:   Diagnosis Date    Anemia     Aneurysm of abdominal aorta (HCC)     Arthritis     Blood circulation, collateral     Bursitis, trochanteric     CAD (coronary artery disease) 2015    Cerebral artery occlusion with cerebral infarction [facility])     Chronic back pain     Cirrhosis of liver without ascites ([facility] 2020    Depression     Diabetes mellitus (HCC)     diet controlled    GERD (gastroesophageal reflux disease)     Headache(784.0)     History of basal cell cancer     Nose    Hyperlipidemia     Hypertension     Irritable bowel     Movement disorder     PVD (peripheral vascular disease) ([facility]     S/P CABG (coronary artery bypass graft)     Sciatica          Tobacco Use      Smoking status: Former Smoker        Packs/day: 0.50        Years: 25.00        Pack years: 12.5        Types: Cigarettes        Quit date: 11/3/2000        Years since quittin.0      Smokeless tobacco: Never Used      Tobacco comment: quit in             OBJECTIVE:  /70   Pulse 59   Temp 97.1 °F (36.2 °C) (Infrared)   Resp 14   Ht 5' 6\" (1.676 m)   Wt 166 lb 3.2 oz (75.4 kg)   LMP  (LMP Unknown)   SpO2 94%   BMI 26.83 kg/m²   General appearance: alert, well appearing, and in no distress. ENT exam reveals - neck without nodes, pharynx erythematous without exudate and nasal mucosa congested.    CVS exam: normal rate, regular rhythm, normal S1, S2, no murmurs, rubs, clicks or gallops. Chest:clear to auscultation, no wheezes, rales or rhonchi, symmetric air entry. Abdominal exam: soft, nontender, nondistended, no masses or organomegaly. Extremities:  No clubbing, cyanosis or edema  Skin exam - normal coloration and turgor, no rashes, no suspicious skin lesions noted. Psych -  Affect appropriate. Thought process is normal without evidence of depression or psychosis. Good insight and appropriae interaction. Cognition and memory appear to be intact. ASSESSMENT & PLAN  Gabriella Portillo was seen today for otalgia, headache and leg pain. Diagnoses and all orders for this visit:    Acute rhinosinusitis  -     amoxicillin-clavulanate (AUGMENTIN) 875-125 MG per tablet; Take 1 tablet by mouth 2 times daily for 10 days  -     POCT COVID-19, Rapid  -     COVID-19        Return if symptoms worsen or fail to improve. -Rapid antigen negative, PCR sent.  -Start above treatments  -Patient advised to contact our office immediately if symptoms worsen or persist  -Patient counseled on conservative care including fluids, rest and OTC meds      I have reviewed this patient's history, habits, and medication list and have updated the chart where appropriate.

## 2021-11-24 ENCOUNTER — TELEPHONE (OUTPATIENT)
Dept: FAMILY MEDICINE CLINIC | Age: 81
End: 2021-11-24

## 2021-11-24 LAB
SARS-COV-2: NOT DETECTED
SOURCE: NORMAL

## 2021-11-24 NOTE — TELEPHONE ENCOUNTER
Pt started Abx on Monday, she states symptoms are still present they have not improved much but also have not got worse. Pt advised to continue Abx as discussed in office.

## 2021-11-29 DIAGNOSIS — K21.9 GASTROESOPHAGEAL REFLUX DISEASE WITHOUT ESOPHAGITIS: ICD-10-CM

## 2021-11-29 DIAGNOSIS — K29.30 CHRONIC SUPERFICIAL GASTRITIS WITHOUT BLEEDING: ICD-10-CM

## 2021-11-29 RX ORDER — FAMOTIDINE 40 MG/1
TABLET, FILM COATED ORAL
Qty: 90 TABLET | Refills: 3 | Status: SHIPPED | OUTPATIENT
Start: 2021-11-29 | End: 2022-03-18 | Stop reason: ALTCHOICE

## 2021-12-21 ENCOUNTER — OFFICE VISIT (OUTPATIENT)
Dept: FAMILY MEDICINE CLINIC | Age: 81
End: 2021-12-21
Payer: MEDICARE

## 2021-12-21 VITALS
HEIGHT: 66 IN | BODY MASS INDEX: 27.1 KG/M2 | TEMPERATURE: 97 F | RESPIRATION RATE: 16 BRPM | OXYGEN SATURATION: 95 % | HEART RATE: 54 BPM | DIASTOLIC BLOOD PRESSURE: 70 MMHG | SYSTOLIC BLOOD PRESSURE: 124 MMHG | WEIGHT: 168.6 LBS

## 2021-12-21 DIAGNOSIS — I10 BENIGN ESSENTIAL HTN: ICD-10-CM

## 2021-12-21 DIAGNOSIS — F02.80 LATE ONSET ALZHEIMER'S DISEASE WITHOUT BEHAVIORAL DISTURBANCE (HCC): Primary | ICD-10-CM

## 2021-12-21 DIAGNOSIS — R73.03 PREDIABETES: ICD-10-CM

## 2021-12-21 DIAGNOSIS — R19.7 DIARRHEA, UNSPECIFIED TYPE: ICD-10-CM

## 2021-12-21 DIAGNOSIS — G30.1 LATE ONSET ALZHEIMER'S DISEASE WITHOUT BEHAVIORAL DISTURBANCE (HCC): Primary | ICD-10-CM

## 2021-12-21 LAB — HBA1C MFR BLD: 5 % (ref 4.3–5.7)

## 2021-12-21 PROCEDURE — 99214 OFFICE O/P EST MOD 30 MIN: CPT | Performed by: FAMILY MEDICINE

## 2021-12-21 NOTE — PROGRESS NOTES
Marcia Jade is a 80 y.o. female that presents for     Chief Complaint   Patient presents with    6 Month Follow-Up     questions about medications       /70   Pulse 54   Temp 97 °F (36.1 °C) (Infrared)   Resp 16   Ht 5' 6\" (1.676 m)   Wt 168 lb 9.6 oz (76.5 kg)   LMP  (LMP Unknown)   SpO2 95%   BMI 27.21 kg/m²       HPI    Continues to have diarrhea and loose stools. Has 2-5 BM/day. No improvement recent change in sx. Now taking supplements from  chiropractor. Balance continues to be 'not good'. Has assistive devices, but generally does not use them. Has declined PT. No recent falls. HTN    Does patient check BP regularly at home? - No  Current Medication regimen - metoprolol  Tolerating medications well? - yes    Shortness of breath or chest pain? No  Headache or visual complaints? No  Neurologic changes like confusion? No  Extremity edema? No    BP Readings from Last 3 Encounters:   12/21/21 124/70   11/22/21 130/70   10/01/21 110/68     On Aricept and prevagen, memory issues have been better per . Seems to be doing well.     Health Maintenance   Topic Date Due    Hepatitis A vaccine (1 of 2 - Risk 2-dose series) Never done    COVID-19 Vaccine (1) Never done    Hepatitis B vaccine (1 of 3 - Risk 3-dose series) Never done    DEXA (modify frequency per FRAX score)  Never done    Shingles Vaccine (3 of 3) 02/25/2021    Lipid screen  10/26/2021    Annual Wellness Visit (AWV)  12/22/2021    Potassium monitoring  09/15/2022    Creatinine monitoring  09/15/2022    DTaP/Tdap/Td vaccine (2 - Td or Tdap) 10/15/2022    Flu vaccine  Completed    Pneumococcal 65+ years Vaccine  Completed    Hib vaccine  Aged Out    Meningococcal (ACWY) vaccine  Aged Out       Past Medical History:   Diagnosis Date    Anemia     Aneurysm of abdominal aorta (HCC)     Arthritis     Blood circulation, collateral     Bursitis, trochanteric     CAD (coronary artery disease) 2/2015    Cerebral artery occlusion with cerebral infarction (HCC)     Chronic back pain     [facility]osis [facility] without ascites ([facility] 2020    Depression     Diabetes mellitus (HCC)     diet controlled    GERD (gastroesophageal reflux disease)     Headache(784.0)     History of basal cell cancer     Nose    Hyperlipidemia     Hypertension     Irritable bowel     Movement disorder     PVD (peripheral vascular disease) ([facility]     S/P CABG (coronary artery bypass graft)     Sciatica        Past Surgical History:   Procedure Laterality Date    ABDOMEN SURGERY      complete hysterectomy, gallbladder    APPENDECTOMY      C[address]HETER[address]ATION  2/3/[address]    [address]    CHOLECYSTECTOMY  yrs ago    COLONOSCOPY      CORONARY ARTERY BYPASS GRAFT  2-18-16    x3     ENDOSCOPY, COLON, DIAGNOSTIC      EYE SURGERY      cataracts, glaucoma    HERNIA REPAIR      Hiatal Hernia    HIATAL HERNIA REPAIR      HYSTERECTOMY      LARYNGOSCOPY  10/29/2012 Dr Jose Mehta with Bx, Lingual Tonsillectomy, Hypopharyngoscopy    AL VEIN BYPASS GRAFT,CAROT-SUBCL/ SUBCL-CAROTD Left 2018    LEFT CAROTID SUBCLAVIAN BYPASS performed by Mariana Colin MD at [address] [place] [address]      as a teen    UPPER GASTROINTESTINAL ENDOSCOPY         Social History     Tobacco Use    Smoking status: Former Smoker     Packs/day: 0.50     Years: 25.00     Pack years: 12.50     Types: Cigarettes     Quit date: 11/3/2000     Years since quittin.1    Smokeless tobacco: Never Used    Tobacco comment: quit in    Vaping Use    Vaping Use: Never used   Substance Use Topics    Alcohol use: No    Drug use: No       Family History   Problem Relation Age of Onset    Early Death Mother         not sure of cause    Heart Disease Father 52        MI    Diabetes Sister     Cancer Brother         pancreatic    Cancer Son         larynx    High Blood Pressure Neg Hx     Stroke Neg Hx     Colon Cancer Neg Hx     Colon Polyps Neg Hx          I have reviewed the patient's past medical history, past surgical history, allergies, medications, social and family history and I have made updates where appropriate. Review of Systems        PHYSICAL EXAM:  /70   Pulse 54   Temp 97 °F (36.1 °C) (Infrared)   Resp 16   Ht 5' 6\" (1.676 m)   Wt 168 lb 9.6 oz (76.5 kg)   LMP  (LMP Unknown)   SpO2 95%   BMI 27.21 kg/m²     Physical Exam  Vitals and nursing note reviewed. Constitutional:       General: She is not in acute distress. Appearance: She is well-developed. HENT:      Head: Normocephalic and atraumatic. Right Ear: Hearing and external ear normal.      Left Ear: Hearing and external ear normal.      Nose: Nose normal.   Eyes:      General:         Right eye: No discharge. Left eye: No discharge. Conjunctiva/sclera: Conjunctivae normal.   Neck:      Thyroid: No thyromegaly. Trachea: No tracheal deviation. Cardiovascular:      Rate and Rhythm: Normal rate and regular rhythm. Heart sounds: Normal heart sounds. No murmur heard. No friction rub. No gallop. Pulmonary:      Effort: Pulmonary effort is normal. No respiratory distress. Breath sounds: No wheezing or rales. Chest:      Chest wall: No tenderness. Musculoskeletal:         General: No deformity. Cervical back: Normal range of motion and neck supple. Lymphadenopathy:      Cervical: No cervical adenopathy. Skin:     General: Skin is warm and dry. Findings: No erythema or rash. Neurological:      Mental Status: She is alert. Motor: No abnormal muscle tone. Coordination: Coordination normal.   Psychiatric:         Behavior: Behavior normal.         Thought Content: Thought content normal.         Judgment: Judgment normal.             ASSESSMENT & PLAN  Rosalia Canas was seen today for 6 month follow-up.     Diagnoses and all orders for this visit:    Late onset Alzheimer's disease without behavioral disturbance (HCC)    Prediabetes  -     POCT glycosylated hemoglobin (Hb A1C)    Benign essential HTN    Diarrhea, unspecified type        -Will hold aricept for 2 weeks and see if diarrhea improves. -Other chronic issues are stable, continue current medications  -Advised to call if any issues      Return in about 6 months (around 6/21/2022), or if symptoms worsen or fail to improve. The most recent results of the following tests were reviewed with the patient today: none     All copied or forwarded information in the progress note was verified by me to be accurate at the time of visit  Patient's past medical, surgical, social and family history were reviewed and updated     All patient questions answered. Patient voiced understanding.

## 2022-01-04 ENCOUNTER — TELEPHONE (OUTPATIENT)
Dept: FAMILY MEDICINE CLINIC | Age: 82
End: 2022-01-04

## 2022-01-04 NOTE — TELEPHONE ENCOUNTER
----- Message from Krystle Flores DO sent at 1/4/2022 10:17 AM EST -----  Please call patient's  and see if her diarrhea has improved with stopping the aricept.  ----- Message -----  From: Krystle Flores DO  Sent: 1/4/2022  12:00 AM EST  To: Krystle Flores DO    Check on stopping aricept and diarrhea

## 2022-03-17 ENCOUNTER — HOSPITAL ENCOUNTER (OUTPATIENT)
Dept: GENERAL RADIOLOGY | Age: 82
Discharge: HOME | End: 2022-03-17
Payer: MEDICARE

## 2022-03-17 ENCOUNTER — HOSPITAL ENCOUNTER (OUTPATIENT)
Age: 82
Discharge: HOME | End: 2022-03-17
Payer: MEDICARE

## 2022-03-17 ENCOUNTER — HOSPITAL ENCOUNTER (OUTPATIENT)
Dept: ULTRASOUND IMAGING | Age: 82
Discharge: HOME | End: 2022-03-17
Payer: MEDICARE

## 2022-03-17 ENCOUNTER — OFFICE VISIT (OUTPATIENT)
Dept: FAMILY MEDICINE CLINIC | Age: 82
End: 2022-03-17
Payer: MEDICARE

## 2022-03-17 VITALS
BODY MASS INDEX: 29.7 KG/M2 | WEIGHT: 184.8 LBS | HEIGHT: 66 IN | RESPIRATION RATE: 18 BRPM | SYSTOLIC BLOOD PRESSURE: 128 MMHG | DIASTOLIC BLOOD PRESSURE: 86 MMHG | OXYGEN SATURATION: 98 % | TEMPERATURE: 97.8 F | HEART RATE: 62 BPM

## 2022-03-17 DIAGNOSIS — K74.60 HEPATIC CIRRHOSIS, UNSPECIFIED HEPATIC CIRRHOSIS TYPE, UNSPECIFIED WHETHER ASCITES PRESENT (HCC): ICD-10-CM

## 2022-03-17 DIAGNOSIS — R06.02 SHORTNESS OF BREATH: ICD-10-CM

## 2022-03-17 DIAGNOSIS — R10.84 GENERALIZED ABDOMINAL PAIN: Primary | ICD-10-CM

## 2022-03-17 DIAGNOSIS — R06.89 DECREASED LUNG SOUNDS: ICD-10-CM

## 2022-03-17 DIAGNOSIS — R10.84 GENERALIZED ABDOMINAL PAIN: ICD-10-CM

## 2022-03-17 LAB
ALBUMIN SERPL-MCNC: 3.8 G/DL (ref 3.5–5.1)
ALP BLD-CCNC: 123 U/L (ref 38–126)
ALT SERPL-CCNC: 15 U/L (ref 11–66)
AMMONIA: 40 UMOL/L (ref 11–60)
ANION GAP SERPL CALCULATED.3IONS-SCNC: 7 MEQ/L (ref 8–16)
ANISOCYTOSIS: PRESENT
AST SERPL-CCNC: 47 U/L (ref 5–40)
BASOPHILS # BLD: 1 %
BASOPHILS ABSOLUTE: 0.1 THOU/MM3 (ref 0–0.1)
BILIRUB SERPL-MCNC: 1.6 MG/DL (ref 0.3–1.2)
BILIRUBIN DIRECT: 0.6 MG/DL (ref 0–0.3)
BUN BLDV-MCNC: 10 MG/DL (ref 7–22)
CALCIUM SERPL-MCNC: 9.6 MG/DL (ref 8.5–10.5)
CHLORIDE BLD-SCNC: 108 MEQ/L (ref 98–111)
CO2: 27 MEQ/L (ref 23–33)
CREAT SERPL-MCNC: 0.8 MG/DL (ref 0.4–1.2)
ELLIPTOCYTES: ABNORMAL
EOSINOPHIL # BLD: 5.8 %
EOSINOPHILS ABSOLUTE: 0.3 THOU/MM3 (ref 0–0.4)
ERYTHROCYTE [DISTWIDTH] IN BLOOD BY AUTOMATED COUNT: 23.9 % (ref 11.5–14.5)
ERYTHROCYTE [DISTWIDTH] IN BLOOD BY AUTOMATED COUNT: 91.9 FL (ref 35–45)
GFR SERPL CREATININE-BSD FRML MDRD: 69 ML/MIN/1.73M2
GLUCOSE BLD-MCNC: 80 MG/DL (ref 70–108)
HCT VFR BLD CALC: 31.9 % (ref 37–47)
HEMOGLOBIN: 10 GM/DL (ref 12–16)
IMMATURE GRANS (ABS): 0.01 THOU/MM3 (ref 0–0.07)
IMMATURE GRANULOCYTES: 0.2 %
INR BLD: 1.32 (ref 0.85–1.13)
LYMPHOCYTES # BLD: 25.8 %
LYMPHOCYTES ABSOLUTE: 1.5 THOU/MM3 (ref 1–4.8)
MCH RBC QN AUTO: 32.6 PG (ref 26–33)
MCHC RBC AUTO-ENTMCNC: 31.3 GM/DL (ref 32.2–35.5)
MCV RBC AUTO: 103.9 FL (ref 81–99)
MONOCYTES # BLD: 17.5 %
MONOCYTES ABSOLUTE: 1.1 THOU/MM3 (ref 0.4–1.3)
NUCLEATED RED BLOOD CELLS: 1 /100 WBC
PLATELET # BLD: 164 THOU/MM3 (ref 130–400)
PLATELET ESTIMATE: ADEQUATE
PMV BLD AUTO: 10.7 FL (ref 9.4–12.4)
POTASSIUM SERPL-SCNC: 4.1 MEQ/L (ref 3.5–5.2)
RBC # BLD: 3.07 MILL/MM3 (ref 4.2–5.4)
SCAN OF BLOOD SMEAR: NORMAL
SEG NEUTROPHILS: 49.7 %
SEGMENTED NEUTROPHILS ABSOLUTE COUNT: 3 THOU/MM3 (ref 1.8–7.7)
SODIUM BLD-SCNC: 142 MEQ/L (ref 135–145)
TOTAL PROTEIN: 6.8 G/DL (ref 6.1–8)
WBC # BLD: 6 THOU/MM3 (ref 4.8–10.8)

## 2022-03-17 PROCEDURE — 85025 COMPLETE CBC W/AUTO DIFF WBC: CPT

## 2022-03-17 PROCEDURE — 36415 COLL VENOUS BLD VENIPUNCTURE: CPT

## 2022-03-17 PROCEDURE — 76705 ECHO EXAM OF ABDOMEN: CPT

## 2022-03-17 PROCEDURE — 80053 COMPREHEN METABOLIC PANEL: CPT

## 2022-03-17 PROCEDURE — 71046 X-RAY EXAM CHEST 2 VIEWS: CPT

## 2022-03-17 PROCEDURE — 82248 BILIRUBIN DIRECT: CPT

## 2022-03-17 PROCEDURE — 82140 ASSAY OF AMMONIA: CPT

## 2022-03-17 PROCEDURE — 99215 OFFICE O/P EST HI 40 MIN: CPT | Performed by: NURSE PRACTITIONER

## 2022-03-17 PROCEDURE — 85610 PROTHROMBIN TIME: CPT

## 2022-03-17 PROCEDURE — 84134 ASSAY OF PREALBUMIN: CPT

## 2022-03-17 NOTE — PROGRESS NOTES
SUBJECTIVE:  Paul Sims is a 80 y.o. y/o female that presents with Abdominal Pain, Foot Swelling, and Shoulder Pain  . GI Illness    HPI:      Symptoms have been present for 1 month(s). Nausea? No  Vomiting? No  Diarrhea? none  Abdominal Pain? Yes - all over, at times radiates into right shoulder and neck  Fever? No fever that she knows of, some chills  Blood or Mucus in Stools? No  Currently able to tolerate fluids? yes    Eating makes the abd pain worse  No alleviating factors  Rates it 8-9/10  Having SOB, especially when lying down  Denies chest pain, lightheadedness  Reports some BLE swelling  Voiding well  Reports constipation    Patient Active Problem List   Diagnosis    Hyperlipidemia    Chronic low back pain    Medication monitoring encounter    Postmenopausal HRT (hormone replacement therapy)    GERD (gastroesophageal reflux disease)    Postnasal discharge with cough    Esophageal stricture    History of smoking    Nasal mucositis (ulcerative)    Nasal septal deviation    Globus pharyngeus    Lingual tonsillitis    Dysphagia    Neck mass    Current non-smoker but past smoking history unknown    Candida, oral    Xerostomia    Lingual tonsil hypertrophy    Stone of salivary duct    BCC (basal cell carcinoma), face, bridge of nose.     Hip pain, right    Trochanteric bursitis of right hip    CAD (coronary artery disease)    Unstable angina (HCC)    Angina, class II (HCC)    C[facility]    PVD (peripheral vascular disease) (HCC)    Subclavian steal syndrome    Low hemoglobin    Benign essential HTN    Iron deficiency anemia due to chronic blood loss    Cirrhosis of liver without ascites (HCC)    Anemia, unspecified    Nausea    Acute diverticulitis           OBJECTIVE:  /86   Pulse 62   Temp 97.8 °F (36.6 °C) (Infrared)   Resp 18   Ht 5' 6\" (1.676 m)   Wt 184 lb 12.8 oz (83.8 kg)   LMP  (LMP Unknown)   SpO2 98%   BMI 29.83 kg/m²   General appearance: alert, well appearing, and in no distress. CVS exam: normal rate, regular rhythm, normal S1, S2, no murmurs, rubs, clicks or gallops. Chest: clear to auscultation, no wheezes, rales or rhonchi, symmetric air entry. Abdominal exam: tenderness noted all over,  hepatomegaly noted, fluid wave noted, bowel sounds tympanic and hypoactive, fluid wave present, hepatojugular reflux present. Extremities - peripheral pulses normal, no pedal edema, no clubbing or cyanosis  Psych:  Affect appropriate. Thought process is normal without evidence of depression or psychosis. Good insight and appropriae interaction. Cognition and memory appear to be intact. ASSESSMENT & PLAN  Loli Perez was seen today for abdominal pain, foot swelling and shoulder pain. Diagnoses and all orders for this visit:    Generalized abdominal pain  -     CBC with Auto Differential; Future  -     Comprehensive Metabolic Panel; Future  -     Hepatic Function Panel; Future  -     US LIVER; Future    Hepatic cirrhosis, unspecified hepatic cirrhosis type, unspecified whether ascites present ([facility]  -     CBC with Auto Differential; Future  -     Comprehensive Metabolic Panel; Future  -     Hepatic Function Panel; Future  -     US LIVER; Future  -     XR CHEST (2 VW); Future  -     Protime-INR; Future  -     Prealbumin; Future  -     Ammonia; Future    Shortness of breath  -     CBC with Auto Differential; Future  -     XR CHEST (2 VW); Future    Decreased lung sounds  -     XR CHEST (2 VW); Future        No follow-ups on file.    -Pt appears stable for outpt treatment, advised on concerning sx to monitor for and to notify me immediately or go to ER if developing any of these  -Start above treatments  -Patient advised to contact our office immediately if symptoms worsen or persist  -Patient counseled on conservative care including fluids, rest and OTC meds      All patient questions answered.   Patient voiced understanding.

## 2022-03-18 ENCOUNTER — OFFICE VISIT (OUTPATIENT)
Dept: FAMILY MEDICINE CLINIC | Age: 82
End: 2022-03-18
Payer: MEDICARE

## 2022-03-18 ENCOUNTER — HOSPITAL ENCOUNTER (OUTPATIENT)
Dept: ULTRASOUND IMAGING | Age: 82
Discharge: HOME | End: 2022-03-18
Payer: MEDICARE

## 2022-03-18 VITALS
BODY MASS INDEX: 29.38 KG/M2 | HEIGHT: 66 IN | HEART RATE: 66 BPM | DIASTOLIC BLOOD PRESSURE: 72 MMHG | OXYGEN SATURATION: 92 % | RESPIRATION RATE: 16 BRPM | SYSTOLIC BLOOD PRESSURE: 142 MMHG | TEMPERATURE: 97.1 F | WEIGHT: 182.8 LBS

## 2022-03-18 DIAGNOSIS — R63.4 WEIGHT LOSS: ICD-10-CM

## 2022-03-18 DIAGNOSIS — R18.8 OTHER ASCITES: ICD-10-CM

## 2022-03-18 DIAGNOSIS — K74.60 CIRRHOSIS, NONALCOHOLIC (HCC): Primary | ICD-10-CM

## 2022-03-18 LAB
BODY FLUID RBC: < 2000 /CUMM
CHARACTER, BODY FLUID: CLEAR
COLOR: YELLOW
MESOTHELIAL CELLS BODY FLUID: ABNORMAL
MONONUCLEAR CELLS BODY FLUID: 96 %
PATHOLOGIST REVIEW: ABNORMAL
POLYMORPHONUCLEAR CELLS BODY FLUID: 4 %
PREALBUMIN: 10.8 MG/DL (ref 20–40)
SPECIMEN: ABNORMAL
TOTAL NUCLEATED CELLS BODY FLUID: 694 /CUMM (ref 0–500)
TOTAL VOLUME RECEIVED BODY FLUID: 70 ML

## 2022-03-18 PROCEDURE — 88305 TISSUE EXAM BY PATHOLOGIST: CPT

## 2022-03-18 PROCEDURE — 87070 CULTURE OTHR SPECIMN AEROBIC: CPT

## 2022-03-18 PROCEDURE — 87205 SMEAR GRAM STAIN: CPT

## 2022-03-18 PROCEDURE — 87075 CULTR BACTERIA EXCEPT BLOOD: CPT

## 2022-03-18 PROCEDURE — 89050 BODY FLUID CELL COUNT: CPT

## 2022-03-18 PROCEDURE — 49083 ABD PARACENTESIS W/IMAGING: CPT

## 2022-03-18 PROCEDURE — 88112 CYTOPATH CELL ENHANCE TECH: CPT

## 2022-03-18 PROCEDURE — 99215 OFFICE O/P EST HI 40 MIN: CPT | Performed by: NURSE PRACTITIONER

## 2022-03-18 RX ORDER — FUROSEMIDE 20 MG/1
20 TABLET ORAL DAILY
Qty: 90 TABLET | Refills: 1 | Status: SHIPPED | OUTPATIENT
Start: 2022-03-18 | End: 2022-09-12

## 2022-03-18 RX ORDER — SPIRONOLACTONE 25 MG/1
25 TABLET ORAL DAILY
Qty: 90 TABLET | Refills: 1 | Status: SHIPPED | OUTPATIENT
Start: 2022-03-18 | End: 2022-09-12

## 2022-03-18 RX ORDER — MIRTAZAPINE 7.5 MG/1
7.5 TABLET, FILM COATED ORAL NIGHTLY
Qty: 90 TABLET | Refills: 3 | Status: SHIPPED | OUTPATIENT
Start: 2022-03-18 | End: 2022-06-23

## 2022-03-18 NOTE — H&P
Formulation and discussion of sedation / procedure plans, risks, benefits, side effects and alternatives with patient and/or responsible adult completed.     Electronically signed by Constantino Leon MD on 3/18/22 at 11:03 AM EDT

## 2022-03-18 NOTE — PROGRESS NOTES
Reno Coley is a 80 y.o. female thatpresents for Follow-up      History obtained today from Patient and . Follow up from yesterday   Had US that showed ascites  Blood work was stable (pre-albumin pending)  Chest xray was negative    I have reviewed the patient's past medical history, past surgical history, allergies,medications, social and family history and I have made updates where appropriate.     Past Medical History:   Diagnosis Date    Anemia     Aneurysm of abdominal aorta (HCC)     Arthritis     Blood circulation, collateral     Bursitis, trochanteric     CAD (coronary artery disease) 2015    Cerebral artery occlusion with cerebral infarction (HCC)     Chronic back pain     Cirrhosis of liver without ascites ([facility] [address] 2020    Depression     Diabetes mellitus (HCC)     diet controlled    GERD (gastroesophageal reflux disease)     Headache(784.0)     History of basal cell cancer     Nose    Hyperlipidemia     Hypertension     Irritable bowel     Movement disorder     PVD (peripheral vascular disease) (HCC)     S/P CABG (coronary artery bypass graft)     Sciatica        Social History     Tobacco Use    Smoking status: Former Smoker     Packs/day: 0.50     Years: 25.00     Pack years: 12.50     Types: Cigarettes     Quit date: 11/3/2000     Years since quittin.3    Smokeless tobacco: Never Used    Tobacco comment: quit in    Vaping Use    Vaping Use: Never used   Substance Use Topics    Alcohol use: No    Drug use: No       Family History   Problem Relation Age of Onset    Early Death Mother         not sure of cause    Heart Disease Father 52        MI    Diabetes Sister     Cancer Brother         pancreatic    Cancer Son         larynx    High Blood Pressure Neg Hx     Stroke Neg Hx     Colon Cancer Neg Hx     Colon Polyps Neg Hx          Review of Systems        PHYSICAL EXAM:  BP (!) 142/72   Pulse 66   Temp 97.1 °F (36.2 °C) (Infrared)   Resp 16   Ht 5' 6\" (1.676 m)   Wt 182 lb 12.8 oz (82.9 kg)   LMP  (LMP Unknown)   SpO2 92%   BMI 29.50 kg/m²     Physical Exam  Constitutional:       Appearance: Normal appearance. HENT:      Head: Normocephalic and atraumatic. Right Ear: External ear normal.      Left Ear: External ear normal.      Nose: Nose normal.      Mouth/Throat:      Mouth: Mucous membranes are moist.   Eyes:      Conjunctiva/sclera: Conjunctivae normal.   Cardiovascular:      Rate and Rhythm: Normal rate and regular rhythm. Pulses: Normal pulses. Heart sounds: Normal heart sounds. Pulmonary:      Effort: Pulmonary effort is normal.      Breath sounds: Normal breath sounds. Abdominal:      General: There is distension. Palpations: Abdomen is soft. Tenderness: There is abdominal tenderness. Musculoskeletal:         General: Normal range of motion. Cervical back: Normal range of motion. Right lower leg: Edema present. Left lower leg: Edema present. Skin:     General: Skin is warm and dry. Neurological:      General: No focal deficit present. Mental Status: She is alert and oriented to person, place, and time. Psychiatric:         Mood and Affect: Mood normal.         Behavior: Behavior normal.           ASSESSMENT & PLAN  Portillo Stapleton was seen today for follow-up. Diagnoses and all orders for this visit:    Cirrhosis, nonalcoholic ([address]    Weight loss  -     mirtazapine (REMERON) 7.5 MG tablet; Take 1 tablet by mouth nightly    Other ascites  -     furosemide (LASIX) 20 MG tablet; Take 1 tablet by mouth daily  -     spironolactone (ALDACTONE) 25 MG tablet; Take 1 tablet by mouth daily  -     Comprehensive Metabolic Panel; Future  -     US GUIDED PARACENTESIS; Future  -     US GUIDED PARACENTESIS; Future  -     Cytology, Non-Gyn; Future  -     Cell Count with Differential, Body Fluid; Future  -     Culture, Body Fluid;  Future      Needs follow up with Dr. Reinaldo Rivero asap  Paracentesis today    Updated med list- per  Famotidine, Omeprazole, and Aricept were stopped in 2021    No follow-ups on file. Start above treatments and Obtain above testing    All copied or forwarded information in the progress note was verified by me to be accurate at the time of visit  Patient's past medical, surgical, social and family history were reviewed and updated     All patient questions answered. Patient voiced understanding.

## 2022-03-18 NOTE — OP NOTE
Pre-Procedure Diagnosis: Ascites    Procedure Performed:  Paracentesis    Anesthesia: local    Findings: clear  yellow serous fluid removed    Immediate Complications:  None    Estimated Blood Loss: minimal    SEE DICTATED PROCEDURE NOTE FOR COMPLETE DETAILS.     Ravin Campoverde MD   3/18/2022 11:03 AM

## 2022-03-21 ENCOUNTER — HOSPITAL ENCOUNTER (OUTPATIENT)
Age: 82
Discharge: HOME | End: 2022-03-21
Payer: MEDICARE

## 2022-03-21 LAB
ALBUMIN SERPL-MCNC: 3.7 G/DL (ref 3.5–5.1)
ALP BLD-CCNC: 113 U/L (ref 38–126)
ALT SERPL-CCNC: 13 U/L (ref 11–66)
ANION GAP SERPL CALCULATED.3IONS-SCNC: 9 MEQ/L (ref 8–16)
AST SERPL-CCNC: 41 U/L (ref 5–40)
BILIRUB SERPL-MCNC: 1.8 MG/DL (ref 0.3–1.2)
BUN BLDV-MCNC: 11 MG/DL (ref 7–22)
CALCIUM SERPL-MCNC: 9.4 MG/DL (ref 8.5–10.5)
CHLORIDE BLD-SCNC: 107 MEQ/L (ref 98–111)
CO2: 26 MEQ/L (ref 23–33)
CREAT SERPL-MCNC: 0.8 MG/DL (ref 0.4–1.2)
GFR SERPL CREATININE-BSD FRML MDRD: 69 ML/MIN/1.73M2
GLUCOSE BLD-MCNC: 102 MG/DL (ref 70–108)
POTASSIUM SERPL-SCNC: 4.3 MEQ/L (ref 3.5–5.2)
SODIUM BLD-SCNC: 142 MEQ/L (ref 135–145)
TOTAL PROTEIN: 6.8 G/DL (ref 6.1–8)

## 2022-03-21 PROCEDURE — 80053 COMPREHEN METABOLIC PANEL: CPT

## 2022-03-21 PROCEDURE — 36415 COLL VENOUS BLD VENIPUNCTURE: CPT

## 2022-03-22 ENCOUNTER — OFFICE VISIT (OUTPATIENT)
Dept: FAMILY MEDICINE CLINIC | Age: 82
End: 2022-03-22
Payer: MEDICARE

## 2022-03-22 VITALS
OXYGEN SATURATION: 93 % | RESPIRATION RATE: 16 BRPM | HEIGHT: 66 IN | BODY MASS INDEX: 28.25 KG/M2 | TEMPERATURE: 97 F | WEIGHT: 175.8 LBS | SYSTOLIC BLOOD PRESSURE: 124 MMHG | HEART RATE: 70 BPM | DIASTOLIC BLOOD PRESSURE: 72 MMHG

## 2022-03-22 DIAGNOSIS — Z91.81 AT HIGH RISK FOR FALLS: Primary | ICD-10-CM

## 2022-03-22 DIAGNOSIS — K74.60 HEPATIC CIRRHOSIS, UNSPECIFIED HEPATIC CIRRHOSIS TYPE, UNSPECIFIED WHETHER ASCITES PRESENT (HCC): ICD-10-CM

## 2022-03-22 PROCEDURE — 99213 OFFICE O/P EST LOW 20 MIN: CPT | Performed by: NURSE PRACTITIONER

## 2022-03-22 ASSESSMENT — ENCOUNTER SYMPTOMS
NAUSEA: 0
ABDOMINAL PAIN: 1
ABDOMINAL DISTENTION: 0
DIARRHEA: 1
RECTAL PAIN: 0

## 2022-03-22 NOTE — PROGRESS NOTES
Sadie Bautista is a 80 y.o. female thatpresents for Follow-up      History obtained today from Patient and . HPI    Here for follow up from last week. She underwent US guided paracentesis on 3/18/2022. 1.5 Liters drained. Pt tolerated fair, still with left sided abdominal pain. Puncture site looks good. No nausea  SOB is improved. Leg edema much improved. No fevers or chills. Has appt with Dr Sruthi Melgar at 2 pm today     I have reviewed the patient's past medical history, past surgical history, allergies,medications, social and family history and I have made updates where appropriate.     Past Medical History:   Diagnosis Date    Anemia     Aneurysm of abdominal aorta (HCC)     Arthritis     Blood circulation, collateral     Bursitis, trochanteric     CAD (coronary artery disease) 2015    Cerebral artery occlusion with cerebral infarction (HCC)     Chronic back pain     Cirrhosis of liver without ascites ([facility] [address] 2020    Depression     Diabetes mellitus (HCC)     diet controlled    GERD (gastroesophageal reflux disease)     Headache(784.0)     History of basal cell cancer     Nose    Hyperlipidemia     Hypertension     Irritable bowel     Movement disorder     PVD (peripheral vascular disease) (HCC)     S/P CABG (coronary artery bypass graft)     Sciatica        Social History     Tobacco Use    Smoking status: Former Smoker     Packs/day: 0.50     Years: 25.00     Pack years: 12.50     Types: Cigarettes     Quit date: 11/3/2000     Years since quittin.3    Smokeless tobacco: Never Used    Tobacco comment: quit in    Vaping Use    Vaping Use: Never used   Substance Use Topics    Alcohol use: No    Drug use: No       Family History   Problem Relation Age of Onset    Early Death Mother         not sure of cause    Heart Disease Father 52        MI    Diabetes Sister     Cancer Brother         pancreatic    Cancer Son         larynx    High Blood Pressure Neg Hx     Stroke Neg Hx     Colon Cancer Neg Hx     Colon Polyps Neg Hx          Review of Systems   Constitutional: Negative for fever. Cardiovascular: Negative for leg swelling. Gastrointestinal: Positive for abdominal pain and diarrhea. Negative for abdominal distention, nausea and rectal pain. PHYSICAL EXAM:  /72   Pulse 70   Temp 97 °F (36.1 °C) (Infrared)   Resp 16   Ht 5' 6\" (1.676 m)   Wt 175 lb 12.8 oz (79.7 kg)   LMP  (LMP Unknown)   SpO2 93%   BMI 28.37 kg/m²     Physical Exam  Constitutional:       Appearance: Normal appearance. HENT:      Mouth/Throat:      Mouth: Mucous membranes are moist.   Pulmonary:      Effort: Pulmonary effort is normal.      Breath sounds: Normal breath sounds. Abdominal:      Palpations: Abdomen is soft. Skin:     General: Skin is warm and dry. Neurological:      Mental Status: She is alert. Psychiatric:         Mood and Affect: Mood normal.         Thought Content: Thought content normal.           ASSESSMENT & PLAN  Kimber Ovalle was seen today for follow-up. Diagnoses and all orders for this visit:    At high risk for falls    Hepatic cirrhosis, unspecified hepatic cirrhosis type, unspecified whether ascites present ([facility] [address]        No follow-ups on file. No red flag sx and Follow up with Dr Andrae Ospina    All copied or forwarded information in the progress note was verified by me to be accurate at the time of visit  Patient's past medical, surgical, social and family history were reviewed and updated     All patient questions answered. Patient voiced understanding. On the basis of positive falls risk screening, assessment and plan is as follows: patient will follow up in 3 month(s) for further evaluation.  Has FU with Dr Noelle Razo

## 2022-03-22 NOTE — PATIENT INSTRUCTIONS
Continue Lasix and Aldactone  Patient Education        Cirrhosis: Care Instructions  Overview     Cirrhosis occurs when healthy tissue in your liver gets scarred. This keeps the liver from working well. It usually happens after a liver has been inflamed for years. Cirrhosis is most often caused by alcohol use disorder or hepatitis infection. But there are other causes too. These include medicines and too much fat in the liver. Conditions passed down in families and other disorders can also cause it. In some cases, no cause can be found. Treatment can't completely fix liver damage. But you may be able to slow or prevent more damage if you don't drink alcohol or take medicines, drugs, or supplements that harm your liver. Follow-up care is a key part of your treatment and safety. Be sure to make and go to all appointments, and call your doctor if you are having problems. It's also a good idea to know your test results and keep a list of the medicines you take. How can you care for yourself at home? · Do not drink any alcohol. It can harm your liver. Talk to your doctor if you need help to stop drinking. · Be safe with medicines. Take your medicines exactly as prescribed. Call your doctor if you think you are having a problem with your medicine. · Talk to your doctor before you take any other medicines. These include over-the-counter medicines and herbal products. · Be careful taking acetaminophen (Tylenol), ibuprofen (Advil, Motrin), or naproxen (Aleve). These can sometimes cause more liver damage. Talk with your doctor if you're not sure which medicines are safe. · If your cirrhosis causes extra fluid to build up in your body, try not to eat a lot of salt. Use less salt when you cook and at the table. Don't eat fast foods or snack foods with a lot of salt. Extra fluid in your belly, legs, and chest can cause serious problems.   · Work with your doctor or a dietitian to be sure you eat the right amount of carbohydrate, protein, fat, and sodium (salt). It's very important to choose the best foods for the health of your liver. · If your doctor recommends it, limit how much fluid you drink. · If your doctor recommends it, get more exercise. Walking is a good choice. Bit by bit, increase the amount you walk every day. Try for at least 30 minutes on most days of the week. You also may want to swim, bike, or do other activities. When should you call for help? Call 911 anytime you think you may need emergency care. For example, call if:    · You have trouble breathing.     · You vomit blood or what looks like coffee grounds. Call your doctor now or seek immediate medical care if:    · You feel very sleepy or confused.     · You have new or worse belly pain.     · You have a fever.     · There is a new or increasing yellow tint to your skin or the whites of your eyes.     · You have any abnormal bleeding, such as:  ? Nosebleeds. ? Vaginal bleeding that is different (heavier, more frequent, at a different time of the month) than what you are used to.  ? Bloody or black stools, or rectal bleeding. ? Bloody or pink urine. Watch closely for changes in your health, and be sure to contact your doctor if:    · You have any problems.     · Your belly is getting bigger.     · You are gaining weight. Where can you learn more? Go to [email or web address] org and sign in to your [facility] account. Enter M412 in the [facility] box to learn more about \"Cirrhosis: Care Instructions. \"     If you do not have an account, please click on the \"Sign Up Now\" link. Current as of: September 8, 2021               Content Version: 13.1  © 9014-9543 Healthwise, [employer]. Care instructions adapted under license by [facility] ([facility]).  If you have questions about a medical condition or this instruction, always ask your healthcare professional. [address] any warranty or liability for your use of this information.

## 2022-03-23 LAB
ANAEROBIC CULTURE: NORMAL
BODY FLUID CULTURE, STERILE: NORMAL
GRAM STAIN RESULT: NORMAL

## 2022-04-08 ENCOUNTER — OFFICE VISIT (OUTPATIENT)
Dept: FAMILY MEDICINE CLINIC | Age: 82
End: 2022-04-08
Payer: MEDICARE

## 2022-04-08 VITALS
TEMPERATURE: 97.4 F | HEIGHT: 66 IN | OXYGEN SATURATION: 93 % | DIASTOLIC BLOOD PRESSURE: 60 MMHG | SYSTOLIC BLOOD PRESSURE: 90 MMHG | BODY MASS INDEX: 28.51 KG/M2 | HEART RATE: 71 BPM | WEIGHT: 177.4 LBS | RESPIRATION RATE: 16 BRPM

## 2022-04-08 DIAGNOSIS — J01.90 ACUTE RHINOSINUSITIS: ICD-10-CM

## 2022-04-08 DIAGNOSIS — M65.341 TRIGGER RING FINGER OF RIGHT HAND: Primary | ICD-10-CM

## 2022-04-08 DIAGNOSIS — M77.11 LATERAL EPICONDYLITIS OF RIGHT ELBOW: ICD-10-CM

## 2022-04-08 PROCEDURE — 99214 OFFICE O/P EST MOD 30 MIN: CPT | Performed by: FAMILY MEDICINE

## 2022-04-08 RX ORDER — PREDNISONE 20 MG/1
40 TABLET ORAL DAILY
Qty: 10 TABLET | Refills: 0 | Status: SHIPPED | OUTPATIENT
Start: 2022-04-08 | End: 2022-04-13

## 2022-04-08 RX ORDER — ASPIRIN 81 MG/1
81 TABLET ORAL DAILY
COMMUNITY

## 2022-04-08 RX ORDER — BENZONATATE 200 MG/1
200 CAPSULE ORAL 3 TIMES DAILY PRN
Qty: 30 CAPSULE | Refills: 0 | Status: SHIPPED | OUTPATIENT
Start: 2022-04-08 | End: 2022-04-15

## 2022-04-08 RX ORDER — AMOXICILLIN AND CLAVULANATE POTASSIUM 875; 125 MG/1; MG/1
1 TABLET, FILM COATED ORAL 2 TIMES DAILY
Qty: 20 TABLET | Refills: 0 | Status: SHIPPED | OUTPATIENT
Start: 2022-04-08 | End: 2022-04-18

## 2022-04-08 ASSESSMENT — PATIENT HEALTH QUESTIONNAIRE - PHQ9
SUM OF ALL RESPONSES TO PHQ QUESTIONS 1-9: 3
7. TROUBLE CONCENTRATING ON THINGS, SUCH AS READING THE NEWSPAPER OR WATCHING TELEVISION: 0
4. FEELING TIRED OR HAVING LITTLE ENERGY: 2
3. TROUBLE FALLING OR STAYING ASLEEP: 0
SUM OF ALL RESPONSES TO PHQ QUESTIONS 1-9: 3
SUM OF ALL RESPONSES TO PHQ QUESTIONS 1-9: 3
2. FEELING DOWN, DEPRESSED OR HOPELESS: 0
SUM OF ALL RESPONSES TO PHQ QUESTIONS 1-9: 3
9. THOUGHTS THAT YOU WOULD BE BETTER OFF DEAD, OR OF HURTING YOURSELF: 0
6. FEELING BAD ABOUT YOURSELF - OR THAT YOU ARE A FAILURE OR HAVE LET YOURSELF OR YOUR FAMILY DOWN: 0
1. LITTLE INTEREST OR PLEASURE IN DOING THINGS: 0
8. MOVING OR SPEAKING SO SLOWLY THAT OTHER PEOPLE COULD HAVE NOTICED. OR THE OPPOSITE, BEING SO FIGETY OR RESTLESS THAT YOU HAVE BEEN MOVING AROUND A LOT MORE THAN USUAL: 0
5. POOR APPETITE OR OVEREATING: 1
SUM OF ALL RESPONSES TO PHQ9 QUESTIONS 1 & 2: 0

## 2022-04-08 NOTE — PROGRESS NOTES
SUBJECTIVE:  Sebastian Johnson is a 80 y.o. y/o female that presents with Headache and Cough  . HPI:      Symptoms have been present for 10 day(s). Symptoms are unchanged since they initially started. Fever? Yes, did have one initially  Runny nose or congestion? Yes, with post nasal drainage  Cough? Yes, mildly productive  Sore throat? Yes  Shortness of breath/Wheezing? Yes, mild exertional SOB  Other associated symptoms?  none      Known COVID exposures or RFs? No  Smoker? No  Preexisting Respiratory conditions?  none      Notes right forearm pain. Worse with gripping. Having trigger finger sx of the right 4th digit. Past Medical History:   Diagnosis Date    Anemia     Aneurysm of abdominal aorta (HCC)     Arthritis     Blood circulation, collateral     Bursitis, trochanteric     CAD (coronary artery disease) 2015    Cerebral artery occlusion with cerebral infarction (HCC)     Chronic back pain     Cirrhosis of liver without ascites ([facility] [address] 2020    Depression     Diabetes mellitus (HCC)     diet controlled    GERD (gastroesophageal reflux disease)     Headache(784.0)     History of basal cell cancer     Nose    Hyperlipidemia     Hypertension     Irritable bowel     Movement disorder     PVD (peripheral vascular disease) ([facility] [address]     S/P CABG (coronary artery bypass graft)     Sciatica          Tobacco Use      Smoking status: Former Smoker        Packs/day: 0.50        Years: 25.00        Pack years: 12.5        Types: Cigarettes        Quit date: 11/3/2000        Years since quittin.4      Smokeless tobacco: Never Used      Tobacco comment: quit in             OBJECTIVE:  BP 90/60   Pulse 71   Temp 97.4 °F (36.3 °C) (Infrared)   Resp 16   Ht 5' 6\" (1.676 m)   Wt 177 lb 6.4 oz (80.5 kg)   LMP  (LMP Unknown)   SpO2 93%   BMI 28.63 kg/m²   General appearance: oriented to person, place, and time.   ENT exam reveals - neck without nodes, pharynx erythematous without exudate and nasal mucosa congested. CVS exam: normal rate, regular rhythm, normal S1, S2, no murmurs, rubs, clicks or gallops. Chest:clear to auscultation, no wheezes, rales or rhonchi, symmetric air entry. Abdominal exam: soft, nontender, nondistended, no masses or organomegaly. Extremities:  No clubbing, cyanosis or edema  Skin exam - normal coloration and turgor, no rashes, no suspicious skin lesions noted. Psych -  Affect appropriate. Thought process is normal without evidence of depression or psychosis. Good insight and appropriae interaction. Cognition and memory appear to be intact. ASSESSMENT & PLAN  Gino Mares was seen today for headache and cough. Diagnoses and all orders for this visit:    Trigger ring finger of right hand  -     KYLE Hermosillo MD, Orthopedic Surgery, [place]    Acute rhinosinusitis  -     amoxicillin-clavulanate (AUGMENTIN) 875-125 MG per tablet; Take 1 tablet by mouth 2 times daily for 10 days  -     benzonatate (TESSALON) 200 MG capsule; Take 1 capsule by mouth 3 times daily as needed for Cough    Lateral epicondylitis of right elbow  -     predniSONE (DELTASONE) 20 MG tablet; Take 2 tablets by mouth daily for 5 days        Return if symptoms worsen or fail to improve.     -Start above treatments  -Patient advised to contact our office immediately if symptoms worsen or persist  -Patient counseled on conservative care including fluids, rest and OTC meds      I have reviewed this patient's history, habits, and medication list and have updated the chart where appropriate.

## 2022-04-22 ENCOUNTER — HOSPITAL ENCOUNTER (OUTPATIENT)
Age: 82
Discharge: HOME | End: 2022-04-22
Payer: MEDICARE

## 2022-04-22 DIAGNOSIS — Z95.1 S/P CABG X 3: ICD-10-CM

## 2022-04-22 DIAGNOSIS — E78.00 PURE HYPERCHOLESTEROLEMIA: ICD-10-CM

## 2022-04-22 LAB
ALBUMIN SERPL-MCNC: 4 G/DL (ref 3.5–5.1)
ALP BLD-CCNC: 132 U/L (ref 38–126)
ALT SERPL-CCNC: 25 U/L (ref 11–66)
AST SERPL-CCNC: 60 U/L (ref 5–40)
BILIRUB SERPL-MCNC: 1.2 MG/DL (ref 0.3–1.2)
BILIRUBIN DIRECT: 0.4 MG/DL (ref 0–0.3)
TOTAL PROTEIN: 6.9 G/DL (ref 6.1–8)

## 2022-04-22 PROCEDURE — 36415 COLL VENOUS BLD VENIPUNCTURE: CPT

## 2022-04-22 PROCEDURE — 80076 HEPATIC FUNCTION PANEL: CPT

## 2022-04-22 RX ORDER — SIMVASTATIN 40 MG
TABLET ORAL
Qty: 90 TABLET | Refills: 3 | Status: SHIPPED | OUTPATIENT
Start: 2022-04-22

## 2022-06-23 ENCOUNTER — OFFICE VISIT (OUTPATIENT)
Dept: FAMILY MEDICINE CLINIC | Age: 82
End: 2022-06-23
Payer: MEDICARE

## 2022-06-23 VITALS
SYSTOLIC BLOOD PRESSURE: 106 MMHG | WEIGHT: 181.2 LBS | BODY MASS INDEX: 29.12 KG/M2 | RESPIRATION RATE: 18 BRPM | HEIGHT: 66 IN | TEMPERATURE: 97.9 F | OXYGEN SATURATION: 96 % | DIASTOLIC BLOOD PRESSURE: 78 MMHG | HEART RATE: 63 BPM

## 2022-06-23 DIAGNOSIS — Z00.00 MEDICARE ANNUAL WELLNESS VISIT, SUBSEQUENT: Primary | ICD-10-CM

## 2022-06-23 DIAGNOSIS — I10 BENIGN ESSENTIAL HTN: ICD-10-CM

## 2022-06-23 DIAGNOSIS — E78.00 PURE HYPERCHOLESTEROLEMIA: ICD-10-CM

## 2022-06-23 DIAGNOSIS — T88.7XXA MEDICATION SIDE EFFECT: ICD-10-CM

## 2022-06-23 DIAGNOSIS — K74.60 CIRRHOSIS, NONALCOHOLIC (HCC): ICD-10-CM

## 2022-06-23 PROCEDURE — 1123F ACP DISCUSS/DSCN MKR DOCD: CPT | Performed by: FAMILY MEDICINE

## 2022-06-23 PROCEDURE — 99213 OFFICE O/P EST LOW 20 MIN: CPT | Performed by: FAMILY MEDICINE

## 2022-06-23 PROCEDURE — G0439 PPPS, SUBSEQ VISIT: HCPCS | Performed by: FAMILY MEDICINE

## 2022-06-23 ASSESSMENT — PATIENT HEALTH QUESTIONNAIRE - PHQ9
8. MOVING OR SPEAKING SO SLOWLY THAT OTHER PEOPLE COULD HAVE NOTICED. OR THE OPPOSITE, BEING SO FIGETY OR RESTLESS THAT YOU HAVE BEEN MOVING AROUND A LOT MORE THAN USUAL: 0
9. THOUGHTS THAT YOU WOULD BE BETTER OFF DEAD, OR OF HURTING YOURSELF: 0
1. LITTLE INTEREST OR PLEASURE IN DOING THINGS: 1
6. FEELING BAD ABOUT YOURSELF - OR THAT YOU ARE A FAILURE OR HAVE LET YOURSELF OR YOUR FAMILY DOWN: 1
SUM OF ALL RESPONSES TO PHQ9 QUESTIONS 1 & 2: 2
3. TROUBLE FALLING OR STAYING ASLEEP: 0
SUM OF ALL RESPONSES TO PHQ QUESTIONS 1-9: 9
SUM OF ALL RESPONSES TO PHQ QUESTIONS 1-9: 9
5. POOR APPETITE OR OVEREATING: 3
4. FEELING TIRED OR HAVING LITTLE ENERGY: 3
7. TROUBLE CONCENTRATING ON THINGS, SUCH AS READING THE NEWSPAPER OR WATCHING TELEVISION: 0
SUM OF ALL RESPONSES TO PHQ QUESTIONS 1-9: 9
SUM OF ALL RESPONSES TO PHQ QUESTIONS 1-9: 9
10. IF YOU CHECKED OFF ANY PROBLEMS, HOW DIFFICULT HAVE THESE PROBLEMS MADE IT FOR YOU TO DO YOUR WORK, TAKE CARE OF THINGS AT HOME, OR GET ALONG WITH OTHER PEOPLE: 0
2. FEELING DOWN, DEPRESSED OR HOPELESS: 1

## 2022-06-23 ASSESSMENT — LIFESTYLE VARIABLES: HOW OFTEN DO YOU HAVE A DRINK CONTAINING ALCOHOL: NEVER

## 2022-06-23 NOTE — PROGRESS NOTES
Medicare Annual Wellness Visit    Thao Hilliard is here for Medicare AWV    Assessment & Plan   Medicare annual wellness visit, subsequent      Recommendations for Preventive Services Due: see orders and patient instructions/AVS.  Recommended screening schedule for the next 5-10 years is provided to the patient in written form: see Patient Instructions/AVS.     Return in about 6 months (around 12/23/2022). Subjective       Patient's complete Health Risk Assessment and screening values have been reviewed and are found in Flowsheets. The following problems were reviewed today and where indicated follow up appointments were made and/or referrals ordered. Positive Risk Factor Screenings with Interventions:    Fall Risk:  Do you feel unsteady or are you worried about falling? : (!) yes  2 or more falls in past year?: no  Fall with injury in past year?: no     Fall Risk Interventions:    · Home safety tips provided    Cognitive:   Words recalled: 0 Words Recalled  Clock Drawing Test (CDT): Normal  Total Score Interpretation: Abnormal Mini-Cog    Cognitive Impairment Interventions:  · see progress note    Depression:  PHQ-2 Score: 2  PHQ-9 Total Score: 9    Severity:1-4 = minimal depression, 5-9 = mild depression, 10-14 = moderate depression, 15-19 = moderately severe depression, 20-27 = severe depression    Depression Interventions:  · continue zoloft            General Health and ACP:  General  In general, how would you say your health is?: Fair  In the past 7 days, have you experienced any of the following: New or Increased Pain, New or Increased Fatigue, Loneliness, Social Isolation, Stress or Anger?: (!) Yes  Select all that apply: (!) New or Increased Fatigue,Loneliness  Do you get the social and emotional support that you need?: Yes  Do you have a Living Will?: Yes    Advance Directives     Power of  Living Will ACP-Advance Directive ACP-Power of     Not on File Not on File Not on File Not on File      General Health Risk Interventions:  · continue zoloft    Health Habits/Nutrition:     Physical Activity: Inactive    Days of Exercise per Week: 0 days    Minutes of Exercise per Session: 0 min     Have you lost any weight without trying in the past 3 months?: No  Body mass index: (!) 29.24  Have you seen the dentist within the past year?: N/A - wear dentures    Health Habits/Nutrition Interventions:  · encouraged on activity as tolerated             Objective   Vitals:    06/23/22 1330   BP: 106/78   Pulse: 63   Resp: 18   Temp: 97.9 °F (36.6 °C)   TempSrc: Infrared   SpO2: 96%   Weight: 181 lb 3.2 oz (82.2 kg)   Height: 5' 6\" (1.676 m)      Body mass index is 29.25 kg/m². Allergies   Allergen Reactions    Ciprofloxacin      Had chest cold and just got worse and worse on the cipro    Darvon [Propoxyphene Hcl] Itching    Flagyl [Metronidazole]     Lipitor [Atorvastatin Calcium] Nausea And Vomiting     Prior to Visit Medications    Medication Sig Taking? Authorizing Provider   simvastatin (ZOCOR) 40 MG tablet TAKE 1 TABLET NIGHTLY  ANDREZ Fowler CNP   aspirin 81 MG EC tablet Take 81 mg by mouth daily  Historical Provider, MD   furosemide (LASIX) 20 MG tablet Take 1 tablet by mouth daily  ANDREZ Fowler CNP   spironolactone (ALDACTONE) 25 MG tablet Take 1 tablet by mouth daily  ANDREZ Fowler CNP   metoprolol tartrate (LOPRESSOR) 25 MG tablet TAKE 1/2 TABLET TWICE A DAY  Lew MARIAN Aleman DO   Apoaequorin (PREVAGEN) 10 MG CAPS Take 1 tablet by mouth  Historical Provider, MD   sertraline (ZOLOFT) 25 MG tablet TAKE 1 TABLET BY MOUTH EVERY DAY  Lew MARIAN Aleman DO   acetaminophen (TYLENOL) 325 MG tablet Take 650 mg by mouth every 6 hours as needed for Pain  Historical Provider, MD   lidocaine (LIDODERM) 5 % Place 3 patches onto the skin every 24 hours 12 hours on, 12 hours off.   Lashell Aleman, DO   blood glucose monitor strips Check blood sugar q daily, Dx E11.9  Rabia Marcus DO   Handicap Placard MISC by Does not apply route Expires 12/14/2022  Rabia Marcus DO       CareTeam (Including outside providers/suppliers regularly involved in providing care):   Patient Care Team:  Rabia Marcus DO as PCP - Ludin Ponce DO as PCP - [facility] Empaneled Provider  Liliana Calderon MD as Consulting Physician [facility])     Reviewed and updated this visit:  Tobacco  Allergies  Meds  Med Hx  Surg Hx  Soc Hx  Fam Hx

## 2022-06-23 NOTE — PROGRESS NOTES
Megan Ayon is a 80 y.o. female that presents for     Chief Complaint   Patient presents with    Medicare AWV       /78   Pulse 63   Temp 97.9 °F (36.6 °C) (Infrared)   Resp 18   Ht 5' 6\" (1.676 m)   Wt 181 lb 3.2 oz (82.2 kg)   LMP  (LMP Unknown)   SpO2 96%   BMI 29.25 kg/m²       HPI    Has been doing weekly iron infusions. Most recent Hgb 9.5. Eating well, mood ok. On remeron , would like to see if she can stop this. Has noted feeling dizzy in the AM at times. HTN    Does patient check BP regularly at home? - No  Current Medication regimen - metoprolol  Tolerating medications well? - yes    Shortness of breath or chest pain? No  Headache or visual complaints? No  Neurologic changes like confusion? No  Extremity edema? No    BP Readings from Last 3 Encounters:   06/23/22 106/78   04/08/22 90/60   03/22/22 124/72     Memory issues:  'about the same'. Is on prevagen,  feels like it does help. Diarrhea is better overall since stopping the aricept.     Health Maintenance   Topic Date Due    Hepatitis A vaccine (1 of 2 - Risk 2-dose series) Never done    Hepatitis B vaccine (1 of 3 - Risk 3-dose series) Never done    DEXA (modify frequency per FRAX score)  Never done    Shingles vaccine (3 of 3) 02/25/2021    Lipids  10/26/2021    DTaP/Tdap/Td vaccine (2 - Td or Tdap) 10/15/2022    Depression Monitoring  04/08/2023    Annual Wellness Visit (AWV)  06/24/2023    Flu vaccine  Completed    Pneumococcal 65+ years Vaccine  Completed    COVID-19 Vaccine  Completed    Hib vaccine  Aged Out    Meningococcal (ACWY) vaccine  Aged Out       Past Medical History:   Diagnosis Date    Anemia     [facility]neur[facility]m [facility] abdominal aorta [facility] [address]     Arthritis     Blood circulation, collateral     Bursitis, trochanteric     CAD (coronary artery disease) 2/2015    Cerebral artery occlusion with cerebral infarction (HCC)     Chronic back pain     Cirrhosis of liver without ascites ([address] 2020    Depression     Diabetes mellitus (HCC)     diet controlled    GERD (gastroesophageal reflux disease)     Headache(784.0)     History of basal cell cancer     Nose    Hyperlipidemia     Hypertension     Irritable bowel     Movement disorder     PVD (peripheral vascular disease) (HCC)     S/P CABG (coronary artery bypass graft)     Sciatica        Past Surgical History:   Procedure Laterality Date    ABDOMEN SURGERY      complete hysterectomy, gallbladder    APPENDECTOMY      CARDIAC CATHETERIZATION  2/3/2016    [facility]    CHOLECYSTECTOMY  yrs ago    COLONOSCOPY      CORONARY ARTERY BYPASS GRAFT  2-18-16    x3    [facility] ENDOSCOPY, COLON, DIAGNOSTIC      EYE SURGERY      cataracts, glaucoma    HERNIA REPAIR      Hiatal Hernia    HIATAL HERNIA REPAIR      HYSTERECTOMY (CERVIX STATUS UNKNOWN)      LARYNGOSCOPY  10/29/2012 Dr Collin Casillas with Bx, Lingual Tonsillectomy, Hypopharyngoscopy    CA VEIN BYPASS GRAFT,CAROT-SUBCL/ SUBCL-CAROTD Left 2018    LEFT CAROTID SUBCLAVIAN BYPASS performed by Shahid Vargas MD at [id number] [place] [address]      as a teen    UPPER GASTROINTESTINAL ENDOSCOPY         Social History     Tobacco Use    Smoking status: Former Smoker     Packs/day: 0.50     Years: 25.00     Pack years: 12.50     Types: Cigarettes     Quit date: 11/3/2000     Years since quittin.6    Smokeless tobacco: Never Used    Tobacco comment: quit in    Vaping Use    Vaping Use: Never used   Substance Use Topics    Alcohol use: No    Drug use: No       Family History   Problem Relation Age of Onset    Early Death Mother         not sure of cause    Heart Disease Father 52        MI    Diabetes Sister     Cancer Brother         pancreatic    Cancer Son         larynx    High Blood Pressure Neg Hx     Stroke Neg Hx     Colon Cancer Neg Hx     Colon Polyps Neg Hx          I have reviewed the patient's past medical history, past surgical history, allergies, medications, social and family history and I have made updates where appropriate. Review of Systems        PHYSICAL EXAM:  /78   Pulse 63   Temp 97.9 °F (36.6 °C) (Infrared)   Resp 18   Ht 5' 6\" (1.676 m)   Wt 181 lb 3.2 oz (82.2 kg)   LMP  (LMP Unknown)   SpO2 96%   BMI 29.25 kg/m²     Physical Exam  Vitals and nursing note reviewed. Constitutional:       General: She is not in acute distress. Appearance: She is well-developed. HENT:      Head: Normocephalic and atraumatic. Right Ear: Hearing and external ear normal.      Left Ear: Hearing and external ear normal.      Nose: Nose normal.   Eyes:      General:         Right eye: No discharge. Left eye: No discharge. Conjunctiva/sclera: Conjunctivae normal.   Neck:      Thyroid: No thyromegaly. Trachea: No tracheal deviation. Cardiovascular:      Rate and Rhythm: Normal rate and regular rhythm. Heart sounds: Normal heart sounds. No murmur heard. No friction rub. No gallop. Pulmonary:      Effort: Pulmonary effort is normal. No respiratory distress. Breath sounds: No wheezing or rales. Chest:      Chest wall: No tenderness. Musculoskeletal:         General: No deformity. Cervical back: Normal range of motion and neck supple. Lymphadenopathy:      Cervical: No cervical adenopathy. Skin:     General: Skin is warm and dry. Findings: No erythema or rash. Neurological:      Mental Status: She is alert. Motor: No abnormal muscle tone. Coordination: Coordination normal.   Psychiatric:         Behavior: Behavior normal.         Thought Content: Thought content normal.         Judgment: Judgment normal.             ASSESSMENT & PLAN  Liv Camara was seen today for medicare awv.     Diagnoses and all orders for this visit:    Medicare annual wellness visit, subsequent    Pure hypercholesterolemia    Cirrhosis, nonalcoholic ([address]    Benign essential HTN    Medication side effect        -stop remeron, will let me know if sx persist.  -Other chronic issues are stable, continue current medications  -Advised to call if any issues      Return in about 6 months (around 12/23/2022). The most recent results of the following tests were reviewed with the patient today: none     All copied or forwarded information in the progress note was verified by me to be accurate at the time of visit  Patient's past medical, surgical, social and family history were reviewed and updated     All patient questions answered. Patient voiced understanding.

## 2022-06-23 NOTE — PATIENT INSTRUCTIONS
Personalized Preventive Plan for Josee Molina - 6/23/2022  Medicare offers a range of preventive health benefits. Some of the tests and screenings are paid in full while other may be subject to a deductible, co-insurance, and/or copay. Some of these benefits include a comprehensive review of your medical history including lifestyle, illnesses that may run in your family, and various assessments and screenings as appropriate. After reviewing your medical record and screening and assessments performed today your provider may have ordered immunizations, labs, imaging, and/or referrals for you. A list of these orders (if applicable) as well as your Preventive Care list are included within your After Visit Summary for your review. Other Preventive Recommendations:    · A preventive eye exam performed by an eye specialist is recommended every 1-2 years to screen for glaucoma; cataracts, macular degeneration, and other eye disorders. · A preventive dental visit is recommended every 6 months. · Try to get at least 150 minutes of exercise per week or 10,000 steps per day on a pedometer . · Order or download the FREE \"Exercise & Physical Activity: Your Everyday Guide\" from [employer]. Call 1-777.514.4974 or search [employer] online. · You need 5056-3685 mg of calcium and 8555-3915 IU of vitamin D per day. It is possible to meet your calcium requirement with diet alone, but a vitamin D supplement is usually necessary to meet this goal.  · When exposed to the sun, use a sunscreen that protects against both UVA and UVB radiation with an SPF of 30 or greater. Reapply every 2 to 3 hours or after sweating, drying off with a towel, or swimming. · Always wear a seat belt when traveling in a car. Always wear a helmet when riding a bicycle or motorcycle.

## 2022-07-01 ENCOUNTER — OFFICE VISIT (OUTPATIENT)
Dept: FAMILY MEDICINE CLINIC | Age: 82
End: 2022-07-01
Payer: MEDICARE

## 2022-07-01 ENCOUNTER — HOSPITAL ENCOUNTER (OUTPATIENT)
Age: 82
Discharge: HOME | End: 2022-07-01
Payer: MEDICARE

## 2022-07-01 VITALS
DIASTOLIC BLOOD PRESSURE: 80 MMHG | BODY MASS INDEX: 28.9 KG/M2 | HEART RATE: 72 BPM | TEMPERATURE: 97.1 F | RESPIRATION RATE: 14 BRPM | HEIGHT: 66 IN | OXYGEN SATURATION: 92 % | WEIGHT: 179.8 LBS | SYSTOLIC BLOOD PRESSURE: 110 MMHG

## 2022-07-01 DIAGNOSIS — R10.31 RIGHT LOWER QUADRANT ABDOMINAL PAIN: Primary | ICD-10-CM

## 2022-07-01 DIAGNOSIS — Z87.19 HX OF CIRRHOSIS: ICD-10-CM

## 2022-07-01 DIAGNOSIS — R10.31 RIGHT LOWER QUADRANT ABDOMINAL PAIN: ICD-10-CM

## 2022-07-01 LAB
ALBUMIN SERPL-MCNC: 3.9 G/DL (ref 3.5–5.1)
ALP BLD-CCNC: 104 U/L (ref 38–126)
ALT SERPL-CCNC: 15 U/L (ref 11–66)
AMMONIA: 58 UMOL/L (ref 11–60)
AST SERPL-CCNC: 39 U/L (ref 5–40)
BILIRUB SERPL-MCNC: 1.6 MG/DL (ref 0.3–1.2)
BILIRUBIN DIRECT: 0.7 MG/DL (ref 0–0.3)
TOTAL PROTEIN: 6.9 G/DL (ref 6.1–8)

## 2022-07-01 PROCEDURE — 1123F ACP DISCUSS/DSCN MKR DOCD: CPT | Performed by: NURSE PRACTITIONER

## 2022-07-01 PROCEDURE — 36415 COLL VENOUS BLD VENIPUNCTURE: CPT

## 2022-07-01 PROCEDURE — 82140 ASSAY OF AMMONIA: CPT

## 2022-07-01 PROCEDURE — 80076 HEPATIC FUNCTION PANEL: CPT

## 2022-07-01 PROCEDURE — 99214 OFFICE O/P EST MOD 30 MIN: CPT | Performed by: NURSE PRACTITIONER

## 2022-07-01 RX ORDER — HYDROCODONE BITARTRATE AND ACETAMINOPHEN 5; 325 MG/1; MG/1
TABLET ORAL
Status: CANCELLED | OUTPATIENT
Start: 2022-07-01

## 2022-07-01 RX ORDER — HYDROCODONE BITARTRATE AND ACETAMINOPHEN 5; 325 MG/1; MG/1
1 TABLET ORAL EVERY 6 HOURS PRN
Qty: 12 TABLET | Refills: 0 | Status: SHIPPED | OUTPATIENT
Start: 2022-07-01 | End: 2022-07-04

## 2022-07-01 RX ORDER — HYDROCODONE BITARTRATE AND ACETAMINOPHEN 5; 325 MG/1; MG/1
TABLET ORAL
COMMUNITY
Start: 2022-04-22 | End: 2022-07-01

## 2022-07-01 SDOH — ECONOMIC STABILITY: FOOD INSECURITY: WITHIN THE PAST 12 MONTHS, THE FOOD YOU BOUGHT JUST DIDN'T LAST AND YOU DIDN'T HAVE MONEY TO GET MORE.: NEVER TRUE

## 2022-07-01 SDOH — ECONOMIC STABILITY: FOOD INSECURITY: WITHIN THE PAST 12 MONTHS, YOU WORRIED THAT YOUR FOOD WOULD RUN OUT BEFORE YOU GOT MONEY TO BUY MORE.: NEVER TRUE

## 2022-07-01 ASSESSMENT — SOCIAL DETERMINANTS OF HEALTH (SDOH): HOW HARD IS IT FOR YOU TO PAY FOR THE VERY BASICS LIKE FOOD, HOUSING, MEDICAL CARE, AND HEATING?: NOT HARD AT ALL

## 2022-07-01 NOTE — PROGRESS NOTES
SUBJECTIVE:    Marisa Farias is a 80 y.o. y/o female that presents with Abdominal Pain (RUQ nausea)  . HPI:       Symptoms have been present for many month(s)  Location:  RLQ   Description: cramping, bloated, distention  Provoking Factors - activity, eating, movement and spicy foods  Alleviating Factors - none  Severity - severe, increasing in the last 2-3 days  Radiation: with radiation to back - right side    Change in pain with eating? yes  Change in pain with BM?  no  Nausea? yes  Vomiting? no  Diarrhea? yes  Constipation? no  Blood in Stools? No but black  Dysuria/Change in Urinary Frequency/Hematuria? yes  Back Pain? yes  Vaginal Discharge? no      Review of Systems  Constitutional:   +worsening fatigue, no chills or fevers. No lower extremity swelling. SOB worse, orthopnea    OBJECTIVE:  /80   Pulse 72   Temp 97.1 °F (36.2 °C) (Infrared)   Resp 14   Ht 5' 6\" (1.676 m)   Wt 179 lb 12.8 oz (81.6 kg)   LMP  (LMP Unknown)   SpO2 92%   BMI 29.02 kg/m²   General Appearance: well developed and well- nourished, in no acute distress  Pulmonary/Chest: clear to auscultation bilaterally- no wheezes, rales or rhonchi, normal air movement, no respiratory distress  Cardiovascular: normal rate, regular rhythm, normal S1 and S2, no murmurs, rubs, clicks, or gallops, distal pulses intact, no carotid bruits  Abdomen: bowel sounds hyperactive, tenderness present- right upper and lower quadrant,  without rebound and guarding and positive fluid wave  Extremities: no cyanosis, clubbing or edema  Musculoskeletal: No joint swelling or gross deformity   Psych:  Normal affect without evidence of depression or anxiety  Skin: warm and dry, no rash or erythema      ASSESSMENT & PLAN  Maximus Guadalupe was seen today for abdominal pain. Diagnoses and all orders for this visit:    Right lower quadrant abdominal pain  -     Cancel: Hepatic Function Panel; Future  -     US ABDOMEN COMPLETE; Future  -     Cancel: Ammonia;  Future  - HYDROcodone-acetaminophen (NORCO) 5-325 MG per tablet; Take 1 tablet by mouth every 6 hours as needed for Pain for up to 3 days. Intended supply: 3 days. Take lowest dose possible to manage pain  -     US LIVER; Future    Hx of cirrhosis  -     Cancel: Hepatic Function Panel; Future  -     US ABDOMEN COMPLETE; Future  -     Cancel: Ammonia; Future    will call with results of US. No follow-ups on file.     -Sx consistent with acute on chronic abdominal pain  -Obtain above labs, Obtain imaging and Continue current treatments  -ER precautions given today  -Patient advised to contact our office immediately if symptoms worsen or persist  -Patient counseled on conservative care including PO intake, rest and OTC meds

## 2022-07-05 ENCOUNTER — HOSPITAL ENCOUNTER (OUTPATIENT)
Dept: ULTRASOUND IMAGING | Age: 82
Discharge: HOME | End: 2022-07-05
Payer: MEDICARE

## 2022-07-05 ENCOUNTER — TELEPHONE (OUTPATIENT)
Dept: FAMILY MEDICINE CLINIC | Age: 82
End: 2022-07-05

## 2022-07-05 DIAGNOSIS — Z87.19 HX OF CIRRHOSIS: ICD-10-CM

## 2022-07-05 DIAGNOSIS — R10.31 RIGHT LOWER QUADRANT ABDOMINAL PAIN: ICD-10-CM

## 2022-07-05 DIAGNOSIS — K74.60 CIRRHOSIS, NONALCOHOLIC (HCC): ICD-10-CM

## 2022-07-05 DIAGNOSIS — R18.8 OTHER ASCITES: Primary | ICD-10-CM

## 2022-07-05 PROCEDURE — 76705 ECHO EXAM OF ABDOMEN: CPT

## 2022-07-05 NOTE — TELEPHONE ENCOUNTER
----- Message from ANDREZ Diamond CNP sent at 7/5/2022  8:17 AM EDT -----  Called and talked to pt this morning after US reviewed. Pain is still present, more so in the right upper quadrant now. Feels like she is starting to swell in lower extremities and short of breath. Not needing to take the Norco often. Ate good supper last night but otherwise not eating much. Will schedule paracentesis due to patients symptoms and US findings of ascites. Please reach out to pt to schedule paracentesis.

## 2022-07-08 ENCOUNTER — HOSPITAL ENCOUNTER (OUTPATIENT)
Dept: ULTRASOUND IMAGING | Age: 82
Discharge: HOME | End: 2022-07-08
Payer: MEDICARE

## 2022-07-08 DIAGNOSIS — R18.8 OTHER ASCITES: ICD-10-CM

## 2022-07-08 PROCEDURE — 49083 ABD PARACENTESIS W/IMAGING: CPT

## 2022-07-08 PROCEDURE — 88112 CYTOPATH CELL ENHANCE TECH: CPT

## 2022-07-08 PROCEDURE — 89050 BODY FLUID CELL COUNT: CPT

## 2022-07-08 PROCEDURE — 88108 CYTOPATH CONCENTRATE TECH: CPT

## 2022-07-08 PROCEDURE — 88305 TISSUE EXAM BY PATHOLOGIST: CPT

## 2022-07-09 LAB
BODY FLUID RBC: < 2000 /CUMM
CHARACTER, BODY FLUID: ABNORMAL
COLOR: YELLOW
MONONUCLEAR CELLS BODY FLUID: 71.9 %
PATHOLOGIST REVIEW: ABNORMAL
POLYMORPHONUCLEAR CELLS BODY FLUID: 28.1 %
SPECIMEN: ABNORMAL
TOTAL NUCLEATED CELLS BODY FLUID: 1083 /CUMM (ref 0–500)
TOTAL VOLUME RECEIVED BODY FLUID: 70 ML

## 2022-07-11 ENCOUNTER — TELEPHONE (OUTPATIENT)
Dept: FAMILY MEDICINE CLINIC | Age: 82
End: 2022-07-11

## 2022-07-11 NOTE — TELEPHONE ENCOUNTER
Patient has been informed and voiced understanding and states that she isn't doing good and is asking Dorothy Pederson can I do or take to make my blood come up\"

## 2022-07-12 NOTE — TELEPHONE ENCOUNTER
Patient stated her blood pressure is \"not good\". She has not taken her blood pressure, but she can tell it is not good because all she wants to do is sleep. I advised the patient to come in through the walk in. Patient stated she will be in tomorrow (7/13) in the morning.

## 2022-07-13 ENCOUNTER — OFFICE VISIT (OUTPATIENT)
Dept: FAMILY MEDICINE CLINIC | Age: 82
End: 2022-07-13
Payer: MEDICARE

## 2022-07-13 VITALS
TEMPERATURE: 97.3 F | HEIGHT: 66 IN | RESPIRATION RATE: 16 BRPM | SYSTOLIC BLOOD PRESSURE: 128 MMHG | HEART RATE: 63 BPM | BODY MASS INDEX: 28 KG/M2 | OXYGEN SATURATION: 93 % | WEIGHT: 174.2 LBS | DIASTOLIC BLOOD PRESSURE: 60 MMHG

## 2022-07-13 DIAGNOSIS — K59.09 OTHER CONSTIPATION: ICD-10-CM

## 2022-07-13 DIAGNOSIS — H81.11 BPPV (BENIGN PAROXYSMAL POSITIONAL VERTIGO), RIGHT: ICD-10-CM

## 2022-07-13 DIAGNOSIS — F32.1 CURRENT MODERATE EPISODE OF MAJOR DEPRESSIVE DISORDER WITHOUT PRIOR EPISODE (HCC): ICD-10-CM

## 2022-07-13 DIAGNOSIS — R73.03 PREDIABETES: ICD-10-CM

## 2022-07-13 DIAGNOSIS — H81.10 BENIGN PAROXYSMAL POSITIONAL VERTIGO, UNSPECIFIED LATERALITY: Primary | ICD-10-CM

## 2022-07-13 PROCEDURE — 1123F ACP DISCUSS/DSCN MKR DOCD: CPT | Performed by: NURSE PRACTITIONER

## 2022-07-13 PROCEDURE — 99214 OFFICE O/P EST MOD 30 MIN: CPT | Performed by: NURSE PRACTITIONER

## 2022-07-13 RX ORDER — DOCUSATE SODIUM 100 MG/1
100 CAPSULE, LIQUID FILLED ORAL 2 TIMES DAILY
Qty: 60 CAPSULE | Refills: 0 | Status: SHIPPED | OUTPATIENT
Start: 2022-07-13 | End: 2022-08-12

## 2022-07-13 RX ORDER — SERTRALINE HYDROCHLORIDE 25 MG/1
TABLET, FILM COATED ORAL
Qty: 90 TABLET | Refills: 3 | Status: SHIPPED | OUTPATIENT
Start: 2022-07-13

## 2022-07-13 ASSESSMENT — ENCOUNTER SYMPTOMS
COLOR CHANGE: 0
CONSTIPATION: 1
ABDOMINAL PAIN: 1

## 2022-07-13 NOTE — PROGRESS NOTES
Daja Knowles is a 80 y.o. female thatpresents for Abdominal Pain (right side is painful) and Fatigue      History obtained today from Patient and . HPI    Swelling in legs went down after paracentesis 2022. Still with right sided abdominal pain after paracentesis. + nausea  No vomiting  Belching now  Decreased appetite, eating very little. No diarrhea now, having bowel movement every 1-2 days, it is hard. She was taking kaopectate to help with diarrhea. No longer taking. Not passing flatus. Very unsteady at home for months. No falls. Dizziness in the morning when she wakes up. As day goes on dizziness gets a little better. Seeing Dr Katie Taylor for iron infusions and anemia. I have reviewed the patient's past medical history, past surgical history, allergies,medications, social and family history and I have made updates where appropriate.     Past Medical History:   Diagnosis Date    Anemia     Aneurysm of abdominal aorta (HCC)     Arthritis     Blood circulation, collateral     Bursitis, trochanteric     CAD (coronary artery disease) 2015    Cerebral artery occlusion with cerebral infarction (HCC)     Chronic back pain     Cirrhosis of liver without ascites ([address] 2020    Depression     Diabetes mellitus (HCC)     diet controlled    GERD (gastroesophageal reflux disease)     Headache(784.0)     History of basal cell cancer     Nose    Hyperlipidemia     Hypertension     Irritable bowel     Movement disorder     PVD (peripheral vascular disease) ([address]     S/P CABG (coronary artery bypass graft)     Sciatica        Social History     Tobacco Use    Smoking status: Former Smoker     Packs/day: 0.50     Years: 25.00     Pack years: 12.50     Types: Cigarettes     Quit date: 11/3/2000     Years since quittin.7    Smokeless tobacco: Never Used    Tobacco comment: quit in    Vaping Use    Vaping Use: Never used   Substance Use Topics    Alcohol use: laterality    BPPV (benign paroxysmal positional vertigo), right  -     [facility]    Other constipation  -     docusate sodium (COLACE) 100 MG capsule; Take 1 capsule by mouth 2 times daily      Pt can return to have right ear irrigated as needed. No follow-ups on file. No red flag sx, Referred to physical therapy, ER precautions given. Advised to seek care immediately if any new or worsening symptoms. and Follow up with our practice if no improvement or worsening sx. All copied or forwarded information in the progress note was verified by me to be accurate at the time of visit  Patient's past medical, surgical, social and family history were reviewed and updated     All patient questions answered. Patient voiced understanding.

## 2022-07-15 ENCOUNTER — HOSPITAL ENCOUNTER (OUTPATIENT)
Dept: PHYSICAL THERAPY | Age: 82
Setting detail: THERAPIES SERIES
Discharge: HOME | End: 2022-07-15
Payer: MEDICARE

## 2022-07-15 PROCEDURE — 97162 PT EVAL MOD COMPLEX 30 MIN: CPT

## 2022-07-15 PROCEDURE — 95992 CANALITH REPOSITIONING PROC: CPT

## 2022-07-15 NOTE — PROGRESS NOTES
** PLEASE SIGN, DATE AND TIME CERTIFICATION BELOW AND RETURN TO [facility] OUTPATIENT REHABILITATION (FAX #: [phone]). ATTEST/CO-SIGN IF ACCESSING VIA [employer]. THANK YOU.**    I certify that I have examined the patient below and determined that Physical Medicine and Rehabilitation service is necessary and that I approve the established plan of care for up to 90 days or as specifically noted. Attestation, signature or co-signature of physician indicates approval of certification requirements.    ________________________ ____________ __________  Physician Signature   Date   Time  Elijah Fischer 60  PHYSICAL THERAPY  [x] VESTIBULAR EVALUATION  [] DAILY NOTE [] PROGRESS NOTE [] DISCHARGE NOTE    [x] [facility]   [] [address]    [] [address] [facility]   [] Dale Even    Date: 7/15/2022  Patient Name:  Manuela Jarrett  : 1940  MRN: [id number]  CSN: [id number]    Referring Practitioner ANDREZ Mccartney*   Diagnosis Benign paroxysmal vertigo, right ear [H81.11]    Treatment Diagnosis BPPV, impaired balance, difficulty walking   Date of Evaluation 7/15/22   Additional Pertinent History Diabetes, memory issues, anemic. Functional Outcome Measure Used [facility]   Functional Outcome Score 78 (7/15/22)       Insurance: Primary: Payor: Margarita Neff /  /  / ,   Secondary:    Authorization Information: PRE CERTIFICATION REQUIRED: NO  INSURANCE THERAPY BENEFIT:  NO VISIT LIMIT  AQUATIC THERAPY COVERED: YES  MODALITIES COVERED:  YES   Visit # 1, 1/10 for progress note   Visits Allowed: unlimited   Recertification Date: 50   Physician Follow-Up:    Physician Orders:    History of Present Illness: Pt presents with dizziness for past 1 month. Pt notes she loses balance when she goes to walk. Pt notes spinning with supine to sit so lays back down, then moves slow and waits for it to quit. Dizziness occurs when turning quickly or moving head quickly. Pt notes fall 2 years ago d/t passing out. Pt went to chiropractor yesterday and he applied pressure to back of head which felt good. SUBJECTIVE: Spinning occurs every time she gets out of bed. Social/Functional History and Current Status:  Medications and Allergies have been reviewed and are listed on Medical History Questionnaire. Vivien Campos lives with spouse in a single story home with a ramp to enter.     Task Previous Current   ADLs  Independent Independent   IADL's Assistance Required Assistance Required   Ambulation Modified Independent Modified Independent- presents with 3WW but tends to hang onto    Transfers Modified Independent Modified Independent   Recreation Not Applicable Not Applicable   Community Integration Assistance Required Assistance Required   Driving Does not drive Does not drive   Work Retired  Retired       Precautions:     Pain H/o spinal pain, c/o crunching in neck with movement   Neck ROM AROM WFL- pain with right rotation   Vertebral Artery Testing Negative B   Visual Field    Oculomotor/VOR End range nystamgus noted  Smooth pursuit: normal  Saccades: abnormal left  Head thrust: abnormal B with corrective saccade   Balance Level surface eyes open 30 sec slight sway  Level surface eyes closed 15 sec then steps feet apart   Gait With 3WW, slow pace, equal step length, mild path deivation   Special Testing BPPV Loaded [place] Hallpike: positive B for pt report of spinning but no nystagmus, right worse than left   Hearing/Ear Pain      TREATMENT   Precautions:    Pain: Neck discomfort    \"X in shaded column indicates activity completed today    *\" next to exercise/intervention indicates progression   Modalities Parameters/  Location  Notes                     Manual Therapy Time/Technique  Notes                     Exercise/Intervention   Notes   Loaded [place] Hallpike: positive B, R worse than L   x    Canalith repositioning maneuvers (CRM) for right posterior canal 1x x                                                                     Specific Interventions Next Treatment: recheck Mat Traylor, treat appropriately, add x1 viewing as necessary, balance training, VOR walking as needed     Activity/Treatment Tolerance:  [x]  Patient tolerated treatment well  []  Patient limited by fatigue  []  Patient limited by pain   []  Patient limited by medical complications  []  Other:     Assessment: 80year old presents with vertigo for past month. Oculomotor exam showed abnormal VOR bilaterally, and left saccades. Pt had difficulty balancing on level surface with eyes closed so didn't test on foam. BPPV testing positive B, but worse on right so treated with CRM for right posterior canal. Pt will benefit from skilled vestibular rehab to address listed impairments to eliminate vertigo and improve mobility. Body Structures/Functions/Activity Limitations:impaired balance, abnormal gait, and vestibular impairment  Prognosis: good    GOALS:  Patient Goal: get rid of dizziness    Short Term Goals:  Time Frame: defer to LTGs      Long Term Goals:  Time Frame: 8 weeks  Patient will have negative bilateral Bethany Hallpike test to get up out of bed without dizziness. Patient will tolerate VOR walking with AD with straight path and no LOB for safe community mobility. Patient will reduce Dizziness Handicap Inventory score from 78/100 to <30/100 to tolerate bending over, quick head movements, and getting in/out of bed. Patient Education:   [x]  HEP/Education Completed: Plan of Care, Goals, vestibular precautions   [facility] Access Code:  []  No new Education completed  []  Reviewed Prior HEP      [x]  Patient verbalized and/or demonstrated understanding of education provided. []  Patient unable to verbalize and/or demonstrate understanding of education provided. Will continue education.   [x]  Barriers to learning: memory issues    PLAN:  Treatment Recommendations: Balance Training, Gait Training, Neuromuscular Re-education, Home Exercise Program, Patient Education, and Vestibular Rehabilitation    [x]  Plan of care initiated. Plan to see patient 1 times per week for 8 weeks to address the treatment planned outlined above.   []  Continue with current plan of care  []  Modify plan of care as follows:    []  Hold pending physician visit  []  Discharge    Time In 1038   Time Out 1118   Timed Code Minutes: 0 min   Total Treatment Time: 40 min       Electronically Signed by: Melene Schaumann, PT

## 2022-07-22 ENCOUNTER — HOSPITAL ENCOUNTER (OUTPATIENT)
Dept: PHYSICAL THERAPY | Age: 82
Setting detail: THERAPIES SERIES
Discharge: HOME | End: 2022-07-22
Payer: MEDICARE

## 2022-07-22 PROCEDURE — 95992 CANALITH REPOSITIONING PROC: CPT

## 2022-07-22 NOTE — PROGRESS NOTES
Elijah Fiscehr 60  PHYSICAL THERAPY  [] VESTIBULAR EVALUATION  [x] DAILY NOTE [] PROGRESS NOTE [] DISCHARGE NOTE    [x] OUTPATIENT REHABILITATION CENTER [facility]   [] [place] [address]    [] [facility]   [] Lucio Alfaro    Date: 2022  Patient Name:  Ankit Galeas  : 1940  MRN: [id number]  CSN: [id number]    Referring Practitioner ANDREZ Barton*   Diagnosis Benign paroxysmal vertigo, right ear [H81.11]    Treatment Diagnosis BPPV, impaired balance, difficulty walking   Date of Evaluation 7/15/22   Additional Pertinent History Diabetes, memory issues, anemic. Functional Outcome Measure Used [facility]   Functional Outcome Score 78 (7/15/22)       Insurance: Primary: Payor: Roxane Florentino /  /  / ,   Secondary:    Authorization Information: PRE CERTIFICATION REQUIRED: NO  INSURANCE THERAPY BENEFIT:  NO VISIT LIMIT  AQUATIC THERAPY COVERED: YES  MODALITIES COVERED:  YES   Visit # 2, 2/10 for progress note   Visits Allowed: unlimited   Recertification Date: 97   Physician Follow-Up:    Physician Orders:    History of Present Illness: Pt presents with dizziness for past 1 month. Pt notes she loses balance when she goes to walk. Pt notes spinning with supine to sit so lays back down, then moves slow and waits for it to quit. Dizziness occurs when turning quickly or moving head quickly. Pt notes fall 2 years ago d/t passing out. Pt went to chiropractor yesterday and he applied pressure to back of head which felt good. SUBJECTIVE: Patient reports she is feeling a little better, but still has spinning everytime she gets out of bed.       TREATMENT   Precautions:    Pain: Neck discomfort    \"X in shaded column indicates activity completed today    *\" next to exercise/intervention indicates progression   Modalities Parameters/  Location  Notes                     Manual Therapy Time/Technique  Notes                     Exercise/Intervention   Notes   [place] Hallpike: positive B, R worse than L   x    Canalith repositioning maneuvers (CRM) for right posterior canal 2x  x                                                                     Specific Interventions Next Treatment: recheck Nessa Olmstead, treat appropriately, add x1 viewing as necessary, balance training, VOR walking as needed     Activity/Treatment Tolerance:  [x]  Patient tolerated treatment well  []  Patient limited by fatigue  []  Patient limited by pain   []  Patient limited by medical complications  []  Other:     Assessment: Polly Allred presents to therapy today and continues to have spinning. Positive on right side and treated 2 times with CRM. Body Structures/Functions/Activity Limitations:impaired balance, abnormal gait, and vestibular impairment  Prognosis: good    GOALS:  Patient Goal: get rid of dizziness    Short Term Goals:  Time Frame: defer to LTGs      Long Term Goals:  Time Frame: 8 weeks  Patient will have negative bilateral [place] Hallpike test to get up out of bed without dizziness. Patient will tolerate VOR walking with AD with straight path and no LOB for safe community mobility. Patient will reduce Dizziness Handicap Inventory score from 78/100 to <30/100 to tolerate bending over, quick head movements, and getting in/out of bed. Patient Education:   [x]  HEP/Education Completed: educated on crystals and CRM   TV[employer] Time Access Code:  []  No new Education completed  []  Reviewed Prior HEP      [x]  Patient verbalized and/or demonstrated understanding of education provided. []  Patient unable to verbalize and/or demonstrate understanding of education provided. Will continue education. [x]  Barriers to learning: memory issues    PLAN:  Treatment Recommendations: Balance Training, Gait Training, Neuromuscular Re-education, Home Exercise Program, Patient Education, and Vestibular Rehabilitation    []  Plan of care initiated.   Plan to see patient 1 times per week for 8 weeks to

## 2022-07-28 ENCOUNTER — HOSPITAL ENCOUNTER (OUTPATIENT)
Dept: PHYSICAL THERAPY | Age: 82
Setting detail: THERAPIES SERIES
Discharge: HOME | End: 2022-07-28
Payer: MEDICARE

## 2022-07-28 PROCEDURE — 95992 CANALITH REPOSITIONING PROC: CPT

## 2022-07-28 NOTE — PROGRESS NOTES
Elijah Fischer 60  PHYSICAL THERAPY  [] VESTIBULAR EVALUATION  [x] DAILY NOTE [] PROGRESS NOTE [] DISCHARGE NOTE    [x] OUTPATIENT REHABILITATION CENTER [facility]   [] [place] [address]    [] [facility]   [] Domo Prajapatiar    Date: 2022  Patient Name:  Queta Dean  : 1940  MRN: [id number]  CSN: [id number]    Referring Practitioner ANDREZ Bhatt*   Diagnosis Benign paroxysmal vertigo, right ear [H81.11]    Treatment Diagnosis BPPV, impaired balance, difficulty walking   Date of Evaluation 7/15/22   Additional Pertinent History Diabetes, memory issues, anemic. Functional Outcome Measure Used [facility]   Functional Outcome Score 78 (7/15/22)       Insurance: Primary: Payor: Pau Regan /  /  / ,   Secondary:    Authorization Information: PRE CERTIFICATION REQUIRED: NO  INSURANCE THERAPY BENEFIT:  NO VISIT LIMIT  AQUATIC THERAPY COVERED: YES  MODALITIES COVERED:  YES   Visit # 3, 3/10 for progress note   Visits Allowed: unlimited   Recertification Date: 42   Physician Follow-Up:    Physician Orders:    History of Present Illness: Pt presents with dizziness for past 1 month. Pt notes she loses balance when she goes to walk. Pt notes spinning with supine to sit so lays back down, then moves slow and waits for it to quit. Dizziness occurs when turning quickly or moving head quickly. Pt notes fall 2 years ago d/t passing out. Pt went to chiropractor yesterday and he applied pressure to back of head which felt good. SUBJECTIVE: Patient reports dizziness is a little bit better. Pt didn't have dizziness this morning getting out of bed, but normally she does.      OBJECTIVE:  TREATMENT   Precautions:    Pain: Neck discomfort    \"X in shaded column indicates activity completed today    *\" next to exercise/intervention indicates progression   Modalities Parameters/  Location  Notes                     Manual Therapy Time/Technique  Notes Exercise/Intervention   Notes   Loaded [place] Hallpike: positive right   x    Canalith repositioning maneuvers (CRM) for right posterior canal 2x  x C/o neck discomfort          Roll test: positive to right for pt report of movement, negative to left   x                                                       Specific Interventions Next Treatment: recheck [employer], treat appropriately, add x1 viewing as necessary, balance training, VOR walking as needed     Activity/Treatment Tolerance:  [x]  Patient tolerated treatment well  []  Patient limited by fatigue  []  Patient limited by pain   []  Patient limited by medical complications  []  Other:     Assessment: Loaded [place] Hallpike and roll test positive to right but posterior canal most affected per pt report. No nystagmus noted during tests. Treated with CRM for right posterior canal twice. Pt noted lightheadedness upon sitting up first time, but not second time. If minimal change in symptoms, treat horizontal canal next session. GOALS:  Patient Goal: get rid of dizziness    Short Term Goals:  Time Frame: defer to LT      Long Term Goals:  Time Frame: 8 weeks  Patient will have negative bilateral [place] Hallpike test to get up out of bed without dizziness. Patient will tolerate VOR walking with AD with straight path and no LOB for safe community mobility. Patient will reduce Dizziness Handicap Inventory score from 78/100 to <30/100 to tolerate bending over, quick head movements, and getting in/out of bed. Patient Education:   [x]  HEP/Education Completed: 24 hour precautions   [facility] Access Code:  []  No new Education completed  []  Reviewed Prior HEP      [x]  Patient verbalized and/or demonstrated understanding of education provided. []  Patient unable to verbalize and/or demonstrate understanding of education provided. Will continue education.   [x]  Barriers to learning: memory issues    PLAN:  Treatment Recommendations: Balance Training, Gait Training, Neuromuscular Re-education, Home Exercise Program, Patient Education, and Vestibular Rehabilitation    []  Plan of care initiated. Plan to see patient 1 times per week for 8 weeks to address the treatment planned outlined above.   [x]  Continue with current plan of care  []  Modify plan of care as follows:    []  Hold pending physician visit  []  Discharge    Time In 1143   Time Out 1158   Timed Code Minutes: 0 min   Total Treatment Time: 15 min       Electronically Signed by: Madelyn Morrell PT

## 2022-08-04 ENCOUNTER — HOSPITAL ENCOUNTER (OUTPATIENT)
Dept: PHYSICAL THERAPY | Age: 82
Setting detail: THERAPIES SERIES
Discharge: HOME | End: 2022-08-04
Payer: MEDICARE

## 2022-08-04 PROCEDURE — 95992 CANALITH REPOSITIONING PROC: CPT

## 2022-08-04 NOTE — PROGRESS NOTES
Elijah Fischer 60  PHYSICAL THERAPY  [] VESTIBULAR EVALUATION  [x] DAILY NOTE [] PROGRESS NOTE [] DISCHARGE NOTE    [x] OUTPATIENT REHABILITATION CENTER [facility]   [] [place] [address]    [] [facility]   [] Domo Prajapatiar    Date: 2022  Patient Name:  Queta Dean  : 1940  MRN: [id number]  CSN: [id number]    Referring Practitioner ANDREZ Bhatt*   Diagnosis Benign paroxysmal vertigo, right ear [H81.11]    Treatment Diagnosis BPPV, impaired balance, difficulty walking   Date of Evaluation 7/15/22   Additional Pertinent History Diabetes, memory issues, anemic. Functional Outcome Measure Used [facility]   Functional Outcome Score 78 (7/15/22)       Insurance: Primary: Payor: Pau Regan /  /  / ,   Secondary:    Authorization Information: PRE CERTIFICATION REQUIRED: NO  INSURANCE THERAPY BENEFIT:  NO VISIT LIMIT  AQUATIC THERAPY COVERED: YES  MODALITIES COVERED:  YES   Visit # 4, 4/10 for progress note   Visits Allowed: unlimited   Recertification Date: 72   Physician Follow-Up:    Physician Orders:    History of Present Illness: Pt presents with dizziness for past 1 month. Pt notes she loses balance when she goes to walk. Pt notes spinning with supine to sit so lays back down, then moves slow and waits for it to quit. Dizziness occurs when turning quickly or moving head quickly. Pt notes fall 2 years ago d/t passing out. Pt went to chiropractor yesterday and he applied pressure to back of head which felt good. SUBJECTIVE: Patient feels dizziness is almost gone. Pt c/o knots in right side of neck. Pt scheduled to see chiropractor again next week for an adjustment.      OBJECTIVE:  TREATMENT   Precautions:    Pain: Neck discomfort    \"X in shaded column indicates activity completed today    *\" next to exercise/intervention indicates progression   Modalities Parameters/  Location  Notes                     Manual Therapy Time/Technique  Notes   STM to right upper trap 5 min x Very tender               Exercise/Intervention   Notes   Loaded Bethany Hallpike: positive left   x    Canalith repositioning maneuvers (CRM) for left posterior canal 2x  x C/o neck discomfort          Roll test: positive to right for pt report of movement, negative to left                                                          Specific Interventions Next Treatment: recheck [employer], treat appropriately, add x1 viewing as necessary, balance training, VOR walking as needed     Activity/Treatment Tolerance:  [x]  Patient tolerated treatment well  []  Patient limited by fatigue  []  Patient limited by pain   []  Patient limited by medical complications  []  Other:     Assessment: Loaded Bethany Hallpike positive to left. Treated with CRM for left posterior canal twice. Dizziness very mild and short duration today. Gentle STM to right upper trap completed d/t pt complaining of knots and pain. GOALS:  Patient Goal: get rid of dizziness    Short Term Goals:  Time Frame: defer to LTGs      Long Term Goals:  Time Frame: 8 weeks  Patient will have negative bilateral [place] Hallpike test to get up out of bed without dizziness. Patient will tolerate VOR walking with AD with straight path and no LOB for safe community mobility. Patient will reduce Dizziness Handicap Inventory score from 78/100 to <30/100 to tolerate bending over, quick head movements, and getting in/out of bed. Patient Education:   [x]  HEP/Education Completed: upper trap stretch and heat to neck  [employer] Access Code:  []  No new Education completed  []  Reviewed Prior HEP      [x]  Patient verbalized and/or demonstrated understanding of education provided. []  Patient unable to verbalize and/or demonstrate understanding of education provided. Will continue education.   [x]  Barriers to learning: memory issues    PLAN:  Treatment Recommendations: Balance Training, Gait Training, Neuromuscular Re-education, Home Exercise Program, Patient Education, and Vestibular Rehabilitation    []  Plan of care initiated. Plan to see patient 1 times per week for 8 weeks to address the treatment planned outlined above.   [x]  Continue with current plan of care  []  Modify plan of care as follows:    []  Hold pending physician visit  []  Discharge    Time In 1045   Time Out 1103   Timed Code Minutes: 0 min   Total Treatment Time: 18 min       Electronically Signed by: Preston Arzate PT

## 2022-08-10 ENCOUNTER — HOSPITAL ENCOUNTER (OUTPATIENT)
Dept: PHYSICAL THERAPY | Age: 82
Setting detail: THERAPIES SERIES
Discharge: HOME | End: 2022-08-10
Payer: MEDICARE

## 2022-08-10 PROCEDURE — 95992 CANALITH REPOSITIONING PROC: CPT

## 2022-08-10 NOTE — DISCHARGE SUMMARY
Elijah Fischer 60  PHYSICAL THERAPY  [] VESTIBULAR EVALUATION  [] DAILY NOTE [] PROGRESS NOTE [x] DISCHARGE NOTE    [x] OUTPATIENT REHABILITATION CENTER - [place]   [] [place] [address]    [] [facility]   [] [facility]    Date: 8/10/2022  Patient Name:  Chelita Hyatt  : 1940  MRN: [id number]  CSN: [id number]    Referring Practitioner ANDREZ Mattson*   Diagnosis Benign paroxysmal vertigo, right ear [H81.11]    Treatment Diagnosis BPPV, impaired balance, difficulty walking   Date of Evaluation 7/15/22   Additional Pertinent History Diabetes, memory issues, anemic. Functional Outcome Measure Used [facility]   Functional Outcome Score 78 (7/15/22) 6 (8/10/22)      Insurance: Primary: Payor: Eloina Nice /  /  / ,   Secondary:    Authorization Information: PRE CERTIFICATION REQUIRED: NO  INSURANCE THERAPY BENEFIT:  NO VISIT LIMIT  AQUATIC THERAPY COVERED: YES  MODALITIES COVERED:  YES   Visit # 5, 5/10 for progress note   Visits Allowed: unlimited   Recertification Date: 76   Physician Follow-Up:    Physician Orders:    History of Present Illness: Pt presents with dizziness for past 1 month. Pt notes she loses balance when she goes to walk. Pt notes spinning with supine to sit so lays back down, then moves slow and waits for it to quit. Dizziness occurs when turning quickly or moving head quickly. Pt notes fall 2 years ago d/t passing out. Pt went to chiropractor yesterday and he applied pressure to back of head which felt good. SUBJECTIVE: Patient reports she has been feeling pretty good. Pt denies dizziness episodes. Pt continues to have pain in right side of neck and felt STM helped a little. Pt scheduled to see chiropractor tomorrow. Pt c/o general fatigue and leg weakness with activity.      OBJECTIVE:  TREATMENT   Precautions:    Pain: Neck discomfort    \"X in shaded column indicates activity completed today    *\" next to exercise/intervention indicates progression   Modalities Parameters/  Location  Notes                     Manual Therapy Time/Technique  Notes   STM to right upper trap 5 min x Very tender               Exercise/Intervention   Notes   Loaded [place] Hallpike: positive left   x    Canalith repositioning maneuvers (CRM) for left posterior canal 1x  x C/o neck discomfort          Roll test: positive to right for pt report of movement, negative to left                                                          Specific Interventions Next Treatment: recheck [employer], treat appropriately, add x1 viewing as necessary, balance training, VOR walking as needed     Activity/Treatment Tolerance:  [x]  Patient tolerated treatment well  []  Patient limited by fatigue  []  Patient limited by pain   []  Patient limited by medical complications  []  Other:     Assessment: Pt demos good progress toward goals. Loaded [place]-Hallpike positive to left for quick burst of vertigo so treated with CRM; however I don't feel more maneuvers are necessary. DHI score reduced significantly. Pt safe to walk short distances but tends to hang onto . No further vestibular rehab warranted at this time. GOALS:  Patient Goal: get rid of dizziness    Short Term Goals:  Time Frame: defer to LTGs      Long Term Goals:  Time Frame: 8 weeks  Patient will have negative bilateral Bethany Hallpike test to get up out of bed without dizziness. GOAL MET:  Left load [employer] triggered short bust of vertigo but pt denies dizziness. Discontinue Goal  Patient will tolerate VOR walking with AD with straight path and no LOB for safe community mobility. GOAL MET:  Pt able to walk short distance with head turns with straight path and no LOB, pt typically hangs onto husbands arm when walking for comfort. Discontinue Goal  Patient will reduce Dizziness Handicap Inventory score from 78/100 to <30/100 to tolerate bending over, quick head movements, and getting in/out of bed.  GOAL MET:  score 6/100. Discontinue Goal        Patient Education:   [x]  HEP/Education Completed: if symptoms return, contact doctor for PT order  [address] Access Code:  []  No new Education completed  []  Reviewed Prior HEP      [x]  Patient verbalized and/or demonstrated understanding of education provided. []  Patient unable to verbalize and/or demonstrate understanding of education provided. Will continue education. [x]  Barriers to learning: memory issues    PLAN:  Treatment Recommendations: Balance Training, Gait Training, Neuromuscular Re-education, Home Exercise Program, Patient Education, and Vestibular Rehabilitation    []  Plan of care initiated. Plan to see patient 1 times per week for 8 weeks to address the treatment planned outlined above.   []  Continue with current plan of care  []  Modify plan of care as follows:    []  Hold pending physician visit  [x]  Discharge    Time In 1503   Time Out 1520   Timed Code Minutes: 0 min   Total Treatment Time: 17 min       Electronically Signed by: Madison Butler PT

## 2022-09-01 ENCOUNTER — OFFICE VISIT (OUTPATIENT)
Dept: FAMILY MEDICINE CLINIC | Age: 82
End: 2022-09-01
Payer: MEDICARE

## 2022-09-01 VITALS
RESPIRATION RATE: 16 BRPM | HEIGHT: 66 IN | HEART RATE: 73 BPM | TEMPERATURE: 97 F | WEIGHT: 170.8 LBS | OXYGEN SATURATION: 93 % | DIASTOLIC BLOOD PRESSURE: 78 MMHG | SYSTOLIC BLOOD PRESSURE: 110 MMHG | BODY MASS INDEX: 27.45 KG/M2

## 2022-09-01 DIAGNOSIS — U07.1 COVID: Primary | ICD-10-CM

## 2022-09-01 PROCEDURE — 1123F ACP DISCUSS/DSCN MKR DOCD: CPT | Performed by: NURSE PRACTITIONER

## 2022-09-01 PROCEDURE — 99214 OFFICE O/P EST MOD 30 MIN: CPT | Performed by: NURSE PRACTITIONER

## 2022-09-01 RX ORDER — BENZONATATE 200 MG/1
200 CAPSULE ORAL 3 TIMES DAILY PRN
Qty: 21 CAPSULE | Refills: 0 | Status: SHIPPED | OUTPATIENT
Start: 2022-09-01 | End: 2022-09-08

## 2022-09-01 NOTE — PROGRESS NOTES
SUBJECTIVE:  Fermin Yanez is a 80 y.o. y/o female that presents with Pharyngitis and Cough  . HPI:      Symptoms have been present for 1 day(s). Symptoms are unchanged since they initially started. Fever? Yes  Runny nose or congestion? Yes  Cough? Yes  Sore throat? Yes  Shortness of breath/Wheezing? No  Other associated symptoms?  body aches, fatigue, and headaches      Known COVID exposures or RFs? No  Smoker? No  Preexisting Respiratory conditions?  none    Drinking fluids, decreased appetite. Past Medical History:   Diagnosis Date    Anemia     Aneurysm of abdominal aorta (HCC)     Arthritis     Blood circulation, collateral     Bursitis, trochanteric     CAD (coronary artery disease) 2015    Cerebral artery occlusion with cerebral infarction [facility])     Chronic back pain     Cirrhosis of liver without ascites ([facility] [address] 2020    Depression     Diabetes mellitus (HCC)     diet controlled    GERD (gastroesophageal reflux disease)     Headache(784.0)     History of basal cell cancer     Nose    Hyperlipidemia     Hypertension     Iron deficiency anemia     Irritable bowel     Movement disorder     PVD (peripheral vascular disease) (HCC)     S/P CABG (coronary artery bypass graft)     Sciatica          Tobacco Use      Smoking status: Former        Packs/day: 0.50        Years: 25.00        Pack years: 12.5        Types: Cigarettes        Quit date: 11/3/2000        Years since quittin.8      Smokeless tobacco: Never      Tobacco comments: quit in       OBJECTIVE:  /78   Pulse 73   Temp 97 °F (36.1 °C)   Resp 16   Ht 5' 6\" (1.676 m)   Wt 170 lb 12.8 oz (77.5 kg)   LMP  (LMP Unknown)   SpO2 93%   BMI 27.57 kg/m²   General appearance: alert, ill appearing, and in no distress, oriented to person, place, and time, and normal appearing weight. ENT exam reveals - ENT exam normal, no neck nodes or sinus tenderness and pharynx erythematous without exudate.    CVS exam: normal rate, regular rhythm, normal S1, S2, no murmurs, rubs, clicks or gallops. Chest:clear to auscultation, no wheezes, rales or rhonchi, symmetric air entry, no tachypnea, retractions or cyanosis. Abdominal exam: soft, nontender, nondistended, no masses or organomegaly. Extremities:  No clubbing, cyanosis or edema  Skin exam - normal coloration and turgor, no rashes, no suspicious skin lesions noted. Psych -  Affect appropriate. Thought process is normal without evidence of depression or psychosis. Good insight and appropriae interaction. Cognition and memory appear to be intact. ASSESSMENT & PLAN  Cheri Blue was seen today for pharyngitis and cough. Diagnoses and all orders for this visit:    COVID  -     benzonatate (TESSALON) 200 MG capsule; Take 1 capsule by mouth 3 times daily as needed for Cough  -     nirmatrelvir/ritonavir (PAXLOVID) 20 x 150 MG & 10 x 100MG TBPK; Take 3 tablets (two 150 mg nirmatrelvir and one 100 mg ritonavir tablets) by mouth every 12 hours for 5 days. ER precautions given to pt and . No follow-ups on file.     -Start above treatments  -Patient advised to contact our office immediately if symptoms worsen or persist  -Patient counseled on conservative care including fluids, rest and OTC meds    I have reviewed this patient's history, habits, and medication list and have updated the chart where appropriate.

## 2022-09-06 ENCOUNTER — TELEPHONE (OUTPATIENT)
Dept: FAMILY MEDICINE CLINIC | Age: 82
End: 2022-09-06

## 2022-09-06 ENCOUNTER — OFFICE VISIT (OUTPATIENT)
Dept: FAMILY MEDICINE CLINIC | Age: 82
End: 2022-09-06
Payer: MEDICARE

## 2022-09-06 VITALS
HEIGHT: 66 IN | TEMPERATURE: 97.8 F | WEIGHT: 165 LBS | SYSTOLIC BLOOD PRESSURE: 112 MMHG | HEART RATE: 59 BPM | RESPIRATION RATE: 14 BRPM | OXYGEN SATURATION: 93 % | DIASTOLIC BLOOD PRESSURE: 64 MMHG | BODY MASS INDEX: 26.52 KG/M2

## 2022-09-06 DIAGNOSIS — U07.1 COVID: Primary | ICD-10-CM

## 2022-09-06 PROCEDURE — 1123F ACP DISCUSS/DSCN MKR DOCD: CPT | Performed by: NURSE PRACTITIONER

## 2022-09-06 PROCEDURE — 99213 OFFICE O/P EST LOW 20 MIN: CPT | Performed by: NURSE PRACTITIONER

## 2022-09-06 ASSESSMENT — ENCOUNTER SYMPTOMS
VOMITING: 0
NAUSEA: 0
CHEST TIGHTNESS: 0
DIARRHEA: 1
SHORTNESS OF BREATH: 0

## 2022-09-06 NOTE — PROGRESS NOTES
Eloian Perez is a 80 y.o. female thatpresents for Fatigue (Pt states that she is still having fatigue and cough and states that she is still not feeling well. Pt states that she completed the paxlovid last night. )      History obtained today from Patient and . HPI    Follow up from last week, diagnosed with COVID. Denies shortness of breath  Decreased appetite but keeping fluids down. Cough is improved. Over all feeling improved. I have reviewed the patient's past medical history, past surgical history, allergies,medications, social and family history and I have made updates where appropriate.     Past Medical History:   Diagnosis Date    Anemia     Aneurysm of abdominal aorta (HCC)     Arthritis     Blood circulation, collateral     Bursitis, trochanteric     CAD (coronary artery disease) 2015    Cerebral artery occlusion with cerebral infarction [facility])     Chronic back pain     Cirrhosis of liver without ascites ([facility] [address] 2020    Depression     Diabetes mellitus (HCC)     diet controlled    GERD (gastroesophageal reflux disease)     Headache(784.0)     History of basal cell cancer     Nose    Hyperlipidemia     Hypertension     Iron deficiency anemia     Irritable bowel     Movement disorder     PVD (peripheral vascular disease) ([facility])     S/P CABG (coronary artery bypass graft)     Sciatica        Social History     Tobacco Use    Smoking status: Former     Packs/day: 0.50     Years: 25.00     Pack years: 12.50     Types: Cigarettes     Quit date: 11/3/2000     Years since quittin.8    Smokeless tobacco: Never    Tobacco comments:     quit in    Vaping Use    Vaping Use: Never used   Substance Use Topics    Alcohol use: No    Drug use: No       Family History   Problem Relation Age of Onset    Early Death Mother         not sure of cause    Heart Disease Father 52        MI    Diabetes Sister     Cancer Brother         pancreatic    Cancer Son         larynx    High Blood Pressure Neg Hx     Stroke Neg Hx     Colon Cancer Neg Hx     Colon Polyps Neg Hx     Esophageal Cancer Neg Hx     Liver Cancer Neg Hx     Rectal Cancer Neg Hx     Stomach Cancer Neg Hx          Review of Systems   Constitutional:  Positive for appetite change and fatigue. Negative for chills. Respiratory:  Negative for chest tightness and shortness of breath. Cardiovascular:  Negative for chest pain. Gastrointestinal:  Positive for diarrhea. Negative for nausea and vomiting. PHYSICAL EXAM:  /64   Pulse 59   Temp 97.8 °F (36.6 °C) (Oral)   Resp 14   Ht 5' 6\" (1.676 m)   Wt 165 lb (74.8 kg)   LMP  (LMP Unknown)   SpO2 93%   BMI 26.63 kg/m²     Physical Exam  Constitutional:       Appearance: Normal appearance. Cardiovascular:      Rate and Rhythm: Normal rate and regular rhythm. Abdominal:      Palpations: Abdomen is soft. Skin:     General: Skin is warm and dry. Neurological:      Mental Status: She is alert and oriented to person, place, and time. Psychiatric:         Mood and Affect: Mood normal.         Behavior: Behavior normal.         Thought Content: Thought content normal.         Judgment: Judgment normal.         ASSESSMENT & PLAN  Cheri Blue was seen today for fatigue. Diagnoses and all orders for this visit:    COVID    Other orders  -     Multiple Vitamins-Minerals (HM MULTIVITAMIN ADULT GUMMY) CHEW; Take 1 each by mouth daily    Reassurance provided, pt overall is feeling improved, still with decreased appetite but is keeping fluids down and drinking Boost drinks. Symptoms consistent with COVID, No red flag sx, Start above treatments, and ER precautions given. Advised to seek care immediately if any new or worsening symptoms. All copied or forwarded information in the progress note was verified by me to be accurate at the time of visit  Patient's past medical, surgical, social and family history were reviewed and updated     All patient questions answered.   Patient voiced understanding.

## 2022-09-06 NOTE — TELEPHONE ENCOUNTER
Can we see how pt  and her  are doing since having COVID last week?   Thank you    ----- Message from ANDREZ Bean CNP sent at 9/1/2022  9:33 AM EDT -----  covid

## 2022-09-12 DIAGNOSIS — R18.8 OTHER ASCITES: ICD-10-CM

## 2022-09-12 RX ORDER — FUROSEMIDE 20 MG/1
TABLET ORAL
Qty: 90 TABLET | Refills: 3 | Status: SHIPPED | OUTPATIENT
Start: 2022-09-12

## 2022-09-12 RX ORDER — SPIRONOLACTONE 25 MG/1
TABLET ORAL
Qty: 90 TABLET | Refills: 3 | Status: SHIPPED | OUTPATIENT
Start: 2022-09-12

## 2022-11-23 ENCOUNTER — TELEPHONE (OUTPATIENT)
Dept: FAMILY MEDICINE CLINIC | Age: 82
End: 2022-11-23

## 2022-11-23 DIAGNOSIS — H81.11 BPPV (BENIGN PAROXYSMAL POSITIONAL VERTIGO), RIGHT: Primary | ICD-10-CM

## 2022-11-23 NOTE — TELEPHONE ENCOUNTER
Patients  came in and is requesting a new order for patients disability placard. I told Yeni Garcia we will give him a call when it is ready for .

## 2022-12-13 DIAGNOSIS — I25.119 CORONARY ARTERY DISEASE INVOLVING NATIVE CORONARY ARTERY OF NATIVE HEART WITH ANGINA PECTORIS (HCC): ICD-10-CM

## 2022-12-17 ENCOUNTER — HOSPITAL ENCOUNTER (EMERGENCY)
Age: 82
Discharge: HOME | End: 2022-12-17
Attending: EMERGENCY MEDICINE
Payer: MEDICARE

## 2022-12-17 ENCOUNTER — APPOINTMENT (OUTPATIENT)
Dept: CT IMAGING | Age: 82
End: 2022-12-17
Payer: MEDICARE

## 2022-12-17 VITALS
HEART RATE: 50 BPM | SYSTOLIC BLOOD PRESSURE: 141 MMHG | BODY MASS INDEX: 27.32 KG/M2 | WEIGHT: 170 LBS | HEIGHT: 66 IN | OXYGEN SATURATION: 94 % | TEMPERATURE: 98.5 F | RESPIRATION RATE: 15 BRPM | DIASTOLIC BLOOD PRESSURE: 55 MMHG

## 2022-12-17 DIAGNOSIS — M53.3 TAIL BONE PAIN: ICD-10-CM

## 2022-12-17 DIAGNOSIS — M54.50 ACUTE LOW BACK PAIN WITHOUT SCIATICA, UNSPECIFIED BACK PAIN LATERALITY: ICD-10-CM

## 2022-12-17 DIAGNOSIS — M54.2 NECK PAIN: Primary | ICD-10-CM

## 2022-12-17 LAB
ALBUMIN SERPL-MCNC: 3.3 G/DL (ref 3.5–5.1)
ALP BLD-CCNC: 86 U/L (ref 38–126)
ALT SERPL-CCNC: 13 U/L (ref 11–66)
ANION GAP SERPL CALCULATED.3IONS-SCNC: 7 MEQ/L (ref 8–16)
ANISOCYTOSIS: PRESENT
AST SERPL-CCNC: 39 U/L (ref 5–40)
BACTERIA: ABNORMAL /HPF
BASOPHILS # BLD: 1 %
BASOPHILS ABSOLUTE: 0.1 THOU/MM3 (ref 0–0.1)
BILIRUB SERPL-MCNC: 2.8 MG/DL (ref 0.3–1.2)
BILIRUBIN DIRECT: 1 MG/DL (ref 0–0.3)
BILIRUBIN URINE: NEGATIVE
BLOOD, URINE: NEGATIVE
BUN BLDV-MCNC: 9 MG/DL (ref 7–22)
CALCIUM SERPL-MCNC: 9.2 MG/DL (ref 8.5–10.5)
CASTS 2: ABNORMAL /LPF
CASTS UA: ABNORMAL /LPF
CHARACTER, URINE: CLEAR
CHLORIDE BLD-SCNC: 105 MEQ/L (ref 98–111)
CO2: 27 MEQ/L (ref 23–33)
COLOR: YELLOW
CREAT SERPL-MCNC: 0.6 MG/DL (ref 0.4–1.2)
CRYSTALS, UA: ABNORMAL
ELLIPTOCYTES: ABNORMAL
EOSINOPHIL # BLD: 4.7 %
EOSINOPHILS ABSOLUTE: 0.3 THOU/MM3 (ref 0–0.4)
EPITHELIAL CELLS, UA: ABNORMAL /HPF
ERYTHROCYTE [DISTWIDTH] IN BLOOD BY AUTOMATED COUNT: 20.1 % (ref 11.5–14.5)
ERYTHROCYTE [DISTWIDTH] IN BLOOD BY AUTOMATED COUNT: 77 FL (ref 35–45)
GFR SERPL CREATININE-BSD FRML MDRD: > 60 ML/MIN/1.73M2
GLUCOSE BLD-MCNC: 91 MG/DL (ref 70–108)
GLUCOSE URINE: NEGATIVE MG/DL
HCT VFR BLD CALC: 33.4 % (ref 37–47)
HEMOGLOBIN: 11 GM/DL (ref 12–16)
IMMATURE GRANS (ABS): 0.01 THOU/MM3 (ref 0–0.07)
IMMATURE GRANULOCYTES: 0.2 %
KETONES, URINE: NEGATIVE
LEUKOCYTE ESTERASE, URINE: ABNORMAL
LYMPHOCYTES # BLD: 28.7 %
LYMPHOCYTES ABSOLUTE: 1.7 THOU/MM3 (ref 1–4.8)
MCH RBC QN AUTO: 34.5 PG (ref 26–33)
MCHC RBC AUTO-ENTMCNC: 32.9 GM/DL (ref 32.2–35.5)
MCV RBC AUTO: 104.7 FL (ref 81–99)
MISCELLANEOUS 2: ABNORMAL
MONOCYTES # BLD: 15.1 %
MONOCYTES ABSOLUTE: 0.9 THOU/MM3 (ref 0.4–1.3)
NITRITE, URINE: NEGATIVE
NUCLEATED RED BLOOD CELLS: 0 /100 WBC
PH UA: 7 (ref 5–9)
PLATELET # BLD: 166 THOU/MM3 (ref 130–400)
PMV BLD AUTO: 10.9 FL (ref 9.4–12.4)
POIKILOCYTES: ABNORMAL
POTASSIUM SERPL-SCNC: 4.6 MEQ/L (ref 3.5–5.2)
PROTEIN UA: NEGATIVE
RBC # BLD: 3.19 MILL/MM3 (ref 4.2–5.4)
RBC URINE: ABNORMAL /HPF
RENAL EPITHELIAL, UA: ABNORMAL
SCAN OF BLOOD SMEAR: NORMAL
SEG NEUTROPHILS: 50.3 %
SEGMENTED NEUTROPHILS ABSOLUTE COUNT: 2.9 THOU/MM3 (ref 1.8–7.7)
SODIUM BLD-SCNC: 139 MEQ/L (ref 135–145)
SPECIFIC GRAVITY, URINE: 1.01 (ref 1–1.03)
TARGET CELLS: ABNORMAL
TOTAL PROTEIN: 6.1 G/DL (ref 6.1–8)
TROPONIN T: < 0.01 NG/ML
UROBILINOGEN, URINE: 4 EU/DL (ref 0–1)
WBC # BLD: 5.8 THOU/MM3 (ref 4.8–10.8)
WBC UA: ABNORMAL /HPF
YEAST: ABNORMAL

## 2022-12-17 PROCEDURE — 99284 EMERGENCY DEPT VISIT MOD MDM: CPT

## 2022-12-17 PROCEDURE — 93005 ELECTROCARDIOGRAM TRACING: CPT

## 2022-12-17 PROCEDURE — 82248 BILIRUBIN DIRECT: CPT

## 2022-12-17 PROCEDURE — 72125 CT NECK SPINE W/O DYE: CPT

## 2022-12-17 PROCEDURE — 36415 COLL VENOUS BLD VENIPUNCTURE: CPT

## 2022-12-17 PROCEDURE — 72131 CT LUMBAR SPINE W/O DYE: CPT

## 2022-12-17 PROCEDURE — 85025 COMPLETE CBC W/AUTO DIFF WBC: CPT

## 2022-12-17 PROCEDURE — 80053 COMPREHEN METABOLIC PANEL: CPT

## 2022-12-17 PROCEDURE — 70450 CT HEAD/BRAIN W/O DYE: CPT

## 2022-12-17 PROCEDURE — 72192 CT PELVIS W/O DYE: CPT

## 2022-12-17 PROCEDURE — 84484 ASSAY OF TROPONIN QUANT: CPT

## 2022-12-17 PROCEDURE — 81001 URINALYSIS AUTO W/SCOPE: CPT

## 2022-12-17 RX ORDER — TRAMADOL HYDROCHLORIDE 50 MG/1
50 TABLET ORAL 2 TIMES DAILY PRN
Qty: 10 TABLET | Refills: 0 | Status: SHIPPED | OUTPATIENT
Start: 2022-12-17 | End: 2022-12-19

## 2022-12-17 RX ORDER — LIDOCAINE 50 MG/G
1 PATCH TOPICAL DAILY
Qty: 10 PATCH | Refills: 0 | Status: SHIPPED | OUTPATIENT
Start: 2022-12-17 | End: 2022-12-27

## 2022-12-17 ASSESSMENT — ENCOUNTER SYMPTOMS
RHINORRHEA: 0
NAUSEA: 0
ABDOMINAL PAIN: 0
WHEEZING: 0
RESPIRATORY NEGATIVE: 1
SORE THROAT: 0
VOMITING: 0
CHEST TIGHTNESS: 0
SHORTNESS OF BREATH: 0
ALLERGIC/IMMUNOLOGIC NEGATIVE: 1
EYES NEGATIVE: 1
BACK PAIN: 1

## 2022-12-17 ASSESSMENT — PAIN DESCRIPTION - PAIN TYPE: TYPE: ACUTE PAIN

## 2022-12-17 ASSESSMENT — PAIN SCALES - GENERAL
PAINLEVEL_OUTOF10: 10

## 2022-12-17 ASSESSMENT — PAIN - FUNCTIONAL ASSESSMENT: PAIN_FUNCTIONAL_ASSESSMENT: 0-10

## 2022-12-17 NOTE — ED NOTES
PT is in CT at this time. PT respers are regular and unlabored with c-collar remaining in place.       Lefty Carter RN  12/17/22 [id number]

## 2022-12-17 NOTE — ED NOTES
Pt is laying in bed at this time with respers regular and unlabored at this time and c-collar remains in place. Call light is within reach.       Bette Patiño RN  12/17/22 0156

## 2022-12-17 NOTE — ED NOTES
Gave resident update, that patient is continuing to request pain medications.       Rima Dai RN  12/17/22 [id number]

## 2022-12-17 NOTE — DISCHARGE INSTRUCTIONS
Please follow up with your primary care physician for your back and neck pain as needed. Use the tramadol as needed for your pain. Return to the Emergency Department should you develop any worsening pain, develop numbness, tingling, or feelings of paralysis in any of your limbs. Return to the ED if you develop any chest pain or shortness of breath. Thank you for giving us the opportunity to care for you today.

## 2022-12-17 NOTE — ED TRIAGE NOTES
PT comes to ED with c/o left sided tailbone pain, neck pain and head pain from a fall that happened on Sunday when the patient fell from losing her balance. The patient states she has been taking tylenol for pain and has not given much relief. The patient states that the pain has not improved and she has 10/10 tailbone pain. Pt respers are regular and unlabored and call light is within reach. C-collar is placed.

## 2022-12-18 LAB
EKG ATRIAL RATE: 51 BPM
EKG P AXIS: 49 DEGREES
EKG P-R INTERVAL: 178 MS
EKG Q-T INTERVAL: 470 MS
EKG QRS DURATION: 84 MS
EKG QTC CALCULATION (BAZETT): 433 MS
EKG R AXIS: 31 DEGREES
EKG T AXIS: 67 DEGREES
EKG VENTRICULAR RATE: 51 BPM

## 2022-12-18 PROCEDURE — 93010 ELECTROCARDIOGRAM REPORT: CPT | Performed by: INTERNAL MEDICINE

## 2022-12-18 NOTE — ED PROVIDER NOTES
[facility] ENCOUNTER          Pt Name: Hui Parra  MRN: [id number]  [place] 1940  Date of evaluation: 12/17/2022  Treating Resident Physician: Mk Love MD  Supervising Physician: Dr. Marisa Boyd    History obtained from chart review and the patient. CHIEF COMPLAINT       Chief Complaint   Patient presents with    Tailbone Pain    Neck Pain           HISTORY OF PRESENT ILLNESS      Hui Parra is a 80 y.o. female with a past medical history significant for CAD, Hypertension, chronic low back pain, right hip pain, peripheral vascular disease who presents to the emergency department for evaluation of neck pain and tailbone pain. Per the patient, she fell last Sunday and states she landed on her back. She denies any loss of consciousness or being on blood thinners. Patient states she did hit her head, says she has a 'knot' on the back of her head, denies any bleeding from her head, changes in alertness or orientation, no noted confusion. Patient states she feels like she hurt her neck and that it hurts when she moves it. She also states that she hurt her tailbone. Patient states she thought it would get better on its own but it has not, which is why she came in. Denies any chest pain or difficulty breathing associated with the episode. No headache, nausea, vomiting, feelings of palpitations, or abdominal pain. Patient denies any known history of seizures or heart issues. Patient states she did not have any urinary incontinence or tongue biting with the episode. Patient denies any feelings of vertigo or dizziness. States that she returned to baseline pretty quickly after her fall. No known history of TIAs, valvular heart disease, or pulmonary emboli. No infectious symptoms. The patient has no other acute complaints at this time. REVIEW OF SYSTEMS   Review of Systems   Constitutional: Negative.   Negative for chills and fatigue. HENT: Negative. Negative for congestion, rhinorrhea and sore throat. Eyes: Negative. Negative for visual disturbance. Respiratory: Negative. Negative for chest tightness, shortness of breath and wheezing. Cardiovascular:  Negative for chest pain. Gastrointestinal:  Negative for abdominal pain, nausea and vomiting. Endocrine: Negative. Genitourinary: Negative. Musculoskeletal:  Positive for back pain, neck pain and neck stiffness. Skin: Negative. Negative for pallor and wound. Allergic/Immunologic: Negative. Neurological:  Positive for syncope and headaches. Negative for dizziness, tremors, seizures, weakness, light-headedness and numbness. Psychiatric/Behavioral: Negative. Negative for confusion and decreased concentration.         PAST MEDICAL AND SURGICAL HISTORY     Past Medical History:   Diagnosis Date    Anemia     Aneurysm of abdominal aorta     Arthritis     Blood circulation, collateral     Bursitis, trochanteric     CAD (coronary artery disease) 02/2015    Cerebral artery occlusion with cerebral infarction [facility])     Chronic back pain     Cirrhosis of liver without ascites ([facility] [address] 09/02/2020    Depression     Diabetes mellitus (HCC)     diet controlled    GERD (gastroesophageal reflux disease)     Headache(784.0)     History of basal cell cancer     Nose    Hyperlipidemia     Hypertension     Iron deficiency anemia     Irritable bowel     Movement disorder     PVD (peripheral vascular disease) ([facility])     S/P CABG (coronary artery bypass graft)     Sciatica      Past Surgical History:   Procedure Laterality Date    ABDOMEN SURGERY      complete hysterectomy, gallbladder    APPENDECTOMY      CARDIAC CATHETERIZATION  02/03/2016    [facility]    CHOLECYSTECTOMY  yrs ago    COLONOSCOPY  2022    CORONARY ARTERY BYPASS GRAFT  02/18/2016    x3     EGD  2022    ENDOSCOPY, COLON, DIAGNOSTIC      EYE SURGERY      cataracts, glaucoma    HERNIA REPAIR      Hiatal Hernia HIATAL HERNIA REPAIR      HYSTERECTOMY (CERVIX STATUS UNKNOWN)      LARYNGOSCOPY  10/29/2012 Dr Waleska Reece with Bx, Lingual Tonsillectomy, Hypopharyngoscopy    OH VEIN BYPASS GRAFT,CAROT-SUBCL/ SUBCL-CAROTD Left 04/11/2018    LEFT CAROTID SUBCLAVIAN BYPASS performed by Joseline Holt MD at [facility]      as a teen    UPPER GASTROINTESTINAL ENDOSCOPY           MEDICATIONS   No current facility-administered medications for this encounter. Current Outpatient Medications:     traMADol (ULTRAM) 50 MG tablet, Take 1 tablet by mouth 2 times daily as needed for Pain for up to 5 days. Intended supply: 3 days.  Take lowest dose possible to manage pain, Disp: 10 tablet, Rfl: 0    lidocaine (LIDODERM) 5 %, Place 1 patch onto the skin daily for 10 days 12 hours on, 12 hours off., Disp: 10 patch, Rfl: 0    metoprolol tartrate (LOPRESSOR) 25 MG tablet, TAKE 1/2 TABLET TWICE A DAY, Disp: 90 tablet, Rfl: 3    Handicap Placard MISC, by Does not apply route Expires 11/23/27, Disp: 1 each, Rfl: 0    furosemide (LASIX) 20 MG tablet, TAKE 1 TABLET BY MOUTH EVERY DAY, Disp: 90 tablet, Rfl: 3    spironolactone (ALDACTONE) 25 MG tablet, TAKE 1 TABLET BY MOUTH EVERY DAY, Disp: 90 tablet, Rfl: 3    Multiple Vitamins-Minerals (HM MULTIVITAMIN ADULT GUMMY) CHEW, Take 1 each by mouth daily, Disp: 30 tablet, Rfl: 11    sertraline (ZOLOFT) 25 MG tablet, TAKE 1 TABLET BY MOUTH EVERY DAY, Disp: 90 tablet, Rfl: 3    simvastatin (ZOCOR) 40 MG tablet, TAKE 1 TABLET NIGHTLY, Disp: 90 tablet, Rfl: 3    aspirin 81 MG EC tablet, Take 81 mg by mouth daily, Disp: , Rfl:     Apoaequorin (PREVAGEN) 10 MG CAPS, Take 1 tablet by mouth, Disp: , Rfl:     acetaminophen (TYLENOL) 325 MG tablet, Take 650 mg by mouth every 6 hours as needed for Pain, Disp: , Rfl:     blood glucose monitor strips, Check blood sugar q daily, Dx E11.9, Disp: 100 strip, Rfl: 0      SOCIAL HISTORY     Social History     Social History Narrative    Not on file     Social History     Tobacco Use    Smoking status: Former     Packs/day: 0.50     Years: 25.00     Pack years: 12.50     Types: Cigarettes     Quit date: 11/3/2000     Years since quittin.1    Smokeless tobacco: Never    Tobacco comments:     quit in    Vaping Use    Vaping Use: Never used   Substance Use Topics    Alcohol use: No    Drug use: No         ALLERGIES     Allergies   Allergen Reactions    Ciprofloxacin      Had chest cold and just got worse and worse on the cipro    Darvon [Propoxyphene Hcl] Itching    Flagyl [Metronidazole]     Lipitor [Atorvastatin Calcium] Nausea And Vomiting         FAMILY HISTORY     Family History   Problem Relation Age of Onset    Early Death Mother         not sure of cause    Heart Disease Father 52        MI    Diabetes Sister     Cancer Brother         pancreatic    Cancer Son         larynx    High Blood Pressure Neg Hx     Stroke Neg Hx     Colon Cancer Neg Hx     Colon Polyps Neg Hx     Esophageal Cancer Neg Hx     Liver Cancer Neg Hx     Rectal Cancer Neg Hx     Stomach Cancer Neg Hx          PREVIOUS RECORDS   Previous records reviewed:  multiple . PHYSICAL EXAM     ED Triage Vitals [22 1422]   BP Temp Temp Source Heart Rate Resp SpO2 Height Weight   125/62 98.5 °F (36.9 °C) Oral 56 16 93 % 5' 6\" (1.676 m) 170 lb (77.1 kg)     Initial vital signs and nursing assessment reviewed and normal. Body mass index is 27.44 kg/m². Pulsoximetry is normal per my interpretation. Additional Vital Signs:  Vitals:    22 1817   BP: (!) 141/55   Pulse: 50   Resp: 15   Temp:    SpO2: 94%       Physical Exam  Vitals and nursing note reviewed. Constitutional:       General: She is not in acute distress. Appearance: She is normal weight. She is not ill-appearing. HENT:      Head: Normocephalic and atraumatic. Comments: No noted lacerations, cephalohematoms, crepitus of the mandible, moreira signs, or raccoon eyes. Right Ear: External ear normal.      Left Ear: External ear normal.      Nose: Nose normal. No congestion or rhinorrhea. Mouth/Throat:      Mouth: Mucous membranes are moist.      Pharynx: Oropharynx is clear. No oropharyngeal exudate or posterior oropharyngeal erythema. Eyes:      General:         Right eye: No discharge. Left eye: No discharge. Conjunctiva/sclera: Conjunctivae normal.      Pupils: Pupils are equal, round, and reactive to light. Neck:      Comments: No noted C-spine step offs, no significant tenderness to palpation. No noted midline or paraspinal thoracic or lumbar tenderness to palpation. Cardiovascular:      Rate and Rhythm: Normal rate and regular rhythm. Pulses: Normal pulses. Heart sounds: Normal heart sounds. No murmur heard. Pulmonary:      Effort: Pulmonary effort is normal.      Breath sounds: Normal breath sounds. Abdominal:      General: Abdomen is flat. Bowel sounds are normal. There is no distension. Comments: Negative bilateral cva tenderness. Musculoskeletal:         General: No deformity or signs of injury. Normal range of motion. Cervical back: Normal range of motion and neck supple. No rigidity. Right lower leg: No edema. Left lower leg: No edema. Lymphadenopathy:      Cervical: No cervical adenopathy. Skin:     General: Skin is warm and dry. Capillary Refill: Capillary refill takes less than 2 seconds. Neurological:      General: No focal deficit present. Mental Status: She is alert and oriented to person, place, and time. Mental status is at baseline. Cranial Nerves: No cranial nerve deficit. Sensory: No sensory deficit. Motor: No weakness. Coordination: Coordination normal.   Psychiatric:         Mood and Affect: Mood normal.         Behavior: Behavior normal.         Thought Content:  Thought content normal.           MEDICAL DECISION MAKING   Initial Assessment:   Justyn Bliss Jose Luis Richardson is a 80 y.o. female with a past medical history significant for CAD, Hypertension, chronic low back pain, right hip pain, peripheral vascular disease who presents to the emergency department for evaluation of neck pain and tailbone pain. Following initial assessment and evaluation of the patient, she has tailbone pain. Work up in the ED included EKG, CBC, troponin, hepatic function panel, urinalysis, CT lumbar spine without contrast, CT pelvis without contrast, ct cervical spine without contrast, and ct head without contrast which did not show any acute fracture or bony abnormality. An incidental 3 cm infrarenal abdominal aortic anuerysm was found via her CT lumbar spine without contrast and patient's primary care physician, Dr. Micki Morales, was contacted and informed in order to further follow and work up. Plan:   Patient was discharged with follow up instructions to her primary care physician. She was given an outpatient prescription for tramadol and a lidocaine patch prescription for symptomatic relief of her lower tail bone pain. Patient was also advised in comfort measures for her tail bone pain. Strict return precautions were provided to the patient.          ED RESULTS   Laboratory results:  Labs Reviewed   CBC WITH AUTO DIFFERENTIAL - Abnormal; Notable for the following components:       Result Value    RBC 3.19 (*)     Hemoglobin 11.0 (*)     Hematocrit 33.4 (*)     .7 (*)     MCH 34.5 (*)     RDW-CV 20.1 (*)     RDW-SD 77.0 (*)     All other components within normal limits   HEPATIC FUNCTION PANEL - Abnormal; Notable for the following components:    Albumin 3.3 (*)     Total Bilirubin 2.8 (*)     Bilirubin, Direct 1.0 (*)     All other components within normal limits   URINE WITH REFLEXED MICRO - Abnormal; Notable for the following components:    Urobilinogen, Urine 4.0 (*)     Leukocyte Esterase, Urine TRACE (*)     All other components within normal limits   ANION GAP - Abnormal; display      ED COURSE          Strict return precautions and follow up instructions were discussed with the patient prior to discharge, with which the patient agrees. MEDICATION CHANGES     Discharge Medication List as of 12/17/2022  6:15 PM        START taking these medications    Details   traMADol (ULTRAM) 50 MG tablet Take 1 tablet by mouth 2 times daily as needed for Pain for up to 5 days. Intended supply: 3 days. Take lowest dose possible to manage pain, Disp-10 tablet, R-0Print               FINAL DISPOSITION     Final diagnoses:   Neck pain   Tail bone pain   Acute low back pain without sciatica, unspecified back pain laterality     Condition: condition: stable  Dispo: Discharge to home      This transcription was electronically signed. Parts of this transcriptions may have been dictated by use of voice recognition software and electronically transcribed, and parts may have been transcribed with the assistance of an ED scribe. The transcription may contain errors not detected in proofreading. Please refer to my supervising physician's documentation if my documentation differs.     Electronically Signed: Lisa Coello MD, 12/17/22, 8:12 PM        Lisa Coello MD  Resident  12/17/22

## 2022-12-19 ENCOUNTER — OFFICE VISIT (OUTPATIENT)
Dept: FAMILY MEDICINE CLINIC | Age: 82
End: 2022-12-19
Payer: MEDICARE

## 2022-12-19 VITALS
HEIGHT: 66 IN | WEIGHT: 167 LBS | TEMPERATURE: 97.9 F | HEART RATE: 57 BPM | OXYGEN SATURATION: 93 % | BODY MASS INDEX: 26.84 KG/M2 | SYSTOLIC BLOOD PRESSURE: 102 MMHG | DIASTOLIC BLOOD PRESSURE: 78 MMHG | RESPIRATION RATE: 16 BRPM

## 2022-12-19 DIAGNOSIS — S00.93XD TRAUMATIC HEMATOMA OF HEAD, SUBSEQUENT ENCOUNTER: ICD-10-CM

## 2022-12-19 DIAGNOSIS — I71.43 INFRARENAL ABDOMINAL AORTIC ANEURYSM (AAA) WITHOUT RUPTURE: ICD-10-CM

## 2022-12-19 DIAGNOSIS — M53.3 COCCYX PAIN: Primary | ICD-10-CM

## 2022-12-19 PROCEDURE — 99214 OFFICE O/P EST MOD 30 MIN: CPT | Performed by: FAMILY MEDICINE

## 2022-12-19 PROCEDURE — 1123F ACP DISCUSS/DSCN MKR DOCD: CPT | Performed by: FAMILY MEDICINE

## 2022-12-19 PROCEDURE — 3074F SYST BP LT 130 MM HG: CPT | Performed by: FAMILY MEDICINE

## 2022-12-19 PROCEDURE — 3078F DIAST BP <80 MM HG: CPT | Performed by: FAMILY MEDICINE

## 2022-12-19 NOTE — PROGRESS NOTES
Ирина Chu is a 80 y.o. female that presents for Tailbone Pain, Neck Pain, and Headache        ER Follow Up:  Patient presents for ER follow up. Patient recently seen in ED for evaluation and treatment of coccyx pain. Symptoms prior to presenting to ER:  Had a fall prior to symptoms occurring. ER notes and testing reviewed with patient today. Medications/Treatments at discharge:  Tramadol, she has not started this yet. States that she is concerned regarding SEs, especially with her hx of falls. Clinical course since discharge:  Having about the same amount pain as yesterday. States that her tailbone area is sensitive. Sitting aggravates the pain. Scalp hematoma is also painful. No bleeding. PHYSICAL EXAM:  Blood pressure 102/78, pulse 57, temperature 97.9 °F (36.6 °C), temperature source Infrared, resp. rate 16, height 5' 6\" (1.676 m), weight 167 lb (75.8 kg), SpO2 93 %, not currently breastfeeding. GEN: No acute distress  HEENT:  hematoma noted on the scalp, PERRL, EOMI, Nares clear, turbinates pink, mucosa is moist.  Oropharynx  is clear. Hearing grossly intact. Dentition is normal for age. Neck: No lymphadenopathy or masses. Thyroid not palpable; no nodules or masses. Heart: RRR. S1 and S2 normal, no murmurs, clicks, gallops or rubs. Lungs:  CTAB,  No wheezing, ronchi, or rales. Normal symmetric air entry throughout both lung fields. Abdomen:  Soft, non tender, non distended. No rebound or guarding. No organomegaly. Extremities:  No gross deformity, erythema or edema of the lower extremities. Skin: No pathologic lesions or significant rash. Psych:  Affect appropriate. Thought process is normal without evidence of depression or psychosis. Good insight and appropriae interaction. Cognition and memory appear to be intact. ASSESSMENT & PLAN  Edgar Mora was seen today for tailbone pain, neck pain and headache.     Diagnoses and all orders for this visit:    Coccyx pain  -     diclofenac sodium (VOLTAREN) 1 % GEL;  Apply 2 g topically 4 times daily    Traumatic hematoma of head, subsequent encounter    Infrarenal abdominal aortic aneurysm (AAA) without rupture      No follow-ups on file.    -Patient currently stable  -Use walker consistently  -Patient advised to contact our office if sx persisting or worsening

## 2022-12-29 ENCOUNTER — OFFICE VISIT (OUTPATIENT)
Dept: FAMILY MEDICINE CLINIC | Age: 82
End: 2022-12-29
Payer: MEDICARE

## 2022-12-29 VITALS
SYSTOLIC BLOOD PRESSURE: 134 MMHG | DIASTOLIC BLOOD PRESSURE: 68 MMHG | BODY MASS INDEX: 26.84 KG/M2 | RESPIRATION RATE: 16 BRPM | HEART RATE: 53 BPM | TEMPERATURE: 96.9 F | OXYGEN SATURATION: 94 % | WEIGHT: 167 LBS | HEIGHT: 66 IN

## 2022-12-29 DIAGNOSIS — D64.9 LOW HEMOGLOBIN: ICD-10-CM

## 2022-12-29 DIAGNOSIS — M53.3 COCCYX PAIN: Primary | ICD-10-CM

## 2022-12-29 DIAGNOSIS — R73.01 IMPAIRED FASTING GLUCOSE: ICD-10-CM

## 2022-12-29 DIAGNOSIS — I10 BENIGN ESSENTIAL HTN: Chronic | ICD-10-CM

## 2022-12-29 LAB — HBA1C MFR BLD: 5.1 % (ref 4.3–5.7)

## 2022-12-29 PROCEDURE — 3074F SYST BP LT 130 MM HG: CPT | Performed by: FAMILY MEDICINE

## 2022-12-29 PROCEDURE — 3078F DIAST BP <80 MM HG: CPT | Performed by: FAMILY MEDICINE

## 2022-12-29 PROCEDURE — 1123F ACP DISCUSS/DSCN MKR DOCD: CPT | Performed by: FAMILY MEDICINE

## 2022-12-29 PROCEDURE — 99214 OFFICE O/P EST MOD 30 MIN: CPT | Performed by: FAMILY MEDICINE

## 2022-12-29 NOTE — PROGRESS NOTES
Kalina Bullard is a 80 y.o. female that presents for     Chief Complaint   Patient presents with    6 Month Follow-Up       /68   Pulse 53   Temp 96.9 °F (36.1 °C) (Infrared)   Resp 16   Ht 5' 6\" (1.676 m)   Wt 167 lb (75.8 kg)   LMP  (LMP Unknown)   SpO2 94%   BMI 26.95 kg/m²       HPI    Coccyx pain is improving. Able to move better. Hgb improvng overall. Still has a lot of fatigue. HTN    Does patient check BP regularly at home? - No  Current Medication regimen - metoprolol, lasix, aldactone  Tolerating medications well? - yes    Shortness of breath or chest pain? No  Headache or visual complaints? No  Neurologic changes like confusion? No  Extremity edema?  No    BP Readings from Last 3 Encounters:   12/29/22 134/68   12/19/22 102/78   12/17/22 (!) 141/55       Health Maintenance   Topic Date Due    Hepatitis A vaccine (1 of 2 - Risk 2-dose series) Never done    Hepatitis B vaccine (1 of 3 - Risk 3-dose series) Never done    DEXA (modify frequency per FRAX score)  Never done    Shingles vaccine (3 of 3) 02/25/2021    Lipids  10/26/2021    DTaP/Tdap/Td vaccine (2 - Td or Tdap) 10/15/2022    Depression Monitoring  06/23/2023    Annual Wellness Visit (AWV)  06/24/2023    Flu vaccine  Completed    Pneumococcal 65+ years Vaccine  Completed    COVID-19 Vaccine  Completed    Hib vaccine  Aged Out    Meningococcal (ACWY) vaccine  Aged Out       Past Medical History:   Diagnosis Date    Anemia     Aneurysm of abdominal aorta     Arthritis     Blood circulation, collateral     Bursitis, trochanteric     CAD (coronary artery disease) 02/2015    Cerebral artery occlusion with cerebral infarction [facility])     Chronic back pain     Cirrhosis of liver without ascites ([facility] [address] 09/02/2020    Depression     Diabetes mellitus (HCC)     diet controlled    GERD (gastroesophageal reflux disease)     Headache(784.0)     History of basal cell cancer     Nose    Hyperlipidemia     Hypertension     Iron deficiency anemia Irritable bowel     Movement disorder     PVD (peripheral vascular disease) (HCC)     S/P CABG (coronary artery bypass graft)     Sciatica        Past Surgical History:   Procedure Laterality Date    ABDOMEN SURGERY      complete hysterectomy, gallbladder    APPENDECTOMY      CARDIAC CATHETERIZATION  2016    [facility]    CHOLECYSTECTOMY  yrs ago    COLONOSCOPY      CORONARY ARTERY BYPASS GRAFT  02/18/2016    x3     EGD      ENDOSCOPY, COLON, DIAGNOSTIC      EYE SURGERY      cataracts, glaucoma    HERNIA REPAIR      Hiatal Hernia    HIATAL HERNIA REPAIR      HYSTERECTOMY (CERVIX STATUS UNKNOWN)      LARYNGOSCOPY  10/29/2012 Dr Mónica Moreno with Bx, Lingual Tonsillectomy, Hypopharyngoscopy    AK VEIN BYPASS GRAFT,CAROT-SUBCL/ SUBCL-CAROTD Left 2018    LEFT CAROTID SUBCLAVIAN BYPASS performed by Kan Duron MD at [facility]      as a teen    UPPER GASTROINTESTINAL ENDOSCOPY         Social History     Tobacco Use    Smoking status: Former     Packs/day: 0.50     Years: 25.00     Pack years: 12.50     Types: Cigarettes     Quit date: 11/3/2000     Years since quittin.1    Smokeless tobacco: Never    Tobacco comments:     quit in    Vaping Use    Vaping Use: Never used   Substance Use Topics    Alcohol use: No    Drug use: No       Family History   Problem Relation Age of Onset    Early Death Mother         not sure of cause    Heart Disease Father 52        MI    Diabetes Sister     Cancer Brother         pancreatic    Cancer Son         larynx    High Blood Pressure Neg Hx     Stroke Neg Hx     Colon Cancer Neg Hx     Colon Polyps Neg Hx     Esophageal Cancer Neg Hx     Liver Cancer Neg Hx     Rectal Cancer Neg Hx     Stomach Cancer Neg Hx          I have reviewed the patient's past medical history, past surgical history, allergies, medications, social and family history and I have made updates where appropriate.     Review of Systems        PHYSICAL EXAM:  /68   Pulse 53   Temp 96.9 °F (36.1 °C) (Infrared)   Resp 16   Ht 5' 6\" (1.676 m)   Wt 167 lb (75.8 kg)   LMP  (LMP Unknown)   SpO2 94%   BMI 26.95 kg/m²     Physical Exam  Vitals and nursing note reviewed. Constitutional:       General: She is not in acute distress. Appearance: She is well-developed. HENT:      Head: Normocephalic and atraumatic. Right Ear: Hearing and external ear normal.      Left Ear: Hearing and external ear normal.      Nose: Nose normal.   Eyes:      General:         Right eye: No discharge. Left eye: No discharge. Conjunctiva/sclera: Conjunctivae normal.   Neck:      Thyroid: No thyromegaly. Trachea: No tracheal deviation. Cardiovascular:      Rate and Rhythm: Normal rate and regular rhythm. Heart sounds: Normal heart sounds. No murmur heard. No friction rub. No gallop. Pulmonary:      Effort: Pulmonary effort is normal. No respiratory distress. Breath sounds: No wheezing or rales. Chest:      Chest wall: No tenderness. Musculoskeletal:         General: No deformity. Cervical back: Normal range of motion and neck supple. Lymphadenopathy:      Cervical: No cervical adenopathy. Skin:     General: Skin is warm and dry. Findings: No erythema or rash. Neurological:      Mental Status: She is alert. Motor: No abnormal muscle tone. Coordination: Coordination normal.   Psychiatric:         Behavior: Behavior normal.         Thought Content: Thought content normal.         Judgment: Judgment normal.           ASSESSMENT & PLAN  Sophia Cabrera was seen today for 6 month follow-up.     Diagnoses and all orders for this visit:    Coccyx pain    Benign essential HTN    Low hemoglobin    Impaired fasting glucose  -     POCT glycosylated hemoglobin (Hb A1C)        -HTN controlled, continue Lasix, aldactone, metoprolol  -Other chronic issues are stable, continue current medications  -Advised to call if any issues      Return in about 6 months (around 6/29/2023). The most recent results of the following tests were reviewed with the patient today: none     All copied or forwarded information in the progress note was verified by me to be accurate at the time of visit  Patient's past medical, surgical, social and family history were reviewed and updated     All patient questions answered. Patient voiced understanding.

## 2023-06-02 RX ORDER — CALCIUM CITRATE/VITAMIN D3 200MG-6.25
2 TABLET ORAL DAILY
COMMUNITY

## 2023-06-05 ENCOUNTER — OFFICE VISIT (OUTPATIENT)
Dept: FAMILY MEDICINE CLINIC | Age: 83
End: 2023-06-05
Payer: MEDICARE

## 2023-06-05 VITALS
DIASTOLIC BLOOD PRESSURE: 70 MMHG | HEART RATE: 74 BPM | HEIGHT: 66 IN | OXYGEN SATURATION: 92 % | BODY MASS INDEX: 26.74 KG/M2 | SYSTOLIC BLOOD PRESSURE: 138 MMHG | RESPIRATION RATE: 14 BRPM | TEMPERATURE: 97.2 F | WEIGHT: 166.4 LBS

## 2023-06-05 DIAGNOSIS — R18.8 OTHER ASCITES: Primary | ICD-10-CM

## 2023-06-05 DIAGNOSIS — M79.89 SWELLING OF BOTH LOWER EXTREMITIES: ICD-10-CM

## 2023-06-05 DIAGNOSIS — K74.60 CIRRHOSIS, NONALCOHOLIC (HCC): ICD-10-CM

## 2023-06-05 PROCEDURE — 99214 OFFICE O/P EST MOD 30 MIN: CPT | Performed by: NURSE PRACTITIONER

## 2023-06-05 PROCEDURE — 3078F DIAST BP <80 MM HG: CPT | Performed by: NURSE PRACTITIONER

## 2023-06-05 PROCEDURE — 3075F SYST BP GE 130 - 139MM HG: CPT | Performed by: NURSE PRACTITIONER

## 2023-06-05 PROCEDURE — 1123F ACP DISCUSS/DSCN MKR DOCD: CPT | Performed by: NURSE PRACTITIONER

## 2023-06-05 ASSESSMENT — ENCOUNTER SYMPTOMS
ABDOMINAL DISTENTION: 1
CHOKING: 0
COUGH: 0
NAUSEA: 1
SHORTNESS OF BREATH: 0
ABDOMINAL PAIN: 1
CONSTIPATION: 1
CHEST TIGHTNESS: 0
STRIDOR: 0

## 2023-06-05 NOTE — PROGRESS NOTES
Arvin Puentes is a 80 y.o. female thatpresents for Other (Pt states that she has fluid retention and has had issues with this in the past. )      History obtained today from Patient and     HPI  Abdominal distention  Hx of cirrhosis  Notes abdomen getting more distended in last few months  + nausea  +lower leg swelling started yesterday  No shortness of breath  No fevers  Mild abdominal tenderness  Last paracentesis was 2022. Follows with Dr Ksenia Rodríguez but doesn't have follow up, last seen 2023  Weights been stable since Dec 2022 166 lbs today  On Lasix and Aldactone  Decreased appetite  Taking dulcolax for constipation. Last went 2 days ago. Hx of iron deficiency anemia  Follows with Dr Diego Ramirez for infusions    Carotid artery stenosis  Follows with Dr Sundar Trevizo  CTA 2023  I have reviewed the patient's past medical history, past surgical history, allergies,medications, social and family history and I have made updates where appropriate.     Past Medical History:   Diagnosis Date    Anemia     Aneurysm of abdominal aorta (HCC)     Arthritis     Blood circulation, collateral     Bursitis, trochanteric     CAD (coronary artery disease) 2015    Cerebral artery occlusion with cerebral infarction [facility])     x3 after heart surgery    Chronic back pain     Cirrhosis of liver without ascites ([facility] [address] 2020    Depression     Diabetes mellitus (HCC)     diet control    GERD (gastroesophageal reflux disease)     Headache(784.0)     History of basal cell cancer     Nose    Hyperlipidemia     Hypertension     Iron deficiency anemia     Irritable bowel     Movement disorder     PVD (peripheral vascular disease) (HCC)     S/P CABG (coronary artery bypass graft)     Sciatica        Social History     Tobacco Use    Smoking status: Former     Packs/day: 0.50     Years: 25.00     Pack years: 12.50     Types: Cigarettes     Quit date: 11/3/2000     Years since quittin.6    Smokeless tobacco: Never    Tobacco

## 2023-06-06 LAB
A/G RATIO: 1.3 (ref 1.5–2.5)
ABSOLUTE BASO #: 100 /CMM (ref 0–200)
ABSOLUTE EOS #: 200 /CMM (ref 0–500)
ABSOLUTE LYMPH #: 1400 /CMM (ref 1000–4800)
ABSOLUTE MONO #: 800 /CMM (ref 0–800)
ABSOLUTE NEUT #: 2800 /CMM (ref 1800–7700)
ALBUMIN SERPL-MCNC: 3.3 G/DL (ref 3.5–5)
ALP BLD-CCNC: 72 IU/L (ref 39–118)
ALT SERPL-CCNC: 11 IU/L (ref 10–40)
ANION GAP SERPL CALCULATED.3IONS-SCNC: 6 MMOL/L (ref 4–12)
ANISOCYTOSIS: ABNORMAL
AST SERPL-CCNC: 28 IU/L (ref 15–41)
BASOPHILS RELATIVE PERCENT: 2.1 % (ref 0–2)
BILIRUB SERPL-MCNC: 4.2 MG/DL (ref 0.2–1)
BUN BLDV-MCNC: 9 MG/DL (ref 7–20)
CALCIUM SERPL-MCNC: 8.9 MG/DL (ref 8.8–10.5)
CHLORIDE BLD-SCNC: 104 MEQ/L (ref 101–111)
CO2: 29 MEQ/L (ref 21–32)
CREAT SERPL-MCNC: 0.82 MG/DL (ref 0.6–1.3)
CREATININE CLEARANCE: >60
EOSINOPHILS RELATIVE PERCENT: 4.3 % (ref 0–6)
GLUCOSE: 93 MG/DL (ref 70–110)
HCT VFR BLD CALC: 34.1 % (ref 35–44)
HEMOGLOBIN: 11.8 GM/DL (ref 12–15)
LYMPHOCYTES RELATIVE PERCENT: 26.8 % (ref 15–45)
MCH RBC QN AUTO: 35.5 PG (ref 27.5–33)
MCHC RBC AUTO-ENTMCNC: 34.7 GM/DL (ref 33–36)
MCV RBC AUTO: 102.3 CU MIC (ref 80–97)
MONOCYTES RELATIVE PERCENT: 14.2 % (ref 2–10)
NEUTROPHILS RELATIVE PERCENT: 52.6 % (ref 40–70)
NUCLEATED RBCS: 0.3 /100 WBC
PDW BLD-RTO: 19 % (ref 12–16)
PLATELET # BLD: 174 TH/CMM (ref 150–400)
POTASSIUM SERPL-SCNC: 3.9 MEQ/L (ref 3.6–5)
RBC # BLD: 3.33 MIL/CMM (ref 4–5.1)
SODIUM BLD-SCNC: 139 MEQ/L (ref 135–145)
TOTAL PROTEIN: 5.9 G/DL (ref 6.2–8)
WBC # BLD: 5.4 TH/CMM (ref 4.4–10.5)

## 2023-06-09 ENCOUNTER — ANESTHESIA EVENT (OUTPATIENT)
Dept: OPERATING ROOM | Age: 83
End: 2023-06-09
Payer: MEDICARE

## 2023-06-09 ENCOUNTER — TELEPHONE (OUTPATIENT)
Dept: FAMILY MEDICINE CLINIC | Age: 83
End: 2023-06-09

## 2023-06-09 ENCOUNTER — ANESTHESIA (OUTPATIENT)
Dept: OPERATING ROOM | Age: 83
End: 2023-06-09
Payer: MEDICARE

## 2023-06-09 ENCOUNTER — HOSPITAL ENCOUNTER (OUTPATIENT)
Age: 83
Setting detail: OUTPATIENT SURGERY
Discharge: HOME | End: 2023-06-09
Attending: SPECIALIST | Admitting: SPECIALIST
Payer: MEDICARE

## 2023-06-09 VITALS
TEMPERATURE: 97.4 F | RESPIRATION RATE: 16 BRPM | WEIGHT: 162 LBS | HEART RATE: 80 BPM | BODY MASS INDEX: 25.43 KG/M2 | OXYGEN SATURATION: 95 % | DIASTOLIC BLOOD PRESSURE: 54 MMHG | SYSTOLIC BLOOD PRESSURE: 117 MMHG | HEIGHT: 67 IN

## 2023-06-09 DIAGNOSIS — C44.01 BASAL CELL CARCINOMA OF LIP: Primary | ICD-10-CM

## 2023-06-09 LAB — GLUCOSE BLD STRIP.AUTO-MCNC: 96 MG/DL (ref 70–108)

## 2023-06-09 PROCEDURE — 2580000003 HC RX 258

## 2023-06-09 PROCEDURE — 6360000002 HC RX W HCPCS

## 2023-06-09 PROCEDURE — 2500000003 HC RX 250 WO HCPCS: Performed by: SPECIALIST

## 2023-06-09 PROCEDURE — 6360000002 HC RX W HCPCS: Performed by: SPECIALIST

## 2023-06-09 PROCEDURE — 82948 REAGENT STRIP/BLOOD GLUCOSE: CPT

## 2023-06-09 PROCEDURE — 2500000003 HC RX 250 WO HCPCS

## 2023-06-09 RX ORDER — PROPOFOL 10 MG/ML
INJECTION, EMULSION INTRAVENOUS PRN
Status: DISCONTINUED | OUTPATIENT
Start: 2023-06-09 | End: 2023-06-09 | Stop reason: SDUPTHER

## 2023-06-09 RX ORDER — LIDOCAINE HYDROCHLORIDE 20 MG/ML
INJECTION, SOLUTION EPIDURAL; INFILTRATION; INTRACAUDAL; PERINEURAL PRN
Status: DISCONTINUED | OUTPATIENT
Start: 2023-06-09 | End: 2023-06-09 | Stop reason: SDUPTHER

## 2023-06-09 RX ORDER — SODIUM CHLORIDE 9 MG/ML
INJECTION, SOLUTION INTRAVENOUS CONTINUOUS PRN
Status: DISCONTINUED | OUTPATIENT
Start: 2023-06-09 | End: 2023-06-09 | Stop reason: SDUPTHER

## 2023-06-09 RX ORDER — LIDOCAINE HYDROCHLORIDE AND EPINEPHRINE 10; 10 MG/ML; UG/ML
INJECTION, SOLUTION INFILTRATION; PERINEURAL PRN
Status: DISCONTINUED | OUTPATIENT
Start: 2023-06-09 | End: 2023-06-09 | Stop reason: ALTCHOICE

## 2023-06-09 RX ORDER — SODIUM CHLORIDE 9 MG/ML
INJECTION, SOLUTION INTRAVENOUS CONTINUOUS
Status: DISCONTINUED | OUTPATIENT
Start: 2023-06-09 | End: 2023-06-09 | Stop reason: HOSPADM

## 2023-06-09 RX ORDER — ACETAMINOPHEN AND CODEINE PHOSPHATE 300; 30 MG/1; MG/1
1 TABLET ORAL EVERY 4 HOURS PRN
Qty: 12 TABLET | Refills: 0 | Status: SHIPPED | OUTPATIENT
Start: 2023-06-09 | End: 2023-06-12

## 2023-06-09 RX ADMIN — PROPOFOL 30 MG: 10 INJECTION, EMULSION INTRAVENOUS at 11:13

## 2023-06-09 RX ADMIN — PROPOFOL 30 MG: 10 INJECTION, EMULSION INTRAVENOUS at 10:53

## 2023-06-09 RX ADMIN — PROPOFOL 20 MG: 10 INJECTION, EMULSION INTRAVENOUS at 10:41

## 2023-06-09 RX ADMIN — PROPOFOL 30 MG: 10 INJECTION, EMULSION INTRAVENOUS at 11:18

## 2023-06-09 RX ADMIN — PROPOFOL 20 MG: 10 INJECTION, EMULSION INTRAVENOUS at 10:55

## 2023-06-09 RX ADMIN — SODIUM CHLORIDE: 9 INJECTION, SOLUTION INTRAVENOUS at 10:34

## 2023-06-09 RX ADMIN — PROPOFOL 30 MG: 10 INJECTION, EMULSION INTRAVENOUS at 11:09

## 2023-06-09 RX ADMIN — PROPOFOL 30 MG: 10 INJECTION, EMULSION INTRAVENOUS at 11:21

## 2023-06-09 RX ADMIN — PROPOFOL 20 MG: 10 INJECTION, EMULSION INTRAVENOUS at 10:47

## 2023-06-09 RX ADMIN — Medication 2000 MG: at 10:42

## 2023-06-09 RX ADMIN — LIDOCAINE HYDROCHLORIDE 40 MG: 20 INJECTION, SOLUTION EPIDURAL; INFILTRATION; INTRACAUDAL; PERINEURAL at 10:41

## 2023-06-09 RX ADMIN — PROPOFOL 40 MG: 10 INJECTION, EMULSION INTRAVENOUS at 10:49

## 2023-06-09 RX ADMIN — PROPOFOL 30 MG: 10 INJECTION, EMULSION INTRAVENOUS at 11:05

## 2023-06-09 RX ADMIN — PROPOFOL 20 MG: 10 INJECTION, EMULSION INTRAVENOUS at 10:44

## 2023-06-09 RX ADMIN — PROPOFOL 30 MG: 10 INJECTION, EMULSION INTRAVENOUS at 10:58

## 2023-06-09 ASSESSMENT — PAIN - FUNCTIONAL ASSESSMENT: PAIN_FUNCTIONAL_ASSESSMENT: 0-10

## 2023-06-09 ASSESSMENT — PAIN SCALES - GENERAL: PAINLEVEL_OUTOF10: 0

## 2023-06-09 NOTE — TELEPHONE ENCOUNTER
Pt states that \" they just called and I am going to [facility] Monday morning to get it\"     Legs are still swollen \" my stomach is so big, theres no room to eat\"

## 2023-06-09 NOTE — ANESTHESIA PRE PROCEDURE
Department of Anesthesiology  Preprocedure Note       Name:  Meri Ko   Age:  80 y.o.  :  1940                                          MRN:  [id number]         Date:  2023      Surgeon: Ken Gee):  Adriane Spain MD    Procedure: Procedure(s): Mohs Defect Repair BCC Left Upper Cutaneous    Medications prior to admission:   Prior to Admission medications    Medication Sig Start Date End Date Taking? Authorizing Provider   Multiple Vitamins-Minerals (MULTIVITAMIN GUMMIES WOMENS) CHEW Take 2 tablets by mouth daily   Yes Historical Provider, MD   simvastatin (ZOCOR) 40 MG tablet TAKE 1 TABLET NIGHTLY 23   Priti Caballero APRN - CNP   furosemide (LASIX) 20 MG tablet TAKE 1 TABLET BY MOUTH EVERY DAY 22   Noemí Cisse,    spironolactone (ALDACTONE) 25 MG tablet TAKE 1 TABLET BY MOUTH EVERY DAY 22   Noemí Cisse, DO   sertraline (ZOLOFT) 25 MG tablet TAKE 1 TABLET BY MOUTH EVERY DAY 22   ANDREZ Parra - CNP   Apoaequorin (PREVAGEN) 10 MG CAPS Take 1 tablet by mouth daily    Historical Provider, MD       Current medications:    Current Facility-Administered Medications   Medication Dose Route Frequency Provider Last Rate Last Admin    ceFAZolin (ANCEF) 2000 mg in 0.9% sodium chloride 50 mL IVPB  2,000 mg IntraVENous On Call to Symone aRpp MD        0.9 % sodium chloride infusion   IntraVENous Continuous Adriane Spain MD           Allergies:     Allergies   Allergen Reactions    Ciprofloxacin      Had chest cold and just got worse and worse on the cipro    Darvon [Propoxyphene Hcl] Itching    Flagyl [Metronidazole]      Not sure of reaction    Lipitor [Atorvastatin Calcium] Nausea And Vomiting       Problem List:    Patient Active Problem List   Diagnosis Code    Hyperlipidemia E78.5    Chronic low back pain M54.50, G89.29    Medication monitoring encounter Z51.81    Postmenopausal HRT (hormone replacement therapy) Z79.890    GERD (gastroesophageal

## 2023-06-09 NOTE — TELEPHONE ENCOUNTER
----- Message from ANDREZ Thapa CNP sent at 6/6/2023  2:35 PM EDT -----  Labs are stable,  can we see if she can get scheduled for her Paracentesis soon?

## 2023-06-09 NOTE — TELEPHONE ENCOUNTER
[id number] Sharmaine Novak glad it got scheduled. Recommend she get in to see Dr Lenward Gosselin as well for follow up.

## 2023-06-09 NOTE — PROGRESS NOTES
1127- Patient arrived to Phase II via chair. Patient awake and alert, denies any pain. Left upper lip incision dry and intact. Call light in reach. 1129- Drink provided to patient per cup. Denies any other needs at this time. 1206- Discharge instructions reviewed with patient and , all questions answered. 1210- IV's removed. Patient dressed without difficulty. 1219 - Patient discharged ambulatory with home walker with .

## 2023-06-09 NOTE — DISCHARGE INSTRUCTIONS
it easier to develop an infection. At the time of your surgery:  Speak up if someone tries to shave you with a razor before surgery. Ask why you need to be shaved and talk with your surgeon if you have any concerns. Ask if you will get antibiotics before surgery. After your surgery:  Make sure that your healthcare providers clean their hands before examining you, either with soap and water or an alcohol-based hand rub. Family and friends who visit you should not touch the surgical wound or dressings. Family and friends should clean their hands with soap and water or an alcohol-based hand rub before and after visiting you. If you do not see them clean their hands, ask them to clean their hands. What do I need to do when I go home from the hospital?  Before you go home, your doctor or nurse should explain everything you need to know about taking care of your wound. Make sure you understand how to care for your wound before you leave the hospital.  Always clean your hands before and after caring for your wound. Before you go home, make sure you know who to contact if you have questions or problems after you get home. If you have any symptoms of an infection, such as redness and pain at the surgery site, drainage, or fever, call your doctor immediately. If you have additional questions, please ask your doctor or nurse.

## 2023-06-09 NOTE — ANESTHESIA POSTPROCEDURE EVALUATION
Department of Anesthesiology  Postprocedure Note    Patient: Maurice Bautista  MRN: [id number]  YOB: 1940  Date of evaluation: 6/9/2023      Procedure Summary     Date: 06/09/23 Room / Location: [address] [place] [address] 01 / [facility]    Anesthesia Start: 1034 Anesthesia Stop: 6403    Procedure: Mohs Defect Repair BCC Left Upper Cutaneous (Left: Face) Diagnosis:       Basal cell carcinoma (BCC), unspecified site      (Basal cell carcinoma (BCC), unspecified site [C44.91])    Surgeons: Ekaterina Joseph MD Responsible Provider: Angie Angeles MD    Anesthesia Type: MAC ASA Status: 4          Anesthesia Type: No value filed.     Bina Phase I:      Bina Phase II: Bina Score: 10      Anesthesia Post Evaluation    Complications: no

## 2023-06-09 NOTE — H&P
[address] [address]  History and Physical Update    Pt Name: Meri Ko  MRN: [id number]  YOB: 1940  Date of evaluation: 6/9/2023    I have examined the patient and reviewed the H&P/Consult and there are no changes to the patient or plans.       Adriane Spain MD  Electronically signed 6/9/2023 at 10:37 AM

## 2023-06-09 NOTE — OP NOTE
Operative Note    Patient name: Arvin Puentes             Medical Record Number: [id number]    Primary Care Physician: ANDREZ Dallas - CNP     1940    Date of Procedure: 2023    Pre-operative Diagnosis: 2cm2 defect of left upper lip s/p MOHS for basal cell carcinoma    Post-operative Diagnosis: Same    Procedure Performed: Repair of left upper lip defect adjacent tissue transfer (8 cm2) (CPT 42516)    Surgeons/Assistants: MD Naif Prajapati PA-C    Estimated Blood Loss: 3ml     Complications: none immediately appreciated    Procedure: With the patient lying in the supine position and under adequate anesthesia per the anesthesia team, the area was anesthetized with a total of 11 ml of 1% Lidocaine 1:100,000 with epinephrine solution. The area was then prepped and draped in the standard surgical fashion. There was a 2cm2, which could not be closed primarily due to distortion of lip. Therefore, a 8cm2 (3cm x 2cm + 3cm2) sum of defect/adjacent tissue transfer laterally based advancement flap was designed, back cut 3cm along the white roll of lip, elevated 2cm and inset with 4-0 Monocryl suture placed in interrupted buried fashion. The Burow's triangles were resected with final closure being 4-0 chromic and 5-0 fast absorbing suture. Bacitracin was applied as final dressing. The patient tolerated the procedure quite well and remained hemodynamically stable throughout the procedure and was quite comfortable throughout the operative course. Clinical staging for cancer cases:  General Marlyn MD  Electronically signed by me on 2023 at 11:29 AM Operative Note      Patient: Arvin Puentes  YOB: 1940  MRN: [id number]    Date of Procedure: 2023    Pre-Op Diagnosis Codes: * Basal cell carcinoma (BCC), unspecified site [C44.91]    Post-Op Diagnosis: Same       Procedure(s):   Mohs Defect Repair BCC Left Upper

## 2023-06-09 NOTE — TELEPHONE ENCOUNTER
Can we see how patients leg swelling is doing? We are trying to get her scheduled for paracentesis. I apologize for the hold up.

## 2023-06-12 ENCOUNTER — HOSPITAL ENCOUNTER (OUTPATIENT)
Dept: NURSING | Age: 83
Discharge: HOME | End: 2023-06-12
Payer: MEDICARE

## 2023-06-12 VITALS
OXYGEN SATURATION: 92 % | HEART RATE: 70 BPM | TEMPERATURE: 98.5 F | SYSTOLIC BLOOD PRESSURE: 125 MMHG | DIASTOLIC BLOOD PRESSURE: 58 MMHG | RESPIRATION RATE: 16 BRPM

## 2023-06-12 DIAGNOSIS — R18.8 OTHER ASCITES: Primary | ICD-10-CM

## 2023-06-12 PROCEDURE — P9047 ALBUMIN (HUMAN), 25%, 50ML: HCPCS | Performed by: NURSE PRACTITIONER

## 2023-06-12 PROCEDURE — 96365 THER/PROPH/DIAG IV INF INIT: CPT

## 2023-06-12 PROCEDURE — 6360000002 HC RX W HCPCS: Performed by: NURSE PRACTITIONER

## 2023-06-12 RX ORDER — ALBUMIN (HUMAN) 12.5 G/50ML
25 SOLUTION INTRAVENOUS ONCE
Status: COMPLETED | OUTPATIENT
Start: 2023-06-12 | End: 2023-06-12

## 2023-06-12 RX ORDER — ALBUMIN (HUMAN) 12.5 G/50ML
25 SOLUTION INTRAVENOUS ONCE
Status: DISCONTINUED | OUTPATIENT
Start: 2023-06-12 | End: 2023-06-27

## 2023-06-12 RX ADMIN — ALBUMIN (HUMAN) 25 G: 0.25 INJECTION, SOLUTION INTRAVENOUS at 13:12

## 2023-06-12 NOTE — PROGRESS NOTES
1300: Patient arrived ambulatory with  for albumin infusion. Patient states they took off 5 L. Received telephone order from Kaleb Mead CNP to infuse 25 g of albumin. Patient rights and responsibilities offered to patient. Provided with warm blanket. 1312: Albumin infusion started. Patient tolerating well.  Resting in bed, call light in reach.                    _m___ Safety:       (Environmental)  [place] to environment  Ensure ID band is correct and in place/ allergy band as needed  Assess for fall risk  Initiate fall precautions as applicable (fall band, side rails, etc.)  Call light within reach  Bed in low position/ wheels locked    _m___ Pain:       Assess pain level and characteristics  Administer analgesics as ordered  Assess effectiveness of pain management and report to MD as needed    _m___ Knowledge Deficit:  Assess baseline knowledge  Provide teaching at level of understanding  Provide teaching via preferred learning method  Evaluate teaching effectiveness    _m___ Hemodynamic/Respiratory Status:       (Pre and Post Procedure Monitoring)  Assess/Monitor vital signs and LOC  Assess Baseline SpO2 prior to any sedation  Obtain weight/height  Assess vital signs/ LOC until patient meets discharge criteria  Monitor procedure site and notify MD of any issues

## 2023-06-12 NOTE — PROGRESS NOTES
Order placed for Albumin infusion.   Electronically signed by ANDREZ Townsend CNP on 6/12/2023 at 12:21 PM

## 2023-06-12 NOTE — PROGRESS NOTES
1409: Infusion complete. Vitals as charted. AVS reviewed with patient, voiced understanding. Patient discharged ambulatory with walker.

## 2023-06-26 ENCOUNTER — TELEPHONE (OUTPATIENT)
Dept: FAMILY MEDICINE CLINIC | Age: 83
End: 2023-06-26

## 2023-06-26 DIAGNOSIS — R18.8 OTHER ASCITES: Primary | ICD-10-CM

## 2023-06-27 RX ORDER — ALBUMIN (HUMAN) 12.5 G/50ML
25 SOLUTION INTRAVENOUS ONCE
Qty: 100 ML | Refills: 0 | Status: CANCELLED | OUTPATIENT
Start: 2023-06-27 | End: 2023-06-27

## 2023-06-27 RX ORDER — ALBUMIN (HUMAN) 12.5 G/50ML
25 SOLUTION INTRAVENOUS ONCE
Qty: 100 ML | Refills: 0 | Status: SHIPPED | OUTPATIENT
Start: 2023-06-27 | End: 2023-06-27

## 2023-06-28 ENCOUNTER — HOSPITAL ENCOUNTER (OUTPATIENT)
Dept: NURSING | Age: 83
Discharge: HOME | End: 2023-06-28
Payer: MEDICARE

## 2023-06-28 ENCOUNTER — HOSPITAL ENCOUNTER (OUTPATIENT)
Dept: ULTRASOUND IMAGING | Age: 83
Discharge: HOME | End: 2023-06-28
Payer: MEDICARE

## 2023-06-28 DIAGNOSIS — R18.8 OTHER ASCITES: ICD-10-CM

## 2023-06-28 PROCEDURE — P9047 ALBUMIN (HUMAN), 25%, 50ML: HCPCS | Performed by: NURSE PRACTITIONER

## 2023-06-28 PROCEDURE — 49083 ABD PARACENTESIS W/IMAGING: CPT

## 2023-06-28 PROCEDURE — 6360000002 HC RX W HCPCS: Performed by: NURSE PRACTITIONER

## 2023-06-28 PROCEDURE — 96365 THER/PROPH/DIAG IV INF INIT: CPT

## 2023-06-28 RX ORDER — ALBUMIN (HUMAN) 12.5 G/50ML
25 SOLUTION INTRAVENOUS ONCE
Status: COMPLETED | OUTPATIENT
Start: 2023-06-28 | End: 2023-06-28

## 2023-06-28 RX ADMIN — ALBUMIN (HUMAN) 25 G: 0.25 INJECTION, SOLUTION INTRAVENOUS at 11:46

## 2023-07-04 PROBLEM — I10 BENIGN ESSENTIAL HTN: Chronic | Status: RESOLVED | Noted: 2019-12-18 | Resolved: 2023-07-04

## 2023-07-04 PROBLEM — I73.9 PVD (PERIPHERAL VASCULAR DISEASE) (HCC): Status: RESOLVED | Noted: 2018-04-11 | Resolved: 2023-07-04

## 2023-07-04 PROBLEM — K57.92 ACUTE DIVERTICULITIS: Status: RESOLVED | Noted: 2021-09-12 | Resolved: 2023-07-04

## 2023-07-04 PROBLEM — D64.9 LOW HEMOGLOBIN: Status: RESOLVED | Noted: 2018-04-12 | Resolved: 2023-07-04

## 2023-07-04 PROBLEM — R73.01 IMPAIRED FASTING GLUCOSE: Status: RESOLVED | Noted: 2022-12-29 | Resolved: 2023-07-04

## 2023-07-04 PROBLEM — K74.60 CIRRHOSIS OF LIVER WITHOUT ASCITES (HCC): Status: RESOLVED | Noted: 2020-09-02 | Resolved: 2023-07-04

## 2023-07-04 PROBLEM — R11.0 NAUSEA: Status: RESOLVED | Noted: 2021-04-05 | Resolved: 2023-07-04

## 2023-07-04 NOTE — PROGRESS NOTES
masses or hernias noted. Liver and spleen without enlargement. Extremities: no cyanosis, clubbing or edema of the lower extremities. +2 PT/DP bilaterally. Musculoskeletal: No joint swelling or gross deformity   Neuro:  Alert, 5/5 strength globally and symmetrically, 2+ patellar reflexes bilaterally  Psych: Affect appropriate. Mood normal. Thought process is normal without evidence of depression or psychosis. Good insight and appropriate interaction. Cognition and memory appear to be impaired mildly. Skin: warm and dry, no rash or erythema  Lymph:  No cervical, auricular or supraclavicular lymph nodes palpated  Foot: Bilat 2+DP/PT bilaterally. Skin warm, dry and intact. Sensation normal throughout to touch and with monofilament. Lab Results   Component Value Date    WBC 5.4 06/06/2023    HGB 11.8 (L) 06/06/2023    HCT 34.1 (L) 06/06/2023    .3 (H) 06/06/2023     06/06/2023     Lab Results   Component Value Date    URLVOFZF76 937 (H) 09/13/2021     Lab Results   Component Value Date    FOLATE 12.0 09/13/2021     Lab Results   Component Value Date    IRON 227 (H) 09/13/2021    TIBC 280 09/13/2021    FERRITIN 18 08/26/2020     Lab Results   Component Value Date/Time     06/06/2023 08:55 AM    K 3.9 06/06/2023 08:55 AM    K 4.2 09/15/2021 06:18 AM     06/06/2023 08:55 AM    CO2 29 06/06/2023 08:55 AM    BUN 9 06/06/2023 08:55 AM    CREATININE 0.82 06/06/2023 08:55 AM    GLUCOSE 93 06/06/2023 08:55 AM    CALCIUM 8.90 06/06/2023 08:55 AM    LABGLOM >60 12/17/2022 04:16 PM        ASSESSMENT & PLAN  1.  Cirrhosis of liver with ascites, unspecified hepatic cirrhosis type ([address])    Worsening over time with ascites  On lower dose diuretics, so that could be part of the issue for recurrent re-accumulation  Has apt with GI next wk to re-est care and get more definitive treatment  Pt is stable for OP management  EGD AUG 2022 neg for Varix    Plan:  Get setup for therapeutic paracentesis  Will

## 2023-07-06 ENCOUNTER — OFFICE VISIT (OUTPATIENT)
Dept: FAMILY MEDICINE CLINIC | Age: 83
End: 2023-07-06
Payer: MEDICARE

## 2023-07-06 ENCOUNTER — HOSPITAL ENCOUNTER (OUTPATIENT)
Age: 83
Discharge: HOME | End: 2023-07-06
Payer: MEDICARE

## 2023-07-06 VITALS
HEIGHT: 67 IN | DIASTOLIC BLOOD PRESSURE: 72 MMHG | RESPIRATION RATE: 18 BRPM | BODY MASS INDEX: 25.71 KG/M2 | HEART RATE: 74 BPM | WEIGHT: 163.8 LBS | SYSTOLIC BLOOD PRESSURE: 132 MMHG | OXYGEN SATURATION: 95 % | TEMPERATURE: 97.9 F

## 2023-07-06 DIAGNOSIS — K74.60 CIRRHOSIS OF LIVER WITH ASCITES, UNSPECIFIED HEPATIC CIRRHOSIS TYPE (HCC): ICD-10-CM

## 2023-07-06 DIAGNOSIS — F33.42 RECURRENT MAJOR DEPRESSIVE DISORDER, IN FULL REMISSION (HCC): ICD-10-CM

## 2023-07-06 DIAGNOSIS — K74.60 CIRRHOSIS OF LIVER WITH ASCITES, UNSPECIFIED HEPATIC CIRRHOSIS TYPE (HCC): Primary | ICD-10-CM

## 2023-07-06 DIAGNOSIS — Z95.1 S/P CABG X 3: ICD-10-CM

## 2023-07-06 DIAGNOSIS — R18.8 CIRRHOSIS OF LIVER WITH ASCITES, UNSPECIFIED HEPATIC CIRRHOSIS TYPE (HCC): Primary | ICD-10-CM

## 2023-07-06 DIAGNOSIS — Z86.73 HISTORY OF CVA (CEREBROVASCULAR ACCIDENT): ICD-10-CM

## 2023-07-06 DIAGNOSIS — E11.9 TYPE 2 DIABETES MELLITUS WITHOUT COMPLICATION, WITHOUT LONG-TERM CURRENT USE OF INSULIN (HCC): ICD-10-CM

## 2023-07-06 DIAGNOSIS — I77.1 SUBCLAVIAN ARTERY STENOSIS, LEFT (HCC): ICD-10-CM

## 2023-07-06 DIAGNOSIS — I25.10 ASHD (ARTERIOSCLEROTIC HEART DISEASE): ICD-10-CM

## 2023-07-06 DIAGNOSIS — D50.9 IRON DEFICIENCY ANEMIA, UNSPECIFIED IRON DEFICIENCY ANEMIA TYPE: ICD-10-CM

## 2023-07-06 DIAGNOSIS — F03.90 DEMENTIA, UNSPECIFIED DEMENTIA SEVERITY, UNSPECIFIED DEMENTIA TYPE, UNSPECIFIED WHETHER BEHAVIORAL, PSYCHOTIC, OR MOOD DISTURBANCE OR ANXIETY (HCC): ICD-10-CM

## 2023-07-06 DIAGNOSIS — I71.43 INFRARENAL ABDOMINAL AORTIC ANEURYSM (AAA) WITHOUT RUPTURE (HCC): ICD-10-CM

## 2023-07-06 DIAGNOSIS — I73.9 PAD (PERIPHERAL ARTERY DISEASE) (HCC): ICD-10-CM

## 2023-07-06 DIAGNOSIS — R18.8 CIRRHOSIS OF LIVER WITH ASCITES, UNSPECIFIED HEPATIC CIRRHOSIS TYPE (HCC): ICD-10-CM

## 2023-07-06 PROBLEM — M19.90 ARTHRITIS: Status: ACTIVE | Noted: 2023-07-06

## 2023-07-06 PROBLEM — G30.9 ALZHEIMER'S DEMENTIA (HCC): Status: ACTIVE | Noted: 2023-07-06

## 2023-07-06 PROBLEM — M19.90 ARTHRITIS: Chronic | Status: ACTIVE | Noted: 2023-07-06

## 2023-07-06 PROBLEM — F02.80 ALZHEIMER'S DEMENTIA (HCC): Chronic | Status: ACTIVE | Noted: 2023-07-06

## 2023-07-06 PROBLEM — Z85.828 HISTORY OF BASAL CELL CANCER: Chronic | Status: ACTIVE | Noted: 2023-07-06

## 2023-07-06 PROBLEM — I71.40 AAA (ABDOMINAL AORTIC ANEURYSM) (HCC): Status: ACTIVE | Noted: 2023-07-06

## 2023-07-06 PROBLEM — Z87.19 HISTORY OF CROHN'S DISEASE: Chronic | Status: ACTIVE | Noted: 2023-07-06

## 2023-07-06 PROBLEM — I35.0 AORTIC STENOSIS, MILD: Chronic | Status: ACTIVE | Noted: 2023-07-06

## 2023-07-06 PROBLEM — K58.9 IBS (IRRITABLE BOWEL SYNDROME): Chronic | Status: ACTIVE | Noted: 2023-07-06

## 2023-07-06 PROBLEM — F32.9 MAJOR DEPRESSION: Chronic | Status: ACTIVE | Noted: 2023-07-06

## 2023-07-06 PROBLEM — Z87.19 HISTORY OF CROHN'S DISEASE: Status: ACTIVE | Noted: 2023-07-06

## 2023-07-06 PROBLEM — I71.40 AAA (ABDOMINAL AORTIC ANEURYSM) (HCC): Chronic | Status: ACTIVE | Noted: 2023-07-06

## 2023-07-06 PROBLEM — Z86.79 HISTORY OF HYPERTENSION: Chronic | Status: ACTIVE | Noted: 2023-07-06

## 2023-07-06 PROBLEM — G30.9 ALZHEIMER'S DEMENTIA (HCC): Chronic | Status: ACTIVE | Noted: 2023-07-06

## 2023-07-06 PROBLEM — Z86.79 HISTORY OF HYPERTENSION: Status: ACTIVE | Noted: 2023-07-06

## 2023-07-06 PROBLEM — K58.9 IBS (IRRITABLE BOWEL SYNDROME): Status: ACTIVE | Noted: 2023-07-06

## 2023-07-06 PROBLEM — F32.9 MAJOR DEPRESSION: Status: ACTIVE | Noted: 2023-07-06

## 2023-07-06 PROBLEM — F02.80 ALZHEIMER'S DEMENTIA (HCC): Status: ACTIVE | Noted: 2023-07-06

## 2023-07-06 PROBLEM — I35.0 AORTIC STENOSIS, MILD: Status: ACTIVE | Noted: 2023-07-06

## 2023-07-06 PROBLEM — Z85.828 HISTORY OF BASAL CELL CANCER: Status: ACTIVE | Noted: 2023-07-06

## 2023-07-06 LAB
ALBUMIN SERPL BCG-MCNC: 3.6 G/DL (ref 3.5–5.1)
ALP SERPL-CCNC: 127 U/L (ref 38–126)
ALT SERPL W/O P-5'-P-CCNC: 17 U/L (ref 11–66)
ANION GAP SERPL CALC-SCNC: 10 MEQ/L (ref 8–16)
ANISOCYTOSIS BLD QL SMEAR: PRESENT
AST SERPL-CCNC: 45 U/L (ref 5–40)
BASOPHILS ABSOLUTE: 0.1 THOU/MM3 (ref 0–0.1)
BASOPHILS NFR BLD AUTO: 1.2 %
BILIRUB SERPL-MCNC: 3.8 MG/DL (ref 0.3–1.2)
BUN SERPL-MCNC: 11 MG/DL (ref 7–22)
CALCIUM SERPL-MCNC: 9.6 MG/DL (ref 8.5–10.5)
CHLORIDE SERPL-SCNC: 102 MEQ/L (ref 98–111)
CO2 SERPL-SCNC: 27 MEQ/L (ref 23–33)
CREAT SERPL-MCNC: 0.7 MG/DL (ref 0.4–1.2)
DEPRECATED RDW RBC AUTO: 74.9 FL (ref 35–45)
ELLIPTOCYTES: ABNORMAL
EOSINOPHIL NFR BLD AUTO: 2.7 %
EOSINOPHILS ABSOLUTE: 0.2 THOU/MM3 (ref 0–0.4)
ERYTHROCYTE [DISTWIDTH] IN BLOOD BY AUTOMATED COUNT: 19.8 % (ref 11.5–14.5)
GFR SERPL CREATININE-BSD FRML MDRD: > 60 ML/MIN/1.73M2
GLUCOSE SERPL-MCNC: 84 MG/DL (ref 70–108)
HCT VFR BLD AUTO: 35.6 % (ref 37–47)
HGB BLD-MCNC: 11.5 GM/DL (ref 12–16)
IMM GRANULOCYTES # BLD AUTO: 0.01 THOU/MM3 (ref 0–0.07)
IMM GRANULOCYTES NFR BLD AUTO: 0.2 %
LYMPHOCYTES ABSOLUTE: 1.7 THOU/MM3 (ref 1–4.8)
LYMPHOCYTES NFR BLD AUTO: 25.2 %
MCH RBC QN AUTO: 35.1 PG (ref 26–33)
MCHC RBC AUTO-ENTMCNC: 32.3 GM/DL (ref 32.2–35.5)
MCV RBC AUTO: 108.5 FL (ref 81–99)
MONOCYTES ABSOLUTE: 1.2 THOU/MM3 (ref 0.4–1.3)
MONOCYTES NFR BLD AUTO: 18.8 %
NEUTROPHILS NFR BLD AUTO: 51.9 %
NRBC BLD AUTO-RTO: 0 /100 WBC
PLATELET # BLD AUTO: 177 THOU/MM3 (ref 130–400)
PMV BLD AUTO: 10.9 FL (ref 9.4–12.4)
POIKILOCYTES: ABNORMAL
POTASSIUM SERPL-SCNC: 4.1 MEQ/L (ref 3.5–5.2)
PROT SERPL-MCNC: 6.5 G/DL (ref 6.1–8)
RBC # BLD AUTO: 3.28 MILL/MM3 (ref 4.2–5.4)
SCAN OF BLOOD SMEAR: NORMAL
SEGMENTED NEUTROPHILS ABSOLUTE COUNT: 3.4 THOU/MM3 (ref 1.8–7.7)
SODIUM SERPL-SCNC: 139 MEQ/L (ref 135–145)
TARGETS BLD QL SMEAR: ABNORMAL
WBC # BLD AUTO: 6.6 THOU/MM3 (ref 4.8–10.8)

## 2023-07-06 PROCEDURE — 3044F HG A1C LEVEL LT 7.0%: CPT | Performed by: FAMILY MEDICINE

## 2023-07-06 PROCEDURE — 36415 COLL VENOUS BLD VENIPUNCTURE: CPT

## 2023-07-06 PROCEDURE — 80053 COMPREHEN METABOLIC PANEL: CPT

## 2023-07-06 PROCEDURE — 99215 OFFICE O/P EST HI 40 MIN: CPT | Performed by: FAMILY MEDICINE

## 2023-07-06 PROCEDURE — 85025 COMPLETE CBC W/AUTO DIFF WBC: CPT

## 2023-07-06 PROCEDURE — 1123F ACP DISCUSS/DSCN MKR DOCD: CPT | Performed by: FAMILY MEDICINE

## 2023-07-06 RX ORDER — ALBUMIN (HUMAN) 12.5 G/50ML
50 SOLUTION INTRAVENOUS ONCE
Qty: 200 ML | Refills: 0 | Status: SHIPPED | OUTPATIENT
Start: 2023-07-06 | End: 2023-07-06

## 2023-07-06 RX ORDER — SERTRALINE HYDROCHLORIDE 25 MG/1
25 TABLET, FILM COATED ORAL DAILY
Qty: 90 TABLET | Refills: 3 | Status: SHIPPED | OUTPATIENT
Start: 2023-07-06

## 2023-07-06 ASSESSMENT — PATIENT HEALTH QUESTIONNAIRE - PHQ9
2. FEELING DOWN, DEPRESSED OR HOPELESS: 0
SUM OF ALL RESPONSES TO PHQ QUESTIONS 1-9: 0
7. TROUBLE CONCENTRATING ON THINGS, SUCH AS READING THE NEWSPAPER OR WATCHING TELEVISION: 0
3. TROUBLE FALLING OR STAYING ASLEEP: 0
10. IF YOU CHECKED OFF ANY PROBLEMS, HOW DIFFICULT HAVE THESE PROBLEMS MADE IT FOR YOU TO DO YOUR WORK, TAKE CARE OF THINGS AT HOME, OR GET ALONG WITH OTHER PEOPLE: 0
9. THOUGHTS THAT YOU WOULD BE BETTER OFF DEAD, OR OF HURTING YOURSELF: 0
6. FEELING BAD ABOUT YOURSELF - OR THAT YOU ARE A FAILURE OR HAVE LET YOURSELF OR YOUR FAMILY DOWN: 0
SUM OF ALL RESPONSES TO PHQ QUESTIONS 1-9: 0
1. LITTLE INTEREST OR PLEASURE IN DOING THINGS: 0
4. FEELING TIRED OR HAVING LITTLE ENERGY: 0
5. POOR APPETITE OR OVEREATING: 0
SUM OF ALL RESPONSES TO PHQ QUESTIONS 1-9: 0
SUM OF ALL RESPONSES TO PHQ9 QUESTIONS 1 & 2: 0
8. MOVING OR SPEAKING SO SLOWLY THAT OTHER PEOPLE COULD HAVE NOTICED. OR THE OPPOSITE, BEING SO FIGETY OR RESTLESS THAT YOU HAVE BEEN MOVING AROUND A LOT MORE THAN USUAL: 0
SUM OF ALL RESPONSES TO PHQ QUESTIONS 1-9: 0

## 2023-07-07 ENCOUNTER — TELEPHONE (OUTPATIENT)
Dept: FAMILY MEDICINE CLINIC | Age: 83
End: 2023-07-07

## 2023-07-07 ENCOUNTER — HOSPITAL ENCOUNTER (OUTPATIENT)
Dept: ULTRASOUND IMAGING | Age: 83
Discharge: HOME | End: 2023-07-07
Payer: MEDICARE

## 2023-07-07 DIAGNOSIS — K74.60 CIRRHOSIS OF LIVER WITH ASCITES, UNSPECIFIED HEPATIC CIRRHOSIS TYPE (HCC): Primary | ICD-10-CM

## 2023-07-07 DIAGNOSIS — R18.8 CIRRHOSIS OF LIVER WITH ASCITES, UNSPECIFIED HEPATIC CIRRHOSIS TYPE (HCC): Primary | ICD-10-CM

## 2023-07-07 DIAGNOSIS — K74.60 CIRRHOSIS OF LIVER WITH ASCITES, UNSPECIFIED HEPATIC CIRRHOSIS TYPE (HCC): ICD-10-CM

## 2023-07-07 DIAGNOSIS — R18.8 CIRRHOSIS OF LIVER WITH ASCITES, UNSPECIFIED HEPATIC CIRRHOSIS TYPE (HCC): ICD-10-CM

## 2023-07-07 PROCEDURE — 49083 ABD PARACENTESIS W/IMAGING: CPT

## 2023-07-07 RX ORDER — FUROSEMIDE 40 MG/1
40 TABLET ORAL DAILY
Qty: 90 TABLET | Refills: 3 | Status: SHIPPED | OUTPATIENT
Start: 2023-07-07

## 2023-07-07 RX ORDER — SPIRONOLACTONE 50 MG/1
50 TABLET, FILM COATED ORAL DAILY
Qty: 90 TABLET | Refills: 3 | Status: SHIPPED | OUTPATIENT
Start: 2023-07-07

## 2023-07-07 NOTE — TELEPHONE ENCOUNTER
----- Message from Adrián Killer, DO sent at 7/7/2023  6:02 AM EDT -----  Please let pt/ know that labs are stable  I'd like to inc lasix to 40mg and Spironolactone 50mg  Ok to take at the same time in the morning  Repeat BMP 1 wk  Keep apt GI next wk and paracentesis today  Let me know if questions, thanks!

## 2023-07-07 NOTE — TELEPHONE ENCOUNTER
I called and spoke to [facility] and they verbalized understanding and had no questions at this time.

## 2023-07-12 ENCOUNTER — HOSPITAL ENCOUNTER (OUTPATIENT)
Age: 83
Discharge: HOME | End: 2023-07-12
Payer: MEDICARE

## 2023-07-12 DIAGNOSIS — K74.60 LIVER DISEASE, CHRONIC, WITH CIRRHOSIS (HCC): ICD-10-CM

## 2023-07-12 DIAGNOSIS — K74.5 BILIARY CIRRHOSIS (HCC): ICD-10-CM

## 2023-07-12 DIAGNOSIS — R18.8 OTHER ASCITES: ICD-10-CM

## 2023-07-12 DIAGNOSIS — K76.9 LIVER DISEASE, CHRONIC, WITH CIRRHOSIS (HCC): ICD-10-CM

## 2023-07-12 DIAGNOSIS — D50.0 CHRONIC BLOOD LOSS ANEMIA: ICD-10-CM

## 2023-07-12 DIAGNOSIS — D50.0 IRON DEFICIENCY ANEMIA DUE TO CHRONIC BLOOD LOSS: ICD-10-CM

## 2023-07-12 LAB
A1AT SERPL-MCNC: 127 MG/DL (ref 90–200)
ALBUMIN SERPL BCG-MCNC: 3.3 G/DL (ref 3.5–5.1)
ALP SERPL-CCNC: 116 U/L (ref 38–126)
ALT SERPL W/O P-5'-P-CCNC: 16 U/L (ref 11–66)
AMMONIA PLAS-MCNC: 74 UMOL/L (ref 11–60)
AST SERPL-CCNC: 40 U/L (ref 5–40)
BILIRUB CONJ SERPL-MCNC: 1 MG/DL (ref 0–0.3)
BILIRUB SERPL-MCNC: 4 MG/DL (ref 0.3–1.2)
GGT SERPL-CCNC: 65 U/L (ref 8–69)
IRON SERPL-MCNC: 204 UG/DL (ref 50–170)
PROT SERPL-MCNC: 6.3 G/DL (ref 6.1–8)
TRANSFERRIN SERPL-MCNC: 62 U/L (ref 8–52)

## 2023-07-12 PROCEDURE — 82103 ALPHA-1-ANTITRYPSIN TOTAL: CPT

## 2023-07-12 PROCEDURE — 83540 ASSAY OF IRON: CPT

## 2023-07-12 PROCEDURE — 82164 ANGIOTENSIN I ENZYME TEST: CPT

## 2023-07-12 PROCEDURE — 82977 ASSAY OF GGT: CPT

## 2023-07-12 PROCEDURE — 36415 COLL VENOUS BLD VENIPUNCTURE: CPT

## 2023-07-12 PROCEDURE — 80076 HEPATIC FUNCTION PANEL: CPT

## 2023-07-12 PROCEDURE — 82140 ASSAY OF AMMONIA: CPT

## 2023-07-12 PROCEDURE — 82390 ASSAY OF CERULOPLASMIN: CPT

## 2023-07-13 LAB — CERULOPLASMIN SERPL-MCNC: 18 MG/DL (ref 16–45)

## 2023-07-15 ENCOUNTER — APPOINTMENT (OUTPATIENT)
Dept: CT IMAGING | Age: 83
DRG: 432 | End: 2023-07-15
Payer: MEDICARE

## 2023-07-15 ENCOUNTER — HOSPITAL ENCOUNTER (INPATIENT)
Age: 83
LOS: 2 days | Discharge: HOME | DRG: 432 | End: 2023-07-17
Attending: FAMILY MEDICINE
Payer: MEDICARE

## 2023-07-15 ENCOUNTER — APPOINTMENT (OUTPATIENT)
Dept: GENERAL RADIOLOGY | Age: 83
DRG: 432 | End: 2023-07-15
Payer: MEDICARE

## 2023-07-15 DIAGNOSIS — R10.11 RIGHT UPPER QUADRANT ABDOMINAL PAIN: Primary | ICD-10-CM

## 2023-07-15 DIAGNOSIS — R09.02 HYPOXIA: ICD-10-CM

## 2023-07-15 PROBLEM — K56.7 ILEUS OF UNSPECIFIED TYPE (HCC): Status: ACTIVE | Noted: 2023-07-15

## 2023-07-15 LAB
ALBUMIN SERPL BCG-MCNC: 3 G/DL (ref 3.5–5.1)
ALP SERPL-CCNC: 107 U/L (ref 38–126)
ALT SERPL W/O P-5'-P-CCNC: 14 U/L (ref 11–66)
ANION GAP SERPL CALC-SCNC: 11 MEQ/L (ref 8–16)
AST SERPL-CCNC: 34 U/L (ref 5–40)
BILIRUB CONJ SERPL-MCNC: 1 MG/DL (ref 0–0.3)
BILIRUB SERPL-MCNC: 3.8 MG/DL (ref 0.3–1.2)
BUN SERPL-MCNC: 15 MG/DL (ref 7–22)
CALCIUM SERPL-MCNC: 9 MG/DL (ref 8.5–10.5)
CHLORIDE SERPL-SCNC: 103 MEQ/L (ref 98–111)
CO2 SERPL-SCNC: 26 MEQ/L (ref 23–33)
CREAT SERPL-MCNC: 0.8 MG/DL (ref 0.4–1.2)
GFR SERPL CREATININE-BSD FRML MDRD: > 60 ML/MIN/1.73M2
GLUCOSE SERPL-MCNC: 99 MG/DL (ref 70–108)
INR PPP: 1.45 (ref 0.85–1.13)
NT-PROBNP SERPL IA-MCNC: 519.2 PG/ML (ref 0–449)
OSMOLALITY SERPL CALC.SUM OF ELEC: 280.3 MOSMOL/KG (ref 275–300)
POTASSIUM SERPL-SCNC: 4.2 MEQ/L (ref 3.5–5.2)
PROT SERPL-MCNC: 5.6 G/DL (ref 6.1–8)
SCAN OF BLOOD SMEAR: NORMAL
SODIUM SERPL-SCNC: 140 MEQ/L (ref 135–145)

## 2023-07-15 PROCEDURE — 6360000002 HC RX W HCPCS: Performed by: EMERGENCY MEDICINE

## 2023-07-15 PROCEDURE — 71046 X-RAY EXAM CHEST 2 VIEWS: CPT

## 2023-07-15 PROCEDURE — 96375 TX/PRO/DX INJ NEW DRUG ADDON: CPT

## 2023-07-15 PROCEDURE — 82248 BILIRUBIN DIRECT: CPT

## 2023-07-15 PROCEDURE — 80053 COMPREHEN METABOLIC PANEL: CPT

## 2023-07-15 PROCEDURE — 36415 COLL VENOUS BLD VENIPUNCTURE: CPT

## 2023-07-15 PROCEDURE — 83880 ASSAY OF NATRIURETIC PEPTIDE: CPT

## 2023-07-15 PROCEDURE — 6360000004 HC RX CONTRAST MEDICATION: Performed by: STUDENT IN AN ORGANIZED HEALTH CARE EDUCATION/TRAINING PROGRAM

## 2023-07-15 PROCEDURE — 85025 COMPLETE CBC W/AUTO DIFF WBC: CPT

## 2023-07-15 PROCEDURE — 6360000002 HC RX W HCPCS

## 2023-07-15 PROCEDURE — 74177 CT ABD & PELVIS W/CONTRAST: CPT

## 2023-07-15 PROCEDURE — 85610 PROTHROMBIN TIME: CPT

## 2023-07-15 PROCEDURE — 96374 THER/PROPH/DIAG INJ IV PUSH: CPT

## 2023-07-15 PROCEDURE — 6370000000 HC RX 637 (ALT 250 FOR IP)

## 2023-07-15 PROCEDURE — 71275 CT ANGIOGRAPHY CHEST: CPT

## 2023-07-15 PROCEDURE — 2580000003 HC RX 258

## 2023-07-15 PROCEDURE — 1200000000 HC SEMI PRIVATE

## 2023-07-15 PROCEDURE — 99223 1ST HOSP IP/OBS HIGH 75: CPT | Performed by: STUDENT IN AN ORGANIZED HEALTH CARE EDUCATION/TRAINING PROGRAM

## 2023-07-15 PROCEDURE — 99285 EMERGENCY DEPT VISIT HI MDM: CPT

## 2023-07-15 PROCEDURE — 6360000004 HC RX CONTRAST MEDICATION

## 2023-07-15 RX ORDER — ACETAMINOPHEN 325 MG/1
650 TABLET ORAL EVERY 6 HOURS PRN
Status: DISCONTINUED | OUTPATIENT
Start: 2023-07-15 | End: 2023-07-17 | Stop reason: HOSPADM

## 2023-07-15 RX ORDER — MORPHINE SULFATE 2 MG/ML
2 INJECTION, SOLUTION INTRAMUSCULAR; INTRAVENOUS ONCE
Status: COMPLETED | OUTPATIENT
Start: 2023-07-15 | End: 2023-07-15

## 2023-07-15 RX ORDER — SPIRONOLACTONE 25 MG/1
50 TABLET ORAL DAILY
Status: DISCONTINUED | OUTPATIENT
Start: 2023-07-16 | End: 2023-07-17 | Stop reason: HOSPADM

## 2023-07-15 RX ORDER — ORPHENADRINE CITRATE 100 MG/1
100 TABLET, EXTENDED RELEASE ORAL 2 TIMES DAILY
Qty: 20 TABLET | Refills: 0 | Status: CANCELLED | OUTPATIENT
Start: 2023-07-15 | End: 2023-07-25

## 2023-07-15 RX ORDER — MAGNESIUM SULFATE IN WATER 40 MG/ML
2000 INJECTION, SOLUTION INTRAVENOUS PRN
Status: DISCONTINUED | OUTPATIENT
Start: 2023-07-15 | End: 2023-07-17 | Stop reason: HOSPADM

## 2023-07-15 RX ORDER — ENOXAPARIN SODIUM 100 MG/ML
40 INJECTION SUBCUTANEOUS DAILY
Status: DISCONTINUED | OUTPATIENT
Start: 2023-07-16 | End: 2023-07-15

## 2023-07-15 RX ORDER — ONDANSETRON 2 MG/ML
4 INJECTION INTRAMUSCULAR; INTRAVENOUS EVERY 6 HOURS PRN
Status: DISCONTINUED | OUTPATIENT
Start: 2023-07-15 | End: 2023-07-17 | Stop reason: HOSPADM

## 2023-07-15 RX ORDER — ONDANSETRON 4 MG/1
4 TABLET, ORALLY DISINTEGRATING ORAL EVERY 8 HOURS PRN
Status: DISCONTINUED | OUTPATIENT
Start: 2023-07-15 | End: 2023-07-17 | Stop reason: HOSPADM

## 2023-07-15 RX ORDER — MORPHINE SULFATE 2 MG/ML
2 INJECTION, SOLUTION INTRAMUSCULAR; INTRAVENOUS ONCE
Status: DISCONTINUED | OUTPATIENT
Start: 2023-07-15 | End: 2023-07-15

## 2023-07-15 RX ORDER — SODIUM CHLORIDE 0.9 % (FLUSH) 0.9 %
5-40 SYRINGE (ML) INJECTION PRN
Status: DISCONTINUED | OUTPATIENT
Start: 2023-07-15 | End: 2023-07-17 | Stop reason: HOSPADM

## 2023-07-15 RX ORDER — SERTRALINE HYDROCHLORIDE 25 MG/1
25 TABLET, FILM COATED ORAL DAILY
Status: DISCONTINUED | OUTPATIENT
Start: 2023-07-16 | End: 2023-07-17 | Stop reason: HOSPADM

## 2023-07-15 RX ORDER — SODIUM CHLORIDE 0.9 % (FLUSH) 0.9 %
5-40 SYRINGE (ML) INJECTION EVERY 12 HOURS SCHEDULED
Status: DISCONTINUED | OUTPATIENT
Start: 2023-07-15 | End: 2023-07-17 | Stop reason: HOSPADM

## 2023-07-15 RX ORDER — ATORVASTATIN CALCIUM 20 MG/1
20 TABLET, FILM COATED ORAL DAILY
Status: DISCONTINUED | OUTPATIENT
Start: 2023-07-16 | End: 2023-07-16 | Stop reason: CLARIF

## 2023-07-15 RX ORDER — POLYETHYLENE GLYCOL 3350 17 G/17G
17 POWDER, FOR SOLUTION ORAL DAILY PRN
Status: DISCONTINUED | OUTPATIENT
Start: 2023-07-15 | End: 2023-07-17 | Stop reason: HOSPADM

## 2023-07-15 RX ORDER — SODIUM CHLORIDE 9 MG/ML
INJECTION, SOLUTION INTRAVENOUS PRN
Status: DISCONTINUED | OUTPATIENT
Start: 2023-07-15 | End: 2023-07-17 | Stop reason: HOSPADM

## 2023-07-15 RX ORDER — ACETAMINOPHEN 650 MG/1
650 SUPPOSITORY RECTAL EVERY 6 HOURS PRN
Status: DISCONTINUED | OUTPATIENT
Start: 2023-07-15 | End: 2023-07-17 | Stop reason: HOSPADM

## 2023-07-15 RX ORDER — FUROSEMIDE 10 MG/ML
40 INJECTION INTRAMUSCULAR; INTRAVENOUS ONCE
Status: COMPLETED | OUTPATIENT
Start: 2023-07-15 | End: 2023-07-15

## 2023-07-15 RX ADMIN — IOPAMIDOL 80 ML: 755 INJECTION, SOLUTION INTRAVENOUS at 20:52

## 2023-07-15 RX ADMIN — POLYETHYLENE GLYCOL 3350 17 G: 17 POWDER, FOR SOLUTION ORAL at 23:07

## 2023-07-15 RX ADMIN — IOPAMIDOL 80 ML: 755 INJECTION, SOLUTION INTRAVENOUS at 13:00

## 2023-07-15 RX ADMIN — MORPHINE SULFATE 2 MG: 2 INJECTION, SOLUTION INTRAMUSCULAR; INTRAVENOUS at 14:24

## 2023-07-15 RX ADMIN — SODIUM CHLORIDE, PRESERVATIVE FREE 10 ML: 5 INJECTION INTRAVENOUS at 22:33

## 2023-07-15 RX ADMIN — ACETAMINOPHEN 650 MG: 325 TABLET ORAL at 23:41

## 2023-07-15 RX ADMIN — FUROSEMIDE 40 MG: 10 INJECTION, SOLUTION INTRAMUSCULAR; INTRAVENOUS at 20:18

## 2023-07-15 ASSESSMENT — PAIN - FUNCTIONAL ASSESSMENT
PAIN_FUNCTIONAL_ASSESSMENT: NONE - DENIES PAIN
PAIN_FUNCTIONAL_ASSESSMENT: NONE - DENIES PAIN
PAIN_FUNCTIONAL_ASSESSMENT: ACTIVITIES ARE NOT PREVENTED
PAIN_FUNCTIONAL_ASSESSMENT: ACTIVITIES ARE NOT PREVENTED
PAIN_FUNCTIONAL_ASSESSMENT: NONE - DENIES PAIN
PAIN_FUNCTIONAL_ASSESSMENT: NONE - DENIES PAIN

## 2023-07-15 ASSESSMENT — PAIN SCALES - GENERAL
PAINLEVEL_OUTOF10: 8
PAINLEVEL_OUTOF10: 8

## 2023-07-15 ASSESSMENT — PAIN DESCRIPTION - ORIENTATION
ORIENTATION: RIGHT
ORIENTATION: RIGHT

## 2023-07-15 ASSESSMENT — PAIN DESCRIPTION - DESCRIPTORS
DESCRIPTORS: ACHING
DESCRIPTORS: SHARP;ACHING;DISCOMFORT

## 2023-07-15 ASSESSMENT — PAIN DESCRIPTION - LOCATION
LOCATION: ABDOMEN
LOCATION: ABDOMEN

## 2023-07-15 NOTE — ED PROVIDER NOTES
[facility] DEPT  EMERGENCY DEPARTMENT ENCOUNTER          Pt Name: Cole Masterson  MRN: [id number]  [address] [facility] 1940  Date of evaluation: 7/15/2023  Physician: Merissa Woo MD  Supervising Attending Physician: Patrizia Bruce MD      [address]       Chief Complaint   Patient presents with    Abdominal Pain         HISTORY OF PRESENT ILLNESS    HPI  Cole Masterson is a 80 y.o. female who presents to the emergency department from home,  for evaluation of abdominal pain. Patient has an extensive medical history including liver cirrhosis with stable varices, AAA that was fixed in 80, stroke, Alzheimer's disease. She was at her usual health until the night before she presented to the ED where she started complaining of severe sharp right-sided abdominal pain that started right after she bent down to reach out for a faucet. The pain has been constant since and is 4 out of 10 in severity at rest and 10 when she moves. The pain is mostly exacerbated by movement and nothing else, it does not radiate anywhere else. She denies any nausea vomiting fever dysuria or bruising    The patient has no other acute complaints at this time.       REVIEW OF SYSTEMS   Review of Systems      PAST MEDICAL AND SURGICAL HISTORY     Past Medical History:   Diagnosis Date    AAA (abdominal aortic aneurysm) ([address])     Alzheimer's dementia ([address])     Aortic stenosis, mild     ECHO 10/27/2022    Arthritis     ASHD (arteriosclerotic heart disease)     s/p CABG 2016    Chronic low back pain     Cirrhosis of liver ([address]) 09/02/2020    DM2 (diabetes mellitus, type 2) ([address])     diet control    Dyslipidemia     Former smoker     GERD (gastroesophageal reflux disease)     History of basal cell cancer     Nose    History of Crohn's disease     History of CVA (cerebrovascular accident)     x3 after heart surgery    History of hypertension     IBS (irritable bowel syndrome)     Iron deficiency anemia     Major

## 2023-07-15 NOTE — ED NOTES
Patient ambulated in [place]. Pulse ox drops to 85-86% while ambulating with walker her normal distance she walks at home which was approx 50 feet total. Patient back to bed and placed on 2L NC due to oxygen at 88% once back to bed resting.      Beth Romero RN  07/15/23 [id number]

## 2023-07-15 NOTE — ED TRIAGE NOTES
Patient to ER due to right sided abdominal pain. Patient bent over yesterday and got sharp pain in right sided abdomen. Patient has cirrosis of liver and had fluid removed from abdomen three times. The last time was beginning of July. She is not scheduled to have more fluid removed but is suppose to call the doctor if she starts feeling full. Patient states this feels different and is very localized.

## 2023-07-15 NOTE — ED NOTES
Loan Delong MD and Balbir Navas MD notified of patient oxygen saturation of 89% on RA after morphine and that patient was placed on 2L NC. Second dose of Morphine discontinued and patient to be observed for the next hour.        Bryan Mcwilliams RN  07/15/23 0583

## 2023-07-16 ENCOUNTER — APPOINTMENT (OUTPATIENT)
Dept: ULTRASOUND IMAGING | Age: 83
DRG: 432 | End: 2023-07-16
Payer: MEDICARE

## 2023-07-16 PROBLEM — R18.8 ASCITES OF LIVER: Status: ACTIVE | Noted: 2023-07-16

## 2023-07-16 PROBLEM — E72.20 HYPERAMMONEMIA (HCC): Status: ACTIVE | Noted: 2023-07-16

## 2023-07-16 LAB
ALBUMIN FLD-MCNC: 0.6 GM/DL
ALBUMIN SERPL BCG-MCNC: 2.9 G/DL (ref 3.5–5.1)
AMMONIA PLAS-MCNC: 88 UMOL/L (ref 11–60)
AMYLASE FLD-CCNC: 49 U/L
ANION GAP SERPL CALC-SCNC: 8 MEQ/L (ref 8–16)
ANISOCYTOSIS BLD QL SMEAR: PRESENT
APTT PPP: 33.6 SECONDS (ref 22–38)
BASO STIPL BLD QL SMEAR: ABNORMAL
BASOPHILS ABSOLUTE: 0.1 THOU/MM3 (ref 0–0.1)
BASOPHILS NFR BLD AUTO: 1.2 %
BUN SERPL-MCNC: 17 MG/DL (ref 7–22)
CALCIUM SERPL-MCNC: 8.6 MG/DL (ref 8.5–10.5)
CHLORIDE SERPL-SCNC: 105 MEQ/L (ref 98–111)
CO2 SERPL-SCNC: 26 MEQ/L (ref 23–33)
CREAT SERPL-MCNC: 0.8 MG/DL (ref 0.4–1.2)
DACROCYTES: ABNORMAL
DEPRECATED RDW RBC AUTO: 72.1 FL (ref 35–45)
DEPRECATED RDW RBC AUTO: 73.7 FL (ref 35–45)
ELLIPTOCYTES: ABNORMAL
EOSINOPHIL NFR BLD AUTO: 2.6 %
EOSINOPHILS ABSOLUTE: 0.1 THOU/MM3 (ref 0–0.4)
ERYTHROCYTE [DISTWIDTH] IN BLOOD BY AUTOMATED COUNT: 19.7 % (ref 11.5–14.5)
ERYTHROCYTE [DISTWIDTH] IN BLOOD BY AUTOMATED COUNT: 19.7 % (ref 11.5–14.5)
FERRITIN SERPL IA-MCNC: 482 NG/ML (ref 10–291)
FIBRINOGEN PPP-MCNC: 167 MG/100ML (ref 155–475)
FOLATE SERPL-MCNC: > 20 NG/ML (ref 4.8–24.2)
GFR SERPL CREATININE-BSD FRML MDRD: > 60 ML/MIN/1.73M2
GLUCOSE FLD-MCNC: 103 MG/DL
GLUCOSE SERPL-MCNC: 86 MG/DL (ref 70–108)
GRAM STN SPEC: NORMAL
HCT VFR BLD AUTO: 28.7 % (ref 37–47)
HCT VFR BLD AUTO: 31.9 % (ref 37–47)
HGB BLD-MCNC: 10.6 GM/DL (ref 12–16)
HGB BLD-MCNC: 9.6 GM/DL (ref 12–16)
HGB RETIC QN AUTO: 34.7 PG (ref 28.2–35.7)
IMM GRANULOCYTES # BLD AUTO: 0.01 THOU/MM3 (ref 0–0.07)
IMM GRANULOCYTES NFR BLD AUTO: 0.2 %
IMM RETICS NFR: 12.9 % (ref 3–15.9)
IRON SATN MFR SERPL: 84 % (ref 20–50)
IRON SERPL-MCNC: 187 UG/DL (ref 50–170)
LDH FLD L TO P-CCNC: 50 U/L
LYMPHOCYTES ABSOLUTE: 1.1 THOU/MM3 (ref 1–4.8)
LYMPHOCYTES NFR BLD AUTO: 23.1 %
MACROCYTES BLD QL SMEAR: PRESENT
MAGNESIUM SERPL-MCNC: 2 MG/DL (ref 1.6–2.4)
MCH RBC QN AUTO: 34.8 PG (ref 26–33)
MCH RBC QN AUTO: 35.4 PG (ref 26–33)
MCHC RBC AUTO-ENTMCNC: 33.2 GM/DL (ref 32.2–35.5)
MCHC RBC AUTO-ENTMCNC: 33.4 GM/DL (ref 32.2–35.5)
MCV RBC AUTO: 104.6 FL (ref 81–99)
MCV RBC AUTO: 105.9 FL (ref 81–99)
MONOCYTES ABSOLUTE: 0.9 THOU/MM3 (ref 0.4–1.3)
MONOCYTES NFR BLD AUTO: 18.4 %
NEUTROPHILS NFR BLD AUTO: 54.5 %
NRBC BLD AUTO-RTO: 0 /100 WBC
PATHOLOGIST REVIEW: ABNORMAL
PHOSPHATE SERPL-MCNC: 3.3 MG/DL (ref 2.4–4.7)
PLATELET # BLD AUTO: 148 THOU/MM3 (ref 130–400)
PLATELET # BLD AUTO: 163 THOU/MM3 (ref 130–400)
PMV BLD AUTO: 11.2 FL (ref 9.4–12.4)
PMV BLD AUTO: 12 FL (ref 9.4–12.4)
POIKILOCYTES: ABNORMAL
POLYCHROMASIA BLD QL SMEAR: ABNORMAL
POTASSIUM SERPL-SCNC: 4.1 MEQ/L (ref 3.5–5.2)
PROT SERPL-MCNC: 5.7 G/DL (ref 6.1–8)
RBC # BLD AUTO: 2.71 MILL/MM3 (ref 4.2–5.4)
RBC # BLD AUTO: 3.05 MILL/MM3 (ref 4.2–5.4)
RETICS # AUTO: 64 THOU/MM3 (ref 20–115)
RETICS/RBC NFR AUTO: 2.2 % (ref 0.5–2)
SEGMENTED NEUTROPHILS ABSOLUTE COUNT: 2.7 THOU/MM3 (ref 1.8–7.7)
SODIUM SERPL-SCNC: 139 MEQ/L (ref 135–145)
TARGETS BLD QL SMEAR: ABNORMAL
TIBC SERPL-MCNC: 223 UG/DL (ref 171–450)
TSH SERPL DL<=0.005 MIU/L-ACNC: 2.76 UIU/ML (ref 0.4–4.2)
VIT B12 SERPL-MCNC: 1115 PG/ML (ref 211–911)
WBC # BLD AUTO: 4.9 THOU/MM3 (ref 4.8–10.8)
WBC # BLD AUTO: 5.3 THOU/MM3 (ref 4.8–10.8)

## 2023-07-16 PROCEDURE — 85046 RETICYTE/HGB CONCENTRATE: CPT

## 2023-07-16 PROCEDURE — 99232 SBSQ HOSP IP/OBS MODERATE 35: CPT | Performed by: NURSE PRACTITIONER

## 2023-07-16 PROCEDURE — 89050 BODY FLUID CELL COUNT: CPT

## 2023-07-16 PROCEDURE — 82150 ASSAY OF AMYLASE: CPT

## 2023-07-16 PROCEDURE — 6370000000 HC RX 637 (ALT 250 FOR IP)

## 2023-07-16 PROCEDURE — 6360000002 HC RX W HCPCS: Performed by: INTERNAL MEDICINE

## 2023-07-16 PROCEDURE — 76981 USE PARENCHYMA: CPT

## 2023-07-16 PROCEDURE — 83540 ASSAY OF IRON: CPT

## 2023-07-16 PROCEDURE — 85247 CLOT FACTOR VIII MULTIMETRIC: CPT

## 2023-07-16 PROCEDURE — 0W9G3ZZ DRAINAGE OF PERITONEAL CAVITY, PERCUTANEOUS APPROACH: ICD-10-PCS

## 2023-07-16 PROCEDURE — 84155 ASSAY OF PROTEIN SERUM: CPT

## 2023-07-16 PROCEDURE — 83615 LACTATE (LD) (LDH) ENZYME: CPT

## 2023-07-16 PROCEDURE — 85245 CLOT FACTOR VIII VW RISTOCTN: CPT

## 2023-07-16 PROCEDURE — 82945 GLUCOSE OTHER FLUID: CPT

## 2023-07-16 PROCEDURE — 88112 CYTOPATH CELL ENHANCE TECH: CPT

## 2023-07-16 PROCEDURE — 85730 THROMBOPLASTIN TIME PARTIAL: CPT

## 2023-07-16 PROCEDURE — 82607 VITAMIN B-12: CPT

## 2023-07-16 PROCEDURE — 84443 ASSAY THYROID STIM HORMONE: CPT

## 2023-07-16 PROCEDURE — P9047 ALBUMIN (HUMAN), 25%, 50ML: HCPCS | Performed by: INTERNAL MEDICINE

## 2023-07-16 PROCEDURE — 49083 ABD PARACENTESIS W/IMAGING: CPT

## 2023-07-16 PROCEDURE — 6370000000 HC RX 637 (ALT 250 FOR IP): Performed by: NURSE PRACTITIONER

## 2023-07-16 PROCEDURE — 83735 ASSAY OF MAGNESIUM: CPT

## 2023-07-16 PROCEDURE — 84100 ASSAY OF PHOSPHORUS: CPT

## 2023-07-16 PROCEDURE — 82040 ASSAY OF SERUM ALBUMIN: CPT

## 2023-07-16 PROCEDURE — 82746 ASSAY OF FOLIC ACID SERUM: CPT

## 2023-07-16 PROCEDURE — 85384 FIBRINOGEN ACTIVITY: CPT

## 2023-07-16 PROCEDURE — 87205 SMEAR GRAM STAIN: CPT

## 2023-07-16 PROCEDURE — 85246 CLOT FACTOR VIII VW ANTIGEN: CPT

## 2023-07-16 PROCEDURE — 85027 COMPLETE CBC AUTOMATED: CPT

## 2023-07-16 PROCEDURE — 82140 ASSAY OF AMMONIA: CPT

## 2023-07-16 PROCEDURE — 1200000000 HC SEMI PRIVATE

## 2023-07-16 PROCEDURE — 36415 COLL VENOUS BLD VENIPUNCTURE: CPT

## 2023-07-16 PROCEDURE — 83550 IRON BINDING TEST: CPT

## 2023-07-16 PROCEDURE — 85240 CLOT FACTOR VIII AHG 1 STAGE: CPT

## 2023-07-16 PROCEDURE — 80048 BASIC METABOLIC PNL TOTAL CA: CPT

## 2023-07-16 PROCEDURE — 82042 OTHER SOURCE ALBUMIN QUAN EA: CPT

## 2023-07-16 PROCEDURE — 2580000003 HC RX 258

## 2023-07-16 PROCEDURE — 88305 TISSUE EXAM BY PATHOLOGIST: CPT

## 2023-07-16 PROCEDURE — 82728 ASSAY OF FERRITIN: CPT

## 2023-07-16 RX ORDER — ALBUMIN (HUMAN) 12.5 G/50ML
25 SOLUTION INTRAVENOUS
Status: COMPLETED | OUTPATIENT
Start: 2023-07-16 | End: 2023-07-16

## 2023-07-16 RX ORDER — SIMVASTATIN 20 MG
40 TABLET ORAL DAILY
Status: DISCONTINUED | OUTPATIENT
Start: 2023-07-17 | End: 2023-07-17 | Stop reason: HOSPADM

## 2023-07-16 RX ORDER — LACTULOSE 10 G/15ML
20 SOLUTION ORAL DAILY
Status: DISCONTINUED | OUTPATIENT
Start: 2023-07-16 | End: 2023-07-17 | Stop reason: HOSPADM

## 2023-07-16 RX ORDER — SODIUM CHLORIDE 9 MG/ML
INJECTION, SOLUTION INTRAVENOUS CONTINUOUS
Status: DISCONTINUED | OUTPATIENT
Start: 2023-07-16 | End: 2023-07-16

## 2023-07-16 RX ORDER — FUROSEMIDE 40 MG/1
40 TABLET ORAL DAILY
Status: DISCONTINUED | OUTPATIENT
Start: 2023-07-16 | End: 2023-07-17 | Stop reason: HOSPADM

## 2023-07-16 RX ADMIN — SERTRALINE 25 MG: 25 TABLET, FILM COATED ORAL at 08:19

## 2023-07-16 RX ADMIN — ALBUMIN (HUMAN) 25 G: 0.25 INJECTION, SOLUTION INTRAVENOUS at 21:30

## 2023-07-16 RX ADMIN — SODIUM CHLORIDE, PRESERVATIVE FREE 10 ML: 5 INJECTION INTRAVENOUS at 08:19

## 2023-07-16 RX ADMIN — SPIRONOLACTONE 50 MG: 25 TABLET ORAL at 08:19

## 2023-07-16 RX ADMIN — ALBUMIN (HUMAN) 25 G: 0.25 INJECTION, SOLUTION INTRAVENOUS at 19:45

## 2023-07-16 RX ADMIN — SODIUM CHLORIDE, PRESERVATIVE FREE 10 ML: 5 INJECTION INTRAVENOUS at 19:44

## 2023-07-16 RX ADMIN — LACTULOSE 20 G: 20 SOLUTION ORAL at 08:18

## 2023-07-16 RX ADMIN — FUROSEMIDE 40 MG: 40 TABLET ORAL at 14:56

## 2023-07-16 RX ADMIN — ATORVASTATIN CALCIUM 20 MG: 20 TABLET, FILM COATED ORAL at 08:19

## 2023-07-16 RX ADMIN — SODIUM CHLORIDE 25 ML: 9 INJECTION, SOLUTION INTRAVENOUS at 19:45

## 2023-07-16 NOTE — H&P
Internal Medicine Resident History and Physical          Name: Jermaine Bucio, female, : 1940, MRN: [id number]    PCP: Luis Duncan DO    Date of Admission: 7/15/2023  Date of Service: Pt seen/examined on 07/15/23  and Admitted to Inpatient with expected LOS less than two midnights due to medical therapy. Assessment and Plan:  Constipation with possible paralytic ileus: Likely 2/2 to large ascites. Patient has a history of constipation, IBS, Crohn's disease, cirrhosis with ascites. Patient has history of several abdominal surgeries. Patient has not had a bowel movement in 2 days. Complaining of lower right-sided abdominal pain. CT shows large ascites, cirrhotic liver, constipation and moderate paralytic ileus with questionable enteritis involving a few jejunal loops in the left midabdomen. Patient has longstanding history of constipation and states that she has to take stool softeners and laxatives at home to have a bowel movement. Patient also has cirrhosis with recurrent ascites. Recent paracentesis 2023. Patient is afebrile with stable vital signs and no leukocytosis. Sodium, potassium, chloride WNL. Ammonia level 2023 was 74. Patient does take Prevagen supplement which can cause constipation. Differential diagnosis for constipation includes electrolyte imbalance, liver cirrhosis w/ ascites, IBS, enteritis, medication induced, or less likely SBP. Will order magnesium and phosphate   TSH ordered  IR consulted for Paracentesis, will contact in a.m. Lab test of ascites fluid ordered   Bowel rest- NPO for now  Started lactulose daily for bowel movement and elevated ammonium   Liver Cirrhosis with large ascites: History of liver cirrhosis likely 2/2 to CASTELLANO. Us elastography planned for outpatient testing. Paracentesis done 2023 which removed 5 L. CT the abdomen and pelvis shows large ascites in abdomen and pelvis. Child-Pack score 8, Class B.   Serum albumin is

## 2023-07-16 NOTE — PROGRESS NOTES
Hospitalist Progress Note    Patient:  Jesus Lema      Unit/Bed:6E-67/067-A    YOB: 1940    MRN: [id number]       Acct: [de-identified]     PCP: Elwyn Moritz, DO    Date of Admission: 7/15/2023    Assessment/Plan:    Constipation with probable paralytic ileus possibly secondary to large ascites; lactulose ordered for hepatic encephalopathy  CASTELLANO with large ascites--ultrasound organ elastography ordered for today along with paracentesis, had paracentesis done on 7/7/2023 with 5 L drained; follows with Dr. Gunjan Bailey; on Aldactone 50 mg daily; takes Lasix 40 mg daily so we will resume  Acute hypoxic respiratory failure--possibly secondary to large amount of ascites/possibly morphine, wean as able to keep sats greater than 90%  Hepatic encephalopathy--lactulose ordered; await GI input  Macrocytic anemia--stable  Anxiety--treated  Hyperlipidemia--treated  Torturous abdominal aorta with 3 cm distal AAA  CAD status post three-vessel CABG in 2016--on statin  Status post carotid subclavian bypass in 2018      Expected discharge date: Pending clinical course    Disposition:    [] Home       [] TCU       [] Rehab       [] Psych       [] SNF       [] [address] [place] [address]       [] Other-    Chief Complaint: Tom Kevin right-sided abdominal pain after bending over    Hospital Course: Per H&P done 7/15/2023: Jesus Lema is a 80 y.o. female with PMHx of liver cirrhosis, ascites with recurrent paracentesis, chronic constipation who presents to [address] [place] [address] with right-sided abdominal pain and constipation. Patient has not had a bowel movement in 2 days. She states that she had sharp right-sided abdominal pain after bending over in the garden. Patient has a history of liver cirrhosis with ascites and had a therapeutic paracentesis done on 7/7/2023. Patient denies nausea, vomiting, diarrhea, fevers or chills.   In the ED patient was hypoxic with ambulation, denies cough or shortness of

## 2023-07-16 NOTE — FLOWSHEET NOTE
Pt admitted to 9E53. Complains of abdominal pain. Pt C/O 8/10 abdominal pain. Family at bedside. IV site free of s/s of infection or infiltration. Instructed in use of call light, tv controls, bed controls and 5 minute rule scripted to pt with understanding verbalized. Fall and safety brochure discussed with pt.

## 2023-07-16 NOTE — ED NOTES
ED to inpatient nurses report    Chief Complaint   Patient presents with    Abdominal Pain      Present to ED from home  LOC: alert and orientated to name, place, date  Vital signs   Vitals:    07/15/23 1730 07/15/23 1807 07/15/23 1831 07/15/23 1915   BP: 128/76 124/62  122/66   Pulse:  63  66   Resp:  14     Temp:       TempSrc:       SpO2: 93% 92% (!) 88% 93%   Weight:       Height:          Oxygen Baseline RA    Current needs required 1 L NC  LDAs:   Peripheral IV 07/15/23 Left Antecubital (Active)   Site Assessment Clean, dry & intact 07/15/23 1229   Phlebitis Assessment No symptoms 07/15/23 1229   Infiltration Assessment 0 07/15/23 1229     Mobility: Requires assistance * 1  Pending ED orders: ED orders complete  Present condition: stable      C-SSRS Risk of Suicide: No Risk  Swallow Screening    Preferred [facility]: [facility]     Electronically signed by Paris Hernandez RN on 7/15/2023 at 8:57 PM       Paris Hernandez, [address]  07/15/23 2057

## 2023-07-17 ENCOUNTER — TELEPHONE (OUTPATIENT)
Dept: FAMILY MEDICINE CLINIC | Age: 83
End: 2023-07-17

## 2023-07-17 ENCOUNTER — APPOINTMENT (OUTPATIENT)
Dept: GENERAL RADIOLOGY | Age: 83
DRG: 432 | End: 2023-07-17
Payer: MEDICARE

## 2023-07-17 VITALS
RESPIRATION RATE: 18 BRPM | HEART RATE: 65 BPM | BODY MASS INDEX: 24.11 KG/M2 | HEIGHT: 66 IN | OXYGEN SATURATION: 92 % | SYSTOLIC BLOOD PRESSURE: 113 MMHG | TEMPERATURE: 98.4 F | DIASTOLIC BLOOD PRESSURE: 48 MMHG | WEIGHT: 150 LBS

## 2023-07-17 LAB
CHARACTER, BODY FLUID: NORMAL
COLOR FLD: YELLOW
GRANULOCYTES NFR FLD AUTO: 6.4 %
MESOTHELIAL CELLS BODY FLUID: NORMAL
MONONUC CELLS NFR FLD AUTO: 93.6 %
NUC CELL # FLD AUTO: 88 /CUMM (ref 0–500)
PATHOLOGIST REVIEW: NORMAL
RBC # FLD AUTO: < 2000 /CUMM
SPECIMEN: NORMAL
TOTAL VOLUME RECEIVED BODY FLUID: 70 ML

## 2023-07-17 PROCEDURE — 6370000000 HC RX 637 (ALT 250 FOR IP)

## 2023-07-17 PROCEDURE — 99239 HOSP IP/OBS DSCHRG MGMT >30: CPT | Performed by: NURSE PRACTITIONER

## 2023-07-17 PROCEDURE — 2580000003 HC RX 258

## 2023-07-17 PROCEDURE — 6370000000 HC RX 637 (ALT 250 FOR IP): Performed by: NURSE PRACTITIONER

## 2023-07-17 PROCEDURE — 71100 X-RAY EXAM RIBS UNI 2 VIEWS: CPT

## 2023-07-17 RX ORDER — LIDOCAINE 4 G/G
3 PATCH TOPICAL DAILY
Qty: 60 PATCH | Refills: 0 | Status: SHIPPED | OUTPATIENT
Start: 2023-07-18 | End: 2023-07-27

## 2023-07-17 RX ORDER — LIDOCAINE 4 G/G
3 PATCH TOPICAL DAILY
Status: DISCONTINUED | OUTPATIENT
Start: 2023-07-17 | End: 2023-07-17 | Stop reason: HOSPADM

## 2023-07-17 RX ADMIN — LACTULOSE 20 G: 20 SOLUTION ORAL at 07:50

## 2023-07-17 RX ADMIN — SERTRALINE 25 MG: 25 TABLET, FILM COATED ORAL at 07:50

## 2023-07-17 RX ADMIN — SODIUM CHLORIDE, PRESERVATIVE FREE 10 ML: 5 INJECTION INTRAVENOUS at 07:50

## 2023-07-17 RX ADMIN — SPIRONOLACTONE 50 MG: 25 TABLET ORAL at 07:50

## 2023-07-17 RX ADMIN — FUROSEMIDE 40 MG: 40 TABLET ORAL at 07:50

## 2023-07-17 ASSESSMENT — PAIN DESCRIPTION - FREQUENCY: FREQUENCY: CONTINUOUS

## 2023-07-17 ASSESSMENT — PAIN DESCRIPTION - DESCRIPTORS: DESCRIPTORS: STABBING

## 2023-07-17 ASSESSMENT — PAIN DESCRIPTION - PAIN TYPE: TYPE: ACUTE PAIN

## 2023-07-17 ASSESSMENT — PAIN DESCRIPTION - LOCATION: LOCATION: ABDOMEN

## 2023-07-17 ASSESSMENT — PAIN SCALES - GENERAL: PAINLEVEL_OUTOF10: 9

## 2023-07-17 ASSESSMENT — PAIN DESCRIPTION - ONSET: ONSET: ON-GOING

## 2023-07-17 ASSESSMENT — PAIN DESCRIPTION - ORIENTATION: ORIENTATION: RIGHT

## 2023-07-17 ASSESSMENT — PAIN - FUNCTIONAL ASSESSMENT: PAIN_FUNCTIONAL_ASSESSMENT: ACTIVITIES ARE NOT PREVENTED

## 2023-07-17 NOTE — TELEPHONE ENCOUNTER
----- Message from [employer] sent at 7/17/2023 11:47 AM EDT -----  Subject: Hospital Follow Up    QUESTIONS  What hospital was the Patient Discharged from? [facility]  Date of Discharge? 2023-07-17  Discharge Location? Home  Reason for hospitalization as patient stated? Patient had an Ileus. Ten   liters of fluid taken out of abdomen. Please call Elham at [address] to   schedule  What question does the patient have, if applicable?   ---------------------------------------------------------------------------  --------------  CALL BACK INFO  What is the best way for the office to contact you? OK to leave message on   voicemail  Preferred Call Back Phone Number? [phone]  ---------------------------------------------------------------------------  --------------  SCRIPT ANSWERS  Relationship to Patient? Covered Entity  Covered Entity Type? Hospital?  Representative Name?  [facility]

## 2023-07-17 NOTE — DISCHARGE SUMMARY
Hospital Medicine Discharge Summary      Patient Identification:   Alexandra Dennis   : 1940  MRN: [id number]   Account: [de-identified]      Patient's PCP: Mariia Mukherjee DO    Admit Date: 7/15/2023     Discharge Date:   2023    Admitting Physician: No admitting provider for patient encounter. Discharging Nurse Practitioner: Ann Rosas, APRN - CNP     Discharge Diagnoses with Assessment/Plan:  Constipation with probable paralytic ileus possibly secondary to large ascites; lactulose ordered for hepatic encephalopathy; patient having stools  CASTELLANO with large ascites--ultrasound organ elastography noted; had paracentesis done on  with 5 L drained,, had paracentesis done on 2023 with 5 L drained; follows with Dr. 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     Hospital Course:   Alexandra Dennis is a 80 y.o. female admitted to [facility] on 7/15/2023 for right-sided abdominal pain/rib cage after bending over; Per H&P done 7/15/2023: Alexandra Dennis is a 80 y.o. female with PMHx of liver cirrhosis, ascites with recurrent paracentesis, chronic constipation who presents to [facility] with Jeniffer Moore DO for the opportunity to be involved in this patient's care.     Electronically signed by ANDREZ Neal CNP on 7/17/2023 at 1:45 PM

## 2023-07-17 NOTE — PROGRESS NOTES
Hospitalist Progress Note    Patient:  Gualberto Mauricio      Unit/Bed:6E-67/067-A    YOB: 1940    MRN: [id number]       Acct: [de-identified]     PCP: Micky Kuhn DO    Date of Admission: 7/15/2023    Assessment/Plan:    Constipation with probable paralytic ileus possibly secondary to large ascites; lactulose ordered for hepatic encephalopathy; patient having stools  CASTELLANO with large ascites--ultrasound organ elastography noted; had paracentesis done on 7/16 with 5 L drained,, had paracentesis done on 7/7/2023 with 5 L drained; follows with Dr. 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 female with PMHx of liver cirrhosis, ascites with recurrent paracentesis, chronic constipation who presents to [facility] with right-sided abdominal pain and constipation. Patient has not had a bowel movement in 2 days. She states that she had sharp right-sided abdominal pain after bending over in the garden.   Patient has a history of liver cirrhosis basilar atelectasis/pneumonia. No effusion seen. Liver: Relatively small appearing liver with a nodular contour, consistent with osteoporosis. Gallbladder not specifically seen. Moderately dilated common bile duct, as expected postcholecystectomy. Pancreas, spleen and adrenal glands: Unremarkable Kidneys:  Small nonobstructing calculus in each kidney. Mildly hypoplastic left kidney. No hydronephrosis. Bowel/Peritoneum: Large amount of ascites in the abdomen and pelvis. Findings of constipation. Moderate fluid distention of multiple small bowel loops, consistent with ileus. There also appears be some thickening of small bowel wall involving a few jejunal loops. Cannot exclude enteritis. No free air. Markedly tortuous and ectatic abdominal aorta. Small 3 cm fusiform aneurysm in the distal abdominal aorta. Extensive calcification in the abdominal aorta. Extensive calcified and mildly tortuous pelvic vessels. No abnormally enlarged para-aortic lymph nodes are seen. Moderate calcification at the origin of the right renal artery with moderate stenosis, probably 70%. Origin of left renal artery is obscured by calcific plaque but appearance suggestive of mild to moderate stenosis. Suggestion of at least mild calcific stenosis at the origins of the celiac artery and SMA. Bones : No evidence for acute fracture or bone destruction. . Pelvis: Urinary bladder unremarkable. Multiple diverticula in the sigmoid colon and descending colon. No evidence for diverticulitis. 1. Large amount of ascites. Cirrhotic appearing liver. Prior cholecystectomy. 2. Constipation. Diverticulosis coli. Moderate paralytic ileus. Questionable enteritis involving a few jejunal loops in the left midabdomen. Mild bibasilar atelectasis/pneumonia. 3. Tortuous abdominal aorta. 3 cm aneurysm distal abdominal aorta. The remainder of the abdominal aorta is mildly ectatic. Multifocal calcific stenosis involving both renal arteries, SMA, and celiac artery.

## 2023-07-17 NOTE — PROGRESS NOTES
Discharge instructions gone over with patient and spouse, including follow up appointments, rx called into pharmacy,  ascites and ileus information gone over with patient,  she voiced understanding,  no concerns noted at this time

## 2023-07-17 NOTE — TELEPHONE ENCOUNTER
Future Appointments   Date Time Provider [facility] [address]   7/24/2023 12:30 PM ANDREZ Chan - CNP [facility]   7/27/2023  1:00 PM Eugene Sotomayor DO [facility]   8/8/2023  9:15 AM Beth Alejo MD [facility]   8/10/2023 10:20 AM Eugene Sotomayor DO [address]

## 2023-07-17 NOTE — CARE COORDINATION
Case Management Assessment  Initial Evaluation    Date/Time of Evaluation: 7/17/2023 11:42 AM  Assessment Completed by: Raisa Hope RN    If patient is discharged prior to next notation, then this note serves as note for discharge by case management. Patient Name: Vickie Jim                   YOB: 1940  Diagnosis: Hypoxia [R09.02]  Right upper quadrant abdominal pain [R10.11]  Ileus of unspecified type [facility]) [K56.7]                   Date / Time: 7/15/2023  9:40 AM  Location: [address] [place] [address]     Patient Admission Status: Inpatient   Readmission Risk Low 0-14, Mod 15-19), High > 20: Readmission Risk Score: 12.8    Current PCP: Nella Hernandez, DO  PCP verified by CM? Yes    Chart Reviewed: Yes      History Provided by: Patient, Significant Other  Patient Orientation: Alert and Oriented    Patient Cognition: Alert    Hospitalization in the last 30 days (Readmission):  No    If yes, Readmission Assessment in CM Navigator will be completed. Advance Directives:      Code Status: Full Code   Patient's Primary Decision Maker is: Legal Next of Kin    Primary Decision Maker: Frannykeaton Bianca [place] Spouse - [phone]    Discharge Planning:    Patient lives with: Spouse/Significant Other Type of Home: House  Primary Care Giver: Self  Patient Support Systems include: Spouse/Significant Other   Current Financial resources: Medicare  Current community resources: None  Current services prior to admission: None            Current DME:              Type of Home Care services:  None    ADLS  Prior functional level: Independent in ADLs/IADLs  Current functional level: Assistance with the following:, Mobility    Family can provide assistance at DC: Yes  Would you like Case Management to discuss the discharge plan with any other family members/significant others, and if so, who?  Yes ()  Plans to Return to Present Housing: Yes  Other Identified Issues/Barriers to RETURNING to current housing:

## 2023-07-17 NOTE — CONSULTS
[place] [address]                                  CONSULTATION    PATIENT NAME: Gilmar Ventura                    :        1940  MED REC NO:   [id number]                           ROOM:       0067  ACCOUNT NO:   [de-identified]                           ADMIT DATE: 07/15/2023  PROVIDER:     VIANEY Parker DATE:  2023    HISTORY OF PRESENT ILLNESS:  The patient is known to the practice, seen  in the office recently, admitted with right-sided weakness. She has  been diagnosed with ascites. She is scheduled to have a histogram done  of her liver as an outpatient, which was done during the current  admission. She states she has pain more on the right upper side that is  slightly to the back, worsening at one time, increased with movement, has  improved by now at this time. She is having nausea. She has no vomit. She has no dysphagia or  odynophagia. She has no regurgitation. She has no vomit of undigested  food. She has no early satiety. She has lost few pounds. Appetite is  not very good at this time, struggling with eating. Her bowel movement  has not changed. She was given laxative for moving her bowel. She was  given lactulose based on the nursing staff prescription. She has no  fever, no chills, no severe discomfort at this time. Her imaging study shows ascites  and need for evaluation and management. She has enlarged liver disease. A lot of fluid to be taken out. According to her, she was not given albumin post paracentesis. PAST MEDICAL HISTORY:  Significant for cirrhosis, anxiety, anemia, AAA,  history of coronary artery disease. PAST SURGICAL HISTORY:  Angioplasty done in 2016, cardiac surgery bypass  surgery in 2018. ALLERGIES:  CIPROFLOXACIN, DIOVAN, FLAGYL, LIPITOR, AND CALCIUM. FAMILY HISTORY:  No GI malignancy, no cirrhosis in the family.     MEDICATIONS: Reviewed. She is on spironolactone, Lasix, Miralax as  needed, Zofran as needed, Zocor, lactulose, and Tylenol as needed. PHYSICAL EXAMINATION:  VITAL SIGNS:  Her temperature 98.3, heart rate 72, respirations 16,  blood pressure 128/53. HEENT:  Her head is atraumatic. Sclerae anicteric. Pupils are round  and reactive to light and accommodation with normal extraocular muscular  movement. Conjunctivae normal.  Her oral cavity, no lesions seen. NECK:  Supple. CHEST:  Good air entry bilaterally. No crepitations. No rales. CARDIOVASCULAR:  S1 and S2, decreased. No murmurs. ABDOMEN:  Soft. No tenderness, no rebound, no guarding, no  organomegaly. No stigmata of liver disease. Ascites not really  significant. EXTREMITIES:  No peripheral edema was felt. WORKUP:  Her sodium 139, potassium 4.1, BUN 17, creatinine 0.8. Her ALT  is normal.  Her ammonia is 88 on 07/16/2023 today. Her H and H is 9.6  and 38.2. Blood has gone down significantly. Platelets 117,332. Her  INR is 1.45. IMPRESSION:  1. Cirrhosis likely decompensated liver cirrhosis or nonalcoholic  hepatitis. She does not has any risk factors for hepatitis and cirrhosis all part of the  workup being negative in the past.  2.  Dementia. 3.  Hypoxemia. 4.  Microcytic anemia. 5.  Anxiety. 6.  Abdominal aortic aneurysm. PLAN:  1. Conservative management for now at this time. 2.  Supportive care. 3.  Will give albumin. 4.  Daily weight. 5.  Combination of Lasix, spironolactone will be given to ensure that  she does not gain weight . 6.  Continue diuretic closer to discharge for evaluation. Continue GI care.         Crystal Young M.D.    D: 07/16/2023 18:55:25       T: 07/16/2023 23:29:59     [email or web address]  Job#: [id number]     Doc#: [id number]    CC:  Giovanna Wiseman D.O.

## 2023-07-18 ENCOUNTER — TELEPHONE (OUTPATIENT)
Dept: FAMILY MEDICINE CLINIC | Age: 83
End: 2023-07-18

## 2023-07-18 RX ORDER — LIDOCAINE 50 MG/G
1 PATCH TOPICAL DAILY
Qty: 90 PATCH | Refills: 0 | Status: SHIPPED | OUTPATIENT
Start: 2023-07-18 | End: 2023-08-17

## 2023-07-18 NOTE — PLAN OF CARE
Called patient to tell her the results of the right rib series that showed an acute fracture at the anterior tip of the right 10th rib, she states the Lidoderm patches are helping, also continue to encourage her to use deep breathing and coughing, both her and spouse were on the phone; they asked for more Lidoderm patches, it appears I dispense #60 however the spouse said they only got 15 so another prescription was sent in to [facility] on [facility], patient was appreciative of the phone call no other needs at this time
Problem: Chronic Conditions and Co-morbidities  Goal: Patient's chronic conditions and co-morbidity symptoms are monitored and maintained or improved  7/16/2023 2239 by Delvin Knox RN  Outcome: Progressing  Flowsheets (Taken 7/16/2023 2239)  Care Plan - Patient's Chronic Conditions and Co-Morbidity Symptoms are Monitored and Maintained or Improved:   Monitor and assess patient's chronic conditions and comorbid symptoms for stability, deterioration, or improvement   Collaborate with multidisciplinary team to address chronic and comorbid conditions and prevent exacerbation or deterioration   Update acute care plan with appropriate goals if chronic or comorbid symptoms are exacerbated and prevent overall improvement and discharge     Problem: Discharge Planning  Goal: Discharge to home or other facility with appropriate resources  7/16/2023 2239 by Delvin Knox RN  Outcome: Progressing  Flowsheets (Taken 7/16/2023 2239)  Discharge to home or other facility with appropriate resources:   Identify barriers to discharge with patient and caregiver   Identify discharge learning needs (meds, wound care, etc)   Refer to discharge planning if patient needs post-hospital services based on physician order or complex needs related to functional status, cognitive ability or social support system   Arrange for needed discharge resources and transportation as appropriate     Problem: Pain  Goal: Verbalizes/displays adequate comfort level or baseline comfort level  7/16/2023 2239 by Delvin Knox RN  Outcome: Progressing  Flowsheets (Taken 7/16/2023 2239)  Verbalizes/displays adequate comfort level or baseline comfort level:   Encourage patient to monitor pain and request assistance   Administer analgesics based on type and severity of pain and evaluate response   Implement non-pharmacological measures as appropriate and evaluate response   Assess pain using appropriate pain scale     Problem: Safety - Adult  Goal: Free
Problem: Chronic Conditions and Co-morbidities  Goal: Patient's chronic conditions and co-morbidity symptoms are monitored and maintained or improved  Outcome: Progressing  Flowsheets (Taken 7/15/2023 2229)  Care Plan - Patient's Chronic Conditions and Co-Morbidity Symptoms are Monitored and Maintained or Improved:   Monitor and assess patient's chronic conditions and comorbid symptoms for stability, deterioration, or improvement   Collaborate with multidisciplinary team to address chronic and comorbid conditions and prevent exacerbation or deterioration     Problem: Discharge Planning  Goal: Discharge to home or other facility with appropriate resources  Outcome: Progressing  Flowsheets  Taken 7/15/2023 2229  Discharge to home or other facility with appropriate resources:   Identify barriers to discharge with patient and caregiver   Arrange for needed discharge resources and transportation as appropriate   Identify discharge learning needs (meds, wound care, etc)  Taken 7/15/2023 2227  Discharge to home or other facility with appropriate resources:   Identify barriers to discharge with patient and caregiver   Arrange for needed discharge resources and transportation as appropriate   Identify discharge learning needs (meds, wound care, etc)     Problem: Pain  Goal: Verbalizes/displays adequate comfort level or baseline comfort level  Outcome: Progressing  Flowsheets (Taken 7/15/2023 2229)  Verbalizes/displays adequate comfort level or baseline comfort level:   Encourage patient to monitor pain and request assistance   Assess pain using appropriate pain scale   Administer analgesics based on type and severity of pain and evaluate response     Problem: Safety - Adult  Goal: Free from fall injury  Outcome: Progressing  Flowsheets (Taken 7/15/2023 2229)  Free From Fall Injury: Instruct family/caregiver on patient safety   Care plan reviewed with patient.   Patient verbalize understanding of the plan of care and
influences on pain and pain management   Notify Licensed Independent Practitioner if interventions unsuccessful or patient reports new pain     Problem: Safety - Adult  Goal: Free from fall injury  7/16/2023 1020 by Jeremy Moraes RN  Outcome: Oscar Weiner (Taken 7/15/2023 2229 by Daniella Krishna, RN)  Free From Fall Injury: Instruct family/caregiver on patient safety     Problem: Respiratory - Adult  Goal: Achieves optimal ventilation and oxygenation  Outcome: Progressing  Flowsheets (Taken 7/16/2023 1020)  Achieves optimal ventilation and oxygenation:   Assess for changes in respiratory status   Assess for changes in mentation and behavior   Position to facilitate oxygenation and minimize respiratory effort   Encourage broncho-pulmonary hygiene including cough, deep breathe, incentive spirometry   Assess and instruct to report shortness of breath or any respiratory difficulty     Problem: Gastrointestinal - Adult  Goal: Minimal or absence of nausea and vomiting  Outcome: Progressing  Flowsheets (Taken 7/16/2023 1020)  Minimal or absence of nausea and vomiting:   Maintain NPO status until nausea and vomiting are resolved   Administer ordered antiemetic medications as needed   Provide nonpharmacologic comfort measures as appropriate     Problem: Gastrointestinal - Adult  Goal: Maintains or returns to baseline bowel function  Outcome: Progressing  Flowsheets (Taken 7/16/2023 1020)  Maintains or returns to baseline bowel function:   Assess bowel function   Administer ordered medications as needed   Encourage mobilization and activity  Note: Lactulose daily     Problem: Gastrointestinal - Adult  Goal: Maintains adequate nutritional intake  Outcome: Progressing  Flowsheets (Taken 7/16/2023 1020)  Maintains adequate nutritional intake: Monitor intake and output, weight and lab values     Problem: Genitourinary - Adult  Goal: Absence of urinary retention  Outcome: Progressing  Flowsheets (Taken

## 2023-07-18 NOTE — TELEPHONE ENCOUNTER
Care Transitions Initial Follow Up Call    Outreach made within 2 business days of discharge: Yes    Patient: Triny Brown Patient : 1940   MRN: [id number]  Reason for Admission: There are no discharge diagnoses documented for the most recent discharge. Discharge Date: 23       Spoke with: Chanel Harding     Discharge department/facility: [facility]    TCM Interactive Patient Contact:  Was patient able to fill all prescriptions: Yes  Was patient instructed to bring all medications to the follow-up visit: Yes  Is patient taking all medications as directed in the discharge summary?  Yes  Does patient understand their discharge instructions: Yes  Does patient have questions or concerns that need addressed prior to 7-14 day follow up office visit: no    Scheduled appointment with PCP within 7-14 days    Follow Up  Future Appointments   Date Time Provider [address]00  [facility]   2023  1:00 PM Doug Hess [address] [place] [address] Dr Simental   2023  9:15 AM Tova Valenzuela MD [facility]   8/10/2023 10:20 AM Doug Hess [address] [place] [address] LINDA Short LPN

## 2023-07-24 LAB — VON WILLEBRAND MULTIMERS: NORMAL

## 2023-07-27 ENCOUNTER — OFFICE VISIT (OUTPATIENT)
Dept: FAMILY MEDICINE CLINIC | Age: 83
End: 2023-07-27

## 2023-07-27 ENCOUNTER — NURSE ONLY (OUTPATIENT)
Dept: LAB | Age: 83
End: 2023-07-27

## 2023-07-27 VITALS
HEART RATE: 51 BPM | BODY MASS INDEX: 24.8 KG/M2 | SYSTOLIC BLOOD PRESSURE: 112 MMHG | RESPIRATION RATE: 16 BRPM | WEIGHT: 158 LBS | TEMPERATURE: 97.7 F | DIASTOLIC BLOOD PRESSURE: 62 MMHG | OXYGEN SATURATION: 95 % | HEIGHT: 67 IN

## 2023-07-27 DIAGNOSIS — K59.00 CONSTIPATION, UNSPECIFIED CONSTIPATION TYPE: ICD-10-CM

## 2023-07-27 DIAGNOSIS — F03.90 DEMENTIA, UNSPECIFIED DEMENTIA SEVERITY, UNSPECIFIED DEMENTIA TYPE, UNSPECIFIED WHETHER BEHAVIORAL, PSYCHOTIC, OR MOOD DISTURBANCE OR ANXIETY (HCC): ICD-10-CM

## 2023-07-27 DIAGNOSIS — K56.0 PARALYTIC ILEUS (HCC): ICD-10-CM

## 2023-07-27 DIAGNOSIS — I77.1 SUBCLAVIAN ARTERY STENOSIS, LEFT (HCC): ICD-10-CM

## 2023-07-27 DIAGNOSIS — R18.8 CIRRHOSIS OF LIVER WITH ASCITES, UNSPECIFIED HEPATIC CIRRHOSIS TYPE (HCC): ICD-10-CM

## 2023-07-27 DIAGNOSIS — S22.31XA CLOSED FRACTURE OF ONE RIB OF RIGHT SIDE, INITIAL ENCOUNTER: ICD-10-CM

## 2023-07-27 DIAGNOSIS — K74.60 CIRRHOSIS OF LIVER WITH ASCITES, UNSPECIFIED HEPATIC CIRRHOSIS TYPE (HCC): ICD-10-CM

## 2023-07-27 DIAGNOSIS — I71.43 INFRARENAL ABDOMINAL AORTIC ANEURYSM (AAA) WITHOUT RUPTURE (HCC): ICD-10-CM

## 2023-07-27 DIAGNOSIS — D50.9 IRON DEFICIENCY ANEMIA, UNSPECIFIED IRON DEFICIENCY ANEMIA TYPE: ICD-10-CM

## 2023-07-27 DIAGNOSIS — F33.42 RECURRENT MAJOR DEPRESSIVE DISORDER, IN FULL REMISSION (HCC): ICD-10-CM

## 2023-07-27 DIAGNOSIS — Z95.1 S/P CABG X 3: ICD-10-CM

## 2023-07-27 DIAGNOSIS — I25.10 ASHD (ARTERIOSCLEROTIC HEART DISEASE): ICD-10-CM

## 2023-07-27 DIAGNOSIS — Z86.73 HISTORY OF CVA (CEREBROVASCULAR ACCIDENT): ICD-10-CM

## 2023-07-27 DIAGNOSIS — I73.9 PAD (PERIPHERAL ARTERY DISEASE) (HCC): ICD-10-CM

## 2023-07-27 DIAGNOSIS — K56.0 PARALYTIC ILEUS (HCC): Primary | ICD-10-CM

## 2023-07-27 DIAGNOSIS — E11.9 TYPE 2 DIABETES MELLITUS WITHOUT COMPLICATION, WITHOUT LONG-TERM CURRENT USE OF INSULIN (HCC): ICD-10-CM

## 2023-07-27 PROBLEM — E72.20 HYPERAMMONEMIA (HCC): Status: RESOLVED | Noted: 2023-07-16 | Resolved: 2023-07-27

## 2023-07-27 LAB
ANION GAP SERPL CALC-SCNC: 10 MEQ/L (ref 8–16)
ANISOCYTOSIS BLD QL SMEAR: PRESENT
BASOPHILS ABSOLUTE: 0.1 THOU/MM3 (ref 0–0.1)
BASOPHILS NFR BLD AUTO: 1.1 %
BUN SERPL-MCNC: 13 MG/DL (ref 7–22)
CALCIUM SERPL-MCNC: 9.4 MG/DL (ref 8.5–10.5)
CHLORIDE SERPL-SCNC: 101 MEQ/L (ref 98–111)
CO2 SERPL-SCNC: 25 MEQ/L (ref 23–33)
CREAT SERPL-MCNC: 0.9 MG/DL (ref 0.4–1.2)
DACROCYTES: ABNORMAL
DEPRECATED RDW RBC AUTO: 71.5 FL (ref 35–45)
ELLIPTOCYTES: ABNORMAL
EOSINOPHIL NFR BLD AUTO: 2.8 %
EOSINOPHILS ABSOLUTE: 0.2 THOU/MM3 (ref 0–0.4)
ERYTHROCYTE [DISTWIDTH] IN BLOOD BY AUTOMATED COUNT: 19.6 % (ref 11.5–14.5)
GFR SERPL CREATININE-BSD FRML MDRD: > 60 ML/MIN/1.73M2
GLUCOSE SERPL-MCNC: 80 MG/DL (ref 70–108)
HCT VFR BLD AUTO: 32.3 % (ref 37–47)
HGB BLD-MCNC: 10.8 GM/DL (ref 12–16)
IMM GRANULOCYTES # BLD AUTO: 0.02 THOU/MM3 (ref 0–0.07)
IMM GRANULOCYTES NFR BLD AUTO: 0.3 %
LYMPHOCYTES ABSOLUTE: 1.7 THOU/MM3 (ref 1–4.8)
LYMPHOCYTES NFR BLD AUTO: 23.4 %
MCH RBC QN AUTO: 35 PG (ref 26–33)
MCHC RBC AUTO-ENTMCNC: 33.4 GM/DL (ref 32.2–35.5)
MCV RBC AUTO: 104.5 FL (ref 81–99)
MONOCYTES ABSOLUTE: 1.5 THOU/MM3 (ref 0.4–1.3)
MONOCYTES NFR BLD AUTO: 21 %
NEUTROPHILS NFR BLD AUTO: 51.4 %
NRBC BLD AUTO-RTO: 1 /100 WBC
PLATELET # BLD AUTO: 188 THOU/MM3 (ref 130–400)
PMV BLD AUTO: 10.7 FL (ref 9.4–12.4)
POIKILOCYTES: ABNORMAL
POLYCHROMASIA BLD QL SMEAR: ABNORMAL
POTASSIUM SERPL-SCNC: 3.7 MEQ/L (ref 3.5–5.2)
RBC # BLD AUTO: 3.09 MILL/MM3 (ref 4.2–5.4)
SCAN OF BLOOD SMEAR: NORMAL
SEGMENTED NEUTROPHILS ABSOLUTE COUNT: 3.6 THOU/MM3 (ref 1.8–7.7)
SODIUM SERPL-SCNC: 136 MEQ/L (ref 135–145)
TARGETS BLD QL SMEAR: ABNORMAL
WBC # BLD AUTO: 7.1 THOU/MM3 (ref 4.8–10.8)

## 2023-07-27 RX ORDER — POLYETHYLENE GLYCOL 3350 17 G/17G
17 POWDER, FOR SOLUTION ORAL DAILY PRN
Qty: 510 G | Refills: 0 | Status: SHIPPED | OUTPATIENT
Start: 2023-07-27

## 2023-07-27 SDOH — ECONOMIC STABILITY: FOOD INSECURITY: WITHIN THE PAST 12 MONTHS, YOU WORRIED THAT YOUR FOOD WOULD RUN OUT BEFORE YOU GOT MONEY TO BUY MORE.: NEVER TRUE

## 2023-07-27 SDOH — ECONOMIC STABILITY: HOUSING INSECURITY
IN THE LAST 12 MONTHS, WAS THERE A TIME WHEN YOU DID NOT HAVE A STEADY PLACE TO SLEEP OR SLEPT IN A SHELTER (INCLUDING NOW)?: NO

## 2023-07-27 SDOH — ECONOMIC STABILITY: INCOME INSECURITY: HOW HARD IS IT FOR YOU TO PAY FOR THE VERY BASICS LIKE FOOD, HOUSING, MEDICAL CARE, AND HEATING?: NOT HARD AT ALL

## 2023-07-27 SDOH — ECONOMIC STABILITY: FOOD INSECURITY: WITHIN THE PAST 12 MONTHS, THE FOOD YOU BOUGHT JUST DIDN'T LAST AND YOU DIDN'T HAVE MONEY TO GET MORE.: NEVER TRUE

## 2023-07-27 NOTE — PATIENT INSTRUCTIONS
LAB INSTRUCTIONS:    Please complete labs today or tomorrow. Non-fasting is ok. The clinic will call you within 1 week of collection. If you have not heard from us within that amount of time, please call us at [phone].

## 2023-07-27 NOTE — PROGRESS NOTES
effects of prescribed medications. Barriers to medication compliance addressed. All patient questions answered. Pt voiced understanding.        Electronically signed by Mario Bender DO on 7/27/2023 at 5:49 PM

## 2023-07-28 ENCOUNTER — TELEPHONE (OUTPATIENT)
Dept: FAMILY MEDICINE CLINIC | Age: 83
End: 2023-07-28

## 2023-07-28 NOTE — TELEPHONE ENCOUNTER
----- Message from Herbert Alberts DO sent at 7/27/2023  6:15 PM EDT -----  Please let pt/ know that labs look good  Let me know if questions, thanks!

## 2023-08-09 ENCOUNTER — HOSPITAL ENCOUNTER (OUTPATIENT)
Dept: ULTRASOUND IMAGING | Age: 83
Discharge: HOME | End: 2023-08-09
Payer: MEDICARE

## 2023-08-09 ENCOUNTER — HOSPITAL ENCOUNTER (OUTPATIENT)
Dept: NURSING | Age: 83
Discharge: HOME | End: 2023-08-09
Payer: MEDICARE

## 2023-08-09 VITALS
TEMPERATURE: 97.4 F | DIASTOLIC BLOOD PRESSURE: 56 MMHG | SYSTOLIC BLOOD PRESSURE: 128 MMHG | HEART RATE: 70 BPM | RESPIRATION RATE: 20 BRPM | OXYGEN SATURATION: 94 %

## 2023-08-09 DIAGNOSIS — R18.8 OTHER ASCITES: ICD-10-CM

## 2023-08-09 DIAGNOSIS — K76.9 LIVER DISEASE, CHRONIC, WITH CIRRHOSIS (HCC): ICD-10-CM

## 2023-08-09 DIAGNOSIS — K74.60 LIVER DISEASE, CHRONIC, WITH CIRRHOSIS (HCC): ICD-10-CM

## 2023-08-09 PROCEDURE — 6360000002 HC RX W HCPCS: Performed by: INTERNAL MEDICINE

## 2023-08-09 PROCEDURE — P9047 ALBUMIN (HUMAN), 25%, 50ML: HCPCS | Performed by: INTERNAL MEDICINE

## 2023-08-09 PROCEDURE — 96365 THER/PROPH/DIAG IV INF INIT: CPT

## 2023-08-09 PROCEDURE — 49083 ABD PARACENTESIS W/IMAGING: CPT

## 2023-08-09 RX ORDER — ALBUMIN (HUMAN) 12.5 G/50ML
25 SOLUTION INTRAVENOUS ONCE
Status: COMPLETED | OUTPATIENT
Start: 2023-08-09 | End: 2023-08-09

## 2023-08-09 RX ADMIN — ALBUMIN (HUMAN) 25 G: 0.25 INJECTION, SOLUTION INTRAVENOUS at 12:25

## 2023-08-09 ASSESSMENT — PAIN DESCRIPTION - DESCRIPTORS: DESCRIPTORS: ACHING

## 2023-08-09 ASSESSMENT — PAIN - FUNCTIONAL ASSESSMENT: PAIN_FUNCTIONAL_ASSESSMENT: 0-10

## 2023-08-09 NOTE — PROGRESS NOTES
1215 PT ADMITTED TO [facility] PER WHEELCHAIR  FOR AN ALBLUMIN INFUSION. PT RIGHTS AND RESPONSIBILITIES OFFERED TO PT. PT POST PARACENTESIS of 5 l removed. [address] [place] [address]. [address] PT MARY WELL. DISCHARGE INSTRUCTIONS GIVEN TO PATIENT. VERBALIZE UNDERSTANDING.     Vyas Jaron                       _M___ Safety:       (Environmental)  [place] to environment  Ensure ID band is correct and in place/ allergy band as needed  Assess for fall risk  Initiate fall precautions as applicable (fall band, side rails, etc.)  Call light within reach  Bed in low position/ wheels locked    __M__ Pain:       Assess pain level and characteristics  Administer analgesics as ordered  Assess effectiveness of pain management and report to MD as needed    ___M_ Knowledge Deficit:  Assess baseline knowledge  Provide teaching at level of understanding  Provide teaching via preferred learning method  Evaluate teaching effectiveness    __M__ Hemodynamic/Respiratory Status:       (Pre and Post Procedure Monitoring)  Assess/Monitor vital signs and LOC  Assess Baseline SpO2 prior to any sedation  Obtain weight/height  Assess vital signs/ LOC until patient meets discharge criteria  Monitor procedure site and notify MD of any issues

## 2023-08-10 ENCOUNTER — OFFICE VISIT (OUTPATIENT)
Dept: FAMILY MEDICINE CLINIC | Age: 83
End: 2023-08-10
Payer: MEDICARE

## 2023-08-10 VITALS
SYSTOLIC BLOOD PRESSURE: 110 MMHG | RESPIRATION RATE: 16 BRPM | HEART RATE: 64 BPM | BODY MASS INDEX: 26.79 KG/M2 | HEIGHT: 63 IN | DIASTOLIC BLOOD PRESSURE: 60 MMHG | WEIGHT: 151.2 LBS | OXYGEN SATURATION: 95 % | TEMPERATURE: 97.3 F

## 2023-08-10 DIAGNOSIS — R18.8 CIRRHOSIS OF LIVER WITH ASCITES, UNSPECIFIED HEPATIC CIRRHOSIS TYPE (HCC): ICD-10-CM

## 2023-08-10 DIAGNOSIS — K56.0 PARALYTIC ILEUS (HCC): Primary | ICD-10-CM

## 2023-08-10 DIAGNOSIS — K74.60 CIRRHOSIS OF LIVER WITH ASCITES, UNSPECIFIED HEPATIC CIRRHOSIS TYPE (HCC): ICD-10-CM

## 2023-08-10 DIAGNOSIS — F33.42 RECURRENT MAJOR DEPRESSIVE DISORDER, IN FULL REMISSION (HCC): ICD-10-CM

## 2023-08-10 DIAGNOSIS — Z95.1 S/P CABG X 3: ICD-10-CM

## 2023-08-10 DIAGNOSIS — I25.10 ASHD (ARTERIOSCLEROTIC HEART DISEASE): ICD-10-CM

## 2023-08-10 DIAGNOSIS — F03.90 DEMENTIA, UNSPECIFIED DEMENTIA SEVERITY, UNSPECIFIED DEMENTIA TYPE, UNSPECIFIED WHETHER BEHAVIORAL, PSYCHOTIC, OR MOOD DISTURBANCE OR ANXIETY (HCC): ICD-10-CM

## 2023-08-10 DIAGNOSIS — K59.00 CONSTIPATION, UNSPECIFIED CONSTIPATION TYPE: ICD-10-CM

## 2023-08-10 DIAGNOSIS — E11.9 TYPE 2 DIABETES MELLITUS WITHOUT COMPLICATION, WITHOUT LONG-TERM CURRENT USE OF INSULIN (HCC): ICD-10-CM

## 2023-08-10 DIAGNOSIS — Z86.73 HISTORY OF CVA (CEREBROVASCULAR ACCIDENT): ICD-10-CM

## 2023-08-10 DIAGNOSIS — S22.31XA CLOSED FRACTURE OF ONE RIB OF RIGHT SIDE, INITIAL ENCOUNTER: ICD-10-CM

## 2023-08-10 DIAGNOSIS — I73.9 PAD (PERIPHERAL ARTERY DISEASE) (HCC): ICD-10-CM

## 2023-08-10 DIAGNOSIS — I71.43 INFRARENAL ABDOMINAL AORTIC ANEURYSM (AAA) WITHOUT RUPTURE (HCC): ICD-10-CM

## 2023-08-10 DIAGNOSIS — I77.1 SUBCLAVIAN ARTERY STENOSIS, LEFT (HCC): ICD-10-CM

## 2023-08-10 DIAGNOSIS — D50.9 IRON DEFICIENCY ANEMIA, UNSPECIFIED IRON DEFICIENCY ANEMIA TYPE: ICD-10-CM

## 2023-08-10 LAB — HBA1C MFR BLD: 5 % (ref 4.3–5.7)

## 2023-08-10 PROCEDURE — 3044F HG A1C LEVEL LT 7.0%: CPT | Performed by: FAMILY MEDICINE

## 2023-08-10 PROCEDURE — 99214 OFFICE O/P EST MOD 30 MIN: CPT | Performed by: FAMILY MEDICINE

## 2023-08-10 PROCEDURE — 1123F ACP DISCUSS/DSCN MKR DOCD: CPT | Performed by: FAMILY MEDICINE

## 2023-08-10 NOTE — PATIENT INSTRUCTIONS
LAB INSTRUCTIONS:    Please complete labs Next week    Please fast for 8 hours prior to lab collection. The clinic will call you within 1 week of collection. If you have not heard from us within that amount of time, please call us at [phone].

## 2023-08-10 NOTE — PROGRESS NOTES
Chief Complaint   Patient presents with    Follow-up     Issues noted below     History obtained from  and the patient. SUBJECTIVE:  Ban Hill is a 80 y.o. female that presents today for     -LAST VISIT:  Patient admitted to [facility] from 7/15 to 7/17 for issues noted below. D/c summary: \"Discharge Diagnoses with Assessment/Plan:  Constipation with probable paralytic ileus possibly secondary to large ascites; lactulose ordered for hepatic encephalopathy; patient having stools  CASTELLANO with large ascites--ultrasound organ elastography noted; had paracentesis done on 7/16 with 5 L drained,, had paracentesis done on 7/7/2023 with 5 L drained; follows with Dr. 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 Hospital Course:   Ban Hill is a 80 y.o. female admitted to [facility] on 7/15/2023 for right-sided abdominal pain/rib cage after bending over; Per H&P done 7/15/2023: Ban Hill is a 80 y.o. female with PMHx of liver cirrhosis, ascites with recurrent paracentesis, chronic constipation who presents to [facility] with right-sided abdominal pain and constipation.   Patient has not had a bowel movement

## 2023-08-17 ENCOUNTER — HOSPITAL ENCOUNTER (OUTPATIENT)
Age: 83
Discharge: HOME | End: 2023-08-17
Payer: MEDICARE

## 2023-08-17 ENCOUNTER — TELEPHONE (OUTPATIENT)
Dept: FAMILY MEDICINE CLINIC | Age: 83
End: 2023-08-17

## 2023-08-17 DIAGNOSIS — R18.8 CIRRHOSIS OF LIVER WITH ASCITES, UNSPECIFIED HEPATIC CIRRHOSIS TYPE (HCC): ICD-10-CM

## 2023-08-17 DIAGNOSIS — K74.60 CIRRHOSIS OF LIVER WITH ASCITES, UNSPECIFIED HEPATIC CIRRHOSIS TYPE (HCC): ICD-10-CM

## 2023-08-17 DIAGNOSIS — K74.60 CIRRHOSIS OF LIVER WITH ASCITES, UNSPECIFIED HEPATIC CIRRHOSIS TYPE (HCC): Primary | ICD-10-CM

## 2023-08-17 DIAGNOSIS — R18.8 CIRRHOSIS OF LIVER WITH ASCITES, UNSPECIFIED HEPATIC CIRRHOSIS TYPE (HCC): Primary | ICD-10-CM

## 2023-08-17 LAB
ANION GAP SERPL CALC-SCNC: 7 MEQ/L (ref 8–16)
BUN SERPL-MCNC: 16 MG/DL (ref 7–22)
CALCIUM SERPL-MCNC: 9.4 MG/DL (ref 8.5–10.5)
CHLORIDE SERPL-SCNC: 104 MEQ/L (ref 98–111)
CO2 SERPL-SCNC: 26 MEQ/L (ref 23–33)
CREAT SERPL-MCNC: 0.9 MG/DL (ref 0.4–1.2)
GFR SERPL CREATININE-BSD FRML MDRD: > 60 ML/MIN/1.73M2
GLUCOSE SERPL-MCNC: 103 MG/DL (ref 70–108)
POTASSIUM SERPL-SCNC: 4.2 MEQ/L (ref 3.5–5.2)
SODIUM SERPL-SCNC: 137 MEQ/L (ref 135–145)

## 2023-08-17 PROCEDURE — 80048 BASIC METABOLIC PNL TOTAL CA: CPT

## 2023-08-17 PROCEDURE — 36415 COLL VENOUS BLD VENIPUNCTURE: CPT

## 2023-08-17 NOTE — TELEPHONE ENCOUNTER
----- Message from Dania Lanza, DO sent at 8/17/2023  1:41 PM EDT -----  Please let pt/ know that BMP is stable  Ok to con't diuretics at present dose  Let repeat BMP in 4 wks, fasting, to monitor  Let me know if questions, thanks!

## 2023-09-14 ENCOUNTER — TELEPHONE (OUTPATIENT)
Dept: FAMILY MEDICINE CLINIC | Age: 83
End: 2023-09-14

## 2023-09-14 ENCOUNTER — HOSPITAL ENCOUNTER (OUTPATIENT)
Age: 83
Discharge: HOME | End: 2023-09-14
Payer: MEDICARE

## 2023-09-14 DIAGNOSIS — K74.60 CIRRHOSIS OF LIVER WITH ASCITES, UNSPECIFIED HEPATIC CIRRHOSIS TYPE (HCC): ICD-10-CM

## 2023-09-14 DIAGNOSIS — R18.8 CIRRHOSIS OF LIVER WITH ASCITES, UNSPECIFIED HEPATIC CIRRHOSIS TYPE (HCC): ICD-10-CM

## 2023-09-14 LAB
ANION GAP SERPL CALC-SCNC: 11 MEQ/L (ref 8–16)
BUN SERPL-MCNC: 17 MG/DL (ref 7–22)
CALCIUM SERPL-MCNC: 9.4 MG/DL (ref 8.5–10.5)
CHLORIDE SERPL-SCNC: 103 MEQ/L (ref 98–111)
CO2 SERPL-SCNC: 24 MEQ/L (ref 23–33)
CREAT SERPL-MCNC: 0.8 MG/DL (ref 0.4–1.2)
GFR SERPL CREATININE-BSD FRML MDRD: > 60 ML/MIN/1.73M2
GLUCOSE SERPL-MCNC: 104 MG/DL (ref 70–108)
POTASSIUM SERPL-SCNC: 3.9 MEQ/L (ref 3.5–5.2)
SODIUM SERPL-SCNC: 138 MEQ/L (ref 135–145)

## 2023-09-14 PROCEDURE — 80048 BASIC METABOLIC PNL TOTAL CA: CPT

## 2023-09-14 PROCEDURE — 36415 COLL VENOUS BLD VENIPUNCTURE: CPT

## 2023-09-14 NOTE — TELEPHONE ENCOUNTER
----- Message from Jeanna Carr DO sent at 9/14/2023  9:42 AM EDT -----  Please let pt know that BMP remains WNL  Let me know if questions, thanks!

## 2023-10-02 ENCOUNTER — NURSE ONLY (OUTPATIENT)
Dept: LAB | Age: 83
End: 2023-10-02

## 2023-10-02 DIAGNOSIS — D50.0 IRON DEFICIENCY ANEMIA DUE TO CHRONIC BLOOD LOSS: ICD-10-CM

## 2023-10-02 DIAGNOSIS — R18.8 OTHER ASCITES: ICD-10-CM

## 2023-10-02 DIAGNOSIS — R13.14 PHARYNGOESOPHAGEAL DYSPHAGIA: ICD-10-CM

## 2023-10-02 DIAGNOSIS — K76.9 LIVER DISEASE, CHRONIC, WITH CIRRHOSIS (HCC): ICD-10-CM

## 2023-10-02 DIAGNOSIS — K74.5 BILIARY CIRRHOSIS (HCC): ICD-10-CM

## 2023-10-02 DIAGNOSIS — K74.60 LIVER DISEASE, CHRONIC, WITH CIRRHOSIS (HCC): ICD-10-CM

## 2023-10-02 LAB
ANION GAP SERPL CALC-SCNC: 8 MEQ/L (ref 8–16)
BUN SERPL-MCNC: 18 MG/DL (ref 7–22)
CALCIUM SERPL-MCNC: 9.4 MG/DL (ref 8.5–10.5)
CHLORIDE SERPL-SCNC: 103 MEQ/L (ref 98–111)
CO2 SERPL-SCNC: 25 MEQ/L (ref 23–33)
CREAT SERPL-MCNC: 0.9 MG/DL (ref 0.4–1.2)
GFR SERPL CREATININE-BSD FRML MDRD: > 60 ML/MIN/1.73M2
GLUCOSE SERPL-MCNC: 107 MG/DL (ref 70–108)
POTASSIUM SERPL-SCNC: 4.2 MEQ/L (ref 3.5–5.2)
SODIUM SERPL-SCNC: 136 MEQ/L (ref 135–145)

## 2023-10-11 ENCOUNTER — OFFICE VISIT (OUTPATIENT)
Dept: FAMILY MEDICINE CLINIC | Age: 83
End: 2023-10-11
Payer: MEDICARE

## 2023-10-11 VITALS
TEMPERATURE: 97.7 F | WEIGHT: 154.6 LBS | RESPIRATION RATE: 18 BRPM | BODY MASS INDEX: 27.39 KG/M2 | OXYGEN SATURATION: 95 % | SYSTOLIC BLOOD PRESSURE: 128 MMHG | DIASTOLIC BLOOD PRESSURE: 68 MMHG | HEIGHT: 63 IN | HEART RATE: 81 BPM

## 2023-10-11 DIAGNOSIS — R53.81 PHYSICAL DECONDITIONING: ICD-10-CM

## 2023-10-11 DIAGNOSIS — R26.89 BALANCE PROBLEM: ICD-10-CM

## 2023-10-11 DIAGNOSIS — H81.13 BENIGN PAROXYSMAL POSITIONAL VERTIGO DUE TO BILATERAL VESTIBULAR DISORDER: Primary | ICD-10-CM

## 2023-10-11 DIAGNOSIS — Z91.81 AT RISK FOR FALLING: ICD-10-CM

## 2023-10-11 PROCEDURE — 1123F ACP DISCUSS/DSCN MKR DOCD: CPT | Performed by: FAMILY MEDICINE

## 2023-10-11 PROCEDURE — 99213 OFFICE O/P EST LOW 20 MIN: CPT | Performed by: FAMILY MEDICINE

## 2023-10-11 NOTE — PROGRESS NOTES
Chief Complaint   Patient presents with    Dizziness    Balance problems     History obtained from  and the patient. SUBJECTIVE:  Cherri Odonnell is a 80 y.o. female that presents today for     -Dizziness:  Going on a few wks, hard to pin down  When moves head, lays down or sits up, feels as though the room moves and spins  Resolved in seconds to minutes  No better or worse since started  No new tinnitus  No hearing changes  No HA's  Non vision changes  No falls or head trauma      -Balance/gait:  Worse over time  Not related to above  Uses walker   thinks would benefit from PT  No falls, but at risk.        Age/Gender Health Maintenance    Lipid - , LDL 53, HDL 47, TG 59 (4/26/2023 @ [facility])    Lab Results   Component Value Date    CHOL 110 04/26/2023    CHOL 108 10/26/2020    CHOL 118 06/19/2019     Lab Results   Component Value Date    TRIG 59 04/26/2023    TRIG 66 10/26/2020    TRIG 68 06/19/2019     Lab Results   Component Value Date    HDL 47 04/26/2023    HDL 43 10/26/2020    HDL 50 06/19/2019     Lab Results   Component Value Date    LDLCALC 52 10/26/2020    [address] 54 06/19/2019    [address] 65 04/20/2018       Colon Cancer Screening - negative OCT 2022, Sai Morales  Lung Cancer Screening  - Aged out    Tetanus - to get at pharmacy per medicare rules  Influenza Vaccine - UTD FALL 2023  Pneumonia Vaccine - UTD PCV 13 and PPV 23  Zoster - UTD x 2     Breast Cancer Screening - aged out  Osteoporosis Screening - discuss next visit      Diabetes Health Maintenance    A1C -   Lab Results   Component Value Date/Time    LABA1C 5.0 08/10/2023 09:18 AM    LABA1C 4.8 05/11/2023 08:57 AM    LABA1C 5.1 12/29/2022 12:21 PM    LABA1C 5.0 12/21/2021 12:15 PM    LABA1C 5.3 03/22/2021 09:56 AM    LABA1C 6.7 06/19/2019 09:05 AM    LABA1C 6.2 12/14/2017 01:37 PM    LABA1C 6.4 05/09/2017 10:19 AM    LABA1C 6.1 10/24/2016 12:00 AM    LABA1C 5.8 02/16/2016 08:45 AM     ACE/ARB - no. hypotension  Eye - records

## 2023-10-17 NOTE — PROGRESS NOTES
lasix to 40mg bid 3 days  Low salt diet  Reviewed ER precautions, pt understands. 3. Swelling of both lower extremities    As per # 2      DISPOSITION    Return if symptoms worsen or fail to improve. Tirado Eans released without restrictions. Future Appointments   Date Time Provider Department Center   10/24/2023  2:30 PM Jasmine Fore, PT [facility]   10/26/2023 11:30 AM Jasmine Fore, PT [facility]   10/31/2023 10:15 AM Rula Saver, PTA [facility]   11/3/2023 11:15 AM Jasmine Fore, PT [facility]   11/7/2023 11:00 AM Rula Saver, PTA [facility]   11/9/2023 10:45 AM Jasmine Fore, PT [facility]   11/14/2023 10:45 AM Jasmine Fore, PT [facility]   11/16/2023 10:45 AM Jasmine Fore, PT [facility]   11/21/2023 11:15 AM Jasmine Fore, PT [facility]   11/29/2023  1:15 PM Kristi Rosen MD [facility]   2/13/2024 10:40 AM Jeanna Carr DO [address] [facility]     PATIENT COUNSELING    Counseling was provided today regarding the following topics: Healthy eating habits, Regular exercise, substance abuse and healthy sleep habits. Barriers to learning and self management: none    Discussed use, benefit, and side effects of prescribed medications. Barriers to medication compliance addressed. All patient questions answered. Pt voiced understanding.        Electronically signed by Jeanna Carr DO on 10/18/2023 at 2:07 PM

## 2023-10-18 ENCOUNTER — TELEPHONE (OUTPATIENT)
Dept: FAMILY MEDICINE CLINIC | Age: 83
End: 2023-10-18

## 2023-10-18 ENCOUNTER — HOSPITAL ENCOUNTER (OUTPATIENT)
Dept: GENERAL RADIOLOGY | Age: 83
Discharge: HOME | End: 2023-10-18
Payer: MEDICARE

## 2023-10-18 ENCOUNTER — HOSPITAL ENCOUNTER (OUTPATIENT)
Dept: PHYSICAL THERAPY | Age: 83
Setting detail: THERAPIES SERIES
Discharge: HOME | End: 2023-10-18
Attending: FAMILY MEDICINE
Payer: MEDICARE

## 2023-10-18 ENCOUNTER — HOSPITAL ENCOUNTER (OUTPATIENT)
Age: 83
Discharge: HOME | End: 2023-10-18
Payer: MEDICARE

## 2023-10-18 ENCOUNTER — OFFICE VISIT (OUTPATIENT)
Dept: FAMILY MEDICINE CLINIC | Age: 83
End: 2023-10-18
Payer: MEDICARE

## 2023-10-18 VITALS
RESPIRATION RATE: 18 BRPM | WEIGHT: 155 LBS | SYSTOLIC BLOOD PRESSURE: 126 MMHG | HEIGHT: 63 IN | DIASTOLIC BLOOD PRESSURE: 70 MMHG | HEART RATE: 83 BPM | TEMPERATURE: 97.7 F | OXYGEN SATURATION: 94 % | BODY MASS INDEX: 27.46 KG/M2

## 2023-10-18 DIAGNOSIS — K74.60 CIRRHOSIS OF LIVER WITH ASCITES, UNSPECIFIED HEPATIC CIRRHOSIS TYPE (HCC): ICD-10-CM

## 2023-10-18 DIAGNOSIS — M25.811 IMPINGEMENT OF RIGHT SHOULDER: ICD-10-CM

## 2023-10-18 DIAGNOSIS — M25.811 IMPINGEMENT OF RIGHT SHOULDER: Primary | ICD-10-CM

## 2023-10-18 DIAGNOSIS — R18.8 CIRRHOSIS OF LIVER WITH ASCITES, UNSPECIFIED HEPATIC CIRRHOSIS TYPE (HCC): ICD-10-CM

## 2023-10-18 DIAGNOSIS — M79.89 SWELLING OF BOTH LOWER EXTREMITIES: ICD-10-CM

## 2023-10-18 PROCEDURE — 97112 NEUROMUSCULAR REEDUCATION: CPT

## 2023-10-18 PROCEDURE — 73030 X-RAY EXAM OF SHOULDER: CPT

## 2023-10-18 PROCEDURE — 97161 PT EVAL LOW COMPLEX 20 MIN: CPT

## 2023-10-18 PROCEDURE — 73060 X-RAY EXAM OF HUMERUS: CPT

## 2023-10-18 PROCEDURE — 1123F ACP DISCUSS/DSCN MKR DOCD: CPT | Performed by: FAMILY MEDICINE

## 2023-10-18 PROCEDURE — 99214 OFFICE O/P EST MOD 30 MIN: CPT | Performed by: FAMILY MEDICINE

## 2023-10-18 RX ORDER — PREDNISONE 20 MG/1
40 TABLET ORAL DAILY
Qty: 10 TABLET | Refills: 0 | Status: SHIPPED | OUTPATIENT
Start: 2023-10-18 | End: 2023-10-23

## 2023-10-18 NOTE — PATIENT INSTRUCTIONS
-Increase Furosimide (Lasix) to twice daily x 3 days (1st thing in the AM and 2nd dose around 2 or 3 PM) x 3 days.

## 2023-10-18 NOTE — TELEPHONE ENCOUNTER
Phoned Dr Andi Thakur office per Dr Corrie Lux spoke with Jyothi Garcias  to inform him pt was seen in the office today and she is having reoccurring belly swelling-may need pacacentis    She will let Dr Krissy Vo know

## 2023-10-18 NOTE — PROGRESS NOTES
** PLEASE SIGN, DATE AND TIME CERTIFICATION BELOW AND RETURN TO [facility] OUTPATIENT REHABILITATION (FAX #: [phone]). ATTEST/CO-SIGN IF ACCESSING VIA [employer]. THANK YOU.**    I certify that I have examined the patient below and determined that Physical Medicine and Rehabilitation service is necessary and that I approve the established plan of care for up to 90 days or as specifically noted. Attestation, signature or co-signature of physician indicates approval of certification requirements.    ________________________ ____________ __________  Physician Signature   Date   Time  [facility]  PHYSICAL THERAPY  [x] VESTIBULAR EVALUATION  [] DAILY NOTE [] PROGRESS NOTE [] DISCHARGE NOTE    [x] OUTPATIENT REHABILITATION CENTER [facility]   [] [address] [place] [address]    [] 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        Functional Outcome Measure Used I   Functional Outcome Score 84 (10/18/23)       Insurance: Primary: Payor: Anel Whittaker /  /  / ,   Secondary:    Authorization Information: PRE CERTIFICATION REQUIRED: NO -REP WOULD NOT VERIFY CPT CODES, STATED AS LONG AS WE FOLLOW MEDICARE GUIDELINES CLAIMS WILL PAY  INSURANCE THERAPY BENEFIT:  NO LIMIT BASED ON MED NECESSITY  AQUATIC THERAPY COVERED: YES  MODALITIES COVERED:  YES   Visit # 1, 1/10 for progress note   Visits Allowed: unlimited   Recertification Date:

## 2023-10-19 ENCOUNTER — TELEPHONE (OUTPATIENT)
Dept: FAMILY MEDICINE CLINIC | Age: 83
End: 2023-10-19

## 2023-10-24 ENCOUNTER — HOSPITAL ENCOUNTER (OUTPATIENT)
Dept: PHYSICAL THERAPY | Age: 83
Setting detail: THERAPIES SERIES
Discharge: HOME | End: 2023-10-24
Attending: FAMILY MEDICINE
Payer: MEDICARE

## 2023-10-24 PROCEDURE — 97112 NEUROMUSCULAR REEDUCATION: CPT

## 2023-10-24 PROCEDURE — 97110 THERAPEUTIC EXERCISES: CPT

## 2023-10-26 ENCOUNTER — HOSPITAL ENCOUNTER (OUTPATIENT)
Dept: PHYSICAL THERAPY | Age: 83
Setting detail: THERAPIES SERIES
Discharge: HOME | End: 2023-10-26
Attending: FAMILY MEDICINE
Payer: MEDICARE

## 2023-10-26 PROCEDURE — 97112 NEUROMUSCULAR REEDUCATION: CPT

## 2023-10-26 PROCEDURE — 97110 THERAPEUTIC EXERCISES: CPT

## 2023-10-26 NOTE — PROGRESS NOTES
[facility]  PHYSICAL THERAPY  [] VESTIBULAR EVALUATION  [x] DAILY NOTE [] PROGRESS NOTE [] DISCHARGE NOTE    [x] OUTPATIENT REHABILITATION CENTER - [place]   [] [address] [place] [address]    [] [facility]   [] [facility]    Date: 10/26/2023  Patient Name:  Rashmi Goodson  : 1940  MRN: [id number]  CSN: [id number]    Referring Practitioner Peyman Stock DO   Diagnosis Benign paroxysmal positional vertigo due to bilateral vestibular disorder [H81.13]  Balance problem [R26.89]  Physical deconditioning [R53.81]  At risk for falling [Z91.81]    Treatment Diagnosis Vertigo, impaired balance, BLE weakness, difficulty walking   Date of Evaluation 10/18/23   Additional Pertinent History 3 strokes in , vertigo- went through vestibular rehab in , diabetes, memory issues, skin/lip cancer. Functional Outcome Measure Used [facility]   Functional Outcome Score 84 (10/18/23)       Insurance: Primary: Payor: Sylvie Paz /  /  / ,   Secondary:    Authorization Information: PRE CERTIFICATION REQUIRED: NO -REP WOULD NOT VERIFY CPT CODES, STATED AS LONG AS WE FOLLOW MEDICARE GUIDELINES CLAIMS WILL PAY  INSURANCE THERAPY BENEFIT:  NO LIMIT BASED ON MED NECESSITY  AQUATIC THERAPY COVERED: YES  MODALITIES COVERED:  YES   Visit # 3, 3/10 for progress note   Visits Allowed: unlimited   Recertification Date:    Physician Follow-Up: 24   Physician Orders:    History of Present Illness: Pt presents with dizziness when she lays down, rolls over, and stands up. Pt describes dizziness as eyes going back and forth. Pt fell 2022, fractured tailbone and injured neck but continues to have pain. Pt then fell 3 more times, most recently 23. Pt notes she sleeps a lot. Pt notes swelling in feet and had fluid removed multiple times this year. SUBJECTIVE: Pt reports no change in right arm pain after injection. Pt reports she still gets dizzy when she lays down.  Pt was fatigued

## 2023-10-27 ENCOUNTER — HOSPITAL ENCOUNTER (OUTPATIENT)
Dept: ULTRASOUND IMAGING | Age: 83
Discharge: HOME | End: 2023-10-27
Payer: MEDICARE

## 2023-10-27 ENCOUNTER — HOSPITAL ENCOUNTER (OUTPATIENT)
Dept: NURSING | Age: 83
Discharge: HOME | End: 2023-10-27
Payer: MEDICARE

## 2023-10-27 VITALS
OXYGEN SATURATION: 95 % | HEART RATE: 80 BPM | SYSTOLIC BLOOD PRESSURE: 142 MMHG | DIASTOLIC BLOOD PRESSURE: 52 MMHG | RESPIRATION RATE: 16 BRPM | TEMPERATURE: 98.8 F

## 2023-10-27 DIAGNOSIS — R18.8 OTHER ASCITES: ICD-10-CM

## 2023-10-27 DIAGNOSIS — K74.5 BILIARY CIRRHOSIS (HCC): ICD-10-CM

## 2023-10-27 PROCEDURE — 49083 ABD PARACENTESIS W/IMAGING: CPT

## 2023-10-27 PROCEDURE — 96366 THER/PROPH/DIAG IV INF ADDON: CPT

## 2023-10-27 PROCEDURE — 96365 THER/PROPH/DIAG IV INF INIT: CPT

## 2023-10-27 PROCEDURE — 6360000002 HC RX W HCPCS: Performed by: INTERNAL MEDICINE

## 2023-10-27 PROCEDURE — P9047 ALBUMIN (HUMAN), 25%, 50ML: HCPCS | Performed by: INTERNAL MEDICINE

## 2023-10-27 RX ORDER — ALBUMIN (HUMAN) 12.5 G/50ML
25 SOLUTION INTRAVENOUS ONCE
Status: COMPLETED | OUTPATIENT
Start: 2023-10-27 | End: 2023-10-27

## 2023-10-27 RX ADMIN — ALBUMIN (HUMAN) 25 G: 0.25 INJECTION, SOLUTION INTRAVENOUS at 11:42

## 2023-10-27 RX ADMIN — ALBUMIN (HUMAN) 25 G: 0.25 INJECTION, SOLUTION INTRAVENOUS at 10:55

## 2023-10-27 NOTE — PROGRESS NOTES
1050 Patient in wheelchair with  for IV Albumin. Patient verbalizes understanding of infusion. PT RIGHTS AND RESPONSIBILITIES OFFERED TO PT.     1055 Albumin infusion started. Ultrasound states they removed 5 Liters of fluid. 1322 Albumin infusion complete. Patient tolerated well. AVS reviewed with patient. Verbalizes understanding. Patient left in wheelchair to discharge lobby.        _M___ Safety:       (Environmental)  [place] to environment  Ensure ID band is correct and in place/ allergy band as needed  Assess for fall risk  Initiate fall precautions as applicable (fall band, side rails, etc.)  Call light within reach  Bed in low position/ wheels locked    _M___ Pain:       Assess pain level and characteristics  Administer analgesics as ordered  Assess effectiveness of pain management and report to MD as needed    __M__ Knowledge Deficit:  Assess baseline knowledge  Provide teaching at level of understanding  Provide teaching via preferred learning method  Evaluate teaching effectiveness    _M___ Hemodynamic/Respiratory Status:       (Pre and Post Procedure Monitoring)  Assess/Monitor vital signs and LOC  Assess Baseline SpO2 prior to any sedation  Obtain weight/height  Assess vital signs/ LOC until patient meets discharge criteria  Monitor procedure site and notify MD of any issues

## 2023-10-31 ENCOUNTER — HOSPITAL ENCOUNTER (OUTPATIENT)
Dept: PHYSICAL THERAPY | Age: 83
Setting detail: THERAPIES SERIES
Discharge: HOME | End: 2023-10-31
Attending: FAMILY MEDICINE
Payer: MEDICARE

## 2023-10-31 PROCEDURE — 97112 NEUROMUSCULAR REEDUCATION: CPT

## 2023-10-31 PROCEDURE — 97110 THERAPEUTIC EXERCISES: CPT

## 2023-10-31 NOTE — PROGRESS NOTES
Education completed  []  Reviewed Prior HEP      [x]  Patient verbalized and/or demonstrated understanding of education provided. []  Patient unable to verbalize and/or demonstrate understanding of education provided. Will continue education. [x]  Barriers to learning: memory issues, needs handouts    PLAN:  Treatment Recommendations: Strengthening, Balance Training, Functional Mobility Training, Transfer Training, Gait Training, Stair Training, Neuromuscular Re-education, Home Exercise Program, Patient Education, and Vestibular Rehabilitation    []  Plan of care initiated. Plan to see patient 2 times per week for 12 weeks to address the treatment planned outlined above.   [x]  Continue with current plan of care  []  Modify plan of care as follows:    []  Hold pending physician visit  []  Discharge    Time In 1015   Time Out 1102   Timed Code Minutes: 47 min   Total Treatment Time: 47 min       Electronically Signed by: Brock Higginbotham PTA

## 2023-11-03 ENCOUNTER — APPOINTMENT (OUTPATIENT)
Dept: PHYSICAL THERAPY | Age: 83
End: 2023-11-03
Attending: FAMILY MEDICINE
Payer: MEDICARE

## 2023-11-07 ENCOUNTER — HOSPITAL ENCOUNTER (OUTPATIENT)
Dept: PHYSICAL THERAPY | Age: 83
Setting detail: THERAPIES SERIES
Discharge: HOME | End: 2023-11-07
Attending: FAMILY MEDICINE
Payer: MEDICARE

## 2023-11-07 PROCEDURE — 97110 THERAPEUTIC EXERCISES: CPT

## 2023-11-07 PROCEDURE — 97112 NEUROMUSCULAR REEDUCATION: CPT

## 2023-11-07 NOTE — PROGRESS NOTES
[facility]  PHYSICAL THERAPY  [] VESTIBULAR EVALUATION  [x] DAILY NOTE [] PROGRESS NOTE [] DISCHARGE NOTE    [x] [facility]   [] [address] [place] [address]    [] [facility]   [] [facility]    Date: [facility]23  Patient Name:  Moreno Hayward  : 1940  MRN: [id number]  CSN: [id number]    Referring Practitioner Maryam Gates DO   Diagnosis Benign paroxysmal positional vertigo due to bilateral vestibular disorder [H81.13]  Balance problem [R26.89]  Physical deconditioning [R53.81]  At risk for falling [Z91.81]    Treatment Diagnosis Vertigo, impaired balance, BLE weakness, difficulty walking   Date of Evaluation 10/18/23   Additional Pertinent History 3 strokes in , vertigo- went through vestibular rehab in , diabetes, memory issues, skin/lip cancer. Functional Outcome Measure Used [facility]   Functional Outcome Score 84 (10/18/23)       Insurance: Primary: Payor: Cony Rice /  /  / ,   Secondary:    Authorization Information: PRE CERTIFICATION REQUIRED: NO -REP WOULD NOT VERIFY CPT CODES, STATED AS LONG AS WE FOLLOW MEDICARE GUIDELINES CLAIMS WILL PAY  INSURANCE THERAPY BENEFIT:  NO LIMIT BASED ON MED NECESSITY  AQUATIC THERAPY COVERED: YES  MODALITIES COVERED:  YES   Visit # 5, 5/10 for progress note   Visits Allowed: unlimited   Recertification Date: 3/62/94   Physician Follow-Up: 24   Physician Orders:    History of Present Illness: Pt presents with dizziness when she lays down, rolls over, and stands up. Pt describes dizziness as eyes going back and forth. Pt fell 2022, fractured tailbone and injured neck but continues to have pain. Pt then fell 3 more times, most recently 23. Pt notes she sleeps a lot. Pt notes swelling in feet and had fluid removed multiple times this year. SUBJECTIVE: Patient states that she is still getting dizzy. Patient's  stated that last Wednesday she fell in the living room.

## 2023-11-09 ENCOUNTER — HOSPITAL ENCOUNTER (OUTPATIENT)
Dept: PHYSICAL THERAPY | Age: 83
Setting detail: THERAPIES SERIES
Discharge: HOME | End: 2023-11-09
Attending: FAMILY MEDICINE
Payer: MEDICARE

## 2023-11-09 PROCEDURE — 97112 NEUROMUSCULAR REEDUCATION: CPT

## 2023-11-09 PROCEDURE — 97110 THERAPEUTIC EXERCISES: CPT

## 2023-11-09 NOTE — PROGRESS NOTES
of what she was doing and required cues how to do task and complete. GOALS:  Patient Goal: Decrease fall risk and get rid of dizziness    Short Term Goals:  Time Frame: 6 weeks  Patient will complete 5 sit to/from stand transfers from standard chair with UEs in 35 seconds to improve functional strength for walking. Patient will improve TUG score from 25 seconds to 20 seconds to decrease fall risk. Patient will pass 2/4 tests on mCTSIB to improve balance and decrease fall risk. Long Term Goals:  Time Frame: 12 weeks  Patient will have negative bilateral Cambridge Hallpike test to allow for safe transitional motions. Patient will reduce Dizziness Handicap Inventory score from 84/100 to 50/100 to tolerate bending over, quick head movements, and getting in/out of bed. Patient will pass 3/4 tests on mCTSIB to improve balance when walking. Patient will tolerate x1 viewing standing with feet together to improve vestibular function and balance. Patient will be independent and compliant with HEP daily to achieve above goals. Patient Education:   []  HEP/Education Completed: Vestibular Education and Post Maneuver Precautions, safety with functional transfers, HEP- working on proper hand placement with tranfers. [employer] Access Code: Meri Anderson  []  No new Education completed  [x]  Reviewed Prior HEP      [x]  Patient verbalized and/or demonstrated understanding of education provided. []  Patient unable to verbalize and/or demonstrate understanding of education provided. Will continue education. [x]  Barriers to learning: memory issues, needs handouts    PLAN:  Treatment Recommendations: Strengthening, Balance Training, Functional Mobility Training, Transfer Training, Gait Training, Stair Training, Neuromuscular Re-education, Home Exercise Program, Patient Education, and Vestibular Rehabilitation    []  Plan of care initiated.   Plan to see patient 2 times per week for 12 weeks to address the

## 2023-11-14 ENCOUNTER — APPOINTMENT (OUTPATIENT)
Dept: PHYSICAL THERAPY | Age: 83
End: 2023-11-14
Attending: FAMILY MEDICINE
Payer: MEDICARE

## 2023-11-14 NOTE — PROGRESS NOTES
normal air movement, no respiratory distress or retractions. + TTP along R lateral ribs at T3 to T5  Cardiovascular: S1 and S2 auscultated w/ RRR. No murmurs, rubs, clicks, or gallops, distal pulses intact. Abdomen: soft, non-tender, mild distension and soft, bowel sounds physiologic,  no rebound or guarding, no masses or hernias noted. Liver and spleen without enlargement. Extremities: no cyanosis, clubbing or edema of the lower extremities. +2 PT/DP bilaterally. Musculoskeletal: No joint swelling or gross deformity   Neuro:  Alert, 5/5 strength globally and symmetrically, 2+ patellar reflexes bilaterally  Psych: Affect appropriate. Mood normal. Thought process is normal without evidence of depression or psychosis. Good insight and appropriate interaction. Cognition and memory appear to be impaired mildly. Skin: warm and dry, no rash or erythema      ASSESSMENT & PLAN  1. Rib pain on right side    Rib strain vs fxr  Xray  Ice/heat  Short course tramadol  F/u if no better    - XR RIBS RIGHT (2 VIEWS); Future  - traMADol (ULTRAM) 50 MG tablet; Take 1 tablet by mouth every 6 hours as needed for Pain for up to 7 days. Intended supply: 7 days. Take lowest dose possible to manage pain Max Daily Amount: 200 mg  Dispense: 28 tablet; Refill: 0      DISPOSITION    Return if symptoms worsen or fail to improve. Aleksandarunique Diaz released without restrictions. Future Appointments   Date Time Provider 4600  [facility]   11/16/2023  9:45 AM Sharon Gunter OT [facility] [place] [facility]   11/16/2023 10:45 AM Henrimarlene Resendiz, PT [facility] [place] [facility]   11/21/2023 11:15 AM Henri Astrid, PT [facility] [place] [facility]   11/29/2023  1:15 PM MD KYLE GuerrierGASL [facility]   2/13/2024 10:40 AM Kendell Wan  [address]    Counseling was provided today regarding the following topics: Healthy eating habits, Regular exercise, substance abuse and healthy sleep habits.     Barriers to learning and self

## 2023-11-15 ENCOUNTER — HOSPITAL ENCOUNTER (OUTPATIENT)
Age: 83
Discharge: HOME | End: 2023-11-15
Payer: MEDICARE

## 2023-11-15 ENCOUNTER — OFFICE VISIT (OUTPATIENT)
Dept: FAMILY MEDICINE CLINIC | Age: 83
End: 2023-11-15
Payer: MEDICARE

## 2023-11-15 ENCOUNTER — HOSPITAL ENCOUNTER (OUTPATIENT)
Dept: GENERAL RADIOLOGY | Age: 83
Discharge: HOME | End: 2023-11-15
Payer: MEDICARE

## 2023-11-15 VITALS
HEIGHT: 63 IN | WEIGHT: 152.4 LBS | OXYGEN SATURATION: 95 % | DIASTOLIC BLOOD PRESSURE: 62 MMHG | SYSTOLIC BLOOD PRESSURE: 136 MMHG | TEMPERATURE: 97.2 F | BODY MASS INDEX: 27 KG/M2 | RESPIRATION RATE: 18 BRPM | HEART RATE: 94 BPM

## 2023-11-15 DIAGNOSIS — R07.81 RIB PAIN ON RIGHT SIDE: Primary | ICD-10-CM

## 2023-11-15 DIAGNOSIS — R07.81 RIB PAIN ON RIGHT SIDE: ICD-10-CM

## 2023-11-15 PROCEDURE — 71100 X-RAY EXAM RIBS UNI 2 VIEWS: CPT

## 2023-11-15 PROCEDURE — 99213 OFFICE O/P EST LOW 20 MIN: CPT | Performed by: FAMILY MEDICINE

## 2023-11-15 PROCEDURE — 1123F ACP DISCUSS/DSCN MKR DOCD: CPT | Performed by: FAMILY MEDICINE

## 2023-11-15 RX ORDER — TRAMADOL HYDROCHLORIDE 50 MG/1
50 TABLET ORAL EVERY 6 HOURS PRN
Qty: 28 TABLET | Refills: 0 | Status: SHIPPED | OUTPATIENT
Start: 2023-11-15 | End: 2023-11-22

## 2023-11-16 ENCOUNTER — HOSPITAL ENCOUNTER (OUTPATIENT)
Dept: PHYSICAL THERAPY | Age: 83
Setting detail: THERAPIES SERIES
Discharge: HOME | End: 2023-11-16
Attending: FAMILY MEDICINE
Payer: MEDICARE

## 2023-11-16 ENCOUNTER — HOSPITAL ENCOUNTER (OUTPATIENT)
Dept: OCCUPATIONAL THERAPY | Age: 83
Setting detail: THERAPIES SERIES
Discharge: HOME | End: 2023-11-16
Attending: FAMILY MEDICINE
Payer: MEDICARE

## 2023-11-16 ENCOUNTER — TELEPHONE (OUTPATIENT)
Dept: FAMILY MEDICINE CLINIC | Age: 83
End: 2023-11-16

## 2023-11-16 PROCEDURE — 97112 NEUROMUSCULAR REEDUCATION: CPT

## 2023-11-16 PROCEDURE — 97110 THERAPEUTIC EXERCISES: CPT

## 2023-11-16 PROCEDURE — 97165 OT EVAL LOW COMPLEX 30 MIN: CPT

## 2023-11-16 NOTE — PROGRESS NOTES
Patient verbalized and/or demonstrated understanding of education provided. []  Patient unable to and/or demonstrate understanding of education provided. Will continue education. [x]  Barriers to learning: occasional repeated directions needed. PLAN:  Treatment Recommendations: Strengthening, Range of Motion, Manual Therapy - Soft Tissue Mobilization, Manual Therapy - Joint Manipulation, Home Exercise Program, and Modalities    [x]  Plan of care initiated. Plan to see patient 2 times per week for 10 weeks to address the treatment planned outlined above. []  Continue with current plan of care  []  Modify plan of care as follows:    []  Hold pending physician visit  []  Discharge    Time In 0950   Time Out 1040   Timed Code Minutes: 0 min   Total Treatment Time: 50 min       Electronically Signed by:   Zion Early OTR/L #[id number]

## 2023-11-16 NOTE — PROGRESS NOTES
[facility]  PHYSICAL THERAPY  [] VESTIBULAR EVALUATION  [x] DAILY NOTE [] PROGRESS NOTE [] DISCHARGE NOTE    [x] [facility]   [] [address] [place] [address]    [] [facility]   [] Dorothea Freeman    Date: 2023  Patient Name:  Anne-Marie Prince  : 1940  MRN: [id number]  CSN: [id number]    Referring Practitioner Daniel Gomes DO   Diagnosis Benign paroxysmal positional vertigo due to bilateral vestibular disorder [H81.13]  Balance problem [R26.89]  Physical deconditioning [R53.81]  At risk for falling [Z91.81]    Treatment Diagnosis Vertigo, impaired balance, BLE weakness, difficulty walking   Date of Evaluation 10/18/23   Additional Pertinent History 3 strokes in , vertigo- went through vestibular rehab in , diabetes, memory issues, skin/lip cancer. Functional Outcome Measure Used [facility]   Functional Outcome Score 84 (10/18/23)       Insurance: Primary: Payor: Maryam Oro /  /  / ,   Secondary:    Authorization Information: PRE CERTIFICATION REQUIRED: NO -REP WOULD NOT VERIFY CPT CODES, STATED AS LONG AS WE FOLLOW MEDICARE GUIDELINES CLAIMS WILL PAY  INSURANCE THERAPY BENEFIT:  NO LIMIT BASED ON MED NECESSITY  AQUATIC THERAPY COVERED: YES  MODALITIES COVERED:  YES   Visit # 7, 710 for progress note   Visits Allowed: unlimited   Recertification Date:    Physician Follow-Up: 24   Physician Orders:    History of Present Illness: Pt presents with dizziness when she lays down, rolls over, and stands up. Pt describes dizziness as eyes going back and forth. Pt fell 2022, fractured tailbone and injured neck but continues to have pain. Pt then fell 3 more times, most recently 23. Pt notes she sleeps a lot. Pt notes swelling in feet and had fluid removed multiple times this year. SUBJECTIVE: Patient reports she is feeling better, as she was sick the other day. Pt reports dizziness is better.  Pt denies recent

## 2023-11-16 NOTE — TELEPHONE ENCOUNTER
----- Message from Doug Hess, DO sent at 11/16/2023  3:37 PM EST -----  Please let pt/ know xray neg for fxr  Con't with plan from office  Let me know if questions, thanks!

## 2023-11-21 ENCOUNTER — HOSPITAL ENCOUNTER (OUTPATIENT)
Dept: OCCUPATIONAL THERAPY | Age: 83
Setting detail: THERAPIES SERIES
Discharge: HOME | End: 2023-11-21
Attending: FAMILY MEDICINE
Payer: MEDICARE

## 2023-11-21 ENCOUNTER — HOSPITAL ENCOUNTER (OUTPATIENT)
Dept: PHYSICAL THERAPY | Age: 83
Setting detail: THERAPIES SERIES
Discharge: HOME | End: 2023-11-21
Attending: FAMILY MEDICINE
Payer: MEDICARE

## 2023-11-21 PROCEDURE — 97530 THERAPEUTIC ACTIVITIES: CPT

## 2023-11-21 PROCEDURE — 97110 THERAPEUTIC EXERCISES: CPT

## 2023-11-21 NOTE — PROGRESS NOTES
throughout day each time she stands up  [id number]: Melisa Gannon  []  No new Education completed  [x]  Reviewed Prior HEP      [x]  Patient verbalized and/or demonstrated understanding of education provided. []  Patient unable to verbalize and/or demonstrate understanding of education provided. Will continue education. [x]  Barriers to learning: memory issues, needs handouts    PLAN:  Treatment Recommendations: Strengthening, Balance Training, Functional Mobility Training, Transfer Training, Gait Training, Stair Training, Neuromuscular Re-education, Home Exercise Program, Patient Education, and Vestibular Rehabilitation    []  Plan of care initiated. Plan to see patient 2 times per week for 12 weeks to address the treatment planned outlined above.   [x]  Continue with current plan of care  []  Modify plan of care as follows:    []  Hold pending physician visit  []  Discharge    Time In 1112   Time Out 1142   Timed Code Minutes: 30 min   Total Treatment Time: 30 min       Electronically Signed by: Lobo Granger, PT

## 2023-11-21 NOTE — PROGRESS NOTES
patient on importance of locking the brakes for safety to avoid a fall           Specific Interventions Next Treatment: MHP with PROM, AAROM, AROM, HEP review, STM    Activity/Treatment Tolerance:  [x]  Patient tolerated treatment well  []  Patient limited by fatigue  []  Patient limited by pain   []  Patient limited by other medical complications  []  Other:     Assessment: Patient presents to first skilled OT session since evaluation. Impaired cognition, with difficulty gauging true pain scale and discomfort as patient was able to demonstrate AROM of RUE different planes with no grimace or difficulty shown. Education on safety this date with transfers. Reviewed prior HEP for patient to begin prior to new exercises to be distributed. Areas for Improvement: impaired ROM, impaired strength, pain, and ADLs  Prognosis: good    GOALS:  Patient Goal: decreased pain    Short Term Goals   Time Frame: 5 weeks  Patient will be independent with UE HEP for increased ease with reaching into cupboards for dishes. 2.  Patient will report pain in right shoulder no greater than 3/10 at rest for ease with sleep. 3.  Patient will increase AROM right shoulder flexion to 145, abduction to 142, IR to 50 and ER to 63 degll lex for increased ease with dressing and hooking her bra. 4.  Patient will increase PROM right shoulder flexion to 154, abduction to 150 and IR to 65 degrees for increased ease with PROM and bathing. Long Term Goals:  Time Frame: 10 weeks  1. Patient will report no instances of right shoulder pain waking her at night for increased ease with sleep. 2.  Patient will improve UEFS to at least 45. Patient Education:   []  HEP/Education Completed: Plan of Care, Goals,   [facility] Access Code for HEP: Access Code: Wing Fang  URL: [email or web address]  Date: 11/16/2023  Prepared by:  Christiano Valentine    Exercises  - Seated Scapular Retraction  - 2-3 x daily - 7 x weekly - 1 sets - 10 reps - 3 hold  -

## 2023-11-22 ENCOUNTER — HOSPITAL ENCOUNTER (OUTPATIENT)
Dept: NURSING | Age: 83
Discharge: HOME | End: 2023-11-22
Payer: MEDICARE

## 2023-11-22 ENCOUNTER — HOSPITAL ENCOUNTER (OUTPATIENT)
Dept: ULTRASOUND IMAGING | Age: 83
Discharge: HOME | End: 2023-11-22
Payer: MEDICARE

## 2023-11-22 VITALS
OXYGEN SATURATION: 93 % | SYSTOLIC BLOOD PRESSURE: 113 MMHG | DIASTOLIC BLOOD PRESSURE: 58 MMHG | RESPIRATION RATE: 18 BRPM | HEART RATE: 94 BPM | TEMPERATURE: 98.2 F

## 2023-11-22 DIAGNOSIS — R18.8 CIRRHOSIS OF LIVER WITH ASCITES, UNSPECIFIED HEPATIC CIRRHOSIS TYPE (HCC): ICD-10-CM

## 2023-11-22 DIAGNOSIS — K74.60 CIRRHOSIS OF LIVER WITH ASCITES, UNSPECIFIED HEPATIC CIRRHOSIS TYPE (HCC): ICD-10-CM

## 2023-11-22 PROCEDURE — 6360000002 HC RX W HCPCS: Performed by: INTERNAL MEDICINE

## 2023-11-22 PROCEDURE — 96366 THER/PROPH/DIAG IV INF ADDON: CPT

## 2023-11-22 PROCEDURE — 49083 ABD PARACENTESIS W/IMAGING: CPT

## 2023-11-22 PROCEDURE — 96365 THER/PROPH/DIAG IV INF INIT: CPT

## 2023-11-22 PROCEDURE — P9047 ALBUMIN (HUMAN), 25%, 50ML: HCPCS | Performed by: INTERNAL MEDICINE

## 2023-11-22 RX ORDER — ALBUMIN (HUMAN) 12.5 G/50ML
25 SOLUTION INTRAVENOUS ONCE
Status: COMPLETED | OUTPATIENT
Start: 2023-11-22 | End: 2023-11-22

## 2023-11-22 RX ADMIN — ALBUMIN (HUMAN) 25 G: 0.25 INJECTION, SOLUTION INTRAVENOUS at 11:19

## 2023-11-22 RX ADMIN — ALBUMIN (HUMAN) 25 G: 0.25 INJECTION, SOLUTION INTRAVENOUS at 10:32

## 2023-11-22 ASSESSMENT — PAIN DESCRIPTION - DESCRIPTORS: DESCRIPTORS: ACHING

## 2023-11-22 ASSESSMENT — PAIN - FUNCTIONAL ASSESSMENT: PAIN_FUNCTIONAL_ASSESSMENT: 0-10

## 2023-11-22 NOTE — OP NOTE
Pre-Procedure Diagnosis: Ascites    Procedure Performed:  Paracentesis    Anesthesia: local    Findings: cloudy  yellow serous fluid obtained. Immediate Complications:  None    Estimated Blood Loss: minimal    SEE DICTATED PROCEDURE NOTE FOR COMPLETE DETAILS.     Hannah Marley MD   11/22/2023 8:28 AM

## 2023-11-22 NOTE — H&P
Formulation and discussion of sedation / procedure plans, risks, benefits, side effects and alternatives with patient and/or responsible adult completed. History and Physical reviewed and unchanged.     Electronically signed by Kerrie Lion MD on 11/22/23 at 8:27 AM EST

## 2023-11-22 NOTE — PROGRESS NOTES
1007 Patient arrived to [facility] in wheel chair for albumin infusion. Oriented to room and call light  PT RIGHTS AND RESPONSIBILITIES OFFERED TO PT. Ultrasound stated they drained 5L today. 1032 infusion started and she denies complaints    1225 Infusion completed and she denies complaints. Iv removed. Discharge instructions given and explained and she denies questions.   Discharged in wheel chair       __M__ Safety:       (Environmental)  [place] to environment  Ensure ID band is correct and in place/ allergy band as needed  Assess for fall risk  Initiate fall precautions as applicable (fall band, side rails, etc.)  Call light within reach  Bed in low position/ wheels locked    M____ Pain:       Assess pain level and characteristics  Administer analgesics as ordered  Assess effectiveness of pain management and report to MD as needed    _M___ Knowledge Deficit:  Assess baseline knowledge  Provide teaching at level of understanding  Provide teaching via preferred learning method  Evaluate teaching effectiveness    _M___ Hemodynamic/Respiratory Status:       (Pre and Post Procedure Monitoring)  Assess/Monitor vital signs and LOC  Assess Baseline SpO2 prior to any sedation  Obtain weight/height  Assess vital signs/ LOC until patient meets discharge criteria  Monitor procedure site and notify MD of any issues

## 2023-11-27 ENCOUNTER — HOSPITAL ENCOUNTER (OUTPATIENT)
Dept: OCCUPATIONAL THERAPY | Age: 83
Setting detail: THERAPIES SERIES
Discharge: HOME | End: 2023-11-27
Attending: FAMILY MEDICINE
Payer: MEDICARE

## 2023-11-27 ENCOUNTER — HOSPITAL ENCOUNTER (OUTPATIENT)
Dept: PHYSICAL THERAPY | Age: 83
Setting detail: THERAPIES SERIES
Discharge: HOME | End: 2023-11-27
Attending: FAMILY MEDICINE
Payer: MEDICARE

## 2023-11-27 PROCEDURE — 97112 NEUROMUSCULAR REEDUCATION: CPT

## 2023-11-27 PROCEDURE — 97110 THERAPEUTIC EXERCISES: CPT

## 2023-11-27 NOTE — PROGRESS NOTES
[facility]  PHYSICAL THERAPY  [] VESTIBULAR EVALUATION  [x] DAILY NOTE [] PROGRESS NOTE [] DISCHARGE NOTE    [x] [facility]   [] [address] [place] [address]    [] [facility]   [] Latrell Reddy    Date: 2023  Patient Name:  Sang Mars  : 1940  MRN: [id number]  CSN: [id number]    Referring Practitioner Krystal Howard DO   Diagnosis Benign paroxysmal positional vertigo due to bilateral vestibular disorder [H81.13]  Balance problem [R26.89]  Physical deconditioning [R53.81]  At risk for falling [Z91.81]    Treatment Diagnosis Vertigo, impaired balance, BLE weakness, difficulty walking   Date of Evaluation 10/18/23   Additional Pertinent History 3 strokes in , vertigo- went through vestibular rehab in , diabetes, memory issues, skin/lip cancer. Functional Outcome Measure Used [facility]   Functional Outcome Score 84 (10/18/23) 66 (23)      Insurance: Primary: Payor: Lucero Hunter /  /  / ,   Secondary:    Authorization Information: PRE CERTIFICATION REQUIRED: NO -REP WOULD NOT VERIFY CPT CODES, STATED AS LONG AS WE FOLLOW MEDICARE GUIDELINES CLAIMS WILL PAY  INSURANCE THERAPY BENEFIT:  NO LIMIT BASED ON MED NECESSITY  AQUATIC THERAPY COVERED: YES  MODALITIES COVERED:  YES   Visit # 8, 1/10 for progress note   Visits Allowed: unlimited   Recertification Date: 3/86/95   Physician Follow-Up: 24   Physician Orders:    History of Present Illness: Pt presents with dizziness when she lays down, rolls over, and stands up. Pt describes dizziness as eyes going back and forth. Pt fell 2022, fractured tailbone and injured neck but continues to have pain. Pt then fell 3 more times, most recently 23. Pt notes she sleeps a lot. Pt notes swelling in feet and had fluid removed multiple times this year. SUBJECTIVE: Patient reports that she is very tired today.  Patient reports that she felt sick over the weekend and felt

## 2023-11-27 NOTE — PROGRESS NOTES
[address] THERAPY  [] EVALUATION  [x] DAILY NOTE (LAND) [] DAILY NOTE (AQUATIC ) [] PROGRESS NOTE [] DISCHARGE NOTE    [x] [facility] [place]   [] [address] [place] [address]    [] [facility]   [] Alphonso Brandyn    Date: 2023  Patient Name:  Harper Lira  : 1940  MRN: [id number]  CSN: [id number]    Referring Practitioner Ewa Molina MD   Diagnosis Other shoulder lesions, right shoulder [M75.81]    Treatment Diagnosis Right shoulder pain   Date of Evaluation 23      Functional Outcome Measure Used UEFS   Functional Outcome Score  (23)       Insurance: Primary: Payor: Anel Whittaker /  /  / ,   Secondary:    Authorization Information: PRE CERTIFICATION REQUIRED: No - REP WOULD NOT VERIFY CPT CODES, STATED AS LONG AS WE FOLLOW MEDICARE GUIDELINES CLAIMS WILL PAY  INSURANCE THERAPY BENEFIT:  Unlimited based on medical necessity. AQUATIC THERAPY COVERED: Yes. MODALITIES COVERED:  Yes. Approved Procedure Codes: 19242 - Therapeutic Exercise 25186, 51981   Visit # 3, 3/10 for progress note   Visits Allowed: Unlimited based on medical necessity   Recertification Date: 2024   Physician Follow-Up: 2023   Physician Orders: Isidore Punches and treat 2x week x 6 weeks modalities as needed. Pertinent History: Patient states her right shoulder started to hurt her after she fell into a stool on 2023. Patient's  reports patient has fallen about 6 times this year. She states x-rays were negative for fracture. History of cracked rib this year, high blood pressure, vertigo/dizziness, diabetes, stroke, memory issues. SUBJECTIVE: Pt had 5L of fluid drained since she was here last.  LE edema decreased today and pt wore socks and slip on tennis shoes for tx. Pt reports she feels better than she did last week but dizzy today.      TREATMENT   Precautions: fall risk, general Wedge on

## 2023-11-29 ENCOUNTER — NURSE ONLY (OUTPATIENT)
Dept: LAB | Age: 83
End: 2023-11-29

## 2023-11-29 LAB
ALBUMIN SERPL BCG-MCNC: 3.8 G/DL (ref 3.5–5.1)
ALP SERPL-CCNC: 120 U/L (ref 38–126)
ALT SERPL W/O P-5'-P-CCNC: 22 U/L (ref 11–66)
ANION GAP SERPL CALC-SCNC: 11 MEQ/L (ref 8–16)
AST SERPL-CCNC: 41 U/L (ref 5–40)
BILIRUB SERPL-MCNC: 4 MG/DL (ref 0.3–1.2)
BUN SERPL-MCNC: 19 MG/DL (ref 7–22)
CALCIUM SERPL-MCNC: 9.7 MG/DL (ref 8.5–10.5)
CHLORIDE SERPL-SCNC: 95 MEQ/L (ref 98–111)
CO2 SERPL-SCNC: 25 MEQ/L (ref 23–33)
CREAT SERPL-MCNC: 1.1 MG/DL (ref 0.4–1.2)
GFR SERPL CREATININE-BSD FRML MDRD: 50 ML/MIN/1.73M2
GLUCOSE SERPL-MCNC: 112 MG/DL (ref 70–108)
POTASSIUM SERPL-SCNC: 4.1 MEQ/L (ref 3.5–5.2)
PROT SERPL-MCNC: 6.9 G/DL (ref 6.1–8)
SCAN OF BLOOD SMEAR: NORMAL
SODIUM SERPL-SCNC: 131 MEQ/L (ref 135–145)

## 2023-11-30 LAB
ANISOCYTOSIS BLD QL SMEAR: PRESENT
BASOPHILS ABSOLUTE: 0.1 THOU/MM3 (ref 0–0.1)
BASOPHILS NFR BLD AUTO: 0.9 %
BURR CELLS BLD QL SMEAR: ABNORMAL
DEPRECATED RDW RBC AUTO: 79 FL (ref 35–45)
EOSINOPHIL NFR BLD AUTO: 5.9 %
EOSINOPHILS ABSOLUTE: 0.7 THOU/MM3 (ref 0–0.4)
ERYTHROCYTE [DISTWIDTH] IN BLOOD BY AUTOMATED COUNT: 21.5 % (ref 11.5–14.5)
HCT VFR BLD AUTO: 38.1 % (ref 37–47)
HGB BLD-MCNC: 12.5 GM/DL (ref 12–16)
IMM GRANULOCYTES # BLD AUTO: 0.14 THOU/MM3 (ref 0–0.07)
IMM GRANULOCYTES NFR BLD AUTO: 1.2 %
LYMPHOCYTES ABSOLUTE: 1.5 THOU/MM3 (ref 1–4.8)
LYMPHOCYTES NFR BLD AUTO: 12.8 %
MCH RBC QN AUTO: 32.1 PG (ref 26–33)
MCHC RBC AUTO-ENTMCNC: 32.8 GM/DL (ref 32.2–35.5)
MCV RBC AUTO: 97.7 FL (ref 81–99)
MONOCYTES ABSOLUTE: 2.1 THOU/MM3 (ref 0.4–1.3)
MONOCYTES NFR BLD AUTO: 17.8 %
NEUTROPHILS NFR BLD AUTO: 61.4 %
NRBC BLD AUTO-RTO: 1 /100 WBC
PATHOLOGIST REVIEW: ABNORMAL
PLATELET # BLD AUTO: 194 THOU/MM3 (ref 130–400)
PMV BLD AUTO: 10.5 FL (ref 9.4–12.4)
POIKILOCYTES: ABNORMAL
POLYCHROMASIA BLD QL SMEAR: ABNORMAL
RBC # BLD AUTO: 3.9 MILL/MM3 (ref 4.2–5.4)
SCHISTOCYTES BLD QL SMEAR: ABNORMAL
SEGMENTED NEUTROPHILS ABSOLUTE COUNT: 7.1 THOU/MM3 (ref 1.8–7.7)
TARGETS BLD QL SMEAR: ABNORMAL
WBC # BLD AUTO: 11.6 THOU/MM3 (ref 4.8–10.8)

## 2023-12-01 ENCOUNTER — HOSPITAL ENCOUNTER (OUTPATIENT)
Dept: PHYSICAL THERAPY | Age: 83
Setting detail: THERAPIES SERIES
Discharge: HOME | End: 2023-12-01
Attending: FAMILY MEDICINE
Payer: MEDICARE

## 2023-12-01 ENCOUNTER — HOSPITAL ENCOUNTER (OUTPATIENT)
Dept: OCCUPATIONAL THERAPY | Age: 83
Setting detail: THERAPIES SERIES
Discharge: HOME | End: 2023-12-01
Attending: FAMILY MEDICINE
Payer: MEDICARE

## 2023-12-01 PROCEDURE — 97110 THERAPEUTIC EXERCISES: CPT

## 2023-12-01 NOTE — PROGRESS NOTES
and  HR slightly elevated at 100 BPM while sitting for a rest. Discussed with pt that if she is not feeling well it may be best for her body to rest. Pt states \" I think I need to go home\". Discussed monitoring O2 sats at home, and if she feels any worse give her PCP a call. Session ended early and pt was assisted out of therapy gym by her . GOALS:  Patient Goal: Decrease fall risk and get rid of dizziness    Short Term Goals:  Time Frame: 6 weeks  Patient will complete 5 sit to/from stand transfers from standard chair with UEs in 35 seconds to improve functional strength for walking. Patient will improve TUG score from 25 seconds to 20 seconds to decrease fall risk. Long Term Goals:  Time Frame: 12 weeks  Patient will reduce Dizziness Handicap Inventory score from 84/100 to 50/100 to tolerate bending over, quick head movements, and getting in/out of bed. Patient will pass 4/4 tests on mCTSIB to improve balance when walking. Patient will tolerate x1 viewing standing with feet together to improve vestibular function and balance. Patient will be independent and compliant with HEP daily to achieve above goals. Patient Education:   [x]  HEP/Education Completed: Monitor O2 sats at home. Call PCP if she is feeling any worse  [facility] Access Code: Perry Dee  []  No new Education completed  []  Reviewed Prior HEP      [x]  Patient verbalized and/or demonstrated understanding of education provided. []  Patient unable to verbalize and/or demonstrate understanding of education provided. Will continue education. [x]  Barriers to learning: memory issues, needs handouts    PLAN:  Treatment Recommendations: Strengthening, Balance Training, Functional Mobility Training, Transfer Training, Gait Training, Stair Training, Neuromuscular Re-education, Home Exercise Program, Patient Education, and Vestibular Rehabilitation    []  Plan of care initiated.   Plan to see patient 2 times per week for 12 weeks

## 2023-12-01 NOTE — PROGRESS NOTES
- 10 reps - 3 hold  - Supine Shoulder Flexion with Dowel  - 2 x daily - 7 x weekly - 1 sets - 10 reps - 3 hold  - Supine Shoulder External Rotation in 45 Degrees Abduction AAROM with Dowel  - 2 x daily - 7 x weekly - 1 sets - 10 reps - 3 hold  []  No new Education completed  [x]  Reviewed Prior HEP      []  Patient verbalized and/or demonstrated understanding of education provided. []  Patient unable to and/or demonstrate understanding of education provided. Will continue education. [x]  Barriers to learning: occasional repeated directions needed. PLAN:  Treatment Recommendations: Strengthening, Range of Motion, Manual Therapy - Soft Tissue Mobilization, Manual Therapy - Joint Manipulation, Home Exercise Program, and Modalities    []  Plan of care initiated. Plan to see patient 2 times per week for 10 weeks to address the treatment planned outlined above. [x]  Continue with current plan of care  []  Modify plan of care as follows:    []  Hold pending physician visit  []  Discharge    Time In 0908   Time Out 0949   Timed Code Minutes: 41 min   Total Treatment Time: 41 min       Electronically Signed by:  Kaela MELTON #[id number]

## 2023-12-04 ENCOUNTER — HOSPITAL ENCOUNTER (OUTPATIENT)
Dept: OCCUPATIONAL THERAPY | Age: 83
Setting detail: THERAPIES SERIES
Discharge: HOME | End: 2023-12-04
Attending: FAMILY MEDICINE
Payer: MEDICARE

## 2023-12-04 ENCOUNTER — HOSPITAL ENCOUNTER (OUTPATIENT)
Dept: PHYSICAL THERAPY | Age: 83
Setting detail: THERAPIES SERIES
Discharge: HOME | End: 2023-12-04
Attending: FAMILY MEDICINE
Payer: MEDICARE

## 2023-12-04 PROCEDURE — 97110 THERAPEUTIC EXERCISES: CPT

## 2023-12-04 PROCEDURE — 97140 MANUAL THERAPY 1/> REGIONS: CPT

## 2023-12-04 NOTE — PROGRESS NOTES
[facility]  PHYSICAL THERAPY  [] VESTIBULAR EVALUATION  [x] DAILY NOTE [] PROGRESS NOTE [] DISCHARGE NOTE    [x] [facility]   [] [address] [place] [address]    [] [facility]   [] Manjit Billings    Date: 2023  Patient Name:  Jesus Lema  : 1940  MRN: [id number]  CSN: [id number]    Referring Practitioner Elwyn Moritz, DO   Diagnosis Benign paroxysmal positional vertigo due to bilateral vestibular disorder [H81.13]  Balance problem [R26.89]  Physical deconditioning [R53.81]  At risk for falling [Z91.81]    Treatment Diagnosis Vertigo, impaired balance, BLE weakness, difficulty walking   Date of Evaluation 10/18/23   Additional Pertinent History 3 strokes in , vertigo- went through vestibular rehab in , diabetes, memory issues, skin/lip cancer. Functional Outcome Measure Used [facility]   Functional Outcome Score 84 (10/18/23) 66 (23)      Insurance: Primary: Payor: Laisha Marker /  /  / ,   Secondary:    Authorization Information: PRE CERTIFICATION REQUIRED: NO -REP WOULD NOT VERIFY CPT CODES, STATED AS LONG AS WE FOLLOW MEDICARE GUIDELINES CLAIMS WILL PAY  INSURANCE THERAPY BENEFIT:  NO LIMIT BASED ON MED NECESSITY  AQUATIC THERAPY COVERED: YES  MODALITIES COVERED:  YES   Visit # 9, 2/10 for progress note   Visits Allowed: unlimited   Recertification Date:    Physician Follow-Up: 24   Physician Orders:    History of Present Illness: Pt presents with dizziness when she lays down, rolls over, and stands up. Pt describes dizziness as eyes going back and forth. Pt fell 2022, fractured tailbone and injured neck but continues to have pain. Pt then fell 3 more times, most recently 23. Pt notes she sleeps a lot. Pt notes swelling in feet and had fluid removed multiple times this year. SUBJECTIVE: Pt reports her calves have been making it difficult with walking for the last few weeks.

## 2023-12-06 ENCOUNTER — HOSPITAL ENCOUNTER (OUTPATIENT)
Dept: OCCUPATIONAL THERAPY | Age: 83
Setting detail: THERAPIES SERIES
Discharge: HOME | End: 2023-12-06
Attending: FAMILY MEDICINE
Payer: MEDICARE

## 2023-12-06 ENCOUNTER — HOSPITAL ENCOUNTER (OUTPATIENT)
Dept: PHYSICAL THERAPY | Age: 83
Setting detail: THERAPIES SERIES
Discharge: HOME | End: 2023-12-06
Attending: FAMILY MEDICINE
Payer: MEDICARE

## 2023-12-06 PROCEDURE — 97112 NEUROMUSCULAR REEDUCATION: CPT

## 2023-12-06 PROCEDURE — 97140 MANUAL THERAPY 1/> REGIONS: CPT

## 2023-12-06 PROCEDURE — 97110 THERAPEUTIC EXERCISES: CPT

## 2023-12-06 NOTE — PROGRESS NOTES
[address] [facility] THERAPY  [] EVALUATION  [x] DAILY NOTE (LAND) [] DAILY NOTE (AQUATIC ) [] PROGRESS NOTE [] DISCHARGE NOTE    [x] [facility] [place]   [] [address] [place] [address]    [] [facility]   [] [facility]    Date: 2023  Patient Name:  Fidel Duarte  : 1940  MRN: [id number]  CSN: [id number]    Referring Practitioner Cally Archibald MD   Diagnosis Other shoulder lesions, right shoulder [M75.81]    Treatment Diagnosis Right shoulder pain   Date of Evaluation 23      Functional Outcome Measure Used UEFS   Functional Outcome Score  (23)       Insurance: Primary: Payor: Aristides Henderson /  /  / ,   Secondary:    Authorization Information: PRE CERTIFICATION REQUIRED: No - REP WOULD NOT VERIFY CPT CODES, STATED AS LONG AS WE FOLLOW MEDICARE GUIDELINES CLAIMS WILL PAY  INSURANCE THERAPY BENEFIT:  Unlimited based on medical necessity. AQUATIC THERAPY COVERED: Yes. MODALITIES COVERED:  Yes. Approved Procedure Codes: 52946 - Therapeutic Exercise 57148, 98651   Visit # 6, 6/10 for progress note   Visits Allowed: Unlimited based on medical necessity   Recertification Date: 2024   Physician Follow-Up: 2023   Physician Orders: [employer] and treat 2x week x 6 weeks modalities as needed. Pertinent History: Patient states her right shoulder started to hurt her after she fell into a stool on 2023. Patient's  reports patient has fallen about 6 times this year. She states x-rays were negative for fracture. History of cracked rib this year, high blood pressure, vertigo/dizziness, diabetes, stroke, memory issues. SUBJECTIVE: Patient reports her shoulder is feeling better today and reports her pain as \"very little\". Reports she would be doing okay besides her feet and calves are causing her more trouble.      TREATMENT   Precautions: fall risk, general Wedge on bed   Pain: [employer]

## 2023-12-11 ENCOUNTER — HOSPITAL ENCOUNTER (OUTPATIENT)
Dept: OCCUPATIONAL THERAPY | Age: 83
Setting detail: THERAPIES SERIES
Discharge: HOME | End: 2023-12-11
Attending: FAMILY MEDICINE
Payer: MEDICARE

## 2023-12-11 ENCOUNTER — HOSPITAL ENCOUNTER (OUTPATIENT)
Dept: PHYSICAL THERAPY | Age: 83
Setting detail: THERAPIES SERIES
Discharge: HOME | End: 2023-12-11
Attending: FAMILY MEDICINE
Payer: MEDICARE

## 2023-12-11 PROCEDURE — 97110 THERAPEUTIC EXERCISES: CPT

## 2023-12-11 NOTE — PROGRESS NOTES
[facility]  PHYSICAL THERAPY  [] VESTIBULAR EVALUATION  [x] DAILY NOTE [] PROGRESS NOTE [] DISCHARGE NOTE    [x] [facility] [place]   [] [address] [place] [address]    [] [facility]   [] Joseph Hdez    Date: 2023  Patient Name:  Bello Rivas  : 1940  MRN: [id number]  CSN: [id number]    Referring Practitioner Ailyn Diaz DO   Diagnosis Benign paroxysmal positional vertigo due to bilateral vestibular disorder [H81.13]  Balance problem [R26.89]  Physical deconditioning [R53.81]  At risk for falling [Z91.81]    Treatment Diagnosis Vertigo, impaired balance, BLE weakness, difficulty walking   Date of Evaluation 10/18/23   Additional Pertinent History 3 strokes in , vertigo- went through vestibular rehab in , diabetes, memory issues, skin/lip cancer. Functional Outcome Measure Used [facility]   Functional Outcome Score 84 (10/18/23) 66 (23)      Insurance: Primary: Payor: Eric Ear /  /  / ,   Secondary:    Authorization Information: PRE CERTIFICATION REQUIRED: NO -REP WOULD NOT VERIFY CPT CODES, STATED AS LONG AS WE FOLLOW MEDICARE GUIDELINES CLAIMS WILL PAY  INSURANCE THERAPY BENEFIT:  NO LIMIT BASED ON MED NECESSITY  AQUATIC THERAPY COVERED: YES  MODALITIES COVERED:  YES   Visit # 12, /10 for progress note   Visits Allowed: unlimited   Recertification Date: 38   Physician Follow-Up: 24   Physician Orders:    History of Present Illness: Pt presents with dizziness when she lays down, rolls over, and stands up. Pt describes dizziness as eyes going back and forth. Pt fell 2022, fractured tailbone and injured neck but continues to have pain. Pt then fell 3 more times, most recently 23. Pt notes she sleeps a lot. Pt notes swelling in feet and had fluid removed multiple times this year. SUBJECTIVE: Pt reports significant swelling upon arrival. Rates pain 10/10, denies need for ED.  States she has doctors

## 2023-12-12 ENCOUNTER — HOSPITAL ENCOUNTER (OUTPATIENT)
Dept: ULTRASOUND IMAGING | Age: 83
Discharge: HOME | End: 2023-12-12
Payer: MEDICARE

## 2023-12-12 ENCOUNTER — HOSPITAL ENCOUNTER (OUTPATIENT)
Dept: NURSING | Age: 83
Discharge: HOME | End: 2023-12-12
Payer: MEDICARE

## 2023-12-12 VITALS
OXYGEN SATURATION: 93 % | DIASTOLIC BLOOD PRESSURE: 54 MMHG | SYSTOLIC BLOOD PRESSURE: 108 MMHG | HEART RATE: 93 BPM | RESPIRATION RATE: 20 BRPM | TEMPERATURE: 97.6 F

## 2023-12-12 DIAGNOSIS — K74.5 BILIARY CIRRHOSIS (HCC): ICD-10-CM

## 2023-12-12 LAB
CHARACTER, BODY FLUID: NORMAL
COLOR FLD: YELLOW
GRANULOCYTES NFR FLD AUTO: 6.5 %
MONONUC CELLS NFR FLD AUTO: 93.5 %
NUC CELL # FLD AUTO: 39 /CUMM (ref 0–500)
PATHOLOGIST REVIEW: NORMAL
RBC # FLD AUTO: < 2000 /CUMM
SPECIMEN: NORMAL
TOTAL VOLUME RECEIVED BODY FLUID: 70 ML

## 2023-12-12 PROCEDURE — 96365 THER/PROPH/DIAG IV INF INIT: CPT

## 2023-12-12 PROCEDURE — 6360000002 HC RX W HCPCS: Performed by: INTERNAL MEDICINE

## 2023-12-12 PROCEDURE — 89050 BODY FLUID CELL COUNT: CPT

## 2023-12-12 PROCEDURE — 49083 ABD PARACENTESIS W/IMAGING: CPT

## 2023-12-12 PROCEDURE — 87205 SMEAR GRAM STAIN: CPT

## 2023-12-12 PROCEDURE — 87070 CULTURE OTHR SPECIMN AEROBIC: CPT

## 2023-12-12 PROCEDURE — 87075 CULTR BACTERIA EXCEPT BLOOD: CPT

## 2023-12-12 PROCEDURE — 88108 CYTOPATH CONCENTRATE TECH: CPT

## 2023-12-12 PROCEDURE — 96366 THER/PROPH/DIAG IV INF ADDON: CPT

## 2023-12-12 PROCEDURE — P9047 ALBUMIN (HUMAN), 25%, 50ML: HCPCS | Performed by: INTERNAL MEDICINE

## 2023-12-12 RX ORDER — ALBUMIN (HUMAN) 12.5 G/50ML
25 SOLUTION INTRAVENOUS ONCE
Status: COMPLETED | OUTPATIENT
Start: 2023-12-12 | End: 2023-12-12

## 2023-12-12 RX ADMIN — ALBUMIN (HUMAN) 25 G: 0.25 INJECTION, SOLUTION INTRAVENOUS at 10:59

## 2023-12-12 RX ADMIN — ALBUMIN (HUMAN) 25 G: 0.25 INJECTION, SOLUTION INTRAVENOUS at 10:12

## 2023-12-12 ASSESSMENT — PAIN DESCRIPTION - LOCATION: LOCATION: ANKLE

## 2023-12-12 ASSESSMENT — PAIN DESCRIPTION - DESCRIPTORS: DESCRIPTORS: ACHING

## 2023-12-12 ASSESSMENT — PAIN SCALES - GENERAL: PAINLEVEL_OUTOF10: 5

## 2023-12-12 ASSESSMENT — PAIN DESCRIPTION - ORIENTATION: ORIENTATION: RIGHT;LEFT

## 2023-12-12 NOTE — PROGRESS NOTES
1005:  ARRIVES  VIA WC IN CARE OF , POST PARACENTESIS FOR IV ALBUMIN. PT ASSISTED TO CART. PLAN OF CARE REVIEWED.  1100:  INFUSION CONTINUES.  SNACK OFFERED.   1215:  TOLERATED INFUSION WELL AND PT DISCHARGED VIA WC IN CARE OF .    _M___ Safety:       (Environmental)  [place] to environment  Ensure ID band is correct and in place/ allergy band as needed  Assess for fall risk  Initiate fall precautions as applicable (fall band, side rails, etc.)  Call light within reach  Bed in low position/ wheels locked    _M___ Pain:       Assess pain level and characteristics  Administer analgesics as ordered  Assess effectiveness of pain management and report to MD as needed    _M___ Knowledge Deficit:  Assess baseline knowledge  Provide teaching at level of understanding  Provide teaching via preferred learning method  Evaluate teaching effectiveness    _M___ Hemodynamic/Respiratory Status:       (Pre and Post Procedure Monitoring)  Assess/Monitor vital signs and LOC  Assess Baseline SpO2 prior to any sedation  Obtain weight/height  Assess vital signs/ LOC until patient meets discharge criteria  Monitor procedure site and notify MD of any issues    _

## 2023-12-14 ENCOUNTER — HOSPITAL ENCOUNTER (OUTPATIENT)
Dept: PHYSICAL THERAPY | Age: 83
Setting detail: THERAPIES SERIES
Discharge: HOME | End: 2023-12-14
Attending: FAMILY MEDICINE
Payer: MEDICARE

## 2023-12-14 ENCOUNTER — HOSPITAL ENCOUNTER (OUTPATIENT)
Dept: OCCUPATIONAL THERAPY | Age: 83
Setting detail: THERAPIES SERIES
Discharge: HOME | End: 2023-12-14
Attending: FAMILY MEDICINE
Payer: MEDICARE

## 2023-12-14 PROCEDURE — 97110 THERAPEUTIC EXERCISES: CPT

## 2023-12-14 NOTE — DISCHARGE SUMMARY
[facility]  PHYSICAL THERAPY  [] VESTIBULAR EVALUATION  [] DAILY NOTE [] PROGRESS NOTE [x] DISCHARGE NOTE    [x] [facility] [place]   [] [address] [place] [address]    [] [facility]   [] Tory Haq    Date: 2023  Patient Name:  Cherelle Kumar  : 1940  MRN: [id number]  CSN: [id number]    Referring Practitioner Tyesha Gaines DO   Diagnosis Benign paroxysmal positional vertigo due to bilateral vestibular disorder [H81.13]  Balance problem [R26.89]  Physical deconditioning [R53.81]  At risk for falling [Z91.81]    Treatment Diagnosis Vertigo, impaired balance, BLE weakness, difficulty walking   Date of Evaluation 10/18/23   Additional Pertinent History 3 strokes in , vertigo- went through vestibular rehab in , diabetes, memory issues, skin/lip cancer. Functional Outcome Measure Used [facility]   Functional Outcome Score 84 (10/18/23) 66 (23) 38 (23)      Insurance: Primary: Payor: John Tiwari /  /  / ,   Secondary:    Authorization Information: PRE CERTIFICATION REQUIRED: NO -REP WOULD NOT VERIFY CPT CODES, STATED AS LONG AS WE FOLLOW MEDICARE GUIDELINES CLAIMS WILL PAY  INSURANCE THERAPY BENEFIT:  NO LIMIT BASED ON MED NECESSITY  AQUATIC THERAPY COVERED: YES  MODALITIES COVERED:  YES   Visit # 15, 5/10 for progress note   Visits Allowed: unlimited   Recertification Date:    Physician Follow-Up: 24   Physician Orders:    History of Present Illness: Pt presents with dizziness when she lays down, rolls over, and stands up. Pt describes dizziness as eyes going back and forth. Pt fell 2022, fractured tailbone and injured neck but continues to have pain. Pt then fell 3 more times, most recently 23. Pt notes she sleeps a lot. Pt notes swelling in feet and had fluid removed multiple times this year.       SUBJECTIVE: Pt had 5L of fluid drained earlier this week but legs continue to be swollen d/t liver

## 2023-12-17 LAB
BACTERIA SPEC ANAEROBE CULT: NORMAL
BACTERIA SPEC BFLD CULT: NORMAL
GRAM STN SPEC: NORMAL

## 2023-12-21 ENCOUNTER — HOSPITAL ENCOUNTER (OUTPATIENT)
Dept: NURSING | Age: 83
Discharge: HOME | End: 2023-12-21
Payer: MEDICARE

## 2023-12-21 VITALS
SYSTOLIC BLOOD PRESSURE: 111 MMHG | TEMPERATURE: 97 F | HEART RATE: 93 BPM | OXYGEN SATURATION: 94 % | RESPIRATION RATE: 16 BRPM | DIASTOLIC BLOOD PRESSURE: 53 MMHG

## 2023-12-21 PROCEDURE — 6360000002 HC RX W HCPCS: Performed by: INTERNAL MEDICINE

## 2023-12-21 PROCEDURE — 96366 THER/PROPH/DIAG IV INF ADDON: CPT

## 2023-12-21 PROCEDURE — 96365 THER/PROPH/DIAG IV INF INIT: CPT

## 2023-12-21 PROCEDURE — P9047 ALBUMIN (HUMAN), 25%, 50ML: HCPCS | Performed by: INTERNAL MEDICINE

## 2023-12-21 RX ORDER — ALBUMIN (HUMAN) 12.5 G/50ML
25 SOLUTION INTRAVENOUS ONCE
Status: COMPLETED | OUTPATIENT
Start: 2023-12-21 | End: 2023-12-21

## 2023-12-21 RX ADMIN — ALBUMIN (HUMAN) 25 G: 0.25 INJECTION, SOLUTION INTRAVENOUS at 12:30

## 2023-12-21 RX ADMIN — ALBUMIN (HUMAN) 25 G: 0.25 INJECTION, SOLUTION INTRAVENOUS at 11:40

## 2023-12-21 NOTE — PROGRESS NOTES
1130- Patient arrived to [facility] via wheelchair with  for Albumin infusion post paracentesis. Patient states 5L was removed today. Patient denies changes since last infusion. Vitals stable. IV inserted. Call light placed within reach. PT RIGHTS AND RESPONSIBILITIES OFFERED TO PT.    1140- Infusion started. Patient provided with drink and snack. Patient resting and denies needs. 1230- Second infusion started. Patient resting and denies needs.            __M__ Safety:       (Environmental)  [place] to environment  Ensure ID band is correct and in place/ allergy band as needed  Assess for fall risk  Initiate fall precautions as applicable (fall band, side rails, etc.)  Call light within reach  Bed in low position/ wheels locked    __M__ Pain:       Assess pain level and characteristics  Administer analgesics as ordered  Assess effectiveness of pain management and report to MD as needed    __M__ Knowledge Deficit:  Assess baseline knowledge  Provide teaching at level of understanding  Provide teaching via preferred learning method  Evaluate teaching effectiveness    __M__ Hemodynamic/Respiratory Status:       (Pre and Post Procedure Monitoring)  Assess/Monitor vital signs and LOC  Assess Baseline SpO2 prior to any sedation  Obtain weight/height  Assess vital signs/ LOC until patient meets discharge criteria  Monitor procedure site and notify MD of any issues

## 2023-12-28 ENCOUNTER — HOSPITAL ENCOUNTER (OUTPATIENT)
Dept: NURSING | Age: 83
Discharge: HOME | End: 2023-12-28
Payer: MEDICARE

## 2023-12-28 ENCOUNTER — HOSPITAL ENCOUNTER (OUTPATIENT)
Dept: ULTRASOUND IMAGING | Age: 83
Discharge: HOME | End: 2023-12-28
Payer: MEDICARE

## 2023-12-28 VITALS
SYSTOLIC BLOOD PRESSURE: 96 MMHG | HEART RATE: 101 BPM | OXYGEN SATURATION: 92 % | RESPIRATION RATE: 18 BRPM | TEMPERATURE: 98.2 F | DIASTOLIC BLOOD PRESSURE: 54 MMHG

## 2023-12-28 DIAGNOSIS — R18.8 OTHER ASCITES: ICD-10-CM

## 2023-12-28 PROCEDURE — 49083 ABD PARACENTESIS W/IMAGING: CPT

## 2023-12-28 PROCEDURE — 96366 THER/PROPH/DIAG IV INF ADDON: CPT

## 2023-12-28 PROCEDURE — P9047 ALBUMIN (HUMAN), 25%, 50ML: HCPCS | Performed by: INTERNAL MEDICINE

## 2023-12-28 PROCEDURE — 96365 THER/PROPH/DIAG IV INF INIT: CPT

## 2023-12-28 PROCEDURE — 6360000002 HC RX W HCPCS: Performed by: INTERNAL MEDICINE

## 2023-12-28 RX ORDER — ALBUMIN (HUMAN) 12.5 G/50ML
25 SOLUTION INTRAVENOUS ONCE
Status: COMPLETED | OUTPATIENT
Start: 2023-12-28 | End: 2023-12-28

## 2023-12-28 RX ADMIN — ALBUMIN (HUMAN) 25 G: 0.25 INJECTION, SOLUTION INTRAVENOUS at 13:18

## 2023-12-28 RX ADMIN — ALBUMIN (HUMAN) 25 G: 0.25 INJECTION, SOLUTION INTRAVENOUS at 12:31

## 2023-12-28 ASSESSMENT — PAIN SCALES - GENERAL: PAINLEVEL_OUTOF10: 5

## 2023-12-28 ASSESSMENT — PAIN DESCRIPTION - ORIENTATION: ORIENTATION: RIGHT;LEFT

## 2023-12-28 ASSESSMENT — PAIN DESCRIPTION - LOCATION: LOCATION: LEG

## 2023-12-28 NOTE — H&P
Formulation and discussion of sedation / procedure plans, risks, benefits, side effects and alternatives with patient and/or responsible adult completed. History and Physical reviewed and unchanged.     Electronically signed by Mohinder Alvares MD on 12/28/23 at 11:13 AM EST

## 2023-12-28 NOTE — PROGRESS NOTES
1220 Patient in wheelchair to [facility]N for IV Albumin infusion. Patient states she got 5 Liters removed during her paracentesis today. PT RIGHTS AND RESPONSIBILITIES OFFERED TO PT.    1231 Albumin infusion started. 1430 Infusion complete. Patient tolerated well. AVS reviewed with patient. Verbalizes understanding. Patient left in wheelchair to discharge lobby.          _M___ Safety:       (Environmental)  [place] to environment  Ensure ID band is correct and in place/ allergy band as needed  Assess for fall risk  Initiate fall precautions as applicable (fall band, side rails, etc.)  Call light within reach  Bed in low position/ wheels locked    _M___ Pain:       Assess pain level and characteristics  Administer analgesics as ordered  Assess effectiveness of pain management and report to MD as needed    __M__ Knowledge Deficit:  Assess baseline knowledge  Provide teaching at level of understanding  Provide teaching via preferred learning method  Evaluate teaching effectiveness    _M___ Hemodynamic/Respiratory Status:       (Pre and Post Procedure Monitoring)  Assess/Monitor vital signs and LOC  Assess Baseline SpO2 prior to any sedation  Obtain weight/height  Assess vital signs/ LOC until patient meets discharge criteria  Monitor procedure site and notify MD of any issues

## 2023-12-28 NOTE — OP NOTE
Pre-Procedure Diagnosis: Ascites    Procedure Performed:  Paracentesis    Anesthesia: local    Findings: cloudy  yellow serous fluid obtained. Immediate Complications:  None    Estimated Blood Loss: minimal    SEE DICTATED PROCEDURE NOTE FOR COMPLETE DETAILS.     Ivelisse Israel MD   12/28/2023 11:30 AM

## 2024-01-04 ENCOUNTER — HOSPITAL ENCOUNTER (OUTPATIENT)
Dept: NURSING | Age: 84
Discharge: HOME | End: 2024-01-04
Payer: MEDICARE

## 2024-01-04 ENCOUNTER — HOSPITAL ENCOUNTER (OUTPATIENT)
Dept: ULTRASOUND IMAGING | Age: 84
Discharge: HOME | End: 2024-01-04
Payer: MEDICARE

## 2024-01-04 VITALS
OXYGEN SATURATION: 95 % | RESPIRATION RATE: 18 BRPM | HEART RATE: 104 BPM | TEMPERATURE: 98.2 F | DIASTOLIC BLOOD PRESSURE: 57 MMHG | SYSTOLIC BLOOD PRESSURE: 126 MMHG

## 2024-01-04 DIAGNOSIS — R18.8 OTHER ASCITES: ICD-10-CM

## 2024-01-04 PROCEDURE — P9047 ALBUMIN (HUMAN), 25%, 50ML: HCPCS | Performed by: INTERNAL MEDICINE

## 2024-01-04 PROCEDURE — 96365 THER/PROPH/DIAG IV INF INIT: CPT

## 2024-01-04 PROCEDURE — 6360000002 HC RX W HCPCS: Performed by: INTERNAL MEDICINE

## 2024-01-04 PROCEDURE — 96366 THER/PROPH/DIAG IV INF ADDON: CPT

## 2024-01-04 PROCEDURE — 49083 ABD PARACENTESIS W/IMAGING: CPT

## 2024-01-04 RX ORDER — ALBUMIN (HUMAN) 12.5 G/50ML
25 SOLUTION INTRAVENOUS ONCE
Status: COMPLETED | OUTPATIENT
Start: 2024-01-04 | End: 2024-01-04

## 2024-01-04 RX ADMIN — ALBUMIN (HUMAN) 25 G: 0.25 INJECTION, SOLUTION INTRAVENOUS at 11:19

## 2024-01-04 RX ADMIN — ALBUMIN (HUMAN) 25 G: 0.25 INJECTION, SOLUTION INTRAVENOUS at 12:08

## 2024-01-04 ASSESSMENT — PAIN - FUNCTIONAL ASSESSMENT: PAIN_FUNCTIONAL_ASSESSMENT: NONE - DENIES PAIN

## 2024-01-04 NOTE — PROGRESS NOTES
_m___ Safety:       (Environmental)  [place] to environment  Ensure ID band is correct and in place/ allergy band as needed  Assess for fall risk  Initiate fall precautions as applicable (fall band, side rails, etc.)  Call light within reach  Bed in low position/ wheels locked    ___m_ Pain:       Assess pain level and characteristics  Administer analgesics as ordered  Assess effectiveness of pain management and report to MD as needed    ___m_ Knowledge Deficit:  Assess baseline knowledge  Provide teaching at level of understanding  Provide teaching via preferred learning method  Evaluate teaching effectiveness    ___m_ Hemodynamic/Respiratory Status:       (Pre and Post Procedure Monitoring)  Assess/Monitor vital signs and LOC  Assess Baseline SpO2 prior to any sedation  Obtain weight/height  Assess vital signs/ LOC until patient meets discharge criteria  Monitor procedure site and notify MD of any issues

## 2024-01-04 NOTE — DISCHARGE INSTRUCTIONS
ACTIVITY:  Continue usual care with your doctor. Call your doctor immediately if any severe problems or go to the nearest emergency room.      I have been treated and hereby acknowledge receiving this instruction sheet.

## 2024-01-04 NOTE — PROGRESS NOTES
Patient being discharged in stable condition. Written and verbal instructions given to patient and family, they voice no questions or concerns.

## 2024-01-08 ENCOUNTER — APPOINTMENT (OUTPATIENT)
Dept: GENERAL RADIOLOGY | Age: 84
End: 2024-01-08
Payer: MEDICARE

## 2024-01-08 ENCOUNTER — HOSPITAL ENCOUNTER (INPATIENT)
Age: 84
LOS: 4 days | Discharge: NURSING, REHAB OR LONG-TERM CARE | End: 2024-01-12
Attending: EMERGENCY MEDICINE | Admitting: STUDENT IN AN ORGANIZED HEALTH CARE EDUCATION/TRAINING PROGRAM
Payer: MEDICARE

## 2024-01-08 ENCOUNTER — OFFICE VISIT (OUTPATIENT)
Dept: FAMILY MEDICINE CLINIC | Age: 84
End: 2024-01-08
Payer: MEDICARE

## 2024-01-08 VITALS
DIASTOLIC BLOOD PRESSURE: 70 MMHG | SYSTOLIC BLOOD PRESSURE: 132 MMHG | TEMPERATURE: 97.6 F | RESPIRATION RATE: 18 BRPM | WEIGHT: 165 LBS | BODY MASS INDEX: 29.23 KG/M2 | OXYGEN SATURATION: 98 % | HEIGHT: 63 IN | HEART RATE: 100 BPM

## 2024-01-08 DIAGNOSIS — R06.09 DOE (DYSPNEA ON EXERTION): ICD-10-CM

## 2024-01-08 DIAGNOSIS — R18.8 CIRRHOSIS OF LIVER WITH ASCITES, UNSPECIFIED HEPATIC CIRRHOSIS TYPE (HCC): ICD-10-CM

## 2024-01-08 DIAGNOSIS — K74.60 CIRRHOSIS OF LIVER WITH ASCITES, UNSPECIFIED HEPATIC CIRRHOSIS TYPE (HCC): ICD-10-CM

## 2024-01-08 DIAGNOSIS — R18.8 CIRRHOSIS OF LIVER WITH ASCITES, UNSPECIFIED HEPATIC CIRRHOSIS TYPE (HCC): Primary | ICD-10-CM

## 2024-01-08 DIAGNOSIS — K74.69 OTHER CIRRHOSIS OF LIVER (HCC): ICD-10-CM

## 2024-01-08 DIAGNOSIS — E87.70 HYPERVOLEMIA, UNSPECIFIED HYPERVOLEMIA TYPE: Primary | ICD-10-CM

## 2024-01-08 DIAGNOSIS — E87.79 OTHER HYPERVOLEMIA: ICD-10-CM

## 2024-01-08 DIAGNOSIS — K74.60 CIRRHOSIS OF LIVER WITH ASCITES, UNSPECIFIED HEPATIC CIRRHOSIS TYPE (HCC): Primary | ICD-10-CM

## 2024-01-08 DIAGNOSIS — M79.89 SWELLING OF BOTH LOWER EXTREMITIES: ICD-10-CM

## 2024-01-08 LAB
ALBUMIN SERPL BCG-MCNC: 3.6 G/DL (ref 3.5–5.1)
ALP SERPL-CCNC: 106 U/L (ref 38–126)
ALT SERPL W/O P-5'-P-CCNC: 21 U/L (ref 11–66)
AMMONIA PLAS-MCNC: 39 UMOL/L (ref 11–60)
ANION GAP SERPL CALC-SCNC: 12 MEQ/L (ref 8–16)
AST SERPL-CCNC: 34 U/L (ref 5–40)
BILIRUB SERPL-MCNC: 3.4 MG/DL (ref 0.3–1.2)
BILIRUB UR QL STRIP.AUTO: NEGATIVE
BUN SERPL-MCNC: 28 MG/DL (ref 7–22)
CALCIUM SERPL-MCNC: 9.6 MG/DL (ref 8.5–10.5)
CHARACTER UR: CLEAR
CHLORIDE SERPL-SCNC: 97 MEQ/L (ref 98–111)
CO2 SERPL-SCNC: 21 MEQ/L (ref 23–33)
COLOR: ABNORMAL
CREAT SERPL-MCNC: 1.4 MG/DL (ref 0.4–1.2)
CREAT UR-MCNC: 87.4 MG/DL
GFR SERPL CREATININE-BSD FRML MDRD: 37 ML/MIN/1.73M2
GLUCOSE SERPL-MCNC: 113 MG/DL (ref 70–108)
GLUCOSE UR QL STRIP.AUTO: NEGATIVE MG/DL
HGB UR QL STRIP.AUTO: NEGATIVE
INR PPP: 1.51 (ref 0.85–1.13)
KETONES UR QL STRIP.AUTO: NEGATIVE
NITRITE UR QL STRIP: NEGATIVE
NT-PROBNP SERPL IA-MCNC: 418.8 PG/ML (ref 0–449)
OSMOLALITY SERPL CALC.SUM OF ELEC: 267.1 MOSMOL/KG (ref 275–300)
OSMOLALITY UR: 377 MOSMOL/KG (ref 250–750)
PH UR STRIP.AUTO: 5.5 [PH] (ref 5–9)
POTASSIUM SERPL-SCNC: 4.4 MEQ/L (ref 3.5–5.2)
PROT SERPL-MCNC: 5.9 G/DL (ref 6.1–8)
PROT UR STRIP.AUTO-MCNC: NEGATIVE MG/DL
SCAN OF BLOOD SMEAR: NORMAL
SODIUM SERPL-SCNC: 130 MEQ/L (ref 135–145)
SODIUM UR-SCNC: < 20 MEQ/L
SP GR UR REFRACT.AUTO: 1.01 (ref 1–1.03)
TROPONIN, HIGH SENSITIVITY: 35 NG/L (ref 0–12)
UROBILINOGEN, URINE: 1 EU/DL (ref 0–1)
UUN 24H UR-MCNC: 559 MG/DL
WBC #/AREA URNS HPF: NEGATIVE /[HPF]

## 2024-01-08 PROCEDURE — 2580000003 HC RX 258: Performed by: EMERGENCY MEDICINE

## 2024-01-08 PROCEDURE — 87641 MR-STAPH DNA AMP PROBE: CPT

## 2024-01-08 PROCEDURE — 71045 X-RAY EXAM CHEST 1 VIEW: CPT

## 2024-01-08 PROCEDURE — 85610 PROTHROMBIN TIME: CPT

## 2024-01-08 PROCEDURE — 99223 1ST HOSP IP/OBS HIGH 75: CPT | Performed by: STUDENT IN AN ORGANIZED HEALTH CARE EDUCATION/TRAINING PROGRAM

## 2024-01-08 PROCEDURE — 81003 URINALYSIS AUTO W/O SCOPE: CPT

## 2024-01-08 PROCEDURE — 84540 ASSAY OF URINE/UREA-N: CPT

## 2024-01-08 PROCEDURE — 96375 TX/PRO/DX INJ NEW DRUG ADDON: CPT

## 2024-01-08 PROCEDURE — 87040 BLOOD CULTURE FOR BACTERIA: CPT

## 2024-01-08 PROCEDURE — 85025 COMPLETE CBC W/AUTO DIFF WBC: CPT

## 2024-01-08 PROCEDURE — 82570 ASSAY OF URINE CREATININE: CPT

## 2024-01-08 PROCEDURE — 96365 THER/PROPH/DIAG IV INF INIT: CPT

## 2024-01-08 PROCEDURE — 83935 ASSAY OF URINE OSMOLALITY: CPT

## 2024-01-08 PROCEDURE — 6360000002 HC RX W HCPCS: Performed by: EMERGENCY MEDICINE

## 2024-01-08 PROCEDURE — 99285 EMERGENCY DEPT VISIT HI MDM: CPT

## 2024-01-08 PROCEDURE — 1123F ACP DISCUSS/DSCN MKR DOCD: CPT | Performed by: FAMILY MEDICINE

## 2024-01-08 PROCEDURE — 93005 ELECTROCARDIOGRAM TRACING: CPT | Performed by: EMERGENCY MEDICINE

## 2024-01-08 PROCEDURE — 84484 ASSAY OF TROPONIN QUANT: CPT

## 2024-01-08 PROCEDURE — 1200000003 HC TELEMETRY R&B

## 2024-01-08 PROCEDURE — 83880 ASSAY OF NATRIURETIC PEPTIDE: CPT

## 2024-01-08 PROCEDURE — 84300 ASSAY OF URINE SODIUM: CPT

## 2024-01-08 PROCEDURE — 99214 OFFICE O/P EST MOD 30 MIN: CPT | Performed by: FAMILY MEDICINE

## 2024-01-08 PROCEDURE — 80053 COMPREHEN METABOLIC PANEL: CPT

## 2024-01-08 PROCEDURE — 36415 COLL VENOUS BLD VENIPUNCTURE: CPT

## 2024-01-08 PROCEDURE — 82140 ASSAY OF AMMONIA: CPT

## 2024-01-08 RX ORDER — SPIRONOLACTONE 25 MG/1
50 TABLET ORAL DAILY
Status: DISCONTINUED | OUTPATIENT
Start: 2024-01-09 | End: 2024-01-12 | Stop reason: HOSPADM

## 2024-01-08 RX ORDER — SODIUM CHLORIDE 0.9 % (FLUSH) 0.9 %
5-40 SYRINGE (ML) INJECTION EVERY 12 HOURS SCHEDULED
Status: DISCONTINUED | OUTPATIENT
Start: 2024-01-08 | End: 2024-01-12 | Stop reason: HOSPADM

## 2024-01-08 RX ORDER — SERTRALINE HYDROCHLORIDE 25 MG/1
25 TABLET, FILM COATED ORAL DAILY
Status: DISCONTINUED | OUTPATIENT
Start: 2024-01-09 | End: 2024-01-12 | Stop reason: HOSPADM

## 2024-01-08 RX ORDER — MIDODRINE HYDROCHLORIDE 5 MG/1
5 TABLET ORAL
Status: DISCONTINUED | OUTPATIENT
Start: 2024-01-09 | End: 2024-01-10

## 2024-01-08 RX ORDER — SPIRONOLACTONE 25 MG/1
100 TABLET ORAL DAILY
Status: DISCONTINUED | OUTPATIENT
Start: 2024-01-09 | End: 2024-01-11

## 2024-01-08 RX ORDER — ACETAMINOPHEN 650 MG/1
650 SUPPOSITORY RECTAL EVERY 6 HOURS PRN
Status: DISCONTINUED | OUTPATIENT
Start: 2024-01-08 | End: 2024-01-12 | Stop reason: HOSPADM

## 2024-01-08 RX ORDER — ONDANSETRON 2 MG/ML
4 INJECTION INTRAMUSCULAR; INTRAVENOUS EVERY 6 HOURS PRN
Status: DISCONTINUED | OUTPATIENT
Start: 2024-01-08 | End: 2024-01-12 | Stop reason: HOSPADM

## 2024-01-08 RX ORDER — OCTREOTIDE ACETATE 100 UG/ML
100 INJECTION, SOLUTION INTRAVENOUS; SUBCUTANEOUS EVERY 8 HOURS
Status: DISCONTINUED | OUTPATIENT
Start: 2024-01-08 | End: 2024-01-10

## 2024-01-08 RX ORDER — ACETAMINOPHEN 325 MG/1
650 TABLET ORAL EVERY 6 HOURS PRN
Status: DISCONTINUED | OUTPATIENT
Start: 2024-01-08 | End: 2024-01-12 | Stop reason: HOSPADM

## 2024-01-08 RX ORDER — DEXTROSE MONOHYDRATE 100 MG/ML
INJECTION, SOLUTION INTRAVENOUS CONTINUOUS PRN
Status: DISCONTINUED | OUTPATIENT
Start: 2024-01-08 | End: 2024-01-12 | Stop reason: HOSPADM

## 2024-01-08 RX ORDER — IBUPROFEN 600 MG/1
1 TABLET ORAL PRN
Status: DISCONTINUED | OUTPATIENT
Start: 2024-01-08 | End: 2024-01-12 | Stop reason: HOSPADM

## 2024-01-08 RX ORDER — SIMVASTATIN 20 MG
20 TABLET ORAL NIGHTLY
Status: DISCONTINUED | OUTPATIENT
Start: 2024-01-09 | End: 2024-01-12 | Stop reason: HOSPADM

## 2024-01-08 RX ORDER — POLYETHYLENE GLYCOL 3350 17 G/17G
17 POWDER, FOR SOLUTION ORAL DAILY PRN
Status: DISCONTINUED | OUTPATIENT
Start: 2024-01-08 | End: 2024-01-12 | Stop reason: HOSPADM

## 2024-01-08 RX ORDER — BUMETANIDE 0.25 MG/ML
1 INJECTION INTRAMUSCULAR; INTRAVENOUS 2 TIMES DAILY
Status: DISCONTINUED | OUTPATIENT
Start: 2024-01-08 | End: 2024-01-08

## 2024-01-08 RX ORDER — ENOXAPARIN SODIUM 100 MG/ML
30 INJECTION SUBCUTANEOUS DAILY
Status: DISCONTINUED | OUTPATIENT
Start: 2024-01-09 | End: 2024-01-09

## 2024-01-08 RX ORDER — ONDANSETRON 4 MG/1
4 TABLET, ORALLY DISINTEGRATING ORAL EVERY 8 HOURS PRN
Status: DISCONTINUED | OUTPATIENT
Start: 2024-01-08 | End: 2024-01-12 | Stop reason: HOSPADM

## 2024-01-08 RX ORDER — ALBUMIN (HUMAN) 12.5 G/50ML
25 SOLUTION INTRAVENOUS EVERY 8 HOURS
Status: DISCONTINUED | OUTPATIENT
Start: 2024-01-08 | End: 2024-01-10

## 2024-01-08 RX ORDER — BUMETANIDE 0.25 MG/ML
1 INJECTION INTRAMUSCULAR; INTRAVENOUS ONCE
Status: COMPLETED | OUTPATIENT
Start: 2024-01-08 | End: 2024-01-08

## 2024-01-08 RX ORDER — SODIUM CHLORIDE 0.9 % (FLUSH) 0.9 %
5-40 SYRINGE (ML) INJECTION PRN
Status: DISCONTINUED | OUTPATIENT
Start: 2024-01-08 | End: 2024-01-12 | Stop reason: HOSPADM

## 2024-01-08 RX ORDER — FUROSEMIDE 40 MG/1
40 TABLET ORAL DAILY
Status: DISCONTINUED | OUTPATIENT
Start: 2024-01-09 | End: 2024-01-10

## 2024-01-08 RX ORDER — SODIUM CHLORIDE 9 MG/ML
INJECTION, SOLUTION INTRAVENOUS PRN
Status: DISCONTINUED | OUTPATIENT
Start: 2024-01-08 | End: 2024-01-12 | Stop reason: HOSPADM

## 2024-01-08 RX ADMIN — CEFTRIAXONE SODIUM 1000 MG: 1 INJECTION, POWDER, FOR SOLUTION INTRAMUSCULAR; INTRAVENOUS at 18:05

## 2024-01-08 RX ADMIN — BUMETANIDE 1 MG: 0.25 INJECTION INTRAMUSCULAR; INTRAVENOUS at 18:03

## 2024-01-08 ASSESSMENT — LIFESTYLE VARIABLES: HOW OFTEN DO YOU HAVE A DRINK CONTAINING ALCOHOL: NEVER

## 2024-01-08 ASSESSMENT — PAIN - FUNCTIONAL ASSESSMENT: PAIN_FUNCTIONAL_ASSESSMENT: NONE - DENIES PAIN

## 2024-01-08 NOTE — ED TRIAGE NOTES
Pt comes to ED with  with c/o weight gain and sob. Pt appears confused and does not know what year it is.  states pt told him she was SOB yesterday.  states they were at doctors office today and pt had a 15 pound weight gain.  states pt has non alcoholic cirrhosis. Pt denies pain.

## 2024-01-08 NOTE — PROGRESS NOTES
updates where appropriate.      Review of Systems  Positive responses are highlighted in bold    Constitutional:  Fever, Chills, Night Sweats, Fatigue, Unexpected changes in weight  HENT:  Ear pain, Tinnitus, Nosebleeds, Trouble swallowing, Hearing loss, Sore throat  Cardiovascular:  Chest Pain, Palpitations, Orthopnea, Paroxysmal Nocturnal Dyspnea  Respiratory:  Cough, Wheezing, Shortness of breath, Chest tightness, Apnea  Gastrointestinal:  Nausea, Vomiting, Diarrhea, Constipation, Heartburn, Blood in stool  Genitourinary:  Difficulty or painful urination, Flank pain, Change in frequency, Urgency  Skin:  Color change, Rash, Itching, Wound  Musculoskeletal:  Joint pain, Back pain, Gait problems, Joint swelling, Myalgias  Neurological:  Dizziness, Headaches, Presyncope, Numbness, Seizures, Tremors  Endocrine:  Heat Intolerance, Cold Intolerance, Polydipsia, Polyphagia, Polyuria      PHYSICAL EXAM:  Vitals:    01/08/24 1531   BP: 132/70   Pulse: 100   Resp: 18   Temp: 97.6 °F (36.4 °C)   TempSrc: Temporal   SpO2: 98%   Weight: 74.8 kg (165 lb)   Height: 1.6 m (5' 2.99\")     Body mass index is 29.24 kg/m².     Wt Readings from Last 3 Encounters:   01/08/24 74.8 kg (165 lb)   12/20/23 68.5 kg (151 lb)   11/29/23 65.4 kg (144 lb 3.2 oz)     VS Reviewed  General Appearance: A&O x 3, No acute distress,well developed and well- nourished  Eyes: pupils equal, round, and reactive to light, extraocular eye movements intact, conjunctivae and eye lids without erythema  ENT: external ear and ear canal clear bilaterally, TMs intact and regular, nose without deformity, nasal mucosa and turbinates normal without polyps, oropharynx normal, dentition is normal for age  Neck: supple and non-tender without mass, no thyromegaly or thyroid nodules, no cervical lymphadenopathy  Pulmonary/Chest: clear to auscultation bilaterally- no wheezes, rales or rhonchi, normal air movement, no respiratory distress or retractions.   Cardiovascular: S1

## 2024-01-09 ENCOUNTER — APPOINTMENT (OUTPATIENT)
Age: 84
End: 2024-01-09
Payer: MEDICARE

## 2024-01-09 ENCOUNTER — APPOINTMENT (OUTPATIENT)
Dept: INTERVENTIONAL RADIOLOGY/VASCULAR | Age: 84
End: 2024-01-09
Payer: MEDICARE

## 2024-01-09 PROBLEM — N17.9 AKI (ACUTE KIDNEY INJURY) (HCC): Status: ACTIVE | Noted: 2024-01-09

## 2024-01-09 LAB
ALBUMIN FLD-MCNC: 0.7 GM/DL
ALBUMIN SERPL BCG-MCNC: 3.8 G/DL (ref 3.5–5.1)
ALP SERPL-CCNC: 99 U/L (ref 38–126)
ALT SERPL W/O P-5'-P-CCNC: 19 U/L (ref 11–66)
AMYLASE FLD-CCNC: 21 U/L
ANION GAP SERPL CALC-SCNC: 14 MEQ/L (ref 8–16)
ANISOCYTOSIS BLD QL SMEAR: PRESENT
AST SERPL-CCNC: 37 U/L (ref 5–40)
BASOPHILS ABSOLUTE: 0.1 THOU/MM3 (ref 0–0.1)
BASOPHILS NFR BLD AUTO: 0.6 %
BILIRUB SERPL-MCNC: 3.3 MG/DL (ref 0.3–1.2)
BUN SERPL-MCNC: 27 MG/DL (ref 7–22)
CALCIUM SERPL-MCNC: 9.7 MG/DL (ref 8.5–10.5)
CHARACTER, BODY FLUID: NORMAL
CHLORIDE SERPL-SCNC: 98 MEQ/L (ref 98–111)
CO2 SERPL-SCNC: 18 MEQ/L (ref 23–33)
COLOR FLD: YELLOW
CREAT SERPL-MCNC: 1.1 MG/DL (ref 0.4–1.2)
DACROCYTES: ABNORMAL
DEPRECATED RDW RBC AUTO: 78.6 FL (ref 35–45)
DEPRECATED RDW RBC AUTO: 79.4 FL (ref 35–45)
ECHO AV AREA PEAK VELOCITY: 1.2 CM2
ECHO AV AREA VTI: 1.1 CM2
ECHO AV AREA/BSA PEAK VELOCITY: 0.6 CM2/M2
ECHO AV AREA/BSA VTI: 0.6 CM2/M2
ECHO AV CUSP MM: 1.6 CM
ECHO AV MEAN GRADIENT: 18 MMHG
ECHO AV MEAN VELOCITY: 2 M/S
ECHO AV PEAK GRADIENT: 30 MMHG
ECHO AV PEAK VELOCITY: 2.7 M/S
ECHO AV VELOCITY RATIO: 0.48
ECHO AV VTI: 58.8 CM
ECHO BSA: 1.89 M2
ECHO LA AREA 2C: 18.4 CM2
ECHO LA AREA 4C: 14.5 CM2
ECHO LA DIAMETER INDEX: 2.3 CM/M2
ECHO LA DIAMETER: 4.3 CM
ECHO LA MAJOR AXIS: 5.1 CM
ECHO LA MINOR AXIS: 5.1 CM
ECHO LA VOL BP: 42 ML (ref 22–52)
ECHO LA VOL MOD A2C: 55 ML (ref 22–52)
ECHO LA VOL MOD A4C: 32 ML (ref 22–52)
ECHO LA VOL/BSA BIPLANE: 22 ML/M2 (ref 16–34)
ECHO LA VOLUME INDEX MOD A2C: 29 ML/M2 (ref 16–34)
ECHO LA VOLUME INDEX MOD A4C: 17 ML/M2 (ref 16–34)
ECHO LV E' LATERAL VELOCITY: 9 CM/S
ECHO LV E' SEPTAL VELOCITY: 7 CM/S
ECHO LV FRACTIONAL SHORTENING: 31 % (ref 28–44)
ECHO LV INTERNAL DIMENSION DIASTOLE INDEX: 2.62 CM/M2
ECHO LV INTERNAL DIMENSION DIASTOLIC: 4.9 CM (ref 3.9–5.3)
ECHO LV INTERNAL DIMENSION SYSTOLIC INDEX: 1.82 CM/M2
ECHO LV INTERNAL DIMENSION SYSTOLIC: 3.4 CM
ECHO LV IVSD: 0.8 CM (ref 0.6–0.9)
ECHO LV MASS 2D: 120.8 G (ref 67–162)
ECHO LV MASS INDEX 2D: 64.6 G/M2 (ref 43–95)
ECHO LV POSTERIOR WALL DIASTOLIC: 0.7 CM (ref 0.6–0.9)
ECHO LV RELATIVE WALL THICKNESS RATIO: 0.29
ECHO LVOT AREA: 2.5 CM2
ECHO LVOT AV VTI INDEX: 0.43
ECHO LVOT DIAM: 1.8 CM
ECHO LVOT MEAN GRADIENT: 4 MMHG
ECHO LVOT PEAK GRADIENT: 6 MMHG
ECHO LVOT PEAK VELOCITY: 1.3 M/S
ECHO LVOT STROKE VOLUME INDEX: 34.5 ML/M2
ECHO LVOT SV: 64.6 ML
ECHO LVOT VTI: 25.4 CM
ECHO MV A VELOCITY: 1.93 M/S
ECHO MV E DECELERATION TIME (DT): 201 MS
ECHO MV E VELOCITY: 1.24 M/S
ECHO MV E/A RATIO: 0.64
ECHO MV E/E' LATERAL: 13.78
ECHO MV E/E' RATIO (AVERAGED): 15.75
ECHO MV REGURGITANT PEAK GRADIENT: 88 MMHG
ECHO MV REGURGITANT PEAK VELOCITY: 4.7 M/S
ECHO PULMONARY ARTERY END DIASTOLIC PRESSURE: 6 MMHG
ECHO PV MAX VELOCITY: 1.2 M/S
ECHO PV PEAK GRADIENT: 5 MMHG
ECHO PV REGURGITANT MAX VELOCITY: 1.3 M/S
ECHO RV INTERNAL DIMENSION: 2.8 CM
ECHO TV E WAVE: 0.5 M/S
ECHO TV REGURGITANT MAX VELOCITY: 2.62 M/S
ECHO TV REGURGITANT PEAK GRADIENT: 27 MMHG
EKG ATRIAL RATE: 105 BPM
EKG P AXIS: 88 DEGREES
EKG P-R INTERVAL: 164 MS
EKG Q-T INTERVAL: 340 MS
EKG QRS DURATION: 88 MS
EKG QTC CALCULATION (BAZETT): 449 MS
EKG R AXIS: 66 DEGREES
EKG T AXIS: 58 DEGREES
EKG VENTRICULAR RATE: 105 BPM
ELLIPTOCYTES: ABNORMAL
EOSINOPHIL NFR BLD AUTO: 2.4 %
EOSINOPHILS ABSOLUTE: 0.2 THOU/MM3 (ref 0–0.4)
ERYTHROCYTE [DISTWIDTH] IN BLOOD BY AUTOMATED COUNT: 21.5 % (ref 11.5–14.5)
ERYTHROCYTE [DISTWIDTH] IN BLOOD BY AUTOMATED COUNT: 22.2 % (ref 11.5–14.5)
GFR SERPL CREATININE-BSD FRML MDRD: 50 ML/MIN/1.73M2
GLUCOSE BLD STRIP.AUTO-MCNC: 126 MG/DL (ref 70–108)
GLUCOSE BLD STRIP.AUTO-MCNC: 127 MG/DL (ref 70–108)
GLUCOSE BLD STRIP.AUTO-MCNC: 149 MG/DL (ref 70–108)
GLUCOSE BLD STRIP.AUTO-MCNC: 223 MG/DL (ref 70–108)
GLUCOSE FLD-MCNC: 162 MG/DL
GLUCOSE SERPL-MCNC: 126 MG/DL (ref 70–108)
GRANULOCYTES NFR FLD AUTO: 6.2 %
HCT VFR BLD AUTO: 29.5 % (ref 37–47)
HCT VFR BLD AUTO: 30.4 % (ref 37–47)
HGB BLD-MCNC: 10 GM/DL (ref 12–16)
HGB BLD-MCNC: 9.9 GM/DL (ref 12–16)
IMM GRANULOCYTES # BLD AUTO: 0.11 THOU/MM3 (ref 0–0.07)
IMM GRANULOCYTES NFR BLD AUTO: 1.2 %
LDH FLD L TO P-CCNC: 49 U/L
LYMPHOCYTES ABSOLUTE: 1.1 THOU/MM3 (ref 1–4.8)
LYMPHOCYTES NFR BLD AUTO: 11.6 %
MAGNESIUM SERPL-MCNC: 2.3 MG/DL (ref 1.6–2.4)
MCH RBC QN AUTO: 31.3 PG (ref 26–33)
MCH RBC QN AUTO: 31.9 PG (ref 26–33)
MCHC RBC AUTO-ENTMCNC: 32.9 GM/DL (ref 32.2–35.5)
MCHC RBC AUTO-ENTMCNC: 33.6 GM/DL (ref 32.2–35.5)
MCV RBC AUTO: 95 FL (ref 81–99)
MCV RBC AUTO: 95.2 FL (ref 81–99)
MONOCYTES ABSOLUTE: 1.9 THOU/MM3 (ref 0.4–1.3)
MONOCYTES NFR BLD AUTO: 20.4 %
MONONUC CELLS NFR FLD AUTO: 93.8 %
MRSA DNA SPEC QL NAA+PROBE: NEGATIVE
NEUTROPHILS NFR BLD AUTO: 63.8 %
NRBC BLD AUTO-RTO: 0 /100 WBC
NUC CELL # FLD AUTO: 38 /CUMM (ref 0–500)
OSMOLALITY SERPL CALC.SUM OF ELEC: 267.4 MOSMOL/KG (ref 275–300)
PATHOLOGIST REVIEW: ABNORMAL
PATHOLOGIST REVIEW: NORMAL
PLATELET # BLD AUTO: 146 THOU/MM3 (ref 130–400)
PLATELET # BLD AUTO: 158 THOU/MM3 (ref 130–400)
PLATELET BLD QL SMEAR: ADEQUATE
PMV BLD AUTO: 10 FL (ref 9.4–12.4)
PMV BLD AUTO: 9.7 FL (ref 9.4–12.4)
POIKILOCYTES: ABNORMAL
POTASSIUM SERPL-SCNC: 4.8 MEQ/L (ref 3.5–5.2)
PROT FLD-MCNC: 0.9 GM/DL
PROT SERPL-MCNC: 6 G/DL (ref 6.1–8)
RBC # BLD AUTO: 3.1 MILL/MM3 (ref 4.2–5.4)
RBC # BLD AUTO: 3.2 MILL/MM3 (ref 4.2–5.4)
RBC # FLD AUTO: < 2000 /CUMM
SEGMENTED NEUTROPHILS ABSOLUTE COUNT: 6.1 THOU/MM3 (ref 1.8–7.7)
SODIUM SERPL-SCNC: 130 MEQ/L (ref 135–145)
SPECIMEN: NORMAL
TOTAL VOLUME RECEIVED BODY FLUID: 4000 ML
WBC # BLD AUTO: 8.6 THOU/MM3 (ref 4.8–10.8)
WBC # BLD AUTO: 9.5 THOU/MM3 (ref 4.8–10.8)

## 2024-01-09 PROCEDURE — 83615 LACTATE (LD) (LDH) ENZYME: CPT

## 2024-01-09 PROCEDURE — 84157 ASSAY OF PROTEIN OTHER: CPT

## 2024-01-09 PROCEDURE — 80053 COMPREHEN METABOLIC PANEL: CPT

## 2024-01-09 PROCEDURE — 83735 ASSAY OF MAGNESIUM: CPT

## 2024-01-09 PROCEDURE — 87205 SMEAR GRAM STAIN: CPT

## 2024-01-09 PROCEDURE — 97166 OT EVAL MOD COMPLEX 45 MIN: CPT

## 2024-01-09 PROCEDURE — 87070 CULTURE OTHR SPECIMN AEROBIC: CPT

## 2024-01-09 PROCEDURE — 99232 SBSQ HOSP IP/OBS MODERATE 35: CPT | Performed by: PHYSICIAN ASSISTANT

## 2024-01-09 PROCEDURE — 93010 ELECTROCARDIOGRAM REPORT: CPT | Performed by: INTERNAL MEDICINE

## 2024-01-09 PROCEDURE — P9047 ALBUMIN (HUMAN), 25%, 50ML: HCPCS

## 2024-01-09 PROCEDURE — 82948 REAGENT STRIP/BLOOD GLUCOSE: CPT

## 2024-01-09 PROCEDURE — 36415 COLL VENOUS BLD VENIPUNCTURE: CPT

## 2024-01-09 PROCEDURE — 93306 TTE W/DOPPLER COMPLETE: CPT | Performed by: NUCLEAR MEDICINE

## 2024-01-09 PROCEDURE — 1200000003 HC TELEMETRY R&B

## 2024-01-09 PROCEDURE — 82945 GLUCOSE OTHER FLUID: CPT

## 2024-01-09 PROCEDURE — 87075 CULTR BACTERIA EXCEPT BLOOD: CPT

## 2024-01-09 PROCEDURE — 82042 OTHER SOURCE ALBUMIN QUAN EA: CPT

## 2024-01-09 PROCEDURE — 89050 BODY FLUID CELL COUNT: CPT

## 2024-01-09 PROCEDURE — 93971 EXTREMITY STUDY: CPT

## 2024-01-09 PROCEDURE — 82150 ASSAY OF AMYLASE: CPT

## 2024-01-09 PROCEDURE — 0W9G3ZZ DRAINAGE OF PERITONEAL CAVITY, PERCUTANEOUS APPROACH: ICD-10-PCS

## 2024-01-09 PROCEDURE — 6370000000 HC RX 637 (ALT 250 FOR IP)

## 2024-01-09 PROCEDURE — 6360000002 HC RX W HCPCS

## 2024-01-09 PROCEDURE — 97530 THERAPEUTIC ACTIVITIES: CPT

## 2024-01-09 PROCEDURE — 2580000003 HC RX 258

## 2024-01-09 PROCEDURE — 97116 GAIT TRAINING THERAPY: CPT

## 2024-01-09 PROCEDURE — 93306 TTE W/DOPPLER COMPLETE: CPT

## 2024-01-09 PROCEDURE — 97162 PT EVAL MOD COMPLEX 30 MIN: CPT

## 2024-01-09 PROCEDURE — 85027 COMPLETE CBC AUTOMATED: CPT

## 2024-01-09 RX ORDER — ENOXAPARIN SODIUM 100 MG/ML
40 INJECTION SUBCUTANEOUS DAILY
Status: DISCONTINUED | OUTPATIENT
Start: 2024-01-10 | End: 2024-01-12 | Stop reason: HOSPADM

## 2024-01-09 RX ORDER — LIDOCAINE HYDROCHLORIDE 10 MG/ML
INJECTION, SOLUTION EPIDURAL; INFILTRATION; INTRACAUDAL; PERINEURAL
Status: DISPENSED
Start: 2024-01-09 | End: 2024-01-09

## 2024-01-09 RX ADMIN — SERTRALINE HYDROCHLORIDE 25 MG: 25 TABLET ORAL at 09:34

## 2024-01-09 RX ADMIN — MIDODRINE HYDROCHLORIDE 5 MG: 5 TABLET ORAL at 13:21

## 2024-01-09 RX ADMIN — OCTREOTIDE ACETATE 100 MCG: 100 INJECTION, SOLUTION INTRAVENOUS; SUBCUTANEOUS at 10:35

## 2024-01-09 RX ADMIN — CEFTRIAXONE SODIUM 1000 MG: 1 INJECTION, POWDER, FOR SOLUTION INTRAMUSCULAR; INTRAVENOUS at 18:07

## 2024-01-09 RX ADMIN — SODIUM CHLORIDE, PRESERVATIVE FREE 10 ML: 5 INJECTION INTRAVENOUS at 20:22

## 2024-01-09 RX ADMIN — ALBUMIN (HUMAN) 25 G: 0.25 INJECTION, SOLUTION INTRAVENOUS at 06:25

## 2024-01-09 RX ADMIN — ALBUMIN (HUMAN) 25 G: 0.25 INJECTION, SOLUTION INTRAVENOUS at 22:20

## 2024-01-09 RX ADMIN — OCTREOTIDE ACETATE 100 MCG: 100 INJECTION, SOLUTION INTRAVENOUS; SUBCUTANEOUS at 17:20

## 2024-01-09 RX ADMIN — MIDODRINE HYDROCHLORIDE 5 MG: 5 TABLET ORAL at 17:20

## 2024-01-09 RX ADMIN — ENOXAPARIN SODIUM 30 MG: 100 INJECTION SUBCUTANEOUS at 09:34

## 2024-01-09 RX ADMIN — OCTREOTIDE ACETATE 100 MCG: 100 INJECTION, SOLUTION INTRAVENOUS; SUBCUTANEOUS at 00:58

## 2024-01-09 RX ADMIN — ALBUMIN (HUMAN) 25 G: 0.25 INJECTION, SOLUTION INTRAVENOUS at 14:52

## 2024-01-09 RX ADMIN — SODIUM CHLORIDE, PRESERVATIVE FREE 10 ML: 5 INJECTION INTRAVENOUS at 00:30

## 2024-01-09 RX ADMIN — MIDODRINE HYDROCHLORIDE 5 MG: 5 TABLET ORAL at 09:34

## 2024-01-09 RX ADMIN — SODIUM CHLORIDE, PRESERVATIVE FREE 10 ML: 5 INJECTION INTRAVENOUS at 09:34

## 2024-01-09 RX ADMIN — ALBUMIN (HUMAN) 25 G: 0.25 INJECTION, SOLUTION INTRAVENOUS at 00:39

## 2024-01-09 ASSESSMENT — ENCOUNTER SYMPTOMS
ABDOMINAL DISTENTION: 1
NAUSEA: 0
SHORTNESS OF BREATH: 1
VOMITING: 0

## 2024-01-09 NOTE — ED NOTES
ED to inpatient nurses report      Chief Complaint:  Chief Complaint   Patient presents with    Weight Gain    Shortness of Breath     Present to ED from: home    MOA:     LOC: alert and orientated to name and place  Mobility: Fully dependent  Oxygen Baseline: Room air    Current needs required: Room air     Code Status:   Full Code      Mental Status:  Level of Consciousness: Alert (0)    Psych Assessment:        Vitals:  Patient Vitals for the past 24 hrs:   BP Temp Temp src Pulse Resp SpO2 Height Weight   01/08/24 2106 (!) 131/58 -- -- (!) 105 17 95 % -- --   01/08/24 1936 (!) 141/61 -- -- (!) 108 15 94 % -- --   01/08/24 1851 117/64 -- -- (!) 107 16 95 % -- --   01/08/24 1806 (!) 113/54 -- -- 98 17 96 % 1.575 m (5' 2\") 74.8 kg (165 lb)   01/08/24 1653 (!) 114/58 98.4 °F (36.9 °C) Oral (!) 103 16 96 % -- 74.8 kg (165 lb)        LDAs:   Peripheral IV 01/08/24 Left Antecubital (Active)   Site Assessment Clean, dry & intact 01/08/24 1702   Line Status Blood return noted;Flushed;Normal saline locked;Specimen collected 01/08/24 1702   Dressing Status Clean, dry & intact 01/08/24 1702   Dressing Type Transparent 01/08/24 1702   Dressing Intervention New 01/08/24 1702       Ambulatory Status:  No data recorded    Diagnosis:  DISPOSITION Admitted 01/08/2024 07:43:13 PM   Final diagnoses:   Hypervolemia, unspecified hypervolemia type   Other cirrhosis of liver (HCC)        Consults:  None     Pain Score:  Pain Assessment  Pain Assessment: None - Denies Pain    C-SSRS:   Risk of Suicide: No Risk    Sepsis Screening:  Sepsis Risk Score: 3.37    [place] Fall Risk:       Swallow Screening        Preferred Language:   English      ALLERGIES     Ciprofloxacin, Darvon [propoxyphene hcl], Flagyl [metronidazole], and Lipitor [atorvastatin calcium]    SURGICAL HISTORY       Past Surgical History:   Procedure Laterality Date    ABDOMEN SURGERY      complete hysterectomy, gallbladder    APPENDECTOMY      CARDIAC CATHETERIZATION   02/03/2016    [facility]    CHOLECYSTECTOMY  yrs ago    COLONOSCOPY  2022    CORONARY ARTERY BYPASS GRAFT  02/18/2016    x3     EGD  2022    ENDOSCOPY, COLON, DIAGNOSTIC      EYE SURGERY      cataracts, glaucoma    HERNIA REPAIR      Hiatal Hernia    HIATAL HERNIA REPAIR      HYSTERECTOMY (CERVIX STATUS UNKNOWN)      LARYNGOSCOPY  10/29/2012 Dr Zheng    Microlaryngoscopy with Bx, Lingual Tonsillectomy, Hypopharyngoscopy    MOHS SURGERY Left 6/9/2023    Mohs Defect Repair BCC Left Upper Cutaneous performed by Surendra Agee MD at [facility] OR    UT BYPASS W/VEIN CAROTID-SUBCLV/SUBCLAVIAN CAROTID Left 04/11/2018    LEFT CAROTID SUBCLAVIAN BYPASS performed by Rudy Alva MD at [facility] OR    SKIN BIOPSY      TONSILLECTOMY      as a teen    UPPER GASTROINTESTINAL ENDOSCOPY         PAST MEDICAL HISTORY       Past Medical History:   Diagnosis Date    AAA (abdominal aortic aneurysm) (HCC)     Alzheimer's dementia (HCC)     Aortic stenosis, mild     ECHO 10/27/2022    Arthritis     ASHD (arteriosclerotic heart disease)     s/p CABG 2016    Chronic low back pain     Cirrhosis of liver (HCC) 09/02/2020    DM2 (diabetes mellitus, type 2) (HCC)     diet control    Dyslipidemia     Former smoker     GERD (gastroesophageal reflux disease)     History of basal cell cancer     Nose    History of Crohn's disease     History of CVA (cerebrovascular accident)     x3 after heart surgery    History of hypertension     IBS (irritable bowel syndrome)     Iron deficiency anemia     Major depression     PAD (peripheral artery disease) (HCC)     S/P CABG (coronary artery bypass graft)     Subclavian artery stenosis, left (HCC)     H/O LCCA-->LSCA vein bypass in 2018           Electronically signed by Akin Avila RN on 1/8/2024 at 9:27 PM

## 2024-01-09 NOTE — PROGRESS NOTES
[facility]  INPATIENT PHYSICAL THERAPY  EVALUATION  [facility] ORTHOPEDICS 7K - [id number]    Time In: 0908  Time Out: 09  Timed Code Treatment Minutes: 14 Minutes  Minutes: 23          Date: 2024  Patient Name: Meg Wilson,  Gender:  female        MRN: [id number]  : 1940  (83 y.o.)      Referring Practitioner: Heather Spaulding MD  Diagnosis: Cirrhosis (HCC)  Additional Pertinent Hx: 83 y.o. female with PMHx of non-alcoholic cirrhosis with ascites, G1DD HFpEF, HTN, HLD, CAD s/p CABG, PAD, BENNIE who presents to [facility] with worsened leg swelling and SOB.  is at bedside providing additional history. Pt states that she first noticed the swelling in her legs last month. Has a h/o non-alcoholic cirrhosis for which she follows OP with MICHAEL Gonzalez; is currently on week 3/6 of weekly paracentesis for frequent ascites. Fluid analyses thus far have been unremarkable.     Restrictions/Precautions:  Restrictions/Precautions: General Precautions, Fall Risk    Subjective:  Chart Reviewed: Yes  Patient assessed for rehabilitation services?: Yes  Subjective: RN approved session. Pt pleasant and agreeable to therapy    General:  Overall Orientation Status: Within Functional Limits  Vision: Within Functional Limits  Hearing: Within functional limits       Pain: 0/10: denies pain     Vitals: Vitals not assessed per clinical judgement, see nursing flowsheet    Social/Functional History:    Lives With: Spouse  Type of Home: House  Home Layout: One level  Home Access: Ramped entrance  Home Equipment: Walker, rolling (3WW)     ADL Assistance: Independent  Homemaking Assistance: Independent  Ambulation Assistance: Independent  Transfer Assistance: Independent    OBJECTIVE:  Range of Motion:  Bilateral Lower Extremity: WFL    Strength:  Bilateral Lower Extremity: Impaired - grossly deconditioned    BLE feet swelling noted d/t cellulitis     Balance:  Static Sitting Balance:  Stand By

## 2024-01-09 NOTE — PLAN OF CARE
Problem: Discharge Planning  Goal: Discharge to home or other facility with appropriate resources  Flowsheets (Taken 1/9/2024 0242)  Discharge to home or other facility with appropriate resources: Identify barriers to discharge with patient and caregiver     Problem: Safety - Adult  Goal: Free from fall injury  Flowsheets (Taken 1/9/2024 0242)  Free From Fall Injury: Instruct family/caregiver on patient safety  Note: Patient has remained free of physical injury during this shift. Safe environment provided, call light within reach, and hourly rounding completed.      Problem: ABCDS Injury Assessment  Goal: Absence of physical injury  Flowsheets (Taken 1/9/2024 0242)  Absence of Physical Injury: Implement safety measures based on patient assessment  Note: Patient has remained free of physical injury during this shift. Safe environment provided, call light within reach, and hourly rounding completed.    Care plan reviewed with patient.  Patient verbalize understanding of the plan of care and contribute to goal setting.

## 2024-01-09 NOTE — PLAN OF CARE
Problem: Discharge Planning  Goal: Discharge to home or other facility with appropriate resources  Outcome: Progressing  Flowsheets (Taken 1/9/2024 0242 by Yu Ziegler, RN)  Discharge to home or other facility with appropriate resources: Identify barriers to discharge with patient and caregiver     Problem: Safety - Adult  Goal: Free from fall injury  Outcome: Progressing  Flowsheets (Taken 1/9/2024 0242 by Yu Ziegler, RN)  Free From Fall Injury: Instruct family/caregiver on patient safety     Problem: ABCDS Injury Assessment  Goal: Absence of physical injury  Outcome: Progressing  Flowsheets (Taken 1/9/2024 0242 by Yu Ziegler, RN)  Absence of Physical Injury: Implement safety measures based on patient assessment     Problem: Skin/Tissue Integrity  Goal: Absence of new skin breakdown  Description: 1.  Monitor for areas of redness and/or skin breakdown  2.  Assess vascular access sites hourly  3.  Every 4-6 hours minimum:  Change oxygen saturation probe site  4.  Every 4-6 hours:  If on nasal continuous positive airway pressure, respiratory therapy assess nares and determine need for appliance change or resting period.  Outcome: Progressing  Note: No signs of any new skin breakdown     Problem: Chronic Conditions and Co-morbidities  Goal: Patient's chronic conditions and co-morbidity symptoms are monitored and maintained or improved  Outcome: Progressing  Flowsheets (Taken 1/9/2024 1811)  Care Plan - Patient's Chronic Conditions and Co-Morbidity Symptoms are Monitored and Maintained or Improved: Monitor and assess patient's chronic conditions and comorbid symptoms for stability, deterioration, or improvement   Care plan reviewed with patient.  Patient verbalize understanding of the plan of care and contribute to goal setting.

## 2024-01-09 NOTE — ED PROVIDER NOTES
it was appropriate to consider the following emergency medical conditions: Cirrhosis, decompensated liver failure, hypervolemia, anasarca, CHF, CKD, hepatorenal syndrome although some of these diagnoses are unlikely they were considered in my medical decision making.    Plan: CBC, BMP, ECG, INR, ammonia, chest x-ray, symptomatic treatment with Rocephin, Bumex and reassess         ED RESULTS   Laboratory results:  Labs Reviewed   CBC WITH AUTO DIFFERENTIAL - Abnormal; Notable for the following components:       Result Value    RBC 3.20 (*)     Hemoglobin 10.0 (*)     Hematocrit 30.4 (*)     RDW-CV 21.5 (*)     RDW-SD 78.6 (*)     All other components within normal limits   COMPREHENSIVE METABOLIC PANEL W/ REFLEX TO MG FOR LOW K - Abnormal; Notable for the following components:    Glucose 113 (*)     Creatinine 1.4 (*)     BUN 28 (*)     Sodium 130 (*)     Chloride 97 (*)     CO2 21 (*)     Total Protein 5.9 (*)     Total Bilirubin 3.4 (*)     All other components within normal limits   URINALYSIS WITH REFLEX TO CULTURE - Abnormal; Notable for the following components:    Color, UA DK YELLOW (*)     All other components within normal limits   TROPONIN - Abnormal; Notable for the following components:    Troponin, High Sensitivity 35 (*)     All other components within normal limits   OSMOLALITY - Abnormal; Notable for the following components:    Osmolality Calc 267.1 (*)     All other components within normal limits   GLOMERULAR FILTRATION RATE, ESTIMATED - Abnormal; Notable for the following components:    Est, Glom Filt Rate 37 (*)     All other components within normal limits   AMMONIA   BRAIN NATRIURETIC PEPTIDE   ANION GAP       Radiologic studies results:  XR CHEST PORTABLE   Final Result   Impression:   Normal chest.      This document has been electronically signed by: Jose House MD on    01/08/2024 06:18 PM          ED Medications administered this visit:   Medications   bumetanide (BUMEX) injection 1  by use of voice recognition software and electronically transcribed, and parts may have been transcribed with the assistance of an ED scribe. The transcription may contain errors not detected in proofreading.    Electronically Signed: Gen Coles DO, 01/08/24, 7:15 PM      Gen Coles DO  01/09/24 0359

## 2024-01-09 NOTE — PROGRESS NOTES
Hospitalist Progress Note      Patient:  Meg Wilson    Unit/Bed:7K-21/021-A  YOB: 1940  MRN: [id number]   Acct: [id number]   PCP: Elver Arredondo DO  Date of Admission: 1/8/2024    Assessment/Plan:    Decompensated non-alcoholic cirrhosis with ascites: follows with Dr. Dumont, GI who is consulted. Diagnosed in 2020. Has completed 3/6 weekly paracenteses for progressive difficulty managing ascites. Last paracentesis 1/4/24. Fluid analyses unremarkable, no malignant cells. +Fluid wave on exam with moderate distention and mild tenderness to palpation. New onset SOB, dyspnea, orthopnea, significant bilateral LE pitting edema. LFTs at baseline, albumin 3.6. Suspect albumin level normal d/t recent dose given after last paracentesis. INR elevated at 1.51, plt level WNL. Ammonia WNL. Home medications include Lasix 40mg daily and Spironolactone 150mg daily, resumed with NARDA resolved. CXR read normal, some pulmonary vascular congestion appreciated.     S/p 1mg IV Bumex x1 in ED with improvement in SOB.  Strict I&Os and daily weights  2g Na and 1500ml fluid restriction  GI consulted, appreciate recommendations  Therapeutic paracentesis with cell count ordered  IV Albumin 25g q8h   SBP prophylaxis given patient's impaired renal function  1g IV Rocephin daily  Cont Lasix and Spironolactone with resolution of NARDA   Monitor electrolytes with daily CMP, replace as needed    NARDA, resolved: Cr OA 1.4. Likely prerenal 2/2 poor PO intake and decreased circulating volume; c/f HRS vs CRS. Baseline Cr 0.8, UA unremarkable.   Treating for HRS with Octreotide 100mcg q8h and midrodrine 5mg TID  Optimize HD and avoid Nephrotoxic medications  Urine Na <20, Cr 87.4, osmolality 377 ordered    Renally dose medications and hold NSAIDs/ACEi/ARBs   Daily BMP and UOP    Chronic Diastolic HFpEF: w/ EF of 55-60%, grade 1 diastolic dysfunction, mild aortic

## 2024-01-09 NOTE — H&P
legs last month. Has a h/o non-alcoholic cirrhosis for which she follows OP with MICHAEL Gonzalez; is currently on week 3/6 of weekly paracentesis for frequent ascites. Fluid analyses thus far have been unremarkable. Home diuretics include Lasix 40mg and Spironolactone 150mg. Reports that over the past week her leg swelling had worsened, and  states that a few days ago she began to c/o feeling short of breath. They were unable to get to her PCP over the weekend, but saw him today and he encouraged her to go to the ED for further workup/management.  reports a 15# weight gain from mid-November. Reports significant dyspnea, new orthopnea, SOB at rest, and not being able to fit into her shoes. Denies headache, lightheadedness, dizziness, chest pain, worsening abdominal pain, dysuria, hematuria, hematochezia, black stools, itching, N/V/D.  reports lower energy levels lately as well as poor appetite/PO intake. He also reports that pt has been more unsteady lately and reports she has fallen 7 times in the past year. There is noted redness to RLE with slight warmth to touch as well as tenderness to palpation. Reports improvement in her SOB after IV Bumex in ED. Denies any other complaints at this time.    ED course: Tmax 98.4, RR 16, , /58, SpO2 96% RA. Given 1g Rocephin and 1mg IV Bumex.     Review of Systems:   Pertinent positives and negatives as noted in the HPI. Otherwise complete ROS negative.     Medications Prior to Admission:   Prior to Admission medications    Medication Sig Start Date End Date Taking? Authorizing Provider   spironolactone (ALDACTONE) 100 MG tablet Take 1 tablet by mouth daily 10/10/23 4/7/24  Keke Dumont MD   NONFORMULARY Ultra life mutiviamin 121    Provider, MD Amanda   polyethylene glycol (GLYCOLAX) 17 GM/SCOOP powder Take 17 g by mouth daily as needed (constipation) 7/27/23   Elver Arredondo DO   spironolactone (ALDACTONE) 50 MG tablet Take 1 tablet  EKG with the following interpretation: sinus tachycardia, no ischemic changes noted.       Labs:     Recent Labs     01/08/24  1701   WBC 9.5   HGB 10.0*   HCT 30.4*        Recent Labs     01/08/24  1701   *   K 4.4   CL 97*   CO2 21*   BUN 28*   CREATININE 1.4*   CALCIUM 9.6     Recent Labs     01/08/24  1701   AST 34   ALT 21   BILITOT 3.4*   ALKPHOS 106     No results for input(s): \"INR\" in the last 72 hours.  No results for input(s): \"TROPONINT\" in the last 72 hours.  No results for input(s): \"PROCAL\" in the last 72 hours.   Lab Results   Component Value Date/Time    NITRU NEGATIVE 01/08/2024 06:00 PM    WBCUA 5-9 12/17/2022 03:50 PM    BACTERIA NONE SEEN 12/17/2022 03:50 PM    RBCUA 3-5 12/17/2022 03:50 PM    BLOODU NEGATIVE 01/08/2024 06:00 PM    SPECGRAV 1.014 08/22/2019 02:58 PM    GLUCOSEU NEGATIVE 01/08/2024 06:00 PM         Radiology:   XR CHEST PORTABLE   Final Result   Impression:   Normal chest.      This document has been electronically signed by: Jose House MD on    01/08/2024 06:18 PM             Past Social History  The patient currently lives at home.   Tobacco use:   reports that she quit smoking about 23 years ago. Her smoking use included cigarettes. She started smoking about 48 years ago. She has a 12.5 pack-year smoking history. She has never used smokeless tobacco.  Alcohol use:   reports no history of alcohol use.  Drug use:  reports no history of drug use.     Medical Hx      Diagnosis Date    AAA (abdominal aortic aneurysm) (HCC)     Alzheimer's dementia (HCC)     Aortic stenosis, mild     ECHO 10/27/2022    Arthritis     ASHD (arteriosclerotic heart disease)     s/p CABG 2016    Chronic low back pain     Cirrhosis of liver (HCC) 09/02/2020    DM2 (diabetes mellitus, type 2) (HCC)     diet control    Dyslipidemia     Former smoker     GERD (gastroesophageal reflux disease)     History of basal cell cancer     Nose    History of Crohn's disease     History of CVA

## 2024-01-09 NOTE — PROGRESS NOTES
Pt admitted to  7K21 from ED.     Complaints: non-alcoholi cirrhosis with ascites.      IIV site free of s/s of infection or infiltration. Vital signs obtained. Assessment and data collection initiated.     Two nurse skin assessment performed by Yu MACHUCA and Rosemarie MACHUCA. Oriented to room.     Explained patients right to have family, representative or physician notified of their admission.  Patient has Declined for physician to be notified.  Patient has Declined for family/representative to be notified.        Policies and procedures for  explained. All questions answered with no further questions at this time. Fall prevention and safety brochure discussed with patient.  Bed alarm on. Call light in reach.

## 2024-01-09 NOTE — CARE COORDINATION
Case Management Assessment  Initial Evaluation    Date/Time of Evaluation: 1/9/2024 12:14 PM  Assessment Completed by: Dulce Albarran RN    If patient is discharged prior to next notation, then this note serves as note for discharge by case management.    Patient Name: Meg Wilson                   YOB: 1940  Diagnosis: Hypervolemia [E87.70]  Cirrhosis (HCC) [K74.60]  Other cirrhosis of liver (HCC) [K74.69]  Hypervolemia, unspecified hypervolemia type [E87.70]                   Date / Time: 1/8/2024  4:42 PM  Location: [address] [place] [address]     Patient Admission Status: Inpatient   Readmission Risk Low 0-14, Mod 15-19), High > 20: Readmission Risk Score: 16    Current PCP: Elver Arredondo, DO  PCP verified by ? Yes    Chart Reviewed: Yes      History Provided by: Significant Other, Child/Family  Patient Orientation: Person    Patient Cognition: Dementia / Early Alzheimer's    Hospitalization in the last 30 days (Readmission):  No    If yes, Readmission Assessment in CM Navigator will be completed.    Advance Directives:      Code Status: Full Code   Patient's Primary Decision Maker is: Named in Scanned ACP Document    Primary Decision Maker: Katie KuJairo YANG - Spouse - [phone]    Discharge Planning:    Patient lives with: Spouse/Significant Other Type of Home: House  Primary Care Giver: Self  Patient Support Systems include: Spouse/Significant Other, Children   Current Financial resources: Medicare  Current community resources: Other (Comment) (Weekly Paracentesis)  Current services prior to admission: Durable Medical Equipment            Current DME: Walker, Wheelchair (3WW and 4WW)            Type of Home Care services:  Aide Services, OT, PT, Nursing Services    ADLS  Prior functional level: Assistance with the following:, Housework  Current functional level: Assistance with the following:, Housework, Bathing, Dressing, Toileting    Family can provide assistance at DC: Yes  Would you like Case  [K74.60]  Other cirrhosis of liver (HCC) [K74.69]  Hypervolemia, unspecified hypervolemia type [E87.70]    Patient Goals/Plan/Treatment Preferences: Met with Meg, her  and her son. She is mostly independent. Her  does all the housework and provides transport. Gets weekly paracentesis OP. Has DME, listed above. Jairo  states Glorias confusion and swelling is much worse than normal and may need extra help at home. Expressed interest in HH for nursing, PT/OT, and aide services. List provided. SW consulted.   Transportation/Food Security/Housekeeping Addressed: No issues identified.     Dulce Albarran RN  Case Management Department

## 2024-01-09 NOTE — PROCEDURES
Meg Wilson is a 83 y.o. female patient.  1. Hypervolemia, unspecified hypervolemia type    2. Other cirrhosis of liver (HCC)      Past Medical History:   Diagnosis Date    AAA (abdominal aortic aneurysm) (HCC)     Alzheimer's dementia (HCC)     Aortic stenosis, mild     ECHO 10/27/2022    Arthritis     ASHD (arteriosclerotic heart disease)     s/p CABG 2016    Chronic low back pain     Cirrhosis of liver (HCC) 09/02/2020    DM2 (diabetes mellitus, type 2) (HCC)     diet control    Dyslipidemia     Former smoker     GERD (gastroesophageal reflux disease)     History of basal cell cancer     Nose    History of Crohn's disease     History of CVA (cerebrovascular accident)     x3 after heart surgery    History of hypertension     IBS (irritable bowel syndrome)     Iron deficiency anemia     Major depression     PAD (peripheral artery disease) (HCC)     S/P CABG (coronary artery bypass graft)     Subclavian artery stenosis, left (HCC)     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 rate 18, height 1.702 m (5' 7\"), weight 75.4 kg (166 lb 4.7 oz), SpO2 96 %, not currently breastfeeding.    Paracentesis    Date/Time: 1/9/2024 2:07 AM    Performed by: Heather Spaulding MD  Authorized by: Heather Spaulding MD  Consent: Verbal consent obtained. Written consent obtained.  Risks and benefits: risks, benefits and alternatives were discussed  Consent given by: patient  Patient understanding: patient states understanding of the procedure being performed  Patient consent: the patient's understanding of the procedure matches consent given  Procedure consent: procedure consent matches procedure scheduled  Relevant documents: relevant documents present and verified  Test results: test results available and properly labeled  Site marked: the operative site was marked  Imaging studies: imaging studies available  Patient identity

## 2024-01-10 LAB
ALBUMIN SERPL BCG-MCNC: 3.7 G/DL (ref 3.5–5.1)
ALP SERPL-CCNC: 64 U/L (ref 38–126)
ALT SERPL W/O P-5'-P-CCNC: 18 U/L (ref 11–66)
ANION GAP SERPL CALC-SCNC: 15 MEQ/L (ref 8–16)
AST SERPL-CCNC: 48 U/L (ref 5–40)
BILIRUB SERPL-MCNC: 4 MG/DL (ref 0.3–1.2)
BUN SERPL-MCNC: 32 MG/DL (ref 7–22)
CALCIUM SERPL-MCNC: 9.8 MG/DL (ref 8.5–10.5)
CHLORIDE SERPL-SCNC: 101 MEQ/L (ref 98–111)
CO2 SERPL-SCNC: 17 MEQ/L (ref 23–33)
CREAT SERPL-MCNC: 1 MG/DL (ref 0.4–1.2)
DEPRECATED RDW RBC AUTO: 84.9 FL (ref 35–45)
ERYTHROCYTE [DISTWIDTH] IN BLOOD BY AUTOMATED COUNT: 22.5 % (ref 11.5–14.5)
GFR SERPL CREATININE-BSD FRML MDRD: 56 ML/MIN/1.73M2
GLUCOSE BLD STRIP.AUTO-MCNC: 122 MG/DL (ref 70–108)
GLUCOSE BLD STRIP.AUTO-MCNC: 144 MG/DL (ref 70–108)
GLUCOSE BLD STRIP.AUTO-MCNC: 184 MG/DL (ref 70–108)
GLUCOSE BLD STRIP.AUTO-MCNC: 259 MG/DL (ref 70–108)
GLUCOSE SERPL-MCNC: 124 MG/DL (ref 70–108)
HCT VFR BLD AUTO: 27.2 % (ref 37–47)
HGB BLD-MCNC: 8.7 GM/DL (ref 12–16)
MAGNESIUM SERPL-MCNC: 2.1 MG/DL (ref 1.6–2.4)
MCH RBC QN AUTO: 31.8 PG (ref 26–33)
MCHC RBC AUTO-ENTMCNC: 32 GM/DL (ref 32.2–35.5)
MCV RBC AUTO: 99.3 FL (ref 81–99)
PLATELET # BLD AUTO: 106 THOU/MM3 (ref 130–400)
PMV BLD AUTO: 10.1 FL (ref 9.4–12.4)
POTASSIUM SERPL-SCNC: 5.2 MEQ/L (ref 3.5–5.2)
PROT SERPL-MCNC: 5.6 G/DL (ref 6.1–8)
RBC # BLD AUTO: 2.74 MILL/MM3 (ref 4.2–5.4)
SODIUM SERPL-SCNC: 133 MEQ/L (ref 135–145)
WBC # BLD AUTO: 8.1 THOU/MM3 (ref 4.8–10.8)

## 2024-01-10 PROCEDURE — 2580000003 HC RX 258

## 2024-01-10 PROCEDURE — 82948 REAGENT STRIP/BLOOD GLUCOSE: CPT

## 2024-01-10 PROCEDURE — 99233 SBSQ HOSP IP/OBS HIGH 50: CPT | Performed by: STUDENT IN AN ORGANIZED HEALTH CARE EDUCATION/TRAINING PROGRAM

## 2024-01-10 PROCEDURE — P9047 ALBUMIN (HUMAN), 25%, 50ML: HCPCS

## 2024-01-10 PROCEDURE — 80053 COMPREHEN METABOLIC PANEL: CPT

## 2024-01-10 PROCEDURE — 36415 COLL VENOUS BLD VENIPUNCTURE: CPT

## 2024-01-10 PROCEDURE — 85027 COMPLETE CBC AUTOMATED: CPT

## 2024-01-10 PROCEDURE — 83735 ASSAY OF MAGNESIUM: CPT

## 2024-01-10 PROCEDURE — 1200000003 HC TELEMETRY R&B

## 2024-01-10 PROCEDURE — 1200000000 HC SEMI PRIVATE

## 2024-01-10 PROCEDURE — 6360000002 HC RX W HCPCS

## 2024-01-10 PROCEDURE — 6370000000 HC RX 637 (ALT 250 FOR IP)

## 2024-01-10 RX ORDER — INSULIN LISPRO 100 [IU]/ML
0-4 INJECTION, SOLUTION INTRAVENOUS; SUBCUTANEOUS NIGHTLY
Status: DISCONTINUED | OUTPATIENT
Start: 2024-01-10 | End: 2024-01-12 | Stop reason: HOSPADM

## 2024-01-10 RX ORDER — INSULIN LISPRO 100 [IU]/ML
0-4 INJECTION, SOLUTION INTRAVENOUS; SUBCUTANEOUS
Status: DISCONTINUED | OUTPATIENT
Start: 2024-01-10 | End: 2024-01-12 | Stop reason: HOSPADM

## 2024-01-10 RX ORDER — FUROSEMIDE 10 MG/ML
40 INJECTION INTRAMUSCULAR; INTRAVENOUS ONCE
Status: DISCONTINUED | OUTPATIENT
Start: 2024-01-10 | End: 2024-01-12 | Stop reason: HOSPADM

## 2024-01-10 RX ADMIN — SODIUM CHLORIDE, PRESERVATIVE FREE 10 ML: 5 INJECTION INTRAVENOUS at 20:19

## 2024-01-10 RX ADMIN — ENOXAPARIN SODIUM 40 MG: 100 INJECTION SUBCUTANEOUS at 10:37

## 2024-01-10 RX ADMIN — MIDODRINE HYDROCHLORIDE 5 MG: 5 TABLET ORAL at 10:17

## 2024-01-10 RX ADMIN — SERTRALINE HYDROCHLORIDE 25 MG: 25 TABLET ORAL at 10:17

## 2024-01-10 RX ADMIN — SODIUM CHLORIDE, PRESERVATIVE FREE 10 ML: 5 INJECTION INTRAVENOUS at 10:24

## 2024-01-10 RX ADMIN — Medication 20 MG: at 20:19

## 2024-01-10 RX ADMIN — ALBUMIN (HUMAN) 25 G: 0.25 INJECTION, SOLUTION INTRAVENOUS at 05:18

## 2024-01-10 RX ADMIN — SPIRONOLACTONE 100 MG: 25 TABLET ORAL at 10:25

## 2024-01-10 RX ADMIN — FUROSEMIDE 40 MG: 40 TABLET ORAL at 10:17

## 2024-01-10 RX ADMIN — OCTREOTIDE ACETATE 100 MCG: 100 INJECTION, SOLUTION INTRAVENOUS; SUBCUTANEOUS at 00:59

## 2024-01-10 ASSESSMENT — PAIN SCALES - GENERAL
PAINLEVEL_OUTOF10: 0

## 2024-01-10 NOTE — PROGRESS NOTES
Hospitalist Progress Note      Patient:  Meg Wilson    Unit/Bed:7K-21/021-A  YOB: 1940  MRN: [id number]   Acct: [id number]   PCP: Elver Arredondo DO  Date of Admission: 1/8/2024    Assessment/Plan:    Decompensated cirrhosis  Status post paracenteses.  Fluid studies negative for evidence of SBP, 4 L removed.  Okay to discontinue Rocephin.  Will continue with IV diuretics today as renal function is stable.  Will give IV Lasix 40 mg x 1.  Continue p.o. Aldactone  Extensive workup for etiology of cirrhosis complete in the outpatient setting.  Concern for nonalcoholic steatohepatitis.  GI following.  Appreciate their assistance.  NARDA, resolved  Mild NARDA on arrival with creatinine 1.4, baseline closer to 0.9-1.0.  Okay to discontinue IV albumin.  Will also discontinue midodrine and octreotide due to low concern for hepatorenal syndrome at this time.  Chronic HFpEF  Does not appear to be in an acute exacerbation, anasarca likely due to cirrhosis.   Sodium/fluid restriction  Not on GDMT  Cellulitis or RLE ruled out  U/s negative for DVT  Likely stasis dermatitis in the setting of anasarca and cirrhosis. No indication for antibiotics at this time.   Frequent falls  PT/OT evaluations  Hypotonic hyponatremia  Likely due to cirrhosis/HFpEF  Continue fluid restriction and IV diuretics.    DMT2  Mild hyperglycemia today.   Not on medications at home.  Last A1c 8/2023 5.0%  Will add low sliding scale.   CAD  Status post remote CABG  Hyperlipidemia  On statin  Iron deficiency anemia  No evidence of bleeding.  Hemoglobin stable.    Disposition: Patient will likely discharge home in 24 to 48 hours pending response to IV diuretics including renal function and improvement in anasarca    Chief Complaint: Edema    Hospital Course:    Per H&P - \"Meg Wilson is a 83 y.o. female with PMHx of non-alcoholic cirrhosis with ascites, G1DD HFpEF, HTN, HLD, CAD s/p  growth-preliminary No growth 12/12/2023 09:15 AM       Urinalysis:      Lab Results   Component Value Date/Time    NITRU NEGATIVE 01/08/2024 06:00 PM    WBCUA 5-9 12/17/2022 03:50 PM    BACTERIA NONE SEEN 12/17/2022 03:50 PM    RBCUA 3-5 12/17/2022 03:50 PM    BLOODU NEGATIVE 01/08/2024 06:00 PM    SPECGRAV 1.014 08/22/2019 02:58 PM    GLUCOSEU NEGATIVE 01/08/2024 06:00 PM       Radiology:  Vascular duplex lower extremity venous right   Final Result      1. No evidence of a DVT in the right lower extremity.               **This report has been created using voice recognition software. It may contain minor errors which are inherent in voice recognition technology.**      Final report electronically signed by DR LYNDSAY VAZQUEZ on 1/9/2024 8:34 AM      XR CHEST PORTABLE   Final Result   Impression:   Normal chest.      This document has been electronically signed by: Jose House MD on    01/08/2024 06:18 PM        Echo (TTE) complete (PRN contrast/bubble/strain/3D)    Result Date: 1/9/2024    Left Ventricle: Normal left ventricular systolic function with a visually estimated EF of 55 - 60%. Left ventricle size is normal. Normal wall thickness. Normal wall motion. Normal diastolic function.   Aortic Valve: Findings consistent with myxomatous degeneration. Mildly thickened cusp. Moderately calcified cusp. Mild stenosis of the aortic valve. AV mean gradient is 18 mmHg. AV area by continuity VTI is 1.1 cm2.   Image quality is adequate. Procedure performed with the patient in a supine position.     Vascular duplex lower extremity venous right    Result Date: 1/9/2024  PROCEDURE: VAS DUP LOWER EXTREMITY VENOUS RIGHT CLINICAL INFORMATION: Edema right calf, SHORTNESS OF BREATH. COMPARISON: No prior study. TECHNIQUE: Venous doppler ultrasound was performed of the right lower extremity using gray scale, color flow and spectral doppler imaging. FINDINGS: There is normal color flow, spectral analysis and compressibility of the

## 2024-01-10 NOTE — CARE COORDINATION
DISCHARGE PLANNING EVALUATION  1/10/24, 1:23 PM EST    Reason for Referral: discharge plan  Mental Status: alert, defers discussion to family  Decision Making:  is POA  Family/Social/Home Environment: Meg lives at home with her , family is supportive, including her son, and provides assistance at home.  They have a one level home that is easily accessible for patient.    Current Services including food security, transportation and housekeeping: no current services, no concerns with food security, housekeeping or transportation  Current Equipment: walker, walk in shower, handicap height toilet   Payment Source: [employer] medicare   Concerns or Barriers to Discharge: discussed home health,  shares that they do not like home health providers coming to their home and will likely decline HH  Post-acute (PAC) provider list was provided to patient. Patient was informed of their freedom to choose PAC provider. Discussed and offered to show the patient the relevant PAC Providers quality and resource use measures on Medicare Compare web site via computer based on patient's goals of care and treatment preferences. Questions regarding selection process were answered.  Declined list, previously used [facility], not accepting HH at this time    Teach Back Method used with patient's  regarding care plan and discharge plan  Patient's  verbalized understanding of the plan of care and contributed to goal setting.       Patient goals, treatment preferences and discharge plan:  spoke with patient and her .   shares that they plan home at discharge, have family support and denies needs.  Discussed home health, which  is currently declining.      Electronically signed by SUNNY Regan on 1/10/2024 at 1:23 PM

## 2024-01-10 NOTE — CONSULTS
[place] [address]                                  CONSULTATION    PATIENT NAME: JOVANY FERNANDES                    :        1940  MED REC NO:   [id number]                           ROOM:       0021  ACCOUNT NO:   [id number]                           ADMIT DATE: 2024  PROVIDER:     Keke Dumont M.D.    CONSULT DATE:  2024    HISTORY OF PRESENT ILLNESS:  The patient is known 83-year-old female  followed by the office for nonalcoholic steatohepatitis with liver  cirrhosis as the workup is so far negative for viral hepatitis.  She   is having fluid overload.  She has been having paracentesis lately, more  frequently than before with albumin given after each one of them.  She  presented with short of breath, leg swelling, which is severe with  redness on the right side of the lower extremity with possible deep vein  thrombosis or cellulitis in the clinical finding with redness and swelling.   Referred to the ER for evaluation and management.  Fluid overloaded with  management, she is receiving diuretics at this time.  At the time of   evaluation few months, better at this time.  She is less short of breath   than at admission.  She has leg swelling at the time of admission.  She   is short of breath.  She has been sitting lately more than usual.  She   takes water pill according to  and that she is taking 150 mg   spirolactone as well as 40 mg of Lasix, which seemed to be adequate to   control her weight; however, she became out of control lately and started   to gain significant weight.  She has sent for cytology and cell count   to evaluate SBP .  She has significant dyspnea on exertion.  She has   orthopnea, which has resolved at the time of evaluation.  No chest pain.    She has leg swelling.  Burning sensation in her right lower extremities   as well as significant pedal edema both improved after

## 2024-01-10 NOTE — PLAN OF CARE
Problem: Discharge Planning  Goal: Discharge to home or other facility with appropriate resources  1/9/2024 2206 by Dianelys Sanchez RN  Outcome: Progressing  Flowsheets (Taken 1/9/2024 2206)  Discharge to home or other facility with appropriate resources:   Identify barriers to discharge with patient and caregiver   Identify discharge learning needs (meds, wound care, etc)   Arrange for needed discharge resources and transportation as appropriate     Problem: Safety - Adult  Goal: Free from fall injury  1/9/2024 2206 by Dianelys Sanchez RN  Outcome: Progressing  Flowsheets (Taken 1/9/2024 2206)  Free From Fall Injury: Instruct family/caregiver on patient safety     Problem: ABCDS Injury Assessment  Goal: Absence of physical injury  1/9/2024 2206 by Dianelys Sanchez RN  Outcome: Progressing  Flowsheets (Taken 1/9/2024 2206)  Absence of Physical Injury: Implement safety measures based on patient assessment     Problem: Skin/Tissue Integrity  Goal: Absence of new skin breakdown  Description: 1.  Monitor for areas of redness and/or skin breakdown  2.  Assess vascular access sites hourly  3.  Every 4-6 hours minimum:  Change oxygen saturation probe site  4.  Every 4-6 hours:  If on nasal continuous positive airway pressure, respiratory therapy assess nares and determine need for appliance change or resting period.  1/9/2024 2206 by Dianelys Sanchez RN  Outcome: Progressing  Note: No new skin breakdown noted this shift.      Problem: Chronic Conditions and Co-morbidities  Goal: Patient's chronic conditions and co-morbidity symptoms are monitored and maintained or improved  1/9/2024 2206 by Dianelys Sanchez RN  Outcome: Progressing  Flowsheets (Taken 1/9/2024 2206)  Care Plan - Patient's Chronic Conditions and Co-Morbidity Symptoms are Monitored and Maintained or Improved:   Monitor and assess patient's chronic conditions and comorbid symptoms for stability, deterioration, or improvement   Collaborate with multidisciplinary team to address

## 2024-01-11 ENCOUNTER — HOSPITAL ENCOUNTER (OUTPATIENT)
Dept: NURSING | Age: 84
Discharge: HOME | End: 2024-01-11

## 2024-01-11 LAB
ALBUMIN SERPL BCG-MCNC: 3.3 G/DL (ref 3.5–5.1)
ALP SERPL-CCNC: 67 U/L (ref 38–126)
ALT SERPL W/O P-5'-P-CCNC: 15 U/L (ref 11–66)
ANION GAP SERPL CALC-SCNC: 12 MEQ/L (ref 8–16)
AST SERPL-CCNC: 45 U/L (ref 5–40)
BILIRUB SERPL-MCNC: 3.9 MG/DL (ref 0.3–1.2)
BUN SERPL-MCNC: 33 MG/DL (ref 7–22)
CALCIUM SERPL-MCNC: 9.6 MG/DL (ref 8.5–10.5)
CHLORIDE SERPL-SCNC: 100 MEQ/L (ref 98–111)
CO2 SERPL-SCNC: 19 MEQ/L (ref 23–33)
CREAT SERPL-MCNC: 1 MG/DL (ref 0.4–1.2)
DEPRECATED RDW RBC AUTO: 76.8 FL (ref 35–45)
ERYTHROCYTE [DISTWIDTH] IN BLOOD BY AUTOMATED COUNT: 21.5 % (ref 11.5–14.5)
GFR SERPL CREATININE-BSD FRML MDRD: 56 ML/MIN/1.73M2
GLUCOSE BLD STRIP.AUTO-MCNC: 113 MG/DL (ref 70–108)
GLUCOSE BLD STRIP.AUTO-MCNC: 126 MG/DL (ref 70–108)
GLUCOSE BLD STRIP.AUTO-MCNC: 151 MG/DL (ref 70–108)
GLUCOSE BLD STRIP.AUTO-MCNC: 213 MG/DL (ref 70–108)
GLUCOSE SERPL-MCNC: 115 MG/DL (ref 70–108)
HCT VFR BLD AUTO: 27.6 % (ref 37–47)
HGB BLD-MCNC: 9.1 GM/DL (ref 12–16)
MAGNESIUM SERPL-MCNC: 1.9 MG/DL (ref 1.6–2.4)
MCH RBC QN AUTO: 31.5 PG (ref 26–33)
MCHC RBC AUTO-ENTMCNC: 33 GM/DL (ref 32.2–35.5)
MCV RBC AUTO: 95.5 FL (ref 81–99)
PLATELET # BLD AUTO: 135 THOU/MM3 (ref 130–400)
PMV BLD AUTO: 9.7 FL (ref 9.4–12.4)
POTASSIUM SERPL-SCNC: 5 MEQ/L (ref 3.5–5.2)
PROT SERPL-MCNC: 5.2 G/DL (ref 6.1–8)
RBC # BLD AUTO: 2.89 MILL/MM3 (ref 4.2–5.4)
SODIUM SERPL-SCNC: 131 MEQ/L (ref 135–145)
WBC # BLD AUTO: 9 THOU/MM3 (ref 4.8–10.8)

## 2024-01-11 PROCEDURE — 6360000002 HC RX W HCPCS: Performed by: STUDENT IN AN ORGANIZED HEALTH CARE EDUCATION/TRAINING PROGRAM

## 2024-01-11 PROCEDURE — 99233 SBSQ HOSP IP/OBS HIGH 50: CPT | Performed by: STUDENT IN AN ORGANIZED HEALTH CARE EDUCATION/TRAINING PROGRAM

## 2024-01-11 PROCEDURE — 36415 COLL VENOUS BLD VENIPUNCTURE: CPT

## 2024-01-11 PROCEDURE — 2580000003 HC RX 258

## 2024-01-11 PROCEDURE — 93010 ELECTROCARDIOGRAM REPORT: CPT | Performed by: INTERNAL MEDICINE

## 2024-01-11 PROCEDURE — 80053 COMPREHEN METABOLIC PANEL: CPT

## 2024-01-11 PROCEDURE — 6360000002 HC RX W HCPCS

## 2024-01-11 PROCEDURE — 1200000000 HC SEMI PRIVATE

## 2024-01-11 PROCEDURE — 85027 COMPLETE CBC AUTOMATED: CPT

## 2024-01-11 PROCEDURE — 93005 ELECTROCARDIOGRAM TRACING: CPT | Performed by: STUDENT IN AN ORGANIZED HEALTH CARE EDUCATION/TRAINING PROGRAM

## 2024-01-11 PROCEDURE — 82948 REAGENT STRIP/BLOOD GLUCOSE: CPT

## 2024-01-11 PROCEDURE — 83735 ASSAY OF MAGNESIUM: CPT

## 2024-01-11 PROCEDURE — 6370000000 HC RX 637 (ALT 250 FOR IP): Performed by: STUDENT IN AN ORGANIZED HEALTH CARE EDUCATION/TRAINING PROGRAM

## 2024-01-11 PROCEDURE — 6370000000 HC RX 637 (ALT 250 FOR IP)

## 2024-01-11 RX ORDER — SPIRONOLACTONE 25 MG/1
150 TABLET ORAL DAILY
Status: DISCONTINUED | OUTPATIENT
Start: 2024-01-12 | End: 2024-01-12 | Stop reason: HOSPADM

## 2024-01-11 RX ORDER — FUROSEMIDE 10 MG/ML
40 INJECTION INTRAMUSCULAR; INTRAVENOUS ONCE
Status: COMPLETED | OUTPATIENT
Start: 2024-01-11 | End: 2024-01-11

## 2024-01-11 RX ADMIN — SODIUM CHLORIDE, PRESERVATIVE FREE 10 ML: 5 INJECTION INTRAVENOUS at 20:24

## 2024-01-11 RX ADMIN — SPIRONOLACTONE 100 MG: 25 TABLET ORAL at 08:21

## 2024-01-11 RX ADMIN — FUROSEMIDE 40 MG: 10 INJECTION, SOLUTION INTRAMUSCULAR; INTRAVENOUS at 11:44

## 2024-01-11 RX ADMIN — SERTRALINE HYDROCHLORIDE 25 MG: 25 TABLET ORAL at 08:22

## 2024-01-11 RX ADMIN — Medication 20 MG: at 20:25

## 2024-01-11 RX ADMIN — SODIUM CHLORIDE, PRESERVATIVE FREE 10 ML: 5 INJECTION INTRAVENOUS at 08:22

## 2024-01-11 RX ADMIN — ENOXAPARIN SODIUM 40 MG: 100 INJECTION SUBCUTANEOUS at 08:22

## 2024-01-11 RX ADMIN — METOPROLOL TARTRATE 25 MG: 25 TABLET, FILM COATED ORAL at 11:44

## 2024-01-11 RX ADMIN — METOPROLOL TARTRATE 25 MG: 25 TABLET, FILM COATED ORAL at 20:24

## 2024-01-11 RX ADMIN — INSULIN LISPRO 1 UNITS: 100 INJECTION, SOLUTION INTRAVENOUS; SUBCUTANEOUS at 11:44

## 2024-01-11 ASSESSMENT — PAIN SCALES - GENERAL: PAINLEVEL_OUTOF10: 0

## 2024-01-11 NOTE — PLAN OF CARE
Problem: Discharge Planning  Goal: Discharge to home or other facility with appropriate resources  Outcome: Progressing  Flowsheets (Taken 1/9/2024 2206 by Dianelys Sanchez, RN)  Discharge to home or other facility with appropriate resources:   Identify barriers to discharge with patient and caregiver   Identify discharge learning needs (meds, wound care, etc)   Arrange for needed discharge resources and transportation as appropriate  Note: Feedback readiness for discharge.  Promoting inclusion for discharge planning.      Problem: Safety - Adult  Goal: Free from fall injury  Outcome: Progressing  Flowsheets (Taken 1/9/2024 2206 by Dianelys Sanchez, RN)  Free From Fall Injury: Instruct family/caregiver on patient safety  Note: Encourage movement and activity.   Encourage ambulation.   Encourage exercises recommended by physical or occupational therapy.Bed in low position  Call light in reach  Bed wheel lock  Teaching to use call light for assistance  Hourly Rounding  Non Skid Socks  Bed Alarm on         Problem: ABCDS Injury Assessment  Goal: Absence of physical injury  Outcome: Progressing  Flowsheets (Taken 1/9/2024 2206 by Dianelys Sanchez, RN)  Absence of Physical Injury: Implement safety measures based on patient assessment  Note: Bed in low position  Call light in reach  Bed wheel lock  Teaching to use call light for assistance.     Problem: Skin/Tissue Integrity  Goal: Absence of new skin breakdown  Description: 1.  Monitor for areas of redness and/or skin breakdown  2.  Assess vascular access sites hourly  3.  Every 4-6 hours minimum:  Change oxygen saturation probe site  4.  Every 4-6 hours:  If on nasal continuous positive airway pressure, respiratory therapy assess nares and determine need for appliance change or resting period.  Outcome: Progressing  Note: Maintain every 2 hour turn.  Inspect skin areas  Address issues with open skin with proper wound care per policies and proceduresEncouraging good fluid and diet  intake.  Encourage hygiene  Increase movement and encourage turns.      Problem: Chronic Conditions and Co-morbidities  Goal: Patient's chronic conditions and co-morbidity symptoms are monitored and maintained or improved  Outcome: Progressing  Flowsheets (Taken 1/9/2024 2206 by Dianelys Sanchez RN)  Care Plan - Patient's Chronic Conditions and Co-Morbidity Symptoms are Monitored and Maintained or Improved:   Monitor and assess patient's chronic conditions and comorbid symptoms for stability, deterioration, or improvement   Collaborate with multidisciplinary team to address chronic and comorbid conditions and prevent exacerbation or deterioration   Update acute care plan with appropriate goals if chronic or comorbid symptoms are exacerbated and prevent overall improvement and discharge   Care plan reviewed with patient.  Patient verbalizes   understanding of the plan of care and contribute to goal setting.

## 2024-01-11 NOTE — PROGRESS NOTES
Hospitalist Progress Note      Patient:  Meg Wilson    Unit/Bed:7K-21/021-A  YOB: 1940  MRN: [id number]   Acct: [id number]   PCP: Elver Arredondo DO  Date of Admission: 1/8/2024    Assessment/Plan:    Decompensated cirrhosis  Status post paracenteses.  Fluid studies negative for evidence of SBP, 4 L removed.  Okay to discontinue Rocephin.  Tolerated IV Lasix yesterday.  Will give another dose of IV Lasix today.  Recheck renal function tomorrow  Extensive workup for etiology of cirrhosis complete in the outpatient setting.  Concern for nonalcoholic steatohepatitis.  GI following.  Appreciate their assistance.  NARDA, resolved  Mild NARDA on arrival with creatinine 1.4, baseline closer to 0.9-1.0.  Okay to discontinue IV albumin.  Also discontinued midodrine and octreotide due to low concern for hepatorenal syndrome at this time.  New onset atrial fibrillation with RVR  Noted tachycardia after ambulating from the restroom back to the bedside chair.  EKG confirms A-fib with a heart rate of 130s.  Patient has no prior history of this.  I had an extensive discussion with the patient and her  as well as their daughter, Lidia, and they have all agreed that the patient is too high risk of severe bleeding complications given her very frequent fall history.  They decline anticoagulation at this time.  I feel this is very reasonable request.  Patient's [id number] score of 7 indicating 10% chance of stroke risk per year  Heart rate at time my examination in the 90s to low 100s.  Will start the patient on p.o. Lopressor and reassess her response.  She is asymptomatic and hemodynamically stable for  Dementia  Okay to continue home Prevagen.  Patient previously on Aricept which was stopped due to diarrhea which is a known side effect.  Can discuss restarting if family/patient are willing.    Prior CVA  Noted by family due to CABG.   Chronic  lightheadedness, dizziness, chest pain, worsening abdominal pain, dysuria, hematuria, hematochezia, black stools, itching, N/V/D.  reports lower energy levels lately as well as poor appetite/PO intake. He also reports that pt has been more unsteady lately and reports she has fallen 7 times in the past year. There is noted redness to RLE with slight warmth to touch as well as tenderness to palpation. Reports improvement in her SOB after IV Bumex in ED. Denies any other complaints at this time.     ED course: Tmax 98.4, RR 16, , /58, SpO2 96% RA. Given 1g Rocephin and 1mg IV Bumex. \"    Subjective (past 24 hours):   Patient reports tachycardia noted on telemetry.  This is shortly after the patient ambulated back from the bathroom to the bedside chair.  EKG confirms atrial fibrillation with RVR, new onset.  Patient is asymptomatic and hemodynamically stable.  I had a lengthy discussion with the patient, her , and their daughter regarding anticoagulation and risk of stroke.  They all agree that the patient is very high risk of bleeding given her frequent falls and have agreed to not initiate anticoagulation.  Heart rate during the time my examination in the upper 90s to low 100s.  Patient remains asymptomatic.  She does report that her lower extremity edema is improved.      Past medical history, family history, social history and allergies reviewed again and is unchanged since admission.    ROS (12 point review of systems completed.  Pertinent positives noted. Otherwise ROS is negative)     Medications:  Reviewed    Infusion Medications    sodium chloride      dextrose       Scheduled Medications    furosemide  40 mg IntraVENous Once    insulin lispro  0-4 Units SubCUTAneous TID     insulin lispro  0-4 Units SubCUTAneous Nightly    Apoaequorin  10 mg Oral Daily    enoxaparin  40 mg SubCUTAneous Daily    sertraline  25 mg Oral Daily    simvastatin  20 mg Oral Nightly    spironolactone  100 mg

## 2024-01-11 NOTE — PLAN OF CARE
Problem: Discharge Planning  Goal: Discharge to home or other facility with appropriate resources  Outcome: Progressing  Flowsheets (Taken 1/11/2024 1739)  Discharge to home or other facility with appropriate resources: Identify barriers to discharge with patient and caregiver     Problem: Safety - Adult  Goal: Free from fall injury  Outcome: Progressing  Flowsheets (Taken 1/11/2024 1739)  Free From Fall Injury: Instruct family/caregiver on patient safety     Problem: ABCDS Injury Assessment  Goal: Absence of physical injury  Outcome: Progressing  Flowsheets (Taken 1/11/2024 1739)  Absence of Physical Injury: Implement safety measures based on patient assessment     Problem: Skin/Tissue Integrity  Goal: Absence of new skin breakdown  Description: 1.  Monitor for areas of redness and/or skin breakdown  2.  Assess vascular access sites hourly  3.  Every 4-6 hours minimum:  Change oxygen saturation probe site  4.  Every 4-6 hours:  If on nasal continuous positive airway pressure, respiratory therapy assess nares and determine need for appliance change or resting period.  Outcome: Progressing     Problem: Chronic Conditions and Co-morbidities  Goal: Patient's chronic conditions and co-morbidity symptoms are monitored and maintained or improved  Outcome: Progressing  Flowsheets (Taken 1/11/2024 1739)  Care Plan - Patient's Chronic Conditions and Co-Morbidity Symptoms are Monitored and Maintained or Improved: Monitor and assess patient's chronic conditions and comorbid symptoms for stability, deterioration, or improvement   Care plan reviewed with patient and family.  Patient and family verbalize understanding of the plan of care and contribute to goal setting.

## 2024-01-11 NOTE — CARE COORDINATION
1/11/24, 1:58 PM EST    DISCHARGE ON GOING EVALUATION    [facility] day: 3  Location: -21/021-A Reason for admit: Hypervolemia [E87.70]  Cirrhosis (HCC) [K74.60]  Other cirrhosis of liver (HCC) [K74.69]  Hypervolemia, unspecified hypervolemia type [E87.70]   Procedure:   1/9 Paracentesis: 4050ml removed  1/9 CXR: Normal chest.   1/9 RLE Venous DUP: No evidence of a DVT in the right lower extremity.   1/9 Echo: estimated EF of 55 - 60%.  Aortic Valve: Findings consistent with myxomatous degeneration. Mildly thickened cusp. Moderately calcified cusp. Mild stenosis of the aortic valve.  Barriers to Discharge: new Atrial fib rate 138 /min, EKG confirmed, hospitalist started Lopressor 25 mg po. Na 131 (was 133).  Lasix IV as ordered. Aldactone held, Hgb 9.1. Cont PT/OT.   PCP: Elver Arredondo DO  Readmission Risk Score: 17%  Patient Goals/Plan/Treatment Preferences: from home with ; gets weekly paracentesis as OP. Has needed DME,  declined HH at time of discharge. Plans short stay at [facility].       Lives With: Spouse  Type of Home: House  Home Layout: One level  Home Access: Ramped entrance  Home Equipment: Walker, rolling (3WW)   walk in shower, handicap height toilet

## 2024-01-11 NOTE — PROGRESS NOTES
dilated common bile duct, as expected postcholecystectomy.    Pancreas, spleen and adrenal glands: Unremarkable    Kidneys:  Small nonobstructing calculus in each kidney. Mildly hypoplastic left kidney. No hydronephrosis.    Bowel/Peritoneum: Large amount of ascites in the abdomen and pelvis. Findings of constipation. Moderate fluid distention of multiple small bowel loops, consistent with ileus. There also appears be some thickening of small bowel wall involving a few  jejunal loops. Cannot exclude enteritis. No free air. Markedly tortuous and ectatic abdominal aorta. Small 3 cm fusiform aneurysm in the distal abdominal aorta. Extensive calcification in the abdominal aorta. Extensive calcified and mildly tortuous  pelvic vessels. No abnormally enlarged para-aortic lymph nodes are seen. Moderate calcification at the origin of the right renal artery with moderate stenosis, probably 70%. Origin of left renal artery is obscured by calcific plaque but appearance  suggestive of mild to moderate stenosis. Suggestion of at least mild calcific stenosis at the origins of the celiac artery and SMA.    Bones : No evidence for acute fracture or bone destruction..    Pelvis: Urinary bladder unremarkable. Multiple diverticula in the sigmoid colon and descending colon. No evidence for diverticulitis.    Impression  1. Large amount of ascites. Cirrhotic appearing liver. Prior cholecystectomy.  2. Constipation. Diverticulosis coli. Moderate paralytic ileus. Questionable enteritis involving a few jejunal loops in the left midabdomen. Mild bibasilar atelectasis/pneumonia.  3. Tortuous abdominal aorta. 3 cm aneurysm distal abdominal aorta. The remainder of the abdominal aorta is mildly ectatic. Multifocal calcific stenosis involving both renal arteries, SMA, and celiac artery. Mildly hypoplastic left kidney. Small  nonspecific calculus in each kidney.        **This report has been created using voice recognition software.  It may  contain minor errors which are inherent in voice recognition technology.**    Final report electronically signed by Dr. Champ Baltazar on 7/15/2023 1:23 PM    No results found for this or any previous visit.    No results found for this or any previous visit.      Endoscopy Finding:       Objective:   Vitals: BP (!) 99/49   Pulse 74   Temp 98.2 °F (36.8 °C) (Oral)   Resp 16   Ht 1.702 m (5' 7\")   Wt 72.2 kg (159 lb 2.8 oz)   LMP  (LMP Unknown)   SpO2 96%   BMI 24.93 kg/m²     Intake/Output Summary (Last 24 hours) at 1/11/2024 1845  Last data filed at 1/11/2024 1252  Gross per 24 hour   Intake 670.9 ml   Output 400 ml   Net 270.9 ml     General appearance: alert and cooperative with exam  Lungs: clear to auscultation bilaterally  Heart: regular rate and rhythm, S1, S2 normal, no murmur, click, rub or gallop  Abdomen: soft, non-tender; bowel sounds normal; no masses,  no organomegaly  Extremities: extremities normal, atraumatic, no cyanosis or edema    Assessment and Plan:   Cirrhosis likely based on nonalcoholic steatohepatitis slight deteriorations  Fluid overloaded restrict sodium water daily weight continue spironolactone and Lasix keep the weight stable  Required paracentesis with albumin infusions to keep oncotic pressure high      Follow up in GI Clinic after discharge in 2 week(s)    Patient Active Problem List:     DM2 (diabetes mellitus, type 2) (HCC)     Dyslipidemia     PAD (peripheral artery disease) (HCC)     Chronic low back pain     GERD (gastroesophageal reflux disease)     Esophageal stricture     Former smoker     ASHD (arteriosclerotic heart disease)     Iron deficiency anemia     Cirrhosis of liver (HCC)     Anemia, unspecified     AAA (abdominal aortic aneurysm) (HCC)     Alzheimer's dementia (HCC)     Aortic stenosis, mild     Arthritis     History of basal cell cancer     History of Crohn's disease     History of CVA (cerebrovascular accident)     History of hypertension     IBS

## 2024-01-12 VITALS
DIASTOLIC BLOOD PRESSURE: 52 MMHG | RESPIRATION RATE: 14 BRPM | WEIGHT: 158.29 LBS | HEIGHT: 67 IN | OXYGEN SATURATION: 96 % | HEART RATE: 63 BPM | TEMPERATURE: 97.9 F | SYSTOLIC BLOOD PRESSURE: 108 MMHG | BODY MASS INDEX: 24.84 KG/M2

## 2024-01-12 LAB
ALBUMIN SERPL BCG-MCNC: 3.8 G/DL (ref 3.5–5.1)
ALP SERPL-CCNC: 81 U/L (ref 38–126)
ALT SERPL W/O P-5'-P-CCNC: 17 U/L (ref 11–66)
ANION GAP SERPL CALC-SCNC: 13 MEQ/L (ref 8–16)
AST SERPL-CCNC: 33 U/L (ref 5–40)
BILIRUB SERPL-MCNC: 4.1 MG/DL (ref 0.3–1.2)
BUN SERPL-MCNC: 37 MG/DL (ref 7–22)
CALCIUM SERPL-MCNC: 10 MG/DL (ref 8.5–10.5)
CHLORIDE SERPL-SCNC: 100 MEQ/L (ref 98–111)
CO2 SERPL-SCNC: 19 MEQ/L (ref 23–33)
CREAT SERPL-MCNC: 1.2 MG/DL (ref 0.4–1.2)
DEPRECATED RDW RBC AUTO: 70.1 FL (ref 35–45)
EKG Q-T INTERVAL: 310 MS
EKG QRS DURATION: 86 MS
EKG QTC CALCULATION (BAZETT): 469 MS
EKG R AXIS: 72 DEGREES
EKG T AXIS: 1 DEGREES
EKG VENTRICULAR RATE: 138 BPM
ERYTHROCYTE [DISTWIDTH] IN BLOOD BY AUTOMATED COUNT: 20.9 % (ref 11.5–14.5)
GFR SERPL CREATININE-BSD FRML MDRD: 45 ML/MIN/1.73M2
GLUCOSE BLD STRIP.AUTO-MCNC: 116 MG/DL (ref 70–108)
GLUCOSE BLD STRIP.AUTO-MCNC: 120 MG/DL (ref 70–108)
GLUCOSE SERPL-MCNC: 106 MG/DL (ref 70–108)
HCT VFR BLD AUTO: 30.7 % (ref 37–47)
HGB BLD-MCNC: 10.8 GM/DL (ref 12–16)
MCH RBC QN AUTO: 31.8 PG (ref 26–33)
MCHC RBC AUTO-ENTMCNC: 35.2 GM/DL (ref 32.2–35.5)
MCV RBC AUTO: 90.3 FL (ref 81–99)
PLATELET # BLD AUTO: 123 THOU/MM3 (ref 130–400)
PMV BLD AUTO: 9.6 FL (ref 9.4–12.4)
POTASSIUM SERPL-SCNC: 4.6 MEQ/L (ref 3.5–5.2)
PROT SERPL-MCNC: 5.9 G/DL (ref 6.1–8)
RBC # BLD AUTO: 3.4 MILL/MM3 (ref 4.2–5.4)
SODIUM SERPL-SCNC: 132 MEQ/L (ref 135–145)
WBC # BLD AUTO: 10.1 THOU/MM3 (ref 4.8–10.8)

## 2024-01-12 PROCEDURE — 97110 THERAPEUTIC EXERCISES: CPT

## 2024-01-12 PROCEDURE — 6370000000 HC RX 637 (ALT 250 FOR IP): Performed by: STUDENT IN AN ORGANIZED HEALTH CARE EDUCATION/TRAINING PROGRAM

## 2024-01-12 PROCEDURE — 6370000000 HC RX 637 (ALT 250 FOR IP)

## 2024-01-12 PROCEDURE — 99239 HOSP IP/OBS DSCHRG MGMT >30: CPT | Performed by: STUDENT IN AN ORGANIZED HEALTH CARE EDUCATION/TRAINING PROGRAM

## 2024-01-12 PROCEDURE — 2580000003 HC RX 258

## 2024-01-12 PROCEDURE — 97116 GAIT TRAINING THERAPY: CPT

## 2024-01-12 PROCEDURE — 80053 COMPREHEN METABOLIC PANEL: CPT

## 2024-01-12 PROCEDURE — 93005 ELECTROCARDIOGRAM TRACING: CPT | Performed by: STUDENT IN AN ORGANIZED HEALTH CARE EDUCATION/TRAINING PROGRAM

## 2024-01-12 PROCEDURE — 6360000002 HC RX W HCPCS

## 2024-01-12 PROCEDURE — 85027 COMPLETE CBC AUTOMATED: CPT

## 2024-01-12 PROCEDURE — 82948 REAGENT STRIP/BLOOD GLUCOSE: CPT

## 2024-01-12 PROCEDURE — 36415 COLL VENOUS BLD VENIPUNCTURE: CPT

## 2024-01-12 RX ORDER — FUROSEMIDE 40 MG/1
40 TABLET ORAL 2 TIMES DAILY
Qty: 90 TABLET | Refills: 3 | Status: SHIPPED | OUTPATIENT
Start: 2024-01-12

## 2024-01-12 RX ADMIN — METOPROLOL TARTRATE 25 MG: 25 TABLET, FILM COATED ORAL at 09:14

## 2024-01-12 RX ADMIN — SPIRONOLACTONE 150 MG: 25 TABLET ORAL at 09:14

## 2024-01-12 RX ADMIN — ENOXAPARIN SODIUM 40 MG: 100 INJECTION SUBCUTANEOUS at 09:06

## 2024-01-12 RX ADMIN — SERTRALINE HYDROCHLORIDE 25 MG: 25 TABLET ORAL at 09:06

## 2024-01-12 RX ADMIN — SODIUM CHLORIDE, PRESERVATIVE FREE 10 ML: 5 INJECTION INTRAVENOUS at 09:07

## 2024-01-12 NOTE — PROGRESS NOTES
[facility]  INPATIENT PHYSICAL THERAPY  DAILY NOTE  [facility] ORTHOPEDICS 7K - 7K-21/021-A    Time In: 0957  Time Out: 1022  Timed Code Treatment Minutes: 25 Minutes  Minutes: 25          Date: 2024  Patient Name: Meg Wilson,  Gender:  female        MRN: [id number]  : 1940  (83 y.o.)     Referring Practitioner: Heather Spaulding MD  Diagnosis: Cirrhosis (HCC)  Additional Pertinent Hx: 83 y.o. female with PMHx of non-alcoholic cirrhosis with ascites, G1DD HFpEF, HTN, HLD, CAD s/p CABG, PAD, BENNIE who presents to [facility] with worsened leg swelling and SOB.  is at bedside providing additional history. Pt states that she first noticed the swelling in her legs last month. Has a h/o non-alcoholic cirrhosis for which she follows OP with MICHAEL Gonzalez; is currently on week 3/6 of weekly paracentesis for frequent ascites. Fluid analyses thus far have been unremarkable.     Prior Level of Function:  Lives With: Spouse  Type of Home: House  Home Layout: One level  Home Access: Ramped entrance  Home Equipment: Walker, rolling (3WW)   Bathroom Shower/Tub: Walk-in shower  Bathroom Toilet: Handicap height  Bathroom Accessibility: Accessible    ADL Assistance: Independent  Homemaking Assistance: Independent  Ambulation Assistance: Independent  Transfer Assistance: Independent  Active : No    Restrictions/Precautions:  Restrictions/Precautions: General Precautions, Fall Risk     SUBJECTIVE: Pt in recliner upon arrival, and agrees to therapy, RN approved session, Pt A&O X 2/4, Pt pleasant and cooperative, pt's  and son present during session, pt denied any pain and stated she had a good breakfast this morning.       PAIN: 0/10: pt denied any pain     Vitals: Vitals not assessed per clinical judgement, see nursing flowsheet    OBJECTIVE:  Bed Mobility:  Not Tested    Transfers:  Sit to Stand: Contact Guard Assistance, X 1, cues for hand placement  Stand to Sit:Contact

## 2024-01-12 NOTE — PLAN OF CARE
conditions and prevent exacerbation or deterioration     Problem: Confusion  Goal: Confusion, delirium, dementia, or psychosis is improved or at baseline  Description: INTERVENTIONS:  1. Assess for possible contributors to thought disturbance, including medications, impaired vision or hearing, underlying metabolic abnormalities, dehydration, psychiatric diagnoses, and notify attending LIP  2. Addy high risk fall precautions, as indicated  3. Provide frequent short contacts to provide reality reorientation, refocusing and direction  4. Decrease environmental stimuli, including noise as appropriate  5. Monitor and intervene to maintain adequate nutrition, hydration, elimination, sleep and activity  6. If unable to ensure safety without constant attention obtain sitter and review sitter guidelines with assigned personnel  7. Initiate Psychosocial CNS and Spiritual Care consult, as indicated  Outcome: Progressing  Flowsheets (Taken 1/12/2024 0422)  Effect of thought disturbance (confusion, delirium, dementia, or psychosis) are managed with adequate functional status:   Assess for contributors to thought disturbance, including medications, impaired vision or hearing, underlying metabolic abnormalities, dehydration, psychiatric diagnoses, notify LIP   Addy high risk fall precautions, as indicated   Provide frequent short contacts to provide reality reorientation, refocusing and direction   Decrease environmental stimuli, including noise as appropriate     Care plan reviewed with patient.  Patient verbalized understanding of the plan of care and contribute to goal setting.

## 2024-01-12 NOTE — DISCHARGE SUMMARY
81.0 - 99.0 fL    MCH 31.8 26.0 - 33.0 pg    MCHC 32.0 (L) 32.2 - 35.5 gm/dl    RDW-CV 22.5 (H) 11.5 - 14.5 %    RDW-SD 84.9 (H) 35.0 - 45.0 fL    Platelets 106 (L) 130 - 400 thou/mm3    MPV 10.1 9.4 - 12.4 fL   Glomerular Filtration Rate, Estimated    Collection Time: 01/10/24  6:52 AM   Result Value Ref Range    Est, Glom Filt Rate 56 (A) >60 ml/min/1.73m2   Anion Gap    Collection Time: 01/10/24  6:52 AM   Result Value Ref Range    Anion Gap 15.0 8.0 - 16.0 meq/L   POCT glucose    Collection Time: 01/10/24 11:08 AM   Result Value Ref Range    POC Glucose 259 (H) 70 - 108 mg/dl   POCT glucose    Collection Time: 01/10/24  3:54 PM   Result Value Ref Range    POC Glucose 144 (H) 70 - 108 mg/dl   POCT glucose    Collection Time: 01/10/24  8:08 PM   Result Value Ref Range    POC Glucose 184 (H) 70 - 108 mg/dl   Comprehensive Metabolic Panel    Collection Time: 01/11/24  4:25 AM   Result Value Ref Range    Glucose 115 (H) 70 - 108 mg/dL    Creatinine 1.0 0.4 - 1.2 mg/dL    BUN 33 (H) 7 - 22 mg/dL    Sodium 131 (L) 135 - 145 meq/L    Potassium 5.0 3.5 - 5.2 meq/L    Chloride 100 98 - 111 meq/L    CO2 19 (L) 23 - 33 meq/L    Calcium 9.6 8.5 - 10.5 mg/dL    AST 45 (H) 5 - 40 U/L    Alkaline Phosphatase 67 38 - 126 U/L    Total Protein 5.2 (L) 6.1 - 8.0 g/dL    Albumin 3.3 (L) 3.5 - 5.1 g/dL    Total Bilirubin 3.9 (H) 0.3 - 1.2 mg/dL    ALT 15 11 - 66 U/L   Magnesium    Collection Time: 01/11/24  4:25 AM   Result Value Ref Range    Magnesium 1.9 1.6 - 2.4 mg/dL   CBC    Collection Time: 01/11/24  4:25 AM   Result Value Ref Range    WBC 9.0 4.8 - 10.8 thou/mm3    RBC 2.89 (L) 4.20 - 5.40 mill/mm3    Hemoglobin 9.1 (L) 12.0 - 16.0 gm/dl    Hematocrit 27.6 (L) 37.0 - 47.0 %    MCV 95.5 81.0 - 99.0 fL    MCH 31.5 26.0 - 33.0 pg    MCHC 33.0 32.2 - 35.5 gm/dl    RDW-CV 21.5 (H) 11.5 - 14.5 %    RDW-SD 76.8 (H) 35.0 - 45.0 fL    Platelets 135 130 - 400 thou/mm3    MPV 9.7 9.4 - 12.4 fL   Anion Gap    Collection Time: 01/11/24   01/08/2024 06:00 PM    SPECGRAV 1.014 08/22/2019 02:58 PM    GLUCOSEU NEGATIVE 01/08/2024 06:00 PM       Radiology:-  Echo (TTE) complete (PRN contrast/bubble/strain/3D)    Result Date: 1/9/2024    Left Ventricle: Normal left ventricular systolic function with a visually estimated EF of 55 - 60%. Left ventricle size is normal. Normal wall thickness. Normal wall motion. Normal diastolic function.   Aortic Valve: Findings consistent with myxomatous degeneration. Mildly thickened cusp. Moderately calcified cusp. Mild stenosis of the aortic valve. AV mean gradient is 18 mmHg. AV area by continuity VTI is 1.1 cm2.   Image quality is adequate. Procedure performed with the patient in a supine position.     Vascular duplex lower extremity venous right    Result Date: 1/9/2024  PROCEDURE: VAS DUP LOWER EXTREMITY VENOUS RIGHT CLINICAL INFORMATION: Edema right calf, SHORTNESS OF BREATH. COMPARISON: No prior study. TECHNIQUE: Venous doppler ultrasound was performed of the right lower extremity using gray scale, color flow and spectral doppler imaging. FINDINGS: There is normal color flow, spectral analysis and compressibility of the right common femoral vein, femoral vein and popliteal veins. . There is normal color flow and compressibility in the right posterior tibial veins, anterior tibial veins and peroneal veins. There is normal color flow, spectral analysis and compressibility in the left common femoral vein.     1. No evidence of a DVT in the right lower extremity. **This report has been created using voice recognition software. It may contain minor errors which are inherent in voice recognition technology.** Final report electronically signed by DR LYNDSAY VAZQUEZ on 1/9/2024 8:34 AM    XR CHEST PORTABLE    Result Date: 1/8/2024  Single view of the chest Comparison: CT/SR - CTA CHEST W WO CONTRAST - 07/15/2023 08:56 PM EDT CR/SR - XR CHEST STANDARD (2 VW) - 07/15/2023 07:05 PM EDT Findings: Normal heart, lungs and

## 2024-01-12 NOTE — CARE COORDINATION
1/12/24, 9:38 AM EST    DISCHARGE PLANNING EVALUATION    Family plans respite stay at [facility] assisted living, family is completing arrangements with facility, will transport at discharge.      Met with family and [facility] admissions, Elizabeth, provided additional information needed for admission.

## 2024-01-12 NOTE — DISCHARGE INSTR - COC
Swallowing Test): {Done Not Done Date:635728428}    Treatments at the Time of Hospital Discharge:   Respiratory Treatments: ***  Oxygen Therapy:  {Therapy; copd oxygen:53124}  Ventilator:    {[facility] Vent List:699532766}    Rehab Therapies: {THERAPEUTIC INTERVENTION:4827139927}  Weight Bearing Status/Restrictions: {[facility] Weight Bearin}  Other Medical Equipment (for information only, NOT a DME order):  {EQUIPMENT:057041673}  Other Treatments: ***    Patient's personal belongings (please select all that are sent with patient):  {[facility] DME Belongings:047438865}    RN SIGNATURE:  {Esignature:929085926}    CASE MANAGEMENT/SOCIAL WORK SECTION    Inpatient Status Date: ***    Readmission Risk Assessment Score:  Readmission Risk              Risk of Unplanned Readmission:  19           Discharging to Facility/ Agency   Name:   Address:  Phone:  Fax:    Dialysis Facility (if applicable)   Name:  Address:  Dialysis Schedule:  Phone:  Fax:    / signature: {Esignature:540654191}    PHYSICIAN SECTION    Prognosis: {Prognosis:7645956775}    Condition at Discharge: { Patient Condition:971188670}    Rehab Potential (if transferring to Rehab): {Prognosis:1576650725}    Recommended Labs or Other Treatments After Discharge: ***    Physician Certification: I certify the above information and transfer of Meg Wilson  is necessary for the continuing treatment of the diagnosis listed and that she requires {Admit to Appropriate Level of Care:98817} for {GREATER/LESS:456852628} 30 days.     Update Admission H&P: {[facility] DME Changes in HandP:578253066}    PHYSICIAN SIGNATURE:  {Esignature:576332161}

## 2024-01-12 NOTE — CARE COORDINATION
1/12/24, 1:45 PM EST    DISCHARGE ON GOING EVALUATION    [facility] day: 4  Location: -21/021-A Reason for admit: Hypervolemia [E87.70]  Cirrhosis (HCC) [K74.60]  Other cirrhosis of liver (HCC) [K74.69]  Hypervolemia, unspecified hypervolemia type [E87.70]   Procedure:   1/9 Paracentesis: 4050ml removed  1/9 CXR: Normal chest.   1/9 RLE Venous DUP: No evidence of a DVT in the right lower extremity.   1/9 Echo: estimated EF of 55 - 60%.  Aortic Valve: Findings consistent with myxomatous degeneration. Mildly thickened cusp. Moderately calcified cusp. Mild stenosis of the aortic valve.  Barriers to Discharge: HR NSR rate 65 bpm,   Lopressor 25 mg po. Na 132 (was 131).  Lasix IV as ordered. Aldactone held, Hgb 10.8. Cont PT/OT.   PCP: Elver Arredondo DO  Readmission Risk Score: 17%  Patient Goals/Plan/Treatment Preferences: from home with ; gets weekly paracentesis as OP. Has needed DME,  declined HH at time of discharge. Plans short stay at [facility] as family work there.       1/12/24, 1:47 PM EST    Patient goals/plan/ treatment preferences discussed by  and .  Patient goals/plan/ treatment preferences reviewed with patient/ family.  Patient/ family verbalize understanding of discharge plan and are in agreement with goal/plan/treatment preferences.  Understanding was demonstrated using the teach back method.  AVS provided by RN at time of discharge, which includes all necessary medical information pertaining to the patients current course of illness, treatment, post-discharge goals of care, and treatment preferences.     Services At/After Discharge: Assisted Living and Nursing service       IMM Letter  IMM Letter given to Patient/Family/Significant other/Guardian/POA/by:: MCKINLEY: Petra MACHUCA  IMM Letter date given:: 01/12/24  IMM Letter time given:: 0920

## 2024-01-13 ENCOUNTER — TELEPHONE (OUTPATIENT)
Dept: FAMILY MEDICINE CLINIC | Age: 84
End: 2024-01-13

## 2024-01-13 DIAGNOSIS — G47.00 INSOMNIA, UNSPECIFIED TYPE: Primary | ICD-10-CM

## 2024-01-13 LAB
BACTERIA BLD AEROBE CULT: NORMAL
BACTERIA BLD AEROBE CULT: NORMAL
EKG ATRIAL RATE: 65 BPM
EKG P AXIS: 87 DEGREES
EKG P-R INTERVAL: 174 MS
EKG Q-T INTERVAL: 418 MS
EKG QRS DURATION: 86 MS
EKG QTC CALCULATION (BAZETT): 434 MS
EKG R AXIS: 49 DEGREES
EKG T AXIS: 57 DEGREES
EKG VENTRICULAR RATE: 65 BPM

## 2024-01-13 PROCEDURE — 93010 ELECTROCARDIOGRAM REPORT: CPT | Performed by: INTERNAL MEDICINE

## 2024-01-13 RX ORDER — CHOLECALCIFEROL (VITAMIN D3) 125 MCG
5 CAPSULE ORAL NIGHTLY PRN
Refills: 0 | COMMUNITY
Start: 2024-01-13

## 2024-01-13 NOTE — TELEPHONE ENCOUNTER
Perfect serve received from [facility] for Meg.      Marilyn MACHUCA placed call.   Needing order for OTC melatonin 5 mg. She has used this at home for as needed sleep.   Order placed.

## 2024-01-14 LAB
BACTERIA SPEC ANAEROBE CULT: NORMAL
BACTERIA SPEC BFLD CULT: NORMAL
GRAM STN SPEC: NORMAL

## 2024-01-15 ENCOUNTER — TELEPHONE (OUTPATIENT)
Dept: FAMILY MEDICINE CLINIC | Age: 84
End: 2024-01-15

## 2024-01-15 DIAGNOSIS — N39.0 ACUTE URINARY TRACT INFECTION: Primary | ICD-10-CM

## 2024-01-15 NOTE — TELEPHONE ENCOUNTER
Irene Painter, called asking for an order for a UA due to pt having burning with urination and also an order for PT to be faxed to [phone].      Please advise

## 2024-01-16 ENCOUNTER — NURSE ONLY (OUTPATIENT)
Dept: LAB | Age: 84
End: 2024-01-16

## 2024-01-16 ENCOUNTER — TELEPHONE (OUTPATIENT)
Dept: FAMILY MEDICINE CLINIC | Age: 84
End: 2024-01-16

## 2024-01-16 DIAGNOSIS — R26.89 BALANCE PROBLEM: ICD-10-CM

## 2024-01-16 DIAGNOSIS — K74.60 CIRRHOSIS OF LIVER WITH ASCITES, UNSPECIFIED HEPATIC CIRRHOSIS TYPE (HCC): ICD-10-CM

## 2024-01-16 DIAGNOSIS — R18.8 CIRRHOSIS OF LIVER WITH ASCITES, UNSPECIFIED HEPATIC CIRRHOSIS TYPE (HCC): ICD-10-CM

## 2024-01-16 DIAGNOSIS — N39.0 ACUTE URINARY TRACT INFECTION: ICD-10-CM

## 2024-01-16 DIAGNOSIS — R53.81 PHYSICAL DECONDITIONING: Primary | ICD-10-CM

## 2024-01-16 LAB
AMORPH SED URNS QL MICRO: NORMAL
BACTERIA: NORMAL
BILIRUB UR QL STRIP: NEGATIVE
CASTS #/AREA URNS LPF: NORMAL /LPF
CASTS #/AREA URNS LPF: NORMAL /LPF
CHARACTER UR: CLEAR
CHARCOAL URNS QL MICRO: NORMAL
COLOR UR: YELLOW
CRYSTALS URNS QL MICRO: NORMAL
CRYSTALS URNS QL MICRO: NORMAL
EPITHELIAL CELLS, UA: NORMAL /HPF
GLUCOSE UR QL STRIP.AUTO: NEGATIVE MG/DL
HGB UR QL STRIP.AUTO: NEGATIVE
KETONES UR QL STRIP.AUTO: NEGATIVE
LEUKOCYTE ESTERASE UR QL STRIP.AUTO: NEGATIVE
MISCELLANEOUS LAB TEST RESULT: NORMAL
MUCOUS THREADS URNS QL MICRO: NORMAL
NITRITE UR QL STRIP.AUTO: NEGATIVE
PH UR STRIP.AUTO: 6 [PH] (ref 5–9)
PROT UR STRIP.AUTO-MCNC: NEGATIVE MG/DL
RBC #/AREA URNS HPF: NORMAL /HPF
RENAL EPI CELLS #/AREA URNS HPF: NORMAL /[HPF]
SPECIFIC GRAVITY UA: 1.01 (ref 1–1.03)
UROBILINOGEN, URINE: 1 EU/DL (ref 0–1)
WBC #/AREA URNS HPF: NORMAL /HPF
YEAST LIKE FUNGI URNS QL MICRO: NORMAL

## 2024-01-16 NOTE — TELEPHONE ENCOUNTER
Priscial with [facility] requesting a new order for urinalysis and culture the previous order from [facility] yesterday has clean catch and she states the lab at [facility] can't collect with that on the order  They use a swab in her pull-up to collect urine    She is also requesting a PT order for SR therapy for eval and treatment to be done at [facility]

## 2024-01-16 NOTE — TELEPHONE ENCOUNTER
Sounds good  Most likely they're to swab the opening of the urethra, not the depends, but let's do it the normal way

## 2024-01-16 NOTE — TELEPHONE ENCOUNTER
----- Message from ANDREZ Morley - CNP sent at 1/16/2024  4:04 PM EST -----  Let Meg know her UA is normal.culture pending

## 2024-01-16 NOTE — TELEPHONE ENCOUNTER
It didn't make any sense to me either, but I thought I would check with you, in case this was something new.    I spoke with Priscila and she will stop in our office to get supplies and will take the urine to the lab    They don't have any lab supplies since they switched to the swabs

## 2024-01-16 NOTE — TELEPHONE ENCOUNTER
How're we supposed to an accurate culture when they're swabbing the depends?  You're just culturing the depends at that point, am I missing something?

## 2024-01-16 NOTE — TELEPHONE ENCOUNTER
Ok  So will need to be home health  She'll likely need a face to face before I can order it, correct?

## 2024-01-16 NOTE — TELEPHONE ENCOUNTER
PT order for SR therapy for eval and treatment to be done at [facility] was not ordered.      Please advise

## 2024-01-17 ENCOUNTER — TELEPHONE (OUTPATIENT)
Dept: FAMILY MEDICINE CLINIC | Age: 84
End: 2024-01-17

## 2024-01-17 LAB
BACTERIA UR CULT: ABNORMAL
ORGANISM: ABNORMAL

## 2024-01-17 NOTE — TELEPHONE ENCOUNTER
----- Message from ANDREZ Morley - CNP sent at 1/17/2024  1:40 PM EST -----  Let leandro know her urine culture did not grow any bacteria.

## 2024-01-17 NOTE — TELEPHONE ENCOUNTER
I spoke with the patients daughter ( Priscila not on HIPAA director of [employer]) is asking why another appointment is needed for a face to face when the patient was just here on 01.08.2024 when the patient was direct admitted to the hospital  Priscila states that all they need is the order for [facility] to come into [facility] to do the PT there eat the facility   Priscila further states that as of 02.01.2024 there will be a new company coming in ([facility]) who Priscila states will not be providing PT for her mother. Priscila states that it makes no sense to have [facility] provide the services since they will have to start the assessment all over when all they need is the referral from the current PCP for PT with [facility]      Please advise

## 2024-01-17 NOTE — TELEPHONE ENCOUNTER
To be clear, the face to face is a Medicare rule, not my rule.    We did not address any issues pertinent to HH PT/OT at that visit since we were dealing with her acute liver issues.     However, I will place the referral for home health and hope that her recent hospitalization and visit with me will satisfy home health medicare requirement.    If SR HH pushes back, may need to revisit a need for face to face.     Let me know if questions, thanks!    Referral will need faxed to [facility]     Diagnosis Orders   1. Physical deconditioning  [facility]      2. Balance problem  [facility]      3. Cirrhosis of liver with ascites, unspecified hepatic cirrhosis type (HCC)  [facility]

## 2024-01-18 ENCOUNTER — HOSPITAL ENCOUNTER (OUTPATIENT)
Dept: ULTRASOUND IMAGING | Age: 84
Discharge: HOME | End: 2024-01-18
Payer: MEDICARE

## 2024-01-18 ENCOUNTER — HOSPITAL ENCOUNTER (OUTPATIENT)
Dept: NURSING | Age: 84
Discharge: HOME | End: 2024-01-18
Payer: MEDICARE

## 2024-01-18 VITALS
SYSTOLIC BLOOD PRESSURE: 102 MMHG | RESPIRATION RATE: 18 BRPM | TEMPERATURE: 97.8 F | HEART RATE: 89 BPM | OXYGEN SATURATION: 93 % | DIASTOLIC BLOOD PRESSURE: 57 MMHG | WEIGHT: 165 LBS | BODY MASS INDEX: 25.84 KG/M2

## 2024-01-18 DIAGNOSIS — R18.8 ASCITES OF LIVER: ICD-10-CM

## 2024-01-18 PROCEDURE — 96365 THER/PROPH/DIAG IV INF INIT: CPT

## 2024-01-18 PROCEDURE — 49083 ABD PARACENTESIS W/IMAGING: CPT

## 2024-01-18 PROCEDURE — 6360000002 HC RX W HCPCS: Performed by: INTERNAL MEDICINE

## 2024-01-18 PROCEDURE — 96366 THER/PROPH/DIAG IV INF ADDON: CPT

## 2024-01-18 PROCEDURE — P9047 ALBUMIN (HUMAN), 25%, 50ML: HCPCS | Performed by: INTERNAL MEDICINE

## 2024-01-18 RX ORDER — ALBUMIN (HUMAN) 12.5 G/50ML
25 SOLUTION INTRAVENOUS ONCE
Status: COMPLETED | OUTPATIENT
Start: 2024-01-18 | End: 2024-01-18

## 2024-01-18 RX ADMIN — ALBUMIN (HUMAN) 25 G: 0.25 INJECTION, SOLUTION INTRAVENOUS at 12:34

## 2024-01-18 RX ADMIN — ALBUMIN (HUMAN) 25 G: 0.25 INJECTION, SOLUTION INTRAVENOUS at 13:24

## 2024-01-18 ASSESSMENT — PAIN - FUNCTIONAL ASSESSMENT: PAIN_FUNCTIONAL_ASSESSMENT: NONE - DENIES PAIN

## 2024-01-18 NOTE — PROGRESS NOTES
___m_ Safety:       (Environmental)  [place] to environment  Ensure ID band is correct and in place/ allergy band as needed  Assess for fall risk  Initiate fall precautions as applicable (fall band, side rails, etc.)  Call light within reach  Bed in low position/ wheels locked    __m__ Pain:       Assess pain level and characteristics  Administer analgesics as ordered  Assess effectiveness of pain management and report to MD as needed    ___m_ Knowledge Deficit:  Assess baseline knowledge  Provide teaching at level of understanding  Provide teaching via preferred learning method  Evaluate teaching effectiveness    ___m_ Hemodynamic/Respiratory Status:       (Pre and Post Procedure Monitoring)  Assess/Monitor vital signs and LOC  Assess Baseline SpO2 prior to any sedation  Obtain weight/height  Assess vital signs/ LOC until patient meets discharge criteria  Monitor procedure site and notify MD of any issues    _

## 2024-01-18 NOTE — H&P
Formulation and discussion of sedation / procedure plans, risks, benefits, side effects and alternatives with patient and/or responsible adult completed.    History and Physical reviewed and unchanged.    Electronically signed by Champ Baltazar MD on 1/18/24 at 11:04 AM EST

## 2024-01-18 NOTE — OP NOTE
Pre-Procedure Diagnosis: Ascites    Procedure Performed:  Paracentesis    Anesthesia: local    Findings: Clear yellow serous fluid obtained.    Immediate Complications:  None    Estimated Blood Loss: minimal    SEE DICTATED PROCEDURE NOTE FOR COMPLETE DETAILS.    Champ Baltazar MD   1/18/2024 11:23 AM

## 2024-01-25 ENCOUNTER — HOSPITAL ENCOUNTER (OUTPATIENT)
Dept: ULTRASOUND IMAGING | Age: 84
Discharge: HOME | End: 2024-01-25
Payer: MEDICARE

## 2024-01-25 ENCOUNTER — HOSPITAL ENCOUNTER (OUTPATIENT)
Dept: NURSING | Age: 84
Discharge: HOME | End: 2024-01-25
Payer: MEDICARE

## 2024-01-25 VITALS
DIASTOLIC BLOOD PRESSURE: 73 MMHG | SYSTOLIC BLOOD PRESSURE: 133 MMHG | OXYGEN SATURATION: 94 % | HEART RATE: 94 BPM | TEMPERATURE: 97.6 F | RESPIRATION RATE: 18 BRPM

## 2024-01-25 DIAGNOSIS — R18.8 OTHER ASCITES: ICD-10-CM

## 2024-01-25 LAB — ALBUMIN FLD-MCNC: 0.9 GM/DL

## 2024-01-25 PROCEDURE — P9047 ALBUMIN (HUMAN), 25%, 50ML: HCPCS | Performed by: INTERNAL MEDICINE

## 2024-01-25 PROCEDURE — 96366 THER/PROPH/DIAG IV INF ADDON: CPT

## 2024-01-25 PROCEDURE — 87075 CULTR BACTERIA EXCEPT BLOOD: CPT

## 2024-01-25 PROCEDURE — 87070 CULTURE OTHR SPECIMN AEROBIC: CPT

## 2024-01-25 PROCEDURE — 89050 BODY FLUID CELL COUNT: CPT

## 2024-01-25 PROCEDURE — 96365 THER/PROPH/DIAG IV INF INIT: CPT

## 2024-01-25 PROCEDURE — 87205 SMEAR GRAM STAIN: CPT

## 2024-01-25 PROCEDURE — 6360000002 HC RX W HCPCS: Performed by: INTERNAL MEDICINE

## 2024-01-25 PROCEDURE — 88108 CYTOPATH CONCENTRATE TECH: CPT

## 2024-01-25 PROCEDURE — 49083 ABD PARACENTESIS W/IMAGING: CPT

## 2024-01-25 PROCEDURE — 82042 OTHER SOURCE ALBUMIN QUAN EA: CPT

## 2024-01-25 RX ORDER — ALBUMIN (HUMAN) 12.5 G/50ML
25 SOLUTION INTRAVENOUS ONCE
Status: COMPLETED | OUTPATIENT
Start: 2024-01-25 | End: 2024-01-25

## 2024-01-25 RX ADMIN — ALBUMIN (HUMAN) 25 G: 0.25 INJECTION, SOLUTION INTRAVENOUS at 12:55

## 2024-01-25 RX ADMIN — ALBUMIN (HUMAN) 25 G: 0.25 INJECTION, SOLUTION INTRAVENOUS at 12:05

## 2024-01-25 NOTE — PROGRESS NOTES
1150 Patient arrived to [facility] in wheel chair for albumin infusion.  Oriented to room and call light  PT RIGHTS AND RESPONSIBILITIES OFFERED TO PT.    1205 Infusion started and she denies complaints.  Snack and drink provided     1255 patient denies complaints at this time    1410 Medication completed and she denies complaints.  Iv removed.  Discharge instructions given and explained and she denies questions.  Discharged in wheel chair    _M___ Safety:       (Environmental)  [place] to environment  Ensure ID band is correct and in place/ allergy band as needed  Assess for fall risk  Initiate fall precautions as applicable (fall band, side rails, etc.)  Call light within reach  Bed in low position/ wheels locked    _M___ Pain:       Assess pain level and characteristics  Administer analgesics as ordered  Assess effectiveness of pain management and report to MD as needed    _M___ Knowledge Deficit:  Assess baseline knowledge  Provide teaching at level of understanding  Provide teaching via preferred learning method  Evaluate teaching effectiveness    _M___ Hemodynamic/Respiratory Status:       (Pre and Post Procedure Monitoring)  Assess/Monitor vital signs and LOC  Assess Baseline SpO2 prior to any sedation  Obtain weight/height  Assess vital signs/ LOC until patient meets discharge criteria  Monitor procedure site and notify MD of any issues

## 2024-01-26 LAB
CHARACTER, BODY FLUID: NORMAL
COLOR FLD: YELLOW
GRANULOCYTES NFR FLD AUTO: 17.5 %
MONONUC CELLS NFR FLD AUTO: 82.5 %
NUC CELL # FLD AUTO: 90 /CUMM (ref 0–500)
PATHOLOGIST REVIEW: NORMAL
RBC # FLD AUTO: < 2000 /CUMM
SPECIMEN: NORMAL
TOTAL VOLUME RECEIVED BODY FLUID: 70 ML

## 2024-01-27 LAB
BACTERIA SPEC ANAEROBE CULT: NORMAL
BACTERIA SPEC BFLD CULT: NORMAL
GRAM STN SPEC: NORMAL

## 2024-01-27 NOTE — PROGRESS NOTES
"[facility]  Discharge Summary  Name: Viv Shaw    MRN: [id number]  YOB: 1948    Age: 75 year old  Date of Discharge:  1/27/2024  Date of Admission: 1/23/2024  Primary Care Provider: Summer Vega  Discharge Physician:  Harjinder Carranza MD  Discharging Service:  Hospitalist      Hospital Course/Discharge Diagnoses:    Mechanical fall with right hip fracture.  History of renal transplant.  Diabetes mellitus type 2.  Essential hypertension.  Hypothyroidism.  Parkinson's disease.  Dementia.  Encephalopathy.  Urinary retention, resolved.    Discharge Disposition:  Discharged to home     Allergies:  No Known Allergies     Discharge Medications:        Review of your medicines        START taking        Dose / Directions   oxyCODONE 5 MG tablet  Commonly known as: ROXICODONE      Dose: 2.5-5 mg  Take 0.5-1 tablets (2.5-5 mg) by mouth every 6 hours as needed for moderate to severe pain 1/2 tab po q 4 hrs prn pain scale \"3-6\"  1 tab po q 4 hrs prn pain scale \"7-10\"  Quantity: 15 tablet  Refills: 0     senna-docusate 8.6-50 MG tablet  Commonly known as: SENOKOT-S/PERICOLACE      Dose: 1 tablet  Take 1 tablet by mouth 2 times daily as needed for constipation  Refills: 0     Vitamin D3 25 mcg (1000 units) tablet  Commonly known as: CHOLECALCIFEROL  Replaces: cholecalciferol 125 MCG (5000 UT) Caps      Dose: 50 mcg  Take 2 tablets (50 mcg) by mouth daily  Refills: 0            CONTINUE these medicines which may have CHANGED, or have new prescriptions. If we are uncertain of the size of tablets/capsules you have at home, strength may be listed as something that might have changed.        Dose / Directions   aspirin 81 MG EC tablet  Indication: VTE Prophylaxis  This may have changed: when to take this      Dose: 81 mg  Take 1 tablet (81 mg) by mouth 2 times daily  Refills: 0     insulin aspart 100 UNIT/ML pen  Commonly known as: NovoLOG PEN  This may have changed:   how to take " Sadie Bautista is a [de-identified] y.o. female that presents for     Chief Complaint   Patient presents with    Follow-up     3 months  lft sided lump on abd        /74   Pulse 66   Temp 97.7 °F (36.5 °C)   Resp 16   Ht 5' 6.5\" (1.689 m)   Wt 162 lb 3.2 oz (73.6 kg)   LMP  (LMP Unknown)   SpO2 97%   BMI 25.79 kg/m²       HPI    States that she was pulling weeds last week and felt a sudden pain in left lateral chest wall. Went to ER, ribs XRay wnl. Notes pain with coughing and reaching. Does not remember feeling a 'pop' sensation. Sx improve with trunk flexion, worsen with extension. Balance continues to be an issue. Stopped PT as she did not feel like this was effective. HTN    Does patient check BP regularly at home? - No  Current Medication regimen - metoprolol  Tolerating medications well? - yes    Shortness of breath or chest pain? No  Headache or visual complaints? No  Neurologic changes like confusion? No  Extremity edema? No    BP Readings from Last 3 Encounters:   06/21/21 120/74   06/17/21 137/65   05/25/21 (!) 153/80     On Aricept and prevagen, memory issues have been better per . Seems to be doing well.     Health Maintenance   Topic Date Due    Hepatitis A vaccine (1 of 2 - Risk 2-dose series) Never done    Hepatitis B vaccine (1 of 3 - Risk 3-dose series) Never done    DEXA (modify frequency per FRAX score)  Never done    Shingles Vaccine (3 of 3) 02/25/2021    Lipid screen  10/26/2021    Annual Wellness Visit (AWV)  12/22/2021    Potassium monitoring  05/25/2022    Creatinine monitoring  05/25/2022    DTaP/Tdap/Td vaccine (2 - Td or Tdap) 10/15/2022    Flu vaccine  Completed    Pneumococcal 65+ years Vaccine  Completed    COVID-19 Vaccine  Completed    Hib vaccine  Aged Out    Meningococcal (ACWY) vaccine  Aged Out       Past Medical History:   Diagnosis Date    Anemia     Aneurysm of abdominal aorta (HCC)     Arthritis     Blood circulation, collateral     Bursitis, trochanteric     CAD (coronary artery disease) 2015    Cerebral artery occlusion with cerebral infarction [facility])     Chronic back pain     Cirrhosis of liver without ascites ([facility] [address] 2020    Depression     Diabetes mellitus (HCC)     diet controlled    GERD (gastroesophageal reflux disease)     Headache(784.0)     History of basal cell cancer     Nose    Hyperlipidemia     Hypertension     Irritable bowel     Movement disorder     PVD (peripheral vascular disease) ([facility] [address] [facility]     S/P CABG (coronary artery bypass graft)     Sciatica        Past Surgical History:   Procedure Laterality Date    ABDOMEN SURGERY      complete hysterectomy, gallbladder    APPENDECTOMY      CARDIAC [facility]HETERIZATION  2/3/2016    [facility]    CHOLECYSTECTOMY  yrs ago    COLONOSCOPY      CORONARY ARTERY BYPASS GRAFT  2-18-16    x3     ENDOSCOPY, COLON, DIAGNOSTIC      EYE SURGERY      cataracts, glaucoma    HERNIA REPAIR      Hiatal Hernia    HIATAL HERNIA REPAIR      HYSTERECTOMY      LARYNGOSCOPY  10/29/2012 Dr Too Reyes with Bx, Lingual Tonsillectomy, Hypopharyngoscopy    AZ VEIN BYPASS GRAFT,CAROT-SUBCL/ SUBCL-CAROTD Left 2018    LEFT CAROTID SUBCLAVIAN BYPASS performed by Rigo Villarreal MD at [address] [place] [address]      as a teen    UPPER GASTROINTESTINAL ENDOSCOPY         Social History     Tobacco Use    Smoking status: Former Smoker     Packs/day: 0.50     Years: 25.00     Pack years: 12.50     Types: Cigarettes     Quit date: 11/3/2000     Years since quittin.6    Smokeless tobacco: Never Used    Tobacco comment: quit in    Vaping Use    Vaping Use: Never used   Substance Use Topics    Alcohol use: No    Drug use: No       Family History   Problem Relation Age of Onset    Early Death Mother         not sure of cause    Heart Disease Father 52        MI    Cancer Brother         pancreatic    Cancer Son         larynx    this  when to take this  additional instructions  Used for: Diabetes mellitus type 1.5, managed as type 1 (H)      Inject 5-10 units subcutaneous with meals and correction doses as directed, total daily dose approx 25 units  Quantity: 15 mL  Refills: 5     insulin glargine 100 UNIT/ML pen  This may have changed: how much to take  Used for: Diabetes mellitus type 1.5, managed as type 1 (H)  Generic drug: insulin glargine      Dose: 15-17 Units  Inject 15-17 Units Subcutaneous daily  Quantity: 15 mL  Refills: 5            CONTINUE these medicines which have NOT CHANGED        Dose / Directions   acetaminophen 325 MG tablet  Commonly known as: TYLENOL  Used for: Infection of right eye, Orbital mass, Abscess of orbit, unspecified laterality, Immunosuppressed status (HCC), Type 2 diabetes mellitus with diabetic nephropathy, without long-term current use of insulin (H), Status post kidney transplant, Lewy body dementia, unspecified dementia severity, unspecified whether behavioral, psychotic, or mood disturbance or anxiety (H), Parkinson's disease, unspecified whether dyskinesia present, unspecified whether manifestations fluctuate      Dose: 975 mg  Take 3 tablets (975 mg) by mouth every 8 hours as needed for mild pain  Refills: 0     amLODIPine 10 MG tablet  Commonly known as: NORVASC  Used for: Hypertension secondary to other renal disorders      Dose: 10 mg  Take 1 tablet (10 mg) by mouth daily  Quantity: 90 tablet  Refills: 1     atorvastatin 40 MG tablet  Commonly known as: LIPITOR  Used for: Hyperlipidemia LDL goal <100      Dose: 20 mg  TAKE 0.5 TABLETS BY MOUTH DAILY  Quantity: 45 tablet  Refills: 0     carbidopa-levodopa  MG tablet  Commonly known as: SINEMET      Dose: 1.5 tablet  Take 1.5 tablets by mouth 3 times daily Through Neurologist  Refills: 0     cyanocobalamin 1000 MCG tablet  Commonly known as: VITAMIN B-12      Dose: 1,000 mcg  Take 1,000 mcg by mouth Every Mon, Wed, Fri Morning  Refills: 0      cycloSPORINE modified 25 MG capsule  Commonly known as: GENERIC EQUIVALENT  Used for: Kidney transplanted      Dose: 50 mg  Take 2 capsules (50 mg) by mouth 2 times daily  Quantity: 120 capsule  Refills: 11     Dexcom G7  Sophy  Used for: Diabetes mellitus type 1.5, managed as type 1 (H)      Use to read blood sugars as 's instructions.  Quantity: 1 each  Refills: 0     Dexcom G7 Sensor Misc  Used for: Diabetes mellitus type 1.5, managed as type 1 (H)      Change every 10 days.  Quantity: 3 each  Refills: 5     donepezil 10 MG tablet  Commonly known as: ARICEPT      Dose: 10 mg  Take 10 mg by mouth at bedtime Through Neurologist  Refills: 0     isosorbide mononitrate 30 MG 24 hr tablet  Commonly known as: IMDUR      Dose: 15 mg  Take 15 mg by mouth daily  Refills: 0     levothyroxine 100 MCG tablet  Commonly known as: SYNTHROID/LEVOTHROID  Used for: Other specified hypothyroidism      Dose: 100 mcg  Take 1 tablet (100 mcg) by mouth daily  Quantity: 90 tablet  Refills: 1     losartan 100 MG tablet  Commonly known as: COZAAR  Used for: Hypertension secondary to other renal disorders      TAKE 1/2 OF A TABLET (50 MG) BY MOUTH DAILY  Quantity: 45 tablet  Refills: 1     metoprolol succinate ER 25 MG 24 hr tablet  Commonly known as: TOPROL XL      Dose: 12.5 mg  Take 12.5 mg by mouth daily  Refills: 0     mycophenolic acid 180 MG EC tablet  Commonly known as: GENERIC EQUIVALENT  Used for: Kidney replaced by transplant, Kidney transplanted      Dose: 360 mg  Take 2 tablets (360 mg) by mouth 2 times daily  Quantity: 120 tablet  Refills: 11     pantoprazole 40 MG EC tablet  Commonly known as: PROTONIX  Used for: Gastroesophageal reflux disease without esophagitis      TAKE 1 TABLET BY MOUTH EVERY DAY  Quantity: 90 tablet  Refills: 2            STOP taking      cholecalciferol 125 MCG (5000 UT) Caps  Replaced by: Vitamin D3 25 mcg (1000 units) tablet                  Where to get your medicines     Diabetes Neg Hx     High Blood Pressure Neg Hx     Stroke Neg Hx     Colon Cancer Neg Hx     Colon Polyps Neg Hx          I have reviewed the patient's past medical history, past surgical history, allergies, medications, social and family history and I have made updates where appropriate. Review of Systems        PHYSICAL EXAM:  /74   Pulse 66   Temp 97.7 °F (36.5 °C)   Resp 16   Ht 5' 6.5\" (1.689 m)   Wt 162 lb 3.2 oz (73.6 kg)   LMP  (LMP Unknown)   SpO2 97%   BMI 25.79 kg/m²     Physical Exam  Nursing note reviewed. Constitutional:       Appearance: She is well-developed. Pulmonary:      Effort: Pulmonary effort is normal. No respiratory distress. Musculoskeletal:         General: Tenderness (along the inferior left ephraim-lateral ribs) present. Neurological:      Mental Status: She is alert. Psychiatric:         Behavior: Behavior normal.             ASSESSMENT & PLAN  Helder Durand was seen today for follow-up. Diagnoses and all orders for this visit:    Strain of abdominal wall, initial encounter  -     traMADol (ULTRAM) 50 MG tablet; Take 1 tablet by mouth every 6 hours as needed for Pain for up to 7 days. Pure hypercholesterolemia    Benign essential HTN    Late onset Alzheimer's disease without behavioral disturbance ([facility] [address]      -Chest wall sx are consistent with an abdominal muscle strain. Advised on conservative care. Will do short rx of tramadol for pain control. Will let me know if any new or worsening sx.  -Other chronic issues are stable, continue current medications  -Advised to call if any issues      Return in about 6 months (around 12/21/2021).     The most recent results of the following tests were reviewed with the patient today: none     All copied or forwarded information in the progress note was verified by me to be accurate at the time of visit  Patient's past medical, surgical, social and family history were reviewed and updated     All patient questions "    Some of these will need a paper prescription and others can be bought over the counter. Ask your nurse if you have questions.    Bring a paper prescription for each of these medications  oxyCODONE 5 MG tablet       These medications will be mailed to you       From: [facility] - [place], [place] - [address] Phone: [phone]   Dexcom G7 Sensor Misc         Condition on Discharge:  Discharge condition: Stable       Code status on discharge: Full Code     History of Illness:  Viv Shaw is a 75 year old female admitted on 1/23/2024 with right hip fracture     Problem List/Assessment and Plan:   Mechanical fall with right hip fracture: Hemiarthroplasty done on 1/24.  2.   History of renal transplant.:  Baseline creatinine 1-1.25.  Resumed cyclosporine and mycophenolate.  Creatinine went up to 1.38 today.  Given LR at 75 mill per hour and creatinine improved to 1.18.  3.   Diabetes type 2.:  Patient was hypoglycemic with blood sugar in 40s, decreased Lantus to 15 units at night and sliding scale.  Blood sugars better controlled now.  4.   Essential hypertension.:  Resumed Imdur, amlodipine and metoprolol.  Blood pressure is now better controlled.  5.   Hypothyroidism.:  Continue Synthroid.  6.  Parkinson disease.  Continue Sinemet.  7.  Hyperlipidemia.  Continue Lipitor.  8.  Dementia.  Continue on Aricept.  Patient was slightly confused after her surgery.  9.  Urinary retention.  Arevalo was discontinued on 1/26.    10.  Encephalopathy.  Patient is slightly encephalopathic but this is normal for her after procedures as per the .  Her mental status is better today.    Physical Exam:  Vital signs:  Temp: 97.5  F (36.4  C) Temp src: Temporal BP: (!) 170/58 Pulse: 61   Resp: 18 SpO2: 91 % O2 Device: Nasal cannula Oxygen Delivery: 1 LPM Height: 162.6 cm (5' 4\") Weight: 63.8 kg (140 lb 10.5 oz)  Estimated body mass index is 24.14 kg/m  as calculated from the following:    Height " answered. Patient voiced understanding. "as of this encounter: 1.626 m (5' 4\").    Weight as of this encounter: 63.8 kg (140 lb 10.5 oz).    Wt Readings from Last 1 Encounters:   01/23/24 63.8 kg (140 lb 10.5 oz)     General: Alert, awake, no acute distress.  HEENT: NC/AT, eyes anicteric, external occular movements intact, face symmetric.  Dentition WNL, MM moist.  Cardiac: RRR, S1, S2.  No murmurs appreciated.  Pulmonary: Normal chest rise, normal work of breathing.  Lungs CTA BL  Abdomen: soft, non-tender, non-distended.  Bowel Sounds Present.  No guarding.  Extremities: no deformities.  Warm, well perfused.  Skin: no rashes or lesions noted.  Warm and Dry.  Neuro: No focal deficits noted.  Speech clear.  Coordination and strength grossly normal.  Psych: Appropriate affect.    Imaging:  Results for orders placed or performed during the hospital encounter of 01/23/24   Head CT w/o contrast    Narrative    EXAM: CT HEAD W/O CONTRAST  1/23/2024 12:10 PM     HISTORY:  unwitnessed fall       COMPARISON:  Brain MRI 10/7/2023    TECHNIQUE: Using multidetector thin collimation helical acquisition  technique, axial, coronal and sagittal CT images from the skull base  to the vertex were obtained without intravenous contrast.   (topogram) image(s) also obtained and reviewed.    FINDINGS:  No intracranial hemorrhage, mass effect, or midline shift. No acute  loss of gray-white matter differentiation in the cerebral hemispheres.  Ventricles are proportionate to the cerebral sulci. Clear basal  cisterns. Patchy periventricular hypoattenuation, consistent with  chronic small vessel ischemic disease. Intracranial atherosclerosis.  Scattered chronic lacunar infarcts.    The bony calvaria and the bones of the skull base are normal.  Postsurgical changes from right maxillary antrectomy. 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 Scattered  paranasal sinus mucosal thickening. Bilateral mild mastoid effusions.  Grossly normal orbits.       " Impression    IMPRESSION:   1. No acute intracranial pathology.   2. Chronic small vessel ischemic disease and scattered chronic lacunar  infarcts.    CAMERON EDUARDO MD         SYSTEM ID:  [id number]   CT Cervical Spine w/o Contrast    Narrative    CT CERVICAL SPINE W/O CONTRAST 1/23/2024 12:10 PM    Provided History: fal    Comparison: No prior similar studies    Technique: Using multidetector thin collimation helical acquisition  technique, axial, coronal and sagittal CT images through the cervical  spine were obtained without intravenous contrast.     Findings:  Mild anterolisthesis of C4-5.    Reversal of normal cervical lordosis.     Anterior bony fusion at C5-6.    No acute fracture or subluxation. No prevertebral edema.     There is mild disc height narrowing at C6-7. Mild to moderate left  neural foraminal stenosis at C6-7. No high-grade spinal canal or  neural foraminal stenosis in the remaining cervical spine.     No abnormality of the paraspinous soft tissues.      Impression    Impression:   No acute fracture or traumatic subluxation.    CAMERON EDUARDO MD         SYSTEM ID:  [id number]   XR Hip Right 2-3 Views    Narrative    RIGHT HIP TWO TO THREE VIEWS  1/23/2024 1:58 PM     INDICATION: Right hip pain after trauma.     COMPARISON: None.      Impression    IMPRESSION:  1.  Acute fracture of the right femoral neck. Mild anterolateral  displacement and mild/moderate impaction.  2.  Normal right hip joint spacing and alignment.  3.  Surgical clips projecting over the right pelvis.    BARRETT SARAVIA MD         SYSTEM ID:  [id number]   XR Pelvis Port 1/2 Views    Narrative    EXAM: XR PELVIS PORT 1/2 VIEWS  DATE/TIME: 1/24/2024 1:08 PM     INDICATION: Right hip postoperative follow-up.  COMPARISON: 1/23/2024.      Impression    IMPRESSION: Interval right total hip arthroplasty.  The components are  well seated without evidence of complication.  No fractures or  malalignment. Postoperative soft tissue gas  about the right hip. Right  lateral hip skin staples. Mild bilateral sacroiliac and left hip  degenerative arthrosis. Atherosclerotic calcification.    BARRETT SARAVIA MD         SYSTEM ID:  [id number]     *Note: Due to a large number of results and/or encounters for the requested time period, some results have not been displayed. A complete set of results can be found in Results Review.        Consultations:  Orthopedics.       Recent Lab Results:  Recent Labs   Lab 01/27/24  0719 01/26/24  0648 01/25/24  0611   WBC 6.5 7.4 8.2   HGB 11.1* 10.9* 10.4*   HCT 34.1* 34.2* 32.8*   MCV 90 91 92    163 176          Lab Results   Component Value Date     01/27/2024     01/26/2024     01/25/2024     06/23/2021     03/18/2021     03/01/2021    Lab Results   Component Value Date    CHLORIDE 101 01/27/2024    CHLORIDE 101 01/26/2024    CHLORIDE 101 01/25/2024    CHLORIDE 107 04/11/2022    CHLORIDE 107 03/14/2022    CHLORIDE 105 02/25/2022    CHLORIDE 106 06/23/2021    CHLORIDE 107 03/18/2021    CHLORIDE 104 03/01/2021    Lab Results   Component Value Date    BUN 23.1 01/27/2024    BUN 28.2 01/26/2024    BUN 25.2 01/25/2024    BUN 20 04/11/2022    BUN 15 03/14/2022    BUN 17 02/25/2022    BUN 28 06/23/2021    BUN 33 03/18/2021    BUN 32 03/01/2021      Lab Results   Component Value Date    POTASSIUM 4.4 01/27/2024    POTASSIUM 4.2 01/26/2024    POTASSIUM 4.1 01/25/2024    POTASSIUM 3.8 04/11/2022    POTASSIUM 4.4 03/14/2022    POTASSIUM 4.6 02/25/2022    POTASSIUM 3.8 06/23/2021    POTASSIUM 4.2 03/18/2021    POTASSIUM 4.2 03/01/2021    Lab Results   Component Value Date    CO2 26 01/27/2024    CO2 28 01/26/2024    CO2 27 01/25/2024    CO2 26 04/11/2022    CO2 25 03/14/2022    CO2 26 02/25/2022    CO2 30 06/23/2021    CO2 29 03/18/2021    CO2 31 03/01/2021    Lab Results   Component Value Date    CR 1.18 01/27/2024    CR 1.23 01/26/2024    CR 1.38 01/25/2024    CR 1.34 06/23/2021     CR 1.45 03/18/2021    CR 1.43 03/01/2021             Pending Results:    Unresulted Labs Ordered in the Past 30 Days of this Admission       No orders found from 12/24/2023 to 1/24/2024.             Discharge Instructions and Follow-Up:   Discharge Procedure Orders   Medication Therapy Management Referral   Referral Priority: Routine Referral Type: Med Therapy Management   Requested Specialty: Pharmacist   Number of Visits Requested: 1     Reason for your hospital stay   Order Comments: Right hip fracture: hemiarthroplasty     Wound care (specify)   Order Comments: Site:   right hip   Instructions:  Keep dressings clean, dry and intact.  Change if peeling off or >60% saturated.  Do not immerse.  Ok to shower over tegaderm and aquacel dressings.     Follow Up and recommended labs and tests   Order Comments: Follow up with Dr. Rivas at [facility] 2 weeks after surgery.  If still in TCU, can be seen by the orthopedic provider at the care facility (if this is an option).    Call the care coordinator at [phone] to arrange the appt.   JONATHAN Nguyen: [phone]  Walk-in clinic 8am-8pm     Activity - Up with assistive device   Order Comments: Up with walker/assist     Order Specific Question Answer Comments   Is discharge order? Yes      Weight bearing status   Order Comments: WBAT Right LE with walker     Physical Therapy Adult Consult   Order Comments: Evaluate and treat as clinically indicated.    Reason:  Right hip fracture: hemiarthroplasty     Occupational Therapy Adult Consult   Order Comments: Evaluate and treat as clinically indicated.    Reason:  Right hip fracture: hemiarthroplasty     Fall precautions     Hip precautions   Order Comments: Avoid active abduction     Diet   Order Comments: Follow this diet upon discharge: Orders Placed This Encounter      Advance Diet as Tolerated: Regular Diet Adult     Order Specific Question Answer Comments   Is discharge order? Yes        Total time  spent in face to face contact with the patient and coordinating discharge was:  45 Minutes.

## 2024-01-30 LAB
BACTERIA SPEC ANAEROBE CULT: NORMAL
BACTERIA SPEC BFLD CULT: NORMAL
GRAM STN SPEC: NORMAL

## 2024-02-01 ENCOUNTER — HOSPITAL ENCOUNTER (OUTPATIENT)
Dept: NURSING | Age: 84
Discharge: HOME | End: 2024-02-01
Payer: MEDICARE

## 2024-02-01 ENCOUNTER — HOSPITAL ENCOUNTER (OUTPATIENT)
Dept: ULTRASOUND IMAGING | Age: 84
Discharge: HOME | End: 2024-02-01
Payer: MEDICARE

## 2024-02-01 DIAGNOSIS — R18.8 OTHER ASCITES: ICD-10-CM

## 2024-02-01 LAB — ALBUMIN FLD-MCNC: 1.1 GM/DL

## 2024-02-01 PROCEDURE — 96366 THER/PROPH/DIAG IV INF ADDON: CPT

## 2024-02-01 PROCEDURE — 89050 BODY FLUID CELL COUNT: CPT

## 2024-02-01 PROCEDURE — 87075 CULTR BACTERIA EXCEPT BLOOD: CPT

## 2024-02-01 PROCEDURE — 87205 SMEAR GRAM STAIN: CPT

## 2024-02-01 PROCEDURE — 96365 THER/PROPH/DIAG IV INF INIT: CPT

## 2024-02-01 PROCEDURE — 87070 CULTURE OTHR SPECIMN AEROBIC: CPT

## 2024-02-01 PROCEDURE — 82042 OTHER SOURCE ALBUMIN QUAN EA: CPT

## 2024-02-01 PROCEDURE — 6360000002 HC RX W HCPCS: Performed by: INTERNAL MEDICINE

## 2024-02-01 PROCEDURE — P9047 ALBUMIN (HUMAN), 25%, 50ML: HCPCS | Performed by: INTERNAL MEDICINE

## 2024-02-01 PROCEDURE — 88108 CYTOPATH CONCENTRATE TECH: CPT

## 2024-02-01 PROCEDURE — 49083 ABD PARACENTESIS W/IMAGING: CPT

## 2024-02-01 RX ORDER — ALBUMIN (HUMAN) 12.5 G/50ML
25 SOLUTION INTRAVENOUS ONCE
Status: COMPLETED | OUTPATIENT
Start: 2024-02-01 | End: 2024-02-01

## 2024-02-01 RX ADMIN — ALBUMIN (HUMAN) 25 G: 0.25 INJECTION, SOLUTION INTRAVENOUS at 12:28

## 2024-02-01 RX ADMIN — ALBUMIN (HUMAN) 25 G: 0.25 INJECTION, SOLUTION INTRAVENOUS at 13:15

## 2024-02-01 NOTE — PROGRESS NOTES
1200 Patient in wheelchair to OPN for Albumin infusion with . Verbalizes understanding. PT RIGHTS AND RESPONSIBILITIES OFFERED TO PT.    1228 Albumin infusion started.     1400 Message left with DR. Dumont office and Radiology scheduling to update need a new order.     1445 Infusion complete. Patient tolerated well. AVS reviewed with patient and . Verbalizes understanding.             _M___ Safety:       (Environmental)  [place] to environment  Ensure ID band is correct and in place/ allergy band as needed  Assess for fall risk  Initiate fall precautions as applicable (fall band, side rails, etc.)  Call light within reach  Bed in low position/ wheels locked    _M___ Pain:       Assess pain level and characteristics  Administer analgesics as ordered  Assess effectiveness of pain management and report to MD as needed    _M___ Knowledge Deficit:  Assess baseline knowledge  Provide teaching at level of understanding  Provide teaching via preferred learning method  Evaluate teaching effectiveness    _M___ Hemodynamic/Respiratory Status:       (Pre and Post Procedure Monitoring)  Assess/Monitor vital signs and LOC  Assess Baseline SpO2 prior to any sedation  Obtain weight/height  Assess vital signs/ LOC until patient meets discharge criteria  Monitor procedure site and notify MD of any issues

## 2024-02-02 LAB
CHARACTER, BODY FLUID: NORMAL
COLOR FLD: YELLOW
GRANULOCYTES NFR FLD AUTO: 23.2 %
MESOTHELIAL CELLS BODY FLUID: NORMAL
MONONUC CELLS NFR FLD AUTO: 76.8 %
NUC CELL # FLD AUTO: 95 /CUMM (ref 0–500)
PATHOLOGIST REVIEW: NORMAL
RBC # FLD AUTO: < 2000 /CUMM
SPECIMEN: NORMAL
TOTAL VOLUME RECEIVED BODY FLUID: 70 ML

## 2024-02-03 LAB
BACTERIA SPEC ANAEROBE CULT: NORMAL
BACTERIA SPEC BFLD CULT: NORMAL
GRAM STN SPEC: NORMAL

## 2024-02-06 LAB
BACTERIA SPEC ANAEROBE CULT: NORMAL
BACTERIA SPEC BFLD CULT: NORMAL
GRAM STN SPEC: NORMAL

## 2024-02-08 ENCOUNTER — HOSPITAL ENCOUNTER (OUTPATIENT)
Dept: NURSING | Age: 84
Discharge: HOME | End: 2024-02-08
Payer: MEDICARE

## 2024-02-08 ENCOUNTER — HOSPITAL ENCOUNTER (OUTPATIENT)
Dept: ULTRASOUND IMAGING | Age: 84
Discharge: HOME | End: 2024-02-08
Payer: MEDICARE

## 2024-02-08 VITALS
HEART RATE: 80 BPM | OXYGEN SATURATION: 97 % | TEMPERATURE: 97.9 F | DIASTOLIC BLOOD PRESSURE: 63 MMHG | SYSTOLIC BLOOD PRESSURE: 137 MMHG | RESPIRATION RATE: 16 BRPM

## 2024-02-08 DIAGNOSIS — K74.60 CIRRHOSIS OF LIVER WITH ASCITES, UNSPECIFIED HEPATIC CIRRHOSIS TYPE (HCC): ICD-10-CM

## 2024-02-08 DIAGNOSIS — R18.8 CIRRHOSIS OF LIVER WITH ASCITES, UNSPECIFIED HEPATIC CIRRHOSIS TYPE (HCC): ICD-10-CM

## 2024-02-08 PROCEDURE — 6360000002 HC RX W HCPCS: Performed by: INTERNAL MEDICINE

## 2024-02-08 PROCEDURE — 96365 THER/PROPH/DIAG IV INF INIT: CPT

## 2024-02-08 PROCEDURE — 96366 THER/PROPH/DIAG IV INF ADDON: CPT

## 2024-02-08 PROCEDURE — P9047 ALBUMIN (HUMAN), 25%, 50ML: HCPCS | Performed by: INTERNAL MEDICINE

## 2024-02-08 PROCEDURE — 49083 ABD PARACENTESIS W/IMAGING: CPT

## 2024-02-08 RX ORDER — ALBUMIN (HUMAN) 12.5 G/50ML
25 SOLUTION INTRAVENOUS ONCE
Status: COMPLETED | OUTPATIENT
Start: 2024-02-08 | End: 2024-02-08

## 2024-02-08 RX ADMIN — ALBUMIN (HUMAN) 25 G: 0.25 INJECTION, SOLUTION INTRAVENOUS at 12:53

## 2024-02-08 RX ADMIN — ALBUMIN (HUMAN) 25 G: 0.25 INJECTION, SOLUTION INTRAVENOUS at 12:05

## 2024-02-08 NOTE — H&P
Formulation and discussion of sedation / procedure plans, risks, benefits, side effects and alternatives with patient and/or responsible adult completed.    History and Physical reviewed and unchanged.    Electronically signed by Champ Baltazar MD on 2/8/24 at 11:16 AM EST

## 2024-02-08 NOTE — OP NOTE
Pre-Procedure Diagnosis: Ascites    Procedure Performed:  Paracentesis    Anesthesia: local    Findings: slightly cloudy  yellow serous fluid obtained.    Immediate Complications:  None    Estimated Blood Loss: minimal    SEE DICTATED PROCEDURE NOTE FOR COMPLETE DETAILS.    Champ Baltazar MD   2/8/2024 11:21 AM

## 2024-02-08 NOTE — PROGRESS NOTES
1155: Patient arrived in wheelchair for albumin infusion. Ultrasound states they got 5 L off. Patient rights and responsibilities offered to patient. Patient settled in bed. Albumin infusion started. Resting in bed, call light in reach.    1400: Infusion complete, patient tolerated well. No concerns voiced.  AVS reviewed with patient, voiced understanding. Patient discharged ambulatory.                 _m___ Safety:       (Environmental)  [place] to environment  Ensure ID band is correct and in place/ allergy band as needed  Assess for fall risk  Initiate fall precautions as applicable (fall band, side rails, etc.)  Call light within reach  Bed in low position/ wheels locked    _m___ Pain:       Assess pain level and characteristics  Administer analgesics as ordered  Assess effectiveness of pain management and report to MD as needed    _m___ Knowledge Deficit:  Assess baseline knowledge  Provide teaching at level of understanding  Provide teaching via preferred learning method  Evaluate teaching effectiveness    _m___ Hemodynamic/Respiratory Status:       (Pre and Post Procedure Monitoring)  Assess/Monitor vital signs and LOC  Assess Baseline SpO2 prior to any sedation  Obtain weight/height  Assess vital signs/ LOC until patient meets discharge criteria  Monitor procedure site and notify MD of any issues

## 2024-02-09 ENCOUNTER — HOSPITAL ENCOUNTER (OUTPATIENT)
Age: 84
Discharge: HOME | End: 2024-02-09
Payer: MEDICARE

## 2024-02-09 LAB
ANION GAP SERPL CALC-SCNC: 13 MEQ/L (ref 8–16)
BUN SERPL-MCNC: 25 MG/DL (ref 7–22)
CALCIUM SERPL-MCNC: 9.7 MG/DL (ref 8.5–10.5)
CHLORIDE SERPL-SCNC: 92 MEQ/L (ref 98–111)
CO2 SERPL-SCNC: 20 MEQ/L (ref 23–33)
CREAT SERPL-MCNC: 1.2 MG/DL (ref 0.4–1.2)
GFR SERPL CREATININE-BSD FRML MDRD: 45 ML/MIN/1.73M2
GLUCOSE SERPL-MCNC: 190 MG/DL (ref 70–108)
POTASSIUM SERPL-SCNC: 4.6 MEQ/L (ref 3.5–5.2)
SODIUM SERPL-SCNC: 125 MEQ/L (ref 135–145)

## 2024-02-09 PROCEDURE — 80048 BASIC METABOLIC PNL TOTAL CA: CPT

## 2024-02-09 PROCEDURE — 36415 COLL VENOUS BLD VENIPUNCTURE: CPT

## 2024-02-12 PROBLEM — N17.9 AKI (ACUTE KIDNEY INJURY) (HCC): Status: RESOLVED | Noted: 2024-01-09 | Resolved: 2024-02-12

## 2024-02-12 PROBLEM — K74.60 CIRRHOSIS (HCC): Status: RESOLVED | Noted: 2024-01-08 | Resolved: 2024-02-12

## 2024-02-12 PROBLEM — K56.7 ILEUS OF UNSPECIFIED TYPE (HCC): Status: RESOLVED | Noted: 2023-07-15 | Resolved: 2024-02-12

## 2024-02-12 PROBLEM — R18.8 ASCITES OF LIVER: Status: RESOLVED | Noted: 2023-07-16 | Resolved: 2024-02-12

## 2024-02-12 PROBLEM — E87.70 HYPERVOLEMIA: Status: RESOLVED | Noted: 2024-01-08 | Resolved: 2024-02-12

## 2024-02-12 NOTE — PROGRESS NOTES
Chief Complaint   Patient presents with    Medicare AWV    Insomnia    Restless Leg(s)    Follow-up     Chronic issues noted below     History obtained from  and the patient.    SUBJECTIVE:  Meg Wilson is a 83 y.o. female that presents today for     -Insomnia/RLS:  More trouble sleeping  Started on trazodone while at AL  Not helping  Also having RLS type sxs at night that is new  Moods stable  No anxiety      -Patient admitted to [facility] from 1/8 to 1/12/2024 for issues noted below.   D/c summary:  \"Chief Complaint on presentation :-    Edema     Discharge Assessment and Plan:-   Decompensated cirrhosis  Underwent paracentesis.  Negative for SBP.  Empiric Rocephin was thus discontinued.  Treated with IV diuretics.  Ascites improved as did lower extremity edema.  Adjusted home diuretics to Lasix twice daily.  Did reduce dose of Aldactone as patient was running mildly hypotensive especially after the addition of metoprolol.  She has been following with GI outpatient.  Workup has been largely unremarkable.  Concern for steatohepatitis.  Will continue to follow with Dr. Dumont post discharge  Acute kidney injury, resolved  Likely related to #1.  Improved with diuresis.  She did receive IV albumin for the first 24 to 36 hours of her hospitalization.  This was discontinued several days prior to discharge.  New onset atrial fibrillation with RVR  Patient send onset tachycardia, asymptomatic and hemodynamically stable during her admission.  EKG confirmed atrial fibrillation with RVR.  I had an extensive discussion with the patient, her , and their daughter-in-law, Lidia, regarding anticoagulation and reduction of stroke risk.  The patient is a high fall risk and they all elected to not pursue anticoagulation at this time.  The patient was started on Lopressor with control of her heart rate, she ultimately did convert back to sinus rhythm.  Her blood pressure is running on the lower side given her

## 2024-02-13 ENCOUNTER — OFFICE VISIT (OUTPATIENT)
Dept: FAMILY MEDICINE CLINIC | Age: 84
End: 2024-02-13
Payer: MEDICARE

## 2024-02-13 VITALS
TEMPERATURE: 97 F | WEIGHT: 142 LBS | BODY MASS INDEX: 22.29 KG/M2 | DIASTOLIC BLOOD PRESSURE: 70 MMHG | SYSTOLIC BLOOD PRESSURE: 122 MMHG | HEIGHT: 67 IN | RESPIRATION RATE: 18 BRPM | OXYGEN SATURATION: 98 % | HEART RATE: 93 BPM

## 2024-02-13 DIAGNOSIS — E87.1 HYPONATREMIA: ICD-10-CM

## 2024-02-13 DIAGNOSIS — D64.9 NORMOCYTIC ANEMIA: ICD-10-CM

## 2024-02-13 DIAGNOSIS — F03.90 DEMENTIA, UNSPECIFIED DEMENTIA SEVERITY, UNSPECIFIED DEMENTIA TYPE, UNSPECIFIED WHETHER BEHAVIORAL, PSYCHOTIC, OR MOOD DISTURBANCE OR ANXIETY (HCC): ICD-10-CM

## 2024-02-13 DIAGNOSIS — R53.81 PHYSICAL DECONDITIONING: ICD-10-CM

## 2024-02-13 DIAGNOSIS — Z86.73 HISTORY OF CVA (CEREBROVASCULAR ACCIDENT): ICD-10-CM

## 2024-02-13 DIAGNOSIS — Z95.1 S/P CABG X 3: ICD-10-CM

## 2024-02-13 DIAGNOSIS — Z00.00 MEDICARE ANNUAL WELLNESS VISIT, SUBSEQUENT: Primary | ICD-10-CM

## 2024-02-13 DIAGNOSIS — E11.9 TYPE 2 DIABETES MELLITUS WITHOUT COMPLICATION, WITHOUT LONG-TERM CURRENT USE OF INSULIN (HCC): ICD-10-CM

## 2024-02-13 DIAGNOSIS — D50.9 IRON DEFICIENCY ANEMIA, UNSPECIFIED IRON DEFICIENCY ANEMIA TYPE: ICD-10-CM

## 2024-02-13 DIAGNOSIS — R18.8 CIRRHOSIS OF LIVER WITH ASCITES, UNSPECIFIED HEPATIC CIRRHOSIS TYPE (HCC): ICD-10-CM

## 2024-02-13 DIAGNOSIS — I71.43 INFRARENAL ABDOMINAL AORTIC ANEURYSM (AAA) WITHOUT RUPTURE (HCC): ICD-10-CM

## 2024-02-13 DIAGNOSIS — I48.91 ATRIAL FIBRILLATION, UNSPECIFIED TYPE (HCC): ICD-10-CM

## 2024-02-13 DIAGNOSIS — N17.9 AKI (ACUTE KIDNEY INJURY) (HCC): ICD-10-CM

## 2024-02-13 DIAGNOSIS — F33.42 RECURRENT MAJOR DEPRESSIVE DISORDER, IN FULL REMISSION (HCC): ICD-10-CM

## 2024-02-13 DIAGNOSIS — G47.00 INSOMNIA, UNSPECIFIED TYPE: ICD-10-CM

## 2024-02-13 DIAGNOSIS — I73.9 PAD (PERIPHERAL ARTERY DISEASE) (HCC): ICD-10-CM

## 2024-02-13 DIAGNOSIS — I25.10 ASHD (ARTERIOSCLEROTIC HEART DISEASE): ICD-10-CM

## 2024-02-13 DIAGNOSIS — I77.1 SUBCLAVIAN ARTERY STENOSIS, LEFT (HCC): ICD-10-CM

## 2024-02-13 DIAGNOSIS — G25.81 RESTLESS LEGS: ICD-10-CM

## 2024-02-13 DIAGNOSIS — K74.60 CIRRHOSIS OF LIVER WITH ASCITES, UNSPECIFIED HEPATIC CIRRHOSIS TYPE (HCC): ICD-10-CM

## 2024-02-13 LAB — HBA1C MFR BLD: 5.4 % (ref 4.3–5.7)

## 2024-02-13 PROCEDURE — 1123F ACP DISCUSS/DSCN MKR DOCD: CPT | Performed by: FAMILY MEDICINE

## 2024-02-13 PROCEDURE — 99214 OFFICE O/P EST MOD 30 MIN: CPT | Performed by: FAMILY MEDICINE

## 2024-02-13 PROCEDURE — G0439 PPPS, SUBSEQ VISIT: HCPCS | Performed by: FAMILY MEDICINE

## 2024-02-13 PROCEDURE — 3044F HG A1C LEVEL LT 7.0%: CPT | Performed by: FAMILY MEDICINE

## 2024-02-13 RX ORDER — MIRTAZAPINE 7.5 MG/1
7.5 TABLET, FILM COATED ORAL NIGHTLY
Qty: 30 TABLET | Refills: 2 | Status: SHIPPED | OUTPATIENT
Start: 2024-02-13

## 2024-02-15 ENCOUNTER — HOSPITAL ENCOUNTER (OUTPATIENT)
Dept: NURSING | Age: 84
Discharge: HOME | End: 2024-02-15
Payer: MEDICARE

## 2024-02-15 ENCOUNTER — HOSPITAL ENCOUNTER (OUTPATIENT)
Dept: ULTRASOUND IMAGING | Age: 84
Discharge: HOME | End: 2024-02-15
Payer: MEDICARE

## 2024-02-15 VITALS
TEMPERATURE: 97.9 F | SYSTOLIC BLOOD PRESSURE: 131 MMHG | DIASTOLIC BLOOD PRESSURE: 58 MMHG | HEART RATE: 90 BPM | OXYGEN SATURATION: 94 %

## 2024-02-15 DIAGNOSIS — R18.8 ASCITES OF LIVER: ICD-10-CM

## 2024-02-15 PROCEDURE — 96365 THER/PROPH/DIAG IV INF INIT: CPT

## 2024-02-15 PROCEDURE — 6360000002 HC RX W HCPCS: Performed by: INTERNAL MEDICINE

## 2024-02-15 PROCEDURE — P9047 ALBUMIN (HUMAN), 25%, 50ML: HCPCS | Performed by: INTERNAL MEDICINE

## 2024-02-15 PROCEDURE — 96366 THER/PROPH/DIAG IV INF ADDON: CPT

## 2024-02-15 PROCEDURE — 49083 ABD PARACENTESIS W/IMAGING: CPT

## 2024-02-15 RX ORDER — ALBUMIN (HUMAN) 12.5 G/50ML
25 SOLUTION INTRAVENOUS ONCE
Status: COMPLETED | OUTPATIENT
Start: 2024-02-15 | End: 2024-02-15

## 2024-02-15 RX ADMIN — ALBUMIN (HUMAN) 25 G: 0.25 INJECTION, SOLUTION INTRAVENOUS at 11:59

## 2024-02-15 RX ADMIN — ALBUMIN (HUMAN) 25 G: 0.25 INJECTION, SOLUTION INTRAVENOUS at 12:51

## 2024-02-15 ASSESSMENT — PAIN - FUNCTIONAL ASSESSMENT: PAIN_FUNCTIONAL_ASSESSMENT: NONE - DENIES PAIN

## 2024-02-15 NOTE — PROGRESS NOTES
Patient admitted to room 05, vitals are stable. Patient offered rights and responsibilities.     __m__ Safety:       (Environmental)  [place] to environment  Ensure ID band is correct and in place/ allergy band as needed  Assess for fall risk  Initiate fall precautions as applicable (fall band, side rails, etc.)  Call light within reach  Bed in low position/ wheels locked    _m___ Pain:       Assess pain level and characteristics  Administer analgesics as ordered  Assess effectiveness of pain management and report to MD as needed    _m___ Knowledge Deficit:  Assess baseline knowledge  Provide teaching at level of understanding  Provide teaching via preferred learning method  Evaluate teaching effectiveness    __m__ Hemodynamic/Respiratory Status:       (Pre and Post Procedure Monitoring)  Assess/Monitor vital signs and LOC  Assess Baseline SpO2 prior to any sedation  Obtain weight/height  Assess vital signs/ LOC until patient meets discharge criteria  Monitor procedure site and notify MD of any issues

## 2024-02-16 ENCOUNTER — HOSPITAL ENCOUNTER (OUTPATIENT)
Age: 84
Discharge: HOME | End: 2024-02-16
Payer: MEDICARE

## 2024-02-16 DIAGNOSIS — G25.81 RESTLESS LEGS: ICD-10-CM

## 2024-02-16 DIAGNOSIS — D50.9 IRON DEFICIENCY ANEMIA, UNSPECIFIED IRON DEFICIENCY ANEMIA TYPE: ICD-10-CM

## 2024-02-16 DIAGNOSIS — D64.9 NORMOCYTIC ANEMIA: ICD-10-CM

## 2024-02-16 LAB
ANION GAP SERPL CALC-SCNC: 16 MEQ/L (ref 8–16)
ANISOCYTOSIS BLD QL SMEAR: PRESENT
BASOPHILS ABSOLUTE: 0.1 THOU/MM3 (ref 0–0.1)
BASOPHILS NFR BLD AUTO: 0.9 %
BUN SERPL-MCNC: 22 MG/DL (ref 7–22)
BURR CELLS BLD QL SMEAR: ABNORMAL
CALCIUM SERPL-MCNC: 10.5 MG/DL (ref 8.5–10.5)
CHLORIDE SERPL-SCNC: 98 MEQ/L (ref 98–111)
CO2 SERPL-SCNC: 20 MEQ/L (ref 23–33)
CREAT SERPL-MCNC: 1.4 MG/DL (ref 0.4–1.2)
DEPRECATED RDW RBC AUTO: 76.5 FL (ref 35–45)
ELLIPTOCYTES: ABNORMAL
EOSINOPHIL NFR BLD AUTO: 2 %
EOSINOPHILS ABSOLUTE: 0.2 THOU/MM3 (ref 0–0.4)
ERYTHROCYTE [DISTWIDTH] IN BLOOD BY AUTOMATED COUNT: 21.9 % (ref 11.5–14.5)
FERRITIN SERPL IA-MCNC: 140 NG/ML (ref 10–291)
GFR SERPL CREATININE-BSD FRML MDRD: 37 ML/MIN/1.73M2
GLUCOSE SERPL-MCNC: 130 MG/DL (ref 70–108)
HCT VFR BLD AUTO: 34 % (ref 37–47)
HGB BLD-MCNC: 11.2 GM/DL (ref 12–16)
IMM GRANULOCYTES # BLD AUTO: 0.04 THOU/MM3 (ref 0–0.07)
IMM GRANULOCYTES NFR BLD AUTO: 0.5 %
IRON SERPL-MCNC: 61 UG/DL (ref 50–170)
LYMPHOCYTES ABSOLUTE: 1.6 THOU/MM3 (ref 1–4.8)
LYMPHOCYTES NFR BLD AUTO: 20.4 %
MCH RBC QN AUTO: 31 PG (ref 26–33)
MCHC RBC AUTO-ENTMCNC: 32.9 GM/DL (ref 32.2–35.5)
MCV RBC AUTO: 94.2 FL (ref 81–99)
MONOCYTES ABSOLUTE: 1.2 THOU/MM3 (ref 0.4–1.3)
MONOCYTES NFR BLD AUTO: 15.5 %
NEUTROPHILS NFR BLD AUTO: 60.7 %
NRBC BLD AUTO-RTO: 0 /100 WBC
PLATELET # BLD AUTO: 197 THOU/MM3 (ref 130–400)
PLATELET BLD QL SMEAR: ADEQUATE
PMV BLD AUTO: 9.6 FL (ref 9.4–12.4)
POIKILOCYTES: ABNORMAL
POTASSIUM SERPL-SCNC: 4.4 MEQ/L (ref 3.5–5.2)
RBC # BLD AUTO: 3.61 MILL/MM3 (ref 4.2–5.4)
SCAN OF BLOOD SMEAR: NORMAL
SCHISTOCYTES BLD QL SMEAR: ABNORMAL
SEGMENTED NEUTROPHILS ABSOLUTE COUNT: 4.9 THOU/MM3 (ref 1.8–7.7)
SODIUM SERPL-SCNC: 134 MEQ/L (ref 135–145)
TIBC SERPL-MCNC: 244 UG/DL (ref 171–450)
WBC # BLD AUTO: 8 THOU/MM3 (ref 4.8–10.8)

## 2024-02-16 PROCEDURE — 83550 IRON BINDING TEST: CPT

## 2024-02-16 PROCEDURE — 36415 COLL VENOUS BLD VENIPUNCTURE: CPT

## 2024-02-16 PROCEDURE — 85025 COMPLETE CBC W/AUTO DIFF WBC: CPT

## 2024-02-16 PROCEDURE — 80048 BASIC METABOLIC PNL TOTAL CA: CPT

## 2024-02-16 PROCEDURE — 82728 ASSAY OF FERRITIN: CPT

## 2024-02-16 PROCEDURE — 83540 ASSAY OF IRON: CPT

## 2024-02-19 ENCOUNTER — TELEPHONE (OUTPATIENT)
Dept: FAMILY MEDICINE CLINIC | Age: 84
End: 2024-02-19

## 2024-02-19 DIAGNOSIS — G25.81 RESTLESS LEGS: Primary | ICD-10-CM

## 2024-02-19 RX ORDER — ROPINIROLE 0.25 MG/1
0.25 TABLET, FILM COATED ORAL NIGHTLY
Qty: 90 TABLET | Refills: 1 | Status: SHIPPED | OUTPATIENT
Start: 2024-02-19

## 2024-02-19 NOTE — TELEPHONE ENCOUNTER
I called and spoke to Jairo brito per HIPAA and he verbalized understanding and had no questions at this time.

## 2024-02-19 NOTE — TELEPHONE ENCOUNTER
----- Message from Elver Arredondo, DO sent at 2/19/2024  6:47 AM EST -----  Please let family know that iron stores are normal  Will send in a medication called Requip to see if this helps with her restless legs.   Let me know if questions, thanks!

## 2024-02-22 ENCOUNTER — HOSPITAL ENCOUNTER (OUTPATIENT)
Dept: NURSING | Age: 84
Discharge: HOME | End: 2024-02-22
Payer: MEDICARE

## 2024-02-22 ENCOUNTER — HOSPITAL ENCOUNTER (OUTPATIENT)
Dept: ULTRASOUND IMAGING | Age: 84
Discharge: HOME | End: 2024-02-22
Payer: MEDICARE

## 2024-02-22 VITALS
SYSTOLIC BLOOD PRESSURE: 141 MMHG | OXYGEN SATURATION: 98 % | RESPIRATION RATE: 18 BRPM | DIASTOLIC BLOOD PRESSURE: 60 MMHG | HEART RATE: 89 BPM | TEMPERATURE: 97 F

## 2024-02-22 DIAGNOSIS — R18.8 ASCITES OF LIVER: ICD-10-CM

## 2024-02-22 PROCEDURE — 6360000002 HC RX W HCPCS: Performed by: INTERNAL MEDICINE

## 2024-02-22 PROCEDURE — 96366 THER/PROPH/DIAG IV INF ADDON: CPT

## 2024-02-22 PROCEDURE — P9047 ALBUMIN (HUMAN), 25%, 50ML: HCPCS | Performed by: INTERNAL MEDICINE

## 2024-02-22 PROCEDURE — 49083 ABD PARACENTESIS W/IMAGING: CPT

## 2024-02-22 PROCEDURE — 96365 THER/PROPH/DIAG IV INF INIT: CPT

## 2024-02-22 RX ORDER — ALBUMIN (HUMAN) 12.5 G/50ML
25 SOLUTION INTRAVENOUS ONCE
Status: COMPLETED | OUTPATIENT
Start: 2024-02-22 | End: 2024-02-22

## 2024-02-22 RX ADMIN — ALBUMIN (HUMAN) 25 G: 0.25 INJECTION, SOLUTION INTRAVENOUS at 12:09

## 2024-02-22 RX ADMIN — ALBUMIN (HUMAN) 25 G: 0.25 INJECTION, SOLUTION INTRAVENOUS at 12:55

## 2024-02-22 NOTE — PROGRESS NOTES
1155: Patient arrived in wheelchair for albumin infusion. Ultrasound states they got 5 L off. Patient rights and responsibilities offered to patient. Patient settled in bed.     1209: Albumin infusion started. Resting in bed, call light in reach.     1400: Infusion complete, patient tolerated well. No concerns voiced.  AVS reviewed with patient, voiced understanding. Patient discharged in wheelchair.                   _m___ Safety:       (Environmental)  [place] to environment  Ensure ID band is correct and in place/ allergy band as needed  Assess for fall risk  Initiate fall precautions as applicable (fall band, side rails, etc.)  Call light within reach  Bed in low position/ wheels locked    __m__ Pain:       Assess pain level and characteristics  Administer analgesics as ordered  Assess effectiveness of pain management and report to MD as needed    _m___ Knowledge Deficit:  Assess baseline knowledge  Provide teaching at level of understanding  Provide teaching via preferred learning method  Evaluate teaching effectiveness    _m___ Hemodynamic/Respiratory Status:       (Pre and Post Procedure Monitoring)  Assess/Monitor vital signs and LOC  Assess Baseline SpO2 prior to any sedation  Obtain weight/height  Assess vital signs/ LOC until patient meets discharge criteria  Monitor procedure site and notify MD of any issues

## 2024-02-23 ENCOUNTER — HOSPITAL ENCOUNTER (OUTPATIENT)
Age: 84
Discharge: HOME | End: 2024-02-23
Payer: MEDICARE

## 2024-02-23 LAB
ANION GAP SERPL CALC-SCNC: 17 MEQ/L (ref 8–16)
BUN SERPL-MCNC: 37 MG/DL (ref 7–22)
CALCIUM SERPL-MCNC: 9.9 MG/DL (ref 8.5–10.5)
CHLORIDE SERPL-SCNC: 99 MEQ/L (ref 98–111)
CO2 SERPL-SCNC: 18 MEQ/L (ref 23–33)
CREAT SERPL-MCNC: 1.7 MG/DL (ref 0.4–1.2)
GFR SERPL CREATININE-BSD FRML MDRD: 29 ML/MIN/1.73M2
GLUCOSE SERPL-MCNC: 122 MG/DL (ref 70–108)
POTASSIUM SERPL-SCNC: 4.8 MEQ/L (ref 3.5–5.2)
SODIUM SERPL-SCNC: 134 MEQ/L (ref 135–145)

## 2024-02-23 PROCEDURE — 80048 BASIC METABOLIC PNL TOTAL CA: CPT

## 2024-02-23 PROCEDURE — 36415 COLL VENOUS BLD VENIPUNCTURE: CPT

## 2024-02-28 ENCOUNTER — HOSPITAL ENCOUNTER (INPATIENT)
Age: 84
LOS: 9 days | Discharge: HOSPICE | End: 2024-03-08
Attending: EMERGENCY MEDICINE | Admitting: ORTHOPAEDIC SURGERY
Payer: MEDICARE

## 2024-02-28 ENCOUNTER — APPOINTMENT (OUTPATIENT)
Dept: GENERAL RADIOLOGY | Age: 84
End: 2024-02-28
Payer: MEDICARE

## 2024-02-28 ENCOUNTER — APPOINTMENT (OUTPATIENT)
Dept: CT IMAGING | Age: 84
End: 2024-02-28
Payer: MEDICARE

## 2024-02-28 DIAGNOSIS — S32.9XXA CLOSED NONDISPLACED FRACTURE OF PELVIS, UNSPECIFIED PART OF PELVIS, INITIAL ENCOUNTER (HCC): Primary | ICD-10-CM

## 2024-02-28 PROBLEM — S32.10XA FRACTURE, SACRUM/COCCYX, CLOSED, INITIAL ENCOUNTER (HCC): Status: ACTIVE | Noted: 2024-02-28

## 2024-02-28 PROBLEM — S32.2XXA FRACTURE, SACRUM/COCCYX, CLOSED, INITIAL ENCOUNTER (HCC): Status: ACTIVE | Noted: 2024-02-28

## 2024-02-28 LAB
ALBUMIN SERPL BCG-MCNC: 3.4 G/DL (ref 3.5–5.1)
ALBUMIN SERPL BCG-MCNC: 3.9 G/DL (ref 3.5–5.1)
ALP SERPL-CCNC: 145 U/L (ref 38–126)
ALT SERPL W/O P-5'-P-CCNC: 30 U/L (ref 11–66)
AMMONIA PLAS-MCNC: 67 UMOL/L (ref 11–60)
ANION GAP SERPL CALC-SCNC: 15 MEQ/L (ref 8–16)
ANISOCYTOSIS BLD QL SMEAR: PRESENT
AST SERPL-CCNC: 49 U/L (ref 5–40)
BASO STIPL BLD QL SMEAR: ABNORMAL
BASOPHILS ABSOLUTE: 0.1 THOU/MM3 (ref 0–0.1)
BASOPHILS NFR BLD AUTO: 0.4 %
BILIRUB SERPL-MCNC: 3.2 MG/DL (ref 0.3–1.2)
BUN SERPL-MCNC: 43 MG/DL (ref 7–22)
CALCIUM SERPL-MCNC: 9.6 MG/DL (ref 8.5–10.5)
CHLORIDE SERPL-SCNC: 94 MEQ/L (ref 98–111)
CO2 SERPL-SCNC: 18 MEQ/L (ref 23–33)
CREAT SERPL-MCNC: 1.7 MG/DL (ref 0.4–1.2)
DEPRECATED RDW RBC AUTO: 76.3 FL (ref 35–45)
EKG ATRIAL RATE: 102 BPM
EKG P AXIS: 90 DEGREES
EKG P-R INTERVAL: 166 MS
EKG Q-T INTERVAL: 342 MS
EKG QRS DURATION: 80 MS
EKG QTC CALCULATION (BAZETT): 445 MS
EKG R AXIS: 58 DEGREES
EKG T AXIS: 66 DEGREES
EKG VENTRICULAR RATE: 102 BPM
ELLIPTOCYTES: ABNORMAL
EOSINOPHIL NFR BLD AUTO: 0.7 %
EOSINOPHILS ABSOLUTE: 0.1 THOU/MM3 (ref 0–0.4)
ERYTHROCYTE [DISTWIDTH] IN BLOOD BY AUTOMATED COUNT: 21.9 % (ref 11.5–14.5)
GFR SERPL CREATININE-BSD FRML MDRD: 29 ML/MIN/1.73M2
GLUCOSE SERPL-MCNC: 107 MG/DL (ref 70–108)
HCT VFR BLD AUTO: 30.1 % (ref 37–47)
HGB BLD-MCNC: 9.9 GM/DL (ref 12–16)
IMM GRANULOCYTES # BLD AUTO: 0.33 THOU/MM3 (ref 0–0.07)
IMM GRANULOCYTES NFR BLD AUTO: 2 %
INR PPP: 1.41 (ref 0.85–1.13)
LYMPHOCYTES ABSOLUTE: 1 THOU/MM3 (ref 1–4.8)
LYMPHOCYTES NFR BLD AUTO: 5.9 %
MCH RBC QN AUTO: 30.6 PG (ref 26–33)
MCHC RBC AUTO-ENTMCNC: 32.9 GM/DL (ref 32.2–35.5)
MCV RBC AUTO: 92.9 FL (ref 81–99)
MONOCYTES ABSOLUTE: 2.2 THOU/MM3 (ref 0.4–1.3)
MONOCYTES NFR BLD AUTO: 12.9 %
NEUTROPHILS NFR BLD AUTO: 78.1 %
NRBC BLD AUTO-RTO: 1 /100 WBC
OSMOLALITY SERPL CALC.SUM OF ELEC: 266.5 MOSMOL/KG (ref 275–300)
PATHOLOGIST REVIEW: ABNORMAL
PLATELET # BLD AUTO: 189 THOU/MM3 (ref 130–400)
PLATELET BLD QL SMEAR: ADEQUATE
PMV BLD AUTO: 9.3 FL (ref 9.4–12.4)
POIKILOCYTES: ABNORMAL
POLYCHROMASIA BLD QL SMEAR: ABNORMAL
POTASSIUM SERPL-SCNC: 4.8 MEQ/L (ref 3.5–5.2)
PROT SERPL-MCNC: 6.1 G/DL (ref 6.1–8)
RBC # BLD AUTO: 3.24 MILL/MM3 (ref 4.2–5.4)
SCAN OF BLOOD SMEAR: NORMAL
SCHISTOCYTES BLD QL SMEAR: ABNORMAL
SEGMENTED NEUTROPHILS ABSOLUTE COUNT: 13 THOU/MM3 (ref 1.8–7.7)
SODIUM SERPL-SCNC: 127 MEQ/L (ref 135–145)
TARGETS BLD QL SMEAR: ABNORMAL
TSH SERPL DL<=0.005 MIU/L-ACNC: 2.53 UIU/ML (ref 0.4–4.2)
WBC # BLD AUTO: 16.7 THOU/MM3 (ref 4.8–10.8)

## 2024-02-28 PROCEDURE — 6370000000 HC RX 637 (ALT 250 FOR IP): Performed by: NURSE PRACTITIONER

## 2024-02-28 PROCEDURE — 93010 ELECTROCARDIOGRAM REPORT: CPT | Performed by: INTERNAL MEDICINE

## 2024-02-28 PROCEDURE — 36415 COLL VENOUS BLD VENIPUNCTURE: CPT

## 2024-02-28 PROCEDURE — 82140 ASSAY OF AMMONIA: CPT

## 2024-02-28 PROCEDURE — 87086 URINE CULTURE/COLONY COUNT: CPT

## 2024-02-28 PROCEDURE — 2580000003 HC RX 258: Performed by: NURSE PRACTITIONER

## 2024-02-28 PROCEDURE — 80053 COMPREHEN METABOLIC PANEL: CPT

## 2024-02-28 PROCEDURE — 99285 EMERGENCY DEPT VISIT HI MDM: CPT

## 2024-02-28 PROCEDURE — 84443 ASSAY THYROID STIM HORMONE: CPT

## 2024-02-28 PROCEDURE — 72100 X-RAY EXAM L-S SPINE 2/3 VWS: CPT

## 2024-02-28 PROCEDURE — 73523 X-RAY EXAM HIPS BI 5/> VIEWS: CPT

## 2024-02-28 PROCEDURE — 93005 ELECTROCARDIOGRAM TRACING: CPT | Performed by: EMERGENCY MEDICINE

## 2024-02-28 PROCEDURE — 85610 PROTHROMBIN TIME: CPT

## 2024-02-28 PROCEDURE — 71045 X-RAY EXAM CHEST 1 VIEW: CPT

## 2024-02-28 PROCEDURE — 87205 SMEAR GRAM STAIN: CPT

## 2024-02-28 PROCEDURE — 99222 1ST HOSP IP/OBS MODERATE 55: CPT | Performed by: NURSE PRACTITIONER

## 2024-02-28 PROCEDURE — 2580000003 HC RX 258: Performed by: PHYSICIAN ASSISTANT

## 2024-02-28 PROCEDURE — 73700 CT LOWER EXTREMITY W/O DYE: CPT

## 2024-02-28 PROCEDURE — 6820000001 HC L2 TRAUMA SURGERY EVALUATION: Performed by: ORTHOPAEDIC SURGERY

## 2024-02-28 PROCEDURE — 1200000000 HC SEMI PRIVATE

## 2024-02-28 PROCEDURE — 85025 COMPLETE CBC W/AUTO DIFF WBC: CPT

## 2024-02-28 PROCEDURE — 82040 ASSAY OF SERUM ALBUMIN: CPT

## 2024-02-28 RX ORDER — SODIUM CHLORIDE 9 MG/ML
INJECTION, SOLUTION INTRAVENOUS CONTINUOUS
Status: DISCONTINUED | OUTPATIENT
Start: 2024-02-28 | End: 2024-03-01

## 2024-02-28 RX ORDER — ROPINIROLE 0.25 MG/1
0.25 TABLET, FILM COATED ORAL NIGHTLY
Status: DISCONTINUED | OUTPATIENT
Start: 2024-02-28 | End: 2024-03-08 | Stop reason: HOSPADM

## 2024-02-28 RX ORDER — SPIRONOLACTONE 25 MG/1
50 TABLET ORAL DAILY
Status: DISCONTINUED | OUTPATIENT
Start: 2024-02-28 | End: 2024-03-08 | Stop reason: HOSPADM

## 2024-02-28 RX ORDER — MORPHINE SULFATE 2 MG/ML
2 INJECTION, SOLUTION INTRAMUSCULAR; INTRAVENOUS
Status: DISCONTINUED | OUTPATIENT
Start: 2024-02-28 | End: 2024-03-04

## 2024-02-28 RX ORDER — HYDROCODONE BITARTRATE AND ACETAMINOPHEN 5; 325 MG/1; MG/1
2 TABLET ORAL EVERY 4 HOURS PRN
Status: DISCONTINUED | OUTPATIENT
Start: 2024-02-28 | End: 2024-03-07

## 2024-02-28 RX ORDER — NITROGLYCERIN 0.4 MG/1
0.4 TABLET SUBLINGUAL EVERY 5 MIN PRN
Status: DISCONTINUED | OUTPATIENT
Start: 2024-02-28 | End: 2024-03-08 | Stop reason: HOSPADM

## 2024-02-28 RX ORDER — SPIRONOLACTONE 25 MG/1
100 TABLET ORAL DAILY
Status: DISCONTINUED | OUTPATIENT
Start: 2024-02-28 | End: 2024-03-08 | Stop reason: HOSPADM

## 2024-02-28 RX ORDER — SODIUM CHLORIDE 0.9 % (FLUSH) 0.9 %
5-40 SYRINGE (ML) INJECTION PRN
Status: DISCONTINUED | OUTPATIENT
Start: 2024-02-28 | End: 2024-03-08 | Stop reason: HOSPADM

## 2024-02-28 RX ORDER — SERTRALINE HYDROCHLORIDE 25 MG/1
25 TABLET, FILM COATED ORAL DAILY
Status: DISCONTINUED | OUTPATIENT
Start: 2024-02-29 | End: 2024-03-08 | Stop reason: HOSPADM

## 2024-02-28 RX ORDER — SENNOSIDES 8.6 MG
650 CAPSULE ORAL EVERY 8 HOURS PRN
COMMUNITY

## 2024-02-28 RX ORDER — ATORVASTATIN CALCIUM 20 MG/1
20 TABLET, FILM COATED ORAL NIGHTLY
Status: DISCONTINUED | OUTPATIENT
Start: 2024-02-28 | End: 2024-03-08

## 2024-02-28 RX ORDER — ACETAMINOPHEN 325 MG/1
650 TABLET ORAL EVERY 8 HOURS PRN
Status: DISCONTINUED | OUTPATIENT
Start: 2024-02-28 | End: 2024-03-08 | Stop reason: HOSPADM

## 2024-02-28 RX ORDER — LANOLIN ALCOHOL/MO/W.PET/CERES
4.5 CREAM (GRAM) TOPICAL NIGHTLY PRN
Status: DISCONTINUED | OUTPATIENT
Start: 2024-02-28 | End: 2024-03-08 | Stop reason: HOSPADM

## 2024-02-28 RX ORDER — SODIUM CHLORIDE 0.9 % (FLUSH) 0.9 %
5-40 SYRINGE (ML) INJECTION EVERY 12 HOURS SCHEDULED
Status: DISCONTINUED | OUTPATIENT
Start: 2024-02-28 | End: 2024-03-08 | Stop reason: HOSPADM

## 2024-02-28 RX ORDER — MIRTAZAPINE 7.5 MG/1
7.5 TABLET, FILM COATED ORAL NIGHTLY
Status: DISCONTINUED | OUTPATIENT
Start: 2024-02-28 | End: 2024-03-08 | Stop reason: HOSPADM

## 2024-02-28 RX ORDER — ALBUMIN (HUMAN) 12.5 G/50ML
50 SOLUTION INTRAVENOUS ONCE
Status: DISCONTINUED | OUTPATIENT
Start: 2024-02-29 | End: 2024-02-29 | Stop reason: SDUPTHER

## 2024-02-28 RX ORDER — HYDROCODONE BITARTRATE AND ACETAMINOPHEN 5; 325 MG/1; MG/1
1 TABLET ORAL EVERY 4 HOURS PRN
Status: DISCONTINUED | OUTPATIENT
Start: 2024-02-28 | End: 2024-03-07

## 2024-02-28 RX ORDER — SODIUM CHLORIDE 9 MG/ML
INJECTION, SOLUTION INTRAVENOUS PRN
Status: DISCONTINUED | OUTPATIENT
Start: 2024-02-28 | End: 2024-03-08 | Stop reason: HOSPADM

## 2024-02-28 RX ORDER — FUROSEMIDE 40 MG/1
40 TABLET ORAL DAILY
Status: DISCONTINUED | OUTPATIENT
Start: 2024-02-28 | End: 2024-03-01

## 2024-02-28 RX ORDER — ALBUMIN (HUMAN) 12.5 G/50ML
50 SOLUTION INTRAVENOUS ONCE
Status: DISCONTINUED | OUTPATIENT
Start: 2024-02-28 | End: 2024-02-28

## 2024-02-28 RX ADMIN — MIRTAZAPINE 7.5 MG: 7.5 TABLET, FILM COATED ORAL at 22:46

## 2024-02-28 RX ADMIN — SODIUM CHLORIDE, PRESERVATIVE FREE 10 ML: 5 INJECTION INTRAVENOUS at 22:46

## 2024-02-28 RX ADMIN — SODIUM CHLORIDE: 9 INJECTION, SOLUTION INTRAVENOUS at 22:41

## 2024-02-28 RX ADMIN — ATORVASTATIN CALCIUM 20 MG: 20 TABLET, FILM COATED ORAL at 22:46

## 2024-02-28 ASSESSMENT — PAIN DESCRIPTION - FREQUENCY: FREQUENCY: CONTINUOUS

## 2024-02-28 ASSESSMENT — PAIN DESCRIPTION - PAIN TYPE
TYPE: ACUTE PAIN
TYPE: ACUTE PAIN

## 2024-02-28 ASSESSMENT — PAIN DESCRIPTION - ORIENTATION
ORIENTATION: RIGHT

## 2024-02-28 ASSESSMENT — PAIN DESCRIPTION - LOCATION
LOCATION: HIP

## 2024-02-28 ASSESSMENT — PAIN DESCRIPTION - DESCRIPTORS: DESCRIPTORS: THROBBING

## 2024-02-28 ASSESSMENT — PAIN SCALES - GENERAL
PAINLEVEL_OUTOF10: 0
PAINLEVEL_OUTOF10: 10

## 2024-02-28 ASSESSMENT — PAIN SCALES - WONG BAKER
WONGBAKER_NUMERICALRESPONSE: 6;8
WONGBAKER_NUMERICALRESPONSE: 6;8
WONGBAKER_NUMERICALRESPONSE: 4;6

## 2024-02-28 ASSESSMENT — PAIN - FUNCTIONAL ASSESSMENT
PAIN_FUNCTIONAL_ASSESSMENT: 0-10
PAIN_FUNCTIONAL_ASSESSMENT: WONG-BAKER FACES
PAIN_FUNCTIONAL_ASSESSMENT: WONG-BAKER FACES
PAIN_FUNCTIONAL_ASSESSMENT: PREVENTS OR INTERFERES WITH ALL ACTIVE AND SOME PASSIVE ACTIVITIES

## 2024-02-28 ASSESSMENT — PAIN DESCRIPTION - ONSET: ONSET: ON-GOING

## 2024-02-28 NOTE — PROGRESS NOTES
Pharmacy Medication History Note      List of current medications patient is taking is complete.    Source of information: Patient's      Changes made to medication list:  Medications removed (include reason, ex. therapy complete or physician discontinued):  Aqoaequorin and polyethylene glycol were removed as patient no longer takes these medications.    stated that the patient does not take ropinirole, however it was recently billed in dispense report on 2/19/2024 so it was kept on the list but marked as not taking.     Medications added/doses adjusted:  Sig for furosemide 40 mg was changed to once daily as that is how it is prescribed and how patient takes.  Acetaminophen 650 mg was added as patient uses this as needed for pain.    Other notes (ex. Recent course of antibiotics, Coumadin dosing):  Denies use of other OTC or herbal medications.    Allergies reviewed    Electronically signed by Fabricio Azevedo on 2/28/2024 at 4:26 PM

## 2024-02-28 NOTE — ED TRIAGE NOTES
Pt presents to the ED from home following a mechanical fall at home. Reports she hurt her right hip. Denies hitting head. Denies LOC. EKG completed on arrival

## 2024-02-28 NOTE — ED PROVIDER NOTES
[facility] EMERGENCY DEPT  [address] [address]  Phone: [phone]      CHIEF COMPLAINT       Chief Complaint   Patient presents with    Fall    Hip Pain     RIGHT       Nurses Notes reviewed and I agree except as noted in the HPI.      HISTORY OF PRESENT ILLNESS    Meg Wilson is a 83 y.o. female.    It sounds like there has been multiple falls but there was a new fall just now.  Coming in by ambulance.  Could not get up after this fall it just happened.  Cannot weight-bear on the right side, complains of right hip pain.  Denies hitting head or neck.  There is some underlying dementia.    REVIEW OF SYSTEMS       Review   Of systems not obtainable due to dementia         PAST MEDICAL HISTORY    has a past medical history of AAA (abdominal aortic aneurysm) (HCC), Alzheimer's dementia (HCC), Aortic stenosis, mild, Arthritis, ASHD (arteriosclerotic heart disease), Chronic low back pain, Cirrhosis of liver (HCC), DM2 (diabetes mellitus, type 2) (HCC), Dyslipidemia, Former smoker, GERD (gastroesophageal reflux disease), History of basal cell cancer, History of Crohn's disease, History of CVA (cerebrovascular accident), History of hypertension, IBS (irritable bowel syndrome), Iron deficiency anemia, Major depression, PAD (peripheral artery disease) (HCC), S/P CABG (coronary artery bypass graft), and Subclavian artery stenosis, left ([facility]).    SURGICAL HISTORY      has a past surgical history that includes Colonoscopy (2022); Hysterectomy; Upper gastrointestinal endoscopy; Cholecystectomy (yrs ago); Tonsillectomy; laryngoscopy (10/29/2012 Dr Zheng); Appendectomy; Endoscopy, colon, diagnostic; skin biopsy; hiatal hernia repair; Cardiac catheterization (02/03/2016); Abdomen surgery; eye surgery; hernia repair; Coronary artery bypass graft (02/18/2016); pr bypass w/vein carotid-subclv/subclavian carotid (Left, 04/11/2018); EGD (2022); and Mohs surgery (Left, 6/9/2023).    CURRENT MEDICATIONS    RDW-SD 76.3 (*)     MPV 9.3 (*)     Segs Absolute 13.0 (*)     Monocytes Absolute 2.2 (*)     Immature Grans (Abs) 0.33 (*)     All other components within normal limits   COMPREHENSIVE METABOLIC PANEL - Abnormal; Notable for the following components:    Creatinine 1.7 (*)     BUN 43 (*)     Sodium 127 (*)     Chloride 94 (*)     CO2 18 (*)     AST 49 (*)     Alkaline Phosphatase 145 (*)     Total Bilirubin 3.2 (*)     All other components within normal limits   GLOMERULAR FILTRATION RATE, ESTIMATED - Abnormal; Notable for the following components:    Est, Glom Filt Rate 29 (*)     All other components within normal limits   OSMOLALITY - Abnormal; Notable for the following components:    Osmolality Calc 266.5 (*)     All other components within normal limits   ANION GAP   SCAN OF BLOOD SMEAR       EMERGENCY DEPARTMENT COURSE:   Vitals:    Vitals:    02/28/24 1227 02/28/24 1327 02/28/24 1507   BP: (!) 123/54 (!) 123/53 118/86   Pulse: (!) 102 (!) 102 (!) 109   Resp: 16 18 18   Temp: 97.3 °F (36.3 °C)     TempSrc: Oral     SpO2: 95% 95% 96%   Weight: 68 kg (150 lb)     Height: 1.676 m (5' 6\")       We initially were not able to find the fractures on the plain films so we proceeded with a CT scan and we have now found multiple fractures that probably explain why she is having trouble with weightbearing.  Case is discussed with orthopedics.  And they are admitting with a medicine consult.      CRITICAL CARE:   none    CONSULTS:  Orthopedics    PROCEDURES:  None    FINAL IMPRESSION    Multiple fractures status post fall, ambulatory dysfunction    DISPOSITION/PLAN   admit    DISCHARGE MEDICATIONS:  New Prescriptions    No medications on file       (Please note that portions of this note were completed with a voice recognition program.  Efforts were made to edit the dictations but occasionally words are mis-transcribed.)    DO Andie Palacios Lawrence, DO  02/28/24 [id number]

## 2024-02-28 NOTE — CONSULTS
bone. RIGHT HIP: 1. Mild degenerative changes SACRUM/SACROILIAC JOINTS: 1. Mild degenerative changes of the right SI joint. SYMPHYSIS: Degenerative changes. OTHER: 1. Large ascites is seen. 2. Small hematoma is seen in the space of Retzius 3. Vascular calcifications are seen. 4. The uterus is surgically absent. 5. Circumferential bladder wall thickening that can suggest underlying chronic urinary retention. MUSCULATURE: Normal.     1. Mildly displaced acute fracture of the right sacral ala 2. Mildly displaced fracture of the right inferior pubic ramus. 3. Comminuted and displaced fractures of the body of the right pubic bone. 4. Large ascites **This report has been created using voice recognition software.  It may contain minor errors which are inherent in voice recognition technology.** Final report electronically signed by Dr Alice Denise on 2/28/2024 3:58 PM    XR LUMBAR SPINE (2-3 VIEWS)    Result Date: 2/28/2024  PROCEDURE: XR LUMBAR SPINE (2-3 VIEWS) CLINICAL INFORMATION: Fall TECHNIQUE: AP and lateral views of the lumbosacral spine COMPARISON: Lumbar spine 3/10/2021 FINDINGS: Lumbar vertebral body heights are preserved. Disc space narrowing is present at the L3-L4, L4-L5 and L5-S1 levels. There is stable 5 mm anterolisthesis of L4 on L5. There is no fracture. Multilevel posterior facet arthropathy is most severe at L4-L5 and L5-S1.     1. No fracture of the lumbar spine. 2. Anterolisthesis of L4 on L5. Final report electronically signed by Dr. Declan Albarran on 2/28/2024 1:43 PM    XR HIP W PELVIS MIN 5 VWS BILATERAL    Result Date: 2/28/2024  PROCEDURE: XR HIP W PELVIS MIN 5 VWS BILATERAL CLINICAL INFORMATION: Fall TECHNIQUE: AP pelvic radiograph, 4 views of the right and 2 views of the left hip COMPARISON: None FINDINGS: There is no fracture or dislocation. Joint spaces are preserved. Bones are osteopenic. There are phleboliths in the pelvis. Atherosclerotic vascular consultations are present. Surgical  clips are seen in the bilateral inguinal regions.     No fracture or dislocation. Final report electronically signed by Dr. Declan Albarran on 2/28/2024 1:40 PM    XR CHEST 1 VIEW    Result Date: 2/28/2024  PROCEDURE: XR CHEST 1 VIEW CLINICAL INFORMATION: Fall TECHNIQUE: AP supine chest radiograph COMPARISON: Mobile AP chest radiograph 1/8/2024 FINDINGS: Median sternotomy wires are again noted. Cardiac silhouette is within normal limits. Atherosclerotic calcifications are present in the thoracic aorta. There are no lung consolidations. Degenerative changes in the thoracic spine are poorly visualized. Bones are osteopenic.     No acute intrathoracic process. Final report electronically signed by Dr. Declan Albarran on 2/28/2024 1:36 PM        EKG: Sinus tachycardia rate 102  Cannot rule out Inferior infarct , age undetermined  Abnormal ECG  When compared with ECG of 12-JAN-2024 13:35,  Ventricular rate has increased BY  37 BPM      THANK YOU FOR THE CONSULT    Electronically signed by ANDREZ Knox CNP on 2/28/2024 at 4:16 PM

## 2024-02-29 ENCOUNTER — HOSPITAL ENCOUNTER (OUTPATIENT)
Dept: NURSING | Age: 84
Discharge: HOME | End: 2024-02-29

## 2024-02-29 ENCOUNTER — APPOINTMENT (OUTPATIENT)
Dept: ULTRASOUND IMAGING | Age: 84
End: 2024-02-29
Payer: MEDICARE

## 2024-02-29 PROBLEM — N18.31 STAGE 3A CHRONIC KIDNEY DISEASE (HCC): Status: ACTIVE | Noted: 2024-02-29

## 2024-02-29 PROBLEM — E87.1 HYPONATREMIA: Status: ACTIVE | Noted: 2024-02-29

## 2024-02-29 LAB
ALBUMIN SERPL BCG-MCNC: 3.6 G/DL (ref 3.5–5.1)
ALP SERPL-CCNC: 124 U/L (ref 38–126)
ALT SERPL W/O P-5'-P-CCNC: 31 U/L (ref 11–66)
ANION GAP SERPL CALC-SCNC: 19 MEQ/L (ref 8–16)
ANISOCYTOSIS BLD QL SMEAR: PRESENT
AST SERPL-CCNC: 49 U/L (ref 5–40)
BASOPHILS ABSOLUTE: 0 THOU/MM3 (ref 0–0.1)
BASOPHILS NFR BLD AUTO: 0.2 %
BILIRUB SERPL-MCNC: 5.3 MG/DL (ref 0.3–1.2)
BUN SERPL-MCNC: 48 MG/DL (ref 7–22)
CALCIUM SERPL-MCNC: 9.6 MG/DL (ref 8.5–10.5)
CHLORIDE SERPL-SCNC: 93 MEQ/L (ref 98–111)
CO2 SERPL-SCNC: 16 MEQ/L (ref 23–33)
CREAT SERPL-MCNC: 1.7 MG/DL (ref 0.4–1.2)
DEPRECATED RDW RBC AUTO: 78.3 FL (ref 35–45)
EOSINOPHIL NFR BLD AUTO: 0.1 %
EOSINOPHILS ABSOLUTE: 0 THOU/MM3 (ref 0–0.4)
ERYTHROCYTE [DISTWIDTH] IN BLOOD BY AUTOMATED COUNT: 22.5 % (ref 11.5–14.5)
GFR SERPL CREATININE-BSD FRML MDRD: 29 ML/MIN/1.73M2
GLUCOSE BLD STRIP.AUTO-MCNC: 110 MG/DL (ref 70–108)
GLUCOSE BLD STRIP.AUTO-MCNC: 170 MG/DL (ref 70–108)
GLUCOSE BLD STRIP.AUTO-MCNC: 191 MG/DL (ref 70–108)
GLUCOSE BLD STRIP.AUTO-MCNC: 88 MG/DL (ref 70–108)
GLUCOSE SERPL-MCNC: 170 MG/DL (ref 70–108)
HCT VFR BLD AUTO: 25.3 % (ref 37–47)
HGB BLD-MCNC: 8.4 GM/DL (ref 12–16)
IMM GRANULOCYTES # BLD AUTO: 0.15 THOU/MM3 (ref 0–0.07)
IMM GRANULOCYTES NFR BLD AUTO: 1.2 %
IRON SATN MFR SERPL: 37 % (ref 20–50)
IRON SERPL-MCNC: 88 UG/DL (ref 50–170)
LYMPHOCYTES ABSOLUTE: 0.8 THOU/MM3 (ref 1–4.8)
LYMPHOCYTES NFR BLD AUTO: 6.6 %
MCH RBC QN AUTO: 31 PG (ref 26–33)
MCHC RBC AUTO-ENTMCNC: 33.2 GM/DL (ref 32.2–35.5)
MCV RBC AUTO: 93.4 FL (ref 81–99)
MONOCYTES ABSOLUTE: 1.8 THOU/MM3 (ref 0.4–1.3)
MONOCYTES NFR BLD AUTO: 13.9 %
NEUTROPHILS NFR BLD AUTO: 78 %
NRBC BLD AUTO-RTO: 1 /100 WBC
OSMOLALITY SERPL CALC.SUM OF ELEC: 273.7 MOSMOL/KG (ref 275–300)
PLATELET # BLD AUTO: 162 THOU/MM3 (ref 130–400)
PMV BLD AUTO: 9.8 FL (ref 9.4–12.4)
POTASSIUM SERPL-SCNC: 5.2 MEQ/L (ref 3.5–5.2)
PROT SERPL-MCNC: 5.6 G/DL (ref 6.1–8)
RBC # BLD AUTO: 2.71 MILL/MM3 (ref 4.2–5.4)
SEGMENTED NEUTROPHILS ABSOLUTE COUNT: 9.8 THOU/MM3 (ref 1.8–7.7)
SODIUM SERPL-SCNC: 128 MEQ/L (ref 135–145)
TIBC SERPL-MCNC: 237 UG/DL (ref 171–450)
WBC # BLD AUTO: 12.6 THOU/MM3 (ref 4.8–10.8)

## 2024-02-29 PROCEDURE — 83540 ASSAY OF IRON: CPT

## 2024-02-29 PROCEDURE — 6360000002 HC RX W HCPCS: Performed by: NURSE PRACTITIONER

## 2024-02-29 PROCEDURE — 85025 COMPLETE CBC W/AUTO DIFF WBC: CPT

## 2024-02-29 PROCEDURE — 97530 THERAPEUTIC ACTIVITIES: CPT

## 2024-02-29 PROCEDURE — 2580000003 HC RX 258: Performed by: PHYSICIAN ASSISTANT

## 2024-02-29 PROCEDURE — 88108 CYTOPATH CONCENTRATE TECH: CPT

## 2024-02-29 PROCEDURE — 87070 CULTURE OTHR SPECIMN AEROBIC: CPT

## 2024-02-29 PROCEDURE — 82948 REAGENT STRIP/BLOOD GLUCOSE: CPT

## 2024-02-29 PROCEDURE — 87075 CULTR BACTERIA EXCEPT BLOOD: CPT

## 2024-02-29 PROCEDURE — 1200000000 HC SEMI PRIVATE

## 2024-02-29 PROCEDURE — 2580000003 HC RX 258: Performed by: INTERNAL MEDICINE

## 2024-02-29 PROCEDURE — 6370000000 HC RX 637 (ALT 250 FOR IP): Performed by: NURSE PRACTITIONER

## 2024-02-29 PROCEDURE — 0W9G3ZZ DRAINAGE OF PERITONEAL CAVITY, PERCUTANEOUS APPROACH: ICD-10-PCS | Performed by: INTERNAL MEDICINE

## 2024-02-29 PROCEDURE — 6370000000 HC RX 637 (ALT 250 FOR IP): Performed by: PHYSICIAN ASSISTANT

## 2024-02-29 PROCEDURE — 89050 BODY FLUID CELL COUNT: CPT

## 2024-02-29 PROCEDURE — 49083 ABD PARACENTESIS W/IMAGING: CPT

## 2024-02-29 PROCEDURE — 99232 SBSQ HOSP IP/OBS MODERATE 35: CPT | Performed by: INTERNAL MEDICINE

## 2024-02-29 PROCEDURE — P9047 ALBUMIN (HUMAN), 25%, 50ML: HCPCS | Performed by: NURSE PRACTITIONER

## 2024-02-29 PROCEDURE — 83550 IRON BINDING TEST: CPT

## 2024-02-29 PROCEDURE — 97163 PT EVAL HIGH COMPLEX 45 MIN: CPT

## 2024-02-29 PROCEDURE — 36415 COLL VENOUS BLD VENIPUNCTURE: CPT

## 2024-02-29 PROCEDURE — 80053 COMPREHEN METABOLIC PANEL: CPT

## 2024-02-29 RX ORDER — ALBUMIN (HUMAN) 12.5 G/50ML
25 SOLUTION INTRAVENOUS
Status: COMPLETED | OUTPATIENT
Start: 2024-02-29 | End: 2024-02-29

## 2024-02-29 RX ADMIN — ALBUMIN (HUMAN) 25 G: 0.25 INJECTION, SOLUTION INTRAVENOUS at 11:27

## 2024-02-29 RX ADMIN — SODIUM CHLORIDE: 9 INJECTION, SOLUTION INTRAVENOUS at 20:33

## 2024-02-29 RX ADMIN — SERTRALINE HYDROCHLORIDE 25 MG: 25 TABLET ORAL at 08:29

## 2024-02-29 RX ADMIN — ALBUMIN (HUMAN) 25 G: 0.25 INJECTION, SOLUTION INTRAVENOUS at 10:06

## 2024-02-29 RX ADMIN — HYDROCODONE BITARTRATE AND ACETAMINOPHEN 1 TABLET: 5; 325 TABLET ORAL at 10:08

## 2024-02-29 RX ADMIN — SODIUM CHLORIDE: 9 INJECTION, SOLUTION INTRAVENOUS at 08:45

## 2024-02-29 RX ADMIN — ATORVASTATIN CALCIUM 20 MG: 20 TABLET, FILM COATED ORAL at 19:42

## 2024-02-29 RX ADMIN — ROPINIROLE HYDROCHLORIDE 0.25 MG: 0.25 TABLET, FILM COATED ORAL at 19:42

## 2024-02-29 ASSESSMENT — PATIENT HEALTH QUESTIONNAIRE - PHQ9
2. FEELING DOWN, DEPRESSED OR HOPELESS: 0
SUM OF ALL RESPONSES TO PHQ QUESTIONS 1-9: 0
SUM OF ALL RESPONSES TO PHQ9 QUESTIONS 1 & 2: 0
SUM OF ALL RESPONSES TO PHQ QUESTIONS 1-9: 0
1. LITTLE INTEREST OR PLEASURE IN DOING THINGS: 0
SUM OF ALL RESPONSES TO PHQ QUESTIONS 1-9: 0
SUM OF ALL RESPONSES TO PHQ QUESTIONS 1-9: 0

## 2024-02-29 ASSESSMENT — PAIN DESCRIPTION - DESCRIPTORS: DESCRIPTORS: ACHING

## 2024-02-29 ASSESSMENT — LIFESTYLE VARIABLES
HOW MANY STANDARD DRINKS CONTAINING ALCOHOL DO YOU HAVE ON A TYPICAL DAY: PATIENT DOES NOT DRINK
HOW OFTEN DO YOU HAVE A DRINK CONTAINING ALCOHOL: NEVER

## 2024-02-29 ASSESSMENT — PAIN - FUNCTIONAL ASSESSMENT: PAIN_FUNCTIONAL_ASSESSMENT: PREVENTS OR INTERFERES SOME ACTIVE ACTIVITIES AND ADLS

## 2024-02-29 ASSESSMENT — PAIN DESCRIPTION - ORIENTATION: ORIENTATION: RIGHT

## 2024-02-29 ASSESSMENT — PAIN DESCRIPTION - LOCATION: LOCATION: PELVIS

## 2024-02-29 ASSESSMENT — PAIN SCALES - GENERAL: PAINLEVEL_OUTOF10: 5

## 2024-02-29 NOTE — PROGRESS NOTES
Physician Progress Note      PATIENT:               JOVANY FERNANDES  CSN #:                  [id number]  :                       1940  ADMIT DATE:       2024 12:25 PM  DISCH DATE:  RESPONDING  PROVIDER #:        Farooq Allan MD          QUERY TEXT:    Pt admitted with mildly displaced acute fracture of the right sacral ala and   mildly displaced fracture of the right inferior pubic ramus.  XR Hip noted,   \"Bones are osteopenic.\"  If possible, please document in progress notes and   discharge summary if you are evaluating and/or treating any of the following:    The medical record reflects the following:  Risk Factors: 83 yr. old female, s/p fall  Clinical Indicators: mildly displaced acute fracture of the right sacral ala   and mildly displaced fracture of the right inferior pubic ramus.  XR Hip   noted, \"Bones are osteopenic.\"  Treatment: Orthopedic mgmt, pain control, closed treatment, WBAT, PT/OT,   possible SNF placement    Thank you!    Bryanna Beard, RN, BSN, RHIT, CCDS  Clinical   Options provided:  -- Osteoporotic right sacral and pubic fracture following fall which would not   usually break a normal, healthy bone  -- Traumatic right sacral and pubic fracture  -- Other - I will add my own diagnosis  -- Disagree - Not applicable / Not valid  -- Disagree - Clinically unable to determine / Unknown  -- Refer to Clinical Documentation Reviewer    PROVIDER RESPONSE TEXT:    This patient has an osteoporotic right sacral and pubic fracture following   fall which would not break a normal, healthy bone.    Query created by: Bryanna Beard on 2024 12:24 PM      Electronically signed by:  Farooq Allan MD 2024 2:08 PM

## 2024-02-29 NOTE — PLAN OF CARE
SW Consulted. Please see noted dated 2/29 for details.     Problem: Discharge Planning  Goal: Discharge to home or other facility with appropriate resources  Outcome: Progressing

## 2024-02-29 NOTE — PROGRESS NOTES
D-Dimer: No results found for: \"DDIMER\"    Lactic acid: No results found for: \"LACT\"     Troponin T No results for input(s): \"TROPHS\" in the last 72 hours.    Patient Active Problem List   Diagnosis    DM2 (diabetes mellitus, type 2) (HCC)    Dyslipidemia    PAD (peripheral artery disease) (HCC)    Chronic low back pain    GERD (gastroesophageal reflux disease)    Esophageal stricture    Former smoker    ASHD (arteriosclerotic heart disease)    Iron deficiency anemia    Cirrhosis of liver (HCC)    Normocytic anemia    AAA (abdominal aortic aneurysm) (HCC)    Alzheimer's dementia (HCC)    Aortic stenosis, mild    Arthritis    History of basal cell cancer    History of Crohn's disease    History of CVA (cerebrovascular accident)    History of hypertension    IBS (irritable bowel syndrome)    Major depression    S/P CABG (coronary artery bypass graft)    Subclavian artery stenosis, left (HCC)    Ascites of liver    Acute kidney injury (HCC)    Restless legs    Fracture, sacrum/coccyx, closed, initial encounter ([facility])    Hyponatremia    Stage 3a chronic kidney disease (HCC)       PAST MEDICAL HISTORY    has a past medical history of AAA (abdominal aortic aneurysm) (HCC), Alzheimer's dementia (HCC), Aortic stenosis, mild, Arthritis, ASHD (arteriosclerotic heart disease), Chronic low back pain, Cirrhosis of liver (HCC), DM2 (diabetes mellitus, type 2) (HCC), Dyslipidemia, Former smoker, GERD (gastroesophageal reflux disease), History of basal cell cancer, History of Crohn's disease, History of CVA (cerebrovascular accident), History of hypertension, IBS (irritable bowel syndrome), Iron deficiency anemia, Major depression, PAD (peripheral artery disease) (HCC), S/P CABG (coronary artery bypass graft), and Subclavian artery stenosis, left (HCC).    SURGICAL HISTORY      has a past surgical history that includes Colonoscopy (2022); Hysterectomy; Upper gastrointestinal endoscopy; Cholecystectomy (yrs ago); Tonsillectomy;  bones are markedly demineralized. FRACTURE: 1. Mildly displaced acute fracture of the right sacral ala 2. Mildly displaced fracture of the right inferior pubic ramus. 3. Comminuted and displaced fractures of the body of the right pubic bone. RIGHT HIP: 1. Mild degenerative changes SACRUM/SACROILIAC JOINTS: 1. Mild degenerative changes of the right SI joint. SYMPHYSIS: Degenerative changes. OTHER: 1. Large ascites is seen. 2. Small hematoma is seen in the space of Retzius 3. Vascular calcifications are seen. 4. The uterus is surgically absent. 5. Circumferential bladder wall thickening that can suggest underlying chronic urinary retention. MUSCULATURE: Normal.     1. Mildly displaced acute fracture of the right sacral ala 2. Mildly displaced fracture of the right inferior pubic ramus. 3. Comminuted and displaced fractures of the body of the right pubic bone. 4. Large ascites **This report has been created using voice recognition software.  It may contain minor errors which are inherent in voice recognition technology.** Final report electronically signed by Dr Alice Denise on 2/28/2024 3:58 PM    XR LUMBAR SPINE (2-3 VIEWS)    Result Date: 2/28/2024  PROCEDURE: XR LUMBAR SPINE (2-3 VIEWS) CLINICAL INFORMATION: Fall TECHNIQUE: AP and lateral views of the lumbosacral spine COMPARISON: Lumbar spine 3/10/2021 FINDINGS: Lumbar vertebral body heights are preserved. Disc space narrowing is present at the L3-L4, L4-L5 and L5-S1 levels. There is stable 5 mm anterolisthesis of L4 on L5. There is no fracture. Multilevel posterior facet arthropathy is most severe at L4-L5 and L5-S1.     1. No fracture of the lumbar spine. 2. Anterolisthesis of L4 on L5. Final report electronically signed by Dr. Declan Albarran on 2/28/2024 1:43 PM    XR HIP W PELVIS MIN 5 VWS BILATERAL    Result Date: 2/28/2024  PROCEDURE: XR HIP W PELVIS MIN 5 VWS BILATERAL CLINICAL INFORMATION: Fall TECHNIQUE: AP pelvic radiograph, 4 views of the right and 2

## 2024-02-29 NOTE — CARE COORDINATION
Case Management Assessment  Initial Evaluation    Date/Time of Evaluation: 2/29/2024 7:37 AM  Assessment Completed by: Petra Ureña RN    If patient is discharged prior to next notation, then this note serves as note for discharge by case management.    Patient Name: Meg Wilson                   YOB: 1940  Diagnosis: Fracture, sacrum/coccyx, closed, initial encounter ([facility]) [S32.10XA, S32.2XXA]  Closed nondisplaced fracture of pelvis, unspecified part of pelvis, initial encounter ([facility]) [S32.9XXA]                   Date / Time: 2/28/2024 12:25 PM  Location: [address] [place] [address]     Patient Admission Status: Inpatient   Readmission Risk Low 0-14, Mod 15-19), High > 20: Readmission Risk Score: 22.4    Current PCP: Elver Arredondo, DO  PCP verified by CM?      Chart Reviewed: Yes      History Provided by:    Patient Orientation:      Patient Cognition:      Hospitalization in the last 30 days (Readmission):  No    If yes, Readmission Assessment in  Navigator will be completed.    Advance Directives:      Code Status: Prior   Patient's Primary Decision Maker is:      Primary Decision Maker: Jaior Wilson Sr. - Spouse - [phone]    Discharge Planning:    Patient lives with: Spouse/Significant Other   Type of Home: House  Primary Care Giver:    Patient Support Systems include: Spouse/Significant Other, Children   Current Financial resources:    Current community resources:    Current services prior to admission: Home Care            Current DME:              Type of Home Care services:  Nursing Services, PT, OT    ADLS  Prior functional level:    Current functional level:      Family can provide assistance at DC:    Would you like Case Management to discuss the discharge plan with any other family members/significant others, and if so, who?    Plans to Return to Present Housing:    Other Identified Issues/Barriers to RETURNING to current housing: unsure  Potential Assistance needed at discharge:

## 2024-02-29 NOTE — PROGRESS NOTES
SBIRT screening completed per trauma protocol. SBIRT Findings are Negative.  Patient denies alcohol and/or drug use. Also denies depressive symptoms.    Brief Intervention and Referral to Treatment Summary N/A    Patient was provided PHQ-9, AUDIT-C and DAST Screening:      PHQ-9 Score:  Negative  AUDIT-C Score:  Negative  DAST Score:   Negative    Patient’s substance use is considered-Negative    Low Risk/Healthy  Moderate Risk  Harmful  Dependent    Patient’s depression is considered:-Negative    Minimal  Mild   Moderate  Moderately Severe  Severe    Brief Education Was Provided N/A    Patient was receptive  Patient was not receptive      Brief Intervention Is Provided (Only for AUDIT-C or DAST) N/A    Patient reports readiness to decrease and/or stop use and a plan was discussed   Patient denies readiness to decrease and/or stop use and a plan was not discussed    Injured Trauma Survivor Screening  1.  When you were injured or right afterward   Did you think you were going to die? RESPONSES; YES+1/NO: NO  Do you think this was done to you intentionally? NO    Since your injury  Have you felt more restless, tense or jumpy than usual? RESPONSES; YES+1/NO: NO  Have you found yourself unable to stop worrying? RESPONSES; YES+1/NO: NO  Do you find yourself thinking that the world is unsafe and that people are not to be trusted? RESPONSES; YES+1/NO: NO    TOTAL SCORE from ITSS Questions 1 and 2:   0  NOTE: A score of greater than or equal to 2 is considered positive for PTSD risk and is to receive a community resource packet to link with appropriate providers.    Recommendations/Referrals for Brief and/or Specialized Treatment Provided to Patient  N/A

## 2024-02-29 NOTE — CARE COORDINATION
2/29/24, 3:06 PM EST    DISCHARGE PLANNING EVALUATION    SW consulted. Patient will need snf at discharge for continued therapy. SW gave patient's  snf list,  is going to speak with his daughter and discuss options.
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
## 2024-02-29 NOTE — ADT AUTH CERT
Last Updated by Keiry Cote RN on 2024 1054     Review Status Created By   In Primary Keiry Cote RN       Review Type Associated Date   -- 2024      Criteria Review   DATE:  Day 2      RELEVANT BASELINES:   sCr in -.2     PERTINENT UPDATES:  Plan for paracentesis today, Albumin IV x1  Creatinine remains elevated at 1.7, continue IVF  Still sustained tachycardia  Norco for pain, conservative ortho treatment  PT/OT evals pending     VITALS:  98 (36.7)          16        111      115/56    94% RA  Tachycardia: 109, 111, 105     ABNL/PERTINENT LABS/RADIOLOGY/DIAGNOSTIC STUDIES:  Sodium: 128 (L)  Chloride: 93 (L)  CO2: 16 (L)  BUN,BUNPL: 48 (H)  Creatinine: 1.7 (H)  Anion Gap: 19.0 (H)  Est, Glom Filt Rate: 29 !  Glucose, Random: 170 (H)  Osmolality Ca.7 (L)  Total Protein: 5.6 (L)  AST: 49 (H)  BILIRUBIN TOTAL: 5.3 (H)  WBC: 12.6 (H)  RBC: 2.71 (L)  Hemoglobin Quant: 8.4 (L)  Hematocrit: 25.3 (L)  RDW-CV: 22.5 (H)  RDW-SD: 78.3 (H)  Lymphocytes Absolute: 0.8 (L)  Monocytes Absolute: 1.8 (H)  Segs Absolute: 9.8 (H)  Immature Grans (Abs): 0.15 (H)     PHYSICAL EXAM:  GENERAL APPEARANCE: Awake and oriented x4, some confusion regarding fall but answers appropriately . No acute distress, except appropriate to injury.  MOOD AND AFFECT: Calm appropriate to situation  HEAD: normocephalic, atraumatic   EYES: equal and reactive to light, Extraocular movements are normal. Pupils are equal in size.  Hematologic/Lymphatic: no lymphadenopathy to upper or lower extremity.   MSK  RLE:- atraumatic hip, knee, ankle, no lacerations or lesions. Sitting in neutral alignment. Compartments soft.  Nontender to palpation asis iliac crest greater troch, nontender medial lateral joint line, tibia shaft, medial lateral mal, midfoot, calc, calf supple nontender,  Able to perform SLR, gastroc ta ehl intact, painless IR ER through hip. sensation to light touch intact, distal pulses  cell count and culture. He has no immediate concern for SBP. Will defer antibiotics for now.  CKD with chronic metabolic acidosis  -sCr in January 1-1.2, recently 1.4, 1.7. BUN elevated  -hold lasix and aldactone for now, trend renal indices, resume when improved  --  Other significant medical history:  CAD s/p CABG 2016  -Not on aspirin per hematology due to anemia, metoprolol held due to hypotension per cardiology  Atrial fibrillation, new onset in January, family elected not to pursue anticoagulation due to fall risk  -Not currently on beta-blocker due to hypotension  HFpEF, appears compensated  -no c/o shortness of breath, no hypoxia, no interstitial edema on CXR  -TTE 1/8/2024 EF 55 to 60%  -I&Os, daily weight, recommend low sodium diet  Mild aortic stenosis per 1/9/2024 TTE  AAA  PAD  H/o subclavian artery stenosis s/p LCCA-->LSCA vein bypass in 2018  H/o DM  -not on medications  -A1c 2/13/2024 5.4  -accuchecks   GERD  H/o CVA  -Off aspirin, continue statin  Alzheimer's dementia     MEDICATIONS:  NS 0.9% IV @ 100ml/hr     ORDERS:  Daily weights  Strict I/O  GI consult  Body fluid culture  Consult IR  Hospitalist consult  PT/OT  Reg diet  Weight restrictions

## 2024-02-29 NOTE — H&P
imaging through the right hip without IV contrast. Coronal and sagittal reconstructions were performed.      All CT scans at this facility use dose modulation, iterative reconstruction, and/or weight based dosing when appropriate to reduce the radiation dose to as low as reasonably achievable.        FINDINGS:  POSTOPERATIVE CHANGES: None.     ALIGNMENT: Anatomic.     MINERALIZATION: The bones are markedly demineralized.     FRACTURE:   1. Mildly displaced acute fracture of the right sacral ala  2. Mildly displaced fracture of the right inferior pubic ramus.  3. Comminuted and displaced fractures of the body of the right pubic bone.     RIGHT HIP:   1. Mild degenerative changes     SACRUM/SACROILIAC JOINTS:   1. Mild degenerative changes of the right SI joint.     SYMPHYSIS: Degenerative changes.     OTHER:   1. Large ascites is seen.  2. Small hematoma is seen in the space of Retzius  3. Vascular calcifications are seen.  4. The uterus is surgically absent.  5. Circumferential bladder wall thickening that can suggest underlying chronic urinary retention.     MUSCULATURE: Normal.        IMPRESSION:  1. Mildly displaced acute fracture of the right sacral ala  2. Mildly displaced fracture of the right inferior pubic ramus.  3. Comminuted and displaced fractures of the body of the right pubic bone.  4. Large ascites  Radiology report reviewed.    ASSESSMENT:  83 y.o. female who sustained a mechanical unwitnessed fall in her home yesterday. Had been mobilizing fairly well while working with therapy until this event, imaging revealed a pubic rami and sacral fracture for which orthopaedics was asked to admit. Her imaging was reviewed and we discussed her fractures, their alignment, and different management options for her injury. I recommend closed treatment. The risks, benefits, alternatives to closed treatment of the fractures were discussed at length, specifically nonunion and malunion. Natural history also discussed.  She was in agreement to move forward with closed treatment. Family bedside also expressed understanding       PLAN:    -closed treatment R sided pubic rami fracture, R sacral fracture  -WBAT BLE with walker  -PT OT  -paracentesis per hospitalist, apprecaite assistance  -dispo- pending mobilization, possible SNF placement   -multimodal pain control   -follow up 6 weeks Dr Allan    Patient history physical and plan were reviewed with Dr Allan, he was in agreement with the plan.

## 2024-03-01 PROBLEM — S32.9XXA CLOSED NONDISPLACED FRACTURE OF PELVIS (HCC): Status: ACTIVE | Noted: 2024-02-28

## 2024-03-01 LAB
ALBUMIN SERPL BCG-MCNC: 3.9 G/DL (ref 3.5–5.1)
ALP SERPL-CCNC: 118 U/L (ref 38–126)
ALT SERPL W/O P-5'-P-CCNC: 25 U/L (ref 11–66)
AMORPH SED URNS QL MICRO: ABNORMAL
ANION GAP SERPL CALC-SCNC: 13 MEQ/L (ref 8–16)
ANION GAP SERPL CALC-SCNC: 14 MEQ/L (ref 8–16)
ANISOCYTOSIS BLD QL SMEAR: PRESENT
AST SERPL-CCNC: 43 U/L (ref 5–40)
BACTERIA URNS QL MICRO: ABNORMAL /HPF
BASOPHILS ABSOLUTE: 0 THOU/MM3 (ref 0–0.1)
BASOPHILS NFR BLD AUTO: 0.3 %
BILIRUB SERPL-MCNC: 3.9 MG/DL (ref 0.3–1.2)
BILIRUB UR QL STRIP.AUTO: NEGATIVE
BUN SERPL-MCNC: 47 MG/DL (ref 7–22)
BUN SERPL-MCNC: 47 MG/DL (ref 7–22)
CALCIUM SERPL-MCNC: 9.2 MG/DL (ref 8.5–10.5)
CALCIUM SERPL-MCNC: 9.5 MG/DL (ref 8.5–10.5)
CASTS #/AREA URNS LPF: ABNORMAL /LPF
CHARACTER UR: CLEAR
CHARACTER, BODY FLUID: NORMAL
CHLORIDE SERPL-SCNC: 94 MEQ/L (ref 98–111)
CHLORIDE SERPL-SCNC: 95 MEQ/L (ref 98–111)
CO2 SERPL-SCNC: 16 MEQ/L (ref 23–33)
CO2 SERPL-SCNC: 16 MEQ/L (ref 23–33)
COLOR FLD: YELLOW
COLOR: YELLOW
CREAT SERPL-MCNC: 1.5 MG/DL (ref 0.4–1.2)
CREAT SERPL-MCNC: 1.5 MG/DL (ref 0.4–1.2)
CREAT UR-MCNC: 91.7 MG/DL
CRYSTALS URNS MICRO: ABNORMAL
DEPRECATED RDW RBC AUTO: 78.6 FL (ref 35–45)
EOSINOPHIL NFR BLD AUTO: 1.3 %
EOSINOPHILS ABSOLUTE: 0.2 THOU/MM3 (ref 0–0.4)
EPITHELIAL CELLS, UA: ABNORMAL /HPF
ERYTHROCYTE [DISTWIDTH] IN BLOOD BY AUTOMATED COUNT: 22.2 % (ref 11.5–14.5)
GFR SERPL CREATININE-BSD FRML MDRD: 34 ML/MIN/1.73M2
GFR SERPL CREATININE-BSD FRML MDRD: 34 ML/MIN/1.73M2
GLUCOSE BLD STRIP.AUTO-MCNC: 170 MG/DL (ref 70–108)
GLUCOSE BLD STRIP.AUTO-MCNC: 173 MG/DL (ref 70–108)
GLUCOSE BLD STRIP.AUTO-MCNC: 176 MG/DL (ref 70–108)
GLUCOSE BLD STRIP.AUTO-MCNC: 211 MG/DL (ref 70–108)
GLUCOSE SERPL-MCNC: 134 MG/DL (ref 70–108)
GLUCOSE SERPL-MCNC: 145 MG/DL (ref 70–108)
GLUCOSE UR QL STRIP.AUTO: NEGATIVE MG/DL
GRANULOCYTES NFR FLD AUTO: 14.9 %
HCT VFR BLD AUTO: 26.1 % (ref 37–47)
HGB BLD-MCNC: 8.8 GM/DL (ref 12–16)
HGB UR QL STRIP.AUTO: ABNORMAL
IMM GRANULOCYTES # BLD AUTO: 0.16 THOU/MM3 (ref 0–0.07)
IMM GRANULOCYTES NFR BLD AUTO: 1.2 %
KETONES UR QL STRIP.AUTO: NEGATIVE
LYMPHOCYTES ABSOLUTE: 0.8 THOU/MM3 (ref 1–4.8)
LYMPHOCYTES NFR BLD AUTO: 6.3 %
MCH RBC QN AUTO: 31.9 PG (ref 26–33)
MCHC RBC AUTO-ENTMCNC: 33.7 GM/DL (ref 32.2–35.5)
MCV RBC AUTO: 94.6 FL (ref 81–99)
MESOTHELIAL CELLS BODY FLUID: NORMAL
MISCELLANEOUS LAB TEST RESULT: ABNORMAL
MONOCYTES ABSOLUTE: 1.7 THOU/MM3 (ref 0.4–1.3)
MONOCYTES NFR BLD AUTO: 12.5 %
MONONUC CELLS NFR FLD AUTO: 85.1 %
NEUTROPHILS NFR BLD AUTO: 78.4 %
NITRITE UR QL STRIP: NEGATIVE
NRBC BLD AUTO-RTO: 0 /100 WBC
NUC CELL # FLD AUTO: 57 /CUMM (ref 0–500)
OSMOLALITY SERPL: 280 MOSMOL/KG (ref 275–295)
OSMOLALITY UR: 431 MOSMOL/KG (ref 250–750)
PATHOLOGIST REVIEW: NORMAL
PH UR STRIP.AUTO: 6 [PH] (ref 5–9)
PLATELET # BLD AUTO: 155 THOU/MM3 (ref 130–400)
PMV BLD AUTO: 9.9 FL (ref 9.4–12.4)
POTASSIUM SERPL-SCNC: 5.2 MEQ/L (ref 3.5–5.2)
POTASSIUM SERPL-SCNC: 5.6 MEQ/L (ref 3.5–5.2)
PROT SERPL-MCNC: 5.6 G/DL (ref 6.1–8)
PROT UR STRIP.AUTO-MCNC: NEGATIVE MG/DL
RBC # BLD AUTO: 2.76 MILL/MM3 (ref 4.2–5.4)
RBC # FLD AUTO: < 2000 /CUMM
RBC URINE: ABNORMAL /HPF
RENAL EPI CELLS #/AREA URNS HPF: ABNORMAL /[HPF]
SEGMENTED NEUTROPHILS ABSOLUTE COUNT: 10.5 THOU/MM3 (ref 1.8–7.7)
SODIUM SERPL-SCNC: 124 MEQ/L (ref 135–145)
SODIUM SERPL-SCNC: 124 MEQ/L (ref 135–145)
SODIUM UR-SCNC: < 20 MEQ/L
SP GR UR REFRACT.AUTO: 1.02 (ref 1–1.03)
SPECIMEN: NORMAL
TOTAL VOLUME RECEIVED BODY FLUID: 70 ML
UROBILINOGEN, URINE: 0.2 EU/DL (ref 0–1)
WBC # BLD AUTO: 13.4 THOU/MM3 (ref 4.8–10.8)
WBC #/AREA URNS HPF: ABNORMAL /HPF
WBC #/AREA URNS HPF: ABNORMAL /[HPF]
YEAST LIKE FUNGI URNS QL MICRO: ABNORMAL

## 2024-03-01 PROCEDURE — 85025 COMPLETE CBC W/AUTO DIFF WBC: CPT

## 2024-03-01 PROCEDURE — 36415 COLL VENOUS BLD VENIPUNCTURE: CPT

## 2024-03-01 PROCEDURE — 97535 SELF CARE MNGMENT TRAINING: CPT

## 2024-03-01 PROCEDURE — 2580000003 HC RX 258: Performed by: INTERNAL MEDICINE

## 2024-03-01 PROCEDURE — 97530 THERAPEUTIC ACTIVITIES: CPT

## 2024-03-01 PROCEDURE — 82570 ASSAY OF URINE CREATININE: CPT

## 2024-03-01 PROCEDURE — 1200000000 HC SEMI PRIVATE

## 2024-03-01 PROCEDURE — 81001 URINALYSIS AUTO W/SCOPE: CPT

## 2024-03-01 PROCEDURE — 83935 ASSAY OF URINE OSMOLALITY: CPT

## 2024-03-01 PROCEDURE — 6370000000 HC RX 637 (ALT 250 FOR IP): Performed by: PHYSICIAN ASSISTANT

## 2024-03-01 PROCEDURE — 82948 REAGENT STRIP/BLOOD GLUCOSE: CPT

## 2024-03-01 PROCEDURE — 97166 OT EVAL MOD COMPLEX 45 MIN: CPT

## 2024-03-01 PROCEDURE — 84300 ASSAY OF URINE SODIUM: CPT

## 2024-03-01 PROCEDURE — 99232 SBSQ HOSP IP/OBS MODERATE 35: CPT | Performed by: INTERNAL MEDICINE

## 2024-03-01 PROCEDURE — 2580000003 HC RX 258: Performed by: NURSE PRACTITIONER

## 2024-03-01 PROCEDURE — 80053 COMPREHEN METABOLIC PANEL: CPT

## 2024-03-01 PROCEDURE — 6370000000 HC RX 637 (ALT 250 FOR IP): Performed by: INTERNAL MEDICINE

## 2024-03-01 PROCEDURE — 6370000000 HC RX 637 (ALT 250 FOR IP): Performed by: NURSE PRACTITIONER

## 2024-03-01 PROCEDURE — 99222 1ST HOSP IP/OBS MODERATE 55: CPT | Performed by: INTERNAL MEDICINE

## 2024-03-01 PROCEDURE — 83930 ASSAY OF BLOOD OSMOLALITY: CPT

## 2024-03-01 RX ORDER — FUROSEMIDE 40 MG/1
40 TABLET ORAL DAILY
Status: DISCONTINUED | OUTPATIENT
Start: 2024-03-01 | End: 2024-03-02

## 2024-03-01 RX ORDER — SODIUM CHLORIDE 1 G/1
2 TABLET ORAL 2 TIMES DAILY WITH MEALS
Status: DISCONTINUED | OUTPATIENT
Start: 2024-03-01 | End: 2024-03-06

## 2024-03-01 RX ADMIN — ATORVASTATIN CALCIUM 20 MG: 20 TABLET, FILM COATED ORAL at 20:45

## 2024-03-01 RX ADMIN — SODIUM CHLORIDE, PRESERVATIVE FREE 10 ML: 5 INJECTION INTRAVENOUS at 20:45

## 2024-03-01 RX ADMIN — SODIUM CHLORIDE: 9 INJECTION, SOLUTION INTRAVENOUS at 10:26

## 2024-03-01 RX ADMIN — SODIUM CHLORIDE 2 G: 1 TABLET ORAL at 17:07

## 2024-03-01 RX ADMIN — ROPINIROLE HYDROCHLORIDE 0.25 MG: 0.25 TABLET, FILM COATED ORAL at 20:45

## 2024-03-01 RX ADMIN — FUROSEMIDE 40 MG: 40 TABLET ORAL at 13:47

## 2024-03-01 ASSESSMENT — PAIN DESCRIPTION - PAIN TYPE: TYPE: ACUTE PAIN

## 2024-03-01 ASSESSMENT — PAIN SCALES - WONG BAKER
WONGBAKER_NUMERICALRESPONSE: 0
WONGBAKER_NUMERICALRESPONSE: 4
WONGBAKER_NUMERICALRESPONSE: 0

## 2024-03-01 ASSESSMENT — PAIN SCALES - GENERAL
PAINLEVEL_OUTOF10: 0

## 2024-03-01 ASSESSMENT — PAIN DESCRIPTION - LOCATION: LOCATION: PELVIS

## 2024-03-01 ASSESSMENT — PAIN - FUNCTIONAL ASSESSMENT: PAIN_FUNCTIONAL_ASSESSMENT: PREVENTS OR INTERFERES SOME ACTIVE ACTIVITIES AND ADLS

## 2024-03-01 ASSESSMENT — PAIN DESCRIPTION - ONSET: ONSET: ON-GOING

## 2024-03-01 ASSESSMENT — PAIN DESCRIPTION - DESCRIPTORS: DESCRIPTORS: ACHING

## 2024-03-01 ASSESSMENT — PAIN DESCRIPTION - ORIENTATION: ORIENTATION: RIGHT

## 2024-03-01 ASSESSMENT — PAIN DESCRIPTION - FREQUENCY: FREQUENCY: CONTINUOUS

## 2024-03-01 NOTE — PLAN OF CARE
Problem: Discharge Planning  Goal: Discharge to home or other facility with appropriate resources  Outcome: Progressing     Problem: Pain  Goal: Verbalizes/displays adequate comfort level or baseline comfort level  Outcome: Progressing  Flowsheets  Taken 3/1/2024 1330  Verbalizes/displays adequate comfort level or baseline comfort level: Encourage patient to monitor pain and request assistance  Taken 3/1/2024 0945  Verbalizes/displays adequate comfort level or baseline comfort level: Encourage patient to monitor pain and request assistance     Problem: Skin/Tissue Integrity  Goal: Absence of new skin breakdown  Description: 1.  Monitor for areas of redness and/or skin breakdown  2.  Assess vascular access sites hourly  3.  Every 4-6 hours minimum:  Change oxygen saturation probe site  4.  Every 4-6 hours:  If on nasal continuous positive airway pressure, respiratory therapy assess nares and determine need for appliance change or resting period.  Outcome: Progressing     Problem: ABCDS Injury Assessment  Goal: Absence of physical injury  Outcome: Progressing  Flowsheets (Taken 3/1/2024 1400)  Absence of Physical Injury: Implement safety measures based on patient assessment     Problem: Safety - Adult  Goal: Free from fall injury  Outcome: Progressing  Flowsheets (Taken 3/1/2024 1400)  Free From Fall Injury: Instruct family/caregiver on patient safety     Problem: Chronic Conditions and Co-morbidities  Goal: Patient's chronic conditions and co-morbidity symptoms are monitored and maintained or improved  Outcome: Progressing

## 2024-03-01 NOTE — PROGRESS NOTES
5-40 mL, 2 times per day  [Held by provider] spironolactone, 100 mg, Daily  [Held by provider] spironolactone, 50 mg, Daily  atorvastatin, 20 mg, Nightly  [Held by provider] sertraline, 25 mg, Daily  [Held by provider] mirtazapine, 7.5 mg, Nightly  rOPINIRole, 0.25 mg, Nightly      ADULT DIET; Regular  Code Status:  Prior  No pharmacologic DVT prophylaxis.      Anticipated Disposition upon discharge:      [] Home  [] Home with HHC (Home Health Care) [] SNF (Skilled Nursing Facility) [] Assisted living facility [] LTAC ([facility])      /O (24Hr):    Intake/Output Summary (Last 24 hours) at 3/1/2024 1149  Last data filed at 2/29/2024 1855  Gross per 24 hour   Intake 2354.73 ml   Output --   Net 2354.73 ml         Patient Vitals for the past 96 hrs (Last 3 readings):   Weight   02/28/24 1227 68 kg (150 lb)     Admission weight: 68 kg (150 lb)  Wt Readings from Last 3 Encounters:   02/28/24 68 kg (150 lb)   02/13/24 64.4 kg (142 lb)   01/31/24 68.3 kg (150 lb 8 oz)    (encounters)    BP (!) 120/52   Pulse (!) 101   Temp 98.3 °F (36.8 °C) (Oral)   Resp 16   Ht 1.676 m (5' 6\")   Wt 68 kg (150 lb)   LMP  (LMP Unknown)   SpO2 92%   BMI 24.21 kg/m²   Vitals:    02/29/24 1930 03/01/24 0015 03/01/24 0345 03/01/24 0945   BP: (!) 114/50 (!) 124/52 (!) 105/50 (!) 120/52   Pulse: (!) 102 100 99 (!) 101   Resp: 17 18 17 16   Temp: 98.1 °F (36.7 °C) 98 °F (36.7 °C) 98 °F (36.7 °C) 98.3 °F (36.8 °C)   TempSrc: Oral Oral Oral Oral   SpO2: 92% 94% 93% 92%   Weight:       Height:           CBC:   Recent Labs     02/28/24  1225 02/29/24  0629 03/01/24  0700   WBC 16.7* 12.6* 13.4*   RBC 3.24* 2.71* 2.76*   HGB 9.9* 8.4* 8.8*   HCT 30.1* 25.3* 26.1*   MCV 92.9 93.4 94.6   MCH 30.6 31.0 31.9   MCHC 32.9 33.2 33.7    162 155   MPV 9.3* 9.8 9.9       MAG: No results for input(s): \"MG\" in the last 72 hours.    CMP:   Recent Labs     02/28/24  1225 02/28/24  1752 02/29/24  0629 03/01/24  0700   *  --   errors as no guarantees can be provided that every mistake has been identified and corrected by editing.      Electronically signed by Hortensia Piper MD on 3/1/2024 at 11:49 AM

## 2024-03-01 NOTE — CONSULTS
[facility]    [address] [id number]  [phone]     Hospital Consult  3/1/2024 3:14 PM    Pt Name:    Meg Wilson  MRN:     [id number]   [id number]  YOB: 1940  Admit Date:    2/28/2024 12:25 PM  Primary Care Physician:  Elver Arredondo DO        Reason for Consult:  hyponatremia    History:   The patient is a 83 y.o. female seen in renal consult for hyponatremia. She has a hx of cirrhosis requiring weekly paracentesis, CAD/CABG, CVA, dementia, and as below. She presented to the hospital after a fall and was found to have a pelvic fracture and was admitted.   Pt noted to have worsening sodium levels so nephrology was consulted.  Sodium on admission was 127 and today down to 124.  She had been receiving normal saline which was stopped today. She had a paracentesis yesterday with 4.7 liters removed.  She is on lasix and aldactone chronically which have been on hold since admission. Does have some SOB at rest.  + abdominal distention. Mild leg edema. Reports decent appetite. States she doesn't drink much fluid. Not on fluid restriction.  Sodium has been running low 130s since November.  Creatinine also worse typically runs 1.1-1.2 mg/dL and was 1.7 on admission, 1.5 today.    Past Medical History:  Past Medical History:   Diagnosis Date    AAA (abdominal aortic aneurysm) (HCC)     Alzheimer's dementia (HCC)     Aortic stenosis, mild     ECHO 10/27/2022    Arthritis     ASHD (arteriosclerotic heart disease)     s/p CABG 2016    Chronic low back pain     Cirrhosis of liver (HCC) 09/02/2020    DM2 (diabetes mellitus, type 2) ([facility])     diet control    Dyslipidemia     Former smoker     GERD (gastroesophageal reflux disease)     History of basal cell cancer     Nose    History of Crohn's disease     History of CVA (cerebrovascular accident)     x3 after heart surgery    History of hypertension     IBS (irritable bowel syndrome)     Iron deficiency anemia          Meds prn: sodium chloride flush, sodium chloride, HYDROcodone 5 mg - acetaminophen, HYDROcodone 5 mg - acetaminophen, morphine, melatonin, nitroGLYCERIN, acetaminophen     Allergies/Intolerances:  ALLERGIES: Ciprofloxacin, Darvon [propoxyphene hcl], Flagyl [metronidazole], and Lipitor [atorvastatin calcium]    24HR INTAKE/OUTPUT:    Intake/Output Summary (Last 24 hours) at 3/1/2024 1514  Last data filed at 2/29/2024 1855  Gross per 24 hour   Intake 2154.73 ml   Output --   Net 2154.73 ml     I/O last 3 completed shifts:  In: 2354.7 [P.O.:440; I.V.:1717.8; IV Piggyback:196.9]  Out: 0   No intake/output data recorded.  Admission weight: 68 kg (150 lb)  Wt Readings from Last 3 Encounters:   02/28/24 68 kg (150 lb)   02/13/24 64.4 kg (142 lb)   01/31/24 68.3 kg (150 lb 8 oz)     Body mass index is 24.21 kg/m².    Physical Examination:  VITALS:  BP (!) 114/58   Pulse (!) 104   Temp 98.8 °F (37.1 °C) (Oral)   Resp 16   Ht 1.676 m (5' 6\")   Wt 68 kg (150 lb)   LMP  (LMP Unknown)   SpO2 95%   BMI 24.21 kg/m²   Weight:   Wt Readings from Last 3 Encounters:   02/28/24 68 kg (150 lb)   02/13/24 64.4 kg (142 lb)   01/31/24 68.3 kg (150 lb 8 oz)     Constitutional and General Appearance:awake, pleasant  Eyes: no icteric sclera, no pallor conjunctiva, no discharge seen from either eye  Ears and Nose: normal external appearance of left and right ear and nose.  No active drainage from nose.   Oral: moist oral mucus membranes  Neck: No jugular venous distention, appears symmetric, good ROM  Lungs: diminished, end exp wheeze  Heart: regular rate, S1, S2 tachycardic  Extremities: trace ankle edema  GI: soft, distended  Skin: no rash seen on exposed extremities  Musculo: moves all extremities  Neuro: no slurred speech, no facial drooping  Psychiatric: Awake, alert, Oriented    Lab Data  CBC:   Recent Labs     02/28/24  1225 02/29/24  0629 03/01/24  0700   WBC 16.7* 12.6* 13.4*   HGB 9.9* 8.4* 8.8*   HCT 30.1* 25.3* 26.1*

## 2024-03-01 NOTE — PROGRESS NOTES
voice recognition technology.**    Final report electronically signed by Dr. Champ Baltazar on 7/15/2023 1:23 PM    No results found for this or any previous visit.    No results found for this or any previous visit.      Endoscopy Finding:       Objective:   Vitals: BP (!) 114/58   Pulse (!) 104   Temp 98.8 °F (37.1 °C) (Oral)   Resp 16   Ht 1.676 m (5' 6\")   Wt 68 kg (150 lb)   LMP  (LMP Unknown)   SpO2 95%   BMI 24.21 kg/m²     Intake/Output Summary (Last 24 hours) at 3/1/2024 1556  Last data filed at 2/29/2024 1855  Gross per 24 hour   Intake 1914.73 ml   Output --   Net 1914.73 ml     General appearance: alert and cooperative with exam  Lungs: clear to auscultation bilaterally  Heart: regular rate and rhythm, S1, S2 normal, no murmur, click, rub or gallop  Abdomen: Distended with air no ascites at this time  Extremities: edema 2 and extremities normal, atraumatic, no cyanosis or edema    Assessment and Plan:   Cirrhosis likely cryptogenic or not consider hepatitis not decompensated at this time on weekly biopsy and seeded with 50 g albumin given  Multiple fracture to the hip because of manage with orthopedic surgery  Hyperkalemia seen by the nephrology service acute kidney injury seen by the nephrology service .      Follow up in GI Clinic after discharge in 2 week(s)    Patient Active Problem List:     DM2 (diabetes mellitus, type 2) (HCC)     Dyslipidemia     PAD (peripheral artery disease) (HCC)     Chronic low back pain     GERD (gastroesophageal reflux disease)     Esophageal stricture     Former smoker     CAD (coronary artery disease)     Iron deficiency anemia     Cirrhosis of liver (HCC)     Normocytic anemia     AAA (abdominal aortic aneurysm) (HCC)     Alzheimer's dementia (HCC)     Aortic stenosis, mild     Arthritis     History of basal cell cancer     History of Crohn's disease     History of CVA (cerebrovascular accident)     History of hypertension     IBS (irritable bowel syndrome)      Major depression     S/P CABG (coronary artery bypass graft)     Subclavian artery stenosis, left (HCC)     Ascites of liver     Acute kidney injury (HCC)     Restless legs     Closed nondisplaced fracture of pelvis (HCC)     Hyponatremia     Stage 3a chronic kidney disease (HCC)      Electronically signed by Keke Dumont MD on 3/1/2024 at 3:56 PM

## 2024-03-01 NOTE — PROGRESS NOTES
Orthopaedic Progress Note      SUBJECTIVE   Ms. Wilson is hospital day 2, R pubic rami fx    Seen at bedside this morning  no adverse events overnight  Pain well controlled, tolerating oral diet  No new complaints   Good effort PT OT   Family bedside      OBJECTIVE      Physical    VITALS:  BP (!) 105/50   Pulse 99   Temp 98 °F (36.7 °C) (Oral)   Resp 17   Ht 1.676 m (5' 6\")   Wt 68 kg (150 lb)   LMP  (LMP Unknown)   SpO2 93%   BMI 24.21 kg/m²   I/O last 3 completed shifts:  In: 2354.7 [P.O.:440; I.V.:1717.8; IV Piggyback:196.9]  Out: 0     GENERAL APPEARANCE: Awake and oriented x4, some confusion regarding fall but answers appropriately . No acute distress, except appropriate to injury.  MOOD AND AFFECT: Calm appropriate to situation  HEAD: normocephalic, atraumatic   EYES: equal and reactive to light, Extraocular movements are normal. Pupils are equal in size.  Hematologic/Lymphatic: no lymphadenopathy to upper or lower extremity.   MSK  RLE:- atraumatic hip, knee, ankle, no lacerations or lesions. Sitting in neutral alignment. Compartments soft.  Nontender to palpation asis iliac crest greater troch, nontender medial lateral joint line, tibia shaft, medial lateral mal, midfoot, calc, calf supple nontender,  Able to perform SLR, gastroc ta ehl intact, painless IR ER through hip. sensation to light touch intact, distal pulses palpable       Data  CBC:   Lab Results   Component Value Date/Time    WBC 12.6 02/29/2024 06:29 AM    HGB 8.4 02/29/2024 06:29 AM     02/29/2024 06:29 AM     BMP:    Lab Results   Component Value Date/Time     02/29/2024 06:29 AM    K 5.2 02/29/2024 06:29 AM    K 4.4 01/08/2024 05:01 PM    CL 93 02/29/2024 06:29 AM    CO2 16 02/29/2024 06:29 AM    BUN 48 02/29/2024 06:29 AM    CREATININE 1.7 02/29/2024 06:29 AM    CALCIUM 9.6 02/29/2024 06:29 AM    GLUCOSE 170 02/29/2024 06:29 AM    GLUCOSE 93 06/06/2023 08:55 AM     Uric Acid:  No components found for: \"URIC\"  PT/INR:

## 2024-03-01 NOTE — PROGRESS NOTES
[facility]  PHYSICAL THERAPY MISSED TREATMENT NOTE  [facility]    Date: 3/1/2024  Patient Name: Meg Wilson        MRN: [id number]   : 1940  (83 y.o.)  Gender: female   Referring Practitioner: RODOLFO De La Cruz  Diagnosis: fracture, sacrum/coccyx         REASON FOR MISSED TREATMENT:  Missed Treat.      RN present, had just gotten pt back to bed, per RN pt is worn out and was unable to follow directions or maintain NWB when getting back to bed. Will let pt rest and plan to coordinate <>chair with OT/nursing staff next sessions

## 2024-03-01 NOTE — CARE COORDINATION
DISCHARGE PLANNING EVALUATION  3/1/24, 2:03 PM EST    Reason for Referral: snf   Mental Status: alert, confused due to dementia  Decision Making:  and family make decisions regarding care   Family/Social/Home Environment:  Meg lives at home with her .  Family assists with care at home.  Family plans for snf for rehab   Current Services including food security, transportation and housekeeping: current with [facility]  Current Equipment:walker   Payment Source: [employer] medicare   Concerns or Barriers to Discharge: will need precert for snf  Post-acute (PAC) provider list was provided to patient. Patient was informed of their freedom to choose PAC provider. Discussed and offered to show the patient the relevant PAC Providers quality and resource use measures on Medicare Compare web site via computer based on patient's goals of care and treatment preferences. Questions regarding selection process were answered.      Teach Back Method used with  and daughter in law regarding care plan and discharge plan  Patient's  and daughter in law verbalized understanding of the plan of care and contribute to goal setting.       Patient goals, treatment preferences and discharge plan: spoke with , with Meg present, and with daughter in law Priscila.  They request referral to [facility], referral made, will need precert.     Electronically signed by SUNNY Regan on 3/1/2024 at 2:03 PM

## 2024-03-01 NOTE — DISCHARGE INSTRUCTIONS
Dr Allan discharge instructions  -weight bearing as tolerated with walker  -PT OT as appropriate  -follow up 4-6 weeks Dr Allan as outpatient       May require further investigation of anemia by PCP as outpatient.  Follow-up with your primary care provider (PCP) in 1 to 2 weeks.   Follow up 6 weeks, Dr Allan, Ortho.

## 2024-03-01 NOTE — PROGRESS NOTES
[facility]  INPATIENT OCCUPATIONAL THERAPY  STR ORTHOPEDICS 7K  EVALUATION    Time:   Time In: 756  Time Out: 0834  Timed Code Treatment Minutes: 30 Minutes  Minutes: 38          Date: 3/1/2024  Patient Name: Meg Wilson,   Gender: female      MRN: [id number]  : 1940  (83 y.o.)  Referring Practitioner: iNkita Lagunas PA-C  Diagnosis: Fracture, sacrum/coccyx  Additional Pertinent Hx: 83 y.o. female who presents to the ED after an unwitnessed fall in her home. Had been working with therapy when  noticed a noise in the home, patient found on the ground and unable to ambulate after. Head neck imaging not performed inED, denied hitting head and neck. Imaging did reveal a pelvic fracture for which orthopaedics was asked to admit. Pain is predominately to the RLE, worsened with ROM and weight bearing, relieved by immobilization, non radiating, no associated paresthesias or weakness. No other msk complaints at this time,  bedside, aiding in history.    Restrictions/Precautions:  Restrictions/Precautions: Weight Bearing, Fall Risk  Right Lower Extremity Weight Bearing: Non Weight Bearing    Subjective  Chart Reviewed: Yes, Orders, Progress Notes, History and Physical  Patient assessed for rehabilitation services?: Yes    Subjective: RN okayed OT session. Upon arrival patient was sitting up in bed. Spouse present throughout session. Pt is confused.    Pain: Pt did not rank pain on scale.     Vitals: Vitals not assessed per clinical judgement, see nursing flowsheet    Social/Functional History:  Lives With: Spouse  Type of Home: House  Home Layout: One level  Home Access: Ramped entrance (with HR)  Home Equipment: Walker, 4 wheeled, Wheelchair-manual           Ambulation Assistance: Independent  Transfer Assistance: Independent    Active : No  Patient's  Info:  drives     Additional Comments: spouse does provide / supervision due to pt with dementia, per

## 2024-03-01 NOTE — PLAN OF CARE
Problem: Discharge Planning  Goal: Discharge to home or other facility with appropriate resources  3/1/2024 0138 by Mayra Minor RN  Outcome: Progressing  Flowsheets (Taken 3/1/2024 0138)  Discharge to home or other facility with appropriate resources:   Identify barriers to discharge with patient and caregiver   Arrange for needed discharge resources and transportation as appropriate   Identify discharge learning needs (meds, wound care, etc)     Problem: Pain  Goal: Verbalizes/displays adequate comfort level or baseline comfort level  Outcome: Progressing  Flowsheets (Taken 3/1/2024 0138)  Verbalizes/displays adequate comfort level or baseline comfort level:   Encourage patient to monitor pain and request assistance   Assess pain using appropriate pain scale   Administer analgesics based on type and severity of pain and evaluate response   Implement non-pharmacological measures as appropriate and evaluate response     Problem: Skin/Tissue Integrity  Goal: Absence of new skin breakdown  Description: 1.  Monitor for areas of redness and/or skin breakdown  2.  Assess vascular access sites hourly  3.  Every 4-6 hours minimum:  Change oxygen saturation probe site  4.  Every 4-6 hours:  If on nasal continuous positive airway pressure, respiratory therapy assess nares and determine need for appliance change or resting period.  Outcome: Progressing     Problem: ABCDS Injury Assessment  Goal: Absence of physical injury  Outcome: Progressing  Flowsheets (Taken 3/1/2024 0138)  Absence of Physical Injury: Implement safety measures based on patient assessment     Problem: Safety - Adult  Goal: Free from fall injury  Outcome: Progressing  Flowsheets (Taken 3/1/2024 0138)  Free From Fall Injury: Instruct family/caregiver on patient safety     Problem: Chronic Conditions and Co-morbidities  Goal: Patient's chronic conditions and co-morbidity symptoms are monitored and maintained or improved  Outcome:  Progressing  Flowsheets (Taken 3/1/2024 0138)  Care Plan - Patient's Chronic Conditions and Co-Morbidity Symptoms are Monitored and Maintained or Improved:   Monitor and assess patient's chronic conditions and comorbid symptoms for stability, deterioration, or improvement   Collaborate with multidisciplinary team to address chronic and comorbid conditions and prevent exacerbation or deterioration   Update acute care plan with appropriate goals if chronic or comorbid symptoms are exacerbated and prevent overall improvement and discharge     Care plan reviewed with patient.  Patient verbalizes understanding of the plan of care and contributes to goal setting.

## 2024-03-01 NOTE — DISCHARGE INSTR - COC
closed, initial encounter ([facility]) S32.10XA, S32.2XXA    Hyponatremia E87.1    Stage 3a chronic kidney disease ([facility]) N18.31       Isolation/Infection:   Isolation            No Isolation          Patient Infection Status       None to display                     Nurse Assessment:  Last Vital Signs: BP (!) 105/50   Pulse 99   Temp 98 °F (36.7 °C) (Oral)   Resp 17   Ht 1.676 m (5' 6\")   Wt 68 kg (150 lb)   LMP  (LMP Unknown)   SpO2 93%   BMI 24.21 kg/m²     Last documented pain score (0-10 scale): Pain Level: 5  Last Weight:   Wt Readings from Last 1 Encounters:   02/28/24 68 kg (150 lb)     Mental Status:  {IP PT MENTAL STATUS:20030}    IV Access:  { TANIA IV ACCESS:261295398}    Nursing Mobility/ADLs:  Walking   {CHP DME ADLs:502043899}  Transfer  {CHP DME ADLs:062966538}  Bathing  {CHP DME ADLs:161641539}  Dressing  {CHP DME ADLs:741747149}  Toileting  {CHP DME ADLs:181368416}  Feeding  {CHP DME ADLs:041582153}  Med Admin  {P DME ADLs:724979124}  Med Delivery   { TANIA MED Delivery:637072561}    Wound Care Documentation and Therapy:  Incision 06/09/23 Mouth (Active)   Number of days: 266        Elimination:  Continence:   Bowel: {YES / NO:19727}  Bladder: {YES / NO:19727}  Urinary Catheter: {Urinary Catheter:116184676}   Colostomy/Ileostomy/Ileal Conduit: {YES / NO:19727}       Date of Last BM: ***    Intake/Output Summary (Last 24 hours) at 3/1/2024 0738  Last data filed at 2/29/2024 1855  Gross per 24 hour   Intake 2354.73 ml   Output --   Net 2354.73 ml     I/O last 3 completed shifts:  In: 2354.7 [P.O.:440; I.V.:1717.8; IV Piggyback:196.9]  Out: 0     Safety Concerns:     { TANIA Safety Concerns:190366905}    Impairments/Disabilities:      { TANIA Impairments/Disabilities:552299872}    Nutrition Therapy:  Current Nutrition Therapy:   { TANIA Diet List:834360650}    Routes of Feeding: {CHP DME Other Feedings:013357433}  Liquids: {Slp liquid thickness:78804}  Daily Fluid Restriction: {P DME Yes amt  example:221499232}  Last Modified Barium Swallow with Video (Video Swallowing Test): {Done Not Done Date:}    Treatments at the Time of Hospital Discharge:   Respiratory Treatments: ***  Oxygen Therapy:  {Therapy; copd oxygen:55038}  Ventilator:    {[facility] Vent List:692876638}    Rehab Therapies: {THERAPEUTIC INTERVENTION:3578822236}  Weight Bearing Status/Restrictions: {[facility] Weight Bearin}  Other Medical Equipment (for information only, NOT a DME order):  {EQUIPMENT:618395553}  Other Treatments: ***    Patient's personal belongings (please select all that are sent with patient):  {CHP DME Belongings:280840407}    RN SIGNATURE:  {Esignature:946987308}    CASE MANAGEMENT/SOCIAL WORK SECTION    Inpatient Status Date: 2024    Readmission Risk Assessment Score:  Readmission Risk              Risk of Unplanned Readmission:  22           Discharging to Facility/ Agency   Name: [facility]  Address: [address] [place] [address]  Phone: [phone]  Fax: [phone]    Dialysis Facility (if applicable)   Name:  Address:  Dialysis Schedule:  Phone:  Fax:    / signature: Electronically signed by SUNNY Regan on 3/4/24 at 11:54 AM EST    PHYSICIAN SECTION    Prognosis: Good    Condition at Discharge: Stable    Rehab Potential (if transferring to Rehab): Good    Physician Certification: I certify the above information and transfer of Meg Wilson  is necessary for the continuing treatment of the diagnosis listed and that she requires Skilled Nursing Facility for greater 30 days.     Update Admission H&P: No change in H&P    PHYSICIAN SIGNATURE:  Electronically signed by Azul Shaver PA-C on 3/1/24 at 7:38 AM EST

## 2024-03-01 NOTE — CARE COORDINATION
3/1/24, 3:26 PM EST    DISCHARGE PLANNING EVALUATION    Referral made to [facility], facility plans to accept and will start precert

## 2024-03-01 NOTE — CARE COORDINATION
3/1/24, 3:00 PM EST    DISCHARGE ON GOING EVALUATION    [facility] day: 2  Location: -13/013-A Reason for admit: Fracture, sacrum/coccyx, closed, initial encounter ([facility]) [S32.10XA, S32.2XXA]  Closed nondisplaced fracture of pelvis, unspecified part of pelvis, initial encounter ([facility]) [S32.9XXA]   Procedure:   2/28 CT right hip:  Mildly displaced acute fracture of the right sacral ala   Mildly displaced fracture of the right inferior pubic ramus.   Comminuted and displaced fractures of the body of the right pubic bone. Large ascites   2/29 Paracentesis per IR:  peritoneal ascites with 4.7 L of fluid drained.  Barriers to Discharge: cont PT/OT, Nephrology consulted low Na 124, BUN 47/creatinine 1.5,  GI, ortho and hospitalist. DM mgmt,  WBC 13.4, hgb 8.8. IVF 75/hr stopped, Lasix, Cloride 94, CO2 16, hold mirtazapine and sertraline.  PCP: Elver Arredondo, DO  Readmission Risk Score: 23%  Patient Goals/Plan/Treatment Preferences: Family chose SNF today; will be precert. SW follows.

## 2024-03-02 PROBLEM — E87.5 HYPERKALEMIA: Status: ACTIVE | Noted: 2024-03-02

## 2024-03-02 LAB
ALBUMIN SERPL BCG-MCNC: 3.6 G/DL (ref 3.5–5.1)
ALP SERPL-CCNC: 118 U/L (ref 38–126)
ALT SERPL W/O P-5'-P-CCNC: 25 U/L (ref 11–66)
ANION GAP SERPL CALC-SCNC: 10 MEQ/L (ref 8–16)
ANION GAP SERPL CALC-SCNC: 13 MEQ/L (ref 8–16)
ANION GAP SERPL CALC-SCNC: 14 MEQ/L (ref 8–16)
ANION GAP SERPL CALC-SCNC: 15 MEQ/L (ref 8–16)
ANISOCYTOSIS BLD QL SMEAR: PRESENT
ARTERIAL PATENCY WRIST A: POSITIVE
AST SERPL-CCNC: 42 U/L (ref 5–40)
AUTO DIFF PNL BLD: ABNORMAL
BACTERIA UR CULT: ABNORMAL
BASE EXCESS BLDA CALC-SCNC: -5.9 MMOL/L (ref -2.5–2.5)
BASO STIPL BLD QL SMEAR: ABNORMAL
BASOPHILS ABSOLUTE: 0.1 THOU/MM3 (ref 0–0.1)
BASOPHILS NFR BLD AUTO: 0.5 %
BDY SITE: ABNORMAL
BILIRUB CONJ SERPL-MCNC: 1.2 MG/DL (ref 0–0.3)
BILIRUB SERPL-MCNC: 3.3 MG/DL (ref 0.3–1.2)
BUN SERPL-MCNC: 46 MG/DL (ref 7–22)
BUN SERPL-MCNC: 47 MG/DL (ref 7–22)
BUN SERPL-MCNC: 47 MG/DL (ref 7–22)
BUN SERPL-MCNC: 49 MG/DL (ref 7–22)
BURR CELLS BLD QL SMEAR: ABNORMAL
CALCIUM SERPL-MCNC: 8.8 MG/DL (ref 8.5–10.5)
CALCIUM SERPL-MCNC: 9 MG/DL (ref 8.5–10.5)
CALCIUM SERPL-MCNC: 9.2 MG/DL (ref 8.5–10.5)
CALCIUM SERPL-MCNC: 9.3 MG/DL (ref 8.5–10.5)
CHLORIDE SERPL-SCNC: 95 MEQ/L (ref 98–111)
CHLORIDE SERPL-SCNC: 96 MEQ/L (ref 98–111)
CHLORIDE SERPL-SCNC: 97 MEQ/L (ref 98–111)
CHLORIDE SERPL-SCNC: 97 MEQ/L (ref 98–111)
CO2 SERPL-SCNC: 13 MEQ/L (ref 23–33)
CO2 SERPL-SCNC: 14 MEQ/L (ref 23–33)
CO2 SERPL-SCNC: 15 MEQ/L (ref 23–33)
CO2 SERPL-SCNC: 19 MEQ/L (ref 23–33)
COLLECTED BY:: ABNORMAL
CREAT SERPL-MCNC: 1.4 MG/DL (ref 0.4–1.2)
CREAT SERPL-MCNC: 1.5 MG/DL (ref 0.4–1.2)
CREAT SERPL-MCNC: 1.5 MG/DL (ref 0.4–1.2)
CREAT SERPL-MCNC: 1.6 MG/DL (ref 0.4–1.2)
DEPRECATED RDW RBC AUTO: 76.6 FL (ref 35–45)
DEVICE: ABNORMAL
ELLIPTOCYTES: ABNORMAL
EOSINOPHIL NFR BLD AUTO: 2.2 %
EOSINOPHILS ABSOLUTE: 0.3 THOU/MM3 (ref 0–0.4)
ERYTHROCYTE [DISTWIDTH] IN BLOOD BY AUTOMATED COUNT: 22.3 % (ref 11.5–14.5)
FIO2 ON VENT O2 ANALYZER: 21 %
GFR SERPL CREATININE-BSD FRML MDRD: 32 ML/MIN/1.73M2
GFR SERPL CREATININE-BSD FRML MDRD: 34 ML/MIN/1.73M2
GFR SERPL CREATININE-BSD FRML MDRD: 34 ML/MIN/1.73M2
GFR SERPL CREATININE-BSD FRML MDRD: 37 ML/MIN/1.73M2
GLUCOSE BLD STRIP.AUTO-MCNC: 135 MG/DL (ref 70–108)
GLUCOSE BLD STRIP.AUTO-MCNC: 168 MG/DL (ref 70–108)
GLUCOSE BLD STRIP.AUTO-MCNC: 169 MG/DL (ref 70–108)
GLUCOSE BLD STRIP.AUTO-MCNC: 180 MG/DL (ref 70–108)
GLUCOSE SERPL-MCNC: 139 MG/DL (ref 70–108)
GLUCOSE SERPL-MCNC: 146 MG/DL (ref 70–108)
GLUCOSE SERPL-MCNC: 163 MG/DL (ref 70–108)
GLUCOSE SERPL-MCNC: 168 MG/DL (ref 70–108)
HCO3 BLDA-SCNC: 17 MMOL/L (ref 23–28)
HCT VFR BLD AUTO: 24.4 % (ref 37–47)
HGB BLD-MCNC: 8.2 GM/DL (ref 12–16)
IMM GRANULOCYTES # BLD AUTO: 0.14 THOU/MM3 (ref 0–0.07)
IMM GRANULOCYTES NFR BLD AUTO: 1.1 %
LYMPHOCYTES ABSOLUTE: 1.4 THOU/MM3 (ref 1–4.8)
LYMPHOCYTES NFR BLD AUTO: 11 %
MCH RBC QN AUTO: 31.4 PG (ref 26–33)
MCHC RBC AUTO-ENTMCNC: 33.6 GM/DL (ref 32.2–35.5)
MCV RBC AUTO: 93.5 FL (ref 81–99)
MONOCYTES ABSOLUTE: 2.1 THOU/MM3 (ref 0.4–1.3)
MONOCYTES NFR BLD AUTO: 16.6 %
NEUTROPHILS NFR BLD AUTO: 68.6 %
NRBC BLD AUTO-RTO: 1 /100 WBC
ORGANISM: ABNORMAL
PATHOLOGIST REVIEW: ABNORMAL
PCO2 BLDA: 24 MMHG (ref 35–45)
PH BLDA: 7.46 [PH] (ref 7.35–7.45)
PLATELET # BLD AUTO: 158 THOU/MM3 (ref 130–400)
PLATELET BLD QL SMEAR: ADEQUATE
PMV BLD AUTO: 10 FL (ref 9.4–12.4)
PO2 BLDA: 53 MMHG (ref 71–104)
POIKILOCYTES: ABNORMAL
POLYCHROMASIA BLD QL SMEAR: ABNORMAL
POTASSIUM SERPL-SCNC: 4.8 MEQ/L (ref 3.5–5.2)
POTASSIUM SERPL-SCNC: 5.1 MEQ/L (ref 3.5–5.2)
POTASSIUM SERPL-SCNC: 5.3 MEQ/L (ref 3.5–5.2)
POTASSIUM SERPL-SCNC: 5.3 MEQ/L (ref 3.5–5.2)
PROT SERPL-MCNC: 5.4 G/DL (ref 6.1–8)
RBC # BLD AUTO: 2.61 MILL/MM3 (ref 4.2–5.4)
REASON FOR REJECTION: NORMAL
REJECTED TEST: NORMAL
SAO2 % BLDA: 90 %
SCAN OF BLOOD SMEAR: NORMAL
SCHISTOCYTES BLD QL SMEAR: ABNORMAL
SEGMENTED NEUTROPHILS ABSOLUTE COUNT: 8.6 THOU/MM3 (ref 1.8–7.7)
SODIUM SERPL-SCNC: 123 MEQ/L (ref 135–145)
SODIUM SERPL-SCNC: 125 MEQ/L (ref 135–145)
TARGETS BLD QL SMEAR: ABNORMAL
URATE SERPL-MCNC: 6.6 MG/DL (ref 2.4–5.7)
WBC # BLD AUTO: 12.5 THOU/MM3 (ref 4.8–10.8)

## 2024-03-02 PROCEDURE — 84550 ASSAY OF BLOOD/URIC ACID: CPT

## 2024-03-02 PROCEDURE — 99232 SBSQ HOSP IP/OBS MODERATE 35: CPT | Performed by: INTERNAL MEDICINE

## 2024-03-02 PROCEDURE — 82948 REAGENT STRIP/BLOOD GLUCOSE: CPT

## 2024-03-02 PROCEDURE — 6370000000 HC RX 637 (ALT 250 FOR IP): Performed by: PHYSICIAN ASSISTANT

## 2024-03-02 PROCEDURE — 82248 BILIRUBIN DIRECT: CPT

## 2024-03-02 PROCEDURE — 2580000003 HC RX 258: Performed by: NURSE PRACTITIONER

## 2024-03-02 PROCEDURE — 36415 COLL VENOUS BLD VENIPUNCTURE: CPT

## 2024-03-02 PROCEDURE — 6370000000 HC RX 637 (ALT 250 FOR IP): Performed by: INTERNAL MEDICINE

## 2024-03-02 PROCEDURE — 80053 COMPREHEN METABOLIC PANEL: CPT

## 2024-03-02 PROCEDURE — 1200000000 HC SEMI PRIVATE

## 2024-03-02 PROCEDURE — 85025 COMPLETE CBC W/AUTO DIFF WBC: CPT

## 2024-03-02 PROCEDURE — 6370000000 HC RX 637 (ALT 250 FOR IP): Performed by: NURSE PRACTITIONER

## 2024-03-02 PROCEDURE — 82803 BLOOD GASES ANY COMBINATION: CPT

## 2024-03-02 RX ORDER — FUROSEMIDE 40 MG/1
40 TABLET ORAL ONCE
Status: COMPLETED | OUTPATIENT
Start: 2024-03-03 | End: 2024-03-03

## 2024-03-02 RX ORDER — FUROSEMIDE 40 MG/1
80 TABLET ORAL DAILY
Status: DISCONTINUED | OUTPATIENT
Start: 2024-03-03 | End: 2024-03-07

## 2024-03-02 RX ADMIN — SODIUM CHLORIDE, PRESERVATIVE FREE 10 ML: 5 INJECTION INTRAVENOUS at 07:52

## 2024-03-02 RX ADMIN — ATORVASTATIN CALCIUM 20 MG: 20 TABLET, FILM COATED ORAL at 20:57

## 2024-03-02 RX ADMIN — SODIUM ZIRCONIUM CYCLOSILICATE 10 G: 10 POWDER, FOR SUSPENSION ORAL at 10:03

## 2024-03-02 RX ADMIN — FUROSEMIDE 40 MG: 40 TABLET ORAL at 07:52

## 2024-03-02 RX ADMIN — ROPINIROLE HYDROCHLORIDE 0.25 MG: 0.25 TABLET, FILM COATED ORAL at 20:57

## 2024-03-02 RX ADMIN — SODIUM CHLORIDE 2 G: 1 TABLET ORAL at 07:51

## 2024-03-02 RX ADMIN — SODIUM CHLORIDE, PRESERVATIVE FREE 10 ML: 5 INJECTION INTRAVENOUS at 20:57

## 2024-03-02 RX ADMIN — SODIUM CHLORIDE 2 G: 1 TABLET ORAL at 17:02

## 2024-03-02 ASSESSMENT — PAIN SCALES - GENERAL: PAINLEVEL_OUTOF10: 0

## 2024-03-02 NOTE — PROGRESS NOTES
[facility]HOSPITALIST [facility]    Hospitalist PROGRESS NOTE dictated by Hortensia Piper MD on 3/2/2024    Patient ID: Meg Wilson is 83 y.o. and presently in room [facility]  : 1940 MRN: [id number]    Admit date: 2024  Primary Care Physician: Elver Arredondo DO   Patient Care Team:  Elver Arredondo DO as PCP - General (Family Medicine)  Elver Arredondo DO as PCP - Empaneled Provider  Shai Chaparro MD as Consulting Physician (Hospitalist)  Leyla Waller, RN as Registered Nurse (Cardiology)  Tel: [phone]  IP CONSULT TO INTERVENTIONAL RADIOLOGY  IP CONSULT TO GI  IP CONSULT TO HOSPITALIST  IP CONSULT TO INTERNAL MEDICINE  IP CONSULT TO SOCIAL WORK  IP CONSULT TO NEPHROLOGY  Admitting Physician: Farooq Allan MD   Code Status:  Full Code    Meg Wilson is a 83 y.o.  female who presented with Fall and Hip Pain (RIGHT)    Admit date: 2024  Room: [facility]    History Of Present Illness:    Meg Wilson83 y.o. female who we are asked to see/evaluate by Dr Allan for medical management. Patient has a h/o multiple/recurrent falls at home. Imaging reveals right sacral ala, right inferior pubic ramus, and right pubic bone fractures. Imaging also showed large amount of ascites. Patient has a PMHx that includes: Liver cirrhosis, coronary artery disease, mild aortic stenosis, hypertension, AAA, BENNIE, PAD, diabetes mellitus, GERD, history of CVA, Alzheimer's dementia.  Patient is alert, pleasant, oriented x 3 but does have difficulty with recall.   and son are at bedside and help with HPI.   states physical therapy home health was working with patient today, patient was doing well and was going to be discharged from their service.  Shortly after therapy left the house he heard the patient fall to the ground finding her on her right side.  Did not hit furniture, no loss of consciousness.  She is complaining of right hip pain  marked. The overlying area was sterilely prepped and draped. 2 percent lidocaine was given for local anesthesia. Next a 5 [place] Arabian one-step catheter  was introduced into the ascites. Clear yellow fluid was removed. The catheter was removed. The patient tolerated the procedure. No immediate complications. Physician performing procedure: Dr. Perry Informed consent signed: yes Local Anesthetic: 2% lidocaine Specimen volume: 70 ml Catheter: 5 Omani Aspirated ascites volume: 5.0 liters Aspirated ascites color: cloudy yellow Site of Puncture:RLQ Complications: none     Successful ultrasound-guided paracentesis. **This report has been created using voice recognition software.  It may contain minor errors which are inherent in voice recognition technology.** Final report electronically signed by Dr. Jack Perry on 2/1/2024 4:30 PM      This note was dictated using M*Modal Fluency Direct so please excuse any grammatical or syntax errors as no guarantees can be provided that every mistake has been identified and corrected by editing.      Electronically signed by Hortensia Piper MD on 3/2/2024 at 11:57 AM

## 2024-03-02 NOTE — PROGRESS NOTES
Orthopaedic Progress Note      SUBJECTIVE   Ms. Wilson is post op day # 3     Patient notes right pubic rami fracture sacral marquis fracture right side.   is at bedside this morning.  Planning [facility] once pre-CERT has been completed.  No new issues overnight.    Patient has been working with physical therapy without any complications.  She has been nonweightbearing to the right lower extremity.      OBJECTIVE      Physical    VITALS:  BP (!) 139/57   Pulse (!) 104   Temp 97.9 °F (36.6 °C) (Oral)   Resp 18   Ht 1.676 m (5' 6\")   Wt 68 kg (150 lb)   LMP  (LMP Unknown)   SpO2 94%   BMI 24.21 kg/m²   I/O last 3 completed shifts:  In: 400 [P.O.:400]  Out: -       Neurovascular intact sensation intact.  She is able to flex extend toes.  Denies any tenderness palpation of the calf at this time.  No pain with internal/external rotation of bilateral hips.      Data  CBC:   Lab Results   Component Value Date/Time    WBC 12.5 03/02/2024 05:37 AM    HGB 8.2 03/02/2024 05:37 AM     03/02/2024 05:37 AM     BMP:    Lab Results   Component Value Date/Time     03/02/2024 05:37 AM    K 5.3 03/02/2024 05:37 AM    K 4.4 01/08/2024 05:01 PM    CL 97 03/02/2024 05:37 AM    CO2 15 03/02/2024 05:37 AM    BUN 47 03/02/2024 05:37 AM    CREATININE 1.5 03/02/2024 05:37 AM    CALCIUM 9.3 03/02/2024 05:37 AM    GLUCOSE 146 03/02/2024 05:37 AM    GLUCOSE 93 06/06/2023 08:55 AM     Uric Acid:  No components found for: \"URIC\"  PT/INR:    Lab Results   Component Value Date/Time    PROTIME 14.1 10/25/2019 05:57 AM    INR 1.41 02/28/2024 12:25 PM     Troponin:    Lab Results   Component Value Date/Time    TROPONINI <0.006 07/11/2011 08:41 PM     Urine Culture:  No components found for: \"CURINE\"      Current Inpatient Medications    Current Facility-Administered Medications: furosemide (LASIX) tablet 40 mg, 40 mg, Oral, Daily  sodium chloride tablet 2 g, 2 g, Oral, BID WC  sodium chloride flush 0.9 % injection

## 2024-03-02 NOTE — PROGRESS NOTES
Gastroenterology  Progress Note    3/2/2024 12:27 PM  Subjective:   Admit Date: 2/28/2024    Interval History: Patient with cirrhosis likely cryptogenic.  Not considered appendicitis admitted with hip fracture not able to move this time with pain conservative management with orthopedic surgery will be transferred to nursing home family has a spot for her outside facility.  She having weekly paracentesis with 50 g of albumin infusions last 1 done yesterday 4.7 L removed.  Cell count culture do not suggest spontaneous bacterial peritonitis at this time we can watch closely for that.  His acute kidney injury seen by the nephrology service as well as hyponatremia she will be given salt tablet and to improve.    3/2/2024 patient more alert oriented today not short of breath tolerating his diet he ate basically almost everything abdomen slightly distended no shifting dullness waiting to transfer to nursing home likely done during the week has not at the time of evaluation present in the room    Diet: ADULT DIET; Regular; 1500 ml    Medications:   Scheduled Meds:    [START ON 3/3/2024] furosemide  40 mg Oral Once    [START ON 3/3/2024] furosemide  80 mg Oral Daily    sodium chloride  2 g Oral BID WC    sodium chloride flush  5-40 mL IntraVENous 2 times per day    [Held by provider] spironolactone  100 mg Oral Daily    [Held by provider] spironolactone  50 mg Oral Daily    atorvastatin  20 mg Oral Nightly    [Held by provider] sertraline  25 mg Oral Daily    [Held by provider] mirtazapine  7.5 mg Oral Nightly    rOPINIRole  0.25 mg Oral Nightly     Continuous Infusions:    sodium chloride         CBC:   Recent Labs     02/29/24  0629 03/01/24  0700 03/02/24  0537   WBC 12.6* 13.4* 12.5*   HGB 8.4* 8.8* 8.2*    155 158     BMP:    Recent Labs     03/01/24  1806 03/01/24  2343 03/02/24  0537   * 125* 125*   K 5.6* 5.3* 5.3*   CL 95* 96* 97*   CO2 16* 19* 15*   BUN 47* 49* 47*   CREATININE 1.5* 1.6* 1.5*

## 2024-03-02 NOTE — CONSULTS
[place] [id number]                              CONSULTATION      PATIENT NAME: JOVAYN FERNANDES              : 1940  MED REC NO: [id number]                       ROOM: [facility]  ACCOUNT NO: [id number]                       ADMIT DATE: 2024  PROVIDER: Keke Dumont MD    GI CONSULT    CONSULT DATE:  2024      HISTORY OF PRESENT ILLNESS:  The patient, known to the practice, who presented to the hospital due to fall at home with multiple fractures to her pelvic bone she is in pain at the the bedside.  She fell at home, had severe pain , especially when he is going to move her to change her.  She has no bleeding obvious anywhere.    She is more tender with exertion or any activity .  According to the , orthopedic surgery planning for conservative.  Could take more than a month to to heal her fracture according to the  , she might be transferred to chronic care facility which be arranged by the family as an outpatient for the patient admitted to the hospital .  She has difficulty to move at this time.  She cannot put any weight on her right side.  From a GI point of view, the patient with history of cirrhosis due to nonalcoholic steatohepatitis or cryptogenic cirrhosis.  She was having really bad pain with movement at this time .  From a GI point of view, she has no nausea or vomiting.  No dysphagia at this time.  She has no dysphagia or odynophagia.  She has no GI bleeding.  She has slight early satiety. She has not been eating a lot lately, but no pain. She has no hematemesis.  Her bowel movement has not changed.  She had therapeutic paracentesis done during the admissions 74 L removed sent to rule out infection and spontaneous bacterial peritonitis.  So far the result is negative for peritonitis or infections.the  ascitic fluid in her  was sent for analysis to check for culture, sensitivity, and cell count.

## 2024-03-02 NOTE — PROGRESS NOTES
[facility]   Nephrology progress note  3/2/2024, 3:25 PM      Pt Name:    Meg Wilson  MRN:     [id number]     YOB: 1940  Admit Date:    2/28/2024 12:25 PM    Chief Complaint: Nephrology following for hyponatremia.    Subjective:  Patient was seen and examined this morning  Feels ok, no complaints.  at bedside.   She received lokelma this AM and lasix increased.  Objective:  24HR INTAKE/OUTPUT:    Intake/Output Summary (Last 24 hours) at 3/2/2024 1525  Last data filed at 3/2/2024 1358  Gross per 24 hour   Intake 600 ml   Output 550 ml   Net 50 ml         I/O last 3 completed shifts:  In: 400 [P.O.:400]  Out: -   I/O this shift:  In: 200 [P.O.:200]  Out: 550 [Urine:550]   Admission weight: 68 kg (150 lb)  Wt Readings from Last 3 Encounters:   02/28/24 68 kg (150 lb)   02/13/24 64.4 kg (142 lb)   01/31/24 68.3 kg (150 lb 8 oz)        Vitals :   Vitals:    03/01/24 2040 03/02/24 0512 03/02/24 0753 03/02/24 1504   BP: (!) 123/40 (!) 139/57 (!) 128/52 (!) 135/57   Pulse: (!) 112 (!) 104 (!) 102 (!) 103   Resp: 16 18 18 18   Temp: 98.8 °F (37.1 °C) 97.9 °F (36.6 °C) 97.3 °F (36.3 °C) 97.5 °F (36.4 °C)   TempSrc: Oral Oral Oral Oral   SpO2: 95% 94% 96% 95%   Weight:       Height:           Physical examination  General Appearance: alert and cooperative with exam, appears comfortable, no distress  Mouth/Throat: Oral mucosa moist  Neck: No JVD  Lungs: Air entry B/L, no rales, no use of accessory muscles  Heart:  S1, S2 heard  GI: soft, non-tender,distended  Extremities: no significant leg edema    Medications:  Infusion:    sodium chloride       Meds:    [START ON 3/3/2024] furosemide  40 mg Oral Once    [START ON 3/3/2024] furosemide  80 mg Oral Daily    sodium chloride  2 g Oral BID WC    sodium chloride flush  5-40 mL IntraVENous 2 times per day    [Held by provider] spironolactone  100 mg Oral Daily    [Held by provider] spironolactone  50 mg Oral Daily    atorvastatin  20 mg

## 2024-03-03 ENCOUNTER — APPOINTMENT (OUTPATIENT)
Dept: GENERAL RADIOLOGY | Age: 84
End: 2024-03-03
Payer: MEDICARE

## 2024-03-03 LAB
ANION GAP SERPL CALC-SCNC: 12 MEQ/L (ref 8–16)
ANION GAP SERPL CALC-SCNC: 12 MEQ/L (ref 8–16)
ANION GAP SERPL CALC-SCNC: 13 MEQ/L (ref 8–16)
ANION GAP SERPL CALC-SCNC: 14 MEQ/L (ref 8–16)
BUN SERPL-MCNC: 49 MG/DL (ref 7–22)
BUN SERPL-MCNC: 49 MG/DL (ref 7–22)
BUN SERPL-MCNC: 50 MG/DL (ref 7–22)
BUN SERPL-MCNC: 66 MG/DL (ref 7–22)
CALCIUM SERPL-MCNC: 8.8 MG/DL (ref 8.5–10.5)
CALCIUM SERPL-MCNC: 8.9 MG/DL (ref 8.5–10.5)
CALCIUM SERPL-MCNC: 8.9 MG/DL (ref 8.5–10.5)
CALCIUM SERPL-MCNC: 9.4 MG/DL (ref 8.5–10.5)
CHLORIDE SERPL-SCNC: 95 MEQ/L (ref 98–111)
CHLORIDE SERPL-SCNC: 96 MEQ/L (ref 98–111)
CHLORIDE SERPL-SCNC: 97 MEQ/L (ref 98–111)
CHLORIDE SERPL-SCNC: 97 MEQ/L (ref 98–111)
CO2 SERPL-SCNC: 14 MEQ/L (ref 23–33)
CO2 SERPL-SCNC: 15 MEQ/L (ref 23–33)
CO2 SERPL-SCNC: 17 MEQ/L (ref 23–33)
CO2 SERPL-SCNC: 17 MEQ/L (ref 23–33)
CREAT SERPL-MCNC: 1.4 MG/DL (ref 0.4–1.2)
CREAT SERPL-MCNC: 1.4 MG/DL (ref 0.4–1.2)
CREAT SERPL-MCNC: 1.5 MG/DL (ref 0.4–1.2)
CREAT SERPL-MCNC: 1.6 MG/DL (ref 0.4–1.2)
GFR SERPL CREATININE-BSD FRML MDRD: 32 ML/MIN/1.73M2
GFR SERPL CREATININE-BSD FRML MDRD: 34 ML/MIN/1.73M2
GFR SERPL CREATININE-BSD FRML MDRD: 37 ML/MIN/1.73M2
GFR SERPL CREATININE-BSD FRML MDRD: 37 ML/MIN/1.73M2
GLUCOSE BLD STRIP.AUTO-MCNC: 151 MG/DL (ref 70–108)
GLUCOSE BLD STRIP.AUTO-MCNC: 159 MG/DL (ref 70–108)
GLUCOSE BLD STRIP.AUTO-MCNC: 183 MG/DL (ref 70–108)
GLUCOSE BLD STRIP.AUTO-MCNC: 258 MG/DL (ref 70–108)
GLUCOSE SERPL-MCNC: 141 MG/DL (ref 70–108)
GLUCOSE SERPL-MCNC: 144 MG/DL (ref 70–108)
GLUCOSE SERPL-MCNC: 178 MG/DL (ref 70–108)
GLUCOSE SERPL-MCNC: 183 MG/DL (ref 70–108)
POTASSIUM SERPL-SCNC: 4.6 MEQ/L (ref 3.5–5.2)
POTASSIUM SERPL-SCNC: 4.7 MEQ/L (ref 3.5–5.2)
POTASSIUM SERPL-SCNC: 4.9 MEQ/L (ref 3.5–5.2)
POTASSIUM SERPL-SCNC: 4.9 MEQ/L (ref 3.5–5.2)
REASON FOR REJECTION: NORMAL
REJECTED TEST: NORMAL
SODIUM SERPL-SCNC: 124 MEQ/L (ref 135–145)
SODIUM SERPL-SCNC: 124 MEQ/L (ref 135–145)
SODIUM SERPL-SCNC: 125 MEQ/L (ref 135–145)
SODIUM SERPL-SCNC: 126 MEQ/L (ref 135–145)

## 2024-03-03 PROCEDURE — 2580000003 HC RX 258: Performed by: NURSE PRACTITIONER

## 2024-03-03 PROCEDURE — 6370000000 HC RX 637 (ALT 250 FOR IP): Performed by: INTERNAL MEDICINE

## 2024-03-03 PROCEDURE — 99232 SBSQ HOSP IP/OBS MODERATE 35: CPT | Performed by: INTERNAL MEDICINE

## 2024-03-03 PROCEDURE — 80048 BASIC METABOLIC PNL TOTAL CA: CPT

## 2024-03-03 PROCEDURE — 36415 COLL VENOUS BLD VENIPUNCTURE: CPT

## 2024-03-03 PROCEDURE — 1200000000 HC SEMI PRIVATE

## 2024-03-03 PROCEDURE — 6370000000 HC RX 637 (ALT 250 FOR IP): Performed by: PHYSICIAN ASSISTANT

## 2024-03-03 PROCEDURE — 82948 REAGENT STRIP/BLOOD GLUCOSE: CPT

## 2024-03-03 PROCEDURE — 6370000000 HC RX 637 (ALT 250 FOR IP): Performed by: NURSE PRACTITIONER

## 2024-03-03 PROCEDURE — 71045 X-RAY EXAM CHEST 1 VIEW: CPT

## 2024-03-03 RX ADMIN — ATORVASTATIN CALCIUM 20 MG: 20 TABLET, FILM COATED ORAL at 21:09

## 2024-03-03 RX ADMIN — SODIUM CHLORIDE, PRESERVATIVE FREE 10 ML: 5 INJECTION INTRAVENOUS at 21:09

## 2024-03-03 RX ADMIN — ROPINIROLE HYDROCHLORIDE 0.25 MG: 0.25 TABLET, FILM COATED ORAL at 21:09

## 2024-03-03 RX ADMIN — SODIUM CHLORIDE, PRESERVATIVE FREE 10 ML: 5 INJECTION INTRAVENOUS at 08:38

## 2024-03-03 RX ADMIN — FUROSEMIDE 40 MG: 40 TABLET ORAL at 00:24

## 2024-03-03 RX ADMIN — SODIUM CHLORIDE 2 G: 1 TABLET ORAL at 16:04

## 2024-03-03 RX ADMIN — SODIUM CHLORIDE 2 G: 1 TABLET ORAL at 08:38

## 2024-03-03 RX ADMIN — FUROSEMIDE 80 MG: 40 TABLET ORAL at 08:38

## 2024-03-03 RX ADMIN — Medication 15 G: at 13:03

## 2024-03-03 ASSESSMENT — PAIN SCALES - GENERAL
PAINLEVEL_OUTOF10: 5
PAINLEVEL_OUTOF10: 0
PAINLEVEL_OUTOF10: 8
PAINLEVEL_OUTOF10: 0

## 2024-03-03 ASSESSMENT — PAIN DESCRIPTION - ONSET: ONSET: ON-GOING

## 2024-03-03 ASSESSMENT — PAIN SCALES - WONG BAKER
WONGBAKER_NUMERICALRESPONSE: 0

## 2024-03-03 ASSESSMENT — PAIN DESCRIPTION - LOCATION
LOCATION: CHEST
LOCATION: CHEST

## 2024-03-03 ASSESSMENT — PAIN DESCRIPTION - ORIENTATION: ORIENTATION: ANTERIOR

## 2024-03-03 ASSESSMENT — PAIN DESCRIPTION - DESCRIPTORS: DESCRIPTORS: SHARP

## 2024-03-03 NOTE — PROGRESS NOTES
FACILITY use dose modulation, iterative reconstruction, and/or weight-based dosing when appropriate to reduce radiation dose to as low as reasonably achievable.      FINDINGS:    Lung bases: Mild basilar atelectasis/pneumonia. No effusion seen.    Liver: Relatively small appearing liver with a nodular contour, consistent with osteoporosis. Gallbladder not specifically seen. Moderately dilated common bile duct, as expected postcholecystectomy.    Pancreas, spleen and adrenal glands: Unremarkable    Kidneys:  Small nonobstructing calculus in each kidney. Mildly hypoplastic left kidney. No hydronephrosis.    Bowel/Peritoneum: Large amount of ascites in the abdomen and pelvis. Findings of constipation. Moderate fluid distention of multiple small bowel loops, consistent with ileus. There also appears be some thickening of small bowel wall involving a few  jejunal loops. Cannot exclude enteritis. No free air. Markedly tortuous and ectatic abdominal aorta. Small 3 cm fusiform aneurysm in the distal abdominal aorta. Extensive calcification in the abdominal aorta. Extensive calcified and mildly tortuous  pelvic vessels. No abnormally enlarged para-aortic lymph nodes are seen. Moderate calcification at the origin of the right renal artery with moderate stenosis, probably 70%. Origin of left renal artery is obscured by calcific plaque but appearance  suggestive of mild to moderate stenosis. Suggestion of at least mild calcific stenosis at the origins of the celiac artery and SMA.    Bones : No evidence for acute fracture or bone destruction..    Pelvis: Urinary bladder unremarkable. Multiple diverticula in the sigmoid colon and descending colon. No evidence for diverticulitis.    Impression  1. Large amount of ascites. Cirrhotic appearing liver. Prior cholecystectomy.  2. Constipation. Diverticulosis coli. Moderate paralytic ileus. Questionable enteritis involving a few jejunal loops in the left midabdomen. Mild bibasilar  artery disease) (HCC)     Chronic low back pain     GERD (gastroesophageal reflux disease)     Esophageal stricture     Former smoker     CAD (coronary artery disease)     Iron deficiency anemia     Cirrhosis of liver (HCC)     Normocytic anemia     AAA (abdominal aortic aneurysm) (HCC)     Alzheimer's dementia (HCC)     Aortic stenosis, mild     Arthritis     History of basal cell cancer     History of Crohn's disease     History of CVA (cerebrovascular accident)     History of hypertension     IBS (irritable bowel syndrome)     Major depression     S/P CABG (coronary artery bypass graft)     Subclavian artery stenosis, left (HCC)     Ascites of liver     Acute kidney injury (HCC)     Restless legs     Closed nondisplaced fracture of pelvis (HCC)     Hyponatremia     Stage 3a chronic kidney disease (HCC)      Electronically signed by Keke Dumont MD on 3/3/2024 at 10:22 AM

## 2024-03-03 NOTE — PROGRESS NOTES
[facility]   Nephrology progress note  3/3/2024, 12:26 PM      Pt Name:    Meg Wilson  MRN:     [id number]     YOB: 1940  Admit Date:    2/28/2024 12:25 PM    Chief Complaint: Nephrology following for hyponatremia.    Subjective:  Patient was seen and examined this morning  Sodium levels not much improved.     Objective:  24HR INTAKE/OUTPUT:    Intake/Output Summary (Last 24 hours) at 3/3/2024 1226  Last data filed at 3/3/2024 0631  Gross per 24 hour   Intake 650 ml   Output 1150 ml   Net -500 ml         I/O last 3 completed shifts:  In: 1050 [P.O.:1050]  Out: 1150 [Urine:1150]  No intake/output data recorded.   Admission weight: 68 kg (150 lb)  Wt Readings from Last 3 Encounters:   02/28/24 68 kg (150 lb)   02/13/24 64.4 kg (142 lb)   01/31/24 68.3 kg (150 lb 8 oz)        Vitals :   Vitals:    03/03/24 0024 03/03/24 0428 03/03/24 0837 03/03/24 1000   BP: (!) 118/55 (!) 133/52 (!) 129/48 (!) 125/55   Pulse:  (!) 110 (!) 102 (!) 103   Resp:  18 18 12   Temp:  98.2 °F (36.8 °C) 97.5 °F (36.4 °C) 97.8 °F (36.6 °C)   TempSrc:  Oral Oral Oral   SpO2:  95% 97% 95%   Weight:       Height:           Physical examination  General Appearance: alert and cooperative with exam, appears comfortable, no distress  Mouth/Throat: Oral mucosa moist  Neck: No JVD  Lungs: Air entry B/L, no rales, no use of accessory muscles  Heart:  S1, S2 heard  GI: soft, non-tender,distended  Extremities: no significant leg edema    Medications:  Infusion:    sodium chloride       Meds:    urea  15 g Oral Daily    furosemide  80 mg Oral Daily    sodium chloride  2 g Oral BID WC    sodium chloride flush  5-40 mL IntraVENous 2 times per day    [Held by provider] spironolactone  100 mg Oral Daily    [Held by provider] spironolactone  50 mg Oral Daily    atorvastatin  20 mg Oral Nightly    [Held by provider] sertraline  25 mg Oral Daily    [Held by provider] mirtazapine  7.5 mg Oral Nightly    rOPINIRole  0.25 mg  Oral Nightly     Meds prn: sodium chloride flush, sodium chloride, HYDROcodone 5 mg - acetaminophen, HYDROcodone 5 mg - acetaminophen, morphine, melatonin, nitroGLYCERIN, acetaminophen     Lab Data :  CBC:   Recent Labs     03/01/24  0700 03/02/24  0537   WBC 13.4* 12.5*   HGB 8.8* 8.2*   HCT 26.1* 24.4*    158     CMP:  Recent Labs     03/03/24  0106 03/03/24  0542 03/03/24  1120   * 126* 124*   K 4.9 4.9 4.7   CL 97* 95* 97*   CO2 14* 17* 15*   BUN 50* 49* 49*   CREATININE 1.4* 1.4* 1.5*   GLUCOSE 144* 141* 178*   CALCIUM 8.9 9.4 8.9     Hepatic:   Recent Labs     03/01/24  0700 03/02/24  0537   LABALBU 3.9 3.6   AST 43* 42*   ALT 25 25   BILITOT 3.9* 3.3*   ALKPHOS 118 118         Assessment and Plan:  Hyponatremia in setting of cirrhosis, diuretics, paracentesis  Minimal improvement  On salt tabs, fluid restriction  May need to hold lasix  Add urea packet  Hyperkalemia, improved.  Mild NARDA, stable  Cirrhosis with ascites  Acid/Base: ABG showing respiratory alkalosis  S/p fall with pubic rami fracture      D/W patient and Dr. Julia Irving, DO  [facility]    This report has been created using voice recognition software. It may contain minor errors which are inherent in voice recognition technology
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
## 2024-03-03 NOTE — PROGRESS NOTES
Orthopaedic Progress Note      SUBJECTIVE   Ms. Wilson is hospital day # 4     Patient notes right pubic rami fracture and sacral ala fracture right side   at bedside noted increased confusion overnight  Did not work with PT yesterday     Continue NWB RLE      OBJECTIVE      Physical    VITALS:  BP (!) 133/52   Pulse (!) 110   Temp 98.2 °F (36.8 °C) (Oral)   Resp 18   Ht 1.676 m (5' 6\")   Wt 68 kg (150 lb)   LMP  (LMP Unknown)   SpO2 95%   BMI 24.21 kg/m²   I/O last 3 completed shifts:  In: 1050 [P.O.:1050]  Out: 1150 [Urine:1150]      NVI, sensation intact  No pain gastroc, full flex/ext of toes and ankle   No pain log roll bilateral hips       Data  CBC:   Lab Results   Component Value Date/Time    WBC 12.5 03/02/2024 05:37 AM    HGB 8.2 03/02/2024 05:37 AM     03/02/2024 05:37 AM     BMP:    Lab Results   Component Value Date/Time     03/03/2024 05:42 AM    K 4.9 03/03/2024 05:42 AM    K 4.4 01/08/2024 05:01 PM    CL 95 03/03/2024 05:42 AM    CO2 17 03/03/2024 05:42 AM    BUN 49 03/03/2024 05:42 AM    CREATININE 1.4 03/03/2024 05:42 AM    CALCIUM 9.4 03/03/2024 05:42 AM    GLUCOSE 141 03/03/2024 05:42 AM    GLUCOSE 93 06/06/2023 08:55 AM     Uric Acid:  No components found for: \"URIC\"  PT/INR:    Lab Results   Component Value Date/Time    PROTIME 14.1 10/25/2019 05:57 AM    INR 1.41 02/28/2024 12:25 PM     Troponin:    Lab Results   Component Value Date/Time    TROPONINI <0.006 07/11/2011 08:41 PM     Urine Culture:  No components found for: \"CURINE\"      Current Inpatient Medications    Current Facility-Administered Medications: furosemide (LASIX) tablet 80 mg, 80 mg, Oral, Daily  sodium chloride tablet 2 g, 2 g, Oral, BID WC  sodium chloride flush 0.9 % injection 5-40 mL, 5-40 mL, IntraVENous, 2 times per day  sodium chloride flush 0.9 % injection 5-40 mL, 5-40 mL, IntraVENous, PRN  0.9 % sodium chloride infusion, , IntraVENous, PRN  [Held by provider] spironolactone (ALDACTONE)  tablet 100 mg, 100 mg, Oral, Daily  [Held by provider] spironolactone (ALDACTONE) tablet 50 mg, 50 mg, Oral, Daily  atorvastatin (LIPITOR) tablet 20 mg, 20 mg, Oral, Nightly  [Held by provider] sertraline (ZOLOFT) tablet 25 mg, 25 mg, Oral, Daily  [Held by provider] mirtazapine (REMERON) tablet 7.5 mg, 7.5 mg, Oral, Nightly  HYDROcodone-acetaminophen (NORCO) 5-325 MG per tablet 1 tablet, 1 tablet, Oral, Q4H PRN  HYDROcodone-acetaminophen (NORCO) 5-325 MG per tablet 2 tablet, 2 tablet, Oral, Q4H PRN  morphine (PF) injection 2 mg, 2 mg, IntraVENous, Q2H PRN  melatonin tablet 4.5 mg, 4.5 mg, Oral, Nightly PRN  nitroGLYCERIN (NITROSTAT) SL tablet 0.4 mg, 0.4 mg, SubLINGual, Q5 Min PRN  rOPINIRole (REQUIP) tablet 0.25 mg, 0.25 mg, Oral, Nightly  acetaminophen (TYLENOL) tablet 650 mg, 650 mg, Oral, Q8H PRN        PLAN    Continue with physical therapy and Occupational Therapy  Await pre-CERT completion for Jens Irving

## 2024-03-04 ENCOUNTER — APPOINTMENT (OUTPATIENT)
Dept: GENERAL RADIOLOGY | Age: 84
End: 2024-03-04
Payer: MEDICARE

## 2024-03-04 LAB
ALBUMIN SERPL BCG-MCNC: 3.5 G/DL (ref 3.5–5.1)
ALP SERPL-CCNC: 126 U/L (ref 38–126)
ALT SERPL W/O P-5'-P-CCNC: 29 U/L (ref 11–66)
ANION GAP SERPL CALC-SCNC: 13 MEQ/L (ref 8–16)
ANION GAP SERPL CALC-SCNC: 15 MEQ/L (ref 8–16)
ANION GAP SERPL CALC-SCNC: 18 MEQ/L (ref 8–16)
AST SERPL-CCNC: 44 U/L (ref 5–40)
BILIRUB SERPL-MCNC: 3.3 MG/DL (ref 0.3–1.2)
BUN SERPL-MCNC: 65 MG/DL (ref 7–22)
BUN SERPL-MCNC: 66 MG/DL (ref 7–22)
BUN SERPL-MCNC: 75 MG/DL (ref 7–22)
CALCIUM SERPL-MCNC: 9 MG/DL (ref 8.5–10.5)
CALCIUM SERPL-MCNC: 9.1 MG/DL (ref 8.5–10.5)
CALCIUM SERPL-MCNC: 9.3 MG/DL (ref 8.5–10.5)
CHLORIDE SERPL-SCNC: 95 MEQ/L (ref 98–111)
CHLORIDE SERPL-SCNC: 95 MEQ/L (ref 98–111)
CHLORIDE SERPL-SCNC: 98 MEQ/L (ref 98–111)
CO2 SERPL-SCNC: 14 MEQ/L (ref 23–33)
CO2 SERPL-SCNC: 15 MEQ/L (ref 23–33)
CO2 SERPL-SCNC: 16 MEQ/L (ref 23–33)
CREAT SERPL-MCNC: 1.6 MG/DL (ref 0.4–1.2)
CREAT SERPL-MCNC: 1.6 MG/DL (ref 0.4–1.2)
CREAT SERPL-MCNC: 1.7 MG/DL (ref 0.4–1.2)
DEPRECATED RDW RBC AUTO: 80.7 FL (ref 35–45)
ERYTHROCYTE [DISTWIDTH] IN BLOOD BY AUTOMATED COUNT: 22.8 % (ref 11.5–14.5)
GFR SERPL CREATININE-BSD FRML MDRD: 29 ML/MIN/1.73M2
GFR SERPL CREATININE-BSD FRML MDRD: 32 ML/MIN/1.73M2
GFR SERPL CREATININE-BSD FRML MDRD: 32 ML/MIN/1.73M2
GLUCOSE BLD STRIP.AUTO-MCNC: 181 MG/DL (ref 70–108)
GLUCOSE BLD STRIP.AUTO-MCNC: 225 MG/DL (ref 70–108)
GLUCOSE BLD STRIP.AUTO-MCNC: 362 MG/DL (ref 70–108)
GLUCOSE SERPL-MCNC: 149 MG/DL (ref 70–108)
GLUCOSE SERPL-MCNC: 170 MG/DL (ref 70–108)
GLUCOSE SERPL-MCNC: 174 MG/DL (ref 70–108)
HCT VFR BLD AUTO: 25.5 % (ref 37–47)
HGB BLD-MCNC: 8.3 GM/DL (ref 12–16)
MCH RBC QN AUTO: 31.3 PG (ref 26–33)
MCHC RBC AUTO-ENTMCNC: 32.5 GM/DL (ref 32.2–35.5)
MCV RBC AUTO: 96.2 FL (ref 81–99)
PLATELET # BLD AUTO: 198 THOU/MM3 (ref 130–400)
PMV BLD AUTO: 10.2 FL (ref 9.4–12.4)
POTASSIUM SERPL-SCNC: 4.6 MEQ/L (ref 3.5–5.2)
POTASSIUM SERPL-SCNC: 4.8 MEQ/L (ref 3.5–5.2)
POTASSIUM SERPL-SCNC: 4.8 MEQ/L (ref 3.5–5.2)
PROT SERPL-MCNC: 5.4 G/DL (ref 6.1–8)
RBC # BLD AUTO: 2.65 MILL/MM3 (ref 4.2–5.4)
SODIUM SERPL-SCNC: 123 MEQ/L (ref 135–145)
SODIUM SERPL-SCNC: 127 MEQ/L (ref 135–145)
SODIUM SERPL-SCNC: 129 MEQ/L (ref 135–145)
WBC # BLD AUTO: 12.6 THOU/MM3 (ref 4.8–10.8)

## 2024-03-04 PROCEDURE — 6370000000 HC RX 637 (ALT 250 FOR IP): Performed by: PHYSICIAN ASSISTANT

## 2024-03-04 PROCEDURE — 2580000003 HC RX 258: Performed by: NURSE PRACTITIONER

## 2024-03-04 PROCEDURE — 97535 SELF CARE MNGMENT TRAINING: CPT

## 2024-03-04 PROCEDURE — 6370000000 HC RX 637 (ALT 250 FOR IP): Performed by: INTERNAL MEDICINE

## 2024-03-04 PROCEDURE — 85027 COMPLETE CBC AUTOMATED: CPT

## 2024-03-04 PROCEDURE — 36415 COLL VENOUS BLD VENIPUNCTURE: CPT

## 2024-03-04 PROCEDURE — 94640 AIRWAY INHALATION TREATMENT: CPT

## 2024-03-04 PROCEDURE — 94761 N-INVAS EAR/PLS OXIMETRY MLT: CPT

## 2024-03-04 PROCEDURE — 82948 REAGENT STRIP/BLOOD GLUCOSE: CPT

## 2024-03-04 PROCEDURE — 99232 SBSQ HOSP IP/OBS MODERATE 35: CPT | Performed by: INTERNAL MEDICINE

## 2024-03-04 PROCEDURE — 97530 THERAPEUTIC ACTIVITIES: CPT

## 2024-03-04 PROCEDURE — 1200000000 HC SEMI PRIVATE

## 2024-03-04 PROCEDURE — 80053 COMPREHEN METABOLIC PANEL: CPT

## 2024-03-04 PROCEDURE — 6370000000 HC RX 637 (ALT 250 FOR IP): Performed by: NURSE PRACTITIONER

## 2024-03-04 PROCEDURE — 6360000002 HC RX W HCPCS: Performed by: INTERNAL MEDICINE

## 2024-03-04 PROCEDURE — 74018 RADEX ABDOMEN 1 VIEW: CPT

## 2024-03-04 PROCEDURE — 97110 THERAPEUTIC EXERCISES: CPT

## 2024-03-04 RX ORDER — POLYETHYLENE GLYCOL 3350 17 G/17G
17 POWDER, FOR SOLUTION ORAL 2 TIMES DAILY
Status: DISCONTINUED | OUTPATIENT
Start: 2024-03-04 | End: 2024-03-05 | Stop reason: SDUPTHER

## 2024-03-04 RX ORDER — ALBUTEROL SULFATE 2.5 MG/3ML
2.5 SOLUTION RESPIRATORY (INHALATION)
Status: DISCONTINUED | OUTPATIENT
Start: 2024-03-04 | End: 2024-03-05

## 2024-03-04 RX ORDER — ALBUTEROL SULFATE 2.5 MG/3ML
2.5 SOLUTION RESPIRATORY (INHALATION) EVERY 6 HOURS PRN
Status: DISCONTINUED | OUTPATIENT
Start: 2024-03-04 | End: 2024-03-08 | Stop reason: HOSPADM

## 2024-03-04 RX ADMIN — Medication 15 G: at 09:08

## 2024-03-04 RX ADMIN — SODIUM CHLORIDE, PRESERVATIVE FREE 10 ML: 5 INJECTION INTRAVENOUS at 22:07

## 2024-03-04 RX ADMIN — SODIUM CHLORIDE, PRESERVATIVE FREE 10 ML: 5 INJECTION INTRAVENOUS at 08:07

## 2024-03-04 RX ADMIN — SODIUM CHLORIDE 2 G: 1 TABLET ORAL at 08:06

## 2024-03-04 RX ADMIN — ATORVASTATIN CALCIUM 20 MG: 20 TABLET, FILM COATED ORAL at 22:06

## 2024-03-04 RX ADMIN — ROPINIROLE HYDROCHLORIDE 0.25 MG: 0.25 TABLET, FILM COATED ORAL at 22:06

## 2024-03-04 RX ADMIN — POLYETHYLENE GLYCOL 3350 17 G: 17 POWDER, FOR SOLUTION ORAL at 18:45

## 2024-03-04 RX ADMIN — ALBUTEROL SULFATE 2.5 MG: 2.5 SOLUTION RESPIRATORY (INHALATION) at 14:46

## 2024-03-04 RX ADMIN — HYDROCODONE BITARTRATE AND ACETAMINOPHEN 1 TABLET: 5; 325 TABLET ORAL at 14:10

## 2024-03-04 RX ADMIN — SODIUM CHLORIDE 2 G: 1 TABLET ORAL at 16:14

## 2024-03-04 ASSESSMENT — PAIN SCALES - GENERAL: PAINLEVEL_OUTOF10: 6
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
## 2024-03-04 NOTE — RT PROTOCOL NOTE
with same Frequency and PRN reasons based on the score.  If a patient is on this medication at home then do not decrease Frequency below that used at home.    0-3 - enter or revise RT bronchodilator order(s) to equivalent RT Bronchodilator order with Frequency of every 4 hours PRN for wheezing or increased work of breathing using Per Protocol order mode.        4-6 - enter or revise RT Bronchodilator order(s) to two equivalent RT bronchodilator orders with one order with BID Frequency and one order with Frequency of every 4 hours PRN wheezing or increased work of breathing using Per Protocol order mode.        7-10 - enter or revise RT Bronchodilator order(s) to two equivalent RT bronchodilator orders with one order with TID Frequency and one order with Frequency of every 4 hours PRN wheezing or increased work of breathing using Per Protocol order mode.       11-13 - enter or revise RT Bronchodilator order(s) to one equivalent RT bronchodilator order with QID Frequency and an Albuterol order with Frequency of every 4 hours PRN wheezing or increased work of breathing using Per Protocol order mode.      Greater than 13 - enter or revise RT Bronchodilator order(s) to one equivalent RT bronchodilator order with every 4 hours Frequency and an Albuterol order with Frequency of every 2 hours PRN wheezing or increased work of breathing using Per Protocol order mode.     RT to enter RT Home Evaluation for COPD & MDI Assessment order using Per Protocol order mode.    Electronically signed by Zachery Jamison RCP on 3/4/2024 at 2:53 PM

## 2024-03-04 NOTE — PROGRESS NOTES
Orthopaedic Progress Note      SUBJECTIVE   Ms. Wilson is hospital day 5, R pubic rami fx    Seen at bedside this morning  no adverse events overnight  Pain well controlled, tolerating oral diet  No new complaints   No PT OT yesterday   Family bedside      OBJECTIVE      Physical    VITALS:  BP (!) 133/54   Pulse (!) 102   Temp 98.2 °F (36.8 °C)   Resp 18   Ht 1.676 m (5' 6\")   Wt 68 kg (150 lb)   LMP  (LMP Unknown)   SpO2 97%   BMI 24.21 kg/m²   I/O last 3 completed shifts:  In: 1590 [P.O.:1590]  Out: 1300 [Urine:1300]    GENERAL APPEARANCE: Awake and oriented xname , some confusion regarding fall but answers appropriately with redirection. No acute distress, except appropriate to injury.  MOOD AND AFFECT: Calm appropriate to situation  HEAD: normocephalic, atraumatic   EYES: equal and reactive to light, Extraocular movements are normal. Pupils are equal in size.  Hematologic/Lymphatic: no lymphadenopathy to upper or lower extremity.   MSK  RLE:- atraumatic hip, knee, ankle, no lacerations or lesions. Sitting in neutral alignment. Compartments soft.  Nontender to palpation asis iliac crest greater troch, nontender medial lateral joint line, tibia shaft, medial lateral mal, midfoot, calc, calf supple nontender,  Able to perform SLR, gastroc ta ehl intact, painless IR ER through hip. sensation to light touch intact, distal pulses palpable       Data  CBC:   Lab Results   Component Value Date/Time    WBC 12.6 2024 06:06 AM    HGB 8.3 2024 06:06 AM     2024 06:06 AM     BMP:    Lab Results   Component Value Date/Time     2024 06:06 AM    K 4.6 2024 06:06 AM    K 4.4 2024 05:01 PM    CL 98 2024 06:06 AM    CO2 16 2024 06:06 AM    BUN 65 2024 06:06 AM    CREATININE 1.7 2024 06:06 AM    CALCIUM 9.1 2024 06:06 AM    GLUCOSE 149 2024 06:06 AM    GLUCOSE 93 2023 08:55 AM     Uric Acid:  No components found for:  \"URIC\"  PT/INR:    Lab Results   Component Value Date/Time    PROTIME 14.1 10/25/2019 05:57 AM    INR 1.41 02/28/2024 12:25 PM     Troponin:    Lab Results   Component Value Date/Time    TROPONINI <0.006 07/11/2011 08:41 PM     Urine Culture:  No components found for: \"CURINE\"      Current Inpatient Medications    Current Facility-Administered Medications: urea (URE-NA) packet 15 g, 15 g, Oral, Daily  [Held by provider] furosemide (LASIX) tablet 80 mg, 80 mg, Oral, Daily  sodium chloride tablet 2 g, 2 g, Oral, BID WC  sodium chloride flush 0.9 % injection 5-40 mL, 5-40 mL, IntraVENous, 2 times per day  sodium chloride flush 0.9 % injection 5-40 mL, 5-40 mL, IntraVENous, PRN  0.9 % sodium chloride infusion, , IntraVENous, PRN  [Held by provider] spironolactone (ALDACTONE) tablet 100 mg, 100 mg, Oral, Daily  [Held by provider] spironolactone (ALDACTONE) tablet 50 mg, 50 mg, Oral, Daily  atorvastatin (LIPITOR) tablet 20 mg, 20 mg, Oral, Nightly  [Held by provider] sertraline (ZOLOFT) tablet 25 mg, 25 mg, Oral, Daily  [Held by provider] mirtazapine (REMERON) tablet 7.5 mg, 7.5 mg, Oral, Nightly  HYDROcodone-acetaminophen (NORCO) 5-325 MG per tablet 1 tablet, 1 tablet, Oral, Q4H PRN  HYDROcodone-acetaminophen (NORCO) 5-325 MG per tablet 2 tablet, 2 tablet, Oral, Q4H PRN  melatonin tablet 4.5 mg, 4.5 mg, Oral, Nightly PRN  nitroGLYCERIN (NITROSTAT) SL tablet 0.4 mg, 0.4 mg, SubLINGual, Q5 Min PRN  rOPINIRole (REQUIP) tablet 0.25 mg, 0.25 mg, Oral, Nightly  acetaminophen (TYLENOL) tablet 650 mg, 650 mg, Oral, Q8H PRN        PLAN    Ms. Wilson is hospital day 5, R pubic rami fx    Good effort PT OT   GI following  Tolerating oral diet  WBAT BLE with walker  Pain appears well controlled  Hospitalist following   bedside, all questions answered  Dispo- SNF palcement
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
## 2024-03-04 NOTE — PROGRESS NOTES
[facility] ORTHOPEDICS 7K  Occupational Therapy  Daily Note  Time:   Time In: 1330  Time Out: 1408  Timed Code Treatment Minutes: 38 Minutes  Minutes: 38          Date: 3/4/2024  Patient Name: Meg Wilson,   Gender: female      Room: [facility]13/013-A  MRN: [id number]  : 1940  (83 y.o.)  Referring Practitioner: Nikita Lagunas PA-C  Diagnosis: Fracture, sacrum/coccyx  Additional Pertinent Hx: 83 y.o. female who presents to the ED after an unwitnessed fall in her home. Had been working with therapy when  noticed a noise in the home, patient found on the ground and unable to ambulate after. Head neck imaging not performed inED, denied hitting head and neck. Imaging did reveal a pelvic fracture for which orthopaedics was asked to admit. Pain is predominately to the RLE, worsened with ROM and weight bearing, relieved by immobilization, non radiating, no associated paresthesias or weakness. No other msk complaints at this time,  bedside, aiding in history.    Restrictions/Precautions:  Restrictions/Precautions: Weight Bearing, Fall Risk  Right Lower Extremity Weight Bearing: Weight Bearing As Tolerated  Position Activity Restriction  Other position/activity restrictions: R LE WBAT per ortho due to decreased awareness/inability to maintain NWB     Social/Functional History:  Lives With: Spouse  Type of Home: House  Home Layout: One level  Home Access: Ramped entrance (with HR)  Home Equipment: Walker, 4 wheeled, Wheelchair-manual           Ambulation Assistance: Independent  Transfer Assistance: Independent    Active : No  Patient's  Info:  drives     Additional Comments: spouse does provide 24/7 supervision due to pt with dementia, per spouse a w/c won't fit through doorways of home and walker won't fit in bathroom.    SUBJECTIVE: Pt in recliner upon arrival. Agreeable to therapy. Verbal approval for therapy from RN. RN informed of pt wheezing.  present

## 2024-03-04 NOTE — PROGRESS NOTES
[facility]   Nephrology progress note  3/4/2024, 12:35 PM      Pt Name:    Meg Wilson  MRN:     [id number]     YOB: 1940  Admit Date:    2/28/2024 12:25 PM    Chief Complaint: Nephrology following for hyponatremia.    Subjective:  Patient was seen and examined this morning  No new events.    Objective:  24HR INTAKE/OUTPUT:    Intake/Output Summary (Last 24 hours) at 3/4/2024 1235  Last data filed at 3/4/2024 0516  Gross per 24 hour   Intake 1140 ml   Output 700 ml   Net 440 ml         I/O last 3 completed shifts:  In: 1590 [P.O.:1590]  Out: 1300 [Urine:1300]  No intake/output data recorded.   Admission weight: 68 kg (150 lb)  Wt Readings from Last 3 Encounters:   02/28/24 68 kg (150 lb)   02/13/24 64.4 kg (142 lb)   01/31/24 68.3 kg (150 lb 8 oz)        Vitals :   Vitals:    03/03/24 1727 03/03/24 2106 03/04/24 0332 03/04/24 0803   BP: (!) 126/56 (!) 128/50 (!) 133/54 (!) 122/58   Pulse: (!) 107 (!) 108 (!) 102 95   Resp: 16 18  18   Temp: 98 °F (36.7 °C) 98.4 °F (36.9 °C) 98.2 °F (36.8 °C) 97.3 °F (36.3 °C)   TempSrc: Oral Oral  Axillary   SpO2: 96% 95% 97% 98%   Weight:       Height:           Physical examination  General Appearance: alert and cooperative with exam, appears comfortable, no distress  Mouth/Throat: Oral mucosa moist  Neck: No JVD  Lungs: Air entry B/L, no rales, no use of accessory muscles  Heart:  S1, S2 heard  GI: soft, non-tender,distended  Extremities: no significant leg edema    Medications:  Infusion:    sodium chloride       Meds:    urea  15 g Oral Daily    [Held by provider] furosemide  80 mg Oral Daily    sodium chloride  2 g Oral BID WC    sodium chloride flush  5-40 mL IntraVENous 2 times per day    [Held by provider] spironolactone  100 mg Oral Daily    [Held by provider] spironolactone  50 mg Oral Daily    atorvastatin  20 mg Oral Nightly    [Held by provider] sertraline  25 mg Oral Daily    [Held by provider] mirtazapine  7.5 mg Oral Nightly

## 2024-03-04 NOTE — PROGRESS NOTES
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  Not considered appendicitis admitted with hip fracture not able to move this time with pain conservative management with orthopedic surgery will be transferred to nursing home family has a spot for her outside facility.  She having weekly paracentesis with 50 g of albumin infusions last 1 done yesterday 4.7 L removed.  Cell count culture do not suggest spontaneous bacterial peritonitis at this time we can watch closely for that.  His acute kidney injury seen by the nephrology service as well as hyponatremia she will be given salt tablet and to improve.    3/2/2024 patient more alert oriented today not short of breath tolerating his diet he ate basically almost everything abdomen slightly distended no shifting dullness waiting to transfer to nursing home likely done during the week has not at the time of evaluation present in the room.    3/3/2024 slightly short of breath abdomen distended no ascites, KUB for tomorrow chest x-ray for today, discussed care with the nephrology service patient with hyponatremia on salt tablets, will repeat CBC CMP    3/4/2024 distended abdomen imaging study showed constipations distended bowel likely the bowel pressing on the chest did not start shortness of breath plan to give MiraLAX twice a day reevaluate repeat lab KUB    Diet: ADULT DIET; Regular; Low Potassium (Less than 3000 mg/day); 1500 ml    Medications:   Scheduled Meds:    albuterol  2.5 mg Nebulization TID RT    urea  15 g Oral Daily    [Held by provider] furosemide  80 mg Oral Daily    sodium chloride  2 g Oral BID WC    sodium chloride flush  5-40 mL IntraVENous 2 times per day    [Held by provider] spironolactone  100 mg Oral Daily    [Held by provider] spironolactone  50 mg Oral Daily    atorvastatin  20 mg Oral Nightly    [Held by provider] sertraline  25 mg Oral

## 2024-03-04 NOTE — ED NOTES
ED to inpatient nurses report    Chief Complaint   Patient presents with    Fall    Hip Pain     RIGHT      Present to ED from home  LOC: alert and orientated to name and place  Vital signs   Vitals:    02/28/24 1227 02/28/24 1327 02/28/24 1507 02/28/24 1723   BP: (!) 123/54 (!) 123/53 118/86 (!) 124/46   Pulse: (!) 102 (!) 102 (!) 109 (!) 118   Resp: 16 18 18 16   Temp: 97.3 °F (36.3 °C)      TempSrc: Oral      SpO2: 95% 95% 96% 94%   Weight: 68 kg (150 lb)      Height: 1.676 m (5' 6\")         Oxygen Baseline room air     Current needs required none   LDAs:   Peripheral IV 02/28/24 Left Antecubital (Active)   Site Assessment Clean, dry & intact 02/28/24 1508   Phlebitis Assessment No symptoms 02/28/24 1508   Infiltration Assessment 0 02/28/24 1508     Mobility: Requires assistance * 2  Pending ED orders: none  Present condition: stable  C-SSRS Risk of Suicide: No Risk  Swallow Screening    Preferred Language: English     Electronically signed by hSeba Pierre RN on 2/28/2024 at 5:42 PM   
Pt assisted with the bed pan at this time. Voices no other needs or concerns. Vitals stable. Call light in reach  
Pt resting on cot. Vitals stable. Pain controlled. Call light in reach  
on unit

## 2024-03-05 ENCOUNTER — APPOINTMENT (OUTPATIENT)
Dept: GENERAL RADIOLOGY | Age: 84
End: 2024-03-05
Payer: MEDICARE

## 2024-03-05 LAB
ALBUMIN SERPL BCG-MCNC: 3.4 G/DL (ref 3.5–5.1)
ALP SERPL-CCNC: 134 U/L (ref 38–126)
ALT SERPL W/O P-5'-P-CCNC: 30 U/L (ref 11–66)
AMMONIA PLAS-MCNC: 131 UMOL/L (ref 11–60)
ANION GAP SERPL CALC-SCNC: 11 MEQ/L (ref 8–16)
AST SERPL-CCNC: 47 U/L (ref 5–40)
BACTERIA SPEC ANAEROBE CULT: NORMAL
BACTERIA SPEC BFLD CULT: NORMAL
BASE EXCESS BLDA CALC-SCNC: -6.8 MMOL/L (ref -2–3)
BILIRUB SERPL-MCNC: 3.2 MG/DL (ref 0.3–1.2)
BUN SERPL-MCNC: 76 MG/DL (ref 7–22)
CALCIUM SERPL-MCNC: 9.1 MG/DL (ref 8.5–10.5)
CHLORIDE SERPL-SCNC: 99 MEQ/L (ref 98–111)
CO2 SERPL-SCNC: 16 MEQ/L (ref 23–33)
COLLECTED BY:: ABNORMAL
CREAT SERPL-MCNC: 1.5 MG/DL (ref 0.4–1.2)
DEPRECATED RDW RBC AUTO: 78.5 FL (ref 35–45)
ERYTHROCYTE [DISTWIDTH] IN BLOOD BY AUTOMATED COUNT: 22.8 % (ref 11.5–14.5)
GFR SERPL CREATININE-BSD FRML MDRD: 34 ML/MIN/1.73M2
GLUCOSE BLD STRIP.AUTO-MCNC: 151 MG/DL (ref 70–108)
GLUCOSE BLD STRIP.AUTO-MCNC: 205 MG/DL (ref 70–108)
GLUCOSE BLD STRIP.AUTO-MCNC: 245 MG/DL (ref 70–108)
GLUCOSE SERPL-MCNC: 143 MG/DL (ref 70–108)
GRAM STN SPEC: NORMAL
HCO3 BLDA-SCNC: 17 MMOL/L (ref 23–28)
HCT VFR BLD AUTO: 23.8 % (ref 37–47)
HGB BLD-MCNC: 8 GM/DL (ref 12–16)
MCH RBC QN AUTO: 31.3 PG (ref 26–33)
MCHC RBC AUTO-ENTMCNC: 33.6 GM/DL (ref 32.2–35.5)
MCV RBC AUTO: 93 FL (ref 81–99)
PCO2 TEMP ADJ BLDMV: 26 MMHG (ref 41–51)
PH BLDMV: 7.42 [PH] (ref 7.31–7.41)
PLATELET # BLD AUTO: 175 THOU/MM3 (ref 130–400)
PMV BLD AUTO: 9.5 FL (ref 9.4–12.4)
PO2 BLDMV: 55 MMHG (ref 25–40)
POTASSIUM SERPL-SCNC: 5.1 MEQ/L (ref 3.5–5.2)
PROT SERPL-MCNC: 5.3 G/DL (ref 6.1–8)
RBC # BLD AUTO: 2.56 MILL/MM3 (ref 4.2–5.4)
SAO2 % BLDMV: 90 %
SODIUM SERPL-SCNC: 125 MEQ/L (ref 135–145)
SODIUM SERPL-SCNC: 126 MEQ/L (ref 135–145)
SODIUM SERPL-SCNC: 129 MEQ/L (ref 135–145)
WBC # BLD AUTO: 11.5 THOU/MM3 (ref 4.8–10.8)

## 2024-03-05 PROCEDURE — 99232 SBSQ HOSP IP/OBS MODERATE 35: CPT | Performed by: INTERNAL MEDICINE

## 2024-03-05 PROCEDURE — 6360000002 HC RX W HCPCS: Performed by: INTERNAL MEDICINE

## 2024-03-05 PROCEDURE — 99232 SBSQ HOSP IP/OBS MODERATE 35: CPT

## 2024-03-05 PROCEDURE — 71045 X-RAY EXAM CHEST 1 VIEW: CPT

## 2024-03-05 PROCEDURE — 6370000000 HC RX 637 (ALT 250 FOR IP): Performed by: INTERNAL MEDICINE

## 2024-03-05 PROCEDURE — 80053 COMPREHEN METABOLIC PANEL: CPT

## 2024-03-05 PROCEDURE — 6370000000 HC RX 637 (ALT 250 FOR IP)

## 2024-03-05 PROCEDURE — 94640 AIRWAY INHALATION TREATMENT: CPT

## 2024-03-05 PROCEDURE — 36415 COLL VENOUS BLD VENIPUNCTURE: CPT

## 2024-03-05 PROCEDURE — 6370000000 HC RX 637 (ALT 250 FOR IP): Performed by: PHYSICIAN ASSISTANT

## 2024-03-05 PROCEDURE — 82803 BLOOD GASES ANY COMBINATION: CPT

## 2024-03-05 PROCEDURE — 84295 ASSAY OF SERUM SODIUM: CPT

## 2024-03-05 PROCEDURE — 1200000000 HC SEMI PRIVATE

## 2024-03-05 PROCEDURE — 82948 REAGENT STRIP/BLOOD GLUCOSE: CPT

## 2024-03-05 PROCEDURE — 6370000000 HC RX 637 (ALT 250 FOR IP): Performed by: NURSE PRACTITIONER

## 2024-03-05 PROCEDURE — 97110 THERAPEUTIC EXERCISES: CPT

## 2024-03-05 PROCEDURE — 97535 SELF CARE MNGMENT TRAINING: CPT

## 2024-03-05 PROCEDURE — 2580000003 HC RX 258: Performed by: NURSE PRACTITIONER

## 2024-03-05 PROCEDURE — 6360000002 HC RX W HCPCS: Performed by: PHYSICIAN ASSISTANT

## 2024-03-05 PROCEDURE — 97530 THERAPEUTIC ACTIVITIES: CPT

## 2024-03-05 PROCEDURE — 36600 WITHDRAWAL OF ARTERIAL BLOOD: CPT

## 2024-03-05 PROCEDURE — 85027 COMPLETE CBC AUTOMATED: CPT

## 2024-03-05 PROCEDURE — 82140 ASSAY OF AMMONIA: CPT

## 2024-03-05 RX ORDER — POLYETHYLENE GLYCOL 3350 17 G/17G
17 POWDER, FOR SOLUTION ORAL 2 TIMES DAILY
Status: DISCONTINUED | OUTPATIENT
Start: 2024-03-05 | End: 2024-03-05

## 2024-03-05 RX ORDER — IPRATROPIUM BROMIDE AND ALBUTEROL SULFATE 2.5; .5 MG/3ML; MG/3ML
1 SOLUTION RESPIRATORY (INHALATION)
Status: DISCONTINUED | OUTPATIENT
Start: 2024-03-06 | End: 2024-03-08

## 2024-03-05 RX ORDER — LACTULOSE 10 G/15ML
10 SOLUTION ORAL 3 TIMES DAILY
Status: DISCONTINUED | OUTPATIENT
Start: 2024-03-05 | End: 2024-03-07

## 2024-03-05 RX ORDER — TOLVAPTAN 15 MG/1
15 TABLET ORAL ONCE
Status: COMPLETED | OUTPATIENT
Start: 2024-03-05 | End: 2024-03-05

## 2024-03-05 RX ORDER — ALBUTEROL SULFATE 2.5 MG/3ML
2.5 SOLUTION RESPIRATORY (INHALATION)
Status: DISCONTINUED | OUTPATIENT
Start: 2024-03-05 | End: 2024-03-05

## 2024-03-05 RX ORDER — IPRATROPIUM BROMIDE AND ALBUTEROL SULFATE 2.5; .5 MG/3ML; MG/3ML
1 SOLUTION RESPIRATORY (INHALATION)
Status: DISCONTINUED | OUTPATIENT
Start: 2024-03-05 | End: 2024-03-05

## 2024-03-05 RX ADMIN — ATORVASTATIN CALCIUM 20 MG: 20 TABLET, FILM COATED ORAL at 19:53

## 2024-03-05 RX ADMIN — ALBUTEROL SULFATE 2.5 MG: 2.5 SOLUTION RESPIRATORY (INHALATION) at 17:05

## 2024-03-05 RX ADMIN — TOLVAPTAN 15 MG: 15 TABLET ORAL at 10:51

## 2024-03-05 RX ADMIN — ROPINIROLE HYDROCHLORIDE 0.25 MG: 0.25 TABLET, FILM COATED ORAL at 19:53

## 2024-03-05 RX ADMIN — SODIUM CHLORIDE, PRESERVATIVE FREE 10 ML: 5 INJECTION INTRAVENOUS at 08:17

## 2024-03-05 RX ADMIN — HYDROCODONE BITARTRATE AND ACETAMINOPHEN 1 TABLET: 5; 325 TABLET ORAL at 08:15

## 2024-03-05 RX ADMIN — LACTULOSE 10 G: 20 SOLUTION ORAL at 19:53

## 2024-03-05 RX ADMIN — SODIUM CHLORIDE, PRESERVATIVE FREE 10 ML: 5 INJECTION INTRAVENOUS at 19:54

## 2024-03-05 RX ADMIN — Medication 15 G: at 08:15

## 2024-03-05 RX ADMIN — LACTULOSE 10 G: 20 SOLUTION ORAL at 16:25

## 2024-03-05 RX ADMIN — SODIUM CHLORIDE 2 G: 1 TABLET ORAL at 08:18

## 2024-03-05 RX ADMIN — ALBUTEROL SULFATE 2.5 MG: 2.5 SOLUTION RESPIRATORY (INHALATION) at 07:55

## 2024-03-05 RX ADMIN — IPRATROPIUM BROMIDE AND ALBUTEROL SULFATE 1 DOSE: .5; 3 SOLUTION RESPIRATORY (INHALATION) at 20:35

## 2024-03-05 RX ADMIN — POLYETHYLENE GLYCOL 3350 17 G: 17 POWDER, FOR SOLUTION ORAL at 11:55

## 2024-03-05 ASSESSMENT — PAIN DESCRIPTION - ORIENTATION: ORIENTATION: LOWER

## 2024-03-05 ASSESSMENT — PAIN DESCRIPTION - DESCRIPTORS: DESCRIPTORS: ACHING

## 2024-03-05 ASSESSMENT — PAIN DESCRIPTION - LOCATION: LOCATION: BACK

## 2024-03-05 ASSESSMENT — PAIN - FUNCTIONAL ASSESSMENT: PAIN_FUNCTIONAL_ASSESSMENT: PREVENTS OR INTERFERES SOME ACTIVE ACTIVITIES AND ADLS

## 2024-03-05 ASSESSMENT — PAIN SCALES - GENERAL
PAINLEVEL_OUTOF10: 5
PAINLEVEL_OUTOF10: 0

## 2024-03-05 NOTE — PROGRESS NOTES
Dr Gonzalez  Informed consent signed: Yes  Local Anesthetic: 2 % Lidocaine  Specimen volume: 70 ml  Catheter: 19 ga 5 Rwandan  Aspirated ascites volume: 4.7 liters  Aspirated ascites color: Cloudy yellow  Site of Puncture:RLQ  Complications: None observed      The benefits and the risk of the procedure were explained to the patient. Signed consent was obtained.  Limited ultrasound exam of the abdomen showed  large  amount of ascites.  The right side of the abdomen was prepped and draped using the usual sterile technique and the skin was anesthetized.  A 5 Greenlandic one-step catheter was introduced to the peritoneal ascites. 4.7 L of fluid drained. The catheter was then removed.     The patient tolerated the procedure well with no complications reported and was discharged from the radiology department in a stable condition.     Specimen sent for laboratory studies as requested.     IMPRESSION:     Successful ultrasound-guided paracentesis. 4.7 L of fluid drained.    Endoscopy Finding:  N/A      Objective:   Vitals: /60   Pulse 99   Temp 98.2 °F (36.8 °C) (Oral)   Resp 20   Ht 1.676 m (5' 6\")   Wt 69.7 kg (153 lb 10.6 oz)   LMP  (LMP Unknown)   SpO2 94%   BMI 24.80 kg/m²     Intake/Output Summary (Last 24 hours) at 3/5/2024 1556  Last data filed at 3/5/2024 1340  Gross per 24 hour   Intake 460 ml   Output 1400 ml   Net -940 ml     Weight:  Wt Readings from Last 3 Encounters:   03/05/24 69.7 kg (153 lb 10.6 oz)   02/13/24 64.4 kg (142 lb)   01/31/24 68.3 kg (150 lb 8 oz)     General appearance: alert and cooperative with exam  Lungs: clear to auscultation bilaterally  Heart: regular rate and rhythm, S1, S2 normal, no murmur, click, rub or gallop  Abdomen:  soft, distended.  Increased tympany t/o with percussion.  Hypoactive BS.  No significant ascites on physical exam  Extremities: extremities normal, atraumatic, no cyanosis or edema    Assessment and Plan:   S/p fall with right sacral fracture and right

## 2024-03-05 NOTE — PLAN OF CARE
Problem: Discharge Planning  Goal: Discharge to home or other facility with appropriate resources  Outcome: Progressing  Flowsheets (Taken 3/5/2024 1342)  Discharge to home or other facility with appropriate resources:   Identify barriers to discharge with patient and caregiver   Arrange for needed discharge resources and transportation as appropriate   Identify discharge learning needs (meds, wound care, etc)     Problem: Pain  Goal: Verbalizes/displays adequate comfort level or baseline comfort level  Outcome: Progressing  Flowsheets (Taken 3/5/2024 1342)  Verbalizes/displays adequate comfort level or baseline comfort level:   Encourage patient to monitor pain and request assistance   Assess pain using appropriate pain scale     Problem: Skin/Tissue Integrity  Goal: Absence of new skin breakdown  Description: 1.  Monitor for areas of redness and/or skin breakdown  2.  Assess vascular access sites hourly  3.  Every 4-6 hours minimum:  Change oxygen saturation probe site  4.  Every 4-6 hours:  If on nasal continuous positive airway pressure, respiratory therapy assess nares and determine need for appliance change or resting period.  Outcome: Progressing     Problem: ABCDS Injury Assessment  Goal: Absence of physical injury  Outcome: Progressing  Flowsheets  Taken 3/5/2024 1342  Absence of Physical Injury: Implement safety measures based on patient assessment    Problem: Safety - Adult  Goal: Free from fall injury  Outcome: Progressing  Flowsheets (Taken 3/5/2024 1342)  Free From Fall Injury: Instruct family/caregiver on patient safety     Problem: Chronic Conditions and Co-morbidities  Goal: Patient's chronic conditions and co-morbidity symptoms are monitored and maintained or improved  Outcome: Progressing  Flowsheets (Taken 3/5/2024 1342)  Care Plan - Patient's Chronic Conditions and Co-Morbidity Symptoms are Monitored and Maintained or Improved:   Monitor and assess patient's chronic conditions and comorbid  symptoms for stability, deterioration, or improvement   Collaborate with multidisciplinary team to address chronic and comorbid conditions and prevent exacerbation or deterioration     Problem: Respiratory - Adult  Goal: Achieves optimal ventilation and oxygenation  3/5/2024 1342 by Heather Ponce RN  Outcome: Progressing  Flowsheets (Taken 3/5/2024 1342)  Achieves optimal ventilation and oxygenation:   Assess for changes in respiratory status   Assess for changes in mentation and behavior   Care plan reviewed with patient and .  Patient and  verbalize understanding of the plan of care and contribute to goal setting.

## 2024-03-05 NOTE — PROGRESS NOTES
Patient is complaining of shortness of breath and has expiratory wheezes. Referred patient to the attending provider, Samira Avendaño PA-C. Attending provider went to see the patient, talked to the family and ordered BVG and portable chest xray.

## 2024-03-05 NOTE — PROGRESS NOTES
report has been created using voice recognition software.  It may contain minor errors which are inherent in voice recognition technology.**      Final report electronically signed by Dr Alice Denise on 3/3/2024 2:10 PM      US GUIDED PARACENTESIS   Final Result      Successful ultrasound-guided paracentesis. 4.7 L of fluid drained.               **This report has been created using voice recognition software. It may contain minor errors which are inherent in voice recognition technology.**                   Final report electronically signed by Dr Nino Gonzalez on 2/29/2024 2:14 PM      CT HIP RIGHT WO CONTRAST   Final Result   1. Mildly displaced acute fracture of the right sacral ala   2. Mildly displaced fracture of the right inferior pubic ramus.   3. Comminuted and displaced fractures of the body of the right pubic bone.   4. Large ascites               **This report has been created using voice recognition software.  It may contain minor errors which are inherent in voice recognition technology.**      Final report electronically signed by Dr Alice Denise on 2/28/2024 3:58 PM      XR LUMBAR SPINE (2-3 VIEWS)   Final Result   1. No fracture of the lumbar spine.   2. Anterolisthesis of L4 on L5.      Final report electronically signed by Dr. Declan Albarran on 2/28/2024 1:43 PM      XR HIP W PELVIS MIN 5 VWS BILATERAL   Final Result      No fracture or dislocation.      Final report electronically signed by Dr. Declan Albarran on 2/28/2024 1:40 PM      XR CHEST 1 VIEW   Final Result      No acute intrathoracic process.      Final report electronically signed by Dr. Declan Albarran on 2/28/2024 1:36 PM        US GUIDED PARACENTESIS    Result Date: 2/29/2024  PROCEDURE: Ultrasound-guided paracentesis. CLINICAL INFORMATION: Ascites. COMPARISON: Procedure 2/22/2024 PROCEDURE: Physician performing procedure: Dr Gonzalez Informed consent signed: Yes Local Anesthetic: 2 % Lidocaine Specimen volume: 70 ml  Catheter: 19 ga 5 Uruguayan Aspirated ascites volume: 4.7 liters Aspirated ascites color: Cloudy yellow Site of Puncture:RLQ Complications: None observed The benefits and the risk of the procedure were explained to the patient. Signed consent was obtained. Limited ultrasound exam of the abdomen showed  large  amount of ascites. The right side of the abdomen was prepped and draped using the usual sterile technique and the skin was anesthetized. A 5 Syriac one-step catheter was introduced to the peritoneal ascites. 4.7 L of fluid drained. The catheter was then removed. The patient tolerated the procedure well with no complications reported and was discharged from the radiology department in a stable condition. Specimen sent for laboratory studies as requested.     Successful ultrasound-guided paracentesis. 4.7 L of fluid drained. **This report has been created using voice recognition software. It may contain minor errors which are inherent in voice recognition technology.**  Final report electronically signed by Dr Nino Gonzalez on 2/29/2024 2:14 PM      Electronically signed by Samira Avendaño PA-C on 3/5/2024 at 8:40 AM

## 2024-03-05 NOTE — PROGRESS NOTES
Attempted to get an abg which was unsuccessful, asked Puja PULLIAM to attempt, attempt unsuccessful. RN changed to vbg.

## 2024-03-05 NOTE — PROGRESS NOTES
provider] mirtazapine  7.5 mg Oral Nightly    rOPINIRole  0.25 mg Oral Nightly     Meds prn: albuterol, sodium chloride flush, sodium chloride, HYDROcodone 5 mg - acetaminophen, HYDROcodone 5 mg - acetaminophen, melatonin, nitroGLYCERIN, acetaminophen     Lab Data :  CBC:   Recent Labs     03/04/24  0606 03/05/24  0654   WBC 12.6* 11.5*   HGB 8.3* 8.0*   HCT 25.5* 23.8*    175     CMP:  Recent Labs     03/04/24  0606 03/04/24  1242 03/05/24  0654   * 127* 126*   K 4.6 4.8 5.1   CL 98 95* 99   CO2 16* 14* 16*   BUN 65* 75* 76*   CREATININE 1.7* 1.6* 1.5*   GLUCOSE 149* 170* 143*   CALCIUM 9.1 9.3 9.1     Hepatic:   Recent Labs     03/04/24  0606 03/05/24  0654   LABALBU 3.5 3.4*   AST 44* 47*   ALT 29 30   BILITOT 3.3* 3.2*   ALKPHOS 126 134*         Assessment and Plan:  Hyponatremia in setting of cirrhosis, diuretics, paracentesis  Improved to 129 but worsening again  Gave tolvaptan this morning (GI okay with this). Held salt tabs. Trend labs    Zoloft and remeron held  Hyperkalemia, improved.  Mild NARDA, stable  Hyperammonemia  Cirrhosis with ascites  Acid/Base: ABG showing respiratory alkalosis  S/p fall with pubic rami fracture      D/W patient and     Rayna Irving, DO  [facility]    This report has been created using voice recognition software. It may contain minor errors which are inherent in voice recognition technology
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
## 2024-03-05 NOTE — PROGRESS NOTES
[facility] 7K  Occupational Therapy  Daily Note  Time:   Time In: 755  Time Out: 08  Timed Code Treatment Minutes: 26 Minutes  Minutes: 26          Date: 3/5/2024  Patient Name: Meg Wilson,   Gender: female      Room: [facility]13/013-A  MRN: [id number]  : 1940  (83 y.o.)  Referring Practitioner: Nikita Lagunas PA-C  Diagnosis: Fracture, sacrum/coccyx  Additional Pertinent Hx: 83 y.o. female who presents to the ED after an unwitnessed fall in her home. Had been working with therapy when  noticed a noise in the home, patient found on the ground and unable to ambulate after. Head neck imaging not performed inED, denied hitting head and neck. Imaging did reveal a pelvic fracture for which orthopaedics was asked to admit. Pain is predominately to the RLE, worsened with ROM and weight bearing, relieved by immobilization, non radiating, no associated paresthesias or weakness. No other msk complaints at this time,  bedside, aiding in history.    Restrictions/Precautions:  Restrictions/Precautions: Weight Bearing, Fall Risk  Right Lower Extremity Weight Bearing: Weight Bearing As Tolerated  Position Activity Restriction  Other position/activity restrictions: R LE WBAT per ortho due to decreased awareness/inability to maintain NWB     Social/Functional History:  Lives With: Spouse  Type of Home: House  Home Layout: One level  Home Access: Ramped entrance (with HR)  Home Equipment: Walker, 4 wheeled, Wheelchair-manual           Ambulation Assistance: Independent  Transfer Assistance: Independent    Active : No  Patient's  Info:  drives     Additional Comments: spouse does provide 24/7 supervision due to pt with dementia, per spouse a w/c won't fit through doorways of home and walker won't fit in bathroom.    SUBJECTIVE: Pt supine in bed upon arrival, pt agreeable to OT session, RN gave verbal approval for session, pt's  present during session

## 2024-03-05 NOTE — PROGRESS NOTES
Orthopaedic Progress Note      SUBJECTIVE   Ms. Wilson is hospital day 6, R pubic rami fx    Seen at bedside this morning, slightly more withdrawn, less participation in care   no adverse events overnight  Pain well controlled, tolerating oral diet  No new complaints   PT OT yesterday       OBJECTIVE      Physical    VITALS:  /60   Pulse 99   Temp 98.2 °F (36.8 °C) (Oral)   Resp 20   Ht 1.676 m (5' 6\")   Wt 69.7 kg (153 lb 10.6 oz)   LMP  (LMP Unknown)   SpO2 94%   BMI 24.80 kg/m²   I/O last 3 completed shifts:  In: 540 [P.O.:540]  Out: 1100 [Urine:1100]    GENERAL APPEARANCE: Awake and oriented xname ,more confusion today, less willing to participate in care, No acute distress, except appropriate to injury.  MOOD AND AFFECT: Calm appropriate to situation  HEAD: normocephalic, atraumatic   EYES: equal and reactive to light, Extraocular movements are normal. Pupils are equal in size.  Hematologic/Lymphatic: no lymphadenopathy to upper or lower extremity.   MSK  RLE:- atraumatic hip, knee, ankle, no lacerations or lesions. Sitting in neutral alignment. Compartments soft.  Nontender to palpation asis iliac crest greater troch, nontender medial lateral joint line, tibia shaft, medial lateral mal, midfoot, calc, calf supple nontender,  Able to perform SLR, gastroc ta ehl intact, painless IR ER through hip. sensation to light touch intact, distal pulses palpable       Data  CBC:   Lab Results   Component Value Date/Time    WBC 11.5 2024 06:54 AM    HGB 8.0 2024 06:54 AM     2024 06:54 AM     BMP:    Lab Results   Component Value Date/Time     2024 06:54 AM    K 5.1 2024 06:54 AM    K 4.4 2024 05:01 PM    CL 99 2024 06:54 AM    CO2 16 2024 06:54 AM    BUN 76 2024 06:54 AM    CREATININE 1.5 2024 06:54 AM    CALCIUM 9.1 2024 06:54 AM    GLUCOSE 143 2024 06:54 AM    GLUCOSE 93 2023 08:55 AM     Uric Acid:  No components  found for: \"URIC\"  PT/INR:    Lab Results   Component Value Date/Time    PROTIME 14.1 10/25/2019 05:57 AM    INR 1.41 02/28/2024 12:25 PM     Troponin:    Lab Results   Component Value Date/Time    TROPONINI <0.006 07/11/2011 08:41 PM     Urine Culture:  No components found for: \"CURINE\"      Current Inpatient Medications    Current Facility-Administered Medications: albuterol (PROVENTIL) (2.5 MG/3ML) 0.083% nebulizer solution 2.5 mg, 2.5 mg, Nebulization, Q6H PRN  albuterol (PROVENTIL) (2.5 MG/3ML) 0.083% nebulizer solution 2.5 mg, 2.5 mg, Nebulization, TID RT  [Held by provider] polyethylene glycol (GLYCOLAX) powder 17 g, 17 g, Oral, BID  urea (URE-NA) packet 15 g, 15 g, Oral, Daily  [Held by provider] furosemide (LASIX) tablet 80 mg, 80 mg, Oral, Daily  [Held by provider] sodium chloride tablet 2 g, 2 g, Oral, BID WC  sodium chloride flush 0.9 % injection 5-40 mL, 5-40 mL, IntraVENous, 2 times per day  sodium chloride flush 0.9 % injection 5-40 mL, 5-40 mL, IntraVENous, PRN  0.9 % sodium chloride infusion, , IntraVENous, PRN  [Held by provider] spironolactone (ALDACTONE) tablet 100 mg, 100 mg, Oral, Daily  [Held by provider] spironolactone (ALDACTONE) tablet 50 mg, 50 mg, Oral, Daily  atorvastatin (LIPITOR) tablet 20 mg, 20 mg, Oral, Nightly  [Held by provider] sertraline (ZOLOFT) tablet 25 mg, 25 mg, Oral, Daily  [Held by provider] mirtazapine (REMERON) tablet 7.5 mg, 7.5 mg, Oral, Nightly  HYDROcodone-acetaminophen (NORCO) 5-325 MG per tablet 1 tablet, 1 tablet, Oral, Q4H PRN  HYDROcodone-acetaminophen (NORCO) 5-325 MG per tablet 2 tablet, 2 tablet, Oral, Q4H PRN  melatonin tablet 4.5 mg, 4.5 mg, Oral, Nightly PRN  nitroGLYCERIN (NITROSTAT) SL tablet 0.4 mg, 0.4 mg, SubLINGual, Q5 Min PRN  rOPINIRole (REQUIP) tablet 0.25 mg, 0.25 mg, Oral, Nightly  acetaminophen (TYLENOL) tablet 650 mg, 650 mg, Oral, Q8H PRN        PLAN    Ms. Wilson is hospital day 6, R pubic rami fx    Good effort PT OT   GI

## 2024-03-05 NOTE — RT PROTOCOL NOTE
with same Frequency and PRN reasons based on the score.  If a patient is on this medication at home then do not decrease Frequency below that used at home.    0-3 - enter or revise RT bronchodilator order(s) to equivalent RT Bronchodilator order with Frequency of every 4 hours PRN for wheezing or increased work of breathing using Per Protocol order mode.        4-6 - enter or revise RT Bronchodilator order(s) to two equivalent RT bronchodilator orders with one order with BID Frequency and one order with Frequency of every 4 hours PRN wheezing or increased work of breathing using Per Protocol order mode.        7-10 - enter or revise RT Bronchodilator order(s) to two equivalent RT bronchodilator orders with one order with TID Frequency and one order with Frequency of every 4 hours PRN wheezing or increased work of breathing using Per Protocol order mode.       11-13 - enter or revise RT Bronchodilator order(s) to one equivalent RT bronchodilator order with QID Frequency and an Albuterol order with Frequency of every 4 hours PRN wheezing or increased work of breathing using Per Protocol order mode.      Greater than 13 - enter or revise RT Bronchodilator order(s) to one equivalent RT bronchodilator order with every 4 hours Frequency and an Albuterol order with Frequency of every 2 hours PRN wheezing or increased work of breathing using Per Protocol order mode.     RT to enter RT Home Evaluation for COPD & MDI Assessment order using Per Protocol order mode.    Electronically signed by Zachery Jamison RCP on 3/5/2024 at 7:57 AM

## 2024-03-06 PROBLEM — E44.0 MODERATE MALNUTRITION (HCC): Chronic | Status: ACTIVE | Noted: 2024-03-06

## 2024-03-06 LAB
AMMONIA PLAS-MCNC: 157 UMOL/L (ref 11–60)
ANION GAP SERPL CALC-SCNC: 14 MEQ/L (ref 8–16)
BUN SERPL-MCNC: 85 MG/DL (ref 7–22)
CALCIUM SERPL-MCNC: 9.3 MG/DL (ref 8.5–10.5)
CHLORIDE SERPL-SCNC: 97 MEQ/L (ref 98–111)
CO2 SERPL-SCNC: 15 MEQ/L (ref 23–33)
CREAT SERPL-MCNC: 1.4 MG/DL (ref 0.4–1.2)
DEPRECATED RDW RBC AUTO: 79.4 FL (ref 35–45)
ERYTHROCYTE [DISTWIDTH] IN BLOOD BY AUTOMATED COUNT: 23.2 % (ref 11.5–14.5)
GFR SERPL CREATININE-BSD FRML MDRD: 37 ML/MIN/1.73M2
GLUCOSE BLD STRIP.AUTO-MCNC: 168 MG/DL (ref 70–108)
GLUCOSE BLD STRIP.AUTO-MCNC: 193 MG/DL (ref 70–108)
GLUCOSE BLD STRIP.AUTO-MCNC: 194 MG/DL (ref 70–108)
GLUCOSE BLD STRIP.AUTO-MCNC: 204 MG/DL (ref 70–108)
GLUCOSE SERPL-MCNC: 165 MG/DL (ref 70–108)
HCT VFR BLD AUTO: 24.2 % (ref 37–47)
HGB BLD-MCNC: 8.1 GM/DL (ref 12–16)
MCH RBC QN AUTO: 31.4 PG (ref 26–33)
MCHC RBC AUTO-ENTMCNC: 33.5 GM/DL (ref 32.2–35.5)
MCV RBC AUTO: 93.8 FL (ref 81–99)
PLATELET # BLD AUTO: 203 THOU/MM3 (ref 130–400)
PMV BLD AUTO: 10.2 FL (ref 9.4–12.4)
POTASSIUM SERPL-SCNC: 5.1 MEQ/L (ref 3.5–5.2)
RBC # BLD AUTO: 2.58 MILL/MM3 (ref 4.2–5.4)
SODIUM SERPL-SCNC: 126 MEQ/L (ref 135–145)
SODIUM SERPL-SCNC: 127 MEQ/L (ref 135–145)
WBC # BLD AUTO: 12.7 THOU/MM3 (ref 4.8–10.8)

## 2024-03-06 PROCEDURE — 1200000000 HC SEMI PRIVATE

## 2024-03-06 PROCEDURE — 80048 BASIC METABOLIC PNL TOTAL CA: CPT

## 2024-03-06 PROCEDURE — 6370000000 HC RX 637 (ALT 250 FOR IP): Performed by: INTERNAL MEDICINE

## 2024-03-06 PROCEDURE — 6370000000 HC RX 637 (ALT 250 FOR IP): Performed by: PHYSICIAN ASSISTANT

## 2024-03-06 PROCEDURE — 6360000002 HC RX W HCPCS: Performed by: INTERNAL MEDICINE

## 2024-03-06 PROCEDURE — 82948 REAGENT STRIP/BLOOD GLUCOSE: CPT

## 2024-03-06 PROCEDURE — 97535 SELF CARE MNGMENT TRAINING: CPT

## 2024-03-06 PROCEDURE — 6370000000 HC RX 637 (ALT 250 FOR IP)

## 2024-03-06 PROCEDURE — 6370000000 HC RX 637 (ALT 250 FOR IP): Performed by: NURSE PRACTITIONER

## 2024-03-06 PROCEDURE — 2580000003 HC RX 258: Performed by: NURSE PRACTITIONER

## 2024-03-06 PROCEDURE — 85027 COMPLETE CBC AUTOMATED: CPT

## 2024-03-06 PROCEDURE — 36415 COLL VENOUS BLD VENIPUNCTURE: CPT

## 2024-03-06 PROCEDURE — 99232 SBSQ HOSP IP/OBS MODERATE 35: CPT | Performed by: INTERNAL MEDICINE

## 2024-03-06 PROCEDURE — 87205 SMEAR GRAM STAIN: CPT

## 2024-03-06 PROCEDURE — 97110 THERAPEUTIC EXERCISES: CPT

## 2024-03-06 PROCEDURE — 97530 THERAPEUTIC ACTIVITIES: CPT

## 2024-03-06 PROCEDURE — 84295 ASSAY OF SERUM SODIUM: CPT

## 2024-03-06 PROCEDURE — P9047 ALBUMIN (HUMAN), 25%, 50ML: HCPCS | Performed by: INTERNAL MEDICINE

## 2024-03-06 PROCEDURE — 99232 SBSQ HOSP IP/OBS MODERATE 35: CPT

## 2024-03-06 PROCEDURE — 82140 ASSAY OF AMMONIA: CPT

## 2024-03-06 PROCEDURE — 94640 AIRWAY INHALATION TREATMENT: CPT

## 2024-03-06 RX ORDER — ALBUMIN (HUMAN) 12.5 G/50ML
25 SOLUTION INTRAVENOUS
Status: DISCONTINUED | OUTPATIENT
Start: 2024-03-06 | End: 2024-03-06

## 2024-03-06 RX ORDER — ALBUMIN (HUMAN) 12.5 G/50ML
50 SOLUTION INTRAVENOUS ONCE
Status: DISCONTINUED | OUTPATIENT
Start: 2024-03-06 | End: 2024-03-06

## 2024-03-06 RX ORDER — ALBUMIN (HUMAN) 12.5 G/50ML
25 SOLUTION INTRAVENOUS EVERY 8 HOURS
Status: COMPLETED | OUTPATIENT
Start: 2024-03-06 | End: 2024-03-07

## 2024-03-06 RX ORDER — GUAIFENESIN 600 MG/1
600 TABLET, EXTENDED RELEASE ORAL 2 TIMES DAILY
Status: DISCONTINUED | OUTPATIENT
Start: 2024-03-06 | End: 2024-03-08 | Stop reason: HOSPADM

## 2024-03-06 RX ORDER — ALBUMIN (HUMAN) 12.5 G/50ML
25 SOLUTION INTRAVENOUS
Status: COMPLETED | OUTPATIENT
Start: 2024-03-07 | End: 2024-03-07

## 2024-03-06 RX ADMIN — ALBUMIN (HUMAN) 25 G: 0.25 INJECTION, SOLUTION INTRAVENOUS at 16:36

## 2024-03-06 RX ADMIN — IPRATROPIUM BROMIDE AND ALBUTEROL SULFATE 1 DOSE: .5; 3 SOLUTION RESPIRATORY (INHALATION) at 09:05

## 2024-03-06 RX ADMIN — IPRATROPIUM BROMIDE AND ALBUTEROL SULFATE 1 DOSE: .5; 3 SOLUTION RESPIRATORY (INHALATION) at 13:11

## 2024-03-06 RX ADMIN — IPRATROPIUM BROMIDE AND ALBUTEROL SULFATE 1 DOSE: .5; 3 SOLUTION RESPIRATORY (INHALATION) at 20:58

## 2024-03-06 RX ADMIN — SODIUM CHLORIDE 2 G: 1 TABLET ORAL at 08:46

## 2024-03-06 RX ADMIN — HYDROCODONE BITARTRATE AND ACETAMINOPHEN 1 TABLET: 5; 325 TABLET ORAL at 18:33

## 2024-03-06 RX ADMIN — ROPINIROLE HYDROCHLORIDE 0.25 MG: 0.25 TABLET, FILM COATED ORAL at 20:13

## 2024-03-06 RX ADMIN — ACETAMINOPHEN 650 MG: 325 TABLET ORAL at 08:44

## 2024-03-06 RX ADMIN — ALBUMIN (HUMAN) 25 G: 0.25 INJECTION, SOLUTION INTRAVENOUS at 08:52

## 2024-03-06 RX ADMIN — GUAIFENESIN 600 MG: 600 TABLET, EXTENDED RELEASE ORAL at 20:13

## 2024-03-06 RX ADMIN — LACTULOSE 10 G: 20 SOLUTION ORAL at 13:30

## 2024-03-06 RX ADMIN — SODIUM CHLORIDE, PRESERVATIVE FREE 10 ML: 5 INJECTION INTRAVENOUS at 20:14

## 2024-03-06 RX ADMIN — GUAIFENESIN 600 MG: 600 TABLET, EXTENDED RELEASE ORAL at 10:57

## 2024-03-06 RX ADMIN — LACTULOSE 10 G: 20 SOLUTION ORAL at 20:13

## 2024-03-06 RX ADMIN — ATORVASTATIN CALCIUM 20 MG: 20 TABLET, FILM COATED ORAL at 20:13

## 2024-03-06 RX ADMIN — LACTULOSE 10 G: 20 SOLUTION ORAL at 08:45

## 2024-03-06 RX ADMIN — SODIUM CHLORIDE, PRESERVATIVE FREE 10 ML: 5 INJECTION INTRAVENOUS at 08:47

## 2024-03-06 ASSESSMENT — PAIN DESCRIPTION - LOCATION
LOCATION: PELVIS
LOCATION: PELVIS;ABDOMEN

## 2024-03-06 ASSESSMENT — PAIN SCALES - GENERAL
PAINLEVEL_OUTOF10: 0
PAINLEVEL_OUTOF10: 3

## 2024-03-06 ASSESSMENT — PAIN - FUNCTIONAL ASSESSMENT: PAIN_FUNCTIONAL_ASSESSMENT: ACTIVITIES ARE NOT PREVENTED

## 2024-03-06 ASSESSMENT — PAIN DESCRIPTION - DESCRIPTORS
DESCRIPTORS: ACHING
DESCRIPTORS: ACHING

## 2024-03-06 ASSESSMENT — PAIN DESCRIPTION - ORIENTATION
ORIENTATION: MID
ORIENTATION: MID

## 2024-03-06 NOTE — PROGRESS NOTES
Patient educated on how to use incentive spirometer. Patient verbalized understanding and demonstrated proper use. Emphasized importance and usage of device, with coughing and deep breathing every 2 hours while awake.  Also instructed family on use to help patient with memory loss.

## 2024-03-06 NOTE — PROGRESS NOTES
[facility]   Nephrology progress note  3/6/2024, 10:36 AM      Pt Name:    Meg Wilson  MRN:     [id number]     YOB: 1940  Admit Date:    2/28/2024 12:25 PM    Chief Complaint: Nephrology following for hyponatremia.    Subjective:  Patient was seen and examined this morning  More confused today, she did not recognize her .    Objective:  24HR INTAKE/OUTPUT:    Intake/Output Summary (Last 24 hours) at 3/6/2024 1036  Last data filed at 3/6/2024 0958  Gross per 24 hour   Intake 1070 ml   Output 1600 ml   Net -530 ml         I/O last 3 completed shifts:  In: 1250 [P.O.:1250]  Out: 2750 [Urine:2750]  I/O this shift:  In: 140 [P.O.:140]  Out: -    Admission weight: 68 kg (150 lb)  Wt Readings from Last 3 Encounters:   03/05/24 69.7 kg (153 lb 10.6 oz)   02/13/24 64.4 kg (142 lb)   01/31/24 68.3 kg (150 lb 8 oz)        Vitals :   Vitals:    03/06/24 0300 03/06/24 0825 03/06/24 0910 03/06/24 1015   BP: 126/72 (!) 143/65  (!) 132/56   Pulse: 87 (!) 101  99   Resp: 16 20  20   Temp: 98 °F (36.7 °C) 97.9 °F (36.6 °C)  98.1 °F (36.7 °C)   TempSrc: Oral Oral  Oral   SpO2: 95% 93% 93% 93%   Weight:       Height:           Physical examination  General Appearance:awake, pleasant but confused  Mouth/Throat: Oral mucosa moist  Neck: No JVD  Lungs: Air entry B/L, no rales, no use of accessory muscles  Heart:  S1, S2 heard  GI: soft, non-tender,distended  Extremities: trace leg edema    Medications:  Infusion:    sodium chloride       Meds:    albumin human 25%  25 g IntraVENous Q8H    guaiFENesin  600 mg Oral BID    lactulose  10 g Oral TID    ipratropium 0.5 mg-albuterol 2.5 mg  1 Dose Inhalation TID RT    [Held by provider] furosemide  80 mg Oral Daily    sodium chloride  2 g Oral BID WC    sodium chloride flush  5-40 mL IntraVENous 2 times per day    [Held by provider] spironolactone  100 mg Oral Daily    [Held by provider] spironolactone  50 mg Oral Daily    atorvastatin  20 mg Oral

## 2024-03-06 NOTE — PROGRESS NOTES
Stage 3a chronic kidney disease (HCC)     Hyperkalemia        Medications:   Scheduled Meds:   albumin human 25%  25 g IntraVENous Q8H    guaiFENesin  600 mg Oral BID    lactulose  10 g Oral TID    ipratropium 0.5 mg-albuterol 2.5 mg  1 Dose Inhalation TID RT    [Held by provider] furosemide  80 mg Oral Daily    sodium chloride flush  5-40 mL IntraVENous 2 times per day    [Held by provider] spironolactone  100 mg Oral Daily    [Held by provider] spironolactone  50 mg Oral Daily    atorvastatin  20 mg Oral Nightly    [Held by provider] sertraline  25 mg Oral Daily    [Held by provider] mirtazapine  7.5 mg Oral Nightly    rOPINIRole  0.25 mg Oral Nightly     Continuous Infusions:   sodium chloride       CBC:   Recent Labs     03/04/24  0606 03/05/24  0654 03/06/24  0415   WBC 12.6* 11.5* 12.7*   HGB 8.3* 8.0* 8.1*    175 203     BMP:    Recent Labs     03/04/24  1242 03/05/24  0654 03/05/24  1543 03/05/24  2135 03/06/24  0415 03/06/24  0859   * 126*   < > 125* 126* 127*   K 4.8 5.1  --   --  5.1  --    CL 95* 99  --   --  97*  --    CO2 14* 16*  --   --  15*  --    BUN 75* 76*  --   --  85*  --    CREATININE 1.6* 1.5*  --   --  1.4*  --    GLUCOSE 170* 143*  --   --  165*  --     < > = values in this interval not displayed.     Hepatic:   Recent Labs     03/04/24  0606 03/05/24  0654   AST 44* 47*   ALT 29 30   BILITOT 3.3* 3.2*   ALKPHOS 126 134*     INR: No results for input(s): \"INR\" in the last 72 hours.  Xray:   Narrative & Impression  1 view abdomen     Comparison: CT/KO/SR - CT ABDOMEN PELVIS W IV CONTRAST - 07/15/2023 12:56   PM EDT     Findings:  No pneumoperitoneum or pneumatosis.  The stomach lumen is mildly distended with intraluminal air.  Moderate to advanced colonic fecal burden involving the ascending colon   and hepatic flexure. The mid and distal transverse colon is gas-filled and   prominent. Finding may reflect reported history of colonic ileus.   Continued follow-up is recommended.   BMI 24.80 kg/m²     Intake/Output Summary (Last 24 hours) at 3/6/2024 1800  Last data filed at 3/6/2024 1311  Gross per 24 hour   Intake 410 ml   Output 1550 ml   Net -1140 ml     Weight:  Wt Readings from Last 3 Encounters:   03/05/24 69.7 kg (153 lb 10.6 oz)   02/13/24 64.4 kg (142 lb)   01/31/24 68.3 kg (150 lb 8 oz)     General appearance: alert and cooperative with exam  Lungs: clear to auscultation bilaterally  Heart: regular rate and rhythm, S1, S2 normal, no murmur, click, rub or gallop  Abdomen:  soft, distended.  Increased tympany t/o with percussion.  Hypoactive BS.  No significant ascites on physical exam  Extremities: extremities normal, atraumatic, no cyanosis or edema    Assessment and Plan:   S/p fall with right sacral fracture and right pubic rami fracture.  Ortho following.  Decompensated liver cirrhosis d/t CASTELLANO with ascites.  MELD: 27  Plan for paracentesis on Thursday.  Continue low sodium diet.  2L fluid restriction.  Diuretics per Nephrology  Altered mental status - will check ammonia level to rule out hepatic encephalopathy d/t #2.    NARDA on CKD - Nephrology managing.  High ammonia will start Lactulose and stop Mirlax    CODE STATUS change no CPR no shock no intubations, medical management only      Follow up in GI Clinic after discharge in 2 week(s)      Keke Dumont MD  3/6/2024  6:00 PM

## 2024-03-06 NOTE — CARE COORDINATION
3/6/24, 7:38 AM EST    DISCHARGE PLANNING EVALUATION    Precert approved through today, will need restarted if not discharged today.

## 2024-03-06 NOTE — PROGRESS NOTES
Hospitalist Progress Note    Patient:  Meg Wilson    YOB: 1940  Unit/Bed:-13/013-A  Date of Admission: 2/28/2024  Code Status: Full Code      Assessment/Plan:      Conversational confusion with baseline alzheimer's dementia: At baseline oriented to self and location.  Per patient's  at bedside, patient increased confusion.  Likely in setting of decompensated liver cirrhosis given elevated ammonia levels. Pts Zoloft and Remeron held for #4, may be worsening baseline confusion.   Lactulose added for decompensated cirrhosis    Dyspnea: Complains of shortness of breath x 1 day.  Chest x-ray shows no acute cardiopulmonary findings on 3/3/2024 and again 3/5/24.  No hypoxia noted via pulse oximetry.  3 doses albumin.  Added Mucinex.  Continue to monitor for improvement.  Plan for paracentesis tomorrow, may need to consider increasing frequency given diuretics being held    Hypotonic hyponatremia: Sodium between 125-129.  In setting of cirrhosis, diuretics and paracentesis.  Nephrology following-Lasix, Zoloft and Remeron held. salt tablets resumed 3/6. fluid restriction, ureNa held. S/p 1 dose tolvaptan 3/5. Q6 hr sodium.     Decompensated cirrhosis, ascites: Patient with increasing lower extremity edema.  Spironolactone and Lasix held given hyponatremia, likely contributing to decompensation. Ammonia level elevated 161, to 157.   Plan for paracentesis 3/7- pt receiving weekly paracentesis  Lactulose added 3/5 given elevated ammonia    Multiple falls: Most recent on day of admission, right hip pain.  Mildly displaced acute fracture of right sacral fracture, closed right sided pubic rami fracture-PT/OT.  Nonoperative.  Primary following for management.    Acute on chronic BENNIE: Hgb 8.0, slowly down trending.  No overt signs of bleeding.  Iron, iron saturation, TIBC, ferritin within normal limits.  Continue to monitor with daily CBC    NARDA versus CKD with chronic metabolic acidosis:  198 175 203       Recent Labs     03/04/24  1242 03/05/24  0654 03/05/24  1543 03/05/24  2135 03/06/24  0415   * 126* 129* 125* 126*   K 4.8 5.1  --   --  5.1   CL 95* 99  --   --  97*   CO2 14* 16*  --   --  15*   BUN 75* 76*  --   --  85*   CREATININE 1.6* 1.5*  --   --  1.4*   CALCIUM 9.3 9.1  --   --  9.3       Recent Labs     03/04/24  0606 03/05/24  0654   AST 44* 47*   ALT 29 30   BILITOT 3.3* 3.2*   ALKPHOS 126 134*       No results for input(s): \"INR\" in the last 72 hours.  No results for input(s): \"CKTOTAL\", \"TROPONINI\" in the last 72 hours.  Urinalysis:   Lab Results   Component Value Date/Time    NITRU NEGATIVE 03/01/2024 06:50 AM    WBCUA 10-15 03/01/2024 06:50 AM    BACTERIA FEW 03/01/2024 06:50 AM    RBCUA 5-10 03/01/2024 06:50 AM    BLOODU SMALL 03/01/2024 06:50 AM    SPECGRAV 1.010 01/16/2024 01:22 PM    GLUCOSEU NEGATIVE 03/01/2024 06:50 AM     Urine culture:   Lab Results   Component Value Date/Time    LABURIN  01/16/2024 01:22 PM     No significant pathogens isolated. Growth likely represents skin edwige or distal urethral edwige.     Micro:   Blood culture #1:   Lab Results   Component Value Date/Time    BC  01/08/2024 08:48 PM     No growth 24 hours. No growth 48 hours. No growth at 5 days     Blood culture #2:No results found for: \"BLOODCULT2\"  Organism:  Lab Results   Component Value Date/Time    ORG Mixed Growth 02/28/2024 06:50 AM         Lab Results   Component Value Date/Time    LABGRAM  02/29/2024 01:10 PM     performed on cytospun specimen Rare segmented neutrophils observed. No bacteria seen.     MRSA culture only:No results found for: \"MRSAC\"  Respiratory culture: No results found for: \"CULTRESP\"  Aerobic and Anaerobic :  Lab Results   Component Value Date/Time    LABAERO  03/04/2020 04:32 PM     No growth-preliminary Mixed genital edwige present. No GC isolated     Lab Results   Component Value Date/Time    LABANAE No growth-preliminary No growth 02/29/2024 01:10 PM

## 2024-03-06 NOTE — RT PROTOCOL NOTE
RT Inhaler-Nebulizer Bronchodilator Protocol Note    There is a bronchodilator order in the chart from a provider indicating to follow the RT Bronchodilator Protocol and there is an “Initiate RT Inhaler-Nebulizer Bronchodilator Protocol” order as well (see protocol at bottom of note).    CXR Findings:  No results found.    The findings from the last RT Protocol Assessment were as follows:   History Pulmonary Disease: Smoker 15 pack years or more  Respiratory Pattern: Dyspnea on exertion or RR 21-25 bpm  Breath Sounds: Intermittent or unilateral wheezes (as documented by previous RT)  Cough: Strong, spontaneous, non-productive  Indication for Bronchodilator Therapy: Decreased or absent breath sounds  Bronchodilator Assessment Score: 7    Aerosolized bronchodilator medication orders have been revised according to the RT Inhaler-Nebulizer Bronchodilator Protocol below.    Respiratory Therapist to perform RT Therapy Protocol Assessment initially then follow the protocol.  Repeat RT Therapy Protocol Assessment PRN for score 0-3 or on second treatment, BID, and PRN for scores above 3.    No Indications - adjust the frequency to every 6 hours PRN wheezing or bronchospasm, if no treatments needed after 48 hours then discontinue using Per Protocol order mode.     If indication present, adjust the RT bronchodilator orders based on the Bronchodilator Assessment Score as indicated below.  Use Inhaler orders unless patient has one or more of the following: on home nebulizer, not able to hold breath for 10 seconds, is not alert and oriented, cannot activate and use MDI correctly, or respiratory rate 25 breaths per minute or more, then use the equivalent nebulizer order(s) with same Frequency and PRN reasons based on the score.  If a patient is on this medication at home then do not decrease Frequency below that used at home.    0-3 - enter or revise RT bronchodilator order(s) to equivalent RT Bronchodilator order with Frequency  of every 4 hours PRN for wheezing or increased work of breathing using Per Protocol order mode.        4-6 - enter or revise RT Bronchodilator order(s) to two equivalent RT bronchodilator orders with one order with BID Frequency and one order with Frequency of every 4 hours PRN wheezing or increased work of breathing using Per Protocol order mode.        7-10 - enter or revise RT Bronchodilator order(s) to two equivalent RT bronchodilator orders with one order with TID Frequency and one order with Frequency of every 4 hours PRN wheezing or increased work of breathing using Per Protocol order mode.       11-13 - enter or revise RT Bronchodilator order(s) to one equivalent RT bronchodilator order with QID Frequency and an Albuterol order with Frequency of every 4 hours PRN wheezing or increased work of breathing using Per Protocol order mode.      Greater than 13 - enter or revise RT Bronchodilator order(s) to one equivalent RT bronchodilator order with every 4 hours Frequency and an Albuterol order with Frequency of every 2 hours PRN wheezing or increased work of breathing using Per Protocol order mode.     RT to enter RT Home Evaluation for COPD & MDI Assessment order using Per Protocol order mode.    Electronically signed by Tino Donahue RCP on 3/5/2024 at 8:37 PM

## 2024-03-06 NOTE — PROGRESS NOTES
[facility] 7K  Occupational Therapy  Daily Note  Time:   Time In: 815  Time Out: 08  Timed Code Treatment Minutes: 31 Minutes  Minutes: 31          Date: 3/6/2024  Patient Name: Meg Wilson,   Gender: female      Room: [facility]13/013-A  MRN: [id number]  : 1940  (83 y.o.)  Referring Practitioner: Nikita Lagunas PA-C  Diagnosis: Fracture, sacrum/coccyx  Additional Pertinent Hx: 83 y.o. female who presents to the ED after an unwitnessed fall in her home. Had been working with therapy when  noticed a noise in the home, patient found on the ground and unable to ambulate after. Head neck imaging not performed inED, denied hitting head and neck. Imaging did reveal a pelvic fracture for which orthopaedics was asked to admit. Pain is predominately to the RLE, worsened with ROM and weight bearing, relieved by immobilization, non radiating, no associated paresthesias or weakness. No other msk complaints at this time,  bedside, aiding in history.    Restrictions/Precautions:  Restrictions/Precautions: Weight Bearing, Fall Risk  Right Lower Extremity Weight Bearing: Weight Bearing As Tolerated  Position Activity Restriction  Other position/activity restrictions: R LE WBAT per ortho due to decreased awareness/inability to maintain NWB     Social/Functional History:  Lives With: Spouse  Type of Home: House  Home Layout: One level  Home Access: Ramped entrance (with HR)  Home Equipment: Walker, 4 wheeled, Wheelchair-manual           Ambulation Assistance: Independent  Transfer Assistance: Independent    Active : No  Patient's  Info:  drives     Additional Comments: spouse does provide 24/7 supervision due to pt with dementia, per spouse a w/c won't fit through doorways of home and walker won't fit in bathroom.    SUBJECTIVE: Pt sitting up in bed upon arrival. Agreeable to therapy and verbal approval from RN. RN reports increased confusion.  and

## 2024-03-06 NOTE — PROGRESS NOTES
Orthopaedic Progress Note      SUBJECTIVE   Ms. Wilson is hospital day 7, R pubic rami fx    Seen at bedside this morning, remains more withdrawn than prior encounters   no adverse events overnight  Pain well controlled, tolerating oral diet,  aids in feeding   No new complaints   PT OT   Nephrology following hyponatremia/NARDA, GI following cirrhosis with ascites       OBJECTIVE      Physical    VITALS:  /72   Pulse 87   Temp 98 °F (36.7 °C) (Oral)   Resp 16   Ht 1.676 m (5' 6\")   Wt 69.7 kg (153 lb 10.6 oz)   LMP  (LMP Unknown)   SpO2 95%   BMI 24.80 kg/m²   I/O last 3 completed shifts:  In: 1100 [P.O.:1100]  Out: 1650 [Urine:1650]    GENERAL APPEARANCE:continued confusion today, less willing to participate in care as was yesterday, awake, no longer orients to name  or  in room, No acute distress, except appropriate to injury.  MOOD AND AFFECT: Calm appropriate to situation  HEAD: normocephalic, atraumatic   EYES: equal and reactive to light, Extraocular movements are normal. Pupils are equal in size.  Hematologic/Lymphatic: no lymphadenopathy to upper or lower extremity.   MSK  RLE:- atraumatic hip, knee, ankle, no lacerations or lesions. Sitting in neutral alignment. Compartments soft.  Nontender to palpation asis iliac crest greater troch, nontender medial lateral joint line, tibia shaft, medial lateral mal, midfoot, calc, calf supple nontender,  Able to perform SLR, gastroc ta ehl intact, painless IR ER through hip. sensation to light touch intact, distal pulses palpable       Data  CBC:   Lab Results   Component Value Date/Time    WBC 12.7 2024 04:15 AM    HGB 8.1 2024 04:15 AM     2024 04:15 AM     BMP:    Lab Results   Component Value Date/Time     2024 04:15 AM    K 5.1 2024 04:15 AM    CL 97 2024 04:15 AM    CO2 15 2024 04:15 AM    BUN 85 2024 04:15 AM    CREATININE 1.4 2024 04:15 AM    CALCIUM 9.3 2024

## 2024-03-06 NOTE — PLAN OF CARE
Problem: Discharge Planning  Goal: Discharge to home or other facility with appropriate resources  3/5/2024 2125 by Fredis Angulo RN  Outcome: Progressing  Flowsheets (Taken 3/5/2024 1342 by Heather Ponce RN)  Discharge to home or other facility with appropriate resources:   Identify barriers to discharge with patient and caregiver   Arrange for needed discharge resources and transportation as appropriate   Identify discharge learning needs (meds, wound care, etc)  3/5/2024 1342 by Heather Ponce RN  Outcome: Progressing  Flowsheets (Taken 3/5/2024 1342)  Discharge to home or other facility with appropriate resources:   Identify barriers to discharge with patient and caregiver   Arrange for needed discharge resources and transportation as appropriate   Identify discharge learning needs (meds, wound care, etc)     Problem: Pain  Goal: Verbalizes/displays adequate comfort level or baseline comfort level  3/5/2024 2125 by Fredis Angulo RN  Outcome: Progressing  Flowsheets (Taken 3/5/2024 1342 by Heather Ponce RN)  Verbalizes/displays adequate comfort level or baseline comfort level:   Encourage patient to monitor pain and request assistance   Assess pain using appropriate pain scale  3/5/2024 1342 by Heather Ponce RN  Outcome: Progressing  Flowsheets (Taken 3/5/2024 1342)  Verbalizes/displays adequate comfort level or baseline comfort level:   Encourage patient to monitor pain and request assistance   Assess pain using appropriate pain scale     Problem: Skin/Tissue Integrity  Goal: Absence of new skin breakdown  Description: 1.  Monitor for areas of redness and/or skin breakdown  2.  Assess vascular access sites hourly  3.  Every 4-6 hours minimum:  Change oxygen saturation probe site  4.  Every 4-6 hours:  If on nasal continuous positive airway pressure, respiratory therapy assess nares and determine need for appliance change or resting period.  3/5/2024 2125 by Fredis Angulo RN  Outcome:  deterioration, or improvement   Collaborate with multidisciplinary team to address chronic and comorbid conditions and prevent exacerbation or deterioration     Problem: Respiratory - Adult  Goal: Achieves optimal ventilation and oxygenation  3/5/2024 2125 by Fredis Angulo RN  Outcome: Progressing  Flowsheets (Taken 3/5/2024 1342 by Heather Ponce, RN)  Achieves optimal ventilation and oxygenation:   Assess for changes in respiratory status   Assess for changes in mentation and behavior  3/5/2024 1342 by Heather Ponce RN  Outcome: Progressing  Flowsheets (Taken 3/5/2024 1342)  Achieves optimal ventilation and oxygenation:   Assess for changes in respiratory status   Assess for changes in mentation and behavior  3/5/2024 0759 by Zachery Jamison RCP  Outcome: Progressing
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
## 2024-03-06 NOTE — PLAN OF CARE
Problem: Discharge Planning  Goal: Discharge to home or other facility with appropriate resources  Outcome: Progressing  Flowsheets (Taken 3/5/2024 1342 by Heather Ponce, RN)  Discharge to home or other facility with appropriate resources:   Identify barriers to discharge with patient and caregiver   Arrange for needed discharge resources and transportation as appropriate   Identify discharge learning needs (meds, wound care, etc)     Problem: Pain  Goal: Verbalizes/displays adequate comfort level or baseline comfort level  Outcome: Progressing  Flowsheets (Taken 3/5/2024 1342 by Heather Ponce RN)  Verbalizes/displays adequate comfort level or baseline comfort level:   Encourage patient to monitor pain and request assistance   Assess pain using appropriate pain scale     Problem: Skin/Tissue Integrity  Goal: Absence of new skin breakdown  Description: 1.  Monitor for areas of redness and/or skin breakdown  2.  Assess vascular access sites hourly  3.  Every 4-6 hours minimum:  Change oxygen saturation probe site  4.  Every 4-6 hours:  If on nasal continuous positive airway pressure, respiratory therapy assess nares and determine need for appliance change or resting period.  Outcome: Progressing  Note: No new skin breakdown noted. Turn q2 and prn. Up to chair as tolerated. [place] bed in use.      Problem: ABCDS Injury Assessment  Goal: Absence of physical injury  Outcome: Progressing  Flowsheets (Taken 3/5/2024 1342 by Heather Ponce RN)  Absence of Physical Injury: Implement safety measures based on patient assessment     Problem: Safety - Adult  Goal: Free from fall injury  Outcome: Progressing  Flowsheets (Taken 3/5/2024 1342 by Heather Ponce RN)  Free From Fall Injury: Instruct family/caregiver on patient safety     Problem: Chronic Conditions and Co-morbidities  Goal: Patient's chronic conditions and co-morbidity symptoms are monitored and maintained or improved  Outcome: Progressing  Flowsheets (Taken  3/5/2024 1342 by Heather Ponce RN)  Care Plan - Patient's Chronic Conditions and Co-Morbidity Symptoms are Monitored and Maintained or Improved:   Monitor and assess patient's chronic conditions and comorbid symptoms for stability, deterioration, or improvement   Collaborate with multidisciplinary team to address chronic and comorbid conditions and prevent exacerbation or deterioration     Problem: Respiratory - Adult  Goal: Achieves optimal ventilation and oxygenation  Outcome: Progressing  Flowsheets (Taken 3/5/2024 1342 by Heather Ponce, RN)  Achieves optimal ventilation and oxygenation:   Assess for changes in respiratory status   Assess for changes in mentation and behavior    Care plan reviewed with patient and family.  Patient and family verbalize understanding of the plan of care and contribute to goal setting.

## 2024-03-06 NOTE — PROGRESS NOTES
Discussed patient with Dr. Dumont and Samira Avendaño PAC at this time. Dr. Dumont will discuss code status with patient and  spouse. Physician states patient may need moved to higher level of care if deteriorates. Monitor.   1815: Dr Dumont at bedside discussing code status with patient, spouse, and daughter in law at this time. They re-iterated that they do not want CPR or intubation. Further clarification states Limited NO x 4 code status. See order from Dr. Dumont. Samira Avendaño updated on code status and plan for paracentesis, labs, cxr, and KUB tomorrow.

## 2024-03-06 NOTE — PROGRESS NOTES
Comprehensive Nutrition Assessment    Type and Reason for Visit:  Initial, Consult (ONS)    Nutrition Recommendations/Plan:   Continue diet per provider - family/ feeding pt to increase PO intake  Start ONS: Magic cups (TID) - as pt only eating ice cream currently/limited PO and Activia yogurt (TID) - pt eats daily at home; to assist with BM  Remeron and zoloft on pause r/t ascites and cirrhosis  Lactulose started to promote BM  Plan for paracentesis tomorrow (3/7) - pt receives weekly  Will monitor need for additional nutrition interventions as appropriate and able      Malnutrition Assessment:  Malnutrition Status:  Moderate malnutrition (03/06/24 1508)    Context:  Chronic Illness     Findings of the 6 clinical characteristics of malnutrition:  Energy Intake:  Mild decrease in energy intake (Comment) (25% or less for the last 2-3 days, PTA eating well per )  Weight Loss:  Unable to assess (has lost 31# over the course of the last 2 years; cannot assess recent losses r/t ascites)     Body Fat Loss:   (moderate) Orbital, Buccal region   Muscle Mass Loss:   (moderate losses) Temples (temporalis), Clavicles (pectoralis & deltoids), Scapula (trapezius)  Fluid Accumulation:  Severe Ascites   Strength:  Not Performed    Nutrition Assessment:     Pt. moderately malnourished AEB criteria as listed above.  At risk for further nutrition compromise r/t conversational confusion w/ baseline alzheimer's disease - increasing confusion: unable to recognize  and son today, dyspnea, hypotonic hyponatremia, decompensated cirrhosis - ascites - plan for paracentesis 3/7 - gets weekly paracentesis, multiple falls PTA, NARDA vs CKD, advanced age and underlying medical condition (PMHx: AAA, alzheimer's dementia, T2DM, former smoker, GERD, basal cell cancer on nose, crohn's disease, CVA x3 s/p CABG 2016, IBS, HTN, depression).        Nutrition Related Findings:    Pt. Report/Treatments/Miscellaneous: Pt seen, AMS  kg)  Protein (g/day): 59-89 grams (1.0-1.5 grams/kg of IBW) monitoring renal function - declining; increased needs r/t cirrhosis     Fluid (ml/day): 2000 mL fluid restriction per provider    Nutrition Diagnosis:   Moderate malnutrition, In context of chronic illness related to cognitive or neurological impairment, other (comment) (cirrhosis) as evidenced by intake 0-25%, moderate muscle loss, moderate loss of subcutaneous fat    Nutrition Interventions:   Food and/or Nutrient Delivery: Continue Current Diet, Start Oral Nutrition Supplement  Nutrition Education/Counseling: Education initiated (discussed ONS and PO intake with pt)  Coordination of Nutrition Care: Continue to monitor while inpatient       Goals:     Goals: Meet at least 75% of estimated needs, by next RD assessment       Nutrition Monitoring and Evaluation:      Food/Nutrient Intake Outcomes: Diet Advancement/Tolerance, Food and Nutrient Intake, Supplement Intake  Physical Signs/Symptoms Outcomes: Biochemical Data, Constipation, GI Status, Fluid Status or Edema, Meal Time Behavior, Nutrition Focused Physical Findings, Skin, Weight    Discharge Planning:    Too soon to determine     Des Tenorio RD, LD  Contact: [phone]

## 2024-03-07 ENCOUNTER — APPOINTMENT (OUTPATIENT)
Dept: ULTRASOUND IMAGING | Age: 84
End: 2024-03-07
Payer: MEDICARE

## 2024-03-07 ENCOUNTER — APPOINTMENT (OUTPATIENT)
Dept: GENERAL RADIOLOGY | Age: 84
End: 2024-03-07
Payer: MEDICARE

## 2024-03-07 PROBLEM — Z51.5 PALLIATIVE CARE PATIENT: Status: ACTIVE | Noted: 2024-03-07

## 2024-03-07 LAB
ALBUMIN SERPL BCG-MCNC: 3.9 G/DL (ref 3.5–5.1)
ALP SERPL-CCNC: 133 U/L (ref 38–126)
ALT SERPL W/O P-5'-P-CCNC: 26 U/L (ref 11–66)
AMMONIA PLAS-MCNC: 111 UMOL/L (ref 11–60)
AST SERPL-CCNC: 41 U/L (ref 5–40)
BILIRUB CONJ SERPL-MCNC: 1.6 MG/DL (ref 0–0.3)
BILIRUB SERPL-MCNC: 4.5 MG/DL (ref 0.3–1.2)
BUN SERPL-MCNC: 83 MG/DL (ref 7–22)
CALCIUM SERPL-MCNC: 10 MG/DL (ref 8.5–10.5)
CHARACTER, BODY FLUID: CLEAR
CHLORIDE SERPL-SCNC: 102 MEQ/L (ref 98–111)
CO2 SERPL-SCNC: 17 MEQ/L (ref 23–33)
COLOR FLD: YELLOW
CREAT SERPL-MCNC: 1.4 MG/DL (ref 0.4–1.2)
DEPRECATED RDW RBC AUTO: 78.2 FL (ref 35–45)
ERYTHROCYTE [DISTWIDTH] IN BLOOD BY AUTOMATED COUNT: 22.3 % (ref 11.5–14.5)
GFR SERPL CREATININE-BSD FRML MDRD: 37 ML/MIN/1.73M2
GLUCOSE BLD STRIP.AUTO-MCNC: 155 MG/DL (ref 70–108)
GLUCOSE BLD STRIP.AUTO-MCNC: 155 MG/DL (ref 70–108)
GLUCOSE BLD STRIP.AUTO-MCNC: 165 MG/DL (ref 70–108)
GLUCOSE BLD STRIP.AUTO-MCNC: 198 MG/DL (ref 70–108)
GLUCOSE SERPL-MCNC: 139 MG/DL (ref 70–108)
GRANULOCYTES NFR FLD AUTO: 17.1 %
HCT VFR BLD AUTO: 21.1 % (ref 37–47)
HCT VFR BLD AUTO: 21.3 % (ref 37–47)
HGB BLD-MCNC: 7 GM/DL (ref 12–16)
HGB BLD-MCNC: 7.1 GM/DL (ref 12–16)
MCH RBC QN AUTO: 31.1 PG (ref 26–33)
MCHC RBC AUTO-ENTMCNC: 32.9 GM/DL (ref 32.2–35.5)
MCV RBC AUTO: 94.7 FL (ref 81–99)
MESOTHELIAL CELLS BODY FLUID: NORMAL
MONONUC CELLS NFR FLD AUTO: 82.9 %
NUC CELL # FLD AUTO: 47 /CUMM (ref 0–500)
PATHOLOGIST REVIEW: NORMAL
PLATELET # BLD AUTO: 157 THOU/MM3 (ref 130–400)
PMV BLD AUTO: 10.1 FL (ref 9.4–12.4)
POTASSIUM SERPL-SCNC: 5 MEQ/L (ref 3.5–5.2)
POTASSIUM SERPL-SCNC: 5 MEQ/L (ref 3.5–5.2)
PROCALCITONIN SERPL IA-MCNC: 0.26 NG/ML (ref 0.01–0.09)
PROT SERPL-MCNC: 5.6 G/DL (ref 6.1–8)
RBC # BLD AUTO: 2.25 MILL/MM3 (ref 4.2–5.4)
RBC # FLD AUTO: < 2000 /CUMM
SCAN OF BLOOD SMEAR: NORMAL
SODIUM SERPL-SCNC: 133 MEQ/L (ref 135–145)
SPECIMEN: NORMAL
TOTAL VOLUME RECEIVED BODY FLUID: 70 ML
WBC # BLD AUTO: 12.7 THOU/MM3 (ref 4.8–10.8)

## 2024-03-07 PROCEDURE — 99232 SBSQ HOSP IP/OBS MODERATE 35: CPT | Performed by: INTERNAL MEDICINE

## 2024-03-07 PROCEDURE — 6360000002 HC RX W HCPCS: Performed by: INTERNAL MEDICINE

## 2024-03-07 PROCEDURE — P9047 ALBUMIN (HUMAN), 25%, 50ML: HCPCS | Performed by: INTERNAL MEDICINE

## 2024-03-07 PROCEDURE — 71045 X-RAY EXAM CHEST 1 VIEW: CPT

## 2024-03-07 PROCEDURE — 89050 BODY FLUID CELL COUNT: CPT

## 2024-03-07 PROCEDURE — 85027 COMPLETE CBC AUTOMATED: CPT

## 2024-03-07 PROCEDURE — 6370000000 HC RX 637 (ALT 250 FOR IP): Performed by: PHYSICIAN ASSISTANT

## 2024-03-07 PROCEDURE — 92610 EVALUATE SWALLOWING FUNCTION: CPT

## 2024-03-07 PROCEDURE — 36415 COLL VENOUS BLD VENIPUNCTURE: CPT

## 2024-03-07 PROCEDURE — 6370000000 HC RX 637 (ALT 250 FOR IP)

## 2024-03-07 PROCEDURE — 80053 COMPREHEN METABOLIC PANEL: CPT

## 2024-03-07 PROCEDURE — 87075 CULTR BACTERIA EXCEPT BLOOD: CPT

## 2024-03-07 PROCEDURE — 85018 HEMOGLOBIN: CPT

## 2024-03-07 PROCEDURE — 82948 REAGENT STRIP/BLOOD GLUCOSE: CPT

## 2024-03-07 PROCEDURE — 0W9G3ZZ DRAINAGE OF PERITONEAL CAVITY, PERCUTANEOUS APPROACH: ICD-10-PCS | Performed by: RADIOLOGY

## 2024-03-07 PROCEDURE — 99232 SBSQ HOSP IP/OBS MODERATE 35: CPT

## 2024-03-07 PROCEDURE — 74018 RADEX ABDOMEN 1 VIEW: CPT

## 2024-03-07 PROCEDURE — 82140 ASSAY OF AMMONIA: CPT

## 2024-03-07 PROCEDURE — 99223 1ST HOSP IP/OBS HIGH 75: CPT | Performed by: STUDENT IN AN ORGANIZED HEALTH CARE EDUCATION/TRAINING PROGRAM

## 2024-03-07 PROCEDURE — 6360000002 HC RX W HCPCS: Performed by: STUDENT IN AN ORGANIZED HEALTH CARE EDUCATION/TRAINING PROGRAM

## 2024-03-07 PROCEDURE — 49083 ABD PARACENTESIS W/IMAGING: CPT

## 2024-03-07 PROCEDURE — 87070 CULTURE OTHR SPECIMN AEROBIC: CPT

## 2024-03-07 PROCEDURE — 82248 BILIRUBIN DIRECT: CPT

## 2024-03-07 PROCEDURE — 94640 AIRWAY INHALATION TREATMENT: CPT

## 2024-03-07 PROCEDURE — 2580000003 HC RX 258: Performed by: NURSE PRACTITIONER

## 2024-03-07 PROCEDURE — 85014 HEMATOCRIT: CPT

## 2024-03-07 PROCEDURE — 84145 PROCALCITONIN (PCT): CPT

## 2024-03-07 PROCEDURE — 1200000000 HC SEMI PRIVATE

## 2024-03-07 RX ORDER — HYOSCYAMINE SULFATE 0.125 MG
125 TABLET,DISINTEGRATING ORAL
Status: DISCONTINUED | OUTPATIENT
Start: 2024-03-07 | End: 2024-03-08 | Stop reason: HOSPADM

## 2024-03-07 RX ORDER — HALOPERIDOL 2 MG/ML
2 SOLUTION ORAL EVERY 4 HOURS PRN
Status: DISCONTINUED | OUTPATIENT
Start: 2024-03-07 | End: 2024-03-08

## 2024-03-07 RX ORDER — LORAZEPAM 2 MG/ML
0.5 INJECTION INTRAMUSCULAR
Status: DISCONTINUED | OUTPATIENT
Start: 2024-03-07 | End: 2024-03-08 | Stop reason: HOSPADM

## 2024-03-07 RX ORDER — HALOPERIDOL 5 MG/ML
2 INJECTION INTRAMUSCULAR EVERY 4 HOURS PRN
Status: DISCONTINUED | OUTPATIENT
Start: 2024-03-07 | End: 2024-03-08

## 2024-03-07 RX ORDER — LORAZEPAM 2 MG/ML
0.5 CONCENTRATE ORAL
Status: DISCONTINUED | OUTPATIENT
Start: 2024-03-07 | End: 2024-03-08 | Stop reason: HOSPADM

## 2024-03-07 RX ORDER — FUROSEMIDE 40 MG/1
40 TABLET ORAL DAILY
Status: DISCONTINUED | OUTPATIENT
Start: 2024-03-08 | End: 2024-03-08 | Stop reason: HOSPADM

## 2024-03-07 RX ORDER — FENTANYL CITRATE 50 UG/ML
25 INJECTION, SOLUTION INTRAMUSCULAR; INTRAVENOUS
Status: DISCONTINUED | OUTPATIENT
Start: 2024-03-07 | End: 2024-03-08

## 2024-03-07 RX ORDER — MORPHINE SULFATE 2 MG/ML
1 INJECTION, SOLUTION INTRAMUSCULAR; INTRAVENOUS
Status: DISCONTINUED | OUTPATIENT
Start: 2024-03-07 | End: 2024-03-07

## 2024-03-07 RX ORDER — LACTULOSE 10 G/15ML
20 SOLUTION ORAL 3 TIMES DAILY
Status: DISCONTINUED | OUTPATIENT
Start: 2024-03-07 | End: 2024-03-08

## 2024-03-07 RX ORDER — OXYCODONE HCL 20 MG/ML
2.6 CONCENTRATE, ORAL ORAL
Status: DISCONTINUED | OUTPATIENT
Start: 2024-03-07 | End: 2024-03-08 | Stop reason: HOSPADM

## 2024-03-07 RX ORDER — OXYCODONE HCL 20 MG/ML
2.6 CONCENTRATE, ORAL ORAL
Status: DISCONTINUED | OUTPATIENT
Start: 2024-03-07 | End: 2024-03-07

## 2024-03-07 RX ORDER — GLYCOPYRROLATE 0.2 MG/ML
0.2 INJECTION INTRAMUSCULAR; INTRAVENOUS
Status: DISCONTINUED | OUTPATIENT
Start: 2024-03-07 | End: 2024-03-08

## 2024-03-07 RX ADMIN — ALBUTEROL SULFATE 2.5 MG: 2.5 SOLUTION RESPIRATORY (INHALATION) at 17:30

## 2024-03-07 RX ADMIN — SODIUM CHLORIDE, PRESERVATIVE FREE 10 ML: 5 INJECTION INTRAVENOUS at 21:01

## 2024-03-07 RX ADMIN — FENTANYL CITRATE 25 MCG: 50 INJECTION, SOLUTION INTRAMUSCULAR; INTRAVENOUS at 16:41

## 2024-03-07 RX ADMIN — FENTANYL CITRATE 25 MCG: 50 INJECTION, SOLUTION INTRAMUSCULAR; INTRAVENOUS at 20:52

## 2024-03-07 RX ADMIN — ALBUMIN (HUMAN) 25 G: 0.25 INJECTION, SOLUTION INTRAVENOUS at 13:52

## 2024-03-07 RX ADMIN — IPRATROPIUM BROMIDE AND ALBUTEROL SULFATE 1 DOSE: .5; 3 SOLUTION RESPIRATORY (INHALATION) at 13:05

## 2024-03-07 RX ADMIN — HALOPERIDOL LACTATE 2 MG: 5 INJECTION, SOLUTION INTRAMUSCULAR at 16:44

## 2024-03-07 RX ADMIN — ALBUMIN (HUMAN) 25 G: 0.25 INJECTION, SOLUTION INTRAVENOUS at 12:59

## 2024-03-07 RX ADMIN — SODIUM CHLORIDE, PRESERVATIVE FREE 10 ML: 5 INJECTION INTRAVENOUS at 10:56

## 2024-03-07 RX ADMIN — GLYCOPYRROLATE 0.2 MG: 0.2 INJECTION INTRAMUSCULAR; INTRAVENOUS at 17:07

## 2024-03-07 RX ADMIN — HYDROCODONE BITARTRATE AND ACETAMINOPHEN 1 TABLET: 5; 325 TABLET ORAL at 14:15

## 2024-03-07 RX ADMIN — IPRATROPIUM BROMIDE AND ALBUTEROL SULFATE 1 DOSE: .5; 3 SOLUTION RESPIRATORY (INHALATION) at 20:23

## 2024-03-07 RX ADMIN — GUAIFENESIN 600 MG: 600 TABLET, EXTENDED RELEASE ORAL at 10:55

## 2024-03-07 RX ADMIN — LACTULOSE 20 G: 20 SOLUTION ORAL at 14:04

## 2024-03-07 RX ADMIN — LACTULOSE 20 G: 20 SOLUTION ORAL at 10:55

## 2024-03-07 RX ADMIN — GLYCOPYRROLATE 0.2 MG: 0.2 INJECTION INTRAMUSCULAR; INTRAVENOUS at 19:40

## 2024-03-07 RX ADMIN — ALBUMIN (HUMAN) 25 G: 0.25 INJECTION, SOLUTION INTRAVENOUS at 00:45

## 2024-03-07 RX ADMIN — IPRATROPIUM BROMIDE AND ALBUTEROL SULFATE 1 DOSE: .5; 3 SOLUTION RESPIRATORY (INHALATION) at 07:53

## 2024-03-07 RX ADMIN — DOCUSATE SODIUM 283 MG: 283 LIQUID RECTAL at 10:55

## 2024-03-07 RX ADMIN — HYDROCODONE BITARTRATE AND ACETAMINOPHEN 1 TABLET: 5; 325 TABLET ORAL at 06:34

## 2024-03-07 RX ADMIN — HALOPERIDOL LACTATE 2 MG: 5 INJECTION, SOLUTION INTRAMUSCULAR at 20:54

## 2024-03-07 ASSESSMENT — PAIN DESCRIPTION - LOCATION: LOCATION: ABDOMEN

## 2024-03-07 ASSESSMENT — PAIN DESCRIPTION - ORIENTATION: ORIENTATION: MID

## 2024-03-07 ASSESSMENT — PAIN SCALES - GENERAL
PAINLEVEL_OUTOF10: 4
PAINLEVEL_OUTOF10: 6

## 2024-03-07 ASSESSMENT — PAIN DESCRIPTION - DESCRIPTORS: DESCRIPTORS: ACHING

## 2024-03-07 NOTE — CONSULTS
Physician Consult Note        Patient:   Meg Wilson  YOB: 1940  Age:  83 y.o.  Room[facility]  [facility]  MRN:  [id number]   Acct: [id number]  PCP: Elver Arredondo DO    Date of Admission:  2/28/2024 12:25 PM  Date of Service:  3/7/2024    Reason for Consult: Goals of Care and Symptom Management             Subjective   Chief Complaint:-    Chief Complaint   Patient presents with    Fall    Hip Pain     RIGHT        History Obtained From:-  Electronic Medical Record    History of Present Illness:-            Meg Wilson is a 83 y.o. female who  has a past medical history of AAA (abdominal aortic aneurysm) ([facility]), Alzheimer's dementia ([facility]), Aortic stenosis, mild, Arthritis, ASHD (arteriosclerotic heart disease), Chronic low back pain, Cirrhosis of liver ([facility]), DM2 (diabetes mellitus, type 2) ([facility]), Dyslipidemia, Former smoker, GERD (gastroesophageal reflux disease), History of basal cell cancer, History of Crohn's disease, History of CVA (cerebrovascular accident), History of hypertension, IBS (irritable bowel syndrome), Iron deficiency anemia, Major depression, PAD (peripheral artery disease) ([facility]), S/P CABG (coronary artery bypass graft), and Subclavian artery stenosis, left ([facility]). They present to the hospital with Fall and Hip Pain (RIGHT) and are admitted for Closed nondisplaced fracture of pelvis ([facility]). Patient initially presented to the emergency department on February 28, 2024 after a fall at home.  She had been more confused at home.  Workup in the emergency department revealed a hip fracture.  She was then admitted to [facility] for further care.  Orthopedics opted for nonoperative management.  She had a paracentesis done for ascites secondary to cirrhosis.  She had 4.7 L drained on February 29, 2024.  Over the next several days her condition continued to decline.   Palliative care was consulted for Goals of Care and Symptom  greater than 2  Start Ativan PO 0.5 mg every 1 hour as needed for breakthrough anxiety and shortness of breath not controlled on opioids  Start Ativan IV 0.5 mg every 1 hour as needed for breakthrough anxiety and shortness of breath not controlled on opioids  Start Haldol Oral solution 2 mg every 4 hours as needed for  agitation and restlessness  Start Haldol IV 2 mg every 4 hours as needed for breakthrough agitation and restlessness  Start Levsin 125 mcg every 2 hours as needed for  terminal secretions  Start Glycopyrrolate 0.2 mg every 2 hours as needed for  terminal secretions  Please give IV medications only if oral medications are not effective at controlling symptoms  There must be at least one hour between giving oral immediate release opioids and the next dose of either IV or oral immediate release opioid  Hospice following  Plan to continue to evaluate to see if they meet criteria for GIP admission over the next 24 to 48 hours based on their comfort medication usage and whether or not their symptoms are controlled on these medications.  Palliative Care will continue to follow the patient while they are hospitalized   Please [employer] or call the Palliative Care team with any questions or concerns    CURRENT CODE STATUS:  Limited Limited Code details: Intubation/Re-intubation No; Defibrillation/Cardioversion No; Chest Compressions No; Resuscitative Medications No; Other No Comment           Parts of this note may have been dictated by use of voice recognition software and electronically transcribed. The note may contain errors not detected in proofreading    Electronically signed by Surendra Gerard MD on 3/7/2024 at 3:41 PM           Palliative Care Office: [phone]

## 2024-03-07 NOTE — CONSENT
Formulation and discussion of sedation / procedure plans, risks, benefits, side effects and alternatives with patient and/or responsible adult completed.    History and Physical reviewed and unchanged.    Electronically signed by Champ Baltazar MD on 3/7/24 at 9:22 AM EST

## 2024-03-07 NOTE — PROGRESS NOTES
Hospice presentation in patient room 7K13.  , son and daughter in law at bedside.  Patient minimally responsive, in respiratory distress initially.  Rosaura, RN had just administered Morphine.  Educated on hospice concepts and philosophies as well as hospice care in the hospital vs facility with family paying room and board and home with family providing care.  Per , daughter in law Priscila will be handling making the decisions.  She will keep him educated on what is happening.  Priscila wishes for GIP, as she would not get the care she needs at a facility and  cannot take care of her at home.  Dr Gerard updated and will see patient.

## 2024-03-07 NOTE — PALLIATIVE CARE
Initial Evaluation        Patient:   Meg Wilson  YOB: 1940  Age:  83 y.o.  [facility]  MRN:  [id number]   Acct: [id number]    Date of Admission:  2/28/2024 12:25 PM  Date of Service:  3/7/2024  Completed By:  Karla Renae RN                 Reason for Palliative Care Evaluation:-               [x] Code Status Discussion              [x] Goals of Care              [] Pain/Symptom Management               [] Emotional Support              [] Other:                    Current Issues:-   []  Pain  [x]  Fatigue  []  Nausea  []  Anxiety  []  Depression  [x]  Shortness of Breath  []  Constipation  [x]  Appetite - family state patient has been refusing to eat  [x]  Other: increased confusion              Advance Directives:-    [] [place] DNR Form  [x] Living Will  [x] Medical POA              Current Code Status:-   Changed by Dr Dumont this admission  [] Full Resuscitation  [] DNR-Comfort Care-Arrest  [] DNR-Comfort Care       [x] Limited Resuscitation             [x] No CPR            [x] No shock            [x] No ET intubation/reintubation            [x] No resuscitative medications            [] Other limitation:               Palliative Performance Status:-      [] 100%  Full ambulation; normal activity and work; no evidence of disease; able to do own self care; normal intake; fully conscious     [] 90%   Full ambulation; normal activity and work; some evidence of disease; able to do own self care; normal intake; fully conscious    [] 80%   Full ambulation; normal activity with effort; some evidence of disease; able to do own self care; normal or reduced intake; fully conscious    [] 70%  Ambulation reduced; unable to perform normal job/work; significant  disease; able to do own self care; normal or reduced intake; fully conscious    [] 60%  Ambulation reduced; Significant disease;Can't do hobbies/housework; intake normal or reduced; occasional assist; LOC full/confusion    [x]

## 2024-03-07 NOTE — PROCEDURES
Pre-Procedure Diagnosis: Ascites    Procedure Performed:  Paracentesis    Anesthesia: local    Findings: Clear yellow serous fluid obtained.    Immediate Complications:  None    Estimated Blood Loss: minimal    SEE DICTATED PROCEDURE NOTE FOR COMPLETE DETAILS.    Champ Baltazar MD   3/7/2024 9:23 AM

## 2024-03-07 NOTE — PROGRESS NOTES
will consume PO trials with ST only (as mental status permits) to determine appropriateness for PO diet initiation versus need for instrumental swallowing assessment.  Goal 2: Monitor cognitive functioning (AMS, elevated ammonia levels, recurrent falls), determine baseline function and complete further assessment as clinically indicated.    LONG TERM GOALS:  No established LTG's given short JOANNE Cook M.S. CCC-SLP [id number] 3/7/2024

## 2024-03-07 NOTE — PLAN OF CARE
Problem: Discharge Planning  Goal: Discharge to home or other facility with appropriate resources  Outcome: Progressing  Flowsheets (Taken 3/5/2024 1342 by Heather Ponce, RN)  Discharge to home or other facility with appropriate resources:   Identify barriers to discharge with patient and caregiver   Arrange for needed discharge resources and transportation as appropriate   Identify discharge learning needs (meds, wound care, etc)     Problem: Pain  Goal: Verbalizes/displays adequate comfort level or baseline comfort level  Outcome: Progressing  Flowsheets (Taken 3/5/2024 1342 by Heather Ponce, RN)  Verbalizes/displays adequate comfort level or baseline comfort level:   Encourage patient to monitor pain and request assistance   Assess pain using appropriate pain scale     Problem: Skin/Tissue Integrity  Goal: Absence of new skin breakdown  Description: 1.  Monitor for areas of redness and/or skin breakdown  2.  Assess vascular access sites hourly  3.  Every 4-6 hours minimum:  Change oxygen saturation probe site  4.  Every 4-6 hours:  If on nasal continuous positive airway pressure, respiratory therapy assess nares and determine need for appliance change or resting period.  Outcome: Progressing  Note: No new skin breakdown this shift     Problem: ABCDS Injury Assessment  Goal: Absence of physical injury  Outcome: Progressing  Flowsheets (Taken 3/5/2024 1342 by Heather Ponce, RN)  Absence of Physical Injury: Implement safety measures based on patient assessment     Problem: Safety - Adult  Goal: Free from fall injury  Outcome: Progressing  Flowsheets (Taken 3/5/2024 1342 by Heather Ponce, RN)  Free From Fall Injury: Instruct family/caregiver on patient safety     Problem: Chronic Conditions and Co-morbidities  Goal: Patient's chronic conditions and co-morbidity symptoms are monitored and maintained or improved  Outcome: Progressing  Flowsheets (Taken 3/5/2024 1342 by Heather Ponce, RN)  Care Plan - Patient's  Chronic Conditions and Co-Morbidity Symptoms are Monitored and Maintained or Improved:   Monitor and assess patient's chronic conditions and comorbid symptoms for stability, deterioration, or improvement   Collaborate with multidisciplinary team to address chronic and comorbid conditions and prevent exacerbation or deterioration     Problem: Respiratory - Adult  Goal: Achieves optimal ventilation and oxygenation  Outcome: Progressing  Flowsheets (Taken 3/5/2024 1342 by Heather Ponce RN)  Achieves optimal ventilation and oxygenation:   Assess for changes in respiratory status   Assess for changes in mentation and behavior   Care plan reviewed with patient and family.  Patient and famliy verbalize understanding of the plan of care and contribute to goal setting.     Problem: Nutrition Deficit:  Goal: Optimize nutritional status  Outcome: Not Progressing  Flowsheets (Taken 3/7/2024 1336)  Nutrient intake appropriate for improving, restoring, or maintaining nutritional needs: Recommend appropriate diets, oral nutritional supplements, and vitamin/mineral supplements

## 2024-03-07 NOTE — CARE COORDINATION
3/7/24, 1:11 PM EST    DISCHARGE ON GOING EVALUATION    [facility]       [facility] day: 8  Location: -13/013-A Reason for admit: Fracture, sacrum/coccyx, closed, initial encounter ([facility]) [S32.10XA, S32.2XXA]  Closed nondisplaced fracture of pelvis, unspecified part of pelvis, initial encounter ([facility]) [S32.9XXA]   Procedure:   2/28 CT right hip: Mildly displaced acute fracture of the right sacral ala. Mildly displaced fracture of the right inferior pubic ramus. Comminuted and displaced fractures of the body of the right pubic bone. Large ascites   2/29 Paracentesis: peritoneal ascites with 4.7 L of fluid drained.  3/4 KUB: Moderate to advanced colonic fecal burden involving the ascending   colon/hepatic flexure and at the distal rectum. The mid and distal transverse colon is gas-filled and prominent. Finding may reflect reported history of colonic ileus. Continued follow-up is recommended.  3/5 CXR: There is a 1 cm focal alveolar opacity in the right upper lobe unchanged   from 01/08/2024. The lungs are otherwise clear  3/7 KUB: Significant improvement in degree of bowel dilation.   3/7 CXR: Vague right upper lobe opacity concerning for infiltrate.   3/7 Paracentesis: 3.2L fluid drained    Barriers to Discharge: Hospitalist, Nephrology and GI following. PT/OT. SLP to eval; concern for aspiration. Dietician. Palliative to see to discuss goals of care w/ end stage disease. Creat 1.4. Ammonia 111. Hgb 7.0- repeat 7.1. Oriented to person only. HR 100s. Dyspnea noted, respirations in 20s. External rudolph. Paracentesis this AM. IV albumin x2 post paracentesis. Norco prn. Start lactulose TID.     PCP: Elver Arredondo, DO  Readmission Risk Score: 23.9%  Patient Goals/Plan/Treatment Preferences: [facility]. Will need precert. SW following.

## 2024-03-07 NOTE — PALLIATIVE CARE
Follow Up / Progress Note        Patient:   Meg Wilson  YOB: 1940  Age:  83 y.o.  Room:  [facility]  MRN:  [id number]         Family/Patient Discussion:  Returned to room to speak with patient and family. Patient resting in bed, Jairo Hathaway, Jairo Fang, and CHILANGO at bedside. Patient appearing very restless in bed. Jairo Hathaway stating that the patient did not eat much for lunch, has only drank 1/2 of her glucerna. CHILANGO shared many concerns with the patient's care. Stated that the patient was doing fairly well last week, did not have any PT/OT over the weekend, and has since declined. Family concerned that the patient may not have had this outcome if she had been able to be out of bed over the weekend. Emotional support given. Discussed next steps with family. While discussing this primary RN was attempting to give patient her liquid lactulose. Patient coughing while taking sips, was unable to take all of medication. Discussed concerns of patient possibly aspirating. SLP entered room. Patient unable to follow commands with SLP. Patient now NPO due to concerns of aspiration and safety of PO intake. Patient appearing even more restless. Family states that last week, when patient was more oriented, if the patient was turned she would scream due to pain. Discussed comfort medications. Hospice concepts and philosophies. Family agreed to meeting with hospice. Also discussed Dr Gerard reviewing medications for now to ensure patient could be comfortable. Family again agreed. Family denied further questions at this time.     Updated Dr Gerard and [facility] Liaison.       Plan/Follow-Up:  Hospice consult called to office. Will continue to follow. Family to meet with hospice today.         Electronically signed by Karla Renae RN on 3/7/2024 at 2:41 PM             Palliative Care Office: [phone]

## 2024-03-07 NOTE — PROGRESS NOTES
[facility]   Nephrology progress note  3/7/2024, 9:39 AM      Pt Name:    Meg Wilson  MRN:     [id number]     YOB: 1940  Admit Date:    2/28/2024 12:25 PM    Chief Complaint: Nephrology following for hyponatremia.    Subjective:  Patient was seen and examined this morning   at bedside.   Pt remains confused.    states she hasn't had a BM.    Objective:  24HR INTAKE/OUTPUT:    Intake/Output Summary (Last 24 hours) at 3/7/2024 0939  Last data filed at 3/7/2024 0619  Gross per 24 hour   Intake 410 ml   Output 1100 ml   Net -690 ml         I/O last 3 completed shifts:  In: 560 [P.O.:560]  Out: 2200 [Urine:2200]  No intake/output data recorded.   Admission weight: 68 kg (150 lb)  Wt Readings from Last 3 Encounters:   03/05/24 69.7 kg (153 lb 10.6 oz)   02/13/24 64.4 kg (142 lb)   01/31/24 68.3 kg (150 lb 8 oz)        Vitals :   Vitals:    03/06/24 1903 03/06/24 2000 03/07/24 0030 03/07/24 0834   BP:  118/70 129/60 128/66   Pulse:  (!) 106 (!) 106 (!) 104   Resp: 20 20 20 22   Temp:  96.8 °F (36 °C)  97.5 °F (36.4 °C)   TempSrc:  Oral  Axillary   SpO2:  92% 92% 95%   Weight:       Height:           Physical examination  General Appearance:awake, confused  Mouth/Throat: Oral mucosa moist  Neck: No JVD  Lungs: Air entry B/L, no rales, no use of accessory muscles  Heart:  S1, S2 heard  GI: soft, non-tender,distended  Extremities: leg edema noted    Medications:  Infusion:    sodium chloride       Meds:    lactulose  20 g Oral TID    docusate sodium  1 enema Rectal Once    [START ON 3/8/2024] furosemide  40 mg Oral Daily    guaiFENesin  600 mg Oral BID    albumin human 25%  25 g IntraVENous Q1H    ipratropium 0.5 mg-albuterol 2.5 mg  1 Dose Inhalation TID RT    sodium chloride flush  5-40 mL IntraVENous 2 times per day    [Held by provider] spironolactone  100 mg Oral Daily    [Held by provider] spironolactone  50 mg Oral Daily    atorvastatin  20 mg Oral Nightly    [Held

## 2024-03-07 NOTE — CONSENT
Informed Consent for Blood Component Transfusion Note    I have discussed with the son and  the rationale for blood component transfusion; its benefits in treating or preventing fatigue, organ damage, or death; and its risk which includes mild transfusion reactions, rare risk of blood borne infection, or more serious but rare reactions. I have discussed the alternatives to transfusion, including the risk and consequences of not receiving transfusion. The son and  had an opportunity to ask questions and had agreed to proceed with transfusion of blood components.    Electronically signed by Samira Avendaño PA-C on 3/7/24 at 2:34 PM EST

## 2024-03-07 NOTE — PROGRESS NOTES
3/7/2024 1256  Gross per 24 hour   Intake 400 ml   Output 650 ml   Net -250 ml     Weight:  Wt Readings from Last 3 Encounters:   03/05/24 69.7 kg (153 lb 10.6 oz)   02/13/24 64.4 kg (142 lb)   01/31/24 68.3 kg (150 lb 8 oz)     General appearance: alert and cooperative with exam  Lungs: clear to auscultation bilaterally  Heart: regular rate and rhythm, S1, S2 normal, no murmur, click, rub or gallop  Abdomen:  soft, distended.  Increased tympany t/o with percussion.  Hypoactive BS.  No significant ascites on physical exam  Extremities: extremities normal, atraumatic, no cyanosis or edema    Assessment and Plan:   S/p fall with right sacral fracture and right pubic rami fracture.  Ortho following.  Decompensated liver cirrhosis d/t CASTELLANO with ascites.  MELD: 27  Plan for paracentesis on Thursday.  Continue low sodium diet.  2L fluid restriction.  Diuretics per Nephrology  Altered mental status - will check ammonia level to rule out hepatic encephalopathy d/t #2.    NARDA on CKD - Nephrology managing.  High ammonia will start Lactulose and stop Mirlax    CODE STATUS change no CPR no shock no intubations, medical management only  Patient respiratory status deteriorated likely will move to inpatient hospice care.  Will see if needed only supportive care to the family      Follow up in GI Clinic after discharge in 2 week(s)      Keke Dumont MD  3/7/2024  4:35 PM

## 2024-03-07 NOTE — PROGRESS NOTES
Physician Progress Note      PATIENT:               JOVANY FERNANDES  [facility] #:                  [id number]  :                       1940  ADMIT DATE:       2024 12:25 PM  DISCH DATE:  RESPONDING  PROVIDER #:        Samira Evangelista PA-C          QUERY TEXT:    Patient admitted with mildly displaced acute fracture of the right sacral ala   and mildly displaced fracture of the right inferior pubic ramus.  3/5-3/7 IM   notes state, \"Possible acute on chronic HFpEF, mild aortic stenosis: Echo   2024 shows EF 55-60% with mild AS.  CXR on 3/3/2024 does not show evidence   of volume overload.  Diuretics held on given #4.  Strict I&Os. Daily weights.   Fluid/salt restriction.\"  In order to support the diagnosis of acute on   chronic HFpEF, please include additional clinical indicators in your   documentation, or please document if the diagnosis of     The medical record reflects the following:  Risk Factors: mild AS, liver cirrhosis, NARDA on CKD, CAD, afib, advanced age  Clinical Indicators: 3/5-3/7 IM notes state, \"Possible acute on chronic HFpEF,   mild aortic stenosis: Echo 2024 shows EF 55-60% with mild AS.  CXR on   3/3/2024 does not show evidence of volume overload.  Diuretics held on given   #4.  Strict I&Os. Daily weights. Fluid/salt restriction.\"  Treatment: po Lasix given previously but currently on hold, strict I&Os, daily   weights, fluid/salt restriction, imaging    Thank you!    Bryanna Beard, RN, BSN, RHIT, CCDS  Clinical   Options provided:  -- Acute on chronic HFpEF present as evidenced by, Please document evidence.  -- Acute on chronic HFpEF ruled out, and chronic HFpEF confirmed  -- Other - I will add my own diagnosis  -- Disagree - Not applicable / Not valid  -- Disagree - Clinically unable to determine / Unknown  -- Refer to Clinical Documentation Reviewer    PROVIDER RESPONSE TEXT:    Provider is clinically unable to determine a response to this

## 2024-03-07 NOTE — RT PROTOCOL NOTE
RT Inhaler-Nebulizer Bronchodilator Protocol Note    There is a bronchodilator order in the chart from a provider indicating to follow the RT Bronchodilator Protocol and there is an “Initiate RT Inhaler-Nebulizer Bronchodilator Protocol” order as well (see protocol at bottom of note).    CXR Findings:  XR CHEST PORTABLE    Result Date: 3/7/2024  Impression: Vague right upper lobe opacity concerning for infiltrate. This document has been electronically signed by: Salazar Gustafson MD on 03/07/2024 03:39 AM    XR CHEST PORTABLE    Result Date: 3/5/2024  Impression: There is a 1 cm focal alveolar opacity in the right upper lobe unchanged from 01/08/2024. The lungs are otherwise clear. This document has been electronically signed by: Jemal Hoover MD on 03/05/2024 09:23 PM      The findings from the last RT Protocol Assessment were as follows:   History Pulmonary Disease: Smoker 15 pack years or more  Respiratory Pattern: Dyspnea on exertion or RR 21-25 bpm  Breath Sounds: Intermittent or unilateral wheezes  Cough: Strong, spontaneous, non-productive  Indication for Bronchodilator Therapy: Decreased or absent breath sounds  Bronchodilator Assessment Score: 7    Aerosolized bronchodilator medication orders have been revised according to the RT Inhaler-Nebulizer Bronchodilator Protocol below.    Respiratory Therapist to perform RT Therapy Protocol Assessment initially then follow the protocol.  Repeat RT Therapy Protocol Assessment PRN for score 0-3 or on second treatment, BID, and PRN for scores above 3.    No Indications - adjust the frequency to every 6 hours PRN wheezing or bronchospasm, if no treatments needed after 48 hours then discontinue using Per Protocol order mode.     If indication present, adjust the RT bronchodilator orders based on the Bronchodilator Assessment Score as indicated below.  Use Inhaler orders unless patient has one or more of the following: on home nebulizer, not able to hold breath for 10

## 2024-03-07 NOTE — PROGRESS NOTES
[facility]  PHYSICAL THERAPY MISSED TREATMENT NOTE  [facility] 7K    Date: 3/7/2024  Patient Name: Meg Wilson        MRN: [id number]   : 1940  (83 y.o.)  Gender: female   Referring Practitioner: RODOLFO De La Cruz  Diagnosis: fracture, sacrum/coccyx         REASON FOR MISSED TREATMENT:  Pt off unit for paracentesis this am, will check back later or next available date.

## 2024-03-07 NOTE — PROGRESS NOTES
Hospitalist Progress Note    Patient:  Meg Wilson    YOB: 1940  Unit/Bed:7K-13/013-A  Date of Admission: 2/28/2024  Code Status: Limited      Assessment/Plan:      Goals of care: Patient's wish to limited no x 4 on 3/6 after conversation with family and Dr. Dumont.  Had further discussion with patient's  and son at bedside.  Discussed moving forward if mentation does not improve, what goals would be.  Discussed hospice and comfort care, however not ready to pursue at this time.  Family agreeable to meeting with hospice services.    Conversational confusion with baseline alzheimer's dementia: At baseline oriented to self and location.  Per patient's  at bedside, patient increased confusion.  Suspect multifactorial- dementia at baseline, decompensated liver cirrhosis with elevated ammonia levels, Zoloft and Remeron held for #4. Possible hospital associated delirium.   Lactulose added for decompensated cirrhosis, increased dose 3/7 given no BM.  KUB 3/6 demonstrates significant improvement in degree of bowel dilation, moderate retained stool.    Dyspnea: Complains of shortness of breath x 1 day.  Chest x-ray shows no acute cardiopulmonary findings on 3/3/2024 and again 3/5/24.  No hypoxia noted via pulse oximetry.  3 doses albumin.  Added Mucinex.  Continue to monitor for improvement.  Plan for paracentesis 3/7, may need to consider increasing frequency given diuretics being held  CXR 3/7 demonstrates vague RUL opacity concerning for infiltrate, procal 0.26 and CBC stable around 12  Dyspnea appeared to improve following paracentesis    Hypotonic hyponatremia: Sodium improved to 133.  In setting of cirrhosis, diuretics and paracentesis.  Nephrology following- Zoloft and Remeron held. fluid restriction, ureNa held. S/p 1 dose tolvaptan 3/5.  salt tablets resumed 3/6.   Lasix resumed 3/7    Decompensated cirrhosis, ascites: Patient with increasing lower extremity edema.   BILATERAL   Final Result      No fracture or dislocation.      Final report electronically signed by Dr. Declan Albarran on 2/28/2024 1:40 PM      XR CHEST 1 VIEW   Final Result      No acute intrathoracic process.      Final report electronically signed by Dr. Declan Albarran on 2/28/2024 1:36 PM      US GUIDED NEEDLE PLACEMENT    (Results Pending)     US GUIDED PARACENTESIS    Result Date: 2/29/2024  PROCEDURE: Ultrasound-guided paracentesis. CLINICAL INFORMATION: Ascites. COMPARISON: Procedure 2/22/2024 PROCEDURE: Physician performing procedure: Dr Gonzalez Informed consent signed: Yes Local Anesthetic: 2 % Lidocaine Specimen volume: 70 ml Catheter: 19 ga 5 Wallisian Aspirated ascites volume: 4.7 liters Aspirated ascites color: Cloudy yellow Site of Puncture:RLQ Complications: None observed The benefits and the risk of the procedure were explained to the patient. Signed consent was obtained. Limited ultrasound exam of the abdomen showed  large  amount of ascites. The right side of the abdomen was prepped and draped using the usual sterile technique and the skin was anesthetized. A 5 [place]ar[place]uan one-step catheter was introduced to the peritoneal ascites. 4.7 L of fluid drained. The catheter was then removed. The patient tolerated the procedure well with no complications reported and was discharged from the radiology department in a stable condition. Specimen sent for laboratory studies as requested.     Successful ultrasound-guided paracentesis. 4.7 L of fluid drained. **This report has been created using voice recognition software. It may contain minor errors which are inherent in voice recognition technology.**  Final report electronically signed by Dr Nino Gonzalez on 2/29/2024 2:14 PM      Electronically signed by Samira Avendaño PA-C on 3/7/2024 at 7:37 AM

## 2024-03-07 NOTE — PLAN OF CARE
Problem: Discharge Planning  Goal: Discharge to home or other facility with appropriate resources  3/6/2024 2302 by Juany Nuñez RN  Outcome: Progressing  Flowsheets (Taken 3/5/2024 1342 by Heather Ponce RN)  Discharge to home or other facility with appropriate resources:   Identify barriers to discharge with patient and caregiver   Arrange for needed discharge resources and transportation as appropriate   Identify discharge learning needs (meds, wound care, etc)     Problem: Pain  Goal: Verbalizes/displays adequate comfort level or baseline comfort level  3/6/2024 2302 by Juany Nuñez RN  Outcome: Progressing  Flowsheets (Taken 3/5/2024 1342 by Heather Ponce RN)  Verbalizes/displays adequate comfort level or baseline comfort level:   Encourage patient to monitor pain and request assistance   Assess pain using appropriate pain scale     Problem: Skin/Tissue Integrity  Goal: Absence of new skin breakdown  Description: 1.  Monitor for areas of redness and/or skin breakdown  2.  Assess vascular access sites hourly  3.  Every 4-6 hours minimum:  Change oxygen saturation probe site  4.  Every 4-6 hours:  If on nasal continuous positive airway pressure, respiratory therapy assess nares and determine need for appliance change or resting period.  3/6/2024 2302 by Juany Nuñez RN  Outcome: Progressing  Note: No evidence of skin breakdown. Pt. Turned Q2 hours.     Problem: ABCDS Injury Assessment  Goal: Absence of physical injury  3/6/2024 2302 by Juany Nuñez RN  Outcome: Progressing  Flowsheets (Taken 3/5/2024 1342 by Heather Ponce RN)  Absence of Physical Injury: Implement safety measures based on patient assessment     Problem: Safety - Adult  Goal: Free from fall injury  3/6/2024 2302 by Juany Nuñez, RN  Outcome: Progressing  Flowsheets (Taken 3/5/2024 1342 by Heather Ponce RN)  Free From Fall Injury: Instruct family/caregiver on patient safety     Problem: Chronic Conditions and  Co-morbidities  Goal: Patient's chronic conditions and co-morbidity symptoms are monitored and maintained or improved  3/6/2024 2302 by Juany Nuñez RN  Outcome: Progressing  Flowsheets (Taken 3/5/2024 1342 by Heather Ponce RN)  Care Plan - Patient's Chronic Conditions and Co-Morbidity Symptoms are Monitored and Maintained or Improved:   Monitor and assess patient's chronic conditions and comorbid symptoms for stability, deterioration, or improvement   Collaborate with multidisciplinary team to address chronic and comorbid conditions and prevent exacerbation or deterioration     Problem: Respiratory - Adult  Goal: Achieves optimal ventilation and oxygenation  3/6/2024 2302 by Juany Nuñez RN  Outcome: Progressing  Flowsheets (Taken 3/5/2024 1342 by Heather Ponce RN)  Achieves optimal ventilation and oxygenation:   Assess for changes in respiratory status   Assess for changes in mentation and behavior     Problem: Nutrition Deficit:  Goal: Optimize nutritional status  Outcome: Progressing  Flowsheets (Taken 3/6/2024 2302)  Nutrient intake appropriate for improving, restoring, or maintaining nutritional needs:   Assess nutritional status and recommend course of action   Monitor oral intake, labs, and treatment plans   Care plan reviewed with patient.  Patient verbalize understanding of the plan of care and contribute to goal setting.

## 2024-03-08 ENCOUNTER — HOSPITAL ENCOUNTER (INPATIENT)
Age: 84
LOS: 1 days | End: 2024-03-09
Attending: STUDENT IN AN ORGANIZED HEALTH CARE EDUCATION/TRAINING PROGRAM | Admitting: STUDENT IN AN ORGANIZED HEALTH CARE EDUCATION/TRAINING PROGRAM
Payer: MEDICARE

## 2024-03-08 VITALS
HEART RATE: 112 BPM | OXYGEN SATURATION: 93 % | SYSTOLIC BLOOD PRESSURE: 134 MMHG | RESPIRATION RATE: 20 BRPM | WEIGHT: 153.66 LBS | DIASTOLIC BLOOD PRESSURE: 62 MMHG | TEMPERATURE: 98.6 F | HEIGHT: 66 IN | BODY MASS INDEX: 24.7 KG/M2

## 2024-03-08 PROBLEM — S32.9XXA: Status: ACTIVE | Noted: 2024-03-08

## 2024-03-08 LAB
AMMONIA PLAS-MCNC: 86 UMOL/L (ref 11–60)
BUN SERPL-MCNC: 91 MG/DL (ref 7–22)
CALCIUM SERPL-MCNC: 10.2 MG/DL (ref 8.5–10.5)
CHLORIDE SERPL-SCNC: 106 MEQ/L (ref 98–111)
CO2 SERPL-SCNC: 16 MEQ/L (ref 23–33)
CREAT SERPL-MCNC: 1.5 MG/DL (ref 0.4–1.2)
DEPRECATED RDW RBC AUTO: 77.7 FL (ref 35–45)
ERYTHROCYTE [DISTWIDTH] IN BLOOD BY AUTOMATED COUNT: 22.7 % (ref 11.5–14.5)
GFR SERPL CREATININE-BSD FRML MDRD: 34 ML/MIN/1.73M2
GLUCOSE BLD STRIP.AUTO-MCNC: 180 MG/DL (ref 70–108)
GLUCOSE SERPL-MCNC: 162 MG/DL (ref 70–108)
HCT VFR BLD AUTO: 20.8 % (ref 37–47)
HGB BLD-MCNC: 7 GM/DL (ref 12–16)
MCH RBC QN AUTO: 31 PG (ref 26–33)
MCHC RBC AUTO-ENTMCNC: 33.7 GM/DL (ref 32.2–35.5)
MCV RBC AUTO: 92 FL (ref 81–99)
PLATELET # BLD AUTO: 155 THOU/MM3 (ref 130–400)
PMV BLD AUTO: 10.2 FL (ref 9.4–12.4)
POTASSIUM SERPL-SCNC: 5 MEQ/L (ref 3.5–5.2)
RBC # BLD AUTO: 2.26 MILL/MM3 (ref 4.2–5.4)
SCAN OF BLOOD SMEAR: NORMAL
SODIUM SERPL-SCNC: 137 MEQ/L (ref 135–145)
WBC # BLD AUTO: 11.7 THOU/MM3 (ref 4.8–10.8)

## 2024-03-08 PROCEDURE — 6360000002 HC RX W HCPCS: Performed by: STUDENT IN AN ORGANIZED HEALTH CARE EDUCATION/TRAINING PROGRAM

## 2024-03-08 PROCEDURE — 36415 COLL VENOUS BLD VENIPUNCTURE: CPT

## 2024-03-08 PROCEDURE — 1200000000 HC SEMI PRIVATE

## 2024-03-08 PROCEDURE — 94761 N-INVAS EAR/PLS OXIMETRY MLT: CPT

## 2024-03-08 PROCEDURE — 2700000000 HC OXYGEN THERAPY PER DAY

## 2024-03-08 PROCEDURE — 99223 1ST HOSP IP/OBS HIGH 75: CPT | Performed by: STUDENT IN AN ORGANIZED HEALTH CARE EDUCATION/TRAINING PROGRAM

## 2024-03-08 PROCEDURE — 6370000000 HC RX 637 (ALT 250 FOR IP): Performed by: STUDENT IN AN ORGANIZED HEALTH CARE EDUCATION/TRAINING PROGRAM

## 2024-03-08 PROCEDURE — 80048 BASIC METABOLIC PNL TOTAL CA: CPT

## 2024-03-08 PROCEDURE — 82140 ASSAY OF AMMONIA: CPT

## 2024-03-08 PROCEDURE — 82948 REAGENT STRIP/BLOOD GLUCOSE: CPT

## 2024-03-08 PROCEDURE — 94640 AIRWAY INHALATION TREATMENT: CPT

## 2024-03-08 PROCEDURE — 85027 COMPLETE CBC AUTOMATED: CPT

## 2024-03-08 PROCEDURE — 6370000000 HC RX 637 (ALT 250 FOR IP)

## 2024-03-08 PROCEDURE — 99238 HOSP IP/OBS DSCHRG MGMT 30/<: CPT

## 2024-03-08 PROCEDURE — 92526 ORAL FUNCTION THERAPY: CPT

## 2024-03-08 RX ORDER — OXYCODONE HCL 20 MG/ML
2.6 CONCENTRATE, ORAL ORAL
Status: DISCONTINUED | OUTPATIENT
Start: 2024-03-08 | End: 2024-03-09 | Stop reason: HOSPADM

## 2024-03-08 RX ORDER — IPRATROPIUM BROMIDE AND ALBUTEROL SULFATE 2.5; .5 MG/3ML; MG/3ML
1 SOLUTION RESPIRATORY (INHALATION)
Status: CANCELLED | OUTPATIENT
Start: 2024-03-08

## 2024-03-08 RX ORDER — OXYCODONE HCL 20 MG/ML
2.6 CONCENTRATE, ORAL ORAL
Status: CANCELLED | OUTPATIENT
Start: 2024-03-08

## 2024-03-08 RX ORDER — FENTANYL CITRATE 50 UG/ML
50 INJECTION, SOLUTION INTRAMUSCULAR; INTRAVENOUS
Status: DISCONTINUED | OUTPATIENT
Start: 2024-03-08 | End: 2024-03-08 | Stop reason: HOSPADM

## 2024-03-08 RX ORDER — HYOSCYAMINE SULFATE 0.125 MG
125 TABLET,DISINTEGRATING ORAL
Status: DISCONTINUED | OUTPATIENT
Start: 2024-03-08 | End: 2024-03-09 | Stop reason: HOSPADM

## 2024-03-08 RX ORDER — GUAIFENESIN 600 MG/1
600 TABLET, EXTENDED RELEASE ORAL 2 TIMES DAILY
Status: DISCONTINUED | OUTPATIENT
Start: 2024-03-08 | End: 2024-03-09 | Stop reason: HOSPADM

## 2024-03-08 RX ORDER — ALBUTEROL SULFATE 2.5 MG/3ML
2.5 SOLUTION RESPIRATORY (INHALATION) EVERY 6 HOURS PRN
Status: DISCONTINUED | OUTPATIENT
Start: 2024-03-08 | End: 2024-03-09 | Stop reason: HOSPADM

## 2024-03-08 RX ORDER — HALOPERIDOL 2 MG/ML
5 SOLUTION ORAL EVERY 4 HOURS PRN
Status: CANCELLED | OUTPATIENT
Start: 2024-03-08

## 2024-03-08 RX ORDER — HALOPERIDOL 2 MG/ML
5 SOLUTION ORAL EVERY 4 HOURS PRN
Status: DISCONTINUED | OUTPATIENT
Start: 2024-03-08 | End: 2024-03-09 | Stop reason: HOSPADM

## 2024-03-08 RX ORDER — HYOSCYAMINE SULFATE 0.125 MG
125 TABLET,DISINTEGRATING ORAL
Status: CANCELLED | OUTPATIENT
Start: 2024-03-08

## 2024-03-08 RX ORDER — GUAIFENESIN 600 MG/1
600 TABLET, EXTENDED RELEASE ORAL 2 TIMES DAILY
Status: CANCELLED | OUTPATIENT
Start: 2024-03-08

## 2024-03-08 RX ORDER — ROPINIROLE 0.25 MG/1
0.25 TABLET, FILM COATED ORAL NIGHTLY
Status: CANCELLED | OUTPATIENT
Start: 2024-03-08

## 2024-03-08 RX ORDER — LORAZEPAM 2 MG/ML
0.5 INJECTION INTRAMUSCULAR
Status: DISCONTINUED | OUTPATIENT
Start: 2024-03-08 | End: 2024-03-09 | Stop reason: HOSPADM

## 2024-03-08 RX ORDER — HALOPERIDOL 5 MG/ML
5 INJECTION INTRAMUSCULAR EVERY 4 HOURS PRN
Status: CANCELLED | OUTPATIENT
Start: 2024-03-08

## 2024-03-08 RX ORDER — LORAZEPAM 2 MG/ML
0.5 CONCENTRATE ORAL
Status: CANCELLED | OUTPATIENT
Start: 2024-03-08

## 2024-03-08 RX ORDER — ACETAMINOPHEN 650 MG/1
650 SUPPOSITORY RECTAL EVERY 4 HOURS PRN
Status: DISCONTINUED | OUTPATIENT
Start: 2024-03-08 | End: 2024-03-09 | Stop reason: HOSPADM

## 2024-03-08 RX ORDER — ROPINIROLE 0.25 MG/1
0.25 TABLET, FILM COATED ORAL NIGHTLY
Status: DISCONTINUED | OUTPATIENT
Start: 2024-03-08 | End: 2024-03-09 | Stop reason: HOSPADM

## 2024-03-08 RX ORDER — LORAZEPAM 2 MG/ML
0.5 INJECTION INTRAMUSCULAR
Status: CANCELLED | OUTPATIENT
Start: 2024-03-08

## 2024-03-08 RX ORDER — ACETAMINOPHEN 650 MG/1
650 SUPPOSITORY RECTAL EVERY 4 HOURS PRN
Status: CANCELLED | OUTPATIENT
Start: 2024-03-08

## 2024-03-08 RX ORDER — HALOPERIDOL 5 MG/ML
5 INJECTION INTRAMUSCULAR EVERY 4 HOURS PRN
Status: DISCONTINUED | OUTPATIENT
Start: 2024-03-08 | End: 2024-03-09 | Stop reason: HOSPADM

## 2024-03-08 RX ORDER — FENTANYL CITRATE 50 UG/ML
50 INJECTION, SOLUTION INTRAMUSCULAR; INTRAVENOUS
Status: DISCONTINUED | OUTPATIENT
Start: 2024-03-08 | End: 2024-03-09 | Stop reason: HOSPADM

## 2024-03-08 RX ORDER — GLYCOPYRROLATE 0.2 MG/ML
0.4 INJECTION INTRAMUSCULAR; INTRAVENOUS
Status: CANCELLED | OUTPATIENT
Start: 2024-03-08

## 2024-03-08 RX ORDER — FENTANYL CITRATE 50 UG/ML
50 INJECTION, SOLUTION INTRAMUSCULAR; INTRAVENOUS
Status: CANCELLED | OUTPATIENT
Start: 2024-03-08

## 2024-03-08 RX ORDER — GLYCOPYRROLATE 0.2 MG/ML
0.4 INJECTION INTRAMUSCULAR; INTRAVENOUS
Status: DISCONTINUED | OUTPATIENT
Start: 2024-03-08 | End: 2024-03-08 | Stop reason: HOSPADM

## 2024-03-08 RX ORDER — LANOLIN ALCOHOL/MO/W.PET/CERES
4.5 CREAM (GRAM) TOPICAL NIGHTLY PRN
Status: DISCONTINUED | OUTPATIENT
Start: 2024-03-08 | End: 2024-03-09 | Stop reason: HOSPADM

## 2024-03-08 RX ORDER — LORAZEPAM 2 MG/ML
0.5 CONCENTRATE ORAL
Status: DISCONTINUED | OUTPATIENT
Start: 2024-03-08 | End: 2024-03-09 | Stop reason: HOSPADM

## 2024-03-08 RX ORDER — HALOPERIDOL 5 MG/ML
5 INJECTION INTRAMUSCULAR EVERY 4 HOURS PRN
Status: DISCONTINUED | OUTPATIENT
Start: 2024-03-08 | End: 2024-03-08 | Stop reason: HOSPADM

## 2024-03-08 RX ORDER — IPRATROPIUM BROMIDE AND ALBUTEROL SULFATE 2.5; .5 MG/3ML; MG/3ML
1 SOLUTION RESPIRATORY (INHALATION)
Status: DISCONTINUED | OUTPATIENT
Start: 2024-03-08 | End: 2024-03-08 | Stop reason: HOSPADM

## 2024-03-08 RX ORDER — IPRATROPIUM BROMIDE AND ALBUTEROL SULFATE 2.5; .5 MG/3ML; MG/3ML
1 SOLUTION RESPIRATORY (INHALATION)
Status: DISCONTINUED | OUTPATIENT
Start: 2024-03-08 | End: 2024-03-09 | Stop reason: HOSPADM

## 2024-03-08 RX ORDER — HALOPERIDOL 2 MG/ML
5 SOLUTION ORAL EVERY 4 HOURS PRN
Status: DISCONTINUED | OUTPATIENT
Start: 2024-03-08 | End: 2024-03-08 | Stop reason: HOSPADM

## 2024-03-08 RX ORDER — LANOLIN ALCOHOL/MO/W.PET/CERES
4.5 CREAM (GRAM) TOPICAL NIGHTLY PRN
Status: CANCELLED | OUTPATIENT
Start: 2024-03-08

## 2024-03-08 RX ORDER — GLYCOPYRROLATE 0.2 MG/ML
0.4 INJECTION INTRAMUSCULAR; INTRAVENOUS
Status: DISCONTINUED | OUTPATIENT
Start: 2024-03-08 | End: 2024-03-09 | Stop reason: HOSPADM

## 2024-03-08 RX ORDER — ALBUTEROL SULFATE 2.5 MG/3ML
2.5 SOLUTION RESPIRATORY (INHALATION) EVERY 6 HOURS PRN
Status: CANCELLED | OUTPATIENT
Start: 2024-03-08

## 2024-03-08 RX ADMIN — GLYCOPYRROLATE 0.4 MG: 0.2 INJECTION INTRAMUSCULAR; INTRAVENOUS at 14:25

## 2024-03-08 RX ADMIN — FENTANYL CITRATE 50 MCG: 50 INJECTION INTRAMUSCULAR; INTRAVENOUS at 19:32

## 2024-03-08 RX ADMIN — GLYCOPYRROLATE 0.4 MG: 0.2 INJECTION INTRAMUSCULAR; INTRAVENOUS at 22:58

## 2024-03-08 RX ADMIN — FENTANYL CITRATE 50 MCG: 50 INJECTION INTRAMUSCULAR; INTRAVENOUS at 15:36

## 2024-03-08 RX ADMIN — GLYCOPYRROLATE 0.2 MG: 0.2 INJECTION INTRAMUSCULAR; INTRAVENOUS at 02:03

## 2024-03-08 RX ADMIN — HALOPERIDOL 5 MG: 2 SOLUTION ORAL at 11:14

## 2024-03-08 RX ADMIN — FENTANYL CITRATE 25 MCG: 50 INJECTION, SOLUTION INTRAMUSCULAR; INTRAVENOUS at 02:03

## 2024-03-08 RX ADMIN — FENTANYL CITRATE 50 MCG: 50 INJECTION INTRAMUSCULAR; INTRAVENOUS at 14:25

## 2024-03-08 RX ADMIN — HALOPERIDOL LACTATE 5 MG: 5 INJECTION, SOLUTION INTRAMUSCULAR at 15:36

## 2024-03-08 RX ADMIN — HALOPERIDOL LACTATE 2 MG: 5 INJECTION, SOLUTION INTRAMUSCULAR at 03:51

## 2024-03-08 RX ADMIN — GLYCOPYRROLATE 0.2 MG: 0.2 INJECTION INTRAMUSCULAR; INTRAVENOUS at 08:56

## 2024-03-08 RX ADMIN — IPRATROPIUM BROMIDE AND ALBUTEROL SULFATE 1 DOSE: .5; 3 SOLUTION RESPIRATORY (INHALATION) at 08:01

## 2024-03-08 RX ADMIN — LORAZEPAM 0.5 MG: 2 INJECTION INTRAMUSCULAR; INTRAVENOUS at 22:59

## 2024-03-08 RX ADMIN — FENTANYL CITRATE 50 MCG: 50 INJECTION INTRAMUSCULAR; INTRAVENOUS at 22:54

## 2024-03-08 RX ADMIN — HALOPERIDOL LACTATE 2 MG: 5 INJECTION, SOLUTION INTRAMUSCULAR at 08:56

## 2024-03-08 RX ADMIN — FENTANYL CITRATE 25 MCG: 50 INJECTION, SOLUTION INTRAMUSCULAR; INTRAVENOUS at 09:45

## 2024-03-08 RX ADMIN — FENTANYL CITRATE 50 MCG: 50 INJECTION INTRAMUSCULAR; INTRAVENOUS at 12:06

## 2024-03-08 RX ADMIN — FENTANYL CITRATE 50 MCG: 50 INJECTION INTRAMUSCULAR; INTRAVENOUS at 18:10

## 2024-03-08 RX ADMIN — FENTANYL CITRATE 50 MCG: 50 INJECTION INTRAMUSCULAR; INTRAVENOUS at 21:32

## 2024-03-08 ASSESSMENT — PAIN DESCRIPTION - ORIENTATION: ORIENTATION: MID

## 2024-03-08 ASSESSMENT — PAIN SCALES - GENERAL: PAINLEVEL_OUTOF10: 7

## 2024-03-08 ASSESSMENT — PAIN DESCRIPTION - LOCATION: LOCATION: PELVIS

## 2024-03-08 ASSESSMENT — PAIN DESCRIPTION - DESCRIPTORS: DESCRIPTORS: ACHING;CRAMPING

## 2024-03-08 NOTE — PLAN OF CARE
Problem: Discharge Planning  Goal: Discharge to home or other facility with appropriate resources  Outcome: Completed     Problem: Pain  Goal: Verbalizes/displays adequate comfort level or baseline comfort level  Outcome: Completed     Problem: Skin/Tissue Integrity  Goal: Absence of new skin breakdown  Description: 1.  Monitor for areas of redness and/or skin breakdown  2.  Assess vascular access sites hourly  3.  Every 4-6 hours minimum:  Change oxygen saturation probe site  4.  Every 4-6 hours:  If on nasal continuous positive airway pressure, respiratory therapy assess nares and determine need for appliance change or resting period.  Outcome: Completed     Problem: ABCDS Injury Assessment  Goal: Absence of physical injury  Outcome: Completed     Problem: Safety - Adult  Goal: Free from fall injury  Outcome: Completed     Problem: Chronic Conditions and Co-morbidities  Goal: Patient's chronic conditions and co-morbidity symptoms are monitored and maintained or improved  Outcome: Completed     Problem: Respiratory - Adult  Goal: Achieves optimal ventilation and oxygenation  Outcome: Completed     Problem: Respiratory - Adult  Goal: Clear lung sounds  3/8/2024 1129 by Rosaura Guerin RN  Outcome: Completed     Problem: Nutrition Deficit:  Goal: Optimize nutritional status  Outcome: Completed   Care plan reviewed with patient and family.  Patient and family verbalize understanding of the plan of care and contribute to goal setting.

## 2024-03-08 NOTE — PLAN OF CARE
Problem: Discharge Planning  Goal: Discharge to home or other facility with appropriate resources  3/8/2024 1501 by Rosaura Guerin RN  Outcome: Not Progressing  Flowsheets (Taken 3/8/2024 1501)  Discharge to home or other facility with appropriate resources:   Identify barriers to discharge with patient and caregiver   Arrange for needed discharge resources and transportation as appropriate  3/8/2024 1441 by Rosaura Guerin RN  Outcome: Not Progressing     Problem: Chronic Conditions and Co-morbidities  Goal: Patient's chronic conditions and co-morbidity symptoms are monitored and maintained or improved  3/8/2024 1501 by Rosaura Guerin RN  Outcome: Not Progressing  3/8/2024 1441 by Rosaura Guerin RN  Outcome: Progressing     Problem: Respiratory - Adult  Goal: Achieves optimal ventilation and oxygenation  3/8/2024 1501 by Rosuara Guerin RN  Outcome: Not Progressing  Flowsheets (Taken 3/8/2024 1501)  Achieves optimal ventilation and oxygenation:   Assess for changes in respiratory status   Assess for changes in mentation and behavior   Position to facilitate oxygenation and minimize respiratory effort   Oxygen supplementation based on oxygen saturation or arterial blood gases  3/8/2024 1441 by Rosaura Guerin RN  Outcome: Not Progressing     Problem: Pain  Goal: Verbalizes/displays adequate comfort level or baseline comfort level  3/8/2024 1501 by Rosaura Guerin RN  Outcome: Not Progressing  Flowsheets (Taken 3/8/2024 1501)  Verbalizes/displays adequate comfort level or baseline comfort level:   Encourage patient to monitor pain and request assistance   Administer analgesics based on type and severity of pain and evaluate response   Implement non-pharmacological measures as appropriate and evaluate response  3/8/2024 1441 by Rosaura Guerin RN  Outcome: Not Progressing   Care plan reviewed with patient and family.  Patient and family verbalize understanding of the plan of care and contribute to  goal setting.

## 2024-03-08 NOTE — PLAN OF CARE
Problem: Discharge Planning  Goal: Discharge to home or other facility with appropriate resources  Outcome: Not Progressing     Problem: Safety - Adult  Goal: Free from fall injury  Outcome: Progressing     Problem: Chronic Conditions and Co-morbidities  Goal: Patient's chronic conditions and co-morbidity symptoms are monitored and maintained or improved  Outcome: Progressing     Problem: Skin/Tissue Integrity  Goal: Absence of new skin breakdown  Description: 1.  Monitor for areas of redness and/or skin breakdown  2.  Assess vascular access sites hourly  3.  Every 4-6 hours minimum:  Change oxygen saturation probe site  4.  Every 4-6 hours:  If on nasal continuous positive airway pressure, respiratory therapy assess nares and determine need for appliance change or resting period.  Outcome: Progressing  Note: No new skin breakdown this shift     Problem: Respiratory - Adult  Goal: Achieves optimal ventilation and oxygenation  Outcome: Not Progressing     Problem: Pain  Goal: Verbalizes/displays adequate comfort level or baseline comfort level  Outcome: Not Progressing     Problem: Discharge Planning  Goal: Discharge to home or other facility with appropriate resources  Outcome: Not Progressing     Problem: Respiratory - Adult  Goal: Achieves optimal ventilation and oxygenation  Outcome: Not Progressing     Problem: Pain  Goal: Verbalizes/displays adequate comfort level or baseline comfort level  Outcome: Not Progressing

## 2024-03-08 NOTE — PROGRESS NOTES
[facility]  INPATIENT SPEECH THERAPY  [facility] 7K  DAILY NOTE    TIME   SLP Individual Minutes  Time In: 904  Time Out: 912  Minutes: 8  Timed Code Treatment Minutes: 0 Minutes       Date: 3/8/2024  Patient Name: Meg Wilson      CSN: [id number]   : 1940  (83 y.o.)  Gender: female   Referring Physician: Samira Avendaño PA-C    Diagnosis: Closed nondisplaced fracture of pelvis (HCC)  Precautions: Fall risk , aspiration risk  Current Diet: NPO  Respiratory Status: Nasal Canula: 3LPM  Swallowing Strategies: Not Applicable  Date of Last MBS/FEES: Not Applicable    Pain:  No pain reported.    Subjective:  Visit approved by BRIGETTE Kaplan. Patient in bed resting, not alert and unable to follow directions.  and son at bedside     Short-Term Goals:  SHORT TERM GOAL #1:  Goal 1: Post oral care completion, patient will consume PO trials with ST only (as mental status permits) to determine appropriateness for PO diet initiation versus need for instrumental swallowing assessment.  INTERVENTIONS: Family provided education provided regarding how to complete oral care, rationale for oral care completion, and rationale for ST intervention. Patient unable to achieve adequate alertness levels for ST intervention. Oral care initiated with the lips and oral cavity covered with red scabs. Oral discontinued due to poor alertness levels and absent direction follow. ST reiterated importance of oral care as often as patient can tolerate.      SHORT TERM GOAL #2:  Goal 2: Monitor cognitive functioning (AMS, elevated ammonia levels, recurrent falls), determine baseline function and complete further assessment as clinically indicated.  INTERVENTIONS:Not appropriate on this date.       Functional Oral Intake Scale: Functional oral intake testing not appropriate at this time    EDUCATION:  Learner: Patient and Family  Education:  Reviewed results and recommendations of this evaluation, Reviewed ST goals  and Plan of Care, and Reviewed recommendations for follow-up  Evaluation of Education:  Family verbalizes understanding    ASSESSMENT/PLAN:  Activity Tolerance:  Patient tolerance of  treatment: poor.      Assessment/Plan: Patient not progressing toward established goals due to limited alertness.  Will modify plan of care as follows: ***.     Plan for Next Session: dysphagia therapy  Discharge Recommendations: Continue to Assess Pending Progress     ***

## 2024-03-08 NOTE — CARE COORDINATION
3/8/24, 11:12 AM EST    Patient goals/plan/ treatment preferences discussed by  and .  Patient goals/plan/ treatment preferences reviewed with patient/ family.  Patient/ family verbalize understanding of discharge plan and are in agreement with goal/plan/treatment preferences.  Understanding was demonstrated using the teach back method.  AVS provided by RN at time of discharge, which includes all necessary medical information pertaining to the patients current course of illness, treatment, post-discharge goals of care, and treatment preferences.     Services At/After Discharge: Hospice      IMM Letter  IMM Letter given to Patient/Family/Significant other/Guardian/POA/by:: patient access  IMM Letter date given:: 02/28/24  IMM Letter time given:: 1644     Meg is discharging and re-admitting as inpatient hospice on 5K. No new IMM per protocol.

## 2024-03-08 NOTE — PROGRESS NOTES
[facility]  OCCUPATIONAL THERAPY MISSED TREATMENT NOTE  [facility] ORTHOPEDICS 7K  7K-13/013-A      Date: 3/8/2024  Patient Name: Meg Wilson        CSN: [id number]   : 1940  (83 y.o.)  Gender: female   Referring Practitioner: Nikita Lagunas PA-C  Diagnosis: Fracture, sacrum/coccyx         REASON FOR MISSED TREATMENT:  Per nursing, patient transitioning to hospice, followed up with Danyell COURTNEY, no need for further OT at this time, will sign off.

## 2024-03-08 NOTE — PROGRESS NOTES
[facility]   PROGRESS NOTE      Patient: Meg Wilson  Room #: 7K-13/013-A            YOB: 1940  Age: 83 y.o.  Gender: female            Admit Date & Time: 3/8/2024 11:37 AM    Assessment:    The patient was encountered for an emotional distress consult. The patient's family was solemn and shared that the patient was recently placed on hospice. The patient did not respond to the visit but the present family shared their concerns.     Interventions:  The patient's spouse were encouraged to share stories related to the patient and their time together. The patient's family also shared life changes in careers as they prepared to change roles in life. This was a way of acknowledging the changing of roles that happens with death.     Outcomes:  The family was provided a prayer for end of life and encouraged to share stories as they prepare for the patient's death.     Plan:  1.Spiritual care will continue to follow the patient according to [facility] spiritual care SOP.       Electronically signed by Chaplain Denia, on 3/8/2024 at 1:46 PM.  Spiritual Care Department  [facility]  [phone]     03/08/24 1342   Encounter Summary   Encounter Overview/Reason  Spiritual/Emotional Needs;Rituals, Rites and Sacraments;Grief, Loss, and Adjustments;Follow-up   Service Provided For: Patient;Family;Patient and family together;Significant other   Referral/Consult From: Physician   Support System Children;Spouse;Family members   Last Encounter  03/08/24   Complexity of Encounter High   Begin Time 1320   End Time  1345   Total Time Calculated 25 min   Spiritual/Emotional needs   Type Spiritual Support;Difficult news received   Grief, Loss, and Adjustments   Type End of Life;Grief and loss;Anticipatory Grief;Hospice   Assessment/Intervention/Outcome   Assessment Calm;Coping;Sad   Intervention Active listening;Discussed death,

## 2024-03-08 NOTE — PROGRESS NOTES
Nutrition Note:  Due to change in medical/code status, dietitian will sign off.  Note hospice has been consulted. If nutrition intervention is required, please submit a dietitian consult.     Des Tenorio RD, LD  Phone: [phone]

## 2024-03-08 NOTE — PROGRESS NOTES
Hospice nurse visit to admit patient to [facility] hospice level of care. Discussed with Dr. Gerard prior to arrival to floor, pt meeting for inpatient level of hospice care with symptoms unable to be managed in other setting. Despite use of prn medications symptoms continence to not be welled controlled. Since 1600 on 03.07.2024, patient has had 100 mcg fentanyl IV push for pain and dyspnea, 4 doses of IV robinul for secretions, and 13 mg IV haldol for agitation and restlessness. On arrival to room to speak with family and sign paperwork, Meg with RDOS of 8 and CPOT of 6. Haldol 5 mg IV push given then fentanyl 50 mcg IV push given to assist with symptoms as well as robinul for increase secretions.   Paperwork signed by ARABELLA Griffin Sr. This nurse assisted with discharge/readmit. Updated Samira REYNOLDS of admission to inpatient hospice, she will complete discharge summary for acute admission. PCP- Dr. Arredondo notified of patient admission to hospice care.        [facility] Admit Date: 03.08.2024    Terminal Diagnosis: Closed nondisplaced fracture of pelvis    Secondary Diagnosis: : ALZHEIMER'S DEMENTIA, HYPOTONIC HYPONATREMIA, ACUTE ON CHRONIC BENNIE, NARDA, CAD, A FIB, PAD, AAA, GERD, HX CVA, MAJOR DEPRESSION, IBS, HX CROHNS DISEASE, DM, DYSLIPIDEMIA, ASHD, AORTIC STENOSIS, CHRONIC LOW BACK PAIN    Reviewed Nursing Notes for Previous 24 hours of Inpatient Charting:       Pain Scale:   CPOT 6 during admission     Graphics:   /62   Pulse (!) 112   Temp 98.6 °F (37 °C) (Axillary)   Resp 20   Ht 1.676 m (5' 6\")   Wt 69.7 kg (153 lb 10.6 oz)   LMP  (LMP Unknown)   SpO2 93%   BMI 24.80 kg/m²     Assessment (Head to Toe):   RESPIRATORY:  rhonchi upper lobes, diminished bases, tachypnea, dyspnea   CARDIOVASCULAR: tachycardia  GASTROINTESTINAL:  soft, distended non-tender, hypoactive bowel sounds   GENITOURINARY:  negative  SKIN:  external catheter  NEUROLOGICAL:  terminal restlessness, semi-comatose    Current

## 2024-03-08 NOTE — PLAN OF CARE
Problem: Respiratory - Adult  Goal: Clear lung sounds  Outcome: Progressing   Continue breathing medication to improve lung sounds. Family mutually agreed on goals.

## 2024-03-08 NOTE — PROGRESS NOTES
[facility]  PHYSICAL THERAPY MISSED TREATMENT NOTE  [facility] ORTHOPEDICS 7K    Date: 3/8/2024  Patient Name: Meg Wilson        MRN: [id number]   : 1940  (83 y.o.)  Gender: female   Referring Practitioner: RODOLFO De La Cruz  Diagnosis: fracture, sacrum/coccyx         REASON FOR MISSED TREATMENT:  Per RN, patient is transporting to hospice, per OT followed up with Danyell COURTNEY with no further PT required at this time, will sign off .

## 2024-03-08 NOTE — RT PROTOCOL NOTE
RT Inhaler-Nebulizer Bronchodilator Protocol Note    There is a bronchodilator order in the chart from a provider indicating to follow the RT Bronchodilator Protocol and there is an “Initiate RT Inhaler-Nebulizer Bronchodilator Protocol” order as well (see protocol at bottom of note).    CXR Findings:  XR CHEST PORTABLE    Result Date: 3/7/2024  Impression: Vague right upper lobe opacity concerning for infiltrate. This document has been electronically signed by: Salazar Gustafson MD on 03/07/2024 03:39 AM      The findings from the last RT Protocol Assessment were as follows:   History Pulmonary Disease: Smoker 15 pack years or more  Respiratory Pattern: Mild dyspnea at rest, irregular pattern, or RR 21-25 bpm  Breath Sounds: Inspiratory and expiratory or bilateral wheezing and/or rhonchi  Cough: Unable to generate effective cough  Indication for Bronchodilator Therapy: Decreased or absent breath sounds  Bronchodilator Assessment Score: 15    Aerosolized bronchodilator medication orders have been revised according to the RT Inhaler-Nebulizer Bronchodilator Protocol below.    Respiratory Therapist to perform RT Therapy Protocol Assessment initially then follow the protocol.  Repeat RT Therapy Protocol Assessment PRN for score 0-3 or on second treatment, BID, and PRN for scores above 3.    No Indications - adjust the frequency to every 6 hours PRN wheezing or bronchospasm, if no treatments needed after 48 hours then discontinue using Per Protocol order mode.     If indication present, adjust the RT bronchodilator orders based on the Bronchodilator Assessment Score as indicated below.  Use Inhaler orders unless patient has one or more of the following: on home nebulizer, not able to hold breath for 10 seconds, is not alert and oriented, cannot activate and use MDI correctly, or respiratory rate 25 breaths per minute or more, then use the equivalent nebulizer order(s) with same Frequency and PRN reasons based on the  score.  If a patient is on this medication at home then do not decrease Frequency below that used at home.    0-3 - enter or revise RT bronchodilator order(s) to equivalent RT Bronchodilator order with Frequency of every 4 hours PRN for wheezing or increased work of breathing using Per Protocol order mode.        4-6 - enter or revise RT Bronchodilator order(s) to two equivalent RT bronchodilator orders with one order with BID Frequency and one order with Frequency of every 4 hours PRN wheezing or increased work of breathing using Per Protocol order mode.        7-10 - enter or revise RT Bronchodilator order(s) to two equivalent RT bronchodilator orders with one order with TID Frequency and one order with Frequency of every 4 hours PRN wheezing or increased work of breathing using Per Protocol order mode.       11-13 - enter or revise RT Bronchodilator order(s) to one equivalent RT bronchodilator order with QID Frequency and an Albuterol order with Frequency of every 4 hours PRN wheezing or increased work of breathing using Per Protocol order mode.      Greater than 13 - enter or revise RT Bronchodilator order(s) to one equivalent RT bronchodilator order with every 4 hours Frequency and an Albuterol order with Frequency of every 2 hours PRN wheezing or increased work of breathing using Per Protocol order mode.     RT to enter RT Home Evaluation for COPD & MDI Assessment order using Per Protocol order mode.    Electronically signed by Savannah Ron RCP on 3/8/2024 at 8:29 AM

## 2024-03-08 NOTE — PROGRESS NOTES
SN hospice visit made to provide support and assist with care as needed. On arrival patient with noted RDOS of 7 with CPOT of 4, terminal secretions audible. Obtained Fentanyl 50 mcg IV  and robinul 0.4 IV, given. Support provided to family with education done on s/s of dying process that patient exhibiting, as well as s/s that may see. Face washed, mouth care done.   Hospitality cart ordered.

## 2024-03-08 NOTE — DISCHARGE SUMMARY
Hospitalist Discharge Summary    Patient: Meg Wilson  YOB: 1940  MRN: [id number]   Acct: [id number]    Primary Care Physician: Elver Arredondo DO    Admit date  2/28/2024    Discharge date: 3/8/2024     Discharge Assessment and Plan:    Goals of care: Pt's , son, and daughter in law agreeable to move forward with GIP 3/8/24. Hospice services notified. DNR-CC code status     Acute encephalopathy/delirium with baseline alzheimer's dementia  Dyspnea  Hypotonic hyponatremia  Decompensated cirrhosis, ascites  Multiple falls  Acute on chronic BENNIE  NARDA versus CKD with chronic metabolic acidosis  CAD  Atrial fibrillation  chronic HFpEF, mild aortic stenosis  PAD: Noted  History of subclavian artery stenosis: S/p L CCA to LICA vein bypass in 2018  History of DM: Not on medications, A1c 2/13/2024 5.4%.  Accu-Cheks.  AAA  GERD  History of CVA: Not on aspirin, continue statin      Chief Complaint on presentation: Multiple falls     Initial H&P / Hospital Course:   Per initial consult 2/28 \"Meg Wilson83 y.o. female who we are asked to see/evaluate by Dr Allan for medical management. Patient has a h/o multiple/recurrent falls at home. Imaging reveals right sacral ala, right inferior pubic ramus, and right pubic bone fractures. Imaging also showed large amount of ascites. Patient has a PMHx that includes: Liver cirrhosis, coronary artery disease, mild aortic stenosis, hypertension, AAA, BENNIE, PAD, diabetes mellitus, GERD, history of CVA, Alzheimer's dementia.  Patient is alert, pleasant, oriented x 3 but does have difficulty with recall.   and son are at bedside and help with HPI.   states physical therapy home health was working with patient today, patient was doing well and was going to be discharged from their service.  Shortly after therapy left the house he heard the patient fall to the ground finding her on her right side.  Did not hit furniture, no loss of      ASK your doctor about these medications      furosemide 40 MG tablet  Commonly known as: Lasix  Take 1 tablet by mouth 2 times daily     mirtazapine 7.5 MG tablet  Commonly known as: REMERON  Take 1 tablet by mouth nightly     rOPINIRole 0.25 MG tablet  Commonly known as: REQUIP  Take 1 tablet by mouth nightly . May increase by 1 tablet night once a week to a max of 4 tabs at night.     * spironolactone 100 MG tablet  Commonly known as: ALDACTONE  Take 1 tablet by mouth daily     * spironolactone 50 MG tablet  Commonly known as: ALDACTONE  Take 1 tablet by mouth daily           * This list has 2 medication(s) that are the same as other medications prescribed for you. Read the directions carefully, and ask your doctor or other care provider to review them with you.                   Patient Instructions:    Discharge lab work: none  Activity: activity as tolerated  Diet: No diet orders on file      Follow-up visits:   Jens Griffiths [place] [address] [address]  [place] [address]  [phone]             Disposition: Inpatient hospice  Condition at Discharge:  terminal    Time Spent: 30 minutes    Signed:    Thank you Elver Arredondo DO for the opportunity to be involved in this patient's care.    Electronically signed by Samira Avendaño PA-C on 3/8/2024 at 4:40 PM  Discharging Hospitalist

## 2024-03-09 VITALS
DIASTOLIC BLOOD PRESSURE: 56 MMHG | RESPIRATION RATE: 20 BRPM | OXYGEN SATURATION: 91 % | SYSTOLIC BLOOD PRESSURE: 97 MMHG | HEART RATE: 129 BPM | TEMPERATURE: 98.1 F

## 2024-03-09 PROBLEM — R09.89 TERMINAL RESPIRATORY SECRETIONS: Status: ACTIVE | Noted: 2024-03-09

## 2024-03-09 PROBLEM — Z51.5 HOSPICE CARE PATIENT: Status: ACTIVE | Noted: 2024-03-09

## 2024-03-09 PROBLEM — R69 LATE STAGE TERMINAL ILLNESS: Status: ACTIVE | Noted: 2024-03-09

## 2024-03-09 PROBLEM — R45.1 TERMINAL RESTLESSNESS: Status: ACTIVE | Noted: 2024-03-09

## 2024-03-09 LAB
BACTERIA SPEC ANAEROBE CULT: NORMAL
BACTERIA SPEC BFLD CULT: NORMAL
GRAM STN SPEC: NORMAL

## 2024-03-09 PROCEDURE — 6360000002 HC RX W HCPCS: Performed by: STUDENT IN AN ORGANIZED HEALTH CARE EDUCATION/TRAINING PROGRAM

## 2024-03-09 RX ADMIN — FENTANYL CITRATE 50 MCG: 50 INJECTION INTRAMUSCULAR; INTRAVENOUS at 07:31

## 2024-03-09 RX ADMIN — FENTANYL CITRATE 50 MCG: 50 INJECTION INTRAMUSCULAR; INTRAVENOUS at 03:45

## 2024-03-09 RX ADMIN — LORAZEPAM 0.5 MG: 2 INJECTION INTRAMUSCULAR; INTRAVENOUS at 06:11

## 2024-03-09 RX ADMIN — GLYCOPYRROLATE 0.4 MG: 0.2 INJECTION INTRAMUSCULAR; INTRAVENOUS at 12:45

## 2024-03-09 RX ADMIN — FENTANYL CITRATE 50 MCG: 50 INJECTION INTRAMUSCULAR; INTRAVENOUS at 00:14

## 2024-03-09 RX ADMIN — GLYCOPYRROLATE 0.4 MG: 0.2 INJECTION INTRAMUSCULAR; INTRAVENOUS at 03:46

## 2024-03-09 RX ADMIN — GLYCOPYRROLATE 0.4 MG: 0.2 INJECTION INTRAMUSCULAR; INTRAVENOUS at 07:31

## 2024-03-09 RX ADMIN — LORAZEPAM 0.5 MG: 2 INJECTION INTRAMUSCULAR; INTRAVENOUS at 10:15

## 2024-03-09 RX ADMIN — FENTANYL CITRATE 50 MCG: 50 INJECTION INTRAMUSCULAR; INTRAVENOUS at 12:44

## 2024-03-09 RX ADMIN — FENTANYL CITRATE 50 MCG: 50 INJECTION INTRAMUSCULAR; INTRAVENOUS at 06:11

## 2024-03-09 RX ADMIN — LORAZEPAM 0.5 MG: 2 INJECTION INTRAMUSCULAR; INTRAVENOUS at 07:31

## 2024-03-09 RX ADMIN — FENTANYL CITRATE 50 MCG: 50 INJECTION INTRAMUSCULAR; INTRAVENOUS at 10:13

## 2024-03-09 ASSESSMENT — PAIN SCALES - WONG BAKER: WONGBAKER_NUMERICALRESPONSE: 6

## 2024-03-09 NOTE — H&P
LCCA-->LSCA vein bypass in 2018       Past Surgical History:   has a past surgical history that includes Colonoscopy (); Hysterectomy; Upper gastrointestinal endoscopy; Cholecystectomy (yrs ago); Tonsillectomy; laryngoscopy (10/29/2012 Dr Zheng); Appendectomy; Endoscopy, colon, diagnostic; skin biopsy; hiatal hernia repair; Cardiac catheterization (2016); Abdomen surgery; eye surgery; hernia repair; Coronary artery bypass graft (2016); pr bypass w/vein carotid-subclv/subclavian carotid (Left, 2018); EGD (); and Mohs surgery (Left, 2023).    Social History:   Social History     Socioeconomic History    Marital status:      Spouse name: Jairo    Number of children: 4   Occupational History    Occupation: retired   Tobacco Use    Smoking status: Former     Current packs/day: 0.00     Average packs/day: 0.5 packs/day for 25.0 years (12.5 ttl pk-yrs)     Types: Cigarettes     Start date: 11/3/1975     Quit date: 11/3/2000     Years since quittin.3    Smokeless tobacco: Never    Tobacco comments:     quit in    Vaping Use    Vaping Use: Never used   Substance and Sexual Activity    Alcohol use: No    Drug use: No    Sexual activity: Not Currently     Partners: Male     Social Determinants of Health     Financial Resource Strain: Low Risk  (2023)    Overall Financial Resource Strain (CARDIA)     Difficulty of Paying Living Expenses: Not hard at all   Food Insecurity: No Food Insecurity (3/8/2024)    Hunger Vital Sign     Worried About Running Out of Food in the Last Year: Never true     Ran Out of Food in the Last Year: Never true   Transportation Needs: Unmet Transportation Needs (3/8/2024)    PRAPARE - Transportation     Lack of Transportation (Medical): Yes     Lack of Transportation (Non-Medical): Yes   Physical Activity: Inactive (2024)    Exercise Vital Sign     Days of Exercise per Week: 0 days     Minutes of Exercise per Session: 0 min   Housing

## 2024-03-09 NOTE — PROGRESS NOTES
Hospice nurse visit to assess and assist with care.  Patient unresponsive, respirations slightly labored at 20.  Feet cool to touch, radial pulses absent.  Patient has received 10 doses of Fentanyl 50 mcg in past 24 hours with good effect.            GIP Admit Date: 03.08.2024     Terminal Diagnosis: Closed nondisplaced fracture of pelvis     Secondary Diagnosis: : ALZHEIMER'S DEMENTIA, HYPOTONIC HYPONATREMIA, ACUTE ON CHRONIC BENNIE, NARDA, CAD, A FIB, PAD, AAA, GERD, HX CVA, MAJOR DEPRESSION, IBS, HX CROHNS DISEASE, DM, DYSLIPIDEMIA, ASHD, AORTIC STENOSIS, CHRONIC LOW BACK PAIN     Reviewed Nursing Notes for Previous 24 hours of Inpatient Charting:       Pain Scale:   CPOT 6 during admission      Graphics:   /62   Pulse (!) 112   Temp 98.6 °F (37 °C) (Axillary)   Resp 20   Ht 1.676 m (5' 6\")   Wt 69.7 kg (153 lb 10.6 oz)   LMP  (LMP Unknown)   SpO2 93%   BMI 24.80 kg/m²      Assessment (Head to Toe):   RESPIRATORY:  rhonchi upper lobes, diminished bases, tachypnea, dyspnea   CARDIOVASCULAR: RRR  GASTROINTESTINAL:  soft, distended non-tender,bowel sounds absent  GENITOURINARY:  negative  SKIN:  external catheter  NEUROLOGICAL:  comatose, restless with repositioning     Current Medications:      Current Hospital Medications   Current Facility-Administered Medications: ipratropium 0.5 mg-albuterol 2.5 mg (DUONEB) nebulizer solution 1 Dose, 1 Dose, Inhalation, Q4H RT  fentaNYL (SUBLIMAZE) injection 50 mcg, 50 mcg, IntraVENous, Q1H PRN  haloperidol (HALDOL) 2 MG/ML oral solution 5 mg, 5 mg, Oral, Q4H PRN  haloperidol lactate (HALDOL) injection 5 mg, 5 mg, IntraVENous, Q4H PRN  glycopyrrolate (ROBINUL) injection 0.4 mg, 0.4 mg, IntraVENous, Q2H PRN  [Held by provider] furosemide (LASIX) tablet 40 mg, 40 mg, Oral, Daily  LORazepam (ATIVAN) 2 MG/ML concentrated solution 0.5 mg, 0.5 mg, Oral, Q1H PRN  LORazepam (ATIVAN) injection 0.5 mg, 0.5 mg, IntraVENous, Q1H PRN  hyoscyamine (OSCIMIN) dispersible tablet 125  GIP level of care.

## 2024-03-09 NOTE — PROGRESS NOTES
Patient  at 13:10. No respiration, no heart rate noted. Second RN Margret witnessed death.  House supervisor, hospice RN, Dr. Gerard notified.

## 2024-03-09 NOTE — DISCHARGE SUMMARY
[facility] Hospice Death Summary    Date: 3/9/2024  Name: Meg Wilson  MRN: [id number]  YOB: 1940     Patient's PCP: Elver Arredondo DO  Admit Date: 3/8/2024 to General Inpatient Hospice  Date of Death: 3/9/2024  at 1310  Acute care admission: 2/28/24-3/8/24  Admitting Physician: Surendra Gerard MD   Discharge Physician: Surendra Gerard MD   Consultation: none during General Inpatient Hospice stay    Invasive procedures: none during General Inpatient Hospice stay    Discharge Diagnoses:   Principal Problem:    Closed traumatic nondisplaced fracture of pelvis (HCC)  Active Problems:    Cirrhosis of liver (HCC)    Alzheimer's dementia (HCC)    Ascites of liver    Hospice care patient    Late stage terminal illness    Terminal restlessness    Terminal respiratory secretions  Resolved Problems:    * No resolved hospital problems. *      Brief HPI: Please refer to H&P for full HPI.  Briefly, Patient initially presented to the emergency department on February 28, 2024 after a fall at home.  She had been more confused at home.  Workup in the emergency department revealed a hip fracture.  She was then admitted to [facility] for further care.  Orthopedics opted for nonoperative management.  She had a paracentesis done for ascites secondary to cirrhosis.  She had 4.7 L drained on February 29, 2024.  Over the next several days her condition continued to decline.   Palliative care was consulted for Goals of Care and Symptom Management. Patient's condition declined overnight, she required frequent doses of IV Haldol, and fentanyl to control her symptoms and it only lasted for few hours.  On my evaluation this morning she was very restless.  Her family desires transitioning to comfort care.  Due to her uncontrolled symptoms we will admit her to [facility].     Hospital Course: The patient was admitted to [facility] for management of pain, terminal restlessness, and end of  life care. For complete details, please see the acute care History and Physical, progress notes, consultant notes and acute care discharge summary if applicable. The patient was received comfort medications, and symptoms were managed as best as possible. Emotional and spiritual support was provided to the patient and family. The patient  peacefully with family at the bedside.    Cause of death: Closed traumatic nondisplaced fracture of pelvis (HCC)    Parts of this note may have been dictated by use of voice recognition software and electronically transcribed. The note may contain errors not detected in proofreading    Electronically signed by Surendra Gerard MD on 3/9/2024 at 1:50 PM

## 2024-03-09 NOTE — DEATH NOTES
[facility]  Notice of Patient Passing      Patient Name- Meg Wilson   Acct Number- [id number]   Attending Physician- Surendra Gerard MD    Admitted on-3/8/2024 11:37 AM     On 3/9/2024 at 1310 patient was found in 5K06 with:   Absence of vital signs.   Absence of neurological response.    Confirmed time of death at 1310.   Physician or On-call Physician notified of time of death- yes    Family present at time of death- yes, Spouse   Spiritual care present at time of death- no    Physician was notified and orders were obtained to release the body.   Post-Mortem documentation completed; form printed, signed, and given to admitting.    Ann Camacho RN RN Nursing Supervisor/ Manager  3/9/24   2:10 PM

## 2024-03-09 NOTE — PLAN OF CARE
Problem: Discharge Planning  Goal: Discharge to home or other facility with appropriate resources  3/9/2024 0331 by Brianna Hathaway RN  Outcome: Progressing  Flowsheets (Taken 3/9/2024 0331)  Discharge to home or other facility with appropriate resources:   Identify barriers to discharge with patient and caregiver   Identify discharge learning needs (meds, wound care, etc)   Arrange for needed discharge resources and transportation as appropriate     Problem: Safety - Adult  Goal: Free from fall injury  3/9/2024 0331 by Brianna Hathaway RN  Outcome: Progressing  Flowsheets (Taken 3/9/2024 0331)  Free From Fall Injury: Instruct family/caregiver on patient safety     Problem: Chronic Conditions and Co-morbidities  Goal: Patient's chronic conditions and co-morbidity symptoms are monitored and maintained or improved  3/9/2024 0331 by Brianna Hathaway RN  Outcome: Progressing  Flowsheets (Taken 3/9/2024 0331)  Care Plan - Patient's Chronic Conditions and Co-Morbidity Symptoms are Monitored and Maintained or Improved:   Monitor and assess patient's chronic conditions and comorbid symptoms for stability, deterioration, or improvement   Collaborate with multidisciplinary team to address chronic and comorbid conditions and prevent exacerbation or deterioration     Problem: Skin/Tissue Integrity  Goal: Absence of new skin breakdown  Description: 1.  Monitor for areas of redness and/or skin breakdown  2.  Assess vascular access sites hourly  3.  Every 4-6 hours minimum:  Change oxygen saturation probe site  4.  Every 4-6 hours:  If on nasal continuous positive airway pressure, respiratory therapy assess nares and determine need for appliance change or resting period.  3/9/2024 0331 by Brianna Hathaway RN  Outcome: Progressing  Note: Ongoing assessment & interventions provided throughout shift.  Skin assessments provided.  Encouraging/assisting patient to turn as needed.       Problem: Respiratory - Adult  Goal:  Achieves optimal ventilation and oxygenation  3/9/2024 0331 by Brianna Hathaway, RN  Outcome: Progressing  Flowsheets (Taken 3/9/2024 0331)  Achieves optimal ventilation and oxygenation:   Assess for changes in respiratory status   Assess for changes in mentation and behavior     Problem: Pain  Goal: Verbalizes/displays adequate comfort level or baseline comfort level  3/9/2024 0331 by Brianna Hathaway, RN  Outcome: Progressing  Flowsheets (Taken 3/9/2024 0331)  Verbalizes/displays adequate comfort level or baseline comfort level:   Administer analgesics based on type and severity of pain and evaluate response   Encourage patient to monitor pain and request assistance     Care plan reviewed with patient.  Patient verbalizes understanding of the care plan and contributed to goal setting.

## 2024-03-12 LAB
BACTERIA SPEC ANAEROBE CULT: NORMAL
BACTERIA SPEC BFLD CULT: NORMAL
GRAM STN SPEC: NORMAL

## 2025-02-21 NOTE — TELEPHONE ENCOUNTER
CT has been scheduled for 9/15, arrive by 9:45am [place]  2hrs fasting    Spoke with [employer] they are going to refax the paper work needed for pt

## 2025-07-02 NOTE — PROGRESS NOTES
[facility]  OCCUPATIONAL THERAPY MISSED TREATMENT NOTE  [facility] ORTHOPEDICS 7K  [id number]      Date: 3/7/2024  Patient Name: Meg Wilson        CSN: [id number]   : 1940  (83 y.o.)  Gender: female   Referring Practitioner: Nikita Lagunas PA-C  Diagnosis: Fracture, sacrum/coccyx         REASON FOR MISSED TREATMENT: Patient Unavailable patient getting paracentesis this AM. Will attempt back as time allows.                 Vanesa Gloria is a 65 year old female who presents today for pre-operative evaluation.    Preoperative Evaluation    Procedure/Surgery: L foot bunionectomy   Date of procedure/surgery: 7/11/2025  Surgeon: Dr. Bimal Martinez    Previous surgeries: colonoscopy  Previous anesthesia: MAC  No family or personal hx of bleeding or clotting disorders  No family or personal hx of reactions to anesthesia    Able to walk without chest pain or SOB.    Anticoagulation/Anti-platelets: denies    Revised Cardiac Risk Index  High Risk Surgery (intraperitoneal, intrathoracic, suprainguinal, vascular) : yes  Hx of ischemic heart disease: no  Hx of CHF: no  Hx of TIA/CVA: no  Pre-op tx with insulin: no  Pre op Cr > 2: no      REVIEW OF SYSTEMS:  Constitutional: As Per HPI.  Respiratory: As per HPI.  Cardiovascular: As per HPI.  Endocrine: As per HPI.  Skin: As per HPI.  All Review of Systems is negative except as noted in HPI.      Vitals:    07/02/25 1027   BP: 120/79   Pulse: 66   Resp: 13   Temp: 97.4 °F (36.3 °C)       Physical Exam    Constitutional:       General: No acute distress.     Appearance: Normal appearance. Not ill-appearing or toxic-appearing.   Eyes:    Extraocular movements intact. Conjunctivae normal. Pupils are equal, round, and reactive to light.   HENT:      Nose: Nose normal. No congestion. Mucous membranes are moist.   Cardiovascular:      Rate and Rhythm: RRR, 2+ radial pulses. No murmurs, rubs or gallops. No LE edema bilaterally.  Pulmonary:      Effort: Pulmonary effort is normal. No respiratory distress or cyanosis. No wheezing or crackles..   Abdominal:   Abdomen soft, non distended, non tender. Normoactive bowel sounds. No guarding  Skin:     General: Skin is warm, cap refill < 2 seconds. No rash.  Neurological:      General: No focal deficit present, A&O x 3. Mental status is at baseline. Gait normal.   Psychiatric:         Mood and Affect: Mood normal. Behavior normal. Thought content normal. Judgment  normal.       Vanesa Gloria is a 65 year old female who presents today for pre-operative evaluation.    Pre-operative Clearance  Scheduled for bunionectomy 7/11/2025  Not on AC medications  Labs completed. Reviewed and reassuring  EKG completed in office- reassuring. No acute ischemic changes  Pt is medically optimized for upcoming procedure    Health maintenance  Mammogram completed June 18, 2024 normal  Colonoscopy completed 5/28/2021 normal next due 5/2031  Due for shingles COVID vaccines  Pap smear completed 6/2/2025 negative cotesting with HPV    This patient is medically optimized to undergo upcoming surgical procedure.    I have reviewed and edited the visit summary above and attest that it is accurate.     Johana Zavala MD

## (undated) DEVICE — TUBING PRSS 36 M F

## (undated) DEVICE — SUTURE PROL SZ 6-0 L24IN NONABSORBABLE BLU L13MM C-1 3/8 8726H

## (undated) DEVICE — APPLIER CLP L9.375IN APER 2.1MM CLS L3.8MM 20 SM TI CLP

## (undated) DEVICE — VESSEL LOOPS,MINI, RED: Brand: DEVON

## (undated) DEVICE — SPONGE LAP W18XL18IN WHT COT 4 PLY FLD STRUNG RADPQ DISP ST

## (undated) DEVICE — GLOVE ORANGE PI 7 1/2   MSG9075

## (undated) DEVICE — FOGARTY - HYDRAGRIP SURGICAL - CLAMP INSERTS: Brand: FOGARTY SOFTJAW

## (undated) DEVICE — TRAY CATHETER 16FR F INCLUDE BARDX IC COMPLT CARE DRNGE BG

## (undated) DEVICE — SUTURE GOR TX SZ 5-0 L30IN NONABSORBABLE L13MM THC-13 1/2 6M12A

## (undated) DEVICE — ROYAL SILK SURGICAL GOWN, XXL: Brand: CONVERTORS

## (undated) DEVICE — SPLINT ARMBRD W3XL10.5IN POLYFOAM DLX A LN

## (undated) DEVICE — GLOVE ORANGE PI 8   MSG9080

## (undated) DEVICE — Device: Brand: LIGHT GUARD™ FLEXIBLE LIGHT HANDLE COVER (2 EACH/PKG)

## (undated) DEVICE — SCANLAN® SUTURE BOOT™ INSTRUMENT JAW COVERS - ORIGINAL YELLOW, MINI PKG (3 PAIR/CARTRIDGE, 1 CARTRIDGE/PKG): Brand: SCANLAN® SUTURE BOOT™ INSTRUMENT JAW COVERS

## (undated) DEVICE — Z DISCONTINUED NO SUB IDED TAPE UMB W1/8XL36IN WHT COT STRND NONRADIOPAQUE

## (undated) DEVICE — JELLY,LUBE,STERILE,FLIP TOP,TUBE,2-OZ: Brand: MEDLINE

## (undated) DEVICE — GLOVE SURG SZ 6 THK91MIL LTX FREE SYN POLYISOPRENE ANTI

## (undated) DEVICE — 3M™ IOBAN™ 2 ANTIMICROBIAL INCISE DRAPE 6650EZ: Brand: IOBAN™ 2

## (undated) DEVICE — SET CATH 20GA L1.5IN RAD ART POLYUR RADPQ W/ INTEGR 0.018IN

## (undated) DEVICE — SPONGE GZ W4XL4IN COT 12 PLY TYP VII WVN C FLD DSGN

## (undated) DEVICE — DISSECTOR LAP DIA5MM BLNT TIP ENDOPATH

## (undated) DEVICE — COVER US PRB W5XL96IN LTX W/ GEL

## (undated) DEVICE — PRESSURE MONITORING SET: Brand: TRUWAVE

## (undated) DEVICE — SYRINGE IRRIG 60ML SFT PLIABLE BLB EZ TO GRP 1 HND USE W/

## (undated) DEVICE — GLOVE ORANGE PI 7   MSG9070

## (undated) DEVICE — APPLIER CLP L9.38IN M LIG TI DISP STR RNG HNDL LIGACLP

## (undated) DEVICE — SHEET, T, LAPAROTOMY, STERILE: Brand: MEDLINE

## (undated) DEVICE — AGENT HEMSTAT W2XL4IN OXIDIZED REGENERATED CELOS ABSRB SFT

## (undated) DEVICE — INTENDED FOR TISSUE SEPARATION, AND OTHER PROCEDURES THAT REQUIRE A SHARP SURGICAL BLADE TO PUNCTURE OR CUT.: Brand: BARD-PARKER ® CARBON RIB-BACK BLADES

## (undated) DEVICE — Device: Brand: RETRACT-O-TAPE 18G X 30.5CM BLUNT NEEDLE

## (undated) DEVICE — BASIC SINGLE BASIN BTC-LF: Brand: MEDLINE INDUSTRIES, INC.

## (undated) DEVICE — CATHETER F BLLN 5CC 16FR 2 W HYDRGEL COAT LESS TRAUM LUB

## (undated) DEVICE — CHLORAPREP 26ML CLEAR

## (undated) DEVICE — SUTURE PDS II SZ 3-0 L27IN ABSRB VLT L26MM SH 1/2 CIR Z316H

## (undated) DEVICE — BLADE OPHTH GRN ROUNDED TIP 1 SIDE SHRP GRINDLESS MINI-BLDE

## (undated) DEVICE — SET TRNQT STD 12FR TB LEN 7 IN 2 RED 2 BLU 2 CLR 16FR 2 L

## (undated) DEVICE — 48" PROBE COVER W/GEL, ULTRASOUND, STERILE: Brand: SITE-RITE

## (undated) DEVICE — SHEETS DRAW

## (undated) DEVICE — TOWEL,OR,DSP,ST,WHITE,DLX,XR,4/PK,20PK/C: Brand: MEDLINE

## (undated) DEVICE — 500ML,PRESSURE INFUSER W/STOPCOCK: Brand: MEDLINE

## (undated) DEVICE — FEMORAL ARTERY CATHETER SET: Brand: COOK

## (undated) DEVICE — PAD GEN USE BORDERED ADH 14IN 2IN AND 12IN 4IN GZ UNIV ST

## (undated) DEVICE — GOWN,SIRUS,NONRNF,SETINSLV,XL,20/CS: Brand: MEDLINE

## (undated) DEVICE — SOLUTION IV 1000ML LAC RINGERS PH 6.5 INJ USP VIAFLX PLAS

## (undated) DEVICE — MARKER,SKIN,WI/RULER AND LABELS: Brand: MEDLINE

## (undated) DEVICE — DRESSING GERM DIA1IN CNTR H DIA7MM BLU CHG ANTIMIC PROTCT

## (undated) DEVICE — SOLUTION IV 1000ML 0.9% SOD CHL PH 5 INJ USP VIAFLX PLAS

## (undated) DEVICE — GLUE TISS 10ML PLAS STD SPRD TIP SYR PLUNG PREFIL SKIN CLSR 5PK

## (undated) DEVICE — CHLORAPREP 26ML ORANGE

## (undated) DEVICE — SUREFIT, DUAL DISPERSIVE ELECTRODE, CONTACT QUALITY MONITOR: Brand: SUREFIT

## (undated) DEVICE — 3M™ TEGADERM™ TRANSPARENT FILM DRESSING FRAME STYLE, 1626W, 4 IN X 4-3/4 IN (10 CM X 12 CM), 50/CT 4CT/CASE: Brand: 3M™ TEGADERM™

## (undated) DEVICE — ULTRACLEAN ACCESSORY ELECTRODE 4" (10.16 CM) COATED BLADE WITH EXTENDED INSULATION: Brand: ULTRACLEAN

## (undated) DEVICE — DECANTER FLD 9IN ST BG FOR ASEP TRNSF OF FLD

## (undated) DEVICE — CATHETER URETH 20FR L16IN RED RUB INTMIT ROB MOD BARDX

## (undated) DEVICE — GOWN,SIRUS,NON REINFRCD,LARGE,SET IN SL: Brand: MEDLINE

## (undated) DEVICE — VESSEL LOOPS,MAXI, RED: Brand: DEVON

## (undated) DEVICE — SOLUTION IV 500ML 0.9% SOD CHL PH 5 INJ USP VIAFLX PLAS

## (undated) DEVICE — SUTURE PROL SZ 7-0 L24IN NONABSORBABLE BLU L9.3MM CC 3/8 8704H

## (undated) DEVICE — PACK PROCEDURE SURG SET UP SRMC

## (undated) DEVICE — 35 ML SYRINGE LUER-LOCK TIP: Brand: MONOJECT